# Patient Record
Sex: FEMALE | Race: WHITE | Employment: FULL TIME | URBAN - METROPOLITAN AREA
[De-identification: names, ages, dates, MRNs, and addresses within clinical notes are randomized per-mention and may not be internally consistent; named-entity substitution may affect disease eponyms.]

---

## 2017-03-27 DIAGNOSIS — R53.83 FATIGUE, UNSPECIFIED TYPE: ICD-10-CM

## 2017-03-27 DIAGNOSIS — E55.9 VITAMIN D DEFICIENCY: Primary | ICD-10-CM

## 2017-03-27 RX ORDER — PRAVASTATIN SODIUM 20 MG
20 TABLET ORAL EVERY EVENING
Qty: 90 TABLET | Refills: 1 | Status: SHIPPED | OUTPATIENT
Start: 2017-03-27 | End: 2018-06-11 | Stop reason: SDUPTHER

## 2017-04-06 DIAGNOSIS — R53.83 FATIGUE, UNSPECIFIED TYPE: ICD-10-CM

## 2017-04-06 DIAGNOSIS — E55.9 VITAMIN D DEFICIENCY: ICD-10-CM

## 2017-04-06 LAB
CHOLESTEROL, TOTAL: 165 MG/DL (ref 0–199)
FOLATE: 14.4 NG/ML (ref 7.3–26.1)
HDLC SERPL-MCNC: 46 MG/DL (ref 40–59)
LDL CHOLESTEROL CALCULATED: 97 MG/DL (ref 0–129)
TRIGL SERPL-MCNC: 108 MG/DL (ref 0–200)
VITAMIN B-12: 416 PG/ML (ref 211–946)
VITAMIN D 25-HYDROXY: 40.6 NG/ML (ref 30–100)

## 2017-04-14 LAB — HEPATITIS C ANTIBODY INTERPRETATION: NORMAL

## 2017-05-05 ENCOUNTER — EMPLOYEE WELLNESS (OUTPATIENT)
Dept: OTHER | Age: 57
End: 2017-05-05

## 2017-05-05 LAB
CHOLESTEROL, TOTAL: 166 MG/DL (ref 0–199)
GLUCOSE BLD-MCNC: 112 MG/DL (ref 74–109)
HDLC SERPL-MCNC: 48 MG/DL (ref 40–59)
LDL CHOLESTEROL CALCULATED: 99 MG/DL (ref 0–129)
TRIGL SERPL-MCNC: 94 MG/DL (ref 0–200)

## 2017-05-31 DIAGNOSIS — J98.8 RESPIRATORY TRACT INFECTION: Primary | ICD-10-CM

## 2017-05-31 RX ORDER — AZITHROMYCIN 250 MG/1
TABLET, FILM COATED ORAL
Qty: 1 PACKET | Refills: 0 | Status: SHIPPED | OUTPATIENT
Start: 2017-05-31 | End: 2017-06-10

## 2017-06-13 DIAGNOSIS — E55.9 VITAMIN D DEFICIENCY: ICD-10-CM

## 2017-06-13 RX ORDER — LISINOPRIL 10 MG/1
TABLET ORAL
Qty: 90 TABLET | Refills: 3 | Status: SHIPPED | OUTPATIENT
Start: 2017-06-13 | End: 2018-07-03 | Stop reason: SDUPTHER

## 2017-06-13 RX ORDER — BUPROPION HYDROCHLORIDE 200 MG/1
TABLET, EXTENDED RELEASE ORAL
Qty: 90 TABLET | Refills: 3 | Status: SHIPPED | OUTPATIENT
Start: 2017-06-13 | End: 2018-07-03 | Stop reason: SDUPTHER

## 2017-08-03 ENCOUNTER — OFFICE VISIT (OUTPATIENT)
Dept: SURGERY | Age: 57
End: 2017-08-03

## 2017-08-03 VITALS
WEIGHT: 230 LBS | DIASTOLIC BLOOD PRESSURE: 81 MMHG | HEART RATE: 78 BPM | SYSTOLIC BLOOD PRESSURE: 138 MMHG | HEIGHT: 65 IN | BODY MASS INDEX: 38.32 KG/M2

## 2017-08-03 DIAGNOSIS — E03.9 HYPOTHYROIDISM, UNSPECIFIED TYPE: ICD-10-CM

## 2017-08-03 DIAGNOSIS — E04.2 GOITER, NONTOXIC, MULTINODULAR: ICD-10-CM

## 2017-08-03 LAB
ANION GAP SERPL CALCULATED.3IONS-SCNC: 13 MEQ/L (ref 7–13)
BUN BLDV-MCNC: 11 MG/DL (ref 6–20)
CALCIUM SERPL-MCNC: 8.9 MG/DL (ref 8.6–10.2)
CHLORIDE BLD-SCNC: 104 MEQ/L (ref 98–107)
CO2: 24 MEQ/L (ref 22–29)
CREAT SERPL-MCNC: 0.85 MG/DL (ref 0.5–0.9)
CREATININE URINE: 189.4 MG/DL
GFR AFRICAN AMERICAN: >60
GFR NON-AFRICAN AMERICAN: >60
GLUCOSE BLD-MCNC: 127 MG/DL (ref 74–109)
GLUCOSE BLD-MCNC: 99 MG/DL
HBA1C MFR BLD: 6.9 % (ref 4.8–5.9)
MICROALBUMIN UR-MCNC: <1.2 MG/DL
MICROALBUMIN/CREAT UR-RTO: NORMAL MG/G (ref 0–30)
POTASSIUM SERPL-SCNC: 4.6 MEQ/L (ref 3.5–5.1)
SODIUM BLD-SCNC: 141 MEQ/L (ref 132–144)

## 2017-08-03 PROCEDURE — 82962 GLUCOSE BLOOD TEST: CPT | Performed by: INTERNAL MEDICINE

## 2017-08-03 PROCEDURE — 99213 OFFICE O/P EST LOW 20 MIN: CPT | Performed by: INTERNAL MEDICINE

## 2017-08-03 RX ORDER — LEVOTHYROXINE SODIUM 0.05 MG/1
TABLET ORAL
Qty: 90 TABLET | Refills: 3 | Status: SHIPPED | OUTPATIENT
Start: 2017-08-03 | End: 2018-10-06 | Stop reason: SDUPTHER

## 2017-08-19 ENCOUNTER — HOSPITAL ENCOUNTER (OUTPATIENT)
Dept: ULTRASOUND IMAGING | Age: 57
Discharge: HOME | End: 2017-08-19
Payer: COMMERCIAL

## 2017-08-19 DIAGNOSIS — E03.9 HYPOTHYROIDISM, UNSPECIFIED TYPE: ICD-10-CM

## 2017-08-19 PROCEDURE — 76536 US EXAM OF HEAD AND NECK: CPT

## 2017-08-24 ENCOUNTER — TELEPHONE (OUTPATIENT)
Dept: SURGERY | Age: 57
End: 2017-08-24

## 2017-09-11 DIAGNOSIS — E55.9 VITAMIN D DEFICIENCY: ICD-10-CM

## 2017-11-03 ENCOUNTER — OFFICE VISIT (OUTPATIENT)
Dept: SURGERY | Age: 57
End: 2017-11-03

## 2017-11-03 VITALS
BODY MASS INDEX: 38.15 KG/M2 | HEART RATE: 86 BPM | HEIGHT: 65 IN | SYSTOLIC BLOOD PRESSURE: 132 MMHG | DIASTOLIC BLOOD PRESSURE: 71 MMHG | WEIGHT: 229 LBS

## 2017-11-03 DIAGNOSIS — E03.9 HYPOTHYROIDISM, UNSPECIFIED TYPE: ICD-10-CM

## 2017-11-03 LAB
ANION GAP SERPL CALCULATED.3IONS-SCNC: 15 MEQ/L (ref 7–13)
BUN BLDV-MCNC: 13 MG/DL (ref 6–20)
CALCIUM SERPL-MCNC: 9.2 MG/DL (ref 8.6–10.2)
CHLORIDE BLD-SCNC: 103 MEQ/L (ref 98–107)
CO2: 26 MEQ/L (ref 22–29)
CREAT SERPL-MCNC: 0.83 MG/DL (ref 0.5–0.9)
GFR AFRICAN AMERICAN: >60
GFR NON-AFRICAN AMERICAN: >60
GLUCOSE BLD-MCNC: 111 MG/DL (ref 74–109)
GLUCOSE BLD-MCNC: 87 MG/DL
HBA1C MFR BLD: 6.8 % (ref 4.8–5.9)
POTASSIUM SERPL-SCNC: 4.6 MEQ/L (ref 3.5–5.1)
SODIUM BLD-SCNC: 144 MEQ/L (ref 132–144)
T4 FREE: 1.2 NG/DL (ref 0.93–1.7)
TSH SERPL DL<=0.05 MIU/L-ACNC: 0.87 UIU/ML (ref 0.27–4.2)

## 2017-11-03 PROCEDURE — 82962 GLUCOSE BLOOD TEST: CPT | Performed by: INTERNAL MEDICINE

## 2017-11-03 PROCEDURE — 99213 OFFICE O/P EST LOW 20 MIN: CPT | Performed by: INTERNAL MEDICINE

## 2017-11-03 RX ORDER — DEXTROMETHORPHAN HYDROBROMIDE AND PROMETHAZINE HYDROCHLORIDE 15; 6.25 MG/5ML; MG/5ML
5 SYRUP ORAL 3 TIMES DAILY PRN
Qty: 118 ML | Refills: 1 | Status: SHIPPED | OUTPATIENT
Start: 2017-11-03 | End: 2017-11-10

## 2017-11-03 NOTE — PROGRESS NOTES
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 This is a TR 1 lesion.           Impression       NORMAL SIZE THYROID GLAND       NO EVIDENCE OF DOMINANT NODULE.       0.9 X 0.6 X 0.6 CM NODULE RIGHT LOBE, TR3 LESION.  THE NODULE HAS DECREASED IN SIZE SINCE LAST EXAM.       ACR RECOMMENDATIONS FOR TR3 LESION:   TR3: MILDLY SUSPICIOUS; ? 1.5 CM FOLLOW UP, ? 2.5 CM FNA             FOLLOW UP: 1, 3 AND 5 YEARS       ACR RECOMMENDATIONS FOR  TR 1 LESION:   TR1: BENIGN; NO FNA REQUIRED               Patient Active Problem List   Diagnosis    Vitamin D deficiency    Weight gain    Generalized anxiety disorder    Other specified acquired hypothyroidism    Sleep apnea, obstructive    Type II diabetes mellitus, uncontrolled ([facility]    Hypothyroidism    Essential hypertension    Family history of coronary artery disease    History of tobacco abuse       Current Outpatient Prescriptions:     promethazine-dextromethorphan (PROMETHAZINE-DM) 6.25-15 MG/5ML syrup, Take 5 mLs by mouth 3 times daily as needed for Cough, Disp: 118 mL, Rfl: 1    metFORMIN (GLUCOPHAGE) 500 MG tablet, TAKE ONE TABLET BY MOUTH TWICE A DAY WITH MEALS, Disp: 180 tablet, Rfl: 3    levothyroxine (SYNTHROID) 50 MCG tablet, TAKE ONE TABLET BY MOUTH EVERY DAY, Disp: 90 tablet, Rfl: 03    Liraglutide (VICTOZA) 18 MG/3ML SOPN SC injection, Inject 1.8mg daily please give 9 pens for a 90 day supply, Disp: 9 Pen, Rfl: 3    lisinopril (PRINIVIL;ZESTRIL) 10 MG tablet, TAKE ONE TABLET BY MOUTH ONE TIME A DAY, Disp: 90 tablet, Rfl: 3    buPROPion (WELLBUTRIN SR) 200 MG extended release tablet, TAKE ONE TABLET BY MOUTH ONE TIME A DAY, Disp: 90 tablet, Rfl: 3    pravastatin (PRAVACHOL) 20 MG tablet, Take 1 tablet by mouth every evening, Disp: 90 tablet, Rfl: 1    Insulin Pen Needle (NOVOFINE) 32G X 6 MM MISC, Use qd, Disp: 100 each, Rfl: 3    aspirin 81 MG EC tablet, Take 81 mg by mouth daily, Disp: , Rfl:     Cholecalciferol (VITAMIN D) 2000 UNITS CAPS capsule, Take by mouth daily, Disp: , Rfl:     ibuprofen (ADVIL;MOTRIN) 200 MG tablet, Take 200 mg by mouth every 6 hours as needed for Pain, Disp: , Rfl:     loratadine (CLARITIN) 10 MG tablet, Take 10 mg by mouth daily, Disp: , Rfl:     meclizine (ANTIVERT) 12.5 MG tablet, Take one (1) tablet tid as needed for vertigo, Disp: 60 tablet, Rfl: 0    Review of Systems   Constitutional: Positive for fatigue. All other systems reviewed and are negative. Vitals:    11/03/17 1613   BP: 132/71   Site: Right Arm   Position: Sitting   Cuff Size: Large Adult   Pulse: 86   Weight: 229 lb (103.9 kg)   Height: 5' 5\" (1.651 m)       Objective:   Physical Exam   Constitutional: She appears well-developed and well-nourished. Eyes: Conjunctivae are normal.   Neck: Neck supple. Cardiovascular: Normal rate. Pulmonary/Chest: Effort normal.   Abdominal:   Obese    Musculoskeletal: Normal range of motion. Neurological: She is alert. Skin: Skin is warm. Psychiatric: She has a normal mood and affect. Results for Munir Lew (MRN [id number]) as of 11/3/2017 16:27   Ref. Range 11/3/2017 10:37   Sodium Latest Ref Range: 132 - 144 mEq/L 144   Potassium Latest Ref Range: 3.5 - 5.1 mEq/L 4.6   Chloride Latest Ref Range: 98 - 107 mEq/L 103   CO2 Latest Ref Range: 22 - 29 mEq/L 26   BUN Latest Ref Range: 6 - 20 mg/dL 13   Creatinine Latest Ref Range: 0.50 - 0.90 mg/dL 0.83   Anion Gap Latest Ref Range: 7 - 13 mEq/L 15 (H)   GFR Non- Latest Ref Range: >60  >60.0   GFR African American Latest Ref Range: >60  >60.0   Glucose Latest Ref Range: 74 - 109 mg/dL 111 (H)   Calcium Latest Ref Range: 8.6 - 10.2 mg/dL 9.2   Hemoglobin A1C Latest Ref Range: 4.8 - 5.9 % 6.8 (H)   TSH Latest Ref Range: 0.270 - 4.200 uIU/mL 0.872   T4 Free Latest Ref Range: 0.93 - 1.70 ng/dL 1.20       Assessment:      1.  Uncontrolled type 2 diabetes mellitus without complication, without long-term current use of insulin ([facility]  POCT Glucose   2. Hypothyroidism, unspecified type             Plan:      Orders Placed This Encounter   Procedures    Basic Metabolic Panel     Standing Status:   Future     Standing Expiration Date:   11/3/2018    Hemoglobin A1C     Standing Status:   Future     Standing Expiration Date:   11/3/2018    Microalbumin / Creatinine Urine Ratio     Standing Status:   Future     Standing Expiration Date:   11/3/2018    T4, Free     Standing Status:   Future     Standing Expiration Date:   11/3/2018    TSH without Reflex     Standing Status:   Future     Standing Expiration Date:   11/3/2018    POCT Glucose     The current medical regimen is effective;  continue present plan and medications.

## 2017-11-06 ENCOUNTER — CLINICAL DOCUMENTATION (OUTPATIENT)
Dept: PHARMACY | Facility: CLINIC | Age: 57
End: 2017-11-06

## 2017-11-06 NOTE — LETTER
Laila Jean [address] [facility] [address]           11/06/17     Dear Jose Cuenca! You have completed the requirements to participate in the [facility]) Diabetes Management program for 2018. What you will receive? You will receive beginning Jan. 1 up to $600 in waived copays ($300 if entering the program on July 1) for specific medications and pharmacy-related supplies purchased through the [facility]. A list of these items is available on the [place] [employer] Diabetes Management page (Employee Quick Links > Human Resources > Benefits and Well Being > Diabetes Management) and on [employer] under the Diabetes Management tile. In addition, clinical support will be available if your A1c becomes greater than 8 percent. To reduce your health risks, our care coordinators and diabetes educators will assist you in better controlling your condition. What you will need to do? To maintain your benefit throughout the year and be eligible to receive the benefit next year, you must make sure you complete all the following ongoing requirements:  ? Requirements to be completed throughout year:  ? Take diabetes medication as prescribed as well as cholesterol (Statin) or high blood pressure (ACE/ARB), if needed. o 70% adherence is required of diabetes medications  o Provide documentation if cholesterol and/or high blood pressure medications are not needed. ? Lipid panel (once yearly)  ? Urine albumin (once yearly)  ? Pneumonia Vaccination (as indicated)  ? Requirements to be completed between Jan. 1 and June 30:  ? Visit with your physician (First yearly visit)  ? Meet with a [address] [place] [address] pharmacist in person at a hospital location or by phone (This visit may be conducted prior to the start of the requirements period.)  ? First A1c  ?  Requirements to be completed between July 1 and Dec. 31  ? Visit with your physician (Second yearly visit) ? Second A1c  ? Flu vaccination   ? Requirements if A1c is greater than 8 percent:  ? Engage with a [facility]) diabetes educator at one of our hospital locations or an ambulatory care coordinator by phone, if your A1c is greater than 8 percent. We will be reviewing your [employer] account and sending you reminders for needed actions. Please remember if you dont have a [address], you will need to submit documentation to [email or web address] or by fax to [phone]. As a reminder, if programs requirements are not met, your benefit may be terminated and you will not be eligible to participate in the program next year. Thank you for participating in the program and taking steps to improve your health.

## 2018-01-19 ENCOUNTER — SCHEDULED TELEPHONE ENCOUNTER (OUTPATIENT)
Dept: PHARMACY | Facility: CLINIC | Age: 58
End: 2018-01-19

## 2018-01-19 NOTE — TELEPHONE ENCOUNTER
[facility]) Employee Diabetes Program    Two attempts made to reach patient by telephone for pharmacist appointment. Left voice message for patient to return clinician's phone call to [phone]. Will prepare letter and mail to patient.     Sydnee Sears Tracking Only    PHSO: Yes  Time Spent (min): 5

## 2018-01-22 ENCOUNTER — OFFICE VISIT (OUTPATIENT)
Dept: FAMILY MEDICINE CLINIC | Age: 58
End: 2018-01-22
Payer: COMMERCIAL

## 2018-01-22 VITALS
BODY MASS INDEX: 38.32 KG/M2 | DIASTOLIC BLOOD PRESSURE: 86 MMHG | SYSTOLIC BLOOD PRESSURE: 152 MMHG | TEMPERATURE: 101.1 F | HEIGHT: 65 IN | OXYGEN SATURATION: 98 % | RESPIRATION RATE: 14 BRPM | WEIGHT: 230 LBS | HEART RATE: 76 BPM

## 2018-01-22 DIAGNOSIS — H65.193 ACUTE MIDDLE EAR EFFUSION, BILATERAL: ICD-10-CM

## 2018-01-22 DIAGNOSIS — Z20.828 EXPOSURE TO THE FLU: ICD-10-CM

## 2018-01-22 DIAGNOSIS — J01.40 ACUTE NON-RECURRENT PANSINUSITIS: Primary | ICD-10-CM

## 2018-01-22 DIAGNOSIS — R50.9 FEVER, UNSPECIFIED FEVER CAUSE: ICD-10-CM

## 2018-01-22 LAB
INFLUENZA A ANTIBODY: NORMAL
INFLUENZA B ANTIBODY: NORMAL

## 2018-01-22 PROCEDURE — 87804 INFLUENZA ASSAY W/OPTIC: CPT | Performed by: NURSE PRACTITIONER

## 2018-01-22 PROCEDURE — 99213 OFFICE O/P EST LOW 20 MIN: CPT | Performed by: NURSE PRACTITIONER

## 2018-01-22 RX ORDER — AZITHROMYCIN 250 MG/1
TABLET, FILM COATED ORAL
Qty: 1 PACKET | Refills: 0 | Status: SHIPPED | OUTPATIENT
Start: 2018-01-22 | End: 2018-03-09 | Stop reason: ALTCHOICE

## 2018-01-22 RX ORDER — OSELTAMIVIR PHOSPHATE 75 MG/1
75 CAPSULE ORAL 2 TIMES DAILY
Qty: 10 CAPSULE | Refills: 0 | Status: SHIPPED | OUTPATIENT
Start: 2018-01-22 | End: 2018-01-27

## 2018-01-22 ASSESSMENT — ENCOUNTER SYMPTOMS
ABDOMINAL PAIN: 0
SINUS PAIN: 1
TROUBLE SWALLOWING: 0
VOMITING: 0
SORE THROAT: 0
NAUSEA: 0
ABDOMINAL DISTENTION: 0
DIARRHEA: 0
COUGH: 1
RHINORRHEA: 0
WHEEZING: 0
CHEST TIGHTNESS: 0
SINUS PRESSURE: 1

## 2018-01-22 NOTE — PROGRESS NOTES
CHOLECYSTECTOMY, LAPAROSCOPIC  1995    ENDOMETRIAL ABLATION  2002    metrorrhagia    HYSTERECTOMY  9/7/12    fibroid, cervicitis, ovaries intact    TONSILLECTOMY      TUBAL LIGATION  1984     Social History     Social History    Marital status:      Spouse name: N/A    Number of children: 3    Years of education: N/A     Occupational History    clinical [facility] [employer] [facility] [employer] [place] ,  [address]     Social History Main Topics    Smoking status: Former Smoker     Quit date: 6/12/2005    Smokeless tobacco: Never Used    Alcohol use No    Drug use: No    Sexual activity: Not on file     Other Topics Concern    Not on file     Social History Narrative    Born in [place], one of 4 children (2 boys and 2 girls), both parents in the [employer], moved to [place]    Mother in [place], has memory problems    [employer]     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    Has 2 dogs, Blossom and [address], 2 sons in the area, one daughter in [address]: dogs, sawing, machine embroidery, baking     Family History   Problem Relation Age of Onset    Arrhythmia Mother     Hypertension Mother     COPD Father     Diabetes Paternal Grandmother     Diabetes Paternal Grandfather     Dementia Mother     Alcohol Abuse Father     Schizophrenia Brother      No Known Allergies  Current Outpatient Prescriptions   Medication Sig Dispense Refill    azithromycin (ZITHROMAX) 250 MG tablet Take 2 tabs (500 mg) on Day 1, and take 1 tab (250 mg) on days 2 through 5. 1 packet 0    oseltamivir (TAMIFLU) 75 MG capsule Take 1 capsule by mouth 2 times daily for 5 days 10 capsule 0    metFORMIN (GLUCOPHAGE) 500 MG tablet TAKE ONE TABLET BY MOUTH TWICE A DAY WITH MEALS 180 tablet 3    levothyroxine (SYNTHROID) 50 MCG tablet TAKE ONE TABLET BY MOUTH EVERY DAY 90 tablet 03    Liraglutide (VICTOZA) 18 MG/3ML SOPN SC injection Inject 1.8mg daily please give 9 pens for a 90 day

## 2018-01-23 ENCOUNTER — SCHEDULED TELEPHONE ENCOUNTER (OUTPATIENT)
Dept: PHARMACY | Facility: CLINIC | Age: 58
End: 2018-01-23

## 2018-01-23 RX ORDER — LANCETS 33 GAUGE
EACH MISCELLANEOUS
Qty: 200 EACH | Refills: 3 | Status: SHIPPED | OUTPATIENT
Start: 2018-01-23 | End: 2019-01-15

## 2018-01-23 RX ORDER — BLOOD-GLUCOSE METER
EACH MISCELLANEOUS
Qty: 1 DEVICE | Refills: 0 | Status: SHIPPED | OUTPATIENT
Start: 2018-01-23 | End: 2019-01-15

## 2018-01-23 NOTE — TELEPHONE ENCOUNTER
in date. Patient states she has plenty of Easy Max supplies at home, but she is interested in switching to Agamatrix. Patient uses mail order pharmacy.     Allergies:  No Known Allergies   Vitals/Labs:  BP Readings from Last 3 Encounters:   01/22/18 (!) 152/86   11/03/17 132/71   08/03/17 138/81     Lab Results   Component Value Date    LABMICR <1.20 08/03/2017     Lab Results   Component Value Date    LABA1C 6.8 (H) 11/03/2017    LABA1C 6.9 (H) 08/03/2017    LABA1C 6.1 (H) 04/11/2016     Lab Results   Component Value Date    CHOL 166 05/05/2017     Lab Results   Component Value Date    TRIG 94 05/05/2017     Lab Results   Component Value Date    HDL 48 05/05/2017     Lab Results   Component Value Date    LDLCALC 99 05/05/2017     ALT   Date Value Ref Range Status   11/16/2015 22 0 - 33 U/L Final     The 10-year ASCVD risk score (Thao Marie et al., 2013) is: 6.7%    Values used to calculate the score:      Age: 56 years      Sex: Female      Is Non- : No      Diabetic: Yes      Tobacco smoker: No      Systolic Blood Pressure: 982 mmHg      Is BP treated: Yes      HDL Cholesterol: 48 mg/dL      Total Cholesterol: 166 mg/dL     CrCl: > 100 ml/min based on SCr of 0.83  eGFR: > 60 mL/min/1.73 m^2    Immunizations:  Immunization History   Administered Date(s) Administered    Influenza Vaccine, unspecified formulation 10/04/2016    Influenza Virus Vaccine 10/20/2014, 10/12/2015, 10/25/2017      Smoking Status:  History   Smoking Status    Former Smoker    Quit date: 6/12/2005   Smokeless Tobacco    Never Used      ASSESSMENT:  Initial Program Requirements (to be completed by 7/1/2018):  [] OV with provider for DM (1st)  [] A1c (1st)  [x] On statin or contraindication(s) Pravastatin  [x] On ACEi/ARB or contraindication(s) Lisinopril     Ongoing Program Requirements (to be completed by 12/15/2018):  [] OV with provider for DM (2nd)  [x] ACC/diabetes educator visit (if A1c over 8%) - not required at this time  [] A1c (2nd)  [] Lipid panel  [] Urine protein  [] Pneumococcal vaccination: up to date  [] Influenza vaccination for 5174-1619  [] Medication adherence over 70%    Formulary Medication Review:  Non-formulary or medications with cost-effective alternatives: On EasyMax, but has not tried Agamatrix, which may be considered. Current medications eligible for copay waiver, up to $600, through [facility]) (mail order) Pharmacy:  - Victoza, Lisinopril, Metformin, and Pravastatin  - Generic ([place] pharmacy-stocked) insulin syringes and pen needles  - Agamatrix meter and supplies     Other Considerations:  - Glycemic Goal: <7.0%. Is at blood glucose goal..   - Blood Pressure Goal: BP less than 140/90 mmHg due to history of DM: Is at blood pressure goal   - Lipids: Patient is prescribed low-intensity statin therapy. - Smoking status: quit date 6/12/2005    PLAN:  - Consideration(s) for provider:   · Increase Pravastatin from 20 mg daily to 40 mg daily (low to moderate)  · Agamatrix conversion  - DM program gaps identified:   · Initial requirements: OV with provider for DM (1st) and A1c (1st)   · Ongoing requirements: OV with provider for DM (2nd), A1c (2nd), Lipid panel, Pneumococcal vaccination: up to date, Influenza vaccination for 8835-5801, Medication adherence over 70% and Urine Protein  - Education to patient: Counseling at today's visit: discussed the need for weight loss and Medication Adherence.   Program Overview  - Follow up: PCP for identified gaps or as scheduled below  - Upcoming appointments:   Future Appointments  Date Time Provider Elizabeth Brock   5/14/2018 9:15 AM Ana Mena MD [address] [facility], [address] [facility], PharmD  [address] [facility]   Phone: [phone]    For Pharmacy [address]: Yes  Total # of Interventions Recommended: 3  - Increased Dose #: 0  - New Order #: 1 New Medication Order Reason(s): Cost Conversion  -

## 2018-02-05 DIAGNOSIS — R19.7 DIARRHEA OF PRESUMED INFECTIOUS ORIGIN: Primary | ICD-10-CM

## 2018-02-05 DIAGNOSIS — R19.7 DIARRHEA OF PRESUMED INFECTIOUS ORIGIN: ICD-10-CM

## 2018-02-05 RX ORDER — AMOXICILLIN 500 MG/1
500 CAPSULE ORAL 3 TIMES DAILY
Qty: 21 CAPSULE | Refills: 0 | Status: SHIPPED | OUTPATIENT
Start: 2018-02-05 | End: 2018-02-12

## 2018-02-06 LAB — GI BACTERIAL PATHOGENS BY PCR: NORMAL

## 2018-03-09 ENCOUNTER — OFFICE VISIT (OUTPATIENT)
Dept: INTERNAL MEDICINE CLINIC | Age: 58
End: 2018-03-09
Payer: COMMERCIAL

## 2018-03-09 VITALS
TEMPERATURE: 97.6 F | HEIGHT: 65 IN | BODY MASS INDEX: 37.42 KG/M2 | HEART RATE: 84 BPM | DIASTOLIC BLOOD PRESSURE: 60 MMHG | WEIGHT: 224.6 LBS | OXYGEN SATURATION: 98 % | SYSTOLIC BLOOD PRESSURE: 110 MMHG

## 2018-03-09 DIAGNOSIS — Z12.11 COLON CANCER SCREENING: ICD-10-CM

## 2018-03-09 DIAGNOSIS — Z12.2 ENCOUNTER FOR SCREENING FOR LUNG CANCER: ICD-10-CM

## 2018-03-09 DIAGNOSIS — Z86.69 HISTORY OF SLEEP APNEA: ICD-10-CM

## 2018-03-09 DIAGNOSIS — Z12.39 BREAST CANCER SCREENING: ICD-10-CM

## 2018-03-09 DIAGNOSIS — Z00.00 ENCOUNTER FOR ANNUAL PHYSICAL EXAM: Primary | ICD-10-CM

## 2018-03-09 DIAGNOSIS — Z87.891 PERSONAL HISTORY OF TOBACCO USE: ICD-10-CM

## 2018-03-09 PROCEDURE — 90471 IMMUNIZATION ADMIN: CPT | Performed by: INTERNAL MEDICINE

## 2018-03-09 PROCEDURE — 99396 PREV VISIT EST AGE 40-64: CPT | Performed by: INTERNAL MEDICINE

## 2018-03-09 PROCEDURE — G0296 VISIT TO DETERM LDCT ELIG: HCPCS | Performed by: INTERNAL MEDICINE

## 2018-03-09 PROCEDURE — 90732 PPSV23 VACC 2 YRS+ SUBQ/IM: CPT | Performed by: INTERNAL MEDICINE

## 2018-03-09 ASSESSMENT — ENCOUNTER SYMPTOMS
ABDOMINAL PAIN: 0
SHORTNESS OF BREATH: 0
GASTROINTESTINAL NEGATIVE: 1
RESPIRATORY NEGATIVE: 1
PHOTOPHOBIA: 0
EYE PAIN: 0
COUGH: 0
CHOKING: 0
CHEST TIGHTNESS: 0
BLOOD IN STOOL: 0
TROUBLE SWALLOWING: 0

## 2018-03-09 ASSESSMENT — PATIENT HEALTH QUESTIONNAIRE - PHQ9
SUM OF ALL RESPONSES TO PHQ QUESTIONS 1-9: 0
SUM OF ALL RESPONSES TO PHQ9 QUESTIONS 1 & 2: 0
1. LITTLE INTEREST OR PLEASURE IN DOING THINGS: 0
2. FEELING DOWN, DEPRESSED OR HOPELESS: 0

## 2018-03-09 NOTE — PROGRESS NOTES
History     Social History    Marital status:      Spouse name: N/A    Number of children: 3    Years of education: N/A     Occupational History    clinical [address] ,  [address]     Social History Main Topics    Smoking status: Former Smoker     Packs/day: 1.00     Years: 30.00     Types: Cigarettes     Start date: 3/5/1975     Quit date: 6/12/2005    Smokeless tobacco: Never Used    Alcohol use No      Comment: not at all     Drug use: No    Sexual activity: Not on file     Other Topics Concern    Not on file     Social History Narrative    Born in [place], one of 4 children (2 boys and 2 girls), both parents in the [employer], moved to [place]    Mother in [place], has memory problems    [employer]     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 ALLERGIES    Patient has no known allergies.     Current Outpatient Prescriptions on File Prior to Visit   Medication Sig Dispense Refill    Blood Glucose Monitoring Suppl (AGAMATRIX PRESTO PRO METER) CLARA Use to test blood glucose two times per day 1 Device 0    glucose blood VI test strips (AGAMATRIX PRESTO TEST) strip 1 each by In Vitro route 2 times daily 200 each 3    AGAMATRIX ULTRA-THIN LANCETS MISC Use to test blood glucose two times per day 200 each 3    metFORMIN (GLUCOPHAGE) 500 MG tablet TAKE ONE TABLET BY MOUTH TWICE A DAY WITH MEALS (Patient taking differently: TAKE ONE TABLET BY MOUTH DAILY) 180 tablet 3    levothyroxine (SYNTHROID) 50 MCG tablet TAKE ONE TABLET BY MOUTH EVERY DAY 90 tablet 03    Liraglutide (VICTOZA) 18 MG/3ML SOPN SC injection Inject 1.8mg daily please give 9 pens for a 90 day supply 9 Pen 3    lisinopril (PRINIVIL;ZESTRIL) 10 MG tablet TAKE ONE TABLET BY MOUTH ONE TIME A DAY 90 tablet 3    buPROPion (WELLBUTRIN SR) 200 MG extended release tablet TAKE ONE TABLET BY MOUTH ONE TIME A DAY 90 tablet 3    pravastatin (PRAVACHOL) 20 MG tablet Take 1 tablet by mouth every evening 90 tablet 1    Insulin Pen Needle (NOVOFINE) 32G X 6 MM MISC Use qd 100 each 3    aspirin 81 MG EC tablet Take 81 mg by mouth daily      Cholecalciferol (VITAMIN D) 2000 UNITS CAPS capsule Take by mouth daily      ibuprofen (ADVIL;MOTRIN) 200 MG tablet Take 200 mg by mouth every 6 hours as needed for Pain      loratadine (CLARITIN) 10 MG tablet Take 10 mg by mouth daily as needed (Allergies)        No current facility-administered medications on file prior to visit. Review of Systems   Constitutional: Negative for activity change, fatigue and unexpected weight change. HENT: Negative for congestion, ear pain, nosebleeds and trouble swallowing. Eyes: Negative for photophobia and pain. Respiratory: Negative. Negative for cough, choking, chest tightness and shortness of breath. Cardiovascular: Negative. Negative for chest pain and palpitations. Gastrointestinal: Negative. Negative for abdominal pain and blood in stool. Endocrine: Negative. Negative for cold intolerance, heat intolerance, polydipsia and polyuria. Genitourinary: Negative. Negative for dysuria, frequency, hematuria and pelvic pain. Musculoskeletal: Negative for gait problem, neck pain and neck stiffness. Skin: Negative for rash. Neurological: Negative. Negative for dizziness, tremors, weakness and light-headedness. Psychiatric/Behavioral: Positive for sleep disturbance (sleep interrupted, snoring).  Negative for confusion, today for annual exam, health maintenance and immunizations. Diagnoses and all orders for this visit:    Encounter for annual physical exam  -     Pneumococcal polysaccharide vaccine 23-valent greater than or equal to 3yo subcutaneous/IM  -     Hiv-1,-2 W/Reflex To Hiv-1 Western Blot; Future    Breast cancer screening  -     LUKASZ DIGITAL SCREEN W CAD BILATERAL; Future    Colon cancer screening  -     Via Jenaro Seo MD - [facility]    Encounter for screening for lung cancer  -     CT LUNG SCREENING; Future    Personal history of tobacco use  -     MO VISIT TO DISCUSS LUNG CA SCREEN W LDCT  -     Cancel: CT Lung Screening; Future  -     CT LUNG SCREENING; Future    History of sleep apnea  -     Π[place]σει[place]νος MD Gustavo        Plan:    Reviewed with the patient (/and caregiver if present): current health status, medications, activities and diet. See also orders and comments in the assessment section. Patient advised that daily effort to improve diet (a consistent high fiber, low calorie, low sodium diet) and exercise habits (more aerobic exercise as tolerated), better stress management, will result in improved health and help in better management of the current chronic conditions in the long run.      Orders Placed This Encounter   Procedures    LUKASZ DIGITAL SCREEN W CAD BILATERAL     Standing Status:   Future     Standing Expiration Date:   3/9/2019    CT LUNG SCREENING     Standing Status:   Future     Standing Expiration Date:   3/9/2019    Pneumococcal polysaccharide vaccine 23-valent greater than or equal to 3yo subcutaneous/IM    Hiv-1,-2 W/Reflex To Hiv-1 Western Blot     Standing Status:   Future     Standing Expiration Date:   3/9/2019   [address] [facility]     Referral Priority:   Routine     Referral Type:   Consult for Advice and Opinion     Referral Reason:   Specialty Services Required     Referred to Provider:

## 2018-03-16 ENCOUNTER — TELEPHONE (OUTPATIENT)
Dept: CASE MANAGEMENT | Age: 58
End: 2018-03-16

## 2018-03-20 ENCOUNTER — OFFICE VISIT (OUTPATIENT)
Dept: PULMONOLOGY | Age: 58
End: 2018-03-20
Payer: COMMERCIAL

## 2018-03-20 VITALS
HEART RATE: 74 BPM | WEIGHT: 229 LBS | DIASTOLIC BLOOD PRESSURE: 62 MMHG | SYSTOLIC BLOOD PRESSURE: 138 MMHG | BODY MASS INDEX: 38.15 KG/M2 | OXYGEN SATURATION: 98 % | HEIGHT: 65 IN | TEMPERATURE: 98.5 F

## 2018-03-20 VITALS — WEIGHT: 231 LBS | BODY MASS INDEX: 38.44 KG/M2

## 2018-03-20 DIAGNOSIS — G47.33 OBSTRUCTIVE SLEEP APNEA: Primary | ICD-10-CM

## 2018-03-20 PROCEDURE — 99203 OFFICE O/P NEW LOW 30 MIN: CPT | Performed by: INTERNAL MEDICINE

## 2018-03-20 ASSESSMENT — ENCOUNTER SYMPTOMS
DIARRHEA: 0
ABDOMINAL PAIN: 0
WHEEZING: 0
CHEST TIGHTNESS: 0
VOMITING: 0
RHINORRHEA: 0
COUGH: 0
SINUS PRESSURE: 0
SHORTNESS OF BREATH: 1
NAUSEA: 0
SORE THROAT: 0

## 2018-03-23 ENCOUNTER — APPOINTMENT (OUTPATIENT)
Dept: CARDIAC CATH/INVASIVE PROCEDURES | Age: 58
End: 2018-03-23
Payer: COMMERCIAL

## 2018-03-23 ENCOUNTER — APPOINTMENT (OUTPATIENT)
Dept: GENERAL RADIOLOGY | Age: 58
End: 2018-03-23
Payer: COMMERCIAL

## 2018-03-23 ENCOUNTER — HOSPITAL ENCOUNTER (OUTPATIENT)
Age: 58
Setting detail: OBSERVATION
Discharge: HOME | End: 2018-03-23
Attending: EMERGENCY MEDICINE | Admitting: INTERNAL MEDICINE
Payer: COMMERCIAL

## 2018-03-23 ENCOUNTER — OFFICE VISIT (OUTPATIENT)
Dept: CARDIOLOGY CLINIC | Age: 58
End: 2018-03-23
Payer: COMMERCIAL

## 2018-03-23 VITALS
BODY MASS INDEX: 38.15 KG/M2 | SYSTOLIC BLOOD PRESSURE: 128 MMHG | WEIGHT: 229 LBS | DIASTOLIC BLOOD PRESSURE: 56 MMHG | OXYGEN SATURATION: 99 % | RESPIRATION RATE: 16 BRPM | HEIGHT: 65 IN | TEMPERATURE: 98.3 F | HEART RATE: 68 BPM

## 2018-03-23 VITALS — OXYGEN SATURATION: 98 % | HEART RATE: 80 BPM | DIASTOLIC BLOOD PRESSURE: 70 MMHG | SYSTOLIC BLOOD PRESSURE: 140 MMHG

## 2018-03-23 DIAGNOSIS — G47.33 SLEEP APNEA, OBSTRUCTIVE: ICD-10-CM

## 2018-03-23 DIAGNOSIS — I20.0 UNSTABLE ANGINA (HCC): ICD-10-CM

## 2018-03-23 DIAGNOSIS — I10 ESSENTIAL HYPERTENSION: ICD-10-CM

## 2018-03-23 DIAGNOSIS — I20.0 UNSTABLE ANGINA (HCC): Primary | ICD-10-CM

## 2018-03-23 DIAGNOSIS — Z87.891 HISTORY OF TOBACCO ABUSE: ICD-10-CM

## 2018-03-23 DIAGNOSIS — R94.31 ABNORMAL EKG: ICD-10-CM

## 2018-03-23 DIAGNOSIS — R07.2 PRECORDIAL PAIN: Primary | ICD-10-CM

## 2018-03-23 PROBLEM — R07.9 CHEST PAIN: Status: ACTIVE | Noted: 2018-03-23

## 2018-03-23 LAB
ALBUMIN SERPL-MCNC: 4.2 G/DL (ref 3.9–4.9)
ALP BLD-CCNC: 81 U/L (ref 40–130)
ALT SERPL-CCNC: 29 U/L (ref 0–33)
ANION GAP SERPL CALCULATED.3IONS-SCNC: 13 MEQ/L (ref 7–13)
APTT: 25.1 SEC (ref 21.6–35.4)
AST SERPL-CCNC: 20 U/L (ref 0–35)
BASOPHILS ABSOLUTE: 0.1 K/UL (ref 0–0.2)
BASOPHILS RELATIVE PERCENT: 1.2 %
BILIRUB SERPL-MCNC: 0.2 MG/DL (ref 0–1.2)
BUN BLDV-MCNC: 11 MG/DL (ref 6–20)
CALCIUM SERPL-MCNC: 9.2 MG/DL (ref 8.6–10.2)
CHLORIDE BLD-SCNC: 103 MEQ/L (ref 98–107)
CO2: 27 MEQ/L (ref 22–29)
CREAT SERPL-MCNC: 0.91 MG/DL (ref 0.5–0.9)
EKG ATRIAL RATE: 80 BPM
EKG P AXIS: 31 DEGREES
EKG P-R INTERVAL: 154 MS
EKG Q-T INTERVAL: 356 MS
EKG QRS DURATION: 86 MS
EKG QTC CALCULATION (BAZETT): 410 MS
EKG R AXIS: -2 DEGREES
EKG T AXIS: 94 DEGREES
EKG VENTRICULAR RATE: 80 BPM
EOSINOPHILS ABSOLUTE: 0.3 K/UL (ref 0–0.7)
EOSINOPHILS RELATIVE PERCENT: 3.1 %
GFR AFRICAN AMERICAN: >60
GFR NON-AFRICAN AMERICAN: >60
GLOBULIN: 3.2 G/DL (ref 2.3–3.5)
GLUCOSE BLD-MCNC: 110 MG/DL (ref 74–109)
HCT VFR BLD CALC: 38.1 % (ref 37–47)
HEMOGLOBIN: 12.6 G/DL (ref 12–16)
INR BLD: 0.9
LYMPHOCYTES ABSOLUTE: 2.2 K/UL (ref 1–4.8)
LYMPHOCYTES RELATIVE PERCENT: 23 %
MAGNESIUM: 2 MG/DL (ref 1.7–2.3)
MCH RBC QN AUTO: 28 PG (ref 27–31.3)
MCHC RBC AUTO-ENTMCNC: 33.1 % (ref 33–37)
MCV RBC AUTO: 84.6 FL (ref 82–100)
MONOCYTES ABSOLUTE: 0.6 K/UL (ref 0.2–0.8)
MONOCYTES RELATIVE PERCENT: 5.8 %
NEUTROPHILS ABSOLUTE: 6.5 K/UL (ref 1.4–6.5)
NEUTROPHILS RELATIVE PERCENT: 66.9 %
PDW BLD-RTO: 14.6 % (ref 11.5–14.5)
PLATELET # BLD: 354 K/UL (ref 130–400)
POTASSIUM SERPL-SCNC: 4 MEQ/L (ref 3.5–5.1)
PRO-BNP: 314 PG/ML
PROTHROMBIN TIME: 9.6 SEC (ref 8.1–13.7)
RBC # BLD: 4.51 M/UL (ref 4.2–5.4)
SODIUM BLD-SCNC: 143 MEQ/L (ref 132–144)
TOTAL CK: 122 U/L (ref 0–170)
TOTAL PROTEIN: 7.4 G/DL (ref 6.4–8.1)
TROPONIN: <0.01 NG/ML (ref 0–0.01)
TSH SERPL DL<=0.05 MIU/L-ACNC: 1.38 UIU/ML (ref 0.27–4.2)
WBC # BLD: 9.7 K/UL (ref 4.8–10.8)

## 2018-03-23 PROCEDURE — 6360000002 HC RX W HCPCS

## 2018-03-23 PROCEDURE — 93000 ELECTROCARDIOGRAM COMPLETE: CPT | Performed by: INTERNAL MEDICINE

## 2018-03-23 PROCEDURE — C1725 CATH, TRANSLUMIN NON-LASER: HCPCS

## 2018-03-23 PROCEDURE — 82550 ASSAY OF CK (CPK): CPT

## 2018-03-23 PROCEDURE — 2500000003 HC RX 250 WO HCPCS

## 2018-03-23 PROCEDURE — C1894 INTRO/SHEATH, NON-LASER: HCPCS

## 2018-03-23 PROCEDURE — 80053 COMPREHEN METABOLIC PANEL: CPT

## 2018-03-23 PROCEDURE — 71045 X-RAY EXAM CHEST 1 VIEW: CPT

## 2018-03-23 PROCEDURE — 6370000000 HC RX 637 (ALT 250 FOR IP)

## 2018-03-23 PROCEDURE — G0378 HOSPITAL OBSERVATION PER HR: HCPCS

## 2018-03-23 PROCEDURE — 2720000010 HC SURG SUPPLY STERILE

## 2018-03-23 PROCEDURE — 85730 THROMBOPLASTIN TIME PARTIAL: CPT

## 2018-03-23 PROCEDURE — 85025 COMPLETE CBC W/AUTO DIFF WBC: CPT

## 2018-03-23 PROCEDURE — 83880 ASSAY OF NATRIURETIC PEPTIDE: CPT

## 2018-03-23 PROCEDURE — 84484 ASSAY OF TROPONIN QUANT: CPT

## 2018-03-23 PROCEDURE — 93458 L HRT ARTERY/VENTRICLE ANGIO: CPT | Performed by: INTERNAL MEDICINE

## 2018-03-23 PROCEDURE — 99285 EMERGENCY DEPT VISIT HI MDM: CPT

## 2018-03-23 PROCEDURE — 36415 COLL VENOUS BLD VENIPUNCTURE: CPT

## 2018-03-23 PROCEDURE — 85610 PROTHROMBIN TIME: CPT

## 2018-03-23 PROCEDURE — 84443 ASSAY THYROID STIM HORMONE: CPT

## 2018-03-23 PROCEDURE — C1769 GUIDE WIRE: HCPCS

## 2018-03-23 PROCEDURE — 83735 ASSAY OF MAGNESIUM: CPT

## 2018-03-23 PROCEDURE — 2580000003 HC RX 258

## 2018-03-23 PROCEDURE — 93005 ELECTROCARDIOGRAM TRACING: CPT

## 2018-03-23 PROCEDURE — 6370000000 HC RX 637 (ALT 250 FOR IP): Performed by: EMERGENCY MEDICINE

## 2018-03-23 RX ORDER — ASPIRIN 81 MG/1
324 TABLET, CHEWABLE ORAL ONCE
Status: COMPLETED | OUTPATIENT
Start: 2018-03-23 | End: 2018-03-23

## 2018-03-23 RX ORDER — ONDANSETRON 2 MG/ML
4 INJECTION INTRAMUSCULAR; INTRAVENOUS EVERY 6 HOURS PRN
Status: CANCELLED | OUTPATIENT
Start: 2018-03-23

## 2018-03-23 RX ORDER — MORPHINE SULFATE 4 MG/ML
4 INJECTION, SOLUTION INTRAMUSCULAR; INTRAVENOUS
Status: CANCELLED | OUTPATIENT
Start: 2018-03-23

## 2018-03-23 RX ORDER — SODIUM CHLORIDE 0.9 % (FLUSH) 0.9 %
10 SYRINGE (ML) INJECTION PRN
Status: CANCELLED | OUTPATIENT
Start: 2018-03-23

## 2018-03-23 RX ORDER — ACETAMINOPHEN 325 MG/1
650 TABLET ORAL EVERY 4 HOURS PRN
Status: CANCELLED | OUTPATIENT
Start: 2018-03-23

## 2018-03-23 RX ORDER — ASPIRIN 81 MG/1
81 TABLET, CHEWABLE ORAL DAILY
Status: CANCELLED | OUTPATIENT
Start: 2018-03-24

## 2018-03-23 RX ORDER — HYDRALAZINE HYDROCHLORIDE 20 MG/ML
10 INJECTION INTRAMUSCULAR; INTRAVENOUS EVERY 10 MIN PRN
Status: CANCELLED | OUTPATIENT
Start: 2018-03-23

## 2018-03-23 RX ORDER — CARVEDILOL 3.12 MG/1
3.12 TABLET ORAL 2 TIMES DAILY WITH MEALS
Status: CANCELLED | OUTPATIENT
Start: 2018-03-23

## 2018-03-23 RX ORDER — ATORVASTATIN CALCIUM 40 MG/1
20 TABLET, FILM COATED ORAL NIGHTLY
Status: CANCELLED | OUTPATIENT
Start: 2018-03-23

## 2018-03-23 RX ORDER — MORPHINE SULFATE 2 MG/ML
2 INJECTION, SOLUTION INTRAMUSCULAR; INTRAVENOUS
Status: CANCELLED | OUTPATIENT
Start: 2018-03-23

## 2018-03-23 RX ORDER — NITROGLYCERIN 0.4 MG/1
0.4 TABLET SUBLINGUAL EVERY 5 MIN PRN
Status: CANCELLED | OUTPATIENT
Start: 2018-03-23

## 2018-03-23 RX ORDER — NITROGLYCERIN 0.4 MG/1
0.4 TABLET SUBLINGUAL EVERY 5 MIN PRN
Status: DISCONTINUED | OUTPATIENT
Start: 2018-03-23 | End: 2018-03-23

## 2018-03-23 RX ORDER — LABETALOL HYDROCHLORIDE 5 MG/ML
10 INJECTION, SOLUTION INTRAVENOUS EVERY 30 MIN PRN
Status: CANCELLED | OUTPATIENT
Start: 2018-03-23

## 2018-03-23 RX ORDER — SODIUM CHLORIDE 9 MG/ML
INJECTION, SOLUTION INTRAVENOUS CONTINUOUS
Status: CANCELLED | OUTPATIENT
Start: 2018-03-23 | End: 2018-03-23

## 2018-03-23 RX ORDER — SODIUM CHLORIDE 0.9 % (FLUSH) 0.9 %
10 SYRINGE (ML) INJECTION EVERY 12 HOURS SCHEDULED
Status: CANCELLED | OUTPATIENT
Start: 2018-03-23

## 2018-03-23 RX ORDER — NITROGLYCERIN 0.4 MG/1
0.4 TABLET SUBLINGUAL EVERY 5 MIN PRN
Qty: 25 TABLET | Refills: 3 | Status: SHIPPED | OUTPATIENT
Start: 2018-03-23 | End: 2020-09-21 | Stop reason: SDUPTHER

## 2018-03-23 RX ADMIN — NITROGLYCERIN 0.5 INCH: 20 OINTMENT TOPICAL at 13:25

## 2018-03-23 RX ADMIN — TICAGRELOR 180 MG: 90 TABLET ORAL at 14:05

## 2018-03-23 RX ADMIN — ASPIRIN 81 MG 324 MG: 81 TABLET ORAL at 13:25

## 2018-03-23 ASSESSMENT — ENCOUNTER SYMPTOMS
ABDOMINAL PAIN: 0
VOMITING: 0
COLOR CHANGE: 0
RHINORRHEA: 0
WHEEZING: 0
ABDOMINAL DISTENTION: 0
EYE DISCHARGE: 0
SHORTNESS OF BREATH: 0
PHOTOPHOBIA: 0
FACIAL SWELLING: 0

## 2018-03-23 ASSESSMENT — PAIN DESCRIPTION - FREQUENCY: FREQUENCY: CONTINUOUS

## 2018-03-23 ASSESSMENT — PAIN DESCRIPTION - DESCRIPTORS: DESCRIPTORS: TIGHTNESS

## 2018-03-23 ASSESSMENT — PAIN DESCRIPTION - LOCATION: LOCATION: CHEST

## 2018-03-23 ASSESSMENT — PAIN SCALES - GENERAL: PAINLEVEL_OUTOF10: 1

## 2018-03-23 ASSESSMENT — PAIN DESCRIPTION - PAIN TYPE: TYPE: ACUTE PAIN

## 2018-03-23 NOTE — ED PROVIDER NOTES
available at the time of this note:    XR CHEST PORTABLE    (Results Pending)         ED BEDSIDE ULTRASOUND:   Performed by ED Physician - none    LABS:  Labs Reviewed   CBC WITH AUTO DIFFERENTIAL - Abnormal; Notable for the following:        Result Value    RDW 14.6 (*)     All other components within normal limits   COMPREHENSIVE METABOLIC PANEL - Abnormal; Notable for the following:     Glucose 110 (*)     CREATININE 0.91 (*)     All other components within normal limits   APTT   TROPONIN   PROTIME-INR   TSH WITHOUT REFLEX   CK   MAGNESIUM   BRAIN NATRIURETIC PEPTIDE       All other labs were within normal range or not returned as of this dictation. EMERGENCY DEPARTMENT COURSE and DIFFERENTIAL DIAGNOSIS/MDM:   Vitals:    Vitals:    03/23/18 1259 03/23/18 1405   BP: (!) 185/70 (!) 149/74   Pulse: 90 82   Resp: 18 18   Temp: 98.3 °F (36.8 °C)    TempSrc: Oral    SpO2: 98% 97%   Weight: 229 lb (103.9 kg)    Height: 5' 5\" (1.651 m)        Patient is chest pain-free upon arrival to the emergency Department therefore she is placed on Nitropaste however nitro tabs are held. She is given a full dose aspirin as well as Brilinta per Dr. Gilda Harris request.  She will be admitted to his service for catheterization later today. Her initial troponin is negative and she is currently chest pain free awaiting admission. MDM    CRITICAL CARE TIME   Total Critical Care time was 0 minutes, excluding separately reportable procedures. There was a high probability of clinically significant/life threatening deterioration in the patient's condition which required my urgent intervention. CONSULTS:  None    PROCEDURES:  Unless otherwise noted below, none     Procedures    FINAL IMPRESSION      1. Unstable angina [facility])          DISPOSITION/PLAN   DISPOSITION Decision To Admit 03/23/2018 02:07:41 PM      PATIENT REFERRED TO:  No follow-up provider specified.     DISCHARGE MEDICATIONS:  New Prescriptions    No medications on file (Please note that portions of this note were completed with a voice recognition program.  Efforts were made to edit the dictations but occasionally words are mis-transcribed.)    Kathe Pruitt DO (electronically signed)  Attending Emergency Physician         Kathe Pruitt DO  03/23/18 [id number]

## 2018-03-23 NOTE — ED NOTES
Bed: 04  Expected date: 3/23/18  Expected time: 12:30 PM  Means of arrival:   Comments:  Pt of dr pedro Smith, RN  03/23/18 [id number]

## 2018-03-26 PROCEDURE — 93010 ELECTROCARDIOGRAM REPORT: CPT | Performed by: INTERNAL MEDICINE

## 2018-03-28 ENCOUNTER — HOSPITAL ENCOUNTER (OUTPATIENT)
Dept: SLEEP CENTER | Age: 58
Discharge: HOME | End: 2018-03-30
Payer: COMMERCIAL

## 2018-03-28 PROCEDURE — 95811 POLYSOM 6/>YRS CPAP 4/> PARM: CPT

## 2018-04-04 ENCOUNTER — HOSPITAL ENCOUNTER (OUTPATIENT)
Dept: CT IMAGING | Age: 58
Discharge: HOME | End: 2018-04-06
Payer: COMMERCIAL

## 2018-04-04 DIAGNOSIS — Z87.891 PERSONAL HISTORY OF TOBACCO USE: ICD-10-CM

## 2018-04-04 DIAGNOSIS — Z12.2 ENCOUNTER FOR SCREENING FOR LUNG CANCER: ICD-10-CM

## 2018-04-04 PROCEDURE — G0297 LDCT FOR LUNG CA SCREEN: HCPCS

## 2018-04-25 ENCOUNTER — HOSPITAL ENCOUNTER (OUTPATIENT)
Dept: WOMENS IMAGING | Age: 58
Discharge: HOME | End: 2018-04-27
Payer: COMMERCIAL

## 2018-04-25 DIAGNOSIS — Z12.39 BREAST CANCER SCREENING: ICD-10-CM

## 2018-04-25 PROCEDURE — 77067 SCR MAMMO BI INCL CAD: CPT

## 2018-05-08 ENCOUNTER — EMPLOYEE WELLNESS (OUTPATIENT)
Dept: OTHER | Age: 58
End: 2018-05-08

## 2018-05-08 DIAGNOSIS — E03.9 HYPOTHYROIDISM, UNSPECIFIED TYPE: ICD-10-CM

## 2018-05-08 LAB
ANION GAP SERPL CALCULATED.3IONS-SCNC: 19 MEQ/L (ref 7–13)
BUN BLDV-MCNC: 9 MG/DL (ref 6–20)
CALCIUM SERPL-MCNC: 9.2 MG/DL (ref 8.6–10.2)
CHLORIDE BLD-SCNC: 104 MEQ/L (ref 98–107)
CHOLESTEROL, TOTAL: 129 MG/DL (ref 0–199)
CO2: 22 MEQ/L (ref 22–29)
CREAT SERPL-MCNC: 0.84 MG/DL (ref 0.5–0.9)
CREATININE URINE: 73.2 MG/DL
GFR AFRICAN AMERICAN: >60
GFR NON-AFRICAN AMERICAN: >60
GLUCOSE BLD-MCNC: 110 MG/DL (ref 74–109)
GLUCOSE BLD-MCNC: 130 MG/DL (ref 74–109)
HBA1C MFR BLD: 7 % (ref 4.8–5.9)
HDLC SERPL-MCNC: 41 MG/DL (ref 40–59)
LDL CHOLESTEROL CALCULATED: 63 MG/DL (ref 0–129)
MICROALBUMIN UR-MCNC: <1.2 MG/DL
MICROALBUMIN/CREAT UR-RTO: NORMAL MG/G (ref 0–30)
POTASSIUM SERPL-SCNC: 4.3 MEQ/L (ref 3.5–5.1)
SODIUM BLD-SCNC: 145 MEQ/L (ref 132–144)
T4 FREE: 1.36 NG/DL (ref 0.93–1.7)
TRIGL SERPL-MCNC: 124 MG/DL (ref 0–200)
TSH SERPL DL<=0.05 MIU/L-ACNC: 0.71 UIU/ML (ref 0.27–4.2)

## 2018-05-14 ENCOUNTER — OFFICE VISIT (OUTPATIENT)
Dept: ENDOCRINOLOGY | Age: 58
End: 2018-05-14
Payer: COMMERCIAL

## 2018-05-14 ENCOUNTER — ANESTHESIA EVENT (OUTPATIENT)
Dept: ENDOSCOPY | Age: 58
End: 2018-05-14
Payer: COMMERCIAL

## 2018-05-14 VITALS
BODY MASS INDEX: 38.15 KG/M2 | HEART RATE: 60 BPM | SYSTOLIC BLOOD PRESSURE: 130 MMHG | HEIGHT: 65 IN | WEIGHT: 229 LBS | DIASTOLIC BLOOD PRESSURE: 66 MMHG

## 2018-05-14 VITALS — BODY MASS INDEX: 38.27 KG/M2 | WEIGHT: 230 LBS

## 2018-05-14 DIAGNOSIS — E03.9 HYPOTHYROIDISM, UNSPECIFIED TYPE: ICD-10-CM

## 2018-05-14 LAB — GLUCOSE BLD-MCNC: 120 MG/DL

## 2018-05-14 PROCEDURE — 99213 OFFICE O/P EST LOW 20 MIN: CPT | Performed by: INTERNAL MEDICINE

## 2018-05-14 PROCEDURE — 82962 GLUCOSE BLOOD TEST: CPT | Performed by: INTERNAL MEDICINE

## 2018-05-15 ENCOUNTER — ANESTHESIA (OUTPATIENT)
Dept: ENDOSCOPY | Age: 58
End: 2018-05-15
Payer: COMMERCIAL

## 2018-05-15 ENCOUNTER — HOSPITAL ENCOUNTER (OUTPATIENT)
Age: 58
Setting detail: OUTPATIENT SURGERY
Discharge: HOME | End: 2018-05-15
Attending: INTERNAL MEDICINE | Admitting: INTERNAL MEDICINE
Payer: COMMERCIAL

## 2018-05-15 VITALS
HEART RATE: 74 BPM | WEIGHT: 229 LBS | BODY MASS INDEX: 38.15 KG/M2 | RESPIRATION RATE: 16 BRPM | TEMPERATURE: 98.7 F | DIASTOLIC BLOOD PRESSURE: 70 MMHG | OXYGEN SATURATION: 94 % | SYSTOLIC BLOOD PRESSURE: 130 MMHG | HEIGHT: 65 IN

## 2018-05-15 VITALS
OXYGEN SATURATION: 100 % | RESPIRATION RATE: 12 BRPM | DIASTOLIC BLOOD PRESSURE: 67 MMHG | SYSTOLIC BLOOD PRESSURE: 135 MMHG

## 2018-05-15 PROCEDURE — 7100000010 HC PHASE II RECOVERY - FIRST 15 MIN: Performed by: INTERNAL MEDICINE

## 2018-05-15 PROCEDURE — 3609027000 HC COLONOSCOPY: Performed by: INTERNAL MEDICINE

## 2018-05-15 PROCEDURE — 45380 COLONOSCOPY AND BIOPSY: CPT | Performed by: INTERNAL MEDICINE

## 2018-05-15 PROCEDURE — 45385 COLONOSCOPY W/LESION REMOVAL: CPT | Performed by: INTERNAL MEDICINE

## 2018-05-15 PROCEDURE — 3700000001 HC ADD 15 MINUTES (ANESTHESIA): Performed by: INTERNAL MEDICINE

## 2018-05-15 PROCEDURE — 6360000002 HC RX W HCPCS: Performed by: NURSE ANESTHETIST, CERTIFIED REGISTERED

## 2018-05-15 PROCEDURE — 3700000000 HC ANESTHESIA ATTENDED CARE: Performed by: INTERNAL MEDICINE

## 2018-05-15 PROCEDURE — 2500000003 HC RX 250 WO HCPCS: Performed by: NURSE ANESTHETIST, CERTIFIED REGISTERED

## 2018-05-15 RX ORDER — LIDOCAINE HYDROCHLORIDE 10 MG/ML
1 INJECTION, SOLUTION EPIDURAL; INFILTRATION; INTRACAUDAL; PERINEURAL
Status: DISCONTINUED | OUTPATIENT
Start: 2018-05-15 | End: 2018-05-15 | Stop reason: HOSPADM

## 2018-05-15 RX ORDER — PROPOFOL 10 MG/ML
INJECTION, EMULSION INTRAVENOUS PRN
Status: DISCONTINUED | OUTPATIENT
Start: 2018-05-15 | End: 2018-05-15 | Stop reason: SDUPTHER

## 2018-05-15 RX ORDER — SODIUM CHLORIDE 0.9 % (FLUSH) 0.9 %
10 SYRINGE (ML) INJECTION EVERY 12 HOURS SCHEDULED
Status: DISCONTINUED | OUTPATIENT
Start: 2018-05-15 | End: 2018-05-15 | Stop reason: HOSPADM

## 2018-05-15 RX ORDER — ONDANSETRON 2 MG/ML
4 INJECTION INTRAMUSCULAR; INTRAVENOUS
Status: DISCONTINUED | OUTPATIENT
Start: 2018-05-15 | End: 2018-05-15 | Stop reason: HOSPADM

## 2018-05-15 RX ORDER — SODIUM CHLORIDE 0.9 % (FLUSH) 0.9 %
10 SYRINGE (ML) INJECTION PRN
Status: DISCONTINUED | OUTPATIENT
Start: 2018-05-15 | End: 2018-05-15 | Stop reason: HOSPADM

## 2018-05-15 RX ORDER — SODIUM CHLORIDE 9 MG/ML
INJECTION, SOLUTION INTRAVENOUS CONTINUOUS
Status: DISCONTINUED | OUTPATIENT
Start: 2018-05-15 | End: 2018-05-15 | Stop reason: HOSPADM

## 2018-05-15 RX ORDER — LIDOCAINE HYDROCHLORIDE 20 MG/ML
INJECTION, SOLUTION INFILTRATION; PERINEURAL PRN
Status: DISCONTINUED | OUTPATIENT
Start: 2018-05-15 | End: 2018-05-15 | Stop reason: SDUPTHER

## 2018-05-15 RX ADMIN — LIDOCAINE HYDROCHLORIDE 50 MG: 20 INJECTION, SOLUTION INFILTRATION; PERINEURAL at 08:52

## 2018-05-15 RX ADMIN — PROPOFOL 300 MG: 10 INJECTION, EMULSION INTRAVENOUS at 08:52

## 2018-05-15 RX ADMIN — PROPOFOL 100 MG: 10 INJECTION, EMULSION INTRAVENOUS at 09:10

## 2018-05-15 RX ADMIN — PROPOFOL 100 MG: 10 INJECTION, EMULSION INTRAVENOUS at 09:02

## 2018-05-15 ASSESSMENT — PAIN - FUNCTIONAL ASSESSMENT: PAIN_FUNCTIONAL_ASSESSMENT: 0-10

## 2018-05-31 ENCOUNTER — OFFICE VISIT (OUTPATIENT)
Dept: PULMONOLOGY | Age: 58
End: 2018-05-31
Payer: COMMERCIAL

## 2018-05-31 VITALS
WEIGHT: 233 LBS | SYSTOLIC BLOOD PRESSURE: 118 MMHG | HEART RATE: 67 BPM | HEIGHT: 65 IN | DIASTOLIC BLOOD PRESSURE: 66 MMHG | RESPIRATION RATE: 18 BRPM | OXYGEN SATURATION: 98 % | TEMPERATURE: 97.8 F | BODY MASS INDEX: 38.82 KG/M2

## 2018-05-31 DIAGNOSIS — F17.201 TOBACCO ABUSE, IN REMISSION: ICD-10-CM

## 2018-05-31 DIAGNOSIS — G47.33 SLEEP APNEA, OBSTRUCTIVE: Primary | ICD-10-CM

## 2018-05-31 PROCEDURE — 99214 OFFICE O/P EST MOD 30 MIN: CPT | Performed by: PHYSICIAN ASSISTANT

## 2018-05-31 ASSESSMENT — ENCOUNTER SYMPTOMS
SHORTNESS OF BREATH: 0
RHINORRHEA: 0
ABDOMINAL PAIN: 0
CHEST TIGHTNESS: 0
TROUBLE SWALLOWING: 0
SORE THROAT: 0
SINUS PAIN: 0
STRIDOR: 0
BACK PAIN: 0
VOICE CHANGE: 0
SINUS PRESSURE: 0
WHEEZING: 0
COUGH: 0

## 2018-06-01 ENCOUNTER — OFFICE VISIT (OUTPATIENT)
Dept: CARDIOLOGY CLINIC | Age: 58
End: 2018-06-01
Payer: COMMERCIAL

## 2018-06-01 VITALS
DIASTOLIC BLOOD PRESSURE: 78 MMHG | WEIGHT: 233 LBS | OXYGEN SATURATION: 98 % | RESPIRATION RATE: 16 BRPM | HEIGHT: 65 IN | SYSTOLIC BLOOD PRESSURE: 140 MMHG | BODY MASS INDEX: 38.82 KG/M2 | HEART RATE: 76 BPM

## 2018-06-01 DIAGNOSIS — I10 ESSENTIAL HYPERTENSION: ICD-10-CM

## 2018-06-01 DIAGNOSIS — R07.9 CHEST PAIN, UNSPECIFIED TYPE: Primary | ICD-10-CM

## 2018-06-01 DIAGNOSIS — Z87.891 HISTORY OF TOBACCO ABUSE: ICD-10-CM

## 2018-06-01 PROBLEM — I20.0 UNSTABLE ANGINA (HCC): Status: RESOLVED | Noted: 2018-03-23 | Resolved: 2018-06-01

## 2018-06-01 PROBLEM — R94.31 ABNORMAL EKG: Status: RESOLVED | Noted: 2018-03-23 | Resolved: 2018-06-01

## 2018-06-01 PROCEDURE — 93000 ELECTROCARDIOGRAM COMPLETE: CPT | Performed by: INTERNAL MEDICINE

## 2018-06-01 PROCEDURE — 99213 OFFICE O/P EST LOW 20 MIN: CPT | Performed by: INTERNAL MEDICINE

## 2018-06-01 RX ORDER — PANTOPRAZOLE SODIUM 20 MG/1
20 TABLET, DELAYED RELEASE ORAL DAILY
Qty: 30 TABLET | Refills: 5 | Status: SHIPPED | OUTPATIENT
Start: 2018-06-01 | End: 2019-02-15 | Stop reason: SDUPTHER

## 2018-06-01 RX ORDER — ISOSORBIDE MONONITRATE 30 MG/1
30 TABLET, EXTENDED RELEASE ORAL DAILY
Qty: 30 TABLET | Refills: 3 | Status: SHIPPED | OUTPATIENT
Start: 2018-06-01 | End: 2018-06-29 | Stop reason: ALTCHOICE

## 2018-06-07 ENCOUNTER — HOSPITAL ENCOUNTER (OUTPATIENT)
Dept: PULMONOLOGY | Age: 58
Discharge: HOME | End: 2018-06-07
Payer: COMMERCIAL

## 2018-06-07 DIAGNOSIS — F17.201 TOBACCO ABUSE, IN REMISSION: ICD-10-CM

## 2018-06-07 PROCEDURE — 94729 DIFFUSING CAPACITY: CPT | Performed by: INTERNAL MEDICINE

## 2018-06-07 PROCEDURE — 94060 EVALUATION OF WHEEZING: CPT

## 2018-06-07 PROCEDURE — 94060 EVALUATION OF WHEEZING: CPT | Performed by: INTERNAL MEDICINE

## 2018-06-07 PROCEDURE — 6360000002 HC RX W HCPCS: Performed by: PHYSICIAN ASSISTANT

## 2018-06-07 PROCEDURE — 94726 PLETHYSMOGRAPHY LUNG VOLUMES: CPT | Performed by: INTERNAL MEDICINE

## 2018-06-07 PROCEDURE — 94729 DIFFUSING CAPACITY: CPT

## 2018-06-07 PROCEDURE — 94726 PLETHYSMOGRAPHY LUNG VOLUMES: CPT

## 2018-06-07 RX ORDER — ALBUTEROL SULFATE 2.5 MG/3ML
2.5 SOLUTION RESPIRATORY (INHALATION) ONCE
Status: COMPLETED | OUTPATIENT
Start: 2018-06-07 | End: 2018-06-07

## 2018-06-07 RX ADMIN — ALBUTEROL SULFATE 2.5 MG: 2.5 SOLUTION RESPIRATORY (INHALATION) at 08:20

## 2018-06-11 ENCOUNTER — TELEPHONE (OUTPATIENT)
Dept: CARDIOLOGY CLINIC | Age: 58
End: 2018-06-11

## 2018-06-11 RX ORDER — PRAVASTATIN SODIUM 20 MG
20 TABLET ORAL EVERY EVENING
Qty: 90 TABLET | Refills: 1 | Status: SHIPPED | OUTPATIENT
Start: 2018-06-11 | End: 2019-01-14 | Stop reason: SDUPTHER

## 2018-06-12 ENCOUNTER — PATIENT MESSAGE (OUTPATIENT)
Dept: PULMONOLOGY | Age: 58
End: 2018-06-12

## 2018-06-12 DIAGNOSIS — J45.20 MILD INTERMITTENT REACTIVE AIRWAY DISEASE WITHOUT COMPLICATION: Primary | ICD-10-CM

## 2018-06-13 RX ORDER — ALBUTEROL SULFATE 90 UG/1
2 AEROSOL, METERED RESPIRATORY (INHALATION) EVERY 6 HOURS PRN
Qty: 1 INHALER | Refills: 3 | Status: SHIPPED | OUTPATIENT
Start: 2018-06-13 | End: 2021-01-14

## 2018-06-22 DIAGNOSIS — H81.10 BENIGN PAROXYSMAL VERTIGO, UNSPECIFIED LATERALITY: Primary | ICD-10-CM

## 2018-06-22 DIAGNOSIS — H81.10 VERTIGO, BENIGN PAROXYSMAL, UNSPECIFIED LATERALITY: Primary | ICD-10-CM

## 2018-06-22 RX ORDER — DIAZEPAM 2 MG/1
2 TABLET ORAL EVERY 8 HOURS PRN
Qty: 21 TABLET | Refills: 0 | Status: SHIPPED | OUTPATIENT
Start: 2018-06-22 | End: 2018-06-29

## 2018-06-22 RX ORDER — HYDROCHLOROTHIAZIDE 12.5 MG/1
12.5 CAPSULE, GELATIN COATED ORAL DAILY
Qty: 30 CAPSULE | Refills: 3 | Status: SHIPPED | OUTPATIENT
Start: 2018-06-22 | End: 2018-06-22 | Stop reason: SDUPTHER

## 2018-06-22 RX ORDER — HYDROCHLOROTHIAZIDE 12.5 MG/1
12.5 CAPSULE, GELATIN COATED ORAL DAILY
Qty: 30 CAPSULE | Refills: 3 | Status: SHIPPED | OUTPATIENT
Start: 2018-06-22 | End: 2018-08-24 | Stop reason: SDUPTHER

## 2018-06-22 NOTE — PATIENT INSTRUCTIONS
Patient Education        Benign Paroxysmal Positional Vertigo (BPPV): Care Instructions  Your Care Instructions    Benign paroxysmal positional vertigo, also called BPPV, is an inner ear problem. It causes a spinning or whirling sensation when you move your head. This sensation is called vertigo. The vertigo usually lasts for less than a minute. People often have vertigo spells for a few days or weeks. Then the vertigo goes away. But it may come back again. The vertigo may be mild, or it may be bad enough to cause unsteadiness, nausea, and vomiting. When you move, your inner ear sends messages to the brain. This helps you keep your balance. Vertigo can happen when debris builds up in the inner ear. The buildup can cause the inner ear to send the wrong message to the brain. Your doctor may move you in different positions to help your vertigo get better faster. This is called the Epley maneuver. Your doctor may also prescribe medicines or exercises to help with your symptoms. Follow-up care is a key part of your treatment and safety. Be sure to make and go to all appointments, and call your doctor if you are having problems. It's also a good idea to know your test results and keep a list of the medicines you take. How can you care for yourself at home? · If your doctor suggests that you do Swift-Daroff exercises:  ¨ Sit on the edge of a bed or sofa. Quickly lie down on the side that causes the worst vertigo. Lie on your side with your ear down. ¨ Stay in this position for at least 30 seconds or until the vertigo goes away. ¨ Sit up. If this causes vertigo, wait for it to stop. ¨ Repeat the procedure on the other side. ¨ Repeat this 10 times. Do these exercises 2 times a day until the vertigo is gone. When should you call for help? Call 911 anytime you think you may need emergency care. For example, call if:    · You have symptoms of a stroke.  These may include:  ¨ Sudden numbness, tingling, weakness, or direction your doctor told you to. This should be toward the ear that causes the most vertigo for you. In this picture, the woman is turning toward her left ear. Step 3    1. Tilt yourself backward until you are lying on your back. Your head should still be at a 45-degree turn. Your head should be about midway between looking straight ahead and looking out to your side. Hold for 30 seconds. If you have vertigo, stay in this position until it stops. Step 4    1. Turn your head 90 degrees toward the ear that has the least vertigo. In this picture, the woman is turning to the right because she has vertigo on her left side. The point of your chin should be raised and over your shoulder. Hold for 30 seconds. Step 5    1. Roll onto the side with the least vertigo. You should now be looking at the floor. Hold for 30 seconds. Follow-up care is a key part of your treatment and safety. Be sure to make and go to all appointments, and call your doctor if you are having problems. It's also a good idea to know your test results and keep a list of the medicines you take. Where can you learn more? Go to [email or web address].[email or web address] org and sign in to your [employer] account. Enter Q993 in the iCharts box to learn more about \"Epley Maneuver at Home for Vertigo: Exercises. \"     If you do not have an account, please click on the \"Sign Up Now\" link. Current as of: October 9, 2017  Content Version: 11.6  © 8412-9184 [employer]. Care instructions adapted under license by [facility] ([facility]). If you have questions about a medical condition or this instruction, always ask your healthcare professional. [place] [address] any warranty or liability for your use of this information. Patient Education        Ménière's Disease: Care Instructions  Your Care Instructions    Ménière's (say \"men-YEERS\") disease is a problem of the inner ear that affects hearing and balance.  It causes sudden attacks of vertigo that make you feel like you are spinning. It can also cause a loud ringing in the ears called tinnitus, a temporary loss of hearing, and a feeling of fullness in the ear. Your hearing loss may not get better. The cause of Ménière's disease is not known, but it may be related to a fluid imbalance in the inner ear. The goal of treatment is to make the vertigo less severe until the attack ends. Some people can prevent attacks by eating a diet low in sodium and by doing exercises to improve balance. Medicines may also help. Surgery is an option for some people. Follow-up care is a key part of your treatment and safety. Be sure to make and go to all appointments, and call your doctor if you are having problems. It's also a good idea to know your test results and keep a list of the medicines you take. How can you care for yourself at home? · During an attack of vertigo, lie down and hold your head very still until the feeling passes. This may help you cope with vertigo. · Take your medicines exactly as prescribed. Call your doctor if you think you are having a problem with your medicine. You will get more details on the specific medicines your doctor prescribes. · Avoid caffeine, alcohol, tobacco, and stress, along with any other substances or conditions that trigger an attack. · Eat a diet low in sodium to reduce fluid buildup in the inner ear. · Do exercises to improve your balance. These can help ease vertigo. ¨ Stand with your feet together, arms at your sides, and hold this position for 30 seconds. ¨ For slightly harder exercise, stand with your feet together and arms at your sides while slowly moving your head up and down and side to side. ¨ Keep a chart to track your progress. It can make you aware of any improvements. Include the date, the time you spent exercising, how often your eyes were open or closed, and how you felt during each exercise. ¨ Walk 5 steps and then stop abruptly. take.  How can you care for yourself at home? · Do not lie flat on your back. Prop yourself up slightly. This may reduce the spinning feeling. Keep your eyes open. · Move slowly so that you do not fall. · If your doctor recommends medicine, take it exactly as directed. · Do not drive while you are having vertigo. Certain exercises, called Swift-Daroff exercises, can help decrease vertigo. To do Swift-Daroff exercises:  · Sit on the edge of a bed or sofa and quickly lie down on the side that causes the worst vertigo. Lie on your side with your ear down. · Stay in this position for at least 30 seconds or until the vertigo goes away. · Sit up. If this causes vertigo, wait for it to stop. · Repeat the procedure on the other side. · Repeat this 10 times. Do these exercises 2 times a day until the vertigo is gone. When should you call for help? Call 911 anytime you think you may need emergency care. For example, call if:    · You passed out (lost consciousness).     · You have symptoms of a stroke. These may include:  ¨ Sudden numbness, tingling, weakness, or loss of movement in your face, arm, or leg, especially on only one side of your body. ¨ Sudden vision changes. ¨ Sudden trouble speaking. ¨ Sudden confusion or trouble understanding simple statements. ¨ Sudden problems with walking or balance. ¨ A sudden, severe headache that is different from past headaches.    Call your doctor now or seek immediate medical care if:    · Vertigo occurs with a fever, a headache, or ringing in your ears.     · You have new or increased nausea and vomiting.    Watch closely for changes in your health, and be sure to contact your doctor if:    · Vertigo gets worse or happens more often.     · Vertigo has not gotten better after 2 weeks. Where can you learn more? Go to https://chesdraseb.eSight. org and sign in to your [employer] account.  Enter F759 in the [employer] box to learn more about \"Vertigo: Care Instructions. \"     If you do not have an account, please click on the \"Sign Up Now\" link. Current as of: May 12, 2017  Content Version: 11.6  © 7700-0842 [employer]. Care instructions adapted under license by [facility] ([facility]). If you have questions about a medical condition or this instruction, always ask your healthcare professional. [address] any warranty or liability for your use of this information. Patient Education        Cawthorne Exercises for Vertigo: Care Instructions  Your Care Instructions  Simple exercises can help you regain your balance when you have vertigo. If you have Ménière's disease, benign paroxysmal positional vertigo (BPPV), or another inner ear problem, you may have vertigo off and on. Do these exercises first thing in the morning and before you go to bed. You might get dizzy when you first start them. If this happens, try to do them for at least 5 minutes. Do a group of exercises at a time, starting at the top of the list. It may take several weeks before you can do all the exercises without feeling dizzy. Follow-up care is a key part of your treatment and safety. Be sure to make and go to all appointments, and call your doctor if you are having problems. It's also a good idea to know your test results and keep a list of the medicines you take. How can you care for yourself at home? Exercise 1  While sitting on the side of the bed and holding your head still:  · Look up as far as you can. · Look down as far as you can. · Look from side to side as far as you can. · Stretch your arm straight out in front of you. Focus on your index finger. Continue to focus on your finger while you bring it to your nose. Exercise 2  While sitting on the side of the bed:  · Bring your head as far back as you can. · Bring your head forward to touch your chin to your chest.  · Turn your head from side to side.   · Do these exercises first with your

## 2018-06-29 RX ORDER — RANOLAZINE 500 MG/1
500 TABLET, EXTENDED RELEASE ORAL 2 TIMES DAILY
Qty: 60 TABLET | Refills: 5 | Status: SHIPPED | OUTPATIENT
Start: 2018-06-29 | End: 2018-12-21 | Stop reason: SDUPTHER

## 2018-06-29 RX ORDER — RANOLAZINE 500 MG/1
500 TABLET, EXTENDED RELEASE ORAL 2 TIMES DAILY
COMMUNITY
End: 2018-06-29 | Stop reason: SDUPTHER

## 2018-07-02 DIAGNOSIS — H81.10 BENIGN PAROXYSMAL VERTIGO, UNSPECIFIED LATERALITY: Primary | ICD-10-CM

## 2018-07-03 DIAGNOSIS — E55.9 VITAMIN D DEFICIENCY: ICD-10-CM

## 2018-07-03 RX ORDER — LISINOPRIL 10 MG/1
TABLET ORAL
Qty: 90 TABLET | Refills: 1 | Status: SHIPPED | OUTPATIENT
Start: 2018-07-03 | End: 2019-01-13 | Stop reason: SDUPTHER

## 2018-07-03 RX ORDER — BUPROPION HYDROCHLORIDE 200 MG/1
TABLET, EXTENDED RELEASE ORAL
Qty: 90 TABLET | Refills: 3 | Status: CANCELLED | OUTPATIENT
Start: 2018-07-03

## 2018-07-03 RX ORDER — LISINOPRIL 10 MG/1
TABLET ORAL
Qty: 90 TABLET | Refills: 3 | Status: CANCELLED | OUTPATIENT
Start: 2018-07-03

## 2018-07-03 RX ORDER — BUPROPION HYDROCHLORIDE 200 MG/1
TABLET, EXTENDED RELEASE ORAL
Qty: 90 TABLET | Refills: 1 | Status: SHIPPED | OUTPATIENT
Start: 2018-07-03 | End: 2019-01-13 | Stop reason: SDUPTHER

## 2018-07-03 NOTE — TELEPHONE ENCOUNTER
Rx request   Requested Prescriptions     Pending Prescriptions Disp Refills    buPROPion (WELLBUTRIN SR) 200 MG extended release tablet 90 tablet 3    lisinopril (PRINIVIL;ZESTRIL) 10 MG tablet 90 tablet 3     LOV 3/9/2018  Next Visit Date:  Future Appointments  Date Time Provider Elizabeth Brock   11/27/2018 4:00 PM NAYANA Bautista   12/7/2018 8:15 AM Shivam Soto DO [address] [place] [address]

## 2018-08-24 RX ORDER — HYDROCHLOROTHIAZIDE 12.5 MG/1
12.5 CAPSULE, GELATIN COATED ORAL DAILY
Qty: 30 CAPSULE | Refills: 3 | Status: SHIPPED | OUTPATIENT
Start: 2018-08-24 | End: 2019-01-23 | Stop reason: SDUPTHER

## 2018-10-08 DIAGNOSIS — E55.9 VITAMIN D DEFICIENCY: ICD-10-CM

## 2018-10-08 RX ORDER — LEVOTHYROXINE SODIUM 50 MCG
TABLET ORAL
Qty: 90 TABLET | Refills: 3 | Status: SHIPPED | OUTPATIENT
Start: 2018-10-08 | End: 2019-10-28 | Stop reason: SDUPTHER

## 2018-10-24 DIAGNOSIS — E03.9 HYPOTHYROIDISM, UNSPECIFIED TYPE: ICD-10-CM

## 2018-10-24 DIAGNOSIS — Z00.00 ENCOUNTER FOR ANNUAL PHYSICAL EXAM: ICD-10-CM

## 2018-10-24 LAB
ANION GAP SERPL CALCULATED.3IONS-SCNC: 12 MEQ/L (ref 7–13)
BUN BLDV-MCNC: 11 MG/DL (ref 6–20)
CALCIUM SERPL-MCNC: 9.3 MG/DL (ref 8.6–10.2)
CHLORIDE BLD-SCNC: 101 MEQ/L (ref 98–107)
CO2: 30 MEQ/L (ref 22–29)
CREAT SERPL-MCNC: 0.8 MG/DL (ref 0.5–0.9)
GFR AFRICAN AMERICAN: >60
GFR NON-AFRICAN AMERICAN: >60
GLUCOSE BLD-MCNC: 148 MG/DL (ref 74–109)
HBA1C MFR BLD: 7 % (ref 4.8–5.9)
POTASSIUM SERPL-SCNC: 3.6 MEQ/L (ref 3.5–5.1)
SODIUM BLD-SCNC: 143 MEQ/L (ref 132–144)
T4 FREE: 1.16 NG/DL (ref 0.93–1.7)
TSH SERPL DL<=0.05 MIU/L-ACNC: 0.85 UIU/ML (ref 0.27–4.2)

## 2018-10-25 ENCOUNTER — OFFICE VISIT (OUTPATIENT)
Dept: ENDOCRINOLOGY | Age: 58
End: 2018-10-25
Payer: COMMERCIAL

## 2018-10-25 VITALS
DIASTOLIC BLOOD PRESSURE: 78 MMHG | SYSTOLIC BLOOD PRESSURE: 160 MMHG | WEIGHT: 235 LBS | BODY MASS INDEX: 39.15 KG/M2 | HEIGHT: 65 IN | HEART RATE: 78 BPM

## 2018-10-25 DIAGNOSIS — E11.65 UNCONTROLLED TYPE 2 DIABETES MELLITUS WITH HYPERGLYCEMIA (HCC): Primary | ICD-10-CM

## 2018-10-25 DIAGNOSIS — E03.9 HYPOTHYROIDISM, UNSPECIFIED TYPE: ICD-10-CM

## 2018-10-25 LAB — GLUCOSE BLD-MCNC: 172 MG/DL

## 2018-10-25 PROCEDURE — 82962 GLUCOSE BLOOD TEST: CPT | Performed by: INTERNAL MEDICINE

## 2018-10-25 PROCEDURE — 99213 OFFICE O/P EST LOW 20 MIN: CPT | Performed by: INTERNAL MEDICINE

## 2018-10-26 LAB — HIV-1 AND HIV-2 ANTIBODIES: NEGATIVE

## 2018-11-02 NOTE — PROGRESS NOTES
pain    Chest pain    History of colon polyps    Polyp of colon    Tobacco abuse, in remission     Allergies   Allergen Reactions    Seasonal Itching       Current Outpatient Prescriptions:     Dulaglutide (TRULICITY) 1.42 EE/3.5XP SOPN, Once  A day, Disp: 12 pen, Rfl: 3    Dulaglutide (TRULICITY) 6.40 ME/7.1IN SOPN, Inject 0.75 mg into the skin every 7 days Lot # [id number], exp date 10/2019, Disp: 4 pen, Rfl: 0    SYNTHROID 50 MCG tablet, TAKE ONE TABLET BY MOUTH ONE TIME A DAY, Disp: 90 tablet, Rfl: 3    metFORMIN (GLUCOPHAGE) 500 MG tablet, TAKE ONE TABLET BY MOUTH TWICE A DAY WITH MEALS, Disp: 180 tablet, Rfl: 3    hydrochlorothiazide (MICROZIDE) 12.5 MG capsule, Take 1 capsule by mouth daily, Disp: 30 capsule, Rfl: 3    buPROPion (WELLBUTRIN SR) 200 MG extended release tablet, TAKE ONE TABLET BY MOUTH ONE TIME A DAY, Disp: 90 tablet, Rfl: 1    lisinopril (PRINIVIL;ZESTRIL) 10 MG tablet, TAKE ONE TABLET BY MOUTH ONE TIME A DAY, Disp: 90 tablet, Rfl: 1    ranolazine (RANEXA) 500 MG extended release tablet, Take 1 tablet by mouth 2 times daily, Disp: 60 tablet, Rfl: 5    albuterol sulfate HFA (PROAIR HFA) 108 (90 Base) MCG/ACT inhaler, Inhale 2 puffs into the lungs every 6 hours as needed for Wheezing or Shortness of Breath, Disp: 1 Inhaler, Rfl: 3    pravastatin (PRAVACHOL) 20 MG tablet, Take 1 tablet by mouth every evening, Disp: 90 tablet, Rfl: 1    pantoprazole (PROTONIX) 20 MG tablet, Take 1 tablet by mouth daily, Disp: 30 tablet, Rfl: 5    Respiratory Therapy Supplies CLARA, Please give patient the farshad view mask, Disp: 1 Device, Rfl: 0    nitroGLYCERIN (NITROSTAT) 0.4 MG SL tablet, Place 1 tablet under the tongue every 5 minutes as needed for Chest pain up to max of 3 total doses. If no relief after 1 dose, call 911. (Patient taking differently: Place 0.4 mg under the tongue every 5 minutes as needed for Chest pain up to max of 3 total doses.  If no relief after 1 dose, call 911.), Disp: 25

## 2018-11-26 ENCOUNTER — OFFICE VISIT (OUTPATIENT)
Dept: PULMONOLOGY | Age: 58
End: 2018-11-26
Payer: COMMERCIAL

## 2018-11-26 VITALS
SYSTOLIC BLOOD PRESSURE: 124 MMHG | HEART RATE: 68 BPM | DIASTOLIC BLOOD PRESSURE: 62 MMHG | TEMPERATURE: 98.2 F | BODY MASS INDEX: 38.65 KG/M2 | WEIGHT: 232 LBS | OXYGEN SATURATION: 96 % | HEIGHT: 65 IN

## 2018-11-26 DIAGNOSIS — J45.20 MILD INTERMITTENT REACTIVE AIRWAY DISEASE WITHOUT COMPLICATION: ICD-10-CM

## 2018-11-26 DIAGNOSIS — G47.33 SLEEP APNEA, OBSTRUCTIVE: Primary | ICD-10-CM

## 2018-11-26 DIAGNOSIS — F17.201 TOBACCO ABUSE, IN REMISSION: ICD-10-CM

## 2018-11-26 PROCEDURE — 99214 OFFICE O/P EST MOD 30 MIN: CPT | Performed by: PHYSICIAN ASSISTANT

## 2018-11-27 ASSESSMENT — ENCOUNTER SYMPTOMS
CHEST TIGHTNESS: 0
ABDOMINAL PAIN: 0
SINUS PRESSURE: 0
SINUS PAIN: 0
SORE THROAT: 0
STRIDOR: 0
COUGH: 0
BACK PAIN: 0
SHORTNESS OF BREATH: 0
TROUBLE SWALLOWING: 0
VOICE CHANGE: 0
WHEEZING: 0
RHINORRHEA: 0

## 2018-11-27 NOTE — PROGRESS NOTES
Psychiatric: She has a normal mood and affect. Her behavior is normal.   Nursing note and vitals reviewed. Assessment and Plan      ICD-10-CM    1. Sleep apnea, obstructive G47.33    2. Mild intermittent reactive airway disease without complication Q28.23    3. Tobacco abuse, 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      OVERALL IMPRESSION:  This study shows mild terminal airflow obstruction  with significant reversibility after bronchodilator therapy. Otherwise,  the study was normal, but suggests mild reactive airway disease or asthma.     Review with patient results of PFT's suggest mild reactive airway disease. Discussed with patient to continue her rescue inhaler as needed. If she were to become more symptomatic we could consider low-dose inhaled corticosteroid. Patient doing well at this time so we will hold on any additional therapy. Counseling: CPAP/BiPAP uses, patient advised to use CPAP at least 5-6 hours every night.    Driving: patient denies extreme caution when driving or operating machinery if there is a feeling of drowsiness, especially while driving it is preferable to stop driving and take a brief nap. Sleep hygiene: He avoid supine sleep, sleep on her sides. Avoid  sleep deprivation.  Explained sleep hygiene.  Advice to avoid Alcohol and sedative    Patient will notify us of any changes in her respiratory status that would warrant evaluation sooner otherwise we'll see her for follow-up in 6 months. Reviewed with thepatient: current clinical status, medications, activities and diet. Side effects, adverse effects of the medication prescribed today, as well as treatment plan and result expectations have been discussed with thepatient who expresses understanding and desires to proceed. Close follow up to evaluate treatment results and for coordination of care. I have reviewed the patient's medical history in detail and updated thecomputerized patient record.     Return in about 6 months (around

## 2018-12-21 ENCOUNTER — OFFICE VISIT (OUTPATIENT)
Dept: CARDIOLOGY CLINIC | Age: 58
End: 2018-12-21
Payer: COMMERCIAL

## 2018-12-21 VITALS
HEIGHT: 65 IN | BODY MASS INDEX: 37.79 KG/M2 | OXYGEN SATURATION: 99 % | RESPIRATION RATE: 14 BRPM | DIASTOLIC BLOOD PRESSURE: 74 MMHG | HEART RATE: 87 BPM | WEIGHT: 226.8 LBS | SYSTOLIC BLOOD PRESSURE: 132 MMHG

## 2018-12-21 DIAGNOSIS — F17.201 TOBACCO ABUSE, IN REMISSION: ICD-10-CM

## 2018-12-21 DIAGNOSIS — I10 ESSENTIAL HYPERTENSION: Primary | ICD-10-CM

## 2018-12-21 DIAGNOSIS — R07.2 PRECORDIAL PAIN: ICD-10-CM

## 2018-12-21 PROCEDURE — 93000 ELECTROCARDIOGRAM COMPLETE: CPT | Performed by: INTERNAL MEDICINE

## 2018-12-21 PROCEDURE — 99213 OFFICE O/P EST LOW 20 MIN: CPT | Performed by: INTERNAL MEDICINE

## 2018-12-21 RX ORDER — RANOLAZINE 500 MG/1
500 TABLET, EXTENDED RELEASE ORAL 2 TIMES DAILY
Qty: 180 TABLET | Refills: 3 | Status: SHIPPED | OUTPATIENT
Start: 2018-12-21 | End: 2019-02-01 | Stop reason: SDUPTHER

## 2018-12-21 NOTE — PROGRESS NOTES
Please give patient the farshad view mask 1 Device 0    nitroGLYCERIN (NITROSTAT) 0.4 MG SL tablet Place 1 tablet under the tongue every 5 minutes as needed for Chest pain up to max of 3 total doses. If no relief after 1 dose, call 911. (Patient taking differently: Place 0.4 mg under the tongue every 5 minutes as needed for Chest pain up to max of 3 total doses. If no relief after 1 dose, call 911.) 25 tablet 3    Blood Glucose Monitoring Suppl (AGAMATRIX PRESTO PRO METER) CLARA Use to test blood glucose two times per day 1 Device 0    glucose blood VI test strips (AGAMATRIX PRESTO TEST) strip 1 each by In Vitro route 2 times daily 200 each 3    AGAMATRIX ULTRA-THIN LANCETS MISC Use to test blood glucose two times per day 200 each 3    aspirin 81 MG EC tablet Take 81 mg by mouth daily      Cholecalciferol (VITAMIN D) 2000 UNITS CAPS capsule Take by mouth daily      ibuprofen (ADVIL;MOTRIN) 200 MG tablet Take 200 mg by mouth every 6 hours as needed for Pain      loratadine (CLARITIN) 10 MG tablet Take 10 mg by mouth daily as needed (Allergies)        No current facility-administered medications for this visit. Seasonal    Review of Systems:  General ROS: negative  Psychological ROS: negative  Hematological and Lymphatic ROS: No history of blood clots or bleeding disorder. Respiratory ROS: no cough, shortness of breath, or wheezing  Cardiovascular ROS: positive for - chest pain  Gastrointestinal ROS: no abdominal pain, change in bowel habits, or black or bloody stools  Genito-Urinary ROS: no dysuria, trouble voiding, or hematuria  Musculoskeletal ROS: negative  Neurological ROS: no TIA or stroke symptoms  Dermatological ROS: negative    VITALS:  Blood pressure 132/74, pulse 87, resp. rate 14, height 5' 5\" (1.651 m), weight 226 lb 12.8 oz (102.9 kg), SpO2 99 %. Body mass index is 37.74 kg/m².     Physical Examination:  General appearance - alert, well appearing, and in no distress  Mental status - alert,

## 2019-01-13 DIAGNOSIS — E55.9 VITAMIN D DEFICIENCY: ICD-10-CM

## 2019-01-14 RX ORDER — BUPROPION HYDROCHLORIDE 200 MG/1
TABLET, EXTENDED RELEASE ORAL
Qty: 90 TABLET | Refills: 1 | Status: SHIPPED | OUTPATIENT
Start: 2019-01-14 | End: 2019-07-18 | Stop reason: SDUPTHER

## 2019-01-14 RX ORDER — PRAVASTATIN SODIUM 20 MG
20 TABLET ORAL EVERY EVENING
Qty: 90 TABLET | Refills: 1 | Status: SHIPPED | OUTPATIENT
Start: 2019-01-14 | End: 2019-08-15 | Stop reason: SDUPTHER

## 2019-01-14 RX ORDER — LISINOPRIL 10 MG/1
TABLET ORAL
Qty: 90 TABLET | Refills: 1 | Status: SHIPPED | OUTPATIENT
Start: 2019-01-14 | End: 2019-03-21

## 2019-01-15 ENCOUNTER — SCHEDULED TELEPHONE ENCOUNTER (OUTPATIENT)
Dept: PHARMACY | Facility: CLINIC | Age: 59
End: 2019-01-15

## 2019-01-15 RX ORDER — ASPIRIN 81 MG/1
81 TABLET ORAL DAILY
Qty: 100 TABLET | Refills: 3 | Status: SHIPPED | OUTPATIENT
Start: 2019-01-15 | End: 2019-09-24 | Stop reason: SDUPTHER

## 2019-01-15 RX ORDER — BLOOD-GLUCOSE CONTROL, LOW
EACH MISCELLANEOUS
Qty: 100 EACH | Refills: 3 | Status: SHIPPED | OUTPATIENT
Start: 2019-01-15 | End: 2020-04-28 | Stop reason: SDUPTHER

## 2019-01-15 RX ORDER — BLOOD-GLUCOSE METER
EACH MISCELLANEOUS
Qty: 1 KIT | Refills: 0 | Status: SHIPPED | OUTPATIENT
Start: 2019-01-15 | End: 2019-01-23 | Stop reason: SDUPTHER

## 2019-01-23 RX ORDER — BLOOD-GLUCOSE METER
EACH MISCELLANEOUS
Qty: 1 KIT | Refills: 0 | Status: SHIPPED | OUTPATIENT
Start: 2019-01-23 | End: 2020-04-28 | Stop reason: SDUPTHER

## 2019-01-25 RX ORDER — HYDROCHLOROTHIAZIDE 12.5 MG/1
CAPSULE, GELATIN COATED ORAL
Qty: 30 CAPSULE | Refills: 5 | Status: SHIPPED | OUTPATIENT
Start: 2019-01-25 | End: 2019-03-21

## 2019-02-01 ENCOUNTER — OFFICE VISIT (OUTPATIENT)
Dept: CARDIOLOGY CLINIC | Age: 59
End: 2019-02-01
Payer: COMMERCIAL

## 2019-02-01 ENCOUNTER — TELEPHONE (OUTPATIENT)
Dept: CARDIOLOGY CLINIC | Age: 59
End: 2019-02-01

## 2019-02-01 VITALS
BODY MASS INDEX: 37.61 KG/M2 | DIASTOLIC BLOOD PRESSURE: 70 MMHG | SYSTOLIC BLOOD PRESSURE: 132 MMHG | RESPIRATION RATE: 16 BRPM | OXYGEN SATURATION: 99 % | WEIGHT: 226 LBS | HEART RATE: 85 BPM

## 2019-02-01 DIAGNOSIS — R07.2 PRECORDIAL PAIN: ICD-10-CM

## 2019-02-01 DIAGNOSIS — F17.201 TOBACCO ABUSE, IN REMISSION: ICD-10-CM

## 2019-02-01 DIAGNOSIS — I10 ESSENTIAL HYPERTENSION: ICD-10-CM

## 2019-02-01 DIAGNOSIS — R07.89 OTHER CHEST PAIN: Primary | ICD-10-CM

## 2019-02-01 PROCEDURE — 99213 OFFICE O/P EST LOW 20 MIN: CPT | Performed by: INTERNAL MEDICINE

## 2019-02-01 PROCEDURE — 93000 ELECTROCARDIOGRAM COMPLETE: CPT | Performed by: INTERNAL MEDICINE

## 2019-02-01 RX ORDER — RANOLAZINE 1000 MG/1
1000 TABLET, EXTENDED RELEASE ORAL 2 TIMES DAILY
Qty: 180 TABLET | Refills: 2 | Status: SHIPPED | OUTPATIENT
Start: 2019-02-01 | End: 2019-10-04 | Stop reason: SDUPTHER

## 2019-02-01 RX ORDER — RANOLAZINE 1000 MG/1
1000 TABLET, EXTENDED RELEASE ORAL 2 TIMES DAILY
Qty: 60 TABLET | Refills: 6 | Status: SHIPPED | OUTPATIENT
Start: 2019-02-01 | End: 2019-02-01 | Stop reason: SDUPTHER

## 2019-02-15 RX ORDER — PANTOPRAZOLE SODIUM 20 MG/1
20 TABLET, DELAYED RELEASE ORAL DAILY
Qty: 90 TABLET | Refills: 3 | Status: SHIPPED | OUTPATIENT
Start: 2019-02-15 | End: 2019-03-15 | Stop reason: ALTCHOICE

## 2019-03-07 ENCOUNTER — EMPLOYEE WELLNESS (OUTPATIENT)
Dept: OTHER | Age: 59
End: 2019-03-07

## 2019-03-07 LAB
CHOLESTEROL, TOTAL: 129 MG/DL (ref 0–199)
GLUCOSE BLD-MCNC: 97 MG/DL (ref 70–99)
HBA1C MFR BLD: 5.8 % (ref 4.8–5.9)
HDLC SERPL-MCNC: 46 MG/DL (ref 40–59)
LDL CHOLESTEROL CALCULATED: 51 MG/DL (ref 0–129)
TRIGL SERPL-MCNC: 161 MG/DL (ref 0–150)

## 2019-03-15 ENCOUNTER — OFFICE VISIT (OUTPATIENT)
Dept: GASTROENTEROLOGY | Age: 59
End: 2019-03-15
Payer: COMMERCIAL

## 2019-03-15 VITALS
OXYGEN SATURATION: 96 % | WEIGHT: 224 LBS | BODY MASS INDEX: 37.32 KG/M2 | SYSTOLIC BLOOD PRESSURE: 130 MMHG | DIASTOLIC BLOOD PRESSURE: 62 MMHG | TEMPERATURE: 96.6 F | HEART RATE: 66 BPM | HEIGHT: 65 IN

## 2019-03-15 DIAGNOSIS — R07.9 RIGHT-SIDED CHEST PAIN: Primary | ICD-10-CM

## 2019-03-15 PROCEDURE — 99203 OFFICE O/P NEW LOW 30 MIN: CPT | Performed by: INTERNAL MEDICINE

## 2019-03-15 RX ORDER — OMEPRAZOLE 40 MG/1
40 CAPSULE, DELAYED RELEASE ORAL DAILY
Qty: 60 CAPSULE | Refills: 0 | Status: SHIPPED | OUTPATIENT
Start: 2019-03-15 | End: 2019-04-22 | Stop reason: SDUPTHER

## 2019-03-18 ENCOUNTER — HOSPITAL ENCOUNTER (EMERGENCY)
Age: 59
Discharge: HOME | End: 2019-03-18
Attending: EMERGENCY MEDICINE
Payer: COMMERCIAL

## 2019-03-18 ENCOUNTER — APPOINTMENT (OUTPATIENT)
Dept: CT IMAGING | Age: 59
End: 2019-03-18
Payer: COMMERCIAL

## 2019-03-18 VITALS
WEIGHT: 220 LBS | HEART RATE: 65 BPM | DIASTOLIC BLOOD PRESSURE: 76 MMHG | SYSTOLIC BLOOD PRESSURE: 142 MMHG | OXYGEN SATURATION: 96 % | HEIGHT: 65 IN | TEMPERATURE: 97.1 F | RESPIRATION RATE: 20 BRPM | BODY MASS INDEX: 36.65 KG/M2

## 2019-03-18 DIAGNOSIS — R42 DIZZINESS: Primary | ICD-10-CM

## 2019-03-18 LAB
ALBUMIN SERPL-MCNC: 4 G/DL (ref 3.5–4.6)
ALP BLD-CCNC: 56 U/L (ref 40–130)
ALT SERPL-CCNC: 27 U/L (ref 0–33)
ANION GAP SERPL CALCULATED.3IONS-SCNC: 13 MEQ/L (ref 9–15)
AST SERPL-CCNC: 19 U/L (ref 0–35)
BASOPHILS ABSOLUTE: 0.1 K/UL (ref 0–0.2)
BASOPHILS RELATIVE PERCENT: 1.1 %
BILIRUB SERPL-MCNC: 0.3 MG/DL (ref 0.2–0.7)
BUN BLDV-MCNC: 12 MG/DL (ref 6–20)
CALCIUM SERPL-MCNC: 8.9 MG/DL (ref 8.5–9.9)
CHLORIDE BLD-SCNC: 107 MEQ/L (ref 95–107)
CO2: 24 MEQ/L (ref 20–31)
CREAT SERPL-MCNC: 0.88 MG/DL (ref 0.5–0.9)
EKG ATRIAL RATE: 69 BPM
EKG P AXIS: 28 DEGREES
EKG P-R INTERVAL: 186 MS
EKG Q-T INTERVAL: 460 MS
EKG QRS DURATION: 88 MS
EKG QTC CALCULATION (BAZETT): 492 MS
EKG R AXIS: 23 DEGREES
EKG T AXIS: 113 DEGREES
EKG VENTRICULAR RATE: 69 BPM
EOSINOPHILS ABSOLUTE: 0.3 K/UL (ref 0–0.7)
EOSINOPHILS RELATIVE PERCENT: 3.2 %
GFR AFRICAN AMERICAN: >60
GFR NON-AFRICAN AMERICAN: >60
GLOBULIN: 2.9 G/DL (ref 2.3–3.5)
GLUCOSE BLD-MCNC: 113 MG/DL (ref 70–99)
HCT VFR BLD CALC: 33.1 % (ref 37–47)
HEMOGLOBIN: 11.3 G/DL (ref 12–16)
INR BLD: 1
LYMPHOCYTES ABSOLUTE: 1.7 K/UL (ref 1–4.8)
LYMPHOCYTES RELATIVE PERCENT: 19.2 %
MCH RBC QN AUTO: 29 PG (ref 27–31.3)
MCHC RBC AUTO-ENTMCNC: 34 % (ref 33–37)
MCV RBC AUTO: 85.2 FL (ref 82–100)
MONOCYTES ABSOLUTE: 0.4 K/UL (ref 0.2–0.8)
MONOCYTES RELATIVE PERCENT: 4.8 %
NEUTROPHILS ABSOLUTE: 6.3 K/UL (ref 1.4–6.5)
NEUTROPHILS RELATIVE PERCENT: 71.7 %
PDW BLD-RTO: 14.1 % (ref 11.5–14.5)
PLATELET # BLD: 302 K/UL (ref 130–400)
POTASSIUM SERPL-SCNC: 4.4 MEQ/L (ref 3.4–4.9)
PROTHROMBIN TIME: 9.9 SEC (ref 9–11.5)
RBC # BLD: 3.88 M/UL (ref 4.2–5.4)
SODIUM BLD-SCNC: 144 MEQ/L (ref 135–144)
TOTAL PROTEIN: 6.9 G/DL (ref 6.3–8)
TROPONIN: <0.01 NG/ML (ref 0–0.01)
WBC # BLD: 8.8 K/UL (ref 4.8–10.8)

## 2019-03-18 PROCEDURE — 96374 THER/PROPH/DIAG INJ IV PUSH: CPT

## 2019-03-18 PROCEDURE — 93005 ELECTROCARDIOGRAM TRACING: CPT

## 2019-03-18 PROCEDURE — 84484 ASSAY OF TROPONIN QUANT: CPT

## 2019-03-18 PROCEDURE — 70450 CT HEAD/BRAIN W/O DYE: CPT

## 2019-03-18 PROCEDURE — 6370000000 HC RX 637 (ALT 250 FOR IP): Performed by: EMERGENCY MEDICINE

## 2019-03-18 PROCEDURE — 99284 EMERGENCY DEPT VISIT MOD MDM: CPT

## 2019-03-18 PROCEDURE — 85610 PROTHROMBIN TIME: CPT

## 2019-03-18 PROCEDURE — 80053 COMPREHEN METABOLIC PANEL: CPT

## 2019-03-18 PROCEDURE — 6360000002 HC RX W HCPCS: Performed by: EMERGENCY MEDICINE

## 2019-03-18 PROCEDURE — 36415 COLL VENOUS BLD VENIPUNCTURE: CPT

## 2019-03-18 PROCEDURE — 85025 COMPLETE CBC W/AUTO DIFF WBC: CPT

## 2019-03-18 RX ORDER — ONDANSETRON 2 MG/ML
4 INJECTION INTRAMUSCULAR; INTRAVENOUS ONCE
Status: COMPLETED | OUTPATIENT
Start: 2019-03-18 | End: 2019-03-18

## 2019-03-18 RX ORDER — MECLIZINE HCL 12.5 MG/1
12.5 TABLET ORAL ONCE
Status: COMPLETED | OUTPATIENT
Start: 2019-03-18 | End: 2019-03-18

## 2019-03-18 RX ORDER — MECLIZINE HYDROCHLORIDE 25 MG/1
25 TABLET ORAL 3 TIMES DAILY PRN
Qty: 30 TABLET | Refills: 0 | Status: SHIPPED | OUTPATIENT
Start: 2019-03-18 | End: 2019-03-28

## 2019-03-18 RX ADMIN — MECLIZINE 12.5 MG: 12.5 TABLET ORAL at 09:50

## 2019-03-18 RX ADMIN — ONDANSETRON 4 MG: 2 INJECTION INTRAMUSCULAR; INTRAVENOUS at 09:50

## 2019-03-19 PROCEDURE — 93010 ELECTROCARDIOGRAM REPORT: CPT | Performed by: INTERNAL MEDICINE

## 2019-03-20 VITALS — BODY MASS INDEX: 36.94 KG/M2 | WEIGHT: 222 LBS

## 2019-03-21 DIAGNOSIS — H93.19 TINNITUS AURIUM, UNSPECIFIED LATERALITY: ICD-10-CM

## 2019-03-21 DIAGNOSIS — R42 VERTIGO: Primary | ICD-10-CM

## 2019-03-21 RX ORDER — LISINOPRIL AND HYDROCHLOROTHIAZIDE 12.5; 1 MG/1; MG/1
1 TABLET ORAL DAILY
Qty: 90 TABLET | Refills: 1 | Status: SHIPPED | OUTPATIENT
Start: 2019-03-21 | End: 2019-06-04

## 2019-03-22 DIAGNOSIS — H81.13 BENIGN PAROXYSMAL POSITIONAL VERTIGO DUE TO BILATERAL VESTIBULAR DISORDER: Primary | ICD-10-CM

## 2019-03-29 ENCOUNTER — OFFICE VISIT (OUTPATIENT)
Dept: CARDIOLOGY CLINIC | Age: 59
End: 2019-03-29
Payer: COMMERCIAL

## 2019-03-29 VITALS
OXYGEN SATURATION: 99 % | BODY MASS INDEX: 37.28 KG/M2 | RESPIRATION RATE: 16 BRPM | SYSTOLIC BLOOD PRESSURE: 142 MMHG | DIASTOLIC BLOOD PRESSURE: 70 MMHG | HEART RATE: 70 BPM | WEIGHT: 224 LBS

## 2019-03-29 DIAGNOSIS — Z87.891 HISTORY OF TOBACCO ABUSE: ICD-10-CM

## 2019-03-29 DIAGNOSIS — I10 ESSENTIAL HYPERTENSION: ICD-10-CM

## 2019-03-29 DIAGNOSIS — R07.89 OTHER CHEST PAIN: Primary | ICD-10-CM

## 2019-03-29 PROCEDURE — 99212 OFFICE O/P EST SF 10 MIN: CPT | Performed by: INTERNAL MEDICINE

## 2019-03-29 RX ORDER — ISOSORBIDE DINITRATE 10 MG/1
10 TABLET ORAL 3 TIMES DAILY
Qty: 90 TABLET | Refills: 3 | Status: SHIPPED | OUTPATIENT
Start: 2019-03-29 | End: 2019-11-20 | Stop reason: SINTOL

## 2019-04-02 ENCOUNTER — ANESTHESIA EVENT (OUTPATIENT)
Dept: ENDOSCOPY | Age: 59
End: 2019-04-02
Payer: COMMERCIAL

## 2019-04-02 ENCOUNTER — ANESTHESIA (OUTPATIENT)
Dept: ENDOSCOPY | Age: 59
End: 2019-04-02
Payer: COMMERCIAL

## 2019-04-02 ENCOUNTER — HOSPITAL ENCOUNTER (OUTPATIENT)
Age: 59
Setting detail: OUTPATIENT SURGERY
Discharge: HOME | End: 2019-04-02
Attending: INTERNAL MEDICINE | Admitting: INTERNAL MEDICINE
Payer: COMMERCIAL

## 2019-04-02 VITALS
WEIGHT: 224 LBS | RESPIRATION RATE: 16 BRPM | HEIGHT: 65 IN | DIASTOLIC BLOOD PRESSURE: 66 MMHG | TEMPERATURE: 97.6 F | SYSTOLIC BLOOD PRESSURE: 133 MMHG | BODY MASS INDEX: 37.32 KG/M2 | OXYGEN SATURATION: 97 % | HEART RATE: 66 BPM

## 2019-04-02 VITALS
OXYGEN SATURATION: 97 % | RESPIRATION RATE: 14 BRPM | SYSTOLIC BLOOD PRESSURE: 146 MMHG | DIASTOLIC BLOOD PRESSURE: 78 MMHG

## 2019-04-02 PROBLEM — R07.89 NON-CARDIAC CHEST PAIN: Status: ACTIVE | Noted: 2018-03-23

## 2019-04-02 PROCEDURE — 88342 IMHCHEM/IMCYTCHM 1ST ANTB: CPT

## 2019-04-02 PROCEDURE — 7100000011 HC PHASE II RECOVERY - ADDTL 15 MIN: Performed by: INTERNAL MEDICINE

## 2019-04-02 PROCEDURE — 3609017100 HC EGD: Performed by: INTERNAL MEDICINE

## 2019-04-02 PROCEDURE — 3700000000 HC ANESTHESIA ATTENDED CARE: Performed by: INTERNAL MEDICINE

## 2019-04-02 PROCEDURE — 7100000010 HC PHASE II RECOVERY - FIRST 15 MIN: Performed by: INTERNAL MEDICINE

## 2019-04-02 PROCEDURE — 88305 TISSUE EXAM BY PATHOLOGIST: CPT

## 2019-04-02 PROCEDURE — 3700000001 HC ADD 15 MINUTES (ANESTHESIA): Performed by: INTERNAL MEDICINE

## 2019-04-02 PROCEDURE — 2580000003 HC RX 258: Performed by: INTERNAL MEDICINE

## 2019-04-02 PROCEDURE — 43251 EGD REMOVE LESION SNARE: CPT | Performed by: INTERNAL MEDICINE

## 2019-04-02 PROCEDURE — 6360000002 HC RX W HCPCS: Performed by: NURSE ANESTHETIST, CERTIFIED REGISTERED

## 2019-04-02 PROCEDURE — 88313 SPECIAL STAINS GROUP 2: CPT

## 2019-04-02 RX ORDER — LIDOCAINE HYDROCHLORIDE 10 MG/ML
1 INJECTION, SOLUTION EPIDURAL; INFILTRATION; INTRACAUDAL; PERINEURAL
Status: DISCONTINUED | OUTPATIENT
Start: 2019-04-02 | End: 2019-04-02 | Stop reason: HOSPADM

## 2019-04-02 RX ORDER — ONDANSETRON 2 MG/ML
4 INJECTION INTRAMUSCULAR; INTRAVENOUS
Status: DISCONTINUED | OUTPATIENT
Start: 2019-04-02 | End: 2019-04-02 | Stop reason: HOSPADM

## 2019-04-02 RX ORDER — SODIUM CHLORIDE 0.9 % (FLUSH) 0.9 %
10 SYRINGE (ML) INJECTION PRN
Status: DISCONTINUED | OUTPATIENT
Start: 2019-04-02 | End: 2019-04-02 | Stop reason: HOSPADM

## 2019-04-02 RX ORDER — SODIUM CHLORIDE 0.9 % (FLUSH) 0.9 %
10 SYRINGE (ML) INJECTION EVERY 12 HOURS SCHEDULED
Status: DISCONTINUED | OUTPATIENT
Start: 2019-04-02 | End: 2019-04-02 | Stop reason: HOSPADM

## 2019-04-02 RX ORDER — PROPOFOL 10 MG/ML
INJECTION, EMULSION INTRAVENOUS PRN
Status: DISCONTINUED | OUTPATIENT
Start: 2019-04-02 | End: 2019-04-02 | Stop reason: SDUPTHER

## 2019-04-02 RX ORDER — SODIUM CHLORIDE 9 MG/ML
INJECTION, SOLUTION INTRAVENOUS CONTINUOUS
Status: DISCONTINUED | OUTPATIENT
Start: 2019-04-02 | End: 2019-04-02 | Stop reason: HOSPADM

## 2019-04-02 RX ADMIN — PROPOFOL 20 MG: 10 INJECTION, EMULSION INTRAVENOUS at 09:33

## 2019-04-02 RX ADMIN — PROPOFOL 20 MG: 10 INJECTION, EMULSION INTRAVENOUS at 09:35

## 2019-04-02 RX ADMIN — PROPOFOL 20 MG: 10 INJECTION, EMULSION INTRAVENOUS at 09:29

## 2019-04-02 RX ADMIN — PROPOFOL 100 MG: 10 INJECTION, EMULSION INTRAVENOUS at 09:27

## 2019-04-02 RX ADMIN — SODIUM CHLORIDE: 9 INJECTION, SOLUTION INTRAVENOUS at 08:50

## 2019-04-02 RX ADMIN — PROPOFOL 20 MG: 10 INJECTION, EMULSION INTRAVENOUS at 09:28

## 2019-04-02 RX ADMIN — PROPOFOL 20 MG: 10 INJECTION, EMULSION INTRAVENOUS at 09:31

## 2019-04-02 RX ADMIN — PROPOFOL 20 MG: 10 INJECTION, EMULSION INTRAVENOUS at 09:36

## 2019-04-02 ASSESSMENT — PAIN - FUNCTIONAL ASSESSMENT: PAIN_FUNCTIONAL_ASSESSMENT: 0-10

## 2019-04-02 NOTE — LETTER
[address] [place] [address]  Phone: [phone]    No name on file. April 2, 2019     Dr. Cholo Rodriguez  [address] [facility], [place] [address]    Patient: Johnny Mays   MR Number: [id number]   YOB: 1960   Date of Visit: 3/15/2019     Dear Dr. Braxton Wilson:    I am referring my patient, Johnny Mays, to you for evaluation of non cardiac chest pain. She has had a recent upper endoscopy which showed irregular Z line but otherwise negative exam.  She is currently on twice a day PPI without any resolution of her symptoms. She  has a past medical history of Abnormal EKG, Diverticulosis of colon, DMII (diabetes mellitus, type 2) ([facility] [address] Essential hypertension, Family history of coronary artery disease, Generalized anxiety disorder, H/O: hysterectomy, History of tobacco abuse, Obesity (BMI 30-39.9), Other specified acquired hypothyroidism, Precordial pain, S/P colonoscopy with polypectomy, S/P vaginal hysterectomy, Sleep apnea, obstructive, Unstable angina ([address] and Vertigo. Her  has a past surgical history that includes Endometrial ablation (2002); Cholecystectomy, laparoscopic (1995); Tubal ligation (1984); Tonsillectomy; Hysterectomy (9/7/12); pr colon ca scrn not hi rsk ind (N/A, 5/15/2018); and Diagnostic Cardiac Cath Lab Procedure. She  reports that she quit smoking about 13 years ago. Her smoking use included cigarettes. She started smoking about 44 years ago. She has a 30.00 pack-year smoking history. She has never used smokeless tobacco. She reports that she does not drink alcohol or use drugs.     She has a current medication list which includes the following prescription(s): isosorbide dinitrate, lisinopril-hydrochlorothiazide, omeprazole, metoprolol tartrate, ranolazine, aspirin, bupropion, pravastatin, dulaglutide, synthroid, metformin, vitamin d, ibuprofen, loratadine, prodigy autocode blood glucose, prodigy lancets 28g, blood glucose test strips, albuterol sulfate hfa, respiratory therapy supplies, and nitroglycerin, and the following Facility-Administered Medications: sodium chloride, sodium chloride flush, sodium chloride flush, lidocaine pf, and ondansetron. She is allergic to seasonal.    I appreciate your assistance in her care and look forward to your findings and recommendations.     Sincerely,                           Mert Wood MD

## 2019-04-02 NOTE — ANESTHESIA POSTPROCEDURE EVALUATION
Department of Anesthesiology  Postprocedure Note    Patient: Lynn Reyes  MRN: [id number]  [place]: 1960  Date of evaluation: 4/2/2019  Time:  9:40 AM     Procedure Summary     Date:  04/02/19 Room / Location:  [address] [place]    Anesthesia Start:  1033 Anesthesia Stop:      Procedure:  EGD ESOPHAGOGASTRODUODENOSCOPY (N/A ) Diagnosis:  (RT SIDED CHEST PAIN R07.9 (CPT 38221))    Surgeon:  Mitra Singh MD Responsible Provider:  Awilda Bence, APRN - CRNA    Anesthesia Type:  MAC ASA Status:  3          Anesthesia Type: MAC    Sergo Phase I: Sergo Score: 10    Sergo Phase II:      Last vitals: Reviewed and per EMR flowsheets.        Anesthesia Post Evaluation    Patient location during evaluation: PACU  Patient participation: complete - patient participated  Level of consciousness: awake and alert  Pain score: 0  Airway patency: patent  Nausea & Vomiting: no nausea and no vomiting  Complications: no  Cardiovascular status: blood pressure returned to baseline and hemodynamically stable  Respiratory status: acceptable  Hydration status: euvolemic

## 2019-04-02 NOTE — ANESTHESIA PRE PROCEDURE
Department of Anesthesiology  Preprocedure Note       Name:  Kyle Paul   Age:  66 y.o.  :  1960                                          MRN:  [id number]         Date:  2019      Surgeon: Radha Frye):  Kady Hills MD    Procedure: EGD ESOPHAGOGASTRODUODENOSCOPY (N/A )    Medications prior to admission:   Prior to Admission medications    Medication Sig Start Date End Date Taking?  Authorizing Provider   isosorbide dinitrate (ISORDIL) 10 MG tablet Take 1 tablet by mouth 3 times daily 3/29/19  Yes Shivam Rosasiday, DO   lisinopril-hydrochlorothiazide (PRINZIDE;ZESTORETIC) 10-12.5 MG per tablet Take 1 tablet by mouth daily 3/21/19  Yes Chace Marquez MD   omeprazole (PRILOSEC) 40 MG delayed release capsule Take 1 capsule by mouth daily  Patient taking differently: Take 40 mg by mouth 2 times daily  3/15/19  Yes Kady Hills MD   metoprolol tartrate (LOPRESSOR) 25 MG tablet Take 1 tablet by mouth 2 times daily 19  Yes Shivam Rosasiday, DO   ranolazine (RANEXA) 1000 MG extended release tablet Take 1 tablet by mouth 2 times daily 19  Yes Shivam Rosasiday, DO   aspirin 81 MG EC tablet Take 1 tablet by mouth daily 1/15/19  Yes Dennise lCine MD   buPROPion (WELLBUTRIN SR) 200 MG extended release tablet TAKE 1 TABLET BY MOUTH ONE TIME A DAY 19  Yes Chace Marquez MD   pravastatin (PRAVACHOL) 20 MG tablet Take 1 tablet by mouth every evening 19  Yes Chace Marquez MD   Dulaglutide (TRULICITY) 9.89 LX/0.1ZI SOPN Once  A day 10/25/18  Yes Dennise Cline MD   SYNTHROID 50 MCG tablet TAKE ONE TABLET BY MOUTH ONE TIME A DAY 10/8/18  Yes Dennise Cline MD   metFORMIN (GLUCOPHAGE) 500 MG tablet TAKE ONE TABLET BY MOUTH TWICE A DAY WITH MEALS 10/8/18  Yes Chace Marquez MD   Cholecalciferol (VITAMIN D) 2000 UNITS CAPS capsule Take by mouth daily   Yes Historical Provider, MD   ibuprofen (ADVIL;MOTRIN) 200 MG tablet Take 200 mg by mouth every 6 hours as needed for Pain   Yes Historical Provider, MD   loratadine (CLARITIN) 10 MG tablet Take 10 mg by mouth daily as needed (Allergies)    Yes Historical Provider, MD   Blood Glucose Monitoring Suppl (PRODIGY AUTOCODE BLOOD GLUCOSE) w/Device KIT Use as directed to test up to 1 time daily 1/23/19   MD KAITLIN Ochoa LANCETS 28G MISC Use as directed to test up to 1 time daily 1/15/19   Germain Morse MD   blood glucose test strips (PRODIGY NO CODING BLOOD GLUC) strip 1 each by In Vitro route daily As needed. 1/15/19   Germain Morse MD   albuterol sulfate HFA (PROAIR HFA) 108 (90 Base) MCG/ACT inhaler Inhale 2 puffs into the lungs every 6 hours as needed for Wheezing or Shortness of Breath 6/13/18   Medardo Johnson PA-C   Respiratory Therapy Supplies CLARA Please give patient the farshad view mask 5/31/18   Medardo Johnson PA-C   nitroGLYCERIN (NITROSTAT) 0.4 MG SL tablet Place 1 tablet under the tongue every 5 minutes as needed for Chest pain up to max of 3 total doses. If no relief after 1 dose, call 911. Patient taking differently: Place 0.4 mg under the tongue every 5 minutes as needed for Chest pain up to max of 3 total doses. If no relief after 1 dose, call 911. 3/23/18   Robin Aures Holiday, DO       Current medications:    Current Facility-Administered Medications   Medication Dose Route Frequency Provider Last Rate Last Dose    0.9 % sodium chloride infusion   Intravenous Continuous Renae Bobby  mL/hr at 04/02/19 0850      sodium chloride flush 0.9 % injection 10 mL  10 mL Intravenous 2 times per day Renae Bobby MD        sodium chloride flush 0.9 % injection 10 mL  10 mL Intravenous PRN Renae Bobby MD        lidocaine PF 1 % injection 1 mL  1 mL Intradermal Once PRN Renae Bobby MD           Allergies:     Allergies   Allergen Reactions    Seasonal Itching       Problem List:    Patient Active Problem List   Diagnosis Code    Vitamin D deficiency E55.9    Weight gain R63.5    Generalized anxiety disorder F41.1    Other specified acquired hypothyroidism E03.8    Sleep apnea, obstructive G47.33    Type II diabetes mellitus, uncontrolled (HCC) E11.65    Hypothyroidism E03.9    Essential hypertension I10    Family history of coronary artery disease Z80.55    History of tobacco abuse Z87.891    Precordial pain R07.2    Chest pain R07.9    History of colon polyps Z86.010    Polyp of colon K63.5    Tobacco abuse, 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 [address] 2000    Essential hypertension 1/15/2016    Family history of coronary artery disease 1/15/2016    Generalized anxiety disorder     H/O: hysterectomy     History of tobacco abuse 1/15/2016    Obesity (BMI 30-39. 9)     Other specified acquired hypothyroidism     Precordial pain 2018    (? Prinzmetal?) Dr Alexy Renteria S/P colonoscopy with polypectomy ,     Dr Debbrah Boast, Dr Missy Armijo, tubulovillous adenoma     S/P vaginal hysterectomy     benign    Sleep apnea, obstructive     was on CPAP, not using it at present    Unstable angina ([facility] [address] 3/23/2018    Vertigo 2018       Past Surgical History:        Procedure Laterality Date    CHOLECYSTECTOMY, LAPAROSCOPIC      DIAGNOSTIC CARDIAC CATH LAB PROCEDURE      ENDOMETRIAL ABLATION      metrorrhagia    HYSTERECTOMY  12    fibroid, cervicitis, ovaries intact    MT COLON CA SCRN NOT  [address] IND N/A 5/15/2018    COLONOSCOPY performed by Mya Ortega MD at [address] [place] [address]       Social History:    Social History     Tobacco Use    Smoking status: Former Smoker     Packs/day: 1.00     Years: 30.00     Pack years: 30.00     Types: Cigarettes     Start date: 3/5/1975     Last attempt to quit: 2005     Years since quittin.8    Smokeless tobacco: Never Used    Tobacco comment: 3/15/18 started age 17   Substance Use Topics    Alcohol use:  No Comment: not at all                                 Counseling given: Not Answered  Comment: 3/15/18 started age 17      Vital Signs (Current):   Vitals:    04/02/19 0833   BP: 134/72   Pulse: 72   Resp: 16   Temp: 36.4 °C (97.6 °F)   TempSrc: Temporal   SpO2: 97%   Weight: 224 lb (101.6 kg)   Height: 5' 5\" (1.651 m)                                              BP Readings from Last 3 Encounters:   04/02/19 134/72   03/29/19 (!) 142/70   03/18/19 (!) 142/76       NPO Status: Time of last liquid consumption: 2330                        Time of last solid consumption: 2100                        Date of last liquid consumption: 04/01/19                        Date of last solid food consumption: 04/01/19    BMI:   Wt Readings from Last 3 Encounters:   04/02/19 224 lb (101.6 kg)   03/29/19 224 lb (101.6 kg)   03/18/19 220 lb (99.8 kg)     Body mass index is 37.28 kg/m². CBC:   Lab Results   Component Value Date    WBC 8.8 03/18/2019    RBC 3.88 03/18/2019    HGB 11.3 03/18/2019    HCT 33.1 03/18/2019    MCV 85.2 03/18/2019    RDW 14.1 03/18/2019     03/18/2019       CMP:   Lab Results   Component Value Date     03/18/2019    K 4.4 03/18/2019     03/18/2019    CO2 24 03/18/2019    BUN 12 03/18/2019    CREATININE 0.88 03/18/2019    GFRAA >60.0 03/18/2019    LABGLOM >60.0 03/18/2019    GLUCOSE 113 03/18/2019    PROT 6.9 03/18/2019    CALCIUM 8.9 03/18/2019    BILITOT 0.3 03/18/2019    ALKPHOS 56 03/18/2019    AST 19 03/18/2019    ALT 27 03/18/2019       POC Tests: No results for input(s): POCGLU, POCNA, POCK, POCCL, POCBUN, POCHEMO, POCHCT in the last 72 hours.     Coags:   Lab Results   Component Value Date    PROTIME 9.9 03/18/2019    INR 1.0 03/18/2019    APTT 25.1 03/23/2018       HCG (If Applicable): No results found for: PREGTESTUR, PREGSERUM, HCG, HCGQUANT     ABGs: No results found for: PHART, PO2ART, RSO8GWV, YWD9YJE, BEART, S9LPSSUR     Type & Screen (If Applicable):  No results found for: LABABO, [address]    Anesthesia Evaluation  Patient summary reviewed and Nursing notes reviewed  Airway: Mallampati: II  TM distance: >3 FB   Neck ROM: full  Mouth opening: > = 3 FB Dental: normal exam         Pulmonary:normal exam    (+) sleep apnea:                             Cardiovascular:    (+) hypertension:, angina:,                   Neuro/Psych:   (+) psychiatric history:            GI/Hepatic/Renal: Neg GI/Hepatic/Renal ROS            Endo/Other: Negative Endo/Other ROS                    Abdominal:           Vascular:                                        Anesthesia Plan      MAC     ASA 3             Anesthetic plan and risks discussed with patient. Plan discussed with CRNA.                   Rodger Garay, APRN - CRNA   4/2/2019

## 2019-04-02 NOTE — Clinical Note
Please send this letter to Dr. Nupur Alfonso at [facility], please attached a copy of her endoscopy report and previous clinic visits

## 2019-04-03 ENCOUNTER — HOSPITAL ENCOUNTER (OUTPATIENT)
Dept: PHYSICAL THERAPY | Age: 59
Setting detail: THERAPIES SERIES
Discharge: HOME | End: 2019-04-03
Payer: COMMERCIAL

## 2019-04-03 PROCEDURE — 97162 PT EVAL MOD COMPLEX 30 MIN: CPT

## 2019-04-03 PROCEDURE — 97112 NEUROMUSCULAR REEDUCATION: CPT

## 2019-04-03 ASSESSMENT — PAIN SCALES - GENERAL: PAINLEVEL_OUTOF10: 0

## 2019-04-03 NOTE — PROGRESS NOTES
2015 with rolling over and getting up from bed with spinning sensation. 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           PAIN   Location:      Pain Rating (0-10 pain scale):  Pain Level: 0  Pain Description:     Action:  NA      Objective  Vision  Vision: Within Functional Limits(blurred vision at time of episodes )  Hearing  Hearing: Within functional limits(intermittent tinnitus before, during and after episodes )  Observation/Palpation  Posture: Good     Strength RLE  Strength RLE: WFL  Strength LLE  Strength LLE: WFL  Strength RUE  Strength RUE: WFL  Strength LUE  Strength LUE: WFL                   Sensation  Overall Sensation Status: (denies numbness and tingling, except tip of tongue felt numb last 2 episodes )     Transfers  Sit to Stand: Independent  Stand to sit:  Independent  Ambulation 1  Surface: carpet  Device: No Device  Assistance: Independent  Quality of Gait: Pt ambulates with mildly increased TAMY, good андрей and velocity, good step length, no sig pathway deviations   Distance: Unlimited within clinic              Vestibular Assessment:  Patient experiences spells of vertigo?: Yes  Describe Vertigo: Room Spinning, Loss of Balance  How long do these spells last?: Hours     Describe inducing motions/positions: inconsistent, sometimes with sitting   Patient experiences disequilibrium?: Yes  Disequilibrium is?: Spontaneous, Occurs when sitting, Occurs when walking, Occurs when standing still     Associated hearing/ear symptoms: Yes  Hearing/Ear symptoms: Tinnitus, Pressure/fullness  Which Side?: Both Sides  Any recent Illness or Infections?: No     Any recent accidents or head trauma?: No     Any history of migraines or headaches?: No     Oculomotor Examination  Oculomotor Examination: Yes  Spontaneous Nystagmus: No  Gaze Holding Nystagmus: No  Vergence: Abnormal (diplopia or disconjugate at > 10 cm(15 cm)  Smooth Pursuits: Abnormal/Saccadic intrusions noted, Horizontal pursuit, Vertical pursuit(1/5 symptoms, increased symptoms horizontal with increased saccades, overshoot with vertical)  Saccades: WNL (2 or less)  VOR (slow): WNL  Head Impulse Test/ Head Thrust Test: Negative  DVA: Not Tested       Positional Vertigo:   Intensity (0-5) Nystagmus Duration   Sit -> Rt Sidelying 0     Rt Sidelying -> Sit 0     Sit -> Lt Sidelying 1     Lt Sidelying -> Sit 0       Positional Testing  Right Slemp Hallpike: Vertigo  Describe/Comment: 3/5, return to upright 4/5 \"just off\", reports \"start of spinning\" with lying down     Exercises:   Exercises  Exercise 1: smooth pursuits 30s/2 with VCs for proper speed and range to decrease symptoms  Exercise 2: static stand with feet apart eyes open/ closed, feet together eyes open 30s ea to improve static balance   Exercise 3: Rt CRM to decrease symptoms   Exercise 4: ** 90/180  Exercise 5: ** figure 8s   Exercise 6: ** gait with head turns   Exercise 7: ** obstacles  Exercise 8: ** dual task   Exercise 9: ** B-D exs if needed   Exercise 10: ** thumb push ups  Exercise 20: HEP: smooth pursuits, static stand       POST-PAIN    Pain Rating (0-10 pain scale):  0  Location and Pain Description same as pre-pain unless otherwise indicated.   Action: perform HEP     Assessment  Conditions Requiring Skilled Therapeutic Intervention  Body structures, Functions, Activity limitations: Decreased functional mobility , Decreased balance, Vestibular Impairment  Assessment: Pt presents with 3 week h/o dysequilibrium and \"fuzzy feeling\" affecting function. Pt with symptoms with Rt Hallpike and Lt sidelying. No nystagmus noted. Pt with abnormal smooth pursuits horizontal > vertical.  All other oculomotor exam (-). Pt with decreased static and dynamic balance. Pt does c/o fullness in ears with intermittent tinnitus. Pt to see ENT 5/1/19. Pt would benefit from skilled PT to address deficits and return to previous function without symptoms. Treatment Diagnosis: imbalance, dizziness  Prognosis: Good  Decision Making: Medium Complexity  History: personal factors: stress; contributing PMH: DM, anxiety, obesity, angina, CAD  Exam: vestibular systems involved impacting balance, gait, work, ADLs; Gregorio Brooms: 48/56  Clinical Presentation: complicated   Patient Education: HEP, POC, vestibular anatomy   Barriers to Learning: no   REQUIRES PT FOLLOW UP: Yes  Patient Goal:  Patient goals : reduce/ eliminate vertigo     Patient Education:  HEP, POC, vestibular anatomy   Pt verbalized/demonstrated good understanding:    Yes    G Code:      NA    Plan:  Plan  Times per week: 1-2  Plan weeks: 4  Current Treatment Recommendations: Balance Training, Neuromuscular Re-education, Home Exercise Program, Patient/Caregiver Education & Training, Vestibular Rehab       Evaluation and patient rights have been reviewed and patient agrees with plan of care. Yes    Signature: Electronically signed by Rose Mary Lindsay PT on 4/3/2019 at 8:56 AM    PT Individual Minutes  Time In: 0800  Time Out: 4984  Minutes: 50  Timed Code Treatment Minutes: 15 Minutes(neuro ed)  Procedure Minutes:           Allie Fall Risk Assessment  Risk Factor Scale  Score   History of Falls no 25  0 0   Secondary Diagnosis no 15  0 0   Ambulatory Aid None/ Bedrest/ Wheelchair  30  15  0 0

## 2019-04-03 NOTE — PLAN OF CARE
[address]     Ph: [phone]  Fax: [phone]    [] Certification  [] Recertification [x]  Plan of Care  [] Progress Note [] Discharge      To:  Referring Practitioner: Dr. Louise Reyez        From:  Renae Estrada, PT  Patient: Shivam Agrawal          : 1960  Diagnosis: BPPV due to bilateral vestibular disorder     Date: 4/3/2019  Treatment Diagnosis: imbalance, dizziness     Progress Report Period from:  4/3/2019  to 4/3/2019    Total # of Visits to Date: 1   No Show: 0    Canceled Appointment: 0     OBJECTIVE:   Short Term Goals - Time Frame for Short term goals: 2 wks     Goals Current/Discharge status  Met   Short term goal 1: Independent with HEP to improve balance and decrease symptoms  Need for HEP  [] yes  [] no   Short term goal 2: Report 50% reduction in symptoms   C/o blurred vision, imbalance, nausea, disorientation, denies vertigo [] yes  [] no     Long Term Goals - Time Frame for Long term goals : 4-5 wks   Goals Current/ Discharge status Met   Long term goal 1: (-) bilateral Hallpike and sidelying tests  (+) symptoms with Rt Hallpike and Lt sidelying, no nystagmus noted  [] yes  [] no   Long term goal 2: Le >/= 55/56 to demonstrate improved static balance and decreased risk for falls  Le Balance Score: 48   [] yes  [] no   Long term goal 3: FGA >/= 26/30 to demonstrate good dynamic balance and decreased risk for falls  NT [] yes  [] no       Body structures, Functions, Activity limitations: Decreased functional mobility , Decreased balance, Vestibular Impairment  Assessment: Pt presents with 3 week h/o dysequilibrium and \"fuzzy feeling\" affecting function. Pt with symptoms with Rt Hallpike and Lt sidelying. No nystagmus noted. Pt with abnormal smooth pursuits horizontal > vertical.  All other oculomotor exam (-). Pt with decreased static and dynamic balance.   Pt does c/o fullness in ears with intermittent tinnitus. Pt to see ENT 5/1/19. Pt would benefit from skilled PT to address deficits and return to previous function without symptoms. Prognosis: Good      New Education Provided: HEP, POC, vestibular anatomy   G-Codes    NA    PLAN: [x] Evaluate and Treat  Frequency/Duration:  Plan  Times per week: 1-2  Plan weeks: 4  Current Treatment Recommendations: Balance Training, Neuromuscular Re-education, Home Exercise Program, Patient/Caregiver Education & Training, Vestibular Rehab     Precautions: DM             ? Patient Status:[x] Continue/ Initiate plan of Care    [] Discharge PT. Recommend pt continue with HEP. [] Additional visits requested, Please re-certify for additional visits:          Signature: Electronically signed by Renae Estrada PT on 4/3/19 at 8:56 AM      If you have any questions or concerns, please don't hesitate to call. Thank you for your referral.    I have reviewed this plan of care and certify a need for medically necessary rehabilitation services.     Physician Signature:__________________________________________________________  Date:  Please sign and return

## 2019-04-05 ENCOUNTER — HOSPITAL ENCOUNTER (OUTPATIENT)
Dept: PHYSICAL THERAPY | Age: 59
Setting detail: THERAPIES SERIES
Discharge: HOME | End: 2019-04-05
Payer: COMMERCIAL

## 2019-04-05 DIAGNOSIS — R07.9 RIGHT-SIDED CHEST PAIN: ICD-10-CM

## 2019-04-05 DIAGNOSIS — I20.9 ANGINA, CLASS III (HCC): Primary | ICD-10-CM

## 2019-04-05 PROCEDURE — 97112 NEUROMUSCULAR REEDUCATION: CPT

## 2019-04-05 NOTE — PROGRESS NOTES
[address] [address]  Outpatient Physical Therapy    Treatment Note        Date: 2019  Patient: Adolfo Mckeon  : 1960  ACCT #: [de-identified]  Referring Practitioner: Dr. Abhi Ramires   Diagnosis: BPPV due to bilateral vestibular disorder    Visit Information:  PT Visit Information  Onset Date: (a few weeks ago )  PT Insurance Information: Medical [place]   Total # of Visits Approved: (medical necessity )  Total # of Visits to Date: 2  No Show: 0  Canceled Appointment: 0  Progress Note Counter:     Subjective: Pt reports decreased compliance with HEP due to going to [employer]. Reports increased unsteadiness with all of the increased stimulation while there. Feeling a little better today. HEP Compliance:  [] Good [x] Fair [x] Poor [] Reports not doing due to:    Vital Signs  Patient Currently in Pain: No   Pain Screening  Patient Currently in Pain: No    OBJECTIVE:   Exercises  Exercise 1: smooth pursuits 30sx3 horiz and vert  Exercise 2: Static stand: FA/FT with EO/EC/head turns  Exercise 3: Rt Hallpike (-) for nystagmus and sxs  Exercise 4: 90deg, 180deg turns with spotting fito  Exercise 5: Figure 8 around bolsters  Exercise 6: Amb with horiz and vert head turns  Exercise 8: Dual task: amb with ball on cone, ball toss to self, bean bag toss to self all with category naming, amb with conversation with march with opp UE lift  Exercise 10: Thumb push ups x10  Exercise 11: Foam: head turns, EO/EC  Exercise 20: HEP: thumb push ups    Assessment:   Activity Tolerance  Activity Tolerance: Patient Tolerated treatment well    Body structures, Functions, Activity limitations: Decreased functional mobility , Decreased balance, Vestibular Impairment  Assessment: Initiated vestibular and balance exercises per POC with good tolerance. Pt without dizziness with smooth pursuits but reports mild nausea and blurred vision. Pt able to complete 90deg turns to Lt without difficult.   Reports

## 2019-04-08 ENCOUNTER — NURSE TRIAGE (OUTPATIENT)
Dept: OTHER | Facility: CLINIC | Age: 59
End: 2019-04-08

## 2019-04-08 ENCOUNTER — HOSPITAL ENCOUNTER (OUTPATIENT)
Dept: PHYSICAL THERAPY | Age: 59
Setting detail: THERAPIES SERIES
Discharge: HOME | End: 2019-04-08
Payer: COMMERCIAL

## 2019-04-08 PROCEDURE — 97112 NEUROMUSCULAR REEDUCATION: CPT

## 2019-04-08 NOTE — PROGRESS NOTES
[address] [address]  Outpatient Physical Therapy    Treatment Note        Date: 2019  Patient: Wai Rodriguez  : 1960  ACCT #: [de-identified]  Referring Practitioner: Dr. Ai Castro   Diagnosis: BPPV due to bilateral vestibular disorder    Visit Information:  PT Visit Information  Onset Date: (a few weeks ago )  PT Insurance Information: [facility] [place]   Total # of Visits Approved: (medical necessity )  Total # of Visits to Date: 3  No Show: 0  Canceled Appointment: 0  Progress Note Counter: 3/8    Subjective: Pt reports one mild episode while in grocery store, focusing/targeting did not work well. Felt more off balance and \"fuzzy head\" Taking Antivert just as needed, last taken was prior to PT. HEP Compliance:  [x] Good [] Fair [] Poor [] Reports not doing due to:    Vital Signs  Patient Currently in Pain: No   Pain Screening  Patient Currently in Pain: No    OBJECTIVE:   Exercises  Exercise 1: smooth pursuits 30sx3 horiz and vert in seated and standing (.5/5 - with sense of minimal imbalance)  Exercise 5: Figure 8 around bolsters(increased symptoms with turning to Left)  Exercise 6: Amb with horiz and vert head turns ( min lateral sway with horizontal head turns)  Exercise 7: Stepping over objects without difficulty  Exercise 8: Dual tasking: ball to self F/R  Exercise 9: B-D ex's x3 Vincenzo without symptoms  Exercise 10: Thumb push ups x10 (10cm from nose)  Exercise 11: Foam: EO/EC, semitandem   Exercise 20: HEP: head turns           *Indicates exercise, modality, or manual techniques to be initiated when appropriate    Assessment:   Activity Tolerance  Activity Tolerance: Patient Tolerated treatment well    Body structures, Functions, Activity limitations: Decreased functional mobility , Decreased balance, Vestibular Impairment  Assessment: Continued tx on balance and vestibular exercises. Reports of imbalance with any directional changes to Left. Trialed B-D exercises without symptoms.  Cameron Neal deviation with head turns with gait. Fatigue reported at end of tx. Treatment Diagnosis: imbalance, dizziness  Prognosis: Good       Goals:  Short term goals  Time Frame for Short term goals: 2 wks   Short term goal 1: Independent with HEP to improve balance and decrease symptoms  Short term goal 2: Report 50% reduction in symptoms     Long term goals  Time Frame for Long term goals : 4-5 wks   Long term goal 1: (-) bilateral Hallpike and sidelying tests   Long term goal 2: Le >/= 55/56 to demonstrate improved static balance and decreased risk for falls   Long term goal 3: FGA >/= 26/30 to demonstrate good dynamic balance and decreased risk for falls   Progress toward goals: Balance, symptoms    POST-PAIN       Pain Rating (0-10 pain scale):   0/10   Location and pain description same as pre-treatment unless indicated. Action: [x] NA   [] Perform HEP  [] Meds as prescribed  [] Modalities as prescribed   [] Call Physician     Frequency/Duration:  Plan  Times per week: 1-2  Plan weeks: 4  Current Treatment Recommendations: Balance Training, Neuromuscular Re-education, Home Exercise Program, Patient/Caregiver Education & Training, Vestibular Rehab     Pt to continue current HEP. See objective section for any therapeutic exercise changes, additions or modifications this date.          PT Individual Minutes  Time In: 0802  Time Out: 5951  Minutes: 57  Timed Code Treatment Minutes: 56 Minutes  Procedure Minutes: 0  Neuro: 56    Signature:  Electronically signed by Marion Ibarra PTA on 4/8/19 at 7:51 AM

## 2019-04-08 NOTE — TELEPHONE ENCOUNTER
Reason for Disposition   General information question, no triage required and triager able to answer question    Protocols used: INFORMATION ONLY CALL-ADULT-  Patient called RN access in an attempt to reach [facility] [place] regarding a referral for a test not covered by insurance plan. Call not triaged.

## 2019-04-10 ENCOUNTER — HOSPITAL ENCOUNTER (OUTPATIENT)
Dept: PHYSICAL THERAPY | Age: 59
Setting detail: THERAPIES SERIES
Discharge: HOME | End: 2019-04-10
Payer: COMMERCIAL

## 2019-04-10 PROCEDURE — 97112 NEUROMUSCULAR REEDUCATION: CPT

## 2019-04-10 NOTE — PROGRESS NOTES
[id number] [address]  Outpatient Physical Therapy    Treatment Note        Date: 4/10/2019  Patient: Dollene Kayser  : 1960  ACCT #: [de-identified]  Referring Practitioner: Dr. Alexis Eagle   Diagnosis: BPPV due to bilateral vestibular disorder    Visit Information:  PT Visit Information  Onset Date: (a few weeks ago )  PT Insurance Information: Medical [place]   Total # of Visits Approved: (medical necessity )  Total # of Visits to Date: 4  No Show: 0  Canceled Appointment: 0  Progress Note Counter:     Subjective: \"I dont feel 100%\" reports ears feel full but no significant dizziness epsiodes since last visit. Increased fatigue after last visit. HEP Compliance:  [] Good [x] Fair [] Poor [] Reports not doing due to:    Vital Signs  Patient Currently in Pain: No   Pain Screening  Patient Currently in Pain: No    OBJECTIVE:   Exercises  Exercise 1: smooth pursuits 30sx3 horiz and vert in seated and standing (1-2/5 - with ear fullness with horizontal, increased sway with horizontal vs vertical)  Exercise 2: Tuning board: static stand EO/EC, LOS  Exercise 3: Lt Hallpike (-) for nystagmus, increased pressure in ears/fuzzy reported (no spinning/dizziness)  Exercise 4: 90deg, 180deg turns with spotting fito  Exercise 5: Figure 8 around bolsters(increased symptoms with turning to Left)  Exercise 7: Gait: DGI tasks, tandem walking  Exercise 8: Dual tasking: ball to self F/R  Exercise 10: Thumb push ups x10 (10cm from nose)        *Indicates exercise, modality, or manual techniques to be initiated when appropriate    Assessment:   Activity Tolerance  Activity Tolerance: Patient Tolerated treatment well    Body structures, Functions, Activity limitations: Decreased functional mobility , Decreased balance, Vestibular Impairment  Assessment: Pt with (-) Lt Hallpike however experienced pressure in ears as well as overall sinuses when obtaining position and sitting up.  Pt reporting imbalance with dynamic activities such as figure 8's around bolsters and 180deg turns, improved with spotting. Improved gait quality observed with head turns today. Treatment Diagnosis: imbalance, dizziness  Prognosis: Good       Goals:  Short term goals  Time Frame for Short term goals: 2 wks   Short term goal 1: Independent with HEP to improve balance and decrease symptoms  Short term goal 2: Report 50% reduction in symptoms     Long term goals  Time Frame for Long term goals : 4-5 wks   Long term goal 1: (-) bilateral Hallpike and sidelying tests   Long term goal 2: Le >/= 55/56 to demonstrate improved static balance and decreased risk for falls   Long term goal 3: FGA >/= 26/30 to demonstrate good dynamic balance and decreased risk for falls   Progress toward goals: Balance, symptoms    POST-PAIN       Pain Rating (0-10 pain scale):  0 /10   Location and pain description same as pre-treatment unless indicated. Action: [x] NA   [] Perform HEP  [] Meds as prescribed  [] Modalities as prescribed   [] Call Physician     Frequency/Duration:  Plan  Times per week: 1-2  Plan weeks: 4  Current Treatment Recommendations: Balance Training, Neuromuscular Re-education, Home Exercise Program, Patient/Caregiver Education & Training, Vestibular Rehab     Pt to continue current HEP. See objective section for any therapeutic exercise changes, additions or modifications this date.          PT Individual Minutes  Time In: 1301  Time Out: 1630  Minutes: 58  Timed Code Treatment Minutes: 56 Minutes  Procedure Minutes: 0  Neuro: 56    Signature:  Electronically signed by Amee Odom PTA on 4/10/19 at 10:20 AM

## 2019-04-15 ENCOUNTER — HOSPITAL ENCOUNTER (OUTPATIENT)
Dept: PHYSICAL THERAPY | Age: 59
Setting detail: THERAPIES SERIES
Discharge: HOME | End: 2019-04-15
Payer: COMMERCIAL

## 2019-04-15 NOTE — PROGRESS NOTES
[address]       Physical Therapy  Cancellation/No-show Note  Patient Name:  Johnny Mays  :  1960   Date:  4/15/2019  Referring Practitioner: Dr. Brandon Oakley   Diagnosis: BPPV due to bilateral vestibular disorder    Visit Information:  PT Visit Information  Onset Date: (a few weeks ago )  PT Insurance Information: Medical [place]   Total # of Visits Approved: (medical necessity )  Total # of Visits to Date: 4  No Show: 0  Canceled Appointment: 1  Progress Note Counter:  (cx for 4/15/19)    For today's appointment patient:  [x]  Cancelled  []  Rescheduled appointment  []  No-show   []  Called pt to remind of next appointment     Reason given by patient:  [x]  Patient ill  []  Conflicting appointment  []  No transportation    []  Conflict with work  []  No reason given  []  Other:       Comments:       Signature: Electronically signed by Roseanne Barba PTA on 4/15/19 at 8:56 AM

## 2019-04-17 ENCOUNTER — HOSPITAL ENCOUNTER (OUTPATIENT)
Dept: PHYSICAL THERAPY | Age: 59
Setting detail: THERAPIES SERIES
Discharge: HOME | End: 2019-04-17
Payer: COMMERCIAL

## 2019-04-17 PROCEDURE — 97112 NEUROMUSCULAR REEDUCATION: CPT

## 2019-04-17 ASSESSMENT — ENCOUNTER SYMPTOMS
SHORTNESS OF BREATH: 0
EYE PAIN: 0
ABDOMINAL PAIN: 0
CHEST TIGHTNESS: 0
SORE THROAT: 0
VOMITING: 0
NAUSEA: 0

## 2019-04-17 NOTE — PROGRESS NOTES
[address] [address]  Outpatient Physical Therapy    Treatment Note        Date: 2019  Patient: Jeniffer Gay  : 1960  ACCT #: [de-identified]  Referring Practitioner: Dr. Cherry Rendon   Diagnosis: BPPV due to bilateral vestibular disorder    Visit Information:  PT Visit Information  Onset Date: (a few weeks ago )  PT Insurance Information: Medical [place]   Total # of Visits Approved: (medical necessity )  Total # of Visits to Date: 5  No Show: 0  Canceled Appointment: 1  Progress Note Counter:     Subjective: Pt reports having upper respiratory and sinus issues. Pt reports when she feels an episode coming on, anxiety increases but anxiety does not bring on episodes. Pt denies feeling stressed lately. One episode of imbalance while watching someone turn around to talk to her. HEP Compliance:  [x] Good [] Fair [] Poor [] Reports not doing due to:    Vital Signs  Patient Currently in Pain: No   Pain Screening  Patient Currently in Pain: No    OBJECTIVE:   Exercises  Exercise 1: smooth pursuits 30sx3 horiz and vert in  standing (2/5 \"imbalance\" horizontal. 1/5 vertical and diagnal)  Exercise 4: SLS: 10sec on Lt, 8-10 on Lt with multiple trials, tandem 30sec  Exercise 5: Figure 8s, eyes on cones on ground   Exercise 7: Gait: DGI tasks, tandem walking, march and holds, cross overs  Exercise 10: Thumb push ups x10 (9cm from nose)  Exercise 12: Watching therapist toss bean bag, 360 deg turns  Exercise 13: Pinwheels x2 (mild dizziness)  Exercise 14: VOR x1, x2 seated/stand  Exercise 20: HEP: VOR           *Indicates exercise, modality, or manual techniques to be initiated when appropriate    Assessment: Body structures, Functions, Activity limitations: Decreased functional mobility , Decreased balance, Vestibular Impairment  Assessment: Pt reporting stuffiness in ears as tx progressed. Trialed VORx1 &2 with symptoms of imbalance with VOR x2. Improved balance with head turns and static stands. Treatment Diagnosis: imbalance, dizziness  Prognosis: Good       Goals:  Short term goals  Time Frame for Short term goals: 2 wks   Short term goal 1: Independent with HEP to improve balance and decrease symptoms  Short term goal 2: Report 50% reduction in symptoms     Long term goals  Time Frame for Long term goals : 4-5 wks   Long term goal 1: (-) bilateral Hallpike and sidelying tests   Long term goal 2: Le >/= 55/56 to demonstrate improved static balance and decreased risk for falls   Long term goal 3: FGA >/= 26/30 to demonstrate good dynamic balance and decreased risk for falls   Progress toward goals: Balance, symptoms    POST-PAIN       Pain Rating (0-10 pain scale):   0/10   Location and pain description same as pre-treatment unless indicated. Action: [x] NA   [] Perform HEP  [] Meds as prescribed  [] Modalities as prescribed   [] Call Physician     Frequency/Duration:  Plan  Times per week: 1-2  Plan weeks: 4  Current Treatment Recommendations: Balance Training, Neuromuscular Re-education, Home Exercise Program, Patient/Caregiver Education & Training, Vestibular Rehab     Pt to continue current HEP. See objective section for any therapeutic exercise changes, additions or modifications this date.          PT Individual Minutes  Time In: 2591  Time Out: 1800  Minutes: 63  Timed Code Treatment Minutes: 60 Minutes  Procedure Minutes: 0  Neuro: 60    Signature:  Electronically signed by Sharri Guerrero PTA on 4/17/19 at 9:27 AM

## 2019-04-22 ENCOUNTER — OFFICE VISIT (OUTPATIENT)
Dept: ENDOCRINOLOGY | Age: 59
End: 2019-04-22
Payer: COMMERCIAL

## 2019-04-22 VITALS
WEIGHT: 221 LBS | HEART RATE: 75 BPM | HEIGHT: 65 IN | BODY MASS INDEX: 36.82 KG/M2 | SYSTOLIC BLOOD PRESSURE: 119 MMHG | DIASTOLIC BLOOD PRESSURE: 76 MMHG

## 2019-04-22 DIAGNOSIS — E03.8 OTHER SPECIFIED HYPOTHYROIDISM: Primary | ICD-10-CM

## 2019-04-22 DIAGNOSIS — R07.9 RIGHT-SIDED CHEST PAIN: ICD-10-CM

## 2019-04-22 DIAGNOSIS — E11.65 UNCONTROLLED TYPE 2 DIABETES MELLITUS WITH HYPERGLYCEMIA (HCC): Primary | ICD-10-CM

## 2019-04-22 DIAGNOSIS — E03.8 OTHER SPECIFIED HYPOTHYROIDISM: ICD-10-CM

## 2019-04-22 DIAGNOSIS — E11.65 UNCONTROLLED TYPE 2 DIABETES MELLITUS WITH HYPERGLYCEMIA (HCC): ICD-10-CM

## 2019-04-22 LAB
ANION GAP SERPL CALCULATED.3IONS-SCNC: 16 MEQ/L (ref 9–15)
BUN BLDV-MCNC: 12 MG/DL (ref 6–20)
CALCIUM SERPL-MCNC: 9 MG/DL (ref 8.5–9.9)
CHLORIDE BLD-SCNC: 101 MEQ/L (ref 95–107)
CHP ED QC CHECK: NORMAL
CO2: 27 MEQ/L (ref 20–31)
CREAT SERPL-MCNC: 0.87 MG/DL (ref 0.5–0.9)
CREATININE URINE: 133.9 MG/DL
GFR AFRICAN AMERICAN: >60
GFR NON-AFRICAN AMERICAN: >60
GLUCOSE BLD-MCNC: 116 MG/DL (ref 70–99)
GLUCOSE BLD-MCNC: 130 MG/DL
MICROALBUMIN UR-MCNC: <1.2 MG/DL
MICROALBUMIN/CREAT UR-RTO: NORMAL MG/G (ref 0–30)
POTASSIUM SERPL-SCNC: 4.2 MEQ/L (ref 3.4–4.9)
SODIUM BLD-SCNC: 144 MEQ/L (ref 135–144)
T4 FREE: 1.28 NG/DL (ref 0.84–1.68)
TSH SERPL DL<=0.05 MIU/L-ACNC: 0.53 UIU/ML (ref 0.44–3.86)

## 2019-04-22 PROCEDURE — 99213 OFFICE O/P EST LOW 20 MIN: CPT | Performed by: INTERNAL MEDICINE

## 2019-04-22 PROCEDURE — 82962 GLUCOSE BLOOD TEST: CPT | Performed by: INTERNAL MEDICINE

## 2019-04-22 RX ORDER — OMEPRAZOLE 40 MG/1
CAPSULE, DELAYED RELEASE ORAL
Qty: 60 CAPSULE | Refills: 0 | Status: SHIPPED | OUTPATIENT
Start: 2019-04-22 | End: 2019-06-28 | Stop reason: SDUPTHER

## 2019-04-22 NOTE — PROGRESS NOTES
Subjective:      Patient ID: Jeniffer Gay is a 66 y.o. female. 6 month f/u on diabetes hypothyroidism   Diabetes   She presents for her follow-up diabetic visit. She has type 2 diabetes mellitus. Associated symptoms include chest pain. Symptoms are improving. Current diabetic treatments: metformin plus trulicity  Her overall blood glucose range is 140-180 mg/dl. (Lab Results       Component                Value               Date                       LABA1C                   5.8                 03/07/2019              ) An ACE inhibitor/angiotensin II receptor blocker is being taken. Hypothyroidism on replacement with synthroid 50 mcg daily    reviewed labs      Results for Torrie Houston (MRN [id number]) as of 4/22/2019 16:29   Ref.  Range 4/22/2019 10:26 4/22/2019 10:32   Sodium Latest Ref Range: 135 - 144 mEq/L 144    Potassium Latest Ref Range: 3.4 - 4.9 mEq/L 4.2    Chloride Latest Ref Range: 95 - 107 mEq/L 101    CO2 Latest Ref Range: 20 - 31 mEq/L 27    BUN Latest Ref Range: 6 - 20 mg/dL 12    Creatinine Latest Ref Range: 0.50 - 0.90 mg/dL 0.87    Anion Gap Latest Ref Range: 9 - 15 mEq/L 16 (H)    GFR Non- Latest Ref Range: >60  >60.0    GFR African American Latest Ref Range: >60  >60.0    Glucose Latest Ref Range: 70 - 99 mg/dL 116 (H)    Calcium Latest Ref Range: 8.5 - 9.9 mg/dL 9.0    TSH Latest Ref Range: 0.440 - 3.860 uIU/mL 0.531    T4 Free Latest Ref Range: 0.84 - 1.68 ng/dL 1.28    Creatinine, Ur Latest Ref Range: Not Established mg/dL  133.9   Microalbumin Creatinine Ratio Latest Ref Range: 0.0 - 30.0 mg/G  see below   Microalbumin, Random Urine Latest Ref Range: Not Established mg/dL  <1.20       Patient having chest pains has been seeing cardiologist and getting treated and tested for chest pain is with or without exertion    Patient Active Problem List   Diagnosis    Vitamin D deficiency    Weight gain    Generalized anxiety disorder    Other specified acquired Rfl: 3    metFORMIN (GLUCOPHAGE) 500 MG tablet, TAKE ONE TABLET BY MOUTH TWICE A DAY WITH MEALS, Disp: 180 tablet, Rfl: 3    albuterol sulfate HFA (PROAIR HFA) 108 (90 Base) MCG/ACT inhaler, Inhale 2 puffs into the lungs every 6 hours as needed for Wheezing or Shortness of Breath, Disp: 1 Inhaler, Rfl: 3    Respiratory Therapy Supplies CLARA, Please give patient the farshad view mask, Disp: 1 Device, Rfl: 0    nitroGLYCERIN (NITROSTAT) 0.4 MG SL tablet, Place 1 tablet under the tongue every 5 minutes as needed for Chest pain up to max of 3 total doses. If no relief after 1 dose, call 911. (Patient taking differently: Place 0.4 mg under the tongue every 5 minutes as needed for Chest pain up to max of 3 total doses. If no relief after 1 dose, call 911.), Disp: 25 tablet, Rfl: 3    Cholecalciferol (VITAMIN D) 2000 UNITS CAPS capsule, Take by mouth daily, Disp: , Rfl:     ibuprofen (ADVIL;MOTRIN) 200 MG tablet, Take 200 mg by mouth every 6 hours as needed for Pain, Disp: , Rfl:     loratadine (CLARITIN) 10 MG tablet, Take 10 mg by mouth daily as needed (Allergies) , Disp: , Rfl:       Review of Systems   Cardiovascular: Positive for chest pain. All other systems reviewed and are negative. Vitals:    04/22/19 1558   BP: 119/76   Pulse: 75   Weight: 221 lb (100.2 kg)   Height: 5' 5\" (1.651 m)       Objective:   Physical Exam   Constitutional: She appears well-developed and well-nourished. HENT:   Head: Normocephalic and atraumatic. Neck: Neck supple. Cardiovascular: Normal rate. Pulmonary/Chest: Effort normal.   Abdominal:   Obese    Musculoskeletal: Normal range of motion. Neurological: She is alert. Psychiatric: She has a normal mood and affect. Assessment:       Diagnosis Orders   1.  Uncontrolled type 2 diabetes mellitus with hyperglycemia (HCC)  Basic Metabolic Panel    Hemoglobin A1C    Microalbumin / Creatinine Urine Ratio    POCT Glucose    Hemoglobin A3D    Basic Metabolic Panel Microalbumin / Creatinine Urine Ratio   2.  Other specified hypothyroidism             Plan:      Orders Placed This Encounter   Procedures    Basic Metabolic Panel     Standing Status:   Future     Standing Expiration Date:   4/22/2020    Hemoglobin A1C     Standing Status:   Future     Standing Expiration Date:   4/22/2020    Microalbumin / Creatinine Urine Ratio     Standing Status:   Future     Standing Expiration Date:   4/22/2020    Hemoglobin A1C     Standing Status:   Future     Standing Expiration Date:   4/22/2020    Basic Metabolic Panel     Standing Status:   Future     Standing Expiration Date:   4/22/2020    Microalbumin / Creatinine Urine Ratio     Standing Status:   Future     Standing Expiration Date:   4/22/2020    POCT Glucose      continue trulicity 2.64 mg/week plus  metformin 500 mg bid   Continue synthroid 50 mcg daily    F/u in 6 months         Sarika Lucas MD

## 2019-04-25 ENCOUNTER — HOSPITAL ENCOUNTER (OUTPATIENT)
Dept: PHYSICAL THERAPY | Age: 59
Setting detail: THERAPIES SERIES
Discharge: HOME | End: 2019-04-25
Payer: COMMERCIAL

## 2019-04-25 VITALS — SYSTOLIC BLOOD PRESSURE: 135 MMHG | HEART RATE: 75 BPM | DIASTOLIC BLOOD PRESSURE: 74 MMHG

## 2019-04-25 PROCEDURE — 97112 NEUROMUSCULAR REEDUCATION: CPT

## 2019-04-25 ASSESSMENT — PAIN SCALES - GENERAL: PAINLEVEL_OUTOF10: 2

## 2019-04-25 NOTE — PROGRESS NOTES
[address] [address]  Outpatient Physical Therapy    Treatment Note        Date: 2019  Patient: Delphina Nyhan  : 1960  ACCT #: [de-identified]  Referring Practitioner: Dr. Anthony Mariano   Diagnosis: BPPV due to bilateral vestibular disorder    Visit Information:  PT Visit Information  Onset Date: (a few weeks ago )  PT Insurance Information: Medical [place]   Total # of Visits Approved: (medical necessity )  Total # of Visits to Date: 6  No Show: 1  Canceled Appointment: 1  Progress Note Counter:     Subjective: Pt reports having an episode of dizziness/nausea last night while watching tv, took Feast. Symptoms appeared while driving this morning with headache and nausea. Pt reports 30% improvement in symptoms since beginning PT. Comments: ENT appt 19  HEP Compliance:  [x] Good [] Fair [] Poor [] Reports not doing due to:    Vital Signs  Pulse: 75  BP: 135/74  BP Location: Right upper arm  Patient Currently in Pain: Yes   Pain Screening  Patient Currently in Pain: Yes  Pain Assessment  Pain Level: 2(headache)    OBJECTIVE:   Exercises  Exercise 1: Smooth pursuits 30sec x3 (horizontal 3/5 initially, vertical .5/5)  Exercise 5: Figure 8s around bolster   Exercise 7: DGI tasks  Exercise 14: VOR x1, x2 seated (.5/5 with horiz x2)  Exercise 15: Le/56  Exercise 16: STS with focus on pen - no symptoms  Exercise 17: Smooth pursuits: moving basketball with and without background movement, rollercoaster on computer screen        *Indicates exercise, modality, or manual techniques to be initiated when appropriate    Assessment: Body structures, Functions, Activity limitations: Decreased functional mobility , Decreased balance, Vestibular Impairment  Assessment: Pt with improved balance on Le from 48/56 to 55/56, meeting goal. Initiated smooth pursuits on screen saver with moving background, no reports of symptoms with sitting or standing.  Improved gait speed as well as quality with DGI tasks such as turn and stops and head turns. Pt reporting feeling much better after tx then first entering facility. Treatment Diagnosis: imbalance, dizziness  Prognosis: Good       Goals:  Short term goals  Time Frame for Short term goals: 2 wks   Short term goal 1: Independent with HEP to improve balance and decrease symptoms  Short term goal 2: Report 50% reduction in symptoms     Long term goals  Time Frame for Long term goals : 4-5 wks   Long term goal 1: (-) bilateral Hallpike and sidelying tests   Long term goal 2: Le >/= 55/56 to demonstrate improved static balance and decreased risk for falls   Long term goal 3: FGA >/= 26/30 to demonstrate good dynamic balance and decreased risk for falls   Progress toward goals: DGI, symptoms    POST-PAIN       Pain Rating (0-10 pain scale):  0 /10   Location and pain description same as pre-treatment unless indicated. Action: [x] NA   [] Perform HEP  [] Meds as prescribed  [] Modalities as prescribed   [] Call Physician     Frequency/Duration:  Plan  Times per week: 1-2  Plan weeks: 4  Current Treatment Recommendations: Balance Training, Neuromuscular Re-education, Home Exercise Program, Patient/Caregiver Education & Training, Vestibular Rehab     Pt to continue current HEP. See objective section for any therapeutic exercise changes, additions or modifications this date.          PT Individual Minutes  Time In: 1103  Time Out: 1000  Minutes: 57  Timed Code Treatment Minutes: 57 Minutes  Procedure Minutes:0  Neuro: 57    Signature:  Electronically signed by Telly Coronel PTA on 4/25/19 at 7:53 AM

## 2019-04-29 ENCOUNTER — HOSPITAL ENCOUNTER (OUTPATIENT)
Dept: PHYSICAL THERAPY | Age: 59
Setting detail: THERAPIES SERIES
Discharge: HOME | End: 2019-04-29
Payer: COMMERCIAL

## 2019-04-29 PROCEDURE — 97112 NEUROMUSCULAR REEDUCATION: CPT

## 2019-04-29 NOTE — PROGRESS NOTES
[address] [address]  Outpatient Physical Therapy    Treatment Note        Date: 2019  Patient: Kyle Paul  : 1960  ACCT #: [de-identified]  Referring Practitioner: Dr. Chace Marquez   Diagnosis: BPPV due to bilateral vestibular disorder    Visit Information:  PT Visit Information  Onset Date: (a few weeks ago )  PT Insurance Information: Medical [place]   Total # of Visits Approved: (medical necessity )  Total # of Visits to Date: 7  No Show: 1  Canceled Appointment: 1  Progress Note Counter:     Subjective: Pt reports feeling okay lately after last tx except when she was outside. Feels spotting and targeting have helped when not feeling well. Unsure if meds are making pt not feel well or if its the weather  Comments: ENT appt 19  HEP Compliance:  [x] Good [] Fair [] Poor [] Reports not doing due to:    Vital Signs  Patient Currently in Pain: No   Pain Screening  Patient Currently in Pain: No    OBJECTIVE:   Exercises  Exercise 2: Tuning board: head turns, LOS, smooth pursuits on screen   Exercise 3: Vincenzo Hallpike (-);reports of ear fullness (no dizziness)  Exercise 5: Figure 8s around bolster   Exercise 6: Scanning in hallway for words at various heights  Exercise 7: DGI tasks, stop/gos  Exercise 8: Dual tasking: ball to self F/R, bouncing ball  Exercise 14: VOR x1, x2 seated (V/H)  Exercise 17: Smooth pursuits: moving basketball with and without background movement           *Indicates exercise, modality, or manual techniques to be initiated when appropriate    Assessment:   Activity Tolerance  Activity Tolerance: Patient Tolerated treatment well    Body structures, Functions, Activity limitations: Decreased functional mobility , Decreased balance, Vestibular Impairment  Assessment: Initiated scanning for words on wall at various heights with ambulation. Pt able to complete DGI tasks with improved андрей and balance. Reports of earfullness towards end of tx.  (-) Vincenzo Hallpike without reports

## 2019-05-01 ENCOUNTER — HOSPITAL ENCOUNTER (OUTPATIENT)
Dept: PHYSICAL THERAPY | Age: 59
Setting detail: THERAPIES SERIES
Discharge: HOME | End: 2019-05-01
Payer: COMMERCIAL

## 2019-05-01 PROCEDURE — 97112 NEUROMUSCULAR REEDUCATION: CPT

## 2019-05-01 NOTE — PLAN OF CARE
[address]     Ph: [phone]  Fax: [phone]    [] Certification  [] Recertification [x]  Plan of Care  [] Progress Note [] Discharge      To:  Dr. Bessie Chapman       From:  Keri Holland, PT  Patient: Denisa Gurrola       : 1960  Diagnosis: BPPV due to bilateral vestibular disorder     Date: 2019  Treatment Diagnosis: imbalance, dizziness  Progress Report Period from:  4/3/19 to 2019    Total # of Visits to Date: 8   No Show: 1    Canceled Appointment: 1     OBJECTIVE:   Short Term Goals - Time Frame for Short term goals: 2 wks     Goals Current/Discharge status  Met   Short term goal 1: Independent with HEP to improve balance and decrease symptoms  Independent with HEP  [x] yes  [] no   Short term goal 2: Report 50% reduction in symptoms   Reports 50-60% improvement of symptoms, states improved management of symptoms  [x] yes  [] no     Long Term Goals - Time Frame for Long term goals : 4-5 wks   Goals Current/ Discharge status Met   Long term goal 1: (-) bilateral Hallpike and sidelying tests  (-) Hallpike and sidelying  [x] yes  [] no   Long term goal 2: Le >/= 55/56 to demonstrate improved static balance and decreased risk for falls (goal met 19)    55/56 [x] yes  [] no   Long term goal 3: FGA >/= 26/30 to demonstrate good dynamic balance and decreased risk for falls    New goal:  26/30 [x] yes  [] no   Long term goal 4: Pt to demonstrate good understanding of compensatory strategies during exacerbation of symptoms  Improving management of symptoms, needs progression of compensatory strategies  [] yes  [x] no       Body structures, Functions, Activity limitations: Decreased functional mobility , Decreased balance, Vestibular Impairment  Assessment: Pt presents after appt with ENT. Diagnosed with hydrops. Provided education re: Meniere's.   pt has been instructed on compensatory strategies with improving management of symptoms. Would benefit from continued skilled PT with decreased frequency to address fluctuating symptoms and address management of exacerbating situations. Pt to keep dizzy log until next visit. Prognosis: Good      New Education Provided:  Meniere's education, compensatory strategies     PLAN: [x] Evaluate and Treat  Frequency/Duration:  Plan  Times per week: 1-2  Plan weeks: 4  Current Treatment Recommendations: Balance Training, Neuromuscular Re-education, Home Exercise Program, Patient/Caregiver Education & Training, Vestibular Rehab     Precautions:                  Patient Status:[x] Continue/ Initiate plan of Care    [] Discharge PT. Recommend pt continue with HEP. [] Additional visits requested, Please re-certify for additional visits:          Signature: Electronically signed by Dannielle Lindo PT on 5/1/19 at 4:55 PM      If you have any questions or concerns, please don't hesitate to call. Thank you for your referral.    I have reviewed this plan of care and certify a need for medically necessary rehabilitation services.     Physician Signature:__________________________________________________________  Date:  Please sign and return

## 2019-05-01 NOTE — PROGRESS NOTES
[id number] [address]  Outpatient Physical Therapy    Treatment Note        Date: 2019  Patient: Rubén Gibson  : 1960  ACCT #: [de-identified]  Referring Practitioner: Dr. Han   Diagnosis: BPPV due to bilateral vestibular disorder    Visit Information:  PT Visit Information  Onset Date: (a few weeks ago )  PT Insurance Information: Medical [place]   Total # of Visits Approved: (medical necessity )  Total # of Visits to Date: 8  No Show: 1  Canceled Appointment: 1  Progress Note Counter:     Subjective: Pt reports seeing ENT this morning who discussed pt having symptoms of Meiners. Will have MRI to r/o MS soon. Pt reports 50-60% of reduction in symptoms, symptoms more manageable since beginning PT. Comments: ENT appt 19  HEP Compliance:  [x] Good [] Fair [] Poor [] Reports not doing due to:    Vital Signs  Patient Currently in Pain: No   Pain Screening  Patient Currently in Pain: No    OBJECTIVE:   Exercises  Exercise 2: Tuning board: smooth pursuits, VOR, stepping on/off  Exercise 7: DGI tasks, stop/gos  Exercise 18: FGA: 26            *Indicates exercise, modality, or manual techniques to be initiated when appropriate    Assessment: Body structures, Functions, Activity limitations: Decreased functional mobility , Decreased balance, Vestibular Impairment  Assessment: Pt presents after appt with ENT. Diagnosed with hydrops. Provided education re: Meniere's. pt has been instructed on compensatory strategies with improving management of symptoms. Would benefit from continued skilled PT with decreased frequency to address fluctuating symptoms and address management of exacerbating situations. Pt to keep dizzy log until next visit.     Treatment Diagnosis: imbalance, dizziness  Prognosis: Good       Goals:  Short term goals  Time Frame for Short term goals: 2 wks   Short term goal 1: Independent with HEP to improve balance and decrease symptoms  Short term goal 2: Report 50% reduction in symptoms     Long term goals  Time Frame for Long term goals : 4-5 wks   Long term goal 1: (-) bilateral Hallpike and sidelying tests   Long term goal 2: Le >/= 55/56 to demonstrate improved static balance and decreased risk for falls (goal met 4/25/19)   Long term goal 3: FGA >/= 26/30 to demonstrate good dynamic balance and decreased risk for falls   Long term goal 4: Pt to demonstrate good understanding of compensatory strategies during exacerbation of symptoms   Progress toward goals: Progressed towards all goals. POST-PAIN       Pain Rating (0-10 pain scale):  0 /10   Location and pain description same as pre-treatment unless indicated. Action: [x] NA   [] Perform HEP  [] Meds as prescribed  [] Modalities as prescribed   [] Call Physician     Frequency/Duration:  Plan  Times per week: 1-2  Plan weeks: 4  Current Treatment Recommendations: Balance Training, Neuromuscular Re-education, Home Exercise Program, Patient/Caregiver Education & Training, Vestibular Rehab     Pt to continue current HEP. See objective section for any therapeutic exercise changes, additions or modifications this date. PT Individual Minutes  Time In: 3197  Time Out: 0005  Minutes: 48  Timed Code Treatment Minutes: 48 Minutes(pt requested to leave early d/t conflicting appt)  Procedure Minutes:0  Neuro: 48    Signature:  Electronically signed by Marion Ibarra PTA on 5/1/19 at 4:08 PM  Electronically signed by Jeff Escalante PT on 5/2/2019 at 12:26 PM   The above Physical Therapist has performed functional assessment, education on hydrops with pt during this therapy visit.

## 2019-05-06 ENCOUNTER — TELEPHONE (OUTPATIENT)
Dept: CARDIOLOGY CLINIC | Age: 59
End: 2019-05-06

## 2019-05-06 NOTE — TELEPHONE ENCOUNTER
CARDIAC CTA ORDERED ON 4/5/2019 HAS NOT BEEN PRECERTED YET.     ALSO SHE ADVISED [facility] IS OUT-OF-NETWORK FOR THIS PATIENT. WHAT OTHER TEST WOULD YOU LIKE TO ORDER?

## 2019-05-07 NOTE — TELEPHONE ENCOUNTER
See is she can get it done at [facility]. Should be in network. They do the test there.

## 2019-05-09 NOTE — TELEPHONE ENCOUNTER
PATIENT CALLED TO INQUIRE ON THE PROCESS    WAS INFORMED APPROVAL FORM WAS SUBMITTED TO [employer] AND PENDING APPROVAL

## 2019-05-10 NOTE — TELEPHONE ENCOUNTER
Spoke with pt states she filled out exception form but [employer] told her they were told by our office to cancel testing. Pt upset and does not understand why it was cancelled.  Resent paperwork to Juan Daniel

## 2019-05-13 DIAGNOSIS — H53.8 BLURRED VISION: Primary | ICD-10-CM

## 2019-05-13 LAB
BUN BLDV-MCNC: 17 MG/DL (ref 6–20)
CREAT SERPL-MCNC: 0.88 MG/DL (ref 0.5–0.9)
GFR AFRICAN AMERICAN: >60
GFR NON-AFRICAN AMERICAN: >60

## 2019-05-13 NOTE — TELEPHONE ENCOUNTER
PT CALLED INSURANCE STATES TESTING CAN BE DONE AT [facility] WITH NO PA. ORDER FAXED TO [facility]

## 2019-05-15 ENCOUNTER — HOSPITAL ENCOUNTER (OUTPATIENT)
Dept: PHYSICAL THERAPY | Age: 59
Setting detail: THERAPIES SERIES
Discharge: HOME | End: 2019-05-15
Payer: COMMERCIAL

## 2019-05-15 PROCEDURE — 97112 NEUROMUSCULAR REEDUCATION: CPT

## 2019-05-15 NOTE — PROGRESS NOTES
[address] [address]  Outpatient Physical Therapy    Treatment Note        Date: 5/15/2019  Patient: Freda Hicks  : 1960  ACCT #: [de-identified]  Referring Practitioner: Dr. Louise Reyez   Diagnosis: BPPV due to bilateral vestibular disorder    Visit Information:  PT Visit Information  Onset Date: (a few weeks ago )  PT Insurance Information: Medical [place]   Total # of Visits Approved: (medical necessity )  Total # of Visits to Date: 9  No Show: 1  Canceled Appointment: 1  Progress Note Counter:     Subjective: Pt reports having two large episodes of dizzy spells since last visit. First episode, pt had to leave work, lasted ~3 hours, second episode lasted ~2 hours. MRI scheduled of brain 19 ordered by ENT to r/o MS. HEP Compliance:  [x] Good [] Fair [] Poor [] Reports not doing due to:    Vital Signs  Patient Currently in Pain: No   Pain Screening  Patient Currently in Pain: No    OBJECTIVE:   Exercises  Exercise 1: Smooth pursuits on foam 30sec x3  Exercise 2: Tuning board: stepping on/off, marching  Exercise 5: Stepping over obstacles, onto compliant surface  Exercise 7: DGI tasks, stop/gos  Exercise 14: VOR x1, x2 standing (V/H) no symptoms  Exercise 15: Le: 5/56  Exercise 18: FGA: 29                   *Indicates exercise, modality, or manual techniques to be initiated when appropriate    Assessment: Body structures, Functions, Activity limitations: Decreased functional mobility , Decreased balance, Vestibular Impairment  Assessment: Pt with improved Le score and FGA, meeting goal. Discussed continuing with compensation techniques when dizziness occurs. Pt progressing well towards all goals, will hold until F/U and MRI results with ENT.    Treatment Diagnosis: imbalance, dizziness  Prognosis: Good       Goals:  Short term goals  Time Frame for Short term goals: 2 wks   Short term goal 1: Independent with HEP to improve balance and decrease symptoms  Short term goal 2: Report

## 2019-05-21 ENCOUNTER — HOSPITAL ENCOUNTER (OUTPATIENT)
Dept: MRI IMAGING | Age: 59
Discharge: HOME | End: 2019-05-23
Payer: COMMERCIAL

## 2019-05-21 DIAGNOSIS — H93.13 TINNITUS OF BOTH EARS: ICD-10-CM

## 2019-05-21 PROCEDURE — 70553 MRI BRAIN STEM W/O & W/DYE: CPT

## 2019-05-21 PROCEDURE — A9579 GAD-BASE MR CONTRAST NOS,1ML: HCPCS | Performed by: OTOLARYNGOLOGY

## 2019-05-21 PROCEDURE — 6360000004 HC RX CONTRAST MEDICATION: Performed by: OTOLARYNGOLOGY

## 2019-05-21 RX ADMIN — GADOTERIDOL 15 ML: 279.3 INJECTION, SOLUTION INTRAVENOUS at 09:51

## 2019-05-29 ENCOUNTER — HOSPITAL ENCOUNTER (OUTPATIENT)
Dept: PHYSICAL THERAPY | Age: 59
Setting detail: THERAPIES SERIES
Discharge: HOME | End: 2019-05-29
Payer: COMMERCIAL

## 2019-05-29 ENCOUNTER — TELEPHONE (OUTPATIENT)
Dept: CARDIOLOGY CLINIC | Age: 59
End: 2019-05-29

## 2019-05-29 NOTE — TELEPHONE ENCOUNTER
KOMAL FROM [facility] CALLED IN BECAUSE THEY FAXED OVER INFORMATION THAT THE CTA NEEDED A PA BEFORE IT COULD BE SCHEDULED. SHE WAS ASKING IF THE INSURANCE APPROVED OR DENIED? I DID NOT SEE ANYTHING SCANNED IN TO THE PTS CHART. SHE IS ASKING IF SOMEONE CAN GIVE HER A CALL TO LET HER KNOW THE STATUS. KOMAL FROM [facility] [phone]        PLEASE ADVISE.

## 2019-05-29 NOTE — TELEPHONE ENCOUNTER
PT HAS BEEN APPROVED TO HAVE THE TESTING DONE AT [facility].          [facility] FOR KOMAL

## 2019-05-29 NOTE — PROGRESS NOTES
[address]       Physical Therapy  Cancellation/No-show Note  Patient Name:  Vladislav Ghotra  :  1960   Date:  2019  Referring Practitioner: Dr. Verona Persaud   Diagnosis: BPPV due to bilateral vestibular disorder    Visit Information:  PT Visit Information  Onset Date: (a few weeks ago )  PT Insurance Information: Medical [place]   Total # of Visits Approved: (medical necessity )  Total # of Visits to Date: 9  No Show: 1  Canceled Appointment: 2  Progress Note Counter:  (cx on 19)    For today's appointment patient:  [x]  Cancelled  []  Rescheduled appointment  []  No-show   []  Called pt to remind of next appointment     Reason given by patient:  []  Patient ill  []  Conflicting appointment  []  No transportation    []  Conflict with work  []  No reason given  [x]  Other:  No need for therapy- ready for D/C     Comments:       Signature: Electronically signed by Seth Padron PTA on 19 at 10:01 AM

## 2019-05-30 NOTE — PROGRESS NOTES
[address]     Ph: [phone]  Fax: [phone]    [] Certification  [] Recertification []  Plan of Care  [] Progress Note [x] Discharge      To:  Dr. Holly Galarza        From:  Cabrera Torres, PT  Patient: Angela Che       : 1960  Diagnosis: BPPV due to bilateral vestibular disorder     Date: 2019     Progress Report Period from:  2019 to 2019    Total # of Visits to Date: 9   No Show: 1    Canceled Appointment: 2     OBJECTIVE:   Short Term Goals - Time Frame for Short term goals: 2 wks     Goals Current/Discharge status  Met   Short term goal 1: Independent with HEP to improve balance and decrease symptoms  Indep with good compliance [x] yes  [] no   Short term goal 2: Report 50% reduction in symptoms   Pt reports lessened symptoms when dizziness episodes occur. Continues to have dizziness episodes ~1-2x a week. [] yes  [] no     Long Term Goals - Time Frame for Long term goals : 4-5 wks   Goals Current/ Discharge status Met   Long term goal 1: (-) bilateral Hallpike and sidelying tests  (-) Vincenzo Hallpike and Sidelying tests [x] yes  [] no   Long term goal 2: Le >/= 55/56 to demonstrate improved static balance and decreased risk for falls (goal met 19)  Alayna Dayhoff: 56/56 [x] yes  [] no   Long term goal 3: FGA >/= 26/30 to demonstrate good dynamic balance and decreased risk for falls  FGA: 29/30 [x] yes  [] no   Long term goal 4: Pt to demonstrate good understanding of compensatory strategies during exacerbation of symptoms  Demo's good comprehension of strategies when dizziness occurs. [x] yes  [] no            Assessment: Pt reporting lessened symptoms and episodes of dizziness. Able to use compensatory techniques which have improved symptoms. Pt with improved static and dynamic balance. Pt with improved management of symptoms.  Pt progressed well towards all goals, requests to be D/C'd at this time. Has F/U appt with ENT. PLAN: [x] D/C             Patient Status:[] Continue/ Initiate plan of Care    [x] Discharge PT. Recommend pt continue with HEP. [] Additional visits requested, Please re-certify for additional visits:        Obj info by:  Electronically signed by Dee Dolan PTA on 5/30/2019 at 10:28 AM    Signature: Electronically signed by Ze Baldwin PT on 6/3/2019 at 9:20 AM      If you have any questions or concerns, please don't hesitate to call. Thank you for your referral.    I have reviewed this plan of care and certify a need for medically necessary rehabilitation services.     Physician Signature:__________________________________________________________  Date:  Please sign and return

## 2019-06-04 RX ORDER — HYDROCHLOROTHIAZIDE 25 MG/1
25 TABLET ORAL EVERY MORNING
Qty: 90 TABLET | Refills: 1 | Status: SHIPPED | OUTPATIENT
Start: 2019-06-04 | End: 2019-12-10 | Stop reason: SDUPTHER

## 2019-06-04 RX ORDER — FUROSEMIDE 20 MG/1
20 TABLET ORAL DAILY
Qty: 3 TABLET | Refills: 0 | Status: SHIPPED | OUTPATIENT
Start: 2019-06-04 | End: 2019-07-30

## 2019-06-04 RX ORDER — LISINOPRIL 10 MG/1
10 TABLET ORAL DAILY
Qty: 90 TABLET | Refills: 1 | Status: SHIPPED | OUTPATIENT
Start: 2019-06-04 | End: 2019-12-10 | Stop reason: SDUPTHER

## 2019-06-05 ENCOUNTER — OFFICE VISIT (OUTPATIENT)
Dept: PULMONOLOGY | Age: 59
End: 2019-06-05
Payer: COMMERCIAL

## 2019-06-05 VITALS
SYSTOLIC BLOOD PRESSURE: 118 MMHG | HEIGHT: 65 IN | WEIGHT: 223.6 LBS | BODY MASS INDEX: 37.25 KG/M2 | RESPIRATION RATE: 16 BRPM | TEMPERATURE: 97.6 F | HEART RATE: 66 BPM | DIASTOLIC BLOOD PRESSURE: 68 MMHG | OXYGEN SATURATION: 100 %

## 2019-06-05 DIAGNOSIS — G47.33 SLEEP APNEA, OBSTRUCTIVE: ICD-10-CM

## 2019-06-05 DIAGNOSIS — J45.20 MILD INTERMITTENT REACTIVE AIRWAY DISEASE WITHOUT COMPLICATION: Primary | ICD-10-CM

## 2019-06-05 DIAGNOSIS — F17.201 TOBACCO ABUSE, IN REMISSION: ICD-10-CM

## 2019-06-05 PROCEDURE — 99214 OFFICE O/P EST MOD 30 MIN: CPT | Performed by: INTERNAL MEDICINE

## 2019-06-05 ASSESSMENT — ENCOUNTER SYMPTOMS
DIARRHEA: 0
CHEST TIGHTNESS: 0
SHORTNESS OF BREATH: 0
SINUS PRESSURE: 0
RHINORRHEA: 0
VOMITING: 0
SORE THROAT: 0
ABDOMINAL PAIN: 0
WHEEZING: 0
COUGH: 0
NAUSEA: 0

## 2019-06-05 NOTE — PROGRESS NOTES
Subjective: Audra Kessler is a 66 y.o. female who complains today of:     Chief Complaint   Patient presents with    Follow-up     6 Month F/U for DONNIE       HPI  Patient is here for a follow-up visit regarding obstructive sleep apnea. Since the last visit she reports continued compliance, tolerance, and improvement with use of CPAP at night. Her cough has improved significantly and remained stable, she denies significant dyspnea, she's been having mild chest discomfort which also has improved some but she still under the care of Dr. vasquez for workup of chest pain. Allergies:  Seasonal  Past Medical History:   Diagnosis Date    Abnormal EKG 3/23/2018    Angina pectoris syndrome ([facility] 03/23/2018    Diverticulosis of colon     DMII (diabetes mellitus, type 2) ([facility] [address] 2000    Essential hypertension 1/15/2016    Family history of coronary artery disease 1/15/2016    Gastric polyp 2019    Dr Annika Honeycutt, 5 mm    Generalized anxiety disorder     H/O: hysterectomy 2012    History of tobacco abuse 1/15/2016    Obesity (BMI 30-39. 9)     Other specified acquired hypothyroidism 2000    Precordial pain 03/23/2018    (? Prinzmetal?) Dr Grace Crews S/P colonoscopy with polypectomy 2013, 2018, 2019    Dr Adalid Jarquin, Dr Annika Honeycutt, tubulovillous adenoma, hyperplastic polyp    S/P vaginal hysterectomy 2012    benign    Sleep apnea, obstructive 2007    was on CPAP, not using it at present    Vertigo 2018     Past Surgical History:   Procedure Laterality Date    [facility]      ENDOMETRIAL ABLATION  2002    metrorrhagia    HYSTERECTOMY  9/7/12    fibroid, cervicitis, ovaries intact    NV COLON CA SCRN NOT  [address] [facility] N/A 5/15/2018    COLONOSCOPY performed by Bertha Lopez MD at [address] [place] [address] N/A 4/2/2019    EGD ESOPHAGOGASTRODUODENOSCOPY performed by Bertha [facility] MD at [facility]    Not on file   Social History Narrative    Born in [place], one of 4 children (2 boys and 2 girls), both parents in the [employer], moved to [place]    Mother in [place], has memory problems    [employer]     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 Negative for appetite change, chills, diaphoresis and fever. HENT: Positive for congestion. Negative for nosebleeds, postnasal drip, rhinorrhea, sinus pressure, sneezing and sore throat. Eyes: Negative for visual disturbance. Respiratory: Negative for cough, chest tightness, shortness of breath and wheezing. Cardiovascular: Positive for chest pain. Negative for palpitations and leg swelling. Gastrointestinal: Negative for abdominal pain, diarrhea, nausea and vomiting. Genitourinary: Negative for dysuria and urgency. Musculoskeletal: Negative for arthralgias, joint swelling and myalgias. Skin: Negative for rash. Allergic/Immunologic: Negative for environmental allergies. Neurological: Negative for tremors, weakness, light-headedness and headaches. Psychiatric/Behavioral: Negative for behavioral problems and sleep disturbance. Objective:     Vitals:    06/05/19 0916   BP: 118/68   Site: Right Upper Arm   Position: Sitting   Cuff Size: Large Adult   Pulse: 66   Resp: 16   Temp: 97.6 °F (36.4 °C)   TempSrc: Tympanic   SpO2: 100%   Weight: 223 lb 9.6 oz (101.4 kg)   Height: 5' 5\" (1.651 m)         Physical Exam   Constitutional: She is oriented to person, place, and time. She appears well-developed and well-nourished. HENT:   Head: Normocephalic and atraumatic. Mouth/Throat: Oropharynx is clear and moist.   Eyes: Pupils are equal, round, and reactive to light.  EOM are normal.   Neck: No JVD present. No tracheal deviation present. No thyromegaly present. Cardiovascular: Normal rate and regular rhythm. Exam reveals no gallop. No murmur heard. Pulmonary/Chest: She has no wheezes. She has no rales. She exhibits no tenderness. Abdominal: She exhibits no distension. Musculoskeletal: Normal range of motion. She exhibits no edema. Neurological: She is alert and oriented to person, place, and time. Coordination normal.   Skin: Skin is warm and dry. No rash noted. Psychiatric: She has a normal mood and affect. Assessment:      Diagnosis Orders   1. Mild intermittent reactive airway disease without complication     2. Sleep apnea, obstructive     3. Tobacco abuse, in remission       Patient is doing very well with treatment of obstructive sleep apnea, her reactive airway symptoms have improved as well. We'll continue current treatment and see her for follow-up in one year      Plan:     No orders of the defined types were placed in this encounter. No orders of the defined types were placed in this encounter. Return in about 1 year (around 6/5/2020) for re-evaluation.        Nguyễn Hickey MD

## 2019-06-13 ENCOUNTER — TELEPHONE (OUTPATIENT)
Dept: CARDIOLOGY CLINIC | Age: 59
End: 2019-06-13

## 2019-06-13 DIAGNOSIS — I20.9 ANGINA, CLASS III (HCC): ICD-10-CM

## 2019-06-14 ENCOUNTER — TELEPHONE (OUTPATIENT)
Dept: CARDIOLOGY CLINIC | Age: 59
End: 2019-06-14

## 2019-06-28 DIAGNOSIS — R07.9 RIGHT-SIDED CHEST PAIN: ICD-10-CM

## 2019-06-28 RX ORDER — OMEPRAZOLE 40 MG/1
CAPSULE, DELAYED RELEASE ORAL
Qty: 60 CAPSULE | Refills: 0 | Status: SHIPPED | OUTPATIENT
Start: 2019-06-28 | End: 2019-11-20 | Stop reason: ALTCHOICE

## 2019-07-16 ENCOUNTER — TELEPHONE (OUTPATIENT)
Dept: GASTROENTEROLOGY | Age: 59
End: 2019-07-16

## 2019-07-19 RX ORDER — BUPROPION HYDROCHLORIDE 200 MG/1
TABLET, EXTENDED RELEASE ORAL
Qty: 90 TABLET | Refills: 1 | Status: SHIPPED | OUTPATIENT
Start: 2019-07-19 | End: 2019-08-09

## 2019-07-25 ENCOUNTER — TELEPHONE (OUTPATIENT)
Dept: PHARMACY | Facility: CLINIC | Age: 59
End: 2019-07-25

## 2019-07-25 NOTE — TELEPHONE ENCOUNTER
Tracking Only    PHSO: Yes  Total # of Interventions Recommended: 5  - New Order #: 2 New Medication Order Reason(s): Needs Additional Medication Therapy, Patient Preference  Recommended intervention potential cost savings: 1  Total Interventions Accepted: 3  Time Spent (min): 30    Areli Pierce, PharmD  [address]

## 2019-07-27 ENCOUNTER — APPOINTMENT (OUTPATIENT)
Dept: GENERAL RADIOLOGY | Age: 59
End: 2019-07-27
Payer: COMMERCIAL

## 2019-07-27 ENCOUNTER — HOSPITAL ENCOUNTER (EMERGENCY)
Age: 59
Discharge: HOME | End: 2019-07-28
Payer: COMMERCIAL

## 2019-07-27 VITALS
OXYGEN SATURATION: 96 % | WEIGHT: 222 LBS | RESPIRATION RATE: 18 BRPM | DIASTOLIC BLOOD PRESSURE: 60 MMHG | BODY MASS INDEX: 36.99 KG/M2 | TEMPERATURE: 98.2 F | SYSTOLIC BLOOD PRESSURE: 120 MMHG | HEART RATE: 87 BPM | HEIGHT: 65 IN

## 2019-07-27 DIAGNOSIS — R07.9 CHEST PAIN, UNSPECIFIED TYPE: Primary | ICD-10-CM

## 2019-07-27 LAB
ALBUMIN SERPL-MCNC: 3.9 G/DL (ref 3.5–4.6)
ALP BLD-CCNC: 60 U/L (ref 40–130)
ALT SERPL-CCNC: 33 U/L (ref 0–33)
ANION GAP SERPL CALCULATED.3IONS-SCNC: 18 MEQ/L (ref 9–15)
AST SERPL-CCNC: 31 U/L (ref 0–35)
BACTERIA: NEGATIVE /HPF
BASOPHILS ABSOLUTE: 0.1 K/UL (ref 0–0.2)
BASOPHILS RELATIVE PERCENT: 0.6 %
BILIRUB SERPL-MCNC: <0.2 MG/DL (ref 0.2–0.7)
BILIRUBIN URINE: NEGATIVE
BLOOD, URINE: NEGATIVE
BUN BLDV-MCNC: 19 MG/DL (ref 6–20)
CALCIUM SERPL-MCNC: 9.2 MG/DL (ref 8.5–9.9)
CHLORIDE BLD-SCNC: 96 MEQ/L (ref 95–107)
CLARITY: CLEAR
CO2: 24 MEQ/L (ref 20–31)
COLOR: YELLOW
CREAT SERPL-MCNC: 1.29 MG/DL (ref 0.5–0.9)
EKG ATRIAL RATE: 96 BPM
EKG P AXIS: 38 DEGREES
EKG P-R INTERVAL: 172 MS
EKG Q-T INTERVAL: 338 MS
EKG QRS DURATION: 88 MS
EKG QTC CALCULATION (BAZETT): 427 MS
EKG R AXIS: 12 DEGREES
EKG T AXIS: 88 DEGREES
EKG VENTRICULAR RATE: 96 BPM
EOSINOPHILS ABSOLUTE: 0.4 K/UL (ref 0–0.7)
EOSINOPHILS RELATIVE PERCENT: 3.8 %
EPITHELIAL CELLS, UA: ABNORMAL /HPF (ref 0–5)
GFR AFRICAN AMERICAN: 51.6
GFR NON-AFRICAN AMERICAN: 42.7
GLOBULIN: 3.5 G/DL (ref 2.3–3.5)
GLUCOSE BLD-MCNC: 149 MG/DL (ref 70–99)
GLUCOSE URINE: NEGATIVE MG/DL
HCT VFR BLD CALC: 32.7 % (ref 37–47)
HEMOGLOBIN: 11.4 G/DL (ref 12–16)
HYALINE CASTS: ABNORMAL /HPF (ref 0–5)
KETONES, URINE: NEGATIVE MG/DL
LEUKOCYTE ESTERASE, URINE: ABNORMAL
LYMPHOCYTES ABSOLUTE: 3.1 K/UL (ref 1–4.8)
LYMPHOCYTES RELATIVE PERCENT: 26.7 %
MAGNESIUM: 1.8 MG/DL (ref 1.7–2.4)
MCH RBC QN AUTO: 29.9 PG (ref 27–31.3)
MCHC RBC AUTO-ENTMCNC: 34.7 % (ref 33–37)
MCV RBC AUTO: 86.1 FL (ref 82–100)
MONOCYTES ABSOLUTE: 0.6 K/UL (ref 0.2–0.8)
MONOCYTES RELATIVE PERCENT: 4.9 %
NEUTROPHILS ABSOLUTE: 7.3 K/UL (ref 1.4–6.5)
NEUTROPHILS RELATIVE PERCENT: 64 %
NITRITE, URINE: NEGATIVE
PDW BLD-RTO: 13.6 % (ref 11.5–14.5)
PH UA: 6.5 (ref 5–9)
PLATELET # BLD: 319 K/UL (ref 130–400)
POTASSIUM SERPL-SCNC: 4.5 MEQ/L (ref 3.4–4.9)
PRO-BNP: 541 PG/ML
PROTEIN UA: NEGATIVE MG/DL
RBC # BLD: 3.8 M/UL (ref 4.2–5.4)
RBC UA: ABNORMAL /HPF (ref 0–5)
SODIUM BLD-SCNC: 138 MEQ/L (ref 135–144)
SPECIFIC GRAVITY UA: 1.01 (ref 1–1.03)
TOTAL CK: 138 U/L (ref 0–170)
TOTAL PROTEIN: 7.4 G/DL (ref 6.3–8)
TROPONIN: <0.01 NG/ML (ref 0–0.01)
URINE REFLEX TO CULTURE: YES
UROBILINOGEN, URINE: 1 E.U./DL
WBC # BLD: 11.4 K/UL (ref 4.8–10.8)
WBC UA: ABNORMAL /HPF (ref 0–5)

## 2019-07-27 PROCEDURE — 87086 URINE CULTURE/COLONY COUNT: CPT

## 2019-07-27 PROCEDURE — 85025 COMPLETE CBC W/AUTO DIFF WBC: CPT

## 2019-07-27 PROCEDURE — 99285 EMERGENCY DEPT VISIT HI MDM: CPT

## 2019-07-27 PROCEDURE — 81001 URINALYSIS AUTO W/SCOPE: CPT

## 2019-07-27 PROCEDURE — 84484 ASSAY OF TROPONIN QUANT: CPT

## 2019-07-27 PROCEDURE — 71045 X-RAY EXAM CHEST 1 VIEW: CPT

## 2019-07-27 PROCEDURE — 36415 COLL VENOUS BLD VENIPUNCTURE: CPT

## 2019-07-27 PROCEDURE — 83880 ASSAY OF NATRIURETIC PEPTIDE: CPT

## 2019-07-27 PROCEDURE — 80053 COMPREHEN METABOLIC PANEL: CPT

## 2019-07-27 PROCEDURE — 93005 ELECTROCARDIOGRAM TRACING: CPT | Performed by: EMERGENCY MEDICINE

## 2019-07-27 PROCEDURE — 82550 ASSAY OF CK (CPK): CPT

## 2019-07-27 PROCEDURE — 83735 ASSAY OF MAGNESIUM: CPT

## 2019-07-28 ASSESSMENT — ENCOUNTER SYMPTOMS
ANAL BLEEDING: 0
VOMITING: 0
EYE DISCHARGE: 0
COUGH: 0
ABDOMINAL DISTENTION: 0
ABDOMINAL PAIN: 0
SHORTNESS OF BREATH: 0
VOICE CHANGE: 0
APNEA: 0
BACK PAIN: 1
PHOTOPHOBIA: 0

## 2019-07-28 NOTE — ED PROVIDER NOTES
[address] ED  eMERGENCY dEPARTMENT eNCOUnter      Pt Name: Darylene Curlin  MRN: [id number]  [place] 1960  Date of evaluation: 7/27/2019  Provider: Feliberto Oliver Dr       Chief Complaint   Patient presents with    Chest Pain     intermittently since yesterday. pt took nitro yesterday and had relief. Pt denies any pain at this time. HISTORY OF PRESENT ILLNESS   (Location/Symptom, Timing/Onset,Context/Setting, Quality, Duration, Modifying Factors, Severity)  Note limiting factors. Darylene Curlin is a 66 y.o. female who presents to the emergency department patient presents with right-sided chest pain goes through to her back she notes that it comes and goes sometimes it comes with rest sometimes with movement she has had this episode of chest painfor 3 days she is been evaluated for same in the past she sees Dr. Deepali Mojica for cardiology and Dr. Norma Whelan gastrointestinal.  She has had multiple evaluations. She states her last catheterization was negative. Nitro helps her symptoms movement sometimes worsens her symptoms. Symptoms mild to moderate severity    HPI    NursingNotes were reviewed. REVIEW OF SYSTEMS    (2-9 systems for level 4, 10 or more for level 5)     Review of Systems   Constitutional: Negative for activity change, appetite change and unexpected weight change. HENT: Negative for ear discharge, nosebleeds and voice change. Eyes: Negative for photophobia and discharge. Respiratory: Negative for apnea, cough and shortness of breath. Cardiovascular: Positive for chest pain. Gastrointestinal: Negative for abdominal distention, abdominal pain, anal bleeding and vomiting. Endocrine: Negative for cold intolerance, heat intolerance and polyphagia. Genitourinary: Negative for hematuria. Musculoskeletal: Positive for back pain. Negative for joint swelling. Skin: Negative for pallor.    Allergic/Immunologic: Negative for immunocompromised DAY       ALLERGIES     Seasonal    FAMILY HISTORY       Family History   Problem Relation Age of Onset    Arrhythmia Mother     Hypertension Mother     Dementia Mother     COPD Father         mesothelioma    Alcohol Abuse Father     Diabetes Paternal Grandmother     Diabetes Paternal Grandfather     Colon Cancer Paternal Grandfather     Schizophrenia Brother     Colon Cancer Paternal Uncle           SOCIAL HISTORY       Social History     Socioeconomic History    Marital status:      Spouse name: None    Number of children: 3    Years of education: None    Highest education level: None   Occupational History    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[address] , RN     Employer: [employer]   Social Needs    Financial resource strain: None    Food insecurity:     Worry: None     Inability: None    Transportation needs:     Medical: None     Non-medical: None   Tobacco Use    Smoking status: Former Smoker     Packs/day: 1.00     Years: 30.00     Pack years: 30.00     Types: Cigarettes     Start date: 3/5/1975     Last attempt to quit: 2005     Years since quittin.1    Smokeless tobacco: Never Used    Tobacco comment: 3/15/18 started age 17   Substance and Sexual Activity    Alcohol use: No     Comment: not at all     Drug use: No    Sexual activity: None   Lifestyle    Physical activity:     Days per week: None     Minutes per session: None    Stress: None   Relationships    Social connections:     Talks on phone: None     Gets together: None     Attends Catholic service: None     Active member of club or organization: None     Attends meetings of clubs or organizations: None     Relationship status: None    Intimate partner violence:     Fear of current or ex partner: None     Emotionally abused: None     Physically abused: None     Forced sexual activity: None   Other Topics Concern    None   Social History Narrative    Born in [place], one of 4 children (2 boys and cardiologist.    ekg nsr rate 96 negative st change    RADIOLOGY:   Non-plain filmimages such as CT, Ultrasound and MRI are read by the radiologist. Plain radiographic images are visualized and preliminarily interpreted by the emergency physician with the below findings:    nad      Interpretation per the Radiologist below, if available at the time ofthis note:    XR CHEST PORTABLE    (Results Pending)         ED BEDSIDE ULTRASOUND:   Performed by ED Physician - none    LABS:  Labs Reviewed   COMPREHENSIVE METABOLIC PANEL - Abnormal; Notable for the following components:       Result Value    Anion Gap 18 (*)     Glucose 149 (*)     CREATININE 1.29 (*)     GFR Non- 42.7 (*)     GFR  51.6 (*)     All other components within normal limits   CBC WITH AUTO DIFFERENTIAL - Abnormal; Notable for the following components:    WBC 11.4 (*)     RBC 3.80 (*)     Hemoglobin 11.4 (*)     Hematocrit 32.7 (*)     Neutrophils # 7.3 (*)     All other components within normal limits   URINE RT REFLEX TO CULTURE - Abnormal; Notable for the following components:    Leukocyte Esterase, Urine LARGE (*)     All other components within normal limits   MICROSCOPIC URINALYSIS - Abnormal; Notable for the following components:    WBC, UA 10-20 (*)     RBC, UA 6-10 (*)     All other components within normal limits   URINE CULTURE   TROPONIN   MAGNESIUM   CK   BRAIN NATRIURETIC PEPTIDE       All other labs were within normal range or not returned as of this dictation. EMERGENCY DEPARTMENT COURSE and DIFFERENTIAL DIAGNOSIS/MDM:   Vitals:    Vitals:    07/27/19 2149 07/27/19 2251   BP: (!) 144/62 120/60   Pulse: 97 87   Resp: 18 18   Temp: 98.2 °F (36.8 °C)    TempSrc: Oral    SpO2: 97% 96%   Weight: 222 lb (100.7 kg)    Height: 5' 5\" (1.651 m)             MDM  Number of Diagnoses or Management Options  Diagnosis management comments: margo Esteban. Ok to dc.   He discussed results with patient directly to

## 2019-07-29 ENCOUNTER — NURSE TRIAGE (OUTPATIENT)
Dept: OTHER | Facility: CLINIC | Age: 59
End: 2019-07-29

## 2019-07-29 LAB — URINE CULTURE, ROUTINE: NORMAL

## 2019-07-29 PROCEDURE — 93010 ELECTROCARDIOGRAM REPORT: CPT | Performed by: INTERNAL MEDICINE

## 2019-08-09 ENCOUNTER — OFFICE VISIT (OUTPATIENT)
Dept: INTERNAL MEDICINE CLINIC | Age: 59
End: 2019-08-09
Payer: COMMERCIAL

## 2019-08-09 VITALS
RESPIRATION RATE: 16 BRPM | DIASTOLIC BLOOD PRESSURE: 73 MMHG | HEART RATE: 66 BPM | OXYGEN SATURATION: 98 % | BODY MASS INDEX: 36.61 KG/M2 | SYSTOLIC BLOOD PRESSURE: 112 MMHG | TEMPERATURE: 98.1 F | WEIGHT: 220 LBS

## 2019-08-09 DIAGNOSIS — G43.109 MIGRAINOUS VERTIGO: Primary | ICD-10-CM

## 2019-08-09 DIAGNOSIS — F41.8 OTHER SPECIFIED ANXIETY DISORDERS: ICD-10-CM

## 2019-08-09 PROCEDURE — 99214 OFFICE O/P EST MOD 30 MIN: CPT | Performed by: INTERNAL MEDICINE

## 2019-08-09 RX ORDER — BUPROPION HYDROCHLORIDE 300 MG/1
300 TABLET ORAL EVERY MORNING
Qty: 30 TABLET | Refills: 3 | Status: SHIPPED | OUTPATIENT
Start: 2019-08-09 | End: 2019-12-10 | Stop reason: SDUPTHER

## 2019-08-09 RX ORDER — DIAZEPAM 2 MG/1
2 TABLET ORAL EVERY 8 HOURS PRN
Qty: 30 TABLET | Refills: 0 | Status: SHIPPED | OUTPATIENT
Start: 2019-08-09 | End: 2019-08-19

## 2019-08-09 ASSESSMENT — ENCOUNTER SYMPTOMS
WHEEZING: 0
COUGH: 0
VOMITING: 0
TROUBLE SWALLOWING: 0
VISUAL CHANGE: 0
EYE PAIN: 0
ABDOMINAL PAIN: 0
CHOKING: 0

## 2019-08-09 NOTE — PROGRESS NOTES
U/L Final    Comment: Specimen hemolysis has exceeded the interference as defined  by Roche. Value may be falsely increased. Suggest  recollection if clinically indicated.  Pro-BNP 07/27/2019 541  pg/mL Final    Comment: NT-pro BNP ACUTE Interpretive Guidelines:        Age        Cutoff for Heart Failure     Less than 50 yrs   450 pg/mL     50-75 yrs           900 pg/mL     Greater than 75 yrs  1800 pg/mL    NT-pro BNP NON-ACUTE Interpretive Guidelines:      Age                 Reference Range    Less than 74 yrs   0-125 pg/mL    Greater than 74 yrs   0-450 pg/mL    Other possible causes of an elevated NT-proBNP include:  cardiac ischemia, acute coronary syndrome, COPD, pneumonia,  atrial fibrillation, pulmonary emboli, pulmonary hypertension,  pericarditis    Reference:  Burnie Phoenix., et al. NT-proBNP testing for diagnosis and  short-term prognosis in acute destabilized HF: an international  pooled analysis of 1256 patients.  Heart Journal.  3538;81:502-173        Urine Culture, Routine 07/27/2019 No growth 24 hours   Final    Bacteria, UA 07/27/2019 Negative  /HPF Final    Hyaline Casts, UA 07/27/2019 1-3  0 - 5 /HPF Final    WBC, UA 07/27/2019 10-20* 0 - 5 /HPF Final    RBC, UA 07/27/2019 6-10* 0 - 5 /HPF Final    Epi Cells 07/27/2019 3-5  0 - 5 /HPF Final   Orders Only on 05/13/2019   Component Date Value Ref Range Status    CREATININE 05/13/2019 0.88  0.50 - 0.90 mg/dL Final    GFR Non- 05/13/2019 >60.0  >60 Final    Comment: >60 mL/min/1.73m2 EGFR, calc. for ages 25 and older using the  MDRD formula (not corrected for weight), is valid for stable  renal function.  GFR  05/13/2019 >60.0  >60 Final    Comment: >60 mL/min/1.73m2 EGFR, calc. for ages 25 and older using the  MDRD formula (not corrected for weight), is valid for stable  renal function.  BUN 05/13/2019 17  6 - 20 mg/dL Final       HPI:    Dizziness   This is a recurrent problem.  The Has 2 dogs, Blossom and [employer] 3, 2 sons in the area, one daughter in [address]: dogs, sawing, machine embroidery, baking       Family History   Problem Relation Age of Onset    Arrhythmia Mother     Hypertension Mother     Dementia Mother     COPD Father         mesothelioma    Alcohol Abuse Father     Diabetes Paternal Grandmother     Diabetes Paternal Grandfather     Colon Cancer Paternal Grandfather     Schizophrenia Brother     Colon Cancer Paternal Uncle        Family and socialhistory were reviewed and pertinent changes documented. ALLERGIES    Seasonal    Current Outpatient Medications on File Prior to Visit   Medication Sig Dispense Refill    omeprazole (PRILOSEC) 40 MG delayed release capsule TAKE 1 CAPSULE BY MOUTH ONE TIME A DAY 60 capsule 0    lisinopril (PRINIVIL;ZESTRIL) 10 MG tablet Take 1 tablet by mouth daily 90 tablet 1    hydrochlorothiazide (HYDRODIURIL) 25 MG tablet Take 1 tablet by mouth every morning 90 tablet 1    isosorbide dinitrate (ISORDIL) 10 MG tablet Take 1 tablet by mouth 3 times daily 90 tablet 3    metoprolol tartrate (LOPRESSOR) 25 MG tablet Take 1 tablet by mouth 2 times daily 60 tablet 5    ranolazine (RANEXA) 1000 MG extended release tablet Take 1 tablet by mouth 2 times daily 180 tablet 2    Blood Glucose Monitoring Suppl (PRODIGY AUTOCODE BLOOD GLUCOSE) w/Device KIT Use as directed to test up to 1 time daily 1 kit 0    aspirin 81 MG EC tablet Take 1 tablet by mouth daily 100 tablet 3    PRODIGY LANCETS 28G MISC Use as directed to test up to 1 time daily 100 each 3    blood glucose test strips (PRODIGY NO CODING BLOOD GLUC) strip 1 each by In Vitro route daily As needed.  100 each 3    pravastatin (PRAVACHOL) 20 MG tablet Take 1 tablet by mouth every evening 90 tablet 1    Dulaglutide (TRULICITY) 5.86 HJ/8.2YV SOPN Once  A day (Patient taking differently: once a week Once  A day) 12 pen 3    SYNTHROID 50 MCG tablet vertigo). Negative for tremors, syncope, speech difficulty and headaches. Psychiatric/Behavioral: Negative for decreased concentration and dysphoric mood. The patient is nervous/anxious. Vitals:    08/09/19 0926   BP: 112/73   Site: Left Upper Arm   Position: Sitting   Cuff Size: Large Adult   Pulse: 66   Resp: 16   Temp: 98.1 °F (36.7 °C)   TempSrc: Tympanic   SpO2: 98%   Weight: 220 lb (99.8 kg)       Physical Exam   Constitutional: She is oriented to person, place, and time. No distress. Obese, age appropriate, well groomed     HENT:   Head: Atraumatic. Eyes: Conjunctivae and EOM are normal.   Neck: Normal range of motion. Neck supple. No JVD present. No thyromegaly present. Cardiovascular: Regular rhythm, normal heart sounds and intact distal pulses. Pulmonary/Chest: Effort normal and breath sounds normal.   Abdominal: Soft. She exhibits no distension. Musculoskeletal: Normal range of motion. Neurological: She is alert and oriented to person, place, and time. No cranial nerve deficit. Coordination normal.   Skin: Skin is warm. Psychiatric: Her speech is normal and behavior is normal. Her mood appears anxious. She is not slowed and not withdrawn. Thought content is not delusional. Cognition and memory are normal. She does not express inappropriate judgment. She does not exhibit a depressed mood. Good insight, some denial noted        Assessment:    Judd Avitia was seen today for follow-up and dizziness. Diagnoses and all orders for this visit:    Migrainous vertigo  Comments:  Continue stress management, use diazepam for exacerbations of anxiety, continue diuretic, vestibular exercises  Orders:  -     diazepam (VALIUM) 2 MG tablet; Take 1 tablet by mouth every 8 hours as needed for Anxiety (vertigo) for up to 10 days.     Other specified anxiety disorders  Comments:  Increase Wellbutrin to 300 mg daily, use diazepam 2 mg only as a last resort for anxiety with vertigo  Orders:  -     diazepam

## 2019-08-09 NOTE — PATIENT INSTRUCTIONS
and back. [place] your shoulders down and back, like you're sliding your hands down into your back pants pockets. 4. Repeat the circles at least 2 to 4 times. 5. This exercise is also helpful anytime you want to relax. Lower neck and upper back stretch    1. With your arms about shoulder height, clasp your hands in front of you. 2. Drop your chin toward your chest.  3. Reach straight forward so you are rounding your upper back. Think about pulling your shoulder blades apart. Mariam Coronel feel a stretch across your upper back and shoulders. Hold for at least 6 seconds. 4. Repeat 2 to 4 times. Triceps stretch    1. Reach your arm straight up. 2. Keeping your elbow in place, bend your arm and reach your hand down behind your back. 3. With your other hand, apply gentle pressure to the bent elbow. Mariam Coronel feel a stretch at the back of your upper arm and shoulder. Hold about 6 seconds. 4. Repeat 2 to 4 times with each arm. Shoulder stretch    1. Relax your shoulders. 2. Raise one arm to shoulder height, and reach it across your chest.  3. Pull the arm slightly toward you with your other arm. This will help you get a gentle stretch. Hold for about 6 seconds. 4. Repeat 2 to 4 times. Shoulder blade squeeze    1. Sit or stand up tall with your arms at your sides. 2. Keep your shoulders relaxed and down, not shrugged. 3. Squeeze your shoulder blades together. Hold for 6 seconds, then relax. 4. Repeat 8 to 12 times. Straight-arm shoulder blade squeeze    1. Sit or stand tall. Relax your shoulders. 2. With palms down, hold your elastic tubing or band straight out in front of you. 3. Start with slight tension in the tubing or band, with your hands about shoulder-width apart. 4. Slowly pull straight out to the sides, squeezing your shoulder blades together. Keep your arms straight and at shoulder height. Slowly release. 5. Repeat 8 to 12 times. Rowing    1.  [place] your elastic tubing or band at about your thighs or your chair. 7. Tilt your head toward your shoulder and hold for 15 to 30 seconds. Let the weight of your head stretch your muscles. 8. Repeat 2 to 4 times toward each shoulder. Chin tuck    5. Lie on the floor with a rolled-up towel under your neck. Your head should be touching the floor. 6. Slowly bring your chin toward your chest.  7. Hold for a count of 6, and then relax for up to 10 seconds. 8. Repeat 8 to 12 times. Active cervical rotation    5. Sit in a firm chair, or stand up straight. 6. Keeping your chin level, turn your head to the right, and hold for 15 to 30 seconds. 7. Turn your head to the left and hold for 15 to 30 seconds. 8. Repeat 2 to 4 times to each side. Shoulder blade squeeze    5. While standing, squeeze your shoulder blades together. 6. Do not raise your shoulders up as you are squeezing. 7. Hold for 6 seconds. 8. Repeat 8 to 12 times. Shoulder rolls    5. Sit comfortably with your feet shoulder-width apart. You can also do this exercise standing up. 6. Roll your shoulders up, then back, and then down in a smooth, circular motion. 7. Repeat 2 to 4 times. Follow-up care is a key part of your treatment and safety. Be sure to make and go to all appointments, and call your doctor if you are having problems. It's also a good idea to know your test results and keep a list of the medicines you take. Where can you learn more? Go to https://YPlan.Infoflow. org and sign in to your [employer] account. Enter O593 in the [employer] box to learn more about \"Neck Arthritis: Exercises. \"     If you do not have an account, please click on the \"Sign Up Now\" link. Current as of: September 20, 2018  Content Version: 12.1  © 9350-1972 Healthwise, Incorporated. Care instructions adapted under license by [facility] ([facility]).  If you have questions about a medical condition or this instruction, always ask your healthcare professional. Cass Hebert, and together, and at the same time you will feel a stretch across your chest and the front of your shoulders. 12. Hold for about 6 seconds, and then relax for up to 10 seconds. 13. Repeat 8 to 12 times. Strengthening: Hands on head    8. Move your head backward, forward, and side to side against gentle pressure from your hands, holding each position for about 6 seconds. 9. Repeat 8 to 12 times. Follow-up care is a key part of your treatment and safety. Be sure to make and go to all appointments, and call your doctor if you are having problems. It's also a good idea to know your test results and keep a list of the medicines you take. Where can you learn more? Go to https://Metric Insightspeonkea.Rise. org and sign in to your This[employer] account. Enter P975 in the [employer] box to learn more about \"Neck: Exercises. \"     If you do not have an account, please click on the \"Sign Up Now\" link. Current as of: September 20, 2018  Content Version: 12.1  © 7122-1101 Healthwise, Incorporated. Care instructions adapted under license by [facility] ([facility]). If you have questions about a medical condition or this instruction, always ask your healthcare professional. [address] any warranty or liability for your use of this information.

## 2019-08-16 RX ORDER — PRAVASTATIN SODIUM 20 MG
TABLET ORAL
Qty: 90 TABLET | Refills: 1 | Status: SHIPPED | OUTPATIENT
Start: 2019-08-16 | End: 2019-12-10 | Stop reason: SDUPTHER

## 2019-09-24 RX ORDER — ASPIRIN 81 MG/1
81 TABLET ORAL DAILY
Qty: 100 TABLET | Refills: 3 | Status: SHIPPED | OUTPATIENT
Start: 2019-09-24 | End: 2019-11-20 | Stop reason: ALTCHOICE

## 2019-09-30 DIAGNOSIS — I10 ESSENTIAL HYPERTENSION: Primary | ICD-10-CM

## 2019-10-01 DIAGNOSIS — I10 ESSENTIAL HYPERTENSION: ICD-10-CM

## 2019-10-01 LAB
ANION GAP SERPL CALCULATED.3IONS-SCNC: 18 MEQ/L (ref 9–15)
BUN BLDV-MCNC: 18 MG/DL (ref 6–20)
CALCIUM SERPL-MCNC: 9.2 MG/DL (ref 8.5–9.9)
CHLORIDE BLD-SCNC: 101 MEQ/L (ref 95–107)
CO2: 24 MEQ/L (ref 20–31)
CREAT SERPL-MCNC: 1.13 MG/DL (ref 0.5–0.9)
GFR AFRICAN AMERICAN: >60
GFR NON-AFRICAN AMERICAN: 49.7
GLUCOSE BLD-MCNC: 90 MG/DL (ref 70–99)
POTASSIUM SERPL-SCNC: 4.4 MEQ/L (ref 3.4–4.9)
SODIUM BLD-SCNC: 143 MEQ/L (ref 135–144)

## 2019-10-09 ENCOUNTER — CLINICAL DOCUMENTATION (OUTPATIENT)
Dept: PHARMACY | Facility: CLINIC | Age: 59
End: 2019-10-09

## 2019-10-22 ENCOUNTER — OFFICE VISIT (OUTPATIENT)
Dept: ENDOCRINOLOGY | Age: 59
End: 2019-10-22
Payer: COMMERCIAL

## 2019-10-22 VITALS
SYSTOLIC BLOOD PRESSURE: 115 MMHG | HEART RATE: 67 BPM | WEIGHT: 212 LBS | BODY MASS INDEX: 35.32 KG/M2 | HEIGHT: 65 IN | DIASTOLIC BLOOD PRESSURE: 74 MMHG

## 2019-10-22 DIAGNOSIS — E11.65 UNCONTROLLED TYPE 2 DIABETES MELLITUS WITH HYPERGLYCEMIA (HCC): Primary | ICD-10-CM

## 2019-10-22 DIAGNOSIS — E11.65 UNCONTROLLED TYPE 2 DIABETES MELLITUS WITH HYPERGLYCEMIA (HCC): ICD-10-CM

## 2019-10-22 DIAGNOSIS — E03.9 HYPOTHYROIDISM, UNSPECIFIED TYPE: ICD-10-CM

## 2019-10-22 LAB
CHP ED QC CHECK: NORMAL
CREATININE URINE: 111.9 MG/DL
GLUCOSE BLD-MCNC: 87 MG/DL
HBA1C MFR BLD: 5.5 % (ref 4.8–5.9)
MICROALBUMIN UR-MCNC: <1.2 MG/DL
MICROALBUMIN/CREAT UR-RTO: NORMAL MG/G (ref 0–30)

## 2019-10-22 PROCEDURE — 82962 GLUCOSE BLOOD TEST: CPT | Performed by: INTERNAL MEDICINE

## 2019-10-22 PROCEDURE — 99213 OFFICE O/P EST LOW 20 MIN: CPT | Performed by: INTERNAL MEDICINE

## 2019-10-28 RX ORDER — LEVOTHYROXINE SODIUM 50 MCG
TABLET ORAL
Qty: 90 TABLET | Refills: 3 | Status: SHIPPED | OUTPATIENT
Start: 2019-10-28 | End: 2019-12-10 | Stop reason: SDUPTHER

## 2019-11-20 ENCOUNTER — OFFICE VISIT (OUTPATIENT)
Dept: CARDIOLOGY CLINIC | Age: 59
End: 2019-11-20
Payer: COMMERCIAL

## 2019-11-20 VITALS
OXYGEN SATURATION: 99 % | WEIGHT: 217.8 LBS | BODY MASS INDEX: 36.24 KG/M2 | SYSTOLIC BLOOD PRESSURE: 140 MMHG | DIASTOLIC BLOOD PRESSURE: 70 MMHG | HEART RATE: 75 BPM

## 2019-11-20 DIAGNOSIS — R07.89 OTHER CHEST PAIN: Primary | ICD-10-CM

## 2019-11-20 DIAGNOSIS — Z87.891 HISTORY OF TOBACCO ABUSE: ICD-10-CM

## 2019-11-20 DIAGNOSIS — I10 ESSENTIAL HYPERTENSION: ICD-10-CM

## 2019-11-20 PROCEDURE — 99213 OFFICE O/P EST LOW 20 MIN: CPT | Performed by: INTERNAL MEDICINE

## 2019-12-10 ENCOUNTER — OFFICE VISIT (OUTPATIENT)
Dept: INTERNAL MEDICINE CLINIC | Age: 59
End: 2019-12-10
Payer: COMMERCIAL

## 2019-12-10 VITALS
DIASTOLIC BLOOD PRESSURE: 56 MMHG | WEIGHT: 216 LBS | TEMPERATURE: 96.9 F | HEART RATE: 59 BPM | RESPIRATION RATE: 18 BRPM | OXYGEN SATURATION: 100 % | SYSTOLIC BLOOD PRESSURE: 100 MMHG | BODY MASS INDEX: 35.99 KG/M2 | HEIGHT: 65 IN

## 2019-12-10 DIAGNOSIS — R42 VERTIGO: ICD-10-CM

## 2019-12-10 DIAGNOSIS — I10 ESSENTIAL HYPERTENSION: Primary | ICD-10-CM

## 2019-12-10 DIAGNOSIS — F41.1 GAD (GENERALIZED ANXIETY DISORDER): ICD-10-CM

## 2019-12-10 PROCEDURE — 99214 OFFICE O/P EST MOD 30 MIN: CPT | Performed by: INTERNAL MEDICINE

## 2019-12-10 RX ORDER — HYDROCHLOROTHIAZIDE 25 MG/1
25 TABLET ORAL EVERY MORNING
Qty: 90 TABLET | Refills: 1 | Status: SHIPPED | OUTPATIENT
Start: 2019-12-10 | End: 2020-07-31 | Stop reason: SDUPTHER

## 2019-12-10 RX ORDER — LISINOPRIL 5 MG/1
TABLET ORAL
Qty: 90 TABLET | Refills: 1 | Status: SHIPPED | OUTPATIENT
Start: 2019-12-10 | End: 2020-06-26

## 2019-12-10 RX ORDER — BUPROPION HYDROCHLORIDE 300 MG/1
300 TABLET ORAL EVERY MORNING
Qty: 90 TABLET | Refills: 1 | Status: SHIPPED | OUTPATIENT
Start: 2019-12-10 | End: 2020-06-26

## 2019-12-10 RX ORDER — DIAZEPAM 2 MG/1
TABLET ORAL
COMMUNITY
Start: 2018-06-22 | End: 2020-02-28

## 2019-12-10 RX ORDER — LEVOTHYROXINE SODIUM 0.05 MG/1
TABLET ORAL
Qty: 90 TABLET | Refills: 1 | Status: SHIPPED | OUTPATIENT
Start: 2019-12-10 | End: 2020-08-31

## 2019-12-10 RX ORDER — NITROGLYCERIN 0.4 MG/1
0.4 TABLET SUBLINGUAL EVERY 5 MIN PRN
Qty: 25 TABLET | Refills: 3 | Status: CANCELLED | OUTPATIENT
Start: 2019-12-10

## 2019-12-10 RX ORDER — PRAVASTATIN SODIUM 20 MG
TABLET ORAL
Qty: 90 TABLET | Refills: 1 | Status: SHIPPED | OUTPATIENT
Start: 2019-12-10 | End: 2021-06-10 | Stop reason: SDUPTHER

## 2019-12-10 ASSESSMENT — PATIENT HEALTH QUESTIONNAIRE - PHQ9
2. FEELING DOWN, DEPRESSED OR HOPELESS: 0
SUM OF ALL RESPONSES TO PHQ QUESTIONS 1-9: 0
SUM OF ALL RESPONSES TO PHQ QUESTIONS 1-9: 0
SUM OF ALL RESPONSES TO PHQ9 QUESTIONS 1 & 2: 0
1. LITTLE INTEREST OR PLEASURE IN DOING THINGS: 0

## 2019-12-10 ASSESSMENT — ENCOUNTER SYMPTOMS
PHOTOPHOBIA: 0
CHOKING: 0
EYE PAIN: 0
TROUBLE SWALLOWING: 0
ABDOMINAL PAIN: 0
COUGH: 0
NAUSEA: 0
SHORTNESS OF BREATH: 0
VISUAL CHANGE: 0
COLOR CHANGE: 0

## 2019-12-31 RX ORDER — DEXTROMETHORPHAN HYDROBROMIDE AND PROMETHAZINE HYDROCHLORIDE 15; 6.25 MG/5ML; MG/5ML
5 SYRUP ORAL 3 TIMES DAILY PRN
Qty: 118 ML | Refills: 1 | Status: SHIPPED | OUTPATIENT
Start: 2019-12-31 | End: 2020-01-07

## 2020-02-19 ENCOUNTER — OFFICE VISIT (OUTPATIENT)
Dept: CARDIOLOGY CLINIC | Age: 60
End: 2020-02-19
Payer: COMMERCIAL

## 2020-02-19 VITALS
BODY MASS INDEX: 36.74 KG/M2 | WEIGHT: 220.8 LBS | DIASTOLIC BLOOD PRESSURE: 60 MMHG | SYSTOLIC BLOOD PRESSURE: 130 MMHG | HEART RATE: 72 BPM

## 2020-02-19 PROCEDURE — 93000 ELECTROCARDIOGRAM COMPLETE: CPT | Performed by: INTERNAL MEDICINE

## 2020-02-19 PROCEDURE — 99213 OFFICE O/P EST LOW 20 MIN: CPT | Performed by: INTERNAL MEDICINE

## 2020-02-19 NOTE — PROGRESS NOTES
ENDOSCOPY N/A 2019    EGD ESOPHAGOGASTRODUODENOSCOPY performed by Lyly Brown MD at [facility]       Family History   Problem Relation Age of Onset    Arrhythmia Mother     Hypertension Mother     Dementia Mother     COPD Father         mesothelioma    Alcohol Abuse Father     Diabetes Paternal Grandmother     Diabetes Paternal Grandfather     Colon Cancer Paternal Grandfather     Schizophrenia Brother     Colon Cancer Paternal Uncle        Social History     Socioeconomic History    Marital status:      Spouse name: Not on file    Number of children: 3    Years of education: Not on file    Highest education level: Not on file   Occupational History    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   Social Needs    Financial resource strain: Not on file    Food insecurity:     Worry: Not on file     Inability: Not on file    Transportation needs:     Medical: Not on file     Non-medical: Not on file   Tobacco Use    Smoking status: Former Smoker     Packs/day: 1.00     Years: 30.00     Pack years: 30.00     Types: Cigarettes     Start date: 3/5/1975     Last attempt to quit: 2005     Years since quittin.7    Smokeless tobacco: Never Used    Tobacco comment: 3/15/18 started age 17   Substance and Sexual Activity    Alcohol use: No     Comment: not at all     Drug use: No    Sexual activity: Not on file   Lifestyle    Physical activity:     Days per week: Not on file     Minutes per session: Not on file    Stress: Not on file   Relationships    Social connections:     Talks on phone: Not on file     Gets together: Not on file     Attends Congregational service: Not on file     Active member of club or organization: Not on file     Attends meetings of clubs or organizations: Not on file     Relationship status: Not on file    Intimate partner violence:     Fear of current or ex partner: Not on file     Emotionally abused: Not on file Physically abused: Not on file     Forced sexual activity: Not on file   Other Topics Concern    Not on file   Social History Narrative    Born in [place], one of 4 children (2 boys and 2 girls), both parents in the [employer], moved to [place]    Mother in [place], has memory problems    [employer]     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  buPROPion (WELLBUTRIN XL) 300 MG extended release tablet Take 1 tablet by mouth every morning 90 tablet 1    levothyroxine (SYNTHROID) 50 MCG tablet TAKE 1 TABLET BY MOUTH ONE TIME DAILY 90 tablet 1    metoprolol tartrate (LOPRESSOR) 25 MG tablet Take 1 tablet by mouth 2 times daily 180 tablet 1    pravastatin (PRAVACHOL) 20 MG tablet TAKE ONE TABLET BY MOUTH EVERY EVENING 90 tablet 1    lisinopril (PRINIVIL;ZESTRIL) 5 MG tablet Take 1 tab po daily AM 90 tablet 1    hydrochlorothiazide (HYDRODIURIL) 25 MG tablet Take 1 tablet by mouth every morning 90 tablet 1    metFORMIN (GLUCOPHAGE) 500 MG tablet TAKE ONE TABLET BY MOUTH TWICE A DAY WITH MEALS 180 tablet 1    Dulaglutide (TRULICITY) 4.78 JW/5.4KH SOPN INJECT THE CONTENTS OF ONE SYRINGE UNDER THE SKIN ONCE WEEKLY 6 mL 3    Blood Glucose Monitoring Suppl (PRODIGY AUTOCODE BLOOD GLUCOSE) w/Device KIT Use as directed to test up to 1 time daily 1 kit 0    PRODIGY LANCETS 28G MISC Use as directed to test up to 1 time daily 100 each 3    blood glucose test strips (PRODIGY NO CODING BLOOD GLUC) strip 1 each by In Vitro route daily As needed.  100 each 3    albuterol sulfate HFA (PROAIR HFA) 108 (90 Base) MCG/ACT inhaler Inhale 2 puffs into the lungs every 6 hours as needed for Wheezing or Shortness of Breath 1 Inhaler 3    Respiratory Therapy Supplies CLARA Please give patient the farshad view mask 1 Device 0    nitroGLYCERIN (NITROSTAT) 0.4 MG SL tablet Place 1 tablet under the tongue every 5 minutes as needed for Chest pain up to max of 3 total doses. If no relief after 1 dose, call 911. (Patient taking differently: Place 0.4 mg under the tongue every 5 minutes as needed for Chest pain up to max of 3 total doses. If no relief after 1 dose, call 911.) 25 tablet 3    Cholecalciferol (VITAMIN D) 2000 UNITS CAPS capsule Take by mouth daily      ibuprofen (ADVIL;MOTRIN) 200 MG tablet Take 200 mg by mouth every 6 hours as needed for Pain      loratadine (CLARITIN) 10 MG tablet Take 10 mg by mouth daily as needed (Allergies)        No current facility-administered medications for this visit. Review of Systems:   Review of Systems   Constitutional: Negative for activity change and appetite change. HENT: Negative for congestion. Respiratory: Negative for apnea, choking and chest tightness. Cardiovascular: Negative for chest pain. Gastrointestinal: Negative for abdominal distention and abdominal pain. Endocrine: Negative for cold intolerance and heat intolerance. Genitourinary: Negative for dysuria and enuresis. Musculoskeletal: Negative for arthralgias and back pain. Skin: Negative for color change. Allergic/Immunologic: Negative. Neurological: Negative for dizziness, seizures, syncope and light-headedness. Psychiatric/Behavioral: Negative for agitation, behavioral problems and confusion. Physical Examination:    /60 (Site: Left Upper Arm, Position: Sitting, Cuff Size: Large Adult)   Pulse 72   Wt 220 lb 12.8 oz (100.2 kg)   LMP  (Within Months) Comment: 2012  BMI 36.74 kg/m²    Physical Exam   Constitutional: The patient appears healthy. No distress. HENT: Mouth/Throat: Oropharynx is clear.    Eyes: Pupils are equal, round, and 10/01/2019    K 4.4 10/01/2019     10/01/2019    CO2 24 10/01/2019    BUN 18 10/01/2019    LABALBU 3.9 07/27/2019    CREATININE 1.13 10/01/2019    CALCIUM 9.2 10/01/2019    GFRAA >60.0 10/01/2019    LABGLOM 49.7 10/01/2019    GLUCOSE 87 10/22/2019     Magnesium:    Lab Results   Component Value Date    MG 1.8 07/27/2019     TSH:  Lab Results   Component Value Date    TSH 0.531 04/22/2019       Patient Active Problem List   Diagnosis    Vitamin D deficiency    Weight gain    Generalized anxiety disorder    Other specified acquired hypothyroidism    Sleep apnea, obstructive    Type II diabetes mellitus, uncontrolled ([facility] [address]    Hypothyroidism    Essential hypertension    Family history of coronary artery disease    History of tobacco abuse    Precordial pain    Non-cardiac chest pain    History of colon polyps    Polyp of colon    Tobacco abuse, in remission    Irregular Z line of esophagus    Gastric polyp       Medications:  Current Outpatient Medications   Medication Sig Dispense Refill    diazepam (VALIUM) 2 MG tablet Take by mouth.       buPROPion (WELLBUTRIN XL) 300 MG extended release tablet Take 1 tablet by mouth every morning 90 tablet 1    levothyroxine (SYNTHROID) 50 MCG tablet TAKE 1 TABLET BY MOUTH ONE TIME DAILY 90 tablet 1    metoprolol tartrate (LOPRESSOR) 25 MG tablet Take 1 tablet by mouth 2 times daily 180 tablet 1    pravastatin (PRAVACHOL) 20 MG tablet TAKE ONE TABLET BY MOUTH EVERY EVENING 90 tablet 1    lisinopril (PRINIVIL;ZESTRIL) 5 MG tablet Take 1 tab po daily AM 90 tablet 1    hydrochlorothiazide (HYDRODIURIL) 25 MG tablet Take 1 tablet by mouth every morning 90 tablet 1    metFORMIN (GLUCOPHAGE) 500 MG tablet TAKE ONE TABLET BY MOUTH TWICE A DAY WITH MEALS 180 tablet 1    Dulaglutide (TRULICITY) 7.30 BOND/3.7EB SOPN INJECT THE CONTENTS OF ONE SYRINGE UNDER THE SKIN ONCE WEEKLY 6 mL 3    Blood Glucose Monitoring Suppl (PRODIGY AUTOCODE BLOOD GLUCOSE) w/Device KIT

## 2020-02-28 ENCOUNTER — OFFICE VISIT (OUTPATIENT)
Dept: INTERNAL MEDICINE CLINIC | Age: 60
End: 2020-02-28
Payer: COMMERCIAL

## 2020-02-28 VITALS
SYSTOLIC BLOOD PRESSURE: 113 MMHG | OXYGEN SATURATION: 98 % | HEART RATE: 65 BPM | DIASTOLIC BLOOD PRESSURE: 71 MMHG | RESPIRATION RATE: 12 BRPM | TEMPERATURE: 97.9 F | BODY MASS INDEX: 37.15 KG/M2 | HEIGHT: 65 IN | WEIGHT: 223 LBS

## 2020-02-28 PROCEDURE — 99213 OFFICE O/P EST LOW 20 MIN: CPT | Performed by: INTERNAL MEDICINE

## 2020-02-28 RX ORDER — CELECOXIB 100 MG/1
100 CAPSULE ORAL DAILY
Qty: 30 CAPSULE | Refills: 0 | Status: SHIPPED | OUTPATIENT
Start: 2020-02-28 | End: 2020-06-01 | Stop reason: SDUPTHER

## 2020-02-28 ASSESSMENT — PATIENT HEALTH QUESTIONNAIRE - PHQ9
SUM OF ALL RESPONSES TO PHQ9 QUESTIONS 1 & 2: 0
1. LITTLE INTEREST OR PLEASURE IN DOING THINGS: 0
SUM OF ALL RESPONSES TO PHQ QUESTIONS 1-9: 0
2. FEELING DOWN, DEPRESSED OR HOPELESS: 0
SUM OF ALL RESPONSES TO PHQ QUESTIONS 1-9: 0

## 2020-02-28 ASSESSMENT — ENCOUNTER SYMPTOMS
COLOR CHANGE: 0
ABDOMINAL PAIN: 0
BACK PAIN: 1
SHORTNESS OF BREATH: 0
BOWEL INCONTINENCE: 0

## 2020-02-28 NOTE — PROGRESS NOTES
symptoms. The treatment provided moderate relief. More detail above in the chiefcomplaint(s), interim history and below in the review of systems. Past Medical History:   Diagnosis Date    Abnormal EKG 3/23/2018    Angina pectoris syndrome ([facility] [address] 03/23/2018    Diverticulosis of colon     DMII (diabetes mellitus, type 2) ([facility] [address] 2000    Essential hypertension 1/15/2016    Family history of coronary artery disease 1/15/2016    Gastric polyp 2019    Dr Barry Tran, 5 mm    Generalized anxiety disorder     H/O: hysterectomy 2012    History of tobacco abuse 1/15/2016    Obesity (BMI 30-39. 9)     Other specified acquired hypothyroidism 2000    Precordial pain 03/23/2018    (? Prinzmetal?) Dr Jessica Rushing S/P colonoscopy with polypectomy 2013, 2018, 2019    Dr Alonzo Michaels, Dr Barry Tran, tubulovillous adenoma, hyperplastic polyp    S/P vaginal hysterectomy 2012    benign    Sleep apnea, obstructive 2007    was on CPAP, not using it at present    Vertigo 2018       Past Surgical History:   Procedure Laterality Date    CHOLECYSTECTOMY, [facility] CATH LAB PROCEDURE      ENDOMETRIAL ABLATION  2002    metrorrhagia    HYSTERECTOMY  9/7/12    fibroid, cervicitis, ovaries intact    MA COLON CA SCRN NOT  W [address] [place] [address] N/A 5/15/2018    COLONOSCOPY performed by Chela Smith MD at [address] [place] [address] N/A 4/2/2019    EGD ESOPHAGOGASTRODUODENOSCOPY performed by Chela Smith MD at [address] [facility] Marital status:      Spouse name: Not on file    Number of children: 3    Years of education: Not on file    Highest education level: Not on file   Occupational History    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[employer] , RN     Employer: [employer]   Social Needs    Financial resource strain: Not on file    Food insecurity:     Worry: Not on file     Inability: Not on file    Transportation needs:     Medical: Not on file     Non-medical: Not on file   Tobacco Use    Smoking status: Former Smoker     Packs/day: 1.00     Years: 30.00     Pack years: 30.00     Types: Cigarettes     Start date: 3/5/1975     Last attempt to quit: 2005     Years since quittin.7    Smokeless tobacco: Never Used    Tobacco comment: 3/15/18 started age 17   Substance and Sexual Activity    Alcohol use: No     Comment: not at all     Drug use: No    Sexual activity: Not on file   Lifestyle    Physical activity:     Days per week: Not on file     Minutes per session: Not on file    Stress: Not on file   Relationships    Social connections:     Talks on phone: Not on file     Gets together: Not on file     Attends Holiness service: Not on file     Active member of club or organization: Not on file     Attends meetings of clubs or organizations: Not on file     Relationship status: Not on file    Intimate partner violence:     Fear of current or ex partner: Not on file     Emotionally abused: Not on file     Physically abused: Not on file     Forced sexual activity: Not on file   Other Topics Concern    Not on file   Social History Narrative    Born in [place], one of 4 children (2 boys and 2 girls), both parents in the [place], moved to [place]    Mother in [place], has memory problems    Confucianism Baptism     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    Has 2 dogs, Blossom and [address], 2 sons in the area, one daughter in [address]: dogs, sawing, machine embroidery, baking       Family History   Problem Relation Age of Onset    Arrhythmia Mother     Hypertension Mother     Dementia Mother     COPD Father         mesothelioma    Alcohol Abuse Father     Diabetes Paternal Grandmother     Diabetes Paternal Grandfather     Colon Cancer Paternal Grandfather     Schizophrenia Brother     Colon Cancer Paternal Uncle        Family and socialhistory were reviewed and pertinent changes documented. ALLERGIES    Seasonal    Current Outpatient Medications on File Prior to Visit   Medication Sig Dispense Refill    buPROPion (WELLBUTRIN XL) 300 MG extended release tablet Take 1 tablet by mouth every morning 90 tablet 1    levothyroxine (SYNTHROID) 50 MCG tablet TAKE 1 TABLET BY MOUTH ONE TIME DAILY 90 tablet 1    metoprolol tartrate (LOPRESSOR) 25 MG tablet Take 1 tablet by mouth 2 times daily 180 tablet 1    pravastatin (PRAVACHOL) 20 MG tablet TAKE ONE TABLET BY MOUTH EVERY EVENING 90 tablet 1    lisinopril (PRINIVIL;ZESTRIL) 5 MG tablet Take 1 tab po daily AM 90 tablet 1    hydrochlorothiazide (HYDRODIURIL) 25 MG tablet Take 1 tablet by mouth every morning 90 tablet 1    metFORMIN (GLUCOPHAGE) 500 MG tablet TAKE ONE TABLET BY MOUTH TWICE A DAY WITH MEALS 180 tablet 1    Dulaglutide (TRULICITY) 4.17 LL/0.6JQ SOPN INJECT THE CONTENTS OF ONE SYRINGE UNDER THE SKIN ONCE WEEKLY 6 mL 3    Blood Glucose Monitoring Suppl (PRODIGY AUTOCODE BLOOD GLUCOSE) w/Device KIT Use as directed to test up to 1 time daily 1 kit 0    PRODIGY LANCETS 28G MISC Use as directed to test up to 1 time daily 100 each 3    blood glucose test strips (PRODIGY NO CODING BLOOD GLUC) strip 1 each by In Vitro route daily As needed. 100 each 3    albuterol sulfate HFA (PROAIR HFA) 108 (90 Base) MCG/ACT inhaler Inhale 2 puffs into the lungs every 6 hours as needed for Wheezing or Shortness of Breath 1 Inhaler 3    Respiratory Therapy Supplies CLARA Please give patient the farshad view mask 1 Device 0    nitroGLYCERIN (NITROSTAT) 0.4 MG SL tablet Place 1 tablet under the tongue every 5 minutes as needed for Chest pain up to max of 3 total doses. If no relief after 1 dose, call 911. (Patient taking differently: Place 0.4 mg under the tongue every 5 minutes as needed for Chest pain up to max of 3 total doses.  If no relief after 1 dose, call 911.) 25 tablet 3    Cholecalciferol (VITAMIN D) 2000 UNITS CAPS capsule Take by mouth daily      ibuprofen (ADVIL;MOTRIN) 200 MG tablet Take 200 mg by mouth every 6 hours as needed for Pain      loratadine (CLARITIN) 10 MG tablet Take 10 mg by mouth daily as needed (Allergies)        No current facility-administered medications on file prior to visit. Review of Systems   Constitutional: Negative for chills, diaphoresis, fatigue, fever and unexpected weight change. HENT: Negative for nosebleeds. Respiratory: Negative for shortness of breath. Cardiovascular: Positive for chest pain. Gastrointestinal: Negative for abdominal pain and bowel incontinence. Endocrine: Negative for polydipsia and polyuria. Genitourinary: Negative for bladder incontinence, dysuria, flank pain and pelvic pain. Musculoskeletal: Positive for back pain. Negative for gait problem and joint swelling. Skin: Negative for color change. Allergic/Immunologic: Negative for immunocompromised state. Neurological: Negative for dizziness, tingling, weakness and numbness. Psychiatric/Behavioral: Negative for decreased concentration and dysphoric mood. The patient is not nervous/anxious. Vitals:    02/28/20 1019   BP: 113/71   Site: Left Upper Arm   Position: Sitting   Cuff Size: Medium Adult   Pulse: 65   Resp: 12   Temp: 97.9 °F (36.6 °C)   TempSrc: Oral   SpO2: 98%   Weight: 223 lb (101.2 kg)   Height: 5' 5\" (1.651 m)       Physical Exam  Constitutional:       General: She is not in acute distress. Appearance: She is obese. She is not ill-appearing. HENT:      Head: Atraumatic. Nose: No congestion. Mouth/Throat:      Mouth: Mucous membranes are moist.   Eyes:      Extraocular Movements: Extraocular movements intact. Conjunctiva/sclera: Conjunctivae normal.   Neck:      Musculoskeletal: Neck supple. Cardiovascular:      Rate and Rhythm: Regular rhythm. Pulses: Normal pulses.       Heart sounds: Normal heart sounds. Pulmonary:      Effort: No respiratory distress. Abdominal:      General: There is no distension. Musculoskeletal: Normal range of motion. General: No deformity. Thoracic back: She exhibits normal range of motion, no tenderness and no deformity. Lumbar back: She exhibits normal range of motion and no tenderness. Back:    Skin:     General: Skin is warm and dry. Neurological:      Mental Status: She is oriented to person, place, and time. Mental status is at baseline. Motor: No weakness. Coordination: Coordination normal.      Gait: Gait normal.   Psychiatric:         Attention and Perception: Attention normal.         Mood and Affect: Mood and affect normal.         Speech: Speech normal.         Behavior: Behavior is cooperative. Cognition and Memory: Cognition and memory normal.      Comments: Good insight and motivation         Assessment:    Cas Blankenship was seen today for back pain. Diagnoses and all orders for this visit:    Sacroiliac joint pain             Start exercises per handout, nonsteroidal anti-inflammatory, call if no relief in which case referral to physical therapy and/or pain management for injection      Piriformis syndrome of left side             Same as above, muscle relaxants contraindicated due to history of sleep apnea. Stretching exercises, anti-inflammatory and heating pad, call if no relief      Other orders  -     celecoxib (CELEBREX) 100 MG capsule; Take 1 capsule by mouth daily        Plan:    See all orders and comments in the assessment section. Reviewed with the patient (/and caregiver if present): current health status, medications, activities and diet. Today's diagnosis (in the context ofchronic problems) was discussed with patient (/and caregiver if present), questions answered.      Side effects, adverse effects of the medication prescribed (or refilled) today, treatment plan/ rationale and result expectations have (again) been discussed with the patient who expresses understanding and consents to proceed as outlined above. Additional advise included in the \"after visit summary\". Orders Placed This Encounter   Medications    celecoxib (CELEBREX) 100 MG capsule     Sig: Take 1 capsule by mouth daily     Dispense:  30 capsule     Refill:  0       Close follow up needed to evaluate treatment results and for care coordination. Return in about 4 months (around 6/28/2020) for Dr Miranda July. I have reviewed the patient's medical and surgical, family and social history, health maintenance schedule, and updated the computerized patient record. Please note this report has been partially produced by using speech recognition hardware. It may contain errors related to the system, including grammar, punctuation and spelling as well as words and phrases that may seem inaccurate. For anyquestions or concerns, please feel free to contact me for clarification.         Electronically signed by Rodrigo Frye MD

## 2020-02-28 NOTE — PATIENT INSTRUCTIONS
Patient Education        Sacroiliac Joint Pain: Care Instructions  Your Care Instructions    The sacroiliac joints connect the spine and each side of the pelvis. These joints bear the weight and stress of your torso. This makes them easy to injure. Injury or overuse of these joints may cause low back pain. Stress on these joints can cause joint pain. Sacroiliac joint pain is more common in pregnant women. Certain kinds of arthritis also may cause this type of joint pain. Home treatment may help you feel better. So can avoiding activities that stress your back. Your doctor also may recommend physical therapy. This may include doing exercises and stretches to help with pain. You may also learn to use good posture. Follow-up care is a key part of your treatment and safety. Be sure to make and go to all appointments, and call your doctor if you are having problems. It's also a good idea to know your test results and keep a list of the medicines you take. How can you care for yourself at home? · Ask your doctor about light exercises that may help your back pain. Try to do light activity throughout the day. But make sure to take rests as needed. Find a comfortable position for rest, but don't stay in one position for too long. Avoid activities that cause pain. · To apply heat, put a warm water bottle, a heating pad set on low, or a warm cloth on your back. Do not go to sleep with a heating pad on your skin. · Put ice or a cold pack on your back for 10 to 20 minutes at a time. Put a thin cloth between the ice and your skin. · If the doctor gave you a prescription medicine for pain, take it as prescribed. · If you are not taking a prescription pain medicine, ask your doctor if you can take an over-the-counter pain medicine, such as acetaminophen (Tylenol), ibuprofen (Advil, Motrin), or naproxen (Aleve). Read and follow all instructions on the label. Take pain medicines exactly as directed.   · Do not take two or more pain medicines at the same time unless the doctor told you to. Many pain medicines have acetaminophen, which is Tylenol. Too much acetaminophen (Tylenol) can be harmful. · To prevent future back pain, do exercises to stretch and strengthen your back and stomach. Learn how to use good posture, safe lifting techniques, and proper body mechanics. When should you call for help? Call 911 anytime you think you may need emergency care. For example, call if:    · You are unable to move a leg at all.   [facility] your doctor now or seek immediate medical care if:    · You have new or worse symptoms in your legs or buttocks. Symptoms may include:  ? Numbness or tingling. ? Weakness. ? Pain.     · You lose bladder or bowel control.    Watch closely for changes in your health, and be sure to contact your doctor if:    · You are not getting better as expected. Where can you learn more? Go to https://Sunfire.Beacon Holding. org and sign in to your [employer] account. Enter R829 in the [employer] box to learn more about \"Sacroiliac Joint Pain: Care Instructions. \"     If you do not have an account, please click on the \"Sign Up Now\" link. Current as of: June 26, 2019  Content Version: 12.3  © 1861-5362 Healthwise, Incorporated. Care instructions adapted under license by [facility] ([facility]). If you have questions about a medical condition or this instruction, always ask your healthcare professional. [place] [address] any warranty or liability for your use of this information. Patient Education        Sacroiliac Pain: Exercises  Introduction  Here are some examples of exercises for you to try. The exercises may be suggested for a condition or for rehabilitation. Start each exercise slowly. Ease off the exercises if you start to have pain. You will be told when to start these exercises and which ones will work best for you. How to do the exercises  Knee-to-chest stretch   1.  Do not do the knee-to-chest exercise if it causes or increases back or leg pain. 2. Lie on your back with your knees bent and your feet flat on the floor. You can put a small pillow under your head and neck if it is more comfortable. 3. Grasp your hands under one knee and bring the knee to your chest, keeping the other foot flat on the floor. 4. Keep your lower back pressed to the floor. Hold for at least 15 to 30 seconds. 5. Relax and lower the knee to the starting position. Repeat with the other leg. 6. Repeat 2 to 4 times with each leg. 7. To get more stretch, keep your other leg flat on the floor while pulling your knee to your chest.    Bridging   1. Lie on your back with both knees bent. Your knees should be bent about 90 degrees. 2. Tighten your belly muscles by pulling in your belly button toward your spine. Then push your feet into the floor, squeeze your buttocks, and lift your hips off the floor until your shoulders, hips, and knees are all in a straight line. 3. Hold for about 6 seconds as you continue to breathe normally, and then slowly lower your hips back down to the floor and rest for up to 10 seconds. 4. Repeat 8 to 12 times. Hip extension   1. Get down on your hands and knees on the floor. 2. Keeping your back and neck straight, lift one leg straight out behind you. When you lift your leg, keep your hips level. Don't let your back twist, and don't let your hip drop toward the floor. 3. Hold for 6 seconds. Repeat 8 to 12 times with each leg. 4. If you feel steady and strong when you do this exercise, you can make it more difficult. To do this, when you lift your leg, also lift the opposite arm straight out in front of you. For example, lift the left leg and the right arm at the same time. (This is sometimes called the \"bird dog exercise. \") Hold for 6 seconds, and repeat 8 to 12 times on each side. Clamshell   1. Lie on your side with a pillow under your head.  Keep your feet and knees together and your knees bent. 2. Raise your top knee, but keep your feet together. Do not let your hips roll back. Your legs should open up like a clamshell. 3. Hold for 6 seconds. 4. Slowly lower your knee back down. Rest for 10 seconds. 5. Repeat 8 to 12 times. 6. Switch to your other side and repeat steps 1 through 5. Hamstring wall stretch   1. Lie on your back in a doorway, with one leg through the open door. 2. Slide your affected leg up the wall to straighten your knee. You should feel a gentle stretch down the back of your leg. 1. Do not arch your back. 2. Do not bend either knee. 3. Keep one heel touching the floor and the other heel touching the wall. Do not point your toes. 3. Hold the stretch for at least 1 minute to begin. Then try to lengthen the time you hold the stretch to as long as 6 minutes. 4. Switch legs, and repeat steps 1 through 3.  5. Repeat 2 to 4 times. 6. If you do not have a place to do this exercise in a doorway, there is another way to do it:  7. Lie on your back, and bend one knee. 8. Loop a towel under the ball and toes of that foot, and hold the ends of the towel in your hands. 9. Straighten your knee, and slowly pull back on the towel. You should feel a gentle stretch down the back of your leg. 10. Switch legs, and repeat steps 1 through 3.  11. Repeat 2 to 4 times. Lower abdominal strengthening   1. Lie on your back with your knees bent and your feet flat on the floor. 2. Tighten your belly muscles by pulling your belly button in toward your spine. 3. Lift one foot off the floor and bring your knee toward your chest, so that your knee is straight above your hip and your leg is bent like the letter \"L. \"  4. Lift the other knee up to the same position. 5. Lower one leg at a time to the starting position. 6. Keep alternating legs until you have lifted each leg 8 to 12 times. 7. Be sure to keep your belly muscles tight and your back still as you are moving your legs. Be sure to breathe normally. Piriformis stretch   1. Lie on your back with your legs straight. 2. Lift your affected leg, and bend your knee. With your opposite hand, reach across your body, and then gently pull your knee toward your opposite shoulder. 3. Hold the stretch for 15 to 30 seconds. 4. Switch legs and repeat steps 1 through 3.  5. Repeat 2 to 4 times. Follow-up care is a key part of your treatment and safety. Be sure to make and go to all appointments, and call your doctor if you are having problems. It's also a good idea to know your test results and keep a list of the medicines you take. Where can you learn more? Go to https://SoseipeShowpitch.Glassmap. org and sign in to your [employer] account. Enter E683 in the [employer] box to learn more about \"Sacroiliac Pain: Exercises. \"     If you do not have an account, please click on the \"Sign Up Now\" link. Current as of: June 26, 2019  Content Version: 12.3  © 1750-6062 [employer]. Care instructions adapted under license by [facility] ([facility]). If you have questions about a medical condition or this instruction, always ask your healthcare professional. [place] [address] any warranty or liability for your use of this information. Patient Education        Piriformis Syndrome: Care Instructions  Your Care Instructions    The piriformis muscle is deep under your rear end (buttock). One end of the muscle connects deep inside the pelvic area, and the other end attaches to the top of the thighbone. This muscle can press on the sciatic nerve that runs from your spine down your leg. When this happens, you may have pain, numbness, and tingling in the buttock and down the back of your leg. This is called piriformis syndrome. The pain may get worse when you sit for a long time or climb stairs. Also, you may be more likely to develop piriformis syndrome if you run or walk often.   Your doctor will check for other causes of your pain before treating this syndrome. Treatment may include stretching exercises, massage, and medicine for the pain and swelling. If these do not help, you may get a shot of steroid medicine. Until the pain is gone, you may need to rest the muscle and limit activities like running. Exercises and a change in how you move and sit may be enough to stop the pressure on the nerve. Follow-up care is a key part of your treatment and safety. Be sure to make and go to all appointments, and call your doctor if you are having problems. It's also a good idea to know your test results and keep a list of the medicines you take. How can you care for yourself at home? · If your doctor thinks that strenuous exercise is causing your problem, stop or cut back on activities such as running. You may find swimming to be a good exercise for a while. · Stretch the piriformis muscle. ? Lie on your back. ? Bend one leg at the knee and keep the other leg flat on the ground. ? Raise your bent knee up and then move it across your body. Hold the outside of the knee with the opposite hand. ? Gently pull the knee with your hand toward the opposite shoulder. ? Hold the stretch for at least 15 to 30 seconds. Switch legs. ? Do the stretch several times each day. · Massage the muscle to relieve pressure. ? Sit on the floor. Lean to one side so that the hip on your sore side is off the ground. Put a tennis ball under your buttock on that side. ? As you put weight onto the tennis ball, you may find spots that are especially sore. Move gently so that the tennis ball gently massages each of the sore spots. · Use ice or heat to help reduce pain. Put ice or a cold pack or a heating pad set on low or a warm cloth on the sore area for 10 to 20 minutes at a time. Put a thin cloth between the ice pack or heating pad and your skin.   · Ask your doctor if you can take an over-the-counter pain medicine, such as acetaminophen (Tylenol),

## 2020-04-23 ENCOUNTER — TELEPHONE (OUTPATIENT)
Dept: PHARMACY | Facility: CLINIC | Age: 60
End: 2020-04-23

## 2020-04-23 NOTE — TELEPHONE ENCOUNTER
Pharmacy Pop Care Documentation:   2020 Annual Pharmacy  Visit    Called patient to complete yearly pharmacy appointment to discuss the 2020 Diabetes Management Program.    No answer. Left VM on home/cell TAD: Please call back at [phone] Option #7. Bebestore message also sent.     Guillaume Tomas, [address]   Department, toll free: [phone], option 7

## 2020-04-28 RX ORDER — BLOOD SUGAR DIAGNOSTIC
STRIP MISCELLANEOUS
Qty: 100 EACH | Refills: 3 | Status: SHIPPED | OUTPATIENT
Start: 2020-04-28 | End: 2021-12-02

## 2020-04-28 RX ORDER — LANCETS 33 GAUGE
EACH MISCELLANEOUS
Qty: 100 EACH | Refills: 3 | Status: SHIPPED | OUTPATIENT
Start: 2020-04-28 | End: 2020-12-23 | Stop reason: SDUPTHER

## 2020-04-28 RX ORDER — BLOOD GLUCOSE CNTL HIGH,NORMAL
EACH MISCELLANEOUS
Qty: 1 EACH | Refills: 0 | Status: SHIPPED | OUTPATIENT
Start: 2020-04-28 | End: 2022-10-07

## 2020-04-28 RX ORDER — BLOOD-GLUCOSE METER
KIT MISCELLANEOUS
Qty: 1 KIT | Refills: 0 | Status: SHIPPED | OUTPATIENT
Start: 2020-04-28 | End: 2020-11-24

## 2020-04-28 NOTE — TELEPHONE ENCOUNTER
Maria Isabel Marquez MD - patient would like to change to the covered Bluetooth-enabled glucometer/supplies ([employer] Jazz) - free for her through DM program. Orders pending for your convenience if OK with you. Thank you!   Marsha Vogt, PharmD, [facility] Pharmacist  Dept: [phone] (toll free [phone], option 7)

## 2020-05-01 ENCOUNTER — HOSPITAL ENCOUNTER (EMERGENCY)
Age: 60
Discharge: HOME | End: 2020-05-01
Attending: EMERGENCY MEDICINE
Payer: COMMERCIAL

## 2020-05-01 ENCOUNTER — APPOINTMENT (OUTPATIENT)
Dept: CT IMAGING | Age: 60
End: 2020-05-01
Payer: COMMERCIAL

## 2020-05-01 ENCOUNTER — NURSE TRIAGE (OUTPATIENT)
Dept: OTHER | Facility: CLINIC | Age: 60
End: 2020-05-01

## 2020-05-01 ENCOUNTER — TELEPHONE (OUTPATIENT)
Dept: GASTROENTEROLOGY | Age: 60
End: 2020-05-01

## 2020-05-01 VITALS
BODY MASS INDEX: 38.32 KG/M2 | OXYGEN SATURATION: 96 % | TEMPERATURE: 98 F | HEIGHT: 65 IN | RESPIRATION RATE: 16 BRPM | SYSTOLIC BLOOD PRESSURE: 132 MMHG | WEIGHT: 230 LBS | HEART RATE: 72 BPM | DIASTOLIC BLOOD PRESSURE: 67 MMHG

## 2020-05-01 LAB
ALBUMIN SERPL-MCNC: 4.2 G/DL (ref 3.5–4.6)
ALP BLD-CCNC: 66 U/L (ref 40–130)
ALT SERPL-CCNC: 28 U/L (ref 0–33)
ANION GAP SERPL CALCULATED.3IONS-SCNC: 16 MEQ/L (ref 9–15)
AST SERPL-CCNC: 39 U/L (ref 0–35)
BASOPHILS ABSOLUTE: 0.1 K/UL (ref 0–0.2)
BASOPHILS RELATIVE PERCENT: 0.6 %
BILIRUB SERPL-MCNC: 0.3 MG/DL (ref 0.2–0.7)
BUN BLDV-MCNC: 16 MG/DL (ref 6–20)
CALCIUM SERPL-MCNC: 9.1 MG/DL (ref 8.5–9.9)
CHLORIDE BLD-SCNC: 94 MEQ/L (ref 95–107)
CO2: 24 MEQ/L (ref 20–31)
CREAT SERPL-MCNC: 1.33 MG/DL (ref 0.5–0.9)
EOSINOPHILS ABSOLUTE: 0.2 K/UL (ref 0–0.7)
EOSINOPHILS RELATIVE PERCENT: 1.6 %
GFR AFRICAN AMERICAN: 49.7
GFR NON-AFRICAN AMERICAN: 41.1
GLOBULIN: 4.1 G/DL (ref 2.3–3.5)
GLUCOSE BLD-MCNC: 87 MG/DL (ref 70–99)
HCT VFR BLD CALC: 37.6 % (ref 37–47)
HEMOGLOBIN: 12.4 G/DL (ref 12–16)
INR BLD: 1
LACTIC ACID: 1.7 MMOL/L (ref 0.5–2.2)
LYMPHOCYTES ABSOLUTE: 2.5 K/UL (ref 1–4.8)
LYMPHOCYTES RELATIVE PERCENT: 20.6 %
MCH RBC QN AUTO: 28 PG (ref 27–31.3)
MCHC RBC AUTO-ENTMCNC: 32.9 % (ref 33–37)
MCV RBC AUTO: 85.1 FL (ref 82–100)
MONOCYTES ABSOLUTE: 0.7 K/UL (ref 0.2–0.8)
MONOCYTES RELATIVE PERCENT: 5.9 %
NEUTROPHILS ABSOLUTE: 8.6 K/UL (ref 1.4–6.5)
NEUTROPHILS RELATIVE PERCENT: 71.3 %
PDW BLD-RTO: 13.5 % (ref 11.5–14.5)
PLATELET # BLD: 376 K/UL (ref 130–400)
POC CREATININE WHOLE BLOOD: 1.3
POTASSIUM SERPL-SCNC: 5.1 MEQ/L (ref 3.4–4.9)
PROTHROMBIN TIME: 12.9 SEC (ref 12.3–14.9)
RBC # BLD: 4.42 M/UL (ref 4.2–5.4)
SODIUM BLD-SCNC: 134 MEQ/L (ref 135–144)
TOTAL PROTEIN: 8.3 G/DL (ref 6.3–8)
WBC # BLD: 12 K/UL (ref 4.8–10.8)

## 2020-05-01 PROCEDURE — 85025 COMPLETE CBC W/AUTO DIFF WBC: CPT

## 2020-05-01 PROCEDURE — 80053 COMPREHEN METABOLIC PANEL: CPT

## 2020-05-01 PROCEDURE — 6360000002 HC RX W HCPCS: Performed by: EMERGENCY MEDICINE

## 2020-05-01 PROCEDURE — 74177 CT ABD & PELVIS W/CONTRAST: CPT

## 2020-05-01 PROCEDURE — 6360000004 HC RX CONTRAST MEDICATION: Performed by: EMERGENCY MEDICINE

## 2020-05-01 PROCEDURE — 85610 PROTHROMBIN TIME: CPT

## 2020-05-01 PROCEDURE — 96372 THER/PROPH/DIAG INJ SC/IM: CPT

## 2020-05-01 PROCEDURE — 99284 EMERGENCY DEPT VISIT MOD MDM: CPT

## 2020-05-01 PROCEDURE — 83605 ASSAY OF LACTIC ACID: CPT

## 2020-05-01 PROCEDURE — 36415 COLL VENOUS BLD VENIPUNCTURE: CPT

## 2020-05-01 RX ORDER — METRONIDAZOLE 500 MG/1
500 TABLET ORAL 3 TIMES DAILY
Qty: 21 TABLET | Refills: 0 | Status: SHIPPED | OUTPATIENT
Start: 2020-05-01 | End: 2020-05-08

## 2020-05-01 RX ORDER — DICYCLOMINE HYDROCHLORIDE 10 MG/1
20 CAPSULE ORAL EVERY 6 HOURS PRN
Qty: 20 CAPSULE | Refills: 0 | Status: SHIPPED | OUTPATIENT
Start: 2020-05-01 | End: 2020-05-01 | Stop reason: SDUPTHER

## 2020-05-01 RX ORDER — CIPROFLOXACIN 500 MG/1
500 TABLET, FILM COATED ORAL 2 TIMES DAILY
Qty: 14 TABLET | Refills: 0 | Status: SHIPPED | OUTPATIENT
Start: 2020-05-01 | End: 2020-05-08

## 2020-05-01 RX ORDER — DICYCLOMINE HYDROCHLORIDE 10 MG/ML
20 INJECTION INTRAMUSCULAR ONCE
Status: COMPLETED | OUTPATIENT
Start: 2020-05-01 | End: 2020-05-01

## 2020-05-01 RX ORDER — DICYCLOMINE HYDROCHLORIDE 10 MG/1
20 CAPSULE ORAL EVERY 6 HOURS PRN
Qty: 20 CAPSULE | Refills: 0 | Status: SHIPPED | OUTPATIENT
Start: 2020-05-01 | End: 2020-06-01

## 2020-05-01 RX ORDER — CIPROFLOXACIN 500 MG/1
500 TABLET, FILM COATED ORAL 2 TIMES DAILY
Qty: 14 TABLET | Refills: 0 | Status: SHIPPED | OUTPATIENT
Start: 2020-05-01 | End: 2020-05-01 | Stop reason: SDUPTHER

## 2020-05-01 RX ORDER — METRONIDAZOLE 500 MG/1
500 TABLET ORAL 3 TIMES DAILY
Qty: 21 TABLET | Refills: 0 | Status: SHIPPED | OUTPATIENT
Start: 2020-05-01 | End: 2020-05-01 | Stop reason: SDUPTHER

## 2020-05-01 RX ADMIN — IOPAMIDOL 100 ML: 612 INJECTION, SOLUTION INTRAVENOUS at 17:59

## 2020-05-01 RX ADMIN — DICYCLOMINE HYDROCHLORIDE 20 MG: 20 INJECTION INTRAMUSCULAR at 17:49

## 2020-05-01 ASSESSMENT — PAIN DESCRIPTION - DESCRIPTORS: DESCRIPTORS: SORE

## 2020-05-01 ASSESSMENT — ENCOUNTER SYMPTOMS
CHEST TIGHTNESS: 0
EYE PAIN: 0
NAUSEA: 0
SORE THROAT: 0
VOMITING: 0
BLOOD IN STOOL: 1
SHORTNESS OF BREATH: 0
ABDOMINAL PAIN: 1
ABDOMINAL DISTENTION: 0

## 2020-05-01 ASSESSMENT — PAIN DESCRIPTION - LOCATION: LOCATION: ABDOMEN

## 2020-05-01 ASSESSMENT — PAIN DESCRIPTION - PAIN TYPE: TYPE: ACUTE PAIN

## 2020-05-01 ASSESSMENT — PAIN SCALES - GENERAL: PAINLEVEL_OUTOF10: 2

## 2020-05-01 NOTE — ED PROVIDER NOTES
REFERREDTO:  Zaida Villasenor MD  [address] [place] [address]  [phone] [place] [phone]      If symptoms worsen      DISCHARGEMEDICATIONS:  New Prescriptions    CIPROFLOXACIN (CIPRO) 500 MG TABLET    Take 1 tablet by mouth 2 times daily for 7 days    DICYCLOMINE (BENTYL) 10 MG CAPSULE    Take 2 capsules by mouth every 6 hours as needed (cramps)    METRONIDAZOLE (FLAGYL) 500 MG TABLET    Take 1 tablet by mouth 3 times daily for 7 days     Controlled Substances Monitoring:     RX Monitoring 8/9/2019   Periodic Controlled Substance Monitoring Possible medication side effects, risk of tolerance/dependence & alternative treatments discussed. ;No signs of potential drug abuse or diversion identified. ;Assessed functional status.        (Please note that portions of this note were completed with a voice recognition program.  Efforts were made to edit the dictations but occasionally words are mis-transcribed.)    Albert Mak DO (electronically signed)  Attending Emergency Physician          Albert Mak DO  05/01/20 [id number]

## 2020-05-02 ENCOUNTER — CARE COORDINATION (OUTPATIENT)
Dept: OTHER | Facility: CLINIC | Age: 60
End: 2020-05-02

## 2020-05-02 LAB
GFR AFRICAN AMERICAN: 51
GFR NON-AFRICAN AMERICAN: 42
PERFORMED ON: ABNORMAL
POC CREATININE: 1.3 MG/DL (ref 0.6–1.1)
POC SAMPLE TYPE: ABNORMAL

## 2020-05-02 NOTE — CARE COORDINATION
discharge diagnosis. Advised obtaining a 90-day supply of all daily and as-needed medications. Education provided regarding infection prevention, and signs and symptoms of COVID-19 and when to seek medical attention with patient who verbalized understanding. Discussed exposure protocols and quarantine from [address] you at higher risk for severe illness 2019 and given an opportunity for questions and concerns. The patient agrees to contact the COVID-19 hotline [phone] or PCP office for questions related to their healthcare. CTN/ACM provided contact information for future reference. From CDC: Are you at higher risk for severe illness?  Wash your hands often.  Avoid close contact (6 feet, which is about two arm lengths) with people who are sick.  Put distance between yourself and other people if COVID-19 is spreading in your community.  Clean and disinfect frequently touched surfaces.  Avoid all cruise travel and non-essential air travel.  Call your healthcare professional if you have concerns about COVID-19 and your underlying condition or if you are sick. For more information on steps you can take to protect yourself, see CDC's How to Protect Yourself    Will follow up in 14 days  based on severity of symptoms and risk factors.

## 2020-05-04 ENCOUNTER — TELEPHONE (OUTPATIENT)
Dept: GASTROENTEROLOGY | Age: 60
End: 2020-05-04

## 2020-05-04 ENCOUNTER — VIRTUAL VISIT (OUTPATIENT)
Dept: GASTROENTEROLOGY | Age: 60
End: 2020-05-04
Payer: COMMERCIAL

## 2020-05-04 PROCEDURE — 99442 PR PHYS/QHP TELEPHONE EVALUATION 11-20 MIN: CPT | Performed by: NURSE PRACTITIONER

## 2020-05-15 ENCOUNTER — CARE COORDINATION (OUTPATIENT)
Dept: OTHER | Facility: CLINIC | Age: 60
End: 2020-05-15

## 2020-05-28 ENCOUNTER — TELEPHONE (OUTPATIENT)
Dept: PHARMACY | Facility: CLINIC | Age: 60
End: 2020-05-28

## 2020-05-29 ENCOUNTER — TELEPHONE (OUTPATIENT)
Dept: ADMINISTRATIVE | Age: 60
End: 2020-05-29

## 2020-05-29 ENCOUNTER — NURSE TRIAGE (OUTPATIENT)
Dept: OTHER | Facility: CLINIC | Age: 60
End: 2020-05-29

## 2020-05-29 NOTE — TELEPHONE ENCOUNTER
Terrance England from the [address] called to schedule a New Pt appt for this Pt to re-establish   With [facility] for Hip Pain Issues. Former PCP- Dr Syeda Perry. In checking Jocelyn Tyson's schedule there   Was nothing avail (Under Normal Protocols) to schedule properly. Terrance England requested a message be sent to [facility] to see if she could open or add an appt for either Monday 6/1 or Tuesday 6/2/2020. The Pt needed to be seen within 3 days.   The Pt can be reached [phone]

## 2020-06-01 ENCOUNTER — OFFICE VISIT (OUTPATIENT)
Dept: FAMILY MEDICINE CLINIC | Age: 60
End: 2020-06-01
Payer: COMMERCIAL

## 2020-06-01 VITALS
HEIGHT: 65 IN | WEIGHT: 225 LBS | DIASTOLIC BLOOD PRESSURE: 70 MMHG | TEMPERATURE: 98.2 F | SYSTOLIC BLOOD PRESSURE: 128 MMHG | OXYGEN SATURATION: 99 % | RESPIRATION RATE: 18 BRPM | BODY MASS INDEX: 37.49 KG/M2 | HEART RATE: 72 BPM

## 2020-06-01 DIAGNOSIS — E11.65 UNCONTROLLED TYPE 2 DIABETES MELLITUS WITH HYPERGLYCEMIA (HCC): ICD-10-CM

## 2020-06-01 LAB
ANION GAP SERPL CALCULATED.3IONS-SCNC: 13 MEQ/L (ref 9–15)
BUN BLDV-MCNC: 17 MG/DL (ref 6–20)
CALCIUM SERPL-MCNC: 9.9 MG/DL (ref 8.5–9.9)
CHLORIDE BLD-SCNC: 104 MEQ/L (ref 95–107)
CO2: 28 MEQ/L (ref 20–31)
CREAT SERPL-MCNC: 1.25 MG/DL (ref 0.5–0.9)
GFR AFRICAN AMERICAN: 53.4
GFR NON-AFRICAN AMERICAN: 44.1
GLUCOSE BLD-MCNC: 93 MG/DL (ref 70–99)
HCT VFR BLD CALC: 34.7 % (ref 37–47)
HEMOGLOBIN: 11.6 G/DL (ref 12–16)
MCH RBC QN AUTO: 28.8 PG (ref 27–31.3)
MCHC RBC AUTO-ENTMCNC: 33.3 % (ref 33–37)
MCV RBC AUTO: 86.5 FL (ref 82–100)
PDW BLD-RTO: 13.9 % (ref 11.5–14.5)
PLATELET # BLD: 396 K/UL (ref 130–400)
POTASSIUM SERPL-SCNC: 4.2 MEQ/L (ref 3.4–4.9)
RBC # BLD: 4.01 M/UL (ref 4.2–5.4)
SODIUM BLD-SCNC: 145 MEQ/L (ref 135–144)
WBC # BLD: 9.3 K/UL (ref 4.8–10.8)

## 2020-06-01 PROCEDURE — 99214 OFFICE O/P EST MOD 30 MIN: CPT | Performed by: PHYSICIAN ASSISTANT

## 2020-06-01 PROCEDURE — G0296 VISIT TO DETERM LDCT ELIG: HCPCS | Performed by: PHYSICIAN ASSISTANT

## 2020-06-01 RX ORDER — CELECOXIB 200 MG/1
200 CAPSULE ORAL DAILY
Qty: 90 CAPSULE | Refills: 0 | Status: SHIPPED | OUTPATIENT
Start: 2020-06-01 | End: 2020-09-13

## 2020-06-01 RX ORDER — METHOCARBAMOL 500 MG/1
250 TABLET, FILM COATED ORAL NIGHTLY PRN
Qty: 10 TABLET | Refills: 0 | Status: SHIPPED | OUTPATIENT
Start: 2020-06-01 | End: 2020-07-01

## 2020-06-01 ASSESSMENT — PATIENT HEALTH QUESTIONNAIRE - PHQ9
2. FEELING DOWN, DEPRESSED OR HOPELESS: 0
SUM OF ALL RESPONSES TO PHQ9 QUESTIONS 1 & 2: 0
1. LITTLE INTEREST OR PLEASURE IN DOING THINGS: 0
SUM OF ALL RESPONSES TO PHQ QUESTIONS 1-9: 0
SUM OF ALL RESPONSES TO PHQ QUESTIONS 1-9: 0

## 2020-06-01 NOTE — PATIENT INSTRUCTIONS
\"Hamstring Strain: Rehab Exercises. \"     If you do not have an account, please click on the \"Sign Up Now\" link. Current as of: March 2, 2020               Content Version: 12.5  © 2006-2020 Healthwise, Incorporated. Care instructions adapted under license by [employer] [facility] ([facility]). If you have questions about a medical condition or this instruction, always ask your healthcare professional. [place] [address] any warranty or liability for your use of this information. Back Stretches: Exercises  Introduction  Here are some examples of exercises for stretching your back. Start each exercise slowly. Ease off the exercise if you start to have pain. Your doctor or physical therapist will tell you when you can start these exercises and which ones will work best for you. How to do the exercises  Overhead stretch   1. Stand comfortably with your feet shoulder-width apart. 2. Looking straight ahead, raise both arms over your head and reach toward the ceiling. Do not allow your head to tilt back. 3. Hold for 15 to 30 seconds, then lower your arms to your sides. 4. Repeat 2 to 4 times. Side stretch   1. Stand comfortably with your feet shoulder-width apart. 2. Raise one arm over your head, and then lean to the other side. 3. Slide your hand down your leg as you let the weight of your arm gently stretch your side muscles. Hold for 15 to 30 seconds. 4. Repeat 2 to 4 times on each side. Press-up   1. Lie on your stomach, supporting your body with your forearms. 2. Press your elbows down into the floor to raise your upper back. As you do this, relax your stomach muscles and allow your back to arch without using your back muscles. As your press up, do not let your hips or pelvis come off the floor. 3. Hold for 15 to 30 seconds, then relax. 4. Repeat 2 to 4 times. Relax and rest   11. Lie on your back with a rolled towel under your neck and a pillow under your knees.  Extend your arms

## 2020-06-02 ENCOUNTER — VIRTUAL VISIT (OUTPATIENT)
Dept: GASTROENTEROLOGY | Age: 60
End: 2020-06-02
Payer: COMMERCIAL

## 2020-06-02 PROCEDURE — 99213 OFFICE O/P EST LOW 20 MIN: CPT | Performed by: NURSE PRACTITIONER

## 2020-06-02 RX ORDER — SODIUM, POTASSIUM,MAG SULFATES 17.5-3.13G
SOLUTION, RECONSTITUTED, ORAL ORAL
Qty: 1 BOTTLE | Refills: 0 | Status: SHIPPED | OUTPATIENT
Start: 2020-06-02 | End: 2020-07-16

## 2020-06-02 NOTE — PROGRESS NOTES
Holiday, DO   Cholecalciferol (VITAMIN D) 2000 UNITS CAPS capsule Take by mouth daily  Historical Provider, MD   ibuprofen (ADVIL;MOTRIN) 200 MG tablet Take 200 mg by mouth every 6 hours as needed for Pain  Historical Provider, MD   loratadine (CLARITIN) 10 MG tablet Take 10 mg by mouth daily as needed (Allergies)   Historical Provider, MD       Social History     Tobacco Use    Smoking status: Former Smoker     Packs/day: 1.00     Years: 30.00     Pack years: 30.00     Types: Cigarettes     Start date: 3/5/1975     Last attempt to quit: 2005     Years since quittin.9    Smokeless tobacco: Never Used    Tobacco comment: 3/15/18 started age 17   Substance Use Topics    Alcohol use: No     Comment: not at all     Drug use: No      Allergies   Allergen Reactions    Seasonal Itching   ,   Past Medical History:   Diagnosis Date    Abnormal EKG 3/23/2018    Angina pectoris syndrome ([facility] 2018    Diverticulosis of colon     DMII (diabetes mellitus, type 2) ([facility] [address] [facility]     Dr Placido Leigh hypertension 1/15/2016    Family history of coronary artery disease 1/15/2016    Fatty infiltration of liver     Gastric polyp     Dr Luci Mooney, 5 mm    Generalized anxiety disorder     H/O: hysterectomy     History of tobacco abuse 1/15/2016    Obesity (BMI 30-39. 9)     Other specified acquired hypothyroidism 2000    Precordial pain 2018    (? Prinzmetal?) Dr Agustín Wright S/P colonoscopy with polypectomy , , 2019    Dr Martinez Cousins, Dr Luci Mooney, tubulovillous adenoma, hyperplastic polyp    S/P vaginal hysterectomy     benign    Sleep apnea, obstructive     was on CPAP, not using it at present    Vertigo        PHYSICAL EXAMINATION:  [x] Alert  [x] Oriented to person/place/time    [x] No apparent distress  [] Toxic appearing    [] Face flushed appearing [] Sclera clear  [] Lips are cyanotic      [x] Breathing appears normal  [] Appears tachypneic      [] Rash on visible

## 2020-06-04 ENCOUNTER — ANCILLARY PROCEDURE (OUTPATIENT)
Dept: ENDOSCOPY | Age: 60
End: 2020-06-04
Payer: COMMERCIAL

## 2020-06-04 PROCEDURE — 91200 LIVER ELASTOGRAPHY: CPT

## 2020-06-05 ENCOUNTER — PATIENT MESSAGE (OUTPATIENT)
Dept: FAMILY MEDICINE CLINIC | Age: 60
End: 2020-06-05

## 2020-06-05 ENCOUNTER — CARE COORDINATION (OUTPATIENT)
Dept: OTHER | Facility: CLINIC | Age: 60
End: 2020-06-05

## 2020-06-05 PROBLEM — M54.42 ACUTE LEFT-SIDED LOW BACK PAIN WITH LEFT-SIDED SCIATICA: Status: ACTIVE | Noted: 2020-06-05

## 2020-06-05 PROBLEM — R07.2 PRECORDIAL PAIN: Status: RESOLVED | Noted: 2018-03-23 | Resolved: 2020-06-05

## 2020-06-05 SDOH — SOCIAL STABILITY: SOCIAL NETWORK
IN A TYPICAL WEEK, HOW MANY TIMES DO YOU TALK ON THE PHONE WITH FAMILY, FRIENDS, OR NEIGHBORS?: MORE THAN THREE TIMES A WEEK

## 2020-06-05 SDOH — SOCIAL STABILITY: SOCIAL INSECURITY: WITHIN THE LAST YEAR, HAVE YOU BEEN AFRAID OF YOUR PARTNER OR EX-PARTNER?: NO

## 2020-06-05 SDOH — SOCIAL STABILITY: SOCIAL NETWORK: ARE YOU MARRIED, WIDOWED, DIVORCED, SEPARATED, NEVER MARRIED, OR LIVING WITH A PARTNER?: MARRIED

## 2020-06-05 SDOH — ECONOMIC STABILITY: TRANSPORTATION INSECURITY
IN THE PAST 12 MONTHS, HAS THE LACK OF TRANSPORTATION KEPT YOU FROM MEDICAL APPOINTMENTS OR FROM GETTING MEDICATIONS?: NO

## 2020-06-05 SDOH — HEALTH STABILITY: PHYSICAL HEALTH: ON AVERAGE, HOW MANY DAYS PER WEEK DO YOU ENGAGE IN MODERATE TO STRENUOUS EXERCISE (LIKE A BRISK WALK)?: 0 DAYS

## 2020-06-05 SDOH — SOCIAL STABILITY: SOCIAL INSECURITY: WITHIN THE LAST YEAR, HAVE YOU BEEN HUMILIATED OR EMOTIONALLY ABUSED IN OTHER WAYS BY YOUR PARTNER OR EX-PARTNER?: NO

## 2020-06-05 SDOH — ECONOMIC STABILITY: INCOME INSECURITY: HOW HARD IS IT FOR YOU TO PAY FOR THE VERY BASICS LIKE FOOD, HOUSING, MEDICAL CARE, AND HEATING?: NOT HARD AT ALL

## 2020-06-05 SDOH — ECONOMIC STABILITY: FOOD INSECURITY: WITHIN THE PAST 12 MONTHS, THE FOOD YOU BOUGHT JUST DIDN'T LAST AND YOU DIDN'T HAVE MONEY TO GET MORE.: NEVER TRUE

## 2020-06-05 SDOH — SOCIAL STABILITY: SOCIAL INSECURITY
WITHIN THE LAST YEAR, HAVE TO BEEN RAPED OR FORCED TO HAVE ANY KIND OF SEXUAL ACTIVITY BY YOUR PARTNER OR EX-PARTNER?: NO

## 2020-06-05 SDOH — SOCIAL STABILITY: SOCIAL NETWORK: HOW OFTEN DO YOU GET TOGETHER WITH FRIENDS OR RELATIVES?: MORE THAN THREE TIMES A WEEK

## 2020-06-05 SDOH — SOCIAL STABILITY: SOCIAL NETWORK: HOW OFTEN DO YOU ATTEND CHURCH OR RELIGIOUS SERVICES?: NEVER

## 2020-06-05 SDOH — HEALTH STABILITY: MENTAL HEALTH
STRESS IS WHEN SOMEONE FEELS TENSE, NERVOUS, ANXIOUS, OR CAN'T SLEEP AT NIGHT BECAUSE THEIR MIND IS TROUBLED. HOW STRESSED ARE YOU?: NOT AT ALL

## 2020-06-05 SDOH — SOCIAL STABILITY: SOCIAL INSECURITY
WITHIN THE LAST YEAR, HAVE YOU BEEN KICKED, HIT, SLAPPED, OR OTHERWISE PHYSICALLY HURT BY YOUR PARTNER OR EX-PARTNER?: NO

## 2020-06-05 SDOH — ECONOMIC STABILITY: TRANSPORTATION INSECURITY
IN THE PAST 12 MONTHS, HAS LACK OF TRANSPORTATION KEPT YOU FROM MEETINGS, WORK, OR FROM GETTING THINGS NEEDED FOR DAILY LIVING?: NO

## 2020-06-05 SDOH — ECONOMIC STABILITY: FOOD INSECURITY: WITHIN THE PAST 12 MONTHS, YOU WORRIED THAT YOUR FOOD WOULD RUN OUT BEFORE YOU GOT MONEY TO BUY MORE.: NEVER TRUE

## 2020-06-05 SDOH — SOCIAL STABILITY: SOCIAL NETWORK: HOW OFTEN DO YOU ATTENT MEETINGS OF THE CLUB OR ORGANIZATION YOU BELONG TO?: NEVER

## 2020-06-05 SDOH — HEALTH STABILITY: PHYSICAL HEALTH: ON AVERAGE, HOW MANY MINUTES DO YOU ENGAGE IN EXERCISE AT THIS LEVEL?: 0 MIN

## 2020-06-05 ASSESSMENT — ENCOUNTER SYMPTOMS
SHORTNESS OF BREATH: 0
BLOOD IN STOOL: 0
CONSTIPATION: 0
WHEEZING: 0
CHEST TIGHTNESS: 0
COLOR CHANGE: 0
ABDOMINAL PAIN: 1
ABDOMINAL DISTENTION: 0
NAUSEA: 0
DIARRHEA: 0
BACK PAIN: 1

## 2020-06-05 NOTE — LETTER
Dear Sruthi Bear:    Congratulations for taking a step towards managing your health by participating in the [facility] Management (ACM) program. ACM is a new model of care designed to improve the coordination of your health care with an emphasis on your overall well-being. This confidential program is offered free of charge to [facility] participants and their covered family members. To make the most of your first ACM call, please have the following items ready to discuss:     Make a list of any questions you have about your health including questions about dietary recommendations, lifestyle, and disease management. ? Inform the ACM of any health care providers you have visited in the last month and the reason for your visit. This includes urgent care or ED visits. ? Have a list of all prescribed medications including, over-the counter, herbal and dietary supplements. Inform ACM of any refills needed. ? Inform ACM if any new problems have developed in the last month. ? Confirm the date of your next ACM call. ? Activate a [employer] account, if you haven't already. [employer] can provide a communication between you and your care team; as well as a chance to view your care plan. ? If you want to designate a caregiver have access to your record, please ask your doctor's office for the form. ? Think about your goals to achieve better health. With continued partnership through [facility], we hope to optimize your health, increase your quality of life, and prevent hospitalization. We look forward to continuing to serve you. If you have any health concerns prior to your next Apiary outreach, please contact your primary care doctor. Your ACM contact information is listed below for non-urgent questions. MARSHA Saldivar, RN   Contact Info: [phone]  Email: [email or web address]

## 2020-06-05 NOTE — CARE COORDINATION
Ambulatory Care Coordination Note  6/5/2020  CM Risk Score: 10  Charlson 10 Year Mortality Risk Score: 10%     ACC: Jacqueline Barakat, RN    Summary Note: Spoke with patient who said she is doing fairly well. Said she will start to take the pravachol. We reviewed some of her previous labs from [facility] and she is aware of some of the recent labs that were drawn on Monday. 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 Pt is having some sciatica pain she said and has discussed this with her pcp   She is currently in the diabetic program, She is aware her creatinine is elevate. Pt doesn't routinely check her blood sugar only when she feels \"off\" she said she needs to get better at checking it. Pt said [facility] discussed possibly taking her off the metformin due to the high creatinine, nothing has been decided. Pt is having a colonoscopy 6/30/20 and also has her mammogram scheduled. Pt said working from home has really decreased her exercise and activity. Pt is agreeable to continued calls and participate in the associate care program.     Lynn LARSON, RN- [address] [place] [address]  [phone]              Goals Addressed                 This Visit's Progress     Medication Management        I will take my medication as directed. Barriers: none  Plan for overcoming my barriers: will work with pharmacist and ACM to stay on track with adherence  Confidence: 8/10  Anticipated Goal Completion Date: 6/15/20            Prior to Admission medications    Medication Sig Start Date End Date Taking? Authorizing Provider   Na Sulfate-K Sulfate-Mg Sulf 17.5-3.13-1.6 GM/177ML SOLN As directed 6/2/20   ELISEO Bethea - CNP   celecoxib (CELEBREX) 200 MG capsule Take 1 capsule by mouth daily For low back/hip pain.  6/1/20   Harmony Tyson PA-C   methocarbamol (ROBAXIN) 500 MG tablet Take 0.5 tablets by mouth nightly as needed (back/hip spasm) 6/1/20 7/1/20  Harmony Tyson PA-C   Blood Glucose (VITAMIN D) 2000 UNITS CAPS capsule Take by mouth daily    Historical Provider, MD   ibuprofen (ADVIL;MOTRIN) 200 MG tablet Take 200 mg by mouth every 6 hours as needed for Pain    Historical Provider, MD   loratadine (CLARITIN) 10 MG tablet Take 10 mg by mouth daily as needed (Allergies)     Historical Provider, MD       Future Appointments   Date Time Provider Elizabeth Delmy   6/24/2020  4:20 PM SCHEDULE, [facility] [place] [facility] [place]   6/27/2020  8:30 AM [place] ROOM 1 MLOZ CT [facility] Fac RAD   7/16/2020  2:30 PM ELISEO Graff  Eren Yang   7/23/2020  4:00 PM [place] MAMMOGRAM LEE ROOM ML [facility]OMENS [facility] Fac RAD   12/1/2020  3:30 PM Harmony Tyson [facility] SERVICES

## 2020-06-10 ENCOUNTER — TELEPHONE (OUTPATIENT)
Dept: CASE MANAGEMENT | Age: 60
End: 2020-06-10

## 2020-06-11 RX ORDER — EMPAGLIFLOZIN 10 MG/1
1 TABLET, FILM COATED ORAL DAILY
Qty: 30 TABLET | Refills: 5 | Status: SHIPPED | OUTPATIENT
Start: 2020-06-11 | End: 2020-07-16 | Stop reason: SDUPTHER

## 2020-06-24 ENCOUNTER — NURSE ONLY (OUTPATIENT)
Dept: PRIMARY CARE CLINIC | Age: 60
End: 2020-06-24

## 2020-06-24 ENCOUNTER — HOSPITAL ENCOUNTER (OUTPATIENT)
Age: 60
Setting detail: SPECIMEN
Discharge: HOME | End: 2020-06-24
Payer: COMMERCIAL

## 2020-06-24 ENCOUNTER — CARE COORDINATION (OUTPATIENT)
Dept: OTHER | Facility: CLINIC | Age: 60
End: 2020-06-24

## 2020-06-24 PROCEDURE — U0003 INFECTIOUS AGENT DETECTION BY NUCLEIC ACID (DNA OR RNA); SEVERE ACUTE RESPIRATORY SYNDROME CORONAVIRUS 2 (SARS-COV-2) (CORONAVIRUS DISEASE [COVID-19]), AMPLIFIED PROBE TECHNIQUE, MAKING USE OF HIGH THROUGHPUT TECHNOLOGIES AS DESCRIBED BY CMS-2020-01-R: HCPCS

## 2020-06-26 LAB
SARS-COV-2: NOT DETECTED
SOURCE: NORMAL

## 2020-06-30 ENCOUNTER — HOSPITAL ENCOUNTER (OUTPATIENT)
Age: 60
Setting detail: OUTPATIENT SURGERY
Discharge: HOME | End: 2020-06-30
Attending: INTERNAL MEDICINE | Admitting: INTERNAL MEDICINE
Payer: COMMERCIAL

## 2020-06-30 ENCOUNTER — ANESTHESIA (OUTPATIENT)
Dept: ENDOSCOPY | Age: 60
End: 2020-06-30
Payer: COMMERCIAL

## 2020-06-30 ENCOUNTER — ANESTHESIA EVENT (OUTPATIENT)
Dept: ENDOSCOPY | Age: 60
End: 2020-06-30
Payer: COMMERCIAL

## 2020-06-30 ENCOUNTER — ANCILLARY PROCEDURE (OUTPATIENT)
Dept: ENDOSCOPY | Age: 60
End: 2020-06-30
Attending: INTERNAL MEDICINE
Payer: COMMERCIAL

## 2020-06-30 VITALS
OXYGEN SATURATION: 98 % | SYSTOLIC BLOOD PRESSURE: 130 MMHG | RESPIRATION RATE: 16 BRPM | HEART RATE: 62 BPM | DIASTOLIC BLOOD PRESSURE: 60 MMHG

## 2020-06-30 VITALS
SYSTOLIC BLOOD PRESSURE: 123 MMHG | OXYGEN SATURATION: 96 % | DIASTOLIC BLOOD PRESSURE: 63 MMHG | RESPIRATION RATE: 14 BRPM

## 2020-06-30 LAB
GLUCOSE BLD-MCNC: 112 MG/DL (ref 60–115)
PERFORMED ON: NORMAL

## 2020-06-30 PROCEDURE — 6360000002 HC RX W HCPCS: Performed by: NURSE ANESTHETIST, CERTIFIED REGISTERED

## 2020-06-30 PROCEDURE — 7100000010 HC PHASE II RECOVERY - FIRST 15 MIN: Performed by: INTERNAL MEDICINE

## 2020-06-30 PROCEDURE — 3700000001 HC ADD 15 MINUTES (ANESTHESIA): Performed by: INTERNAL MEDICINE

## 2020-06-30 PROCEDURE — 2500000003 HC RX 250 WO HCPCS: Performed by: NURSE ANESTHETIST, CERTIFIED REGISTERED

## 2020-06-30 PROCEDURE — 3700000000 HC ANESTHESIA ATTENDED CARE: Performed by: INTERNAL MEDICINE

## 2020-06-30 PROCEDURE — 2709999900 HC NON-CHARGEABLE SUPPLY: Performed by: INTERNAL MEDICINE

## 2020-06-30 PROCEDURE — 2580000003 HC RX 258: Performed by: INTERNAL MEDICINE

## 2020-06-30 PROCEDURE — 45380 COLONOSCOPY AND BIOPSY: CPT | Performed by: INTERNAL MEDICINE

## 2020-06-30 PROCEDURE — 88305 TISSUE EXAM BY PATHOLOGIST: CPT

## 2020-06-30 PROCEDURE — 3609027000 HC COLONOSCOPY: Performed by: INTERNAL MEDICINE

## 2020-06-30 RX ORDER — ONDANSETRON 2 MG/ML
4 INJECTION INTRAMUSCULAR; INTRAVENOUS
Status: DISCONTINUED | OUTPATIENT
Start: 2020-06-30 | End: 2020-06-30 | Stop reason: HOSPADM

## 2020-06-30 RX ORDER — SODIUM CHLORIDE 9 MG/ML
INJECTION, SOLUTION INTRAVENOUS
Status: DISCONTINUED
Start: 2020-06-30 | End: 2020-06-30 | Stop reason: HOSPADM

## 2020-06-30 RX ORDER — 0.9 % SODIUM CHLORIDE 0.9 %
500 INTRAVENOUS SOLUTION INTRAVENOUS ONCE
Status: COMPLETED | OUTPATIENT
Start: 2020-06-30 | End: 2020-06-30

## 2020-06-30 RX ORDER — LIDOCAINE HYDROCHLORIDE 20 MG/ML
INJECTION, SOLUTION INFILTRATION; PERINEURAL PRN
Status: DISCONTINUED | OUTPATIENT
Start: 2020-06-30 | End: 2020-06-30 | Stop reason: SDUPTHER

## 2020-06-30 RX ORDER — MAGNESIUM HYDROXIDE 1200 MG/15ML
LIQUID ORAL PRN
Status: DISCONTINUED | OUTPATIENT
Start: 2020-06-30 | End: 2020-06-30 | Stop reason: ALTCHOICE

## 2020-06-30 RX ORDER — PROPOFOL 10 MG/ML
INJECTION, EMULSION INTRAVENOUS PRN
Status: DISCONTINUED | OUTPATIENT
Start: 2020-06-30 | End: 2020-06-30 | Stop reason: SDUPTHER

## 2020-06-30 RX ADMIN — PROPOFOL 50 MG: 10 INJECTION, EMULSION INTRAVENOUS at 08:57

## 2020-06-30 RX ADMIN — PROPOFOL 50 MG: 10 INJECTION, EMULSION INTRAVENOUS at 08:46

## 2020-06-30 RX ADMIN — PROPOFOL 50 MG: 10 INJECTION, EMULSION INTRAVENOUS at 08:59

## 2020-06-30 RX ADMIN — PROPOFOL 20 MG: 10 INJECTION, EMULSION INTRAVENOUS at 08:47

## 2020-06-30 RX ADMIN — LIDOCAINE HYDROCHLORIDE 40 MG: 20 INJECTION, SOLUTION INFILTRATION; PERINEURAL at 08:46

## 2020-06-30 RX ADMIN — SODIUM CHLORIDE 400 ML: 9 INJECTION, SOLUTION INTRAVENOUS at 09:10

## 2020-06-30 RX ADMIN — PROPOFOL 50 MG: 10 INJECTION, EMULSION INTRAVENOUS at 08:53

## 2020-06-30 RX ADMIN — PROPOFOL 50 MG: 10 INJECTION, EMULSION INTRAVENOUS at 08:49

## 2020-06-30 RX ADMIN — PROPOFOL 50 MG: 10 INJECTION, EMULSION INTRAVENOUS at 08:55

## 2020-06-30 RX ADMIN — SODIUM CHLORIDE 500 ML: 9 INJECTION, SOLUTION INTRAVENOUS at 08:39

## 2020-06-30 RX ADMIN — PROPOFOL 50 MG: 10 INJECTION, EMULSION INTRAVENOUS at 08:51

## 2020-06-30 RX ADMIN — PROPOFOL 30 MG: 10 INJECTION, EMULSION INTRAVENOUS at 09:00

## 2020-06-30 RX ADMIN — PROPOFOL 50 MG: 10 INJECTION, EMULSION INTRAVENOUS at 09:06

## 2020-06-30 RX ADMIN — PROPOFOL 50 MG: 10 INJECTION, EMULSION INTRAVENOUS at 09:03

## 2020-06-30 ASSESSMENT — PAIN SCALES - GENERAL
PAINLEVEL_OUTOF10: 0
PAINLEVEL_OUTOF10: 0

## 2020-06-30 ASSESSMENT — PULMONARY FUNCTION TESTS
PIF_VALUE: 0
PIF_VALUE: 2
PIF_VALUE: 0
PIF_VALUE: 1
PIF_VALUE: 0
PIF_VALUE: 2
PIF_VALUE: 0
PIF_VALUE: 1
PIF_VALUE: 0
PIF_VALUE: 0

## 2020-06-30 NOTE — ANESTHESIA PRE PROCEDURE
hydrochlorothiazide (HYDRODIURIL) 25 MG tablet Take 1 tablet by mouth every morning 12/10/19   Adonay Cruz MD   albuterol sulfate HFA (PROAIR HFA) 108 (90 Base) MCG/ACT inhaler Inhale 2 puffs into the lungs every 6 hours as needed for Wheezing or Shortness of Breath 6/13/18   Arnel Beebe PA-C   Respiratory Therapy Supplies CLARA Please give patient the farshad view mask 5/31/18   Arnel Beebe PA-C   nitroGLYCERIN (NITROSTAT) 0.4 MG SL tablet Place 1 tablet under the tongue every 5 minutes as needed for Chest pain up to max of 3 total doses. If no relief after 1 dose, call 911. Patient taking differently: Place 0.4 mg under the tongue every 5 minutes as needed for Chest pain up to max of 3 total doses. If no relief after 1 dose, call 911. 3/23/18   Shivam SIMON Holiday, DO   Cholecalciferol (VITAMIN D) 2000 UNITS CAPS capsule Take by mouth daily    Historical Provider, MD   ibuprofen (ADVIL;MOTRIN) 200 MG tablet Take 200 mg by mouth every 6 hours as needed for Pain    Historical Provider, MD   loratadine (CLARITIN) 10 MG tablet Take 10 mg by mouth daily as needed (Allergies)     Historical Provider, MD       Current medications:    Current Facility-Administered Medications   Medication Dose Route Frequency Provider Last Rate Last Dose    sodium chloride 0.9 % infusion             ondansetron (ZOFRAN) injection 4 mg  4 mg Intravenous Once PRN Joss Ellis MD           Allergies:     Allergies   Allergen Reactions    Seasonal Itching       Problem List:    Patient Active Problem List   Diagnosis Code    Vitamin D deficiency E55.9    Generalized anxiety disorder F41.1    Sleep apnea, obstructive G47.33    Type II diabetes mellitus, uncontrolled ([facility] [address] [facility] E11.65    Hypothyroidism E03.9    Essential hypertension I10    Family history of coronary artery disease Z80.55    Personal history of tobacco use Z87.891    Non-cardiac chest pain R07.89    History of colon polyps Z86.010    Polyp of colon K63.5    summary reviewed and Nursing notes reviewed  Airway: Mallampati: II  TM distance: >3 FB   Neck ROM: full  Mouth opening: > = 3 FB Dental: normal exam         Pulmonary:normal exam    (+) sleep apnea:            Patient did not smoke on day of surgery. ROS comment: Former smoker   Cardiovascular:    (+) hypertension:, angina:, hyperlipidemia      ECG reviewed               Beta Blocker:  Dose within 24 Hrs      ROS comment: Cardiac cath 2018 EF 65%  2020 cardiac note - has angina with normal coronary arteries and does not respond to antiangina meds. Neuro/Psych:   (+) psychiatric history:depression/anxiety             GI/Hepatic/Renal:   (+) liver disease:, bowel prep,          ROS comment: Abnormal CT scan  Diverticulosis  Obese  Fatty liver. Endo/Other:    (+) DiabetesType II DM, , hypothyroidism::., .          Pt had no PAT visit        ROS comment: Vit D Def  vertigo Abdominal:   (+) obese,     Abdomen: soft. Vascular: negative vascular ROS. Anesthesia Plan      MAC     ASA 3       Induction: intravenous. Anesthetic plan and risks discussed with patient. Plan discussed with CRNA.                 ELISEO Quezada - CRNA   6/30/2020

## 2020-06-30 NOTE — H&P
Patient Name: Marco Catherine  : 1960  MRN: [id number]  DATE: 20      ENDOSCOPY  History and Physical    Procedure:    [x] Diagnostic Colonoscopy       [] Screening Colonoscopy  [] EGD      [] ERCP      [] EUS       [] Other    [x] Previous office notes/History and Physical reviewed from the patients chart. Please see EMR for further details of HPI. I have examined the patient's status immediately prior to the procedure and:      Indications/HPI:    []Abdominal Pain   []Cancer- GI/Lung  [x]strong fhx of colon CA  []History of Polyps   []Hamiltons   []Melena  []Abnormal Imaging   []Dysphagia    []Persistent Pneumonia  []Anemia   []Food Impaction  []History of Polyps  []GI Bleed   []Pulmonary nodule/Mass  []Change in bowel habits  []Heartburn/Reflux  []Rectal Bleed (BRBPR)  []Chest Pain - Non Cardiac  []Heme (+) Stool  []Ulcers  []Constipation   []Hemoptysis   []Varices  []Diarrhea   []Hypoxemia  []Nausea/Vomiting   []Screening   []Crohns/Colitis  [x]Other: Abnormal CT scan, concern for colitis at recent presentation to the emergency room    Anesthesia:   [x] MAC [] Moderate Sedation   [] General   [] None     ROS: 12 pt Review of Symptoms was negative unless mentioned above    Medications:   Prior to Admission medications    Medication Sig Start Date End Date Taking? Authorizing Provider   buPROPion (WELLBUTRIN XL) 300 MG extended release tablet TAKE ONE TABLET BY MOUTH EVERY MORNING 20  Yes aHrmony Tyson PA-C   lisinopril (PRINIVIL;ZESTRIL) 5 MG tablet TAKE ONE TABLET BY MOUTH EVERY MORNING 20  Yes Harmony Tyson PA-C   celecoxib (CELEBREX) 200 MG capsule Take 1 capsule by mouth daily For low back/hip pain.  20  Yes MARCI Alcantara-RASHID   methocarbamol (ROBAXIN) 500 MG tablet Take 0.5 tablets by mouth nightly as needed (back/hip spasm) 20 Yes Harmony Tyson PA-C   levothyroxine (SYNTHROID) 50 MCG tablet TAKE 1 TABLET BY MOUTH ONE TIME DAILY 12/10/19  Yes Raissa Pickering MD metoprolol tartrate (LOPRESSOR) 25 MG tablet Take 1 tablet by mouth 2 times daily 12/10/19  Yes Shai Martinez MD   pravastatin (PRAVACHOL) 20 MG tablet TAKE ONE TABLET BY MOUTH EVERY EVENING 12/10/19  Yes Shai Martinez MD   hydrochlorothiazide (HYDRODIURIL) 25 MG tablet Take 1 tablet by mouth every morning 12/10/19  Yes Shai Martinez MD   Cholecalciferol (VITAMIN D) 2000 UNITS CAPS capsule Take by mouth daily   Yes Historical Provider, MD   ibuprofen (ADVIL;MOTRIN) 200 MG tablet Take 200 mg by mouth every 6 hours as needed for Pain   Yes Historical Provider, MD   loratadine (CLARITIN) 10 MG tablet Take 10 mg by mouth daily as needed (Allergies)    Yes Historical Provider, MD   empagliflozin (JARDIANCE) 10 MG tablet Take 1 tablet by mouth daily  Patient taking differently: Take 1 tablet by mouth daily Indications: new medication  6/11/20   Harmony Tyson PA-C   Na Sulfate-K Sulfate-Mg Sulf 17.5-3.13-1.6 GM/177ML SOLN As directed 6/2/20   ELISEO Read - CNP   Blood Glucose Monitoring Suppl (AGAMATRIX JAZZ WIRELESS 2) w/Device KIT Use as directed to monitor blood sugars 4/28/20   Joyce Robles MD   blood glucose test strips (AGAMATRIX JAZZ TEST) strip Use to test blood sugar 1 time daily 4/28/20   Joyce Robles MD   AgaMatrix Ultra-Thin Lancets MISC Use to test blood sugar 1 time daily 4/28/20   Joyce Robles MD   Blood Glucose Calibration (AGAMATRIX CONTROL) SOLN Use as directed for control solution test 4/28/20   Joyce Robles MD   albuterol sulfate HFA (PROAIR HFA) 108 (90 Base) MCG/ACT inhaler Inhale 2 puffs into the lungs every 6 hours as needed for Wheezing or Shortness of Breath  Patient taking differently: Inhale 2 puffs into the lungs every 6 hours as needed for Wheezing or Shortness of Breath Indications: never used  6/13/18   Armand Smallwood PA-C   Respiratory Therapy Supplies CLARA Please give patient the farshad view mask 5/31/18   Armand Smallwood PA-C   nitroGLYCERIN (NITROSTAT) 0.4 MG SL tablet Place 1 tablet under the tongue every 5 minutes as needed for Chest pain up to max of 3 total doses. If no relief after 1 dose, call 911. Patient taking differently: Place 0.4 mg under the tongue every 5 minutes as needed for Chest pain up to max of 3 total doses. If no relief after 1 dose, call 911. 3/23/18   Shivam J Holiday, DO       Allergies: Allergies   Allergen Reactions    Seasonal Itching        History of allergic reaction to anesthesia:  No    Past Medical History:  Past Medical History:   Diagnosis Date    Abnormal EKG 3/23/2018    Angina pectoris syndrome ([facility] [address] 03/23/2018    Diverticulosis of colon     DMII (diabetes mellitus, type 2) ([facility] [address] 2000    Dr Yaneth Saxena hypertension 1/15/2016    Family history of coronary artery disease 1/15/2016    Fatty infiltration of liver 2020    Gastric polyp 2019    Dr Samir Parsons, 5 mm    Generalized anxiety disorder     H/O: hysterectomy 2012    History of tobacco abuse 1/15/2016    Obesity (BMI 30-39. 9)     Other specified acquired hypothyroidism 2000    Precordial pain 03/23/2018    (? Prinzmetal?) Dr Avinash Freeman S/P colonoscopy with polypectomy 2013, 2018, 2019    Dr Sun Dias, Dr Samir Parsons, tubulovillous adenoma, hyperplastic polyp    S/P vaginal hysterectomy 2012    benign    Sleep apnea, obstructive 2007    was on CPAP, not using it at present    Vertigo 2018       Past Surgical History:  Past Surgical History:   Procedure Laterality Date    CHOLECYSTECTOMY, LAPAROSCOPIC  1995    [address]     [address]ED[address]      ENDOMETRIAL ABLATION  2002    metrorrhagia    ENDOSCOPY, COLON, DIAGNOSTIC      HYSTERECTOMY  9/7/12    fibroid, cervicitis, ovaries intact    VT COLON CA SCRN NOT HI RSK IND N/A 5/15/2018    COLONOSCOPY performed by Calli Egan MD at [address] N/A 4/2/2019    EGD ESOPHAGOGASTRODUODENOSCOPY performed by Genoveva Clinton Jayson Arriaga MD at [facility]       Social History:  Social History     Tobacco Use    Smoking status: Former Smoker     Packs/day: 1.00     Years: 30.00     Pack years: 30.00     Types: Cigarettes     Start date: 3/5/1975     Last attempt to quit: 6/12/2005     Years since quitting: 15.0    Smokeless tobacco: Never Used    Tobacco comment: 3/15/18 started age 17   Substance Use Topics    Alcohol use: No     Comment: not at all     Drug use: No       Vital Signs: There were no vitals filed for this visit. Physical Exam:  Cardiac:  [x]WNL []Comments:  Pulmonary:  [x]WNL   []Comments:   Neuro/Mental Status:  [x]WNL  []Comments:  Abdominal:  [x]WNL    []Comments:  Other:   []WNL  []Comments:    Informed Consent:  The risks and benefits of the procedure have been discussed with either the patient or if they cannot consent, their representative. Assessment:  Patient examined and appropriate for planned sedation and procedure. Plan:  Proceed with planned sedation and procedure as above. The patient was counseled at length about risks of dillon COVID-19 in the perioperative and any recovery window from the procedure. The patient was made aware that dillon COVID-19 may worsen their prognosis for recovery from their procedure and lend to a higher morbidity and-all mortality risk. The patient was given the option of postponing the procedure all material risks, benefits, and alternatives were discussed. The patient does wish to proceed with the procedure at this time.     Kerri Marquez MD  8:35 AM

## 2020-06-30 NOTE — ANESTHESIA POSTPROCEDURE EVALUATION
Department of Anesthesiology  Postprocedure Note    Patient: Darling Polanco  MRN: [id number]  YOB: 1960  Date of evaluation: 6/30/2020  Time:  9:15 AM     Procedure Summary     Date:  06/30/20 Room / Location:  [address] [place] [address] Thomas Briscoes    Anesthesia Start:  4563 Anesthesia Stop:  0915    Procedure:  COLONOSCOPY DIAGNOSTIC (N/A ) Diagnosis:  (Abnormal CT scan;  R93.89)    Surgeon:  Nikita Stephens MD Responsible Provider:  ELISEO Francisco CRNA    Anesthesia Type:  MAC ASA Status:  3          Anesthesia Type: MAC    Sergo Phase I: Sergo Score: 10    Sergo Phase II:      Last vitals: Reviewed and per EMR flowsheets.        Anesthesia Post Evaluation    Patient location during evaluation: PACU  Patient participation: complete - patient participated  Level of consciousness: awake and alert  Pain score: 0  Airway patency: patent  Nausea & Vomiting: no nausea and no vomiting  Complications: no  Cardiovascular status: blood pressure returned to baseline and hemodynamically stable  Respiratory status: acceptable  Hydration status: euvolemic

## 2020-07-08 ENCOUNTER — CARE COORDINATION (OUTPATIENT)
Dept: OTHER | Facility: CLINIC | Age: 60
End: 2020-07-08

## 2020-07-08 NOTE — CARE COORDINATION
Ambulatory Care Coordination Note  7/8/2020  CM Risk Score: 10  Charlson 10 Year Mortality Risk Score: 10%     ACC: Valentine Jacobs RN    Summary Note: Spoke with patient who is now on jardiance, has discontinued the metformin. Pt has set new goals for exercise and also for being more compliant with checking blood sugars. Pt denies any hypo or hyperglycemic episodes, she is still working from home. Pt has another follow up for 7/16/2020 with pcp. Pt denies any needs today, will continue to follow        Care Coordination Interventions    Program Enrollment:  Rising Risk  Referral from Primary Care Provider:  No  Suggested Interventions and Community Resources         Goals Addressed                 This Visit's Progress     Patient Stated (pt-stated)        Pt will begin increasing her exercise and improve eating habits in order to lower her A1C  By walking on her lunch break 2-3x per week     Barriers: working from home  Plan for overcoming my barriers: Pt will make a conscious effort to leave her desk for exercise daily   Confidence: 7/10  Anticipated Goal Completion Date: 8/30/20       Patient Stated (pt-stated)        A1C will go back down to previous level of around 5    Barriers: medication side effects - pt will be starting jardiance when PA has gone through, was unable to tolerate Trulicity   Plan for overcoming my barriers: working with pcp and pharmacy to get jardiance approved  Confidence: 10/10  Anticipated Goal Completion Date: 6/30/3030    Jardiance approved         Patient Stated (pt-stated)        Pt will check blood sugars twice daily 2-3 x per week     Barriers: none  Plan for overcoming my barriers: N/A  Confidence: 8/10  Anticipated Goal Completion Date: 7/23/2020            Prior to Admission medications    Medication Sig Start Date End Date Taking?  Authorizing Provider   buPROPion (WELLBUTRIN XL) 300 MG extended release tablet TAKE ONE TABLET BY MOUTH EVERY MORNING 6/26/20   Harmony Tyson NAYANA   lisinopril (PRINIVIL;ZESTRIL) 5 MG tablet TAKE ONE TABLET BY MOUTH EVERY MORNING 6/26/20   Harmony Tyson PA-C   empagliflozin (JARDIANCE) 10 MG tablet Take 1 tablet by mouth daily  Patient taking differently: Take 1 tablet by mouth daily Indications: new medication  6/11/20   Harmony Tyson PA-C   Na Sulfate-K Sulfate-Mg Sulf 17.5-3.13-1.6 GM/177ML SOLN As directed 6/2/20   ELIESO Dao - CNP   celecoxib (CELEBREX) 200 MG capsule Take 1 capsule by mouth daily For low back/hip pain.  6/1/20   Harmony Tyson PA-C   Blood Glucose Monitoring Suppl ([employer] WIRELESS 2) w/Device KIT Use as directed to monitor blood sugars 4/28/20   Rolo Coffey MD   blood glucose test strips (AGAMATRIX JAZZ TEST) strip Use to test blood sugar 1 time daily 4/28/20   Rolo Coffey MD   AgaMatrix Ultra-Thin Lancets MISC Use to test blood sugar 1 time daily 4/28/20   Rolo Coffey MD   Blood Glucose Calibration (AGAMATRIX CONTROL) SOLN Use as directed for control solution test 4/28/20   Rolo Coffey MD   levothyroxine (SYNTHROID) 50 MCG tablet TAKE 1 TABLET BY MOUTH ONE TIME DAILY 12/10/19   Whitney Munoz MD   metoprolol tartrate (LOPRESSOR) 25 MG tablet Take 1 tablet by mouth 2 times daily 12/10/19   Whitney Munoz MD   pravastatin (PRAVACHOL) 20 MG tablet TAKE ONE TABLET BY MOUTH EVERY EVENING 12/10/19   Whitney Munoz MD   hydrochlorothiazide (HYDRODIURIL) 25 MG tablet Take 1 tablet by mouth every morning 12/10/19   Whitney Munoz MD   albuterol sulfate HFA (PROAIR HFA) 108 (90 Base) MCG/ACT inhaler Inhale 2 puffs into the lungs every 6 hours as needed for Wheezing or Shortness of Breath  Patient taking differently: Inhale 2 puffs into the lungs every 6 hours as needed for Wheezing or Shortness of Breath Indications: never used  6/13/18   Franci Browning PA-C   Respiratory Therapy Supplies CLARA Please give patient the farshad view mask 5/31/18   Franci Browning PA-C   nitroGLYCERIN (NITROSTAT) 0.4 MG SL tablet Place 1 tablet under the tongue every 5 minutes as needed for Chest pain up to max of 3 total doses. If no relief after 1 dose, call 911. Patient taking differently: Place 0.4 mg under the tongue every 5 minutes as needed for Chest pain up to max of 3 total doses. If no relief after 1 dose, call 911. 3/23/18   Shivam Michael,    Cholecalciferol (VITAMIN D) 2000 UNITS CAPS capsule Take by mouth daily    Historical Provider, MD   ibuprofen (ADVIL;MOTRIN) 200 MG tablet Take 200 mg by mouth every 6 hours as needed for Pain    Historical Provider, MD   loratadine (CLARITIN) 10 MG tablet Take 10 mg by mouth daily as needed (Allergies)     Historical Provider, MD       Future Appointments   Date Time Provider Elizabeth Brock   7/12/2020 10:00 AM [place] [facility] ROOM 1 [facility] Fac RAD   7/16/2020  8:30 AM NAYANA Chin [address]    7/16/2020  2:30 [facility]   7/23/2020  4:00 PM [facility]   12/1/2020  3:30 PM NAYANA Alcantara  Lisa Mireles      and   Diabetes Assessment    Medic Alert ID:  No  How often do you test your blood sugar?:  Daily (Comment: sometimes)   Do you have barriers with adherence to non-pharmacologic self-management interventions?  (Nutrition/Exercise/Self-Monitoring):  No   Have you ever had to go to the ED for symptoms of low blood sugar?:  No       Do you have hyperglycemia symptoms?:  No   Do you have hypoglycemia symptoms?:  No   Last Blood Sugar Value:  112   Blood Sugar Monitoring Regimen:  Once a Day   Blood Sugar Trends:  No Change

## 2020-07-12 ENCOUNTER — HOSPITAL ENCOUNTER (OUTPATIENT)
Dept: CT IMAGING | Age: 60
Discharge: HOME | End: 2020-07-14
Payer: COMMERCIAL

## 2020-07-12 PROCEDURE — G0297 LDCT FOR LUNG CA SCREEN: HCPCS

## 2020-07-16 ENCOUNTER — VIRTUAL VISIT (OUTPATIENT)
Dept: GASTROENTEROLOGY | Age: 60
End: 2020-07-16
Payer: COMMERCIAL

## 2020-07-16 ENCOUNTER — OFFICE VISIT (OUTPATIENT)
Dept: FAMILY MEDICINE CLINIC | Age: 60
End: 2020-07-16
Payer: COMMERCIAL

## 2020-07-16 VITALS
TEMPERATURE: 98.4 F | HEIGHT: 65 IN | RESPIRATION RATE: 18 BRPM | BODY MASS INDEX: 36.99 KG/M2 | SYSTOLIC BLOOD PRESSURE: 110 MMHG | WEIGHT: 222 LBS | OXYGEN SATURATION: 99 % | DIASTOLIC BLOOD PRESSURE: 60 MMHG | HEART RATE: 60 BPM

## 2020-07-16 DIAGNOSIS — E11.9 TYPE 2 DIABETES MELLITUS WITHOUT COMPLICATION, WITHOUT LONG-TERM CURRENT USE OF INSULIN (HCC): ICD-10-CM

## 2020-07-16 DIAGNOSIS — E03.9 HYPOTHYROIDISM, UNSPECIFIED TYPE: ICD-10-CM

## 2020-07-16 DIAGNOSIS — Z13.220 SCREENING CHOLESTEROL LEVEL: ICD-10-CM

## 2020-07-16 DIAGNOSIS — Z00.00 ANNUAL PHYSICAL EXAM: ICD-10-CM

## 2020-07-16 DIAGNOSIS — I10 ESSENTIAL HYPERTENSION: ICD-10-CM

## 2020-07-16 PROBLEM — M54.42 ACUTE LEFT-SIDED LOW BACK PAIN WITH LEFT-SIDED SCIATICA: Status: RESOLVED | Noted: 2020-06-05 | Resolved: 2020-07-16

## 2020-07-16 PROBLEM — K75.81 NASH (NONALCOHOLIC STEATOHEPATITIS): Status: ACTIVE | Noted: 2020-07-16

## 2020-07-16 LAB
ALBUMIN SERPL-MCNC: 4.3 G/DL (ref 3.5–4.6)
ALP BLD-CCNC: 81 U/L (ref 40–130)
ALT SERPL-CCNC: 29 U/L (ref 0–33)
ANION GAP SERPL CALCULATED.3IONS-SCNC: 10 MEQ/L (ref 9–15)
AST SERPL-CCNC: 23 U/L (ref 0–35)
BILIRUB SERPL-MCNC: 0.3 MG/DL (ref 0.2–0.7)
BUN BLDV-MCNC: 16 MG/DL (ref 6–20)
CALCIUM SERPL-MCNC: 10 MG/DL (ref 8.5–9.9)
CHLORIDE BLD-SCNC: 101 MEQ/L (ref 95–107)
CHOLESTEROL, TOTAL: 160 MG/DL (ref 0–199)
CO2: 28 MEQ/L (ref 20–31)
CREAT SERPL-MCNC: 1.29 MG/DL (ref 0.5–0.9)
GFR AFRICAN AMERICAN: 51.4
GFR NON-AFRICAN AMERICAN: 42.5
GLOBULIN: 3.2 G/DL (ref 2.3–3.5)
GLUCOSE BLD-MCNC: 121 MG/DL (ref 70–99)
HBA1C MFR BLD: 7.3 % (ref 4.8–5.9)
HDLC SERPL-MCNC: 43 MG/DL (ref 40–59)
LDL CHOLESTEROL CALCULATED: 73 MG/DL (ref 0–129)
POTASSIUM SERPL-SCNC: 4.1 MEQ/L (ref 3.4–4.9)
SODIUM BLD-SCNC: 139 MEQ/L (ref 135–144)
T4 FREE: 1.21 NG/DL (ref 0.84–1.68)
TOTAL PROTEIN: 7.5 G/DL (ref 6.3–8)
TRIGL SERPL-MCNC: 219 MG/DL (ref 0–150)
TSH SERPL DL<=0.05 MIU/L-ACNC: 0.91 UIU/ML (ref 0.44–3.86)

## 2020-07-16 PROCEDURE — 99214 OFFICE O/P EST MOD 30 MIN: CPT | Performed by: NURSE PRACTITIONER

## 2020-07-16 PROCEDURE — 99396 PREV VISIT EST AGE 40-64: CPT | Performed by: PHYSICIAN ASSISTANT

## 2020-07-16 RX ORDER — EMPAGLIFLOZIN 10 MG/1
1 TABLET, FILM COATED ORAL DAILY
Qty: 90 TABLET | Refills: 3 | Status: SHIPPED
Start: 2020-07-16 | End: 2021-01-14 | Stop reason: DRUGHIGH

## 2020-07-16 ASSESSMENT — ENCOUNTER SYMPTOMS
CONSTIPATION: 0
SHORTNESS OF BREATH: 0
ABDOMINAL DISTENTION: 0
ABDOMINAL PAIN: 0
TROUBLE SWALLOWING: 0
DIARRHEA: 0
CHEST TIGHTNESS: 0
BACK PAIN: 0
BLOOD IN STOOL: 0
WHEEZING: 0
NAUSEA: 0
COLOR CHANGE: 0

## 2020-07-16 NOTE — PROGRESS NOTES
2020    TELEHEALTH EVALUATION -- Audio/Visual (During FBXTJ-12 public health emergency)    Due to [place] 19 outbreak, patient's office visit was converted to a virtual visit. Patient was contacted and agreed to proceed with a virtual visit via Telephone Visit 15 minutes. The risks and benefits of converting to a virtual visit were discussed in light of the current infectious disease epidemic. Patient also understood that insurance coverage and co-pays are up to their individual insurance plans. HPI:  Alivia Barrera (:  1960) has requested an audio/video evaluation for the following concern(s):  Patient status post colonoscopy, due to abnormal CT scan of the abdomen and a family history of colon cancer with her father. Patient denies diarrhea, constipation, or bleeding. No weight loss or loss of appetite. Patient denies abdominal pain. Patient with ongoing complaints of chest pain with activity. Reports her pain resolves with rest.  Occasional instances of heartburn, takes OTC acid reducer with good response. No dysphagia. No nausea or vomiting. Has not tried FD guard. Reports she seen her PCP today and she recommended she try her antianxiety medication when she has chest pain. Patient denies shortness of breath, palpitations, or syncopal episodes. No fever or chills. Endoscopic investigation:   Colonoscopy 2020- scattered diverticulosis. Polyps-hyperplastic removed from the cecum and rectum. Surveillance due in . Review of Systems   All other systems reviewed and are negative. Prior to Visit Medications    Medication Sig Taking?  Authorizing Provider   Aspirin Buf,CaCarb-MgCarb-MgO, 81 MG TABS Take by mouth Yes Historical Provider, MD   empagliflozin (JARDIANCE) 10 MG tablet Take 1 tablet by mouth daily Yes Harmony Tyson PA-C   buPROPion (WELLBUTRIN XL) 300 MG extended release tablet TAKE ONE TABLET BY MOUTH EVERY MORNING Yes Harmony Tyson PA-C   lisinopril daily Yes Historical Provider, MD   ibuprofen (ADVIL;MOTRIN) 200 MG tablet Take 200 mg by mouth every 6 hours as needed for Pain Yes Historical Provider, MD   loratadine (CLARITIN) 10 MG tablet Take 10 mg by mouth daily as needed (Allergies)  Yes Historical Provider, MD       Social History     Tobacco Use    Smoking status: Former Smoker     Packs/day: 1.00     Years: 30.00     Pack years: 30.00     Types: Cigarettes     Start date: 3/5/1975     Last attempt to quit: 6/12/2005     Years since quitting: 15.1    Smokeless tobacco: Never Used    Tobacco comment: 3/15/18 started age 17   Substance Use Topics    Alcohol use: No     Comment: not at all     Drug use: No      Allergies   Allergen Reactions    Seasonal Itching   ,   Past Medical History:   Diagnosis Date    Abnormal EKG 3/23/2018    Angina pectoris syndrome ([facility] [address] 03/23/2018    Diverticulosis of colon     DMII (diabetes mellitus, type 2) ([facility] [address] 2000    Dr José Antonio Cortes hypertension 1/15/2016    Family history of coronary artery disease 1/15/2016    Fatty infiltration of liver 2020    Gastric polyp 2019    Dr Simón Mccann, 5 mm    Generalized anxiety disorder     H/O: hysterectomy 2012    History of tobacco abuse 1/15/2016    Obesity (BMI 30-39. 9)     Other specified acquired hypothyroidism 2000    Precordial pain 03/23/2018    (? Prinzmetal?) Dr David Fuchs S/P colonoscopy with polypectomy 2013, 2018, 2019    Dr Delrae Osgood, Dr Simón Mccann, tubulovillous adenoma, hyperplastic polyp    S/P vaginal hysterectomy 2012    benign    Sleep apnea, obstructive 2007    was on CPAP, not using it at present    Vertigo 2018   ,   Past Surgical History:   Procedure Laterality Date    CHOLECYSTECTOMY, [address] COLONOSCOPY      COLONOSCOPY N/A 6/30/2020    COLONOSCOPY DIAGNOSTIC performed by Tate Daly MD at [facility] CATH LAB PROCEDURE      ENDOMETRIAL ABLATION  2002    metrorrhagia    ENDOSCOPY, COLON, DIAGNOSTIC  HYSTERECTOMY  9/7/12    fibroid, cervicitis, ovaries intact    CT COLON CA SCRN NOT HI RSK IND N/A 5/15/2018    COLONOSCOPY [facility] by Jimena Kauffman MD at [facility] ENDOSCOPY N/A 4/2/2019    EGD ESOPHAGOGASTRODUODENOSCOPY performed by Jimena Kauffman MD at [facility]       Due to this being a TeleHealth encounter, evaluation of the following organ systems is limited: Vitals/Constitutional/EENT/Resp/CV/GI//MS/Neuro/Skin/Heme-Lymph-Imm. ASSESSMENT/PLAN:  1. Chest pain, unspecified type  Patient with ongoing complaints of intermittent chest pain with activity. Chest pain resolves with rest.  Does see cardiology. Patient strongly recommended to follow-up with cardiology. Patient was encouraged to visit ED if her symptoms persist or worsen.  -Encouraged to try FD guard 30 minutes before meals.  -Will trial PPI if FD guard suboptimal response. 2. NAFLD (nonalcoholic fatty liver disease)  FibroScan results discussed with the patient. With findings of severe steatosis. No fibrosis. Liver function test within normal limits. Discussed with the patient natural history of fatty liver, therapeutic modalities and weight loss program.  Mention that as low as 3.5% of weight loss will be associated with improvement of steatosis. Patient declined nutrition referral.  Continued control of associated medical conditions in the term of diabetes, hypertension and dyslipidemia. Mediterranean diet was strongly recommended. Patient encouraged to avoid anything hepatotoxic. Examples provided. Return in about 6 months (around 1/16/2021). An  electronic signature was used to authenticate this note.     --Gaviota Francois, ELISEO - CNP on 7/16/2020 at 3:01 PM  19}    Pursuant to the emergency declaration under the [facility] waiver authority and the [place] [employer] and [employer] Supplemental Appropriations Act, this Virtual  Visit was conducted, with patient's consent, to reduce the patient's risk of exposure to COVID-19 and provide continuity of care for an established patient. Services were provided through a video synchronous discussion virtually to substitute for in-person clinic visit.

## 2020-07-16 NOTE — PROGRESS NOTES
Subjective  Enma Debbie, 64 y.o. female presents today with:  Chief Complaint   Patient presents with    Diabetes     follow up asking for 90 day supply of med. Be Well to be done today     HPI  Sara Carrington is in the office today for BeWell exam.  Last OV with me: 6/1/2020    Annual exam.  Due for BeWell labs and physical exam.    She is fasting today. She had CT lung ca screen. Negative. Scheduled for mammogram.  UTD with colon ca screen. 2 polyps removed--benign. R sided chest pain. Atypical in nature. She saw Dr. Yasmin Soto for initial symptoms/work up. 2018 Heart cath was completed--normal without signs of blockage. She is continuing to have R sided chest pain on intermittent evenings. Discomfort can be dull-sharp. Initially will start midsternum and radiate to her back. Episodes can last from a couple of minutes-hours. She has taken SL nitro in the past, but the nitro gives patient a headache. Exercise can sometimes make worse. There has been external stressor lately. With covid pandemic, working from home, caring for her mother who lives with patient, etc.  Does not \"feel\" overly stressed, but stress may be a factor. BP is controlled. She is currently on wellbutrin for anxiety/depression. Type 2 diabetes. Started on jardiance, tolerating medication. Due for HgbA1c. She is on 5 mg of lisinopril. She is on statin. Back pain. Resolved with higher dose of celebrex 200 mg. Has had no issues with further pain. BELTRAN. Patient had work-up via GI due to abnormal CT scan of liver. She had fibroscan on 6/4/2020 which showed now cirrhosis. +BELTRAN, needs to make dietary/lifestyle changes. She has follow up today with GI. Review of Systems   Constitutional: Negative for activity change, appetite change, chills, diaphoresis, fatigue, fever and unexpected weight change. HENT: Negative for congestion, nosebleeds, postnasal drip and trouble swallowing.     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[address] , RN     Employer: [employer]   Social Needs    Financial resource strain: Not hard at all    Food insecurity     Worry: Never true     Inability: Never true   [place] needs     Medical: No     Non-medical: No   Tobacco Use    Smoking status: Former Smoker     Packs/day: 1.00     Years: 30.00     Pack years: 30.00     Types: Cigarettes     Start date: 3/5/1975     Last attempt to quit: 6/12/2005     Years since quitting: 15.1    Smokeless tobacco: Never Used    Tobacco comment: 3/15/18 started age 17   Substance and Sexual Activity    Alcohol use: No     Comment: not at all     Drug use: No    Sexual activity: Not on file   Lifestyle    Physical activity     Days per week: 0 days     Minutes per session: 0 min    Stress: Not at all   Relationships    Social connections     Talks on phone: More than three times a week     Gets together: More than three times a week     Attends Confucianist service: Never     Active member of club or organization: Not on file     Attends meetings of clubs or organizations: Never     Relationship status:     Intimate partner violence     Fear of current or ex partner: No     Emotionally abused: No     Physically abused: No     Forced sexual activity: No   Other Topics Concern    Not on file   Social History Narrative    Born in [place], one of 4 children (2 boys and 2 girls), both parents in the [place], moved to [place]    Mother in [place], has memory problems    [employer]ism Confucianism     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    Has 2 dogs, Blossom and Carlo    Children 3, 2 sons in the area, one daughter in [address]: dogs, sawing, machine embroidery, baking     Family History   Problem Relation Age of Onset    Arrhythmia Mother     Hypertension Mother     Dementia Mother     COPD Father         mesothelioma    Alcohol Abuse Father     Diabetes Paternal Grandmother     Diabetes Paternal Grandfather     Colon Cancer Paternal Grandfather     Schizophrenia Brother     Colon Cancer Paternal Uncle      Allergies   Allergen Reactions    Seasonal Itching     Current Outpatient Medications   Medication Sig Dispense Refill    Aspirin Buf,CaCarb-MgCarb-MgO, 81 MG TABS Take by mouth      empagliflozin (JARDIANCE) 10 MG tablet Take 1 tablet by mouth daily 90 tablet 3    buPROPion (WELLBUTRIN XL) 300 MG extended release tablet TAKE ONE TABLET BY MOUTH EVERY MORNING 90 tablet 1    lisinopril (PRINIVIL;ZESTRIL) 5 MG tablet TAKE ONE TABLET BY MOUTH EVERY MORNING 90 tablet 1    celecoxib (CELEBREX) 200 MG capsule Take 1 capsule by mouth daily For low back/hip pain.  90 capsule 0    Blood Glucose Monitoring Suppl ([employer] WIRELESS 2) w/Device KIT Use as directed to monitor blood sugars 1 kit 0    blood glucose test strips (AGAMATRIX JAZZ TEST) strip Use to test blood sugar 1 time daily 100 each 3    AgaMatrix Ultra-Thin Lancets MISC Use to test blood sugar 1 time daily 100 each 3    Blood Glucose Calibration (AGAMATRIX CONTROL) SOLN Use as directed for control solution test 1 each 0    levothyroxine (SYNTHROID) 50 MCG tablet TAKE 1 TABLET BY MOUTH ONE TIME DAILY 90 tablet 1    metoprolol tartrate (LOPRESSOR) 25 MG tablet Take 1 tablet by mouth 2 times daily 180 tablet 1    pravastatin (PRAVACHOL) 20 MG tablet TAKE ONE TABLET BY MOUTH EVERY EVENING 90 tablet 1    hydrochlorothiazide (HYDRODIURIL) 25 MG tablet Take 1 tablet by mouth every morning 90 tablet 1    albuterol sulfate HFA (PROAIR HFA) 108 (90 Base) MCG/ACT inhaler Inhale 2 puffs into the lungs every 6 hours as needed for Wheezing or Shortness of Breath (Patient taking differently: Inhale 2 puffs into the lungs every 6 hours as needed for Wheezing or Shortness of Breath Indications: never used ) 1 Inhaler 3    Respiratory Therapy Supplies CLARA Please give patient the farshad view mask 1 Device 0    nitroGLYCERIN (NITROSTAT) 0.4 MG SL tablet Place 1 tablet under the tongue every 5 minutes as needed for Chest pain up to max of 3 total doses. If no relief after 1 dose, call 911. (Patient taking differently: Place 0.4 mg under the tongue every 5 minutes as needed for Chest pain up to max of 3 total doses. If no relief after 1 dose, call 911.) 25 tablet 3    Cholecalciferol (VITAMIN D) 2000 UNITS CAPS capsule Take by mouth daily      ibuprofen (ADVIL;MOTRIN) 200 MG tablet Take 200 mg by mouth every 6 hours as needed for Pain      loratadine (CLARITIN) 10 MG tablet Take 10 mg by mouth daily as needed (Allergies)        No current facility-administered medications for this visit. PMH, Surgical Hx, Family Hx, and Social Hx reviewed and updated. Health Maintenance reviewed. Objective  Vitals:    07/16/20 0825   BP: 110/60   Site: Left Upper Arm   Position: Sitting   Cuff Size: Medium Adult   Pulse: 60   Resp: 18   Temp: 98.4 °F (36.9 °C)   TempSrc: Temporal   SpO2: 99%   Weight: 222 lb (100.7 kg)   Height: 5' 5\" (1.651 m)     BP Readings from Last 3 Encounters:   07/16/20 110/60   06/30/20 130/60   06/30/20 123/63     Wt Readings from Last 3 Encounters:   07/16/20 222 lb (100.7 kg)   06/01/20 225 lb (102.1 kg)   05/01/20 230 lb (104.3 kg)     Physical Exam  Vitals signs reviewed. Constitutional:       General: She is not in acute distress. Appearance: She is well-developed. She is obese. She is not ill-appearing or diaphoretic.    HENT: Head: Normocephalic and atraumatic. Right Ear: External ear normal.      Left Ear: External ear normal.      Nose: Nose normal.   Eyes:      Conjunctiva/sclera: Conjunctivae normal.   Neck:      Musculoskeletal: Normal range of motion. Cardiovascular:      Rate and Rhythm: Normal rate and regular rhythm. Heart sounds: Normal heart sounds. No murmur. Pulmonary:      Effort: Pulmonary effort is normal. No respiratory distress. Breath sounds: Normal breath sounds. No wheezing or rales. Skin:     General: Skin is warm and dry. Findings: No erythema or rash. Neurological:      General: No focal deficit present. Mental Status: She is alert and oriented to person, place, and time. Psychiatric:         Mood and Affect: Mood normal.         Behavior: Behavior normal.         Thought Content: Thought content normal.         Judgment: Judgment normal.       Assessment & Plan   Naomi Josechjana was seen today for diabetes. Diagnoses and all orders for this visit:    Non-cardiac chest pain  Comments:  Trial 1/2 tablet of xanax at night to see if symptoms resolve. Monitor. Discussed trying 5 mg of amlodipine if not better. Annual physical exam  Comments:  greg today. Orders:  -     Lipid Panel; Future  -     Comprehensive Metabolic Panel; Future    Hypothyroidism, unspecified type  -     TSH Without Reflex; Future  -     T4, Free; Future    Essential hypertension  -     Comprehensive Metabolic Panel; Future    Type 2 diabetes mellitus without complication, without long-term current use of insulin (HCC)  -     empagliflozin (JARDIANCE) 10 MG tablet; Take 1 tablet by mouth daily  -     Hemoglobin A1C; Future  -     Comprehensive Metabolic Panel; Future    Screening cholesterol level  -     Lipid Panel; Future    Generalized anxiety disorder    BELTRAN (nonalcoholic steatohepatitis)  Comments:  has gi follow up today  dietary/lifestyle changes. has follow up with me in December.     Orders Placed This Encounter   Procedures    Lipid Panel     Standing Status:   Future     Standing Expiration Date:   7/16/2021     Order Specific Question:   Is Patient Fasting?/# of Hours     Answer:   8+ hours    Hemoglobin A1C     Standing Status:   Future     Standing Expiration Date:   7/16/2021    TSH Without Reflex     Standing Status:   Future     Standing Expiration Date:   7/16/2021    T4, Free     Standing Status:   Future     Standing Expiration Date:   7/16/2021    Comprehensive Metabolic Panel     Standing Status:   Future     Standing Expiration Date:   7/16/2021     Orders Placed This Encounter   Medications    empagliflozin (JARDIANCE) 10 MG tablet     Sig: Take 1 tablet by mouth daily     Dispense:  90 tablet     Refill:  3     Medications Discontinued During This Encounter   Medication Reason    Na Sulfate-K Sulfate-Mg Sulf 17.5-3.13-1.6 GM/177ML SOLN     empagliflozin (JARDIANCE) 10 MG tablet REORDER     No follow-ups on file. Reviewed with the patient: current clinical status, medications, activities and diet. Side effects, adverse effects of the medication prescribed today, as well as treatment plan/ rationale and result expectations have been discussed with the patient who expresses understanding and desires to proceed. Close follow up to evaluate treatment results and for coordination of care. I have reviewed the patient's medical history in detail and updated the computerized patient record.     Harmony Tyson PA-C

## 2020-07-22 ENCOUNTER — HOSPITAL ENCOUNTER (OUTPATIENT)
Dept: GENERAL RADIOLOGY | Age: 60
Discharge: HOME | End: 2020-07-24
Payer: COMMERCIAL

## 2020-07-22 ENCOUNTER — CARE COORDINATION (OUTPATIENT)
Dept: OTHER | Facility: CLINIC | Age: 60
End: 2020-07-22

## 2020-07-22 ENCOUNTER — OFFICE VISIT (OUTPATIENT)
Dept: FAMILY MEDICINE CLINIC | Age: 60
End: 2020-07-22
Payer: COMMERCIAL

## 2020-07-22 VITALS
SYSTOLIC BLOOD PRESSURE: 136 MMHG | HEIGHT: 65 IN | BODY MASS INDEX: 36.99 KG/M2 | OXYGEN SATURATION: 100 % | HEART RATE: 65 BPM | DIASTOLIC BLOOD PRESSURE: 80 MMHG | RESPIRATION RATE: 16 BRPM | TEMPERATURE: 98.4 F | WEIGHT: 222 LBS

## 2020-07-22 PROCEDURE — 99213 OFFICE O/P EST LOW 20 MIN: CPT | Performed by: NURSE PRACTITIONER

## 2020-07-22 PROCEDURE — 73610 X-RAY EXAM OF ANKLE: CPT

## 2020-07-22 ASSESSMENT — ENCOUNTER SYMPTOMS
SORE THROAT: 0
COLOR CHANGE: 0
WHEEZING: 0
SHORTNESS OF BREATH: 0
RHINORRHEA: 0
ABDOMINAL PAIN: 0
TROUBLE SWALLOWING: 0
CHOKING: 0
NAUSEA: 0
VOMITING: 0
DIARRHEA: 0
CHEST TIGHTNESS: 0
COUGH: 0
BACK PAIN: 0

## 2020-07-22 NOTE — CARE COORDINATION
Ambulatory Care Coordination Note  7/22/2020  CM Risk Score: 10  Charlson 10 Year Mortality Risk Score: 23%     ACC: Geetha Butcher, RN    Summary Note: Called and spoke with patient who was on her way to get her mammogram. Requested I call her tomorrow morning. Will follow         Care Coordination Interventions    Program Enrollment:  Rising Risk  Referral from Primary Care Provider:  No  Suggested Interventions and Community Resources  Diabetes Education: In Process  Zone Management Tools:  Completed         Goals Addressed    None         Prior to Admission medications    Medication Sig Start Date End Date Taking? Authorizing Provider   Aspirin Buf,CaCarb-MgCarb-MgO, 81 MG TABS Take by mouth    Historical Provider, MD   empagliflozin (JARDIANCE) 10 MG tablet Take 1 tablet by mouth daily 7/16/20   Harmony Tyson PA-C   buPROPion (WELLBUTRIN XL) 300 MG extended release tablet TAKE ONE TABLET BY MOUTH EVERY MORNING 6/26/20   Harmony Tyson PA-C   lisinopril (PRINIVIL;ZESTRIL) 5 MG tablet TAKE ONE TABLET BY MOUTH EVERY MORNING 6/26/20   Harmony Tyson PA-C   celecoxib (CELEBREX) 200 MG capsule Take 1 capsule by mouth daily For low back/hip pain.  6/1/20   Harmony Tyson PA-C   Blood Glucose Monitoring Suppl (AGAMATRIX JAZZ WIRELESS 2) w/Device KIT Use as directed to monitor blood sugars 4/28/20   Aishwarya Traore MD   blood glucose test strips (AGAMATRIX JAZZ TEST) strip Use to test blood sugar 1 time daily 4/28/20   Aishwarya Traore MD   AgaMatrix Ultra-Thin Lancets MISC Use to test blood sugar 1 time daily 4/28/20   Aishwarya Traore MD   Blood Glucose Calibration (AGAMATRIX CONTROL) SOLN Use as directed for control solution test 4/28/20   Aishwarya Traore MD   levothyroxine (SYNTHROID) 50 MCG tablet TAKE 1 TABLET BY MOUTH ONE TIME DAILY 12/10/19   Zafar Parks MD   metoprolol tartrate (LOPRESSOR) 25 MG tablet Take 1 tablet by mouth 2 times daily 12/10/19   Zafar Parks MD   pravastatin (PRAVACHOL) 20 MG tablet TAKE ONE TABLET BY MOUTH EVERY EVENING 12/10/19   Yung Whitlock MD   hydrochlorothiazide (HYDRODIURIL) 25 MG tablet Take 1 tablet by mouth every morning 12/10/19   Yung Whitlock MD   albuterol sulfate HFA (PROAIR HFA) 108 (90 Base) MCG/ACT inhaler Inhale 2 puffs into the lungs every 6 hours as needed for Wheezing or Shortness of Breath  Patient taking differently: Inhale 2 puffs into the lungs every 6 hours as needed for Wheezing or Shortness of Breath Indications: never used  6/13/18   Reyna Moran PA-C   Respiratory Therapy Supplies CLARA Please give patient the farshad view mask 5/31/18   Reyna Moran PA-C   nitroGLYCERIN (NITROSTAT) 0.4 MG SL tablet Place 1 tablet under the tongue every 5 minutes as needed for Chest pain up to max of 3 total doses. If no relief after 1 dose, call 911. Patient taking differently: Place 0.4 mg under the tongue every 5 minutes as needed for Chest pain up to max of 3 total doses.  If no relief after 1 dose, call 911. 3/23/18   Shivam Michael,    Cholecalciferol (VITAMIN D) 2000 UNITS CAPS capsule Take by mouth daily    Historical Provider, MD   ibuprofen (ADVIL;MOTRIN) 200 MG tablet Take 200 mg by mouth every 6 hours as needed for Pain    Historical Provider, MD   loratadine (CLARITIN) 10 MG tablet Take 10 mg by mouth daily as needed (Allergies)     Historical Provider, MD       Future Appointments   Date Time Provider Elizabeth Brock   7/23/2020  4:00 PM [address] [place] [address]   12/1/2020  3:30 PM NAYANA Mccann   1/14/2021  8:00 AM Vito Diaz

## 2020-07-22 NOTE — PROGRESS NOTES
[address], 64 y.o. female presents today with:  Chief Complaint   Patient presents with    Pain     Twisted the right ankle today around 3:55 PM.         Ankle Pain    The incident occurred 1 to 3 hours ago. The incident occurred at home. The injury mechanism was an inversion injury. The pain is present in the right ankle. The pain is at a severity of 2/10 (with weight bearing up to 8/10). The pain has been constant since onset. Pertinent negatives include no inability to bear weight, loss of motion, loss of sensation, muscle weakness, numbness or tingling. She reports no foreign bodies present. The symptoms are aggravated by movement, weight bearing and palpation. She has tried ice, rest and elevation for the symptoms. The treatment provided mild relief. Review of Systems   Constitutional: Negative for activity change, appetite change, chills, diaphoresis, fatigue and fever. HENT: Negative for congestion, ear pain, postnasal drip, rhinorrhea, sore throat and trouble swallowing. Eyes: Negative for visual disturbance. Respiratory: Negative for cough, choking, chest tightness, shortness of breath and wheezing. Cardiovascular: Negative for chest pain and palpitations. Gastrointestinal: Negative for abdominal pain, diarrhea, nausea and vomiting. Genitourinary: Negative for difficulty urinating, dysuria, flank pain and urgency. Musculoskeletal: Positive for arthralgias, joint swelling and myalgias. Negative for back pain, neck pain and neck stiffness. Skin: Negative for color change and rash. Neurological: Negative for dizziness, tingling, tremors, seizures, syncope, speech difficulty, weakness, light-headedness, numbness and headaches.        Past Medical History:   Diagnosis Date    Abnormal EKG 3/23/2018    Angina pectoris syndrome ([facility] 03/23/2018    Diverticulosis of colon     [facility]    Dr Steven Rodriguez hypertension 1/15/2016  Family history of coronary artery disease 1/15/2016    Fatty infiltration of liver 2020    Gastric polyp 2019    Dr Joceline Guerrero, 5 mm    Generalized anxiety disorder     H/O: hysterectomy 2012    History of tobacco abuse 1/15/2016    Obesity (BMI 30-39. 9)     Other specified acquired hypothyroidism 2000    Precordial pain 03/23/2018    (? Prinzmetal?) Dr Joyce Precise S/P colonoscopy with polypectomy 2013, 2018, 2019    Dr Katrina Smart, Dr Joceline Guerrero, tubulovillous adenoma, hyperplastic polyp    S/P vaginal hysterectomy 2012    benign    Sleep apnea, obstructive 2007    was on CPAP, not using it at present    Vertigo 2018     Past Surgical History:   Procedure Laterality Date    CHOLECYSTECTOMY, [address] COLONOSCOPY      COLONOSCOPY N/A 6/30/2020    COLONOSCOPY DIAGNOSTIC performed by Jessy Byrnes MD at [facility] CATH LAB PROCEDURE      ENDOMETRIAL ABLATION  2002    metrorrhagia    ENDOSCOPY, COLON, DIAGNOSTIC      HYSTERECTOMY  9/7/12    fibroid, cervicitis, ovaries intact    ND COLON CA SCRN NOT  W [address] [place] [address] N/A 5/15/2018    COLONOSCOPY performed by Jessy Byrnes MD at [address] [facility] ENDOSCOPY N/A 4/2/2019    EGD ESOPHAGOGASTRODUODENOSCOPY performed by Jessy Byrnes MD at [address] Marital status:      Spouse name: Not on file    Number of children: 3    Years of education: Not on file    Highest education level: Not on file   Occupational History    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[address] , RN     Employer: [employer]   Social Needs    Financial resource strain: Not hard at all    Food insecurity     Worry: Never true     Inability: Never true    Transportation needs     Medical: No     Non-medical: No   Tobacco Use    Smoking status: Former Smoker     Packs/day: 1.00     Years: 30.00     Pack years: 30.00     Types: Cigarettes     Start date: 3/5/1975     Last attempt to quit: 6/12/2005     Years since quitting: 15.1    Smokeless tobacco: Never Used    Tobacco comment: 3/15/18 started age 17   Substance and Sexual Activity    Alcohol use: No     Comment: not at all     Drug use: No    Sexual activity: Not on file   Lifestyle    Physical activity     Days per week: 0 days     Minutes per session: 0 min    Stress: Not at all   Relationships    Social connections     Talks on phone: More than three times a week     Gets together: More than three times a week     Attends Judaism service: Never     Active member of club or organization: Not on file     Attends meetings of clubs or organizations: Never     Relationship status:     Intimate partner violence     Fear of current or ex partner: No     Emotionally abused: No     Physically abused: No     Forced sexual activity: No   Other Topics Concern    Not on file   Social History Narrative    Born in [place], one of 4 children (2 boys and 2 girls), both parents in the [place], moved to [place]    Mother in [place], has memory problems    [employer]     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    Has 2 dogs, Blossom and Orissaare    Children 3, 2 sons in the area, one daughter in [address]: dogs, sawing, machine embroidery, baking     Family History   Problem Relation Age of Onset    Arrhythmia Mother     Hypertension Mother     Dementia Mother     COPD Father         mesothelioma    Alcohol Abuse Father     Diabetes Paternal Grandmother     Diabetes Paternal Grandfather     Colon Cancer Paternal Grandfather     Schizophrenia Brother     Colon Cancer Paternal Uncle      Allergies   Allergen Reactions    Seasonal Itching     Current Outpatient Medications   Medication Sig Dispense Refill    Aspirin Buf,CaCarb-MgCarb-MgO, 81 MG TABS Take by mouth      empagliflozin (JARDIANCE) 10 MG tablet Take 1 tablet by mouth daily 90 tablet 3    buPROPion (WELLBUTRIN XL) 300 MG extended release tablet TAKE ONE TABLET BY MOUTH EVERY MORNING 90 tablet 1    lisinopril (PRINIVIL;ZESTRIL) 5 MG tablet TAKE ONE TABLET BY MOUTH EVERY MORNING 90 tablet 1    celecoxib (CELEBREX) 200 MG capsule Take 1 capsule by mouth daily For low back/hip pain. 90 capsule 0    Blood Glucose Monitoring Suppl (Power2Switch WIRELESS 2) w/Device KIT Use as directed to monitor blood sugars 1 kit 0    blood glucose test strips ([employer] JAZZ TEST) strip Use to test blood sugar 1 time daily 100 each 3    AgaMatrix Ultra-Thin Lancets MISC Use to test blood sugar 1 time daily 100 each 3    Blood Glucose Calibration ([employer]ion[employer] CONTROL) SOLN Use as directed for control solution test 1 each 0    levothyroxine (SYNTHROID) 50 MCG tablet TAKE 1 TABLET BY MOUTH ONE TIME DAILY 90 tablet 1    metoprolol tartrate (LOPRESSOR) 25 MG tablet Take 1 tablet by mouth 2 times daily 180 tablet 1    pravastatin (PRAVACHOL) 20 MG tablet TAKE ONE TABLET BY MOUTH EVERY EVENING 90 tablet 1    hydrochlorothiazide (HYDRODIURIL) 25 MG tablet Take 1 tablet by mouth every morning 90 tablet 1    albuterol sulfate HFA (PROAIR HFA) 108 (90 Base) MCG/ACT inhaler Inhale 2 puffs into the lungs every 6 hours as needed for Wheezing or Shortness of Breath (Patient taking differently: Inhale 2 puffs into the lungs every 6 hours as needed for Wheezing or Shortness of Breath Indications: never used ) 1 Inhaler 3    Respiratory Therapy Supplies CLARA Please give patient the farshad view mask 1 Device 0    nitroGLYCERIN (NITROSTAT) 0.4 MG SL tablet Place 1 tablet under the tongue every 5 minutes as needed for Chest pain up to max of 3 total doses. If no relief after 1 dose, call 911. (Patient taking differently: Place 0.4 mg under the tongue every 5 minutes as needed for Chest pain up to max of 3 total doses.  If no relief after 1 dose, call 911.) 25 tablet 3    Cholecalciferol (VITAMIN D) 2000 UNITS CAPS capsule Take by mouth daily      ibuprofen (ADVIL;MOTRIN) 200 MG tablet Take 200 mg by mouth every 6 hours as needed for Pain      loratadine (CLARITIN) 10 MG tablet Take 10 mg by mouth daily as needed (Allergies)        No current facility-administered medications for this visit. PMH, Surgical Hx, Family Hx, and Social Hx reviewed and updated. Health Maintenance reviewed. Objective    Vitals:    07/22/20 1648   BP: 136/80   Site: Left Upper Arm   Position: Sitting   Cuff Size: Medium Adult   Pulse: 65   Resp: 16   Temp: 98.4 °F (36.9 °C)   TempSrc: Tympanic   SpO2: 100%   Weight: 222 lb (100.7 kg)   Height: 5' 5\" (1.651 m)       Physical Exam  Constitutional:       General: She is not in acute distress. Appearance: Normal appearance. She is normal weight. She is not ill-appearing, toxic-appearing or diaphoretic. HENT:      Head: Normocephalic and atraumatic. Right Ear: External ear normal.      Left Ear: External ear normal.      Nose: Nose normal.   Eyes:      General:         Right eye: No discharge. Left eye: No discharge. Conjunctiva/sclera: Conjunctivae normal.      Pupils: Pupils are equal, round, and reactive to light. Neck:      Musculoskeletal: Normal range of motion and neck supple. No neck rigidity or muscular tenderness. Cardiovascular:      Rate and Rhythm: Normal rate and regular rhythm. Pulses: Normal pulses. Pulmonary:      Effort: Pulmonary effort is normal.   Musculoskeletal: Normal range of motion. General: Swelling, tenderness and signs of injury present. No deformity. Right knee: Normal.      Right ankle: She exhibits swelling. She exhibits normal range of motion, no ecchymosis, no deformity and no laceration. Tenderness. Lateral malleolus tenderness found. No medial malleolus and no AITFL tenderness found. Right lower leg: Normal.        Feet:    Skin:     General: Skin is warm and dry.       Capillary Refill: Capillary refill takes less than 2 seconds. Neurological:      General: No focal deficit present. Mental Status: She is alert and oriented to person, place, and time. Mental status is at baseline. Cranial Nerves: No cranial nerve deficit. Sensory: No sensory deficit. Motor: No weakness. Coordination: Coordination normal.         Assessment & Plan    Diagnosis Orders   1. Sprain of right ankle, unspecified ligament, initial encounter  XR ANKLE RIGHT (MIN 3 VIEWS)     Orders Placed This Encounter   Procedures    XR ANKLE RIGHT (MIN 3 VIEWS)     Standing Status:   Future     Standing Expiration Date:   7/22/2021     Order Specific Question:   Reason for exam:     Answer:   inversion injury rolled ankle at 4pm today increased pain and swelling     No orders of the defined types were placed in this encounter. There are no discontinued medications. Return in about 1 week (around 7/29/2020), or if symptoms worsen or fail to improve. Reviewed with the patient: current clinical status,medications, activities and diet. Side effects, adverse effects of themedication prescribed today, as well as treatment plan/ rationale and result expectations have been discussed with the patient who expresses understanding and desires to proceed. Close follow up to evaluate treatment results and for coordination of care. I have reviewed the patient's medical history in detail and updated the computerized patient record.      Kole Interiano, APRN - CNP

## 2020-07-23 ENCOUNTER — HOSPITAL ENCOUNTER (OUTPATIENT)
Dept: WOMENS IMAGING | Age: 60
Discharge: HOME | End: 2020-07-25
Payer: COMMERCIAL

## 2020-07-23 ENCOUNTER — CARE COORDINATION (OUTPATIENT)
Dept: OTHER | Facility: CLINIC | Age: 60
End: 2020-07-23

## 2020-07-23 PROCEDURE — 77063 BREAST TOMOSYNTHESIS BI: CPT

## 2020-07-23 NOTE — CARE COORDINATION
previous level of around 5    Barriers: medication side effects - pt will be starting jardiance when PA has gone through, was unable to tolerate Trulicity   Plan for overcoming my barriers: working with pcp and pharmacy to get jardiance approved  Confidence: 10/10  Anticipated Goal Completion Date: 6/30/3030    Jardiance approved    7/23 - A1C is now up to 7.3            Prior to Admission medications    Medication Sig Start Date End Date Taking? Authorizing Provider   Aspirin Buf,CaCarb-MgCarb-MgO, 81 MG TABS Take by mouth    Historical Provider, MD   empagliflozin (JARDIANCE) 10 MG tablet Take 1 tablet by mouth daily 7/16/20   Harmony Tyson PA-C   buPROPion (WELLBUTRIN XL) 300 MG extended release tablet TAKE ONE TABLET BY MOUTH EVERY MORNING 6/26/20   Harmony Tyson PA-C   lisinopril (PRINIVIL;ZESTRIL) 5 MG tablet TAKE ONE TABLET BY MOUTH EVERY MORNING 6/26/20   Harmony Tyson PA-C   celecoxib (CELEBREX) 200 MG capsule Take 1 capsule by mouth daily For low back/hip pain.  6/1/20   Harmony Tyson PA-C   Blood Glucose Monitoring Suppl (AGAMATRTrippy Bandz JAZZ WIRELESS 2) w/Device KIT Use as directed to monitor blood sugars 4/28/20   Taylor Coleman MD   blood glucose test strips (AGAMATRIX JAZZ TEST) strip Use to test blood sugar 1 time daily 4/28/20   MD Shweta MauricioMatrix Ultra-Thin Lancets MISC Use to test blood sugar 1 time daily 4/28/20   Taylor Coleman MD   Blood Glucose Calibration (AGAMATRIX CONTROL) SOLN Use as directed for control solution test 4/28/20   Taylor Coleman MD   levothyroxine (SYNTHROID) 50 MCG tablet TAKE 1 TABLET BY MOUTH ONE TIME DAILY 12/10/19   Marli Roberto MD   metoprolol tartrate (LOPRESSOR) 25 MG tablet Take 1 tablet by mouth 2 times daily 12/10/19   Marli Roberto MD   pravastatin (PRAVACHOL) 20 MG tablet TAKE ONE TABLET BY MOUTH EVERY EVENING 12/10/19   Marli Roberto MD   hydrochlorothiazide (HYDRODIURIL) 25 MG tablet Take 1 tablet by mouth every morning 12/10/19   Marli Roberto MD   albuterol sulfate HFA (PROAIR HFA) 108 (90 Base) MCG/ACT inhaler Inhale 2 puffs into the lungs every 6 hours as needed for Wheezing or Shortness of Breath  Patient taking differently: Inhale 2 puffs into the lungs every 6 hours as needed for Wheezing or Shortness of Breath Indications: never used  6/13/18   Alex Andrade PA-C   Respiratory Therapy Supplies CLARA Please give patient the farshad view mask 5/31/18   Alex Andrade PA-C   nitroGLYCERIN (NITROSTAT) 0.4 MG SL tablet Place 1 tablet under the tongue every 5 minutes as needed for Chest pain up to max of 3 total doses. If no relief after 1 dose, call 911. Patient taking differently: Place 0.4 mg under the tongue every 5 minutes as needed for Chest pain up to max of 3 total doses.  If no relief after 1 dose, call 911. 3/23/18   Shivam Michael, DO   Cholecalciferol (VITAMIN D) 2000 UNITS CAPS capsule Take by mouth daily    Historical Provider, MD   ibuprofen (ADVIL;MOTRIN) 200 MG tablet Take 200 mg by mouth every 6 hours as needed for Pain    Historical Provider, MD   loratadine (CLARITIN) 10 MG tablet Take 10 mg by mouth daily as needed (Allergies)     Historical Provider, MD       Future Appointments   Date Time Provider Elizabeth Brock   7/23/2020  4:00  [facility]   12/1/2020  3:30 PM NAYANA Hein   1/14/2021  8:00 AM Vito Rodriguez

## 2020-08-14 ENCOUNTER — CARE COORDINATION (OUTPATIENT)
Dept: OTHER | Facility: CLINIC | Age: 60
End: 2020-08-14

## 2020-08-14 NOTE — CARE COORDINATION
Ambulatory Care Coordination Note  8/14/2020  CM Risk Score: 6  Charlson 10 Year Mortality Risk Score: 23%     ACC: Aneta Girard, RN    Summary Note: ACM attempted to reach patient for follow up call regarding care coordination HIPAA compliant message left requesting a return phone call at patients convenience. Will continue to follow. Care Coordination Interventions    Program Enrollment:  Rising Risk  Referral from Primary Care Provider:  No  Suggested Interventions and Community Resources  Diabetes Education: In Process  Zone Management Tools:  Completed         Goals Addressed    None         Prior to Admission medications    Medication Sig Start Date End Date Taking? Authorizing Provider   hydroCHLOROthiazide (HYDRODIURIL) 25 MG tablet Take 1 tablet by mouth every morning 7/31/20   Harmony Tyson PA-C   Aspirin Buf,CaCarb-MgCarb-MgO, 81 MG TABS Take by mouth    Historical Provider, MD   empagliflozin (JARDIANCE) 10 MG tablet Take 1 tablet by mouth daily 7/16/20   Harmony Tyson PA-C   buPROPion (WELLBUTRIN XL) 300 MG extended release tablet TAKE ONE TABLET BY MOUTH EVERY MORNING 6/26/20   Harmony Tyson PA-C   lisinopril (PRINIVIL;ZESTRIL) 5 MG tablet TAKE ONE TABLET BY MOUTH EVERY MORNING 6/26/20   Harmony Tyson PA-C   celecoxib (CELEBREX) 200 MG capsule Take 1 capsule by mouth daily For low back/hip pain.  6/1/20   Harmony Tyson PA-C   Blood Glucose Monitoring Suppl (AGAMATRIX JAZZ WIRELESS 2) w/Device KIT Use as directed to monitor blood sugars 4/28/20   Royal Gomez MD   blood glucose test strips (AGAMATRIX JAZZ TEST) strip Use to test blood sugar 1 time daily 4/28/20   Royal Gomez MD   AgaMatrix Ultra-Thin Lancets MISC Use to test blood sugar 1 time daily 4/28/20   Royal Gomez MD   Blood Glucose Calibration (AGAMATRIX CONTROL) SOLN Use as directed for control solution test 4/28/20   Royal Gomez MD   levothyroxine (SYNTHROID) 50 MCG tablet TAKE 1 TABLET BY MOUTH ONE TIME DAILY 12/10/19   Iván Abiola MD Feng   metoprolol tartrate (LOPRESSOR) 25 MG tablet Take 1 tablet by mouth 2 times daily 12/10/19   Kristel Carlson MD   pravastatin (PRAVACHOL) 20 MG tablet TAKE ONE TABLET BY MOUTH EVERY EVENING 12/10/19   Kristel Carlson MD   albuterol sulfate HFA (PROAIR HFA) 108 (90 Base) MCG/ACT inhaler Inhale 2 puffs into the lungs every 6 hours as needed for Wheezing or Shortness of Breath  Patient taking differently: Inhale 2 puffs into the lungs every 6 hours as needed for Wheezing or Shortness of Breath Indications: never used  6/13/18   Ethan Degroot PA-C   Respiratory Therapy Supplies CLARA Please give patient the farshad view mask 5/31/18   Ethan Degroot PA-C   nitroGLYCERIN (NITROSTAT) 0.4 MG SL tablet Place 1 tablet under the tongue every 5 minutes as needed for Chest pain up to max of 3 total doses. If no relief after 1 dose, call 911. Patient taking differently: Place 0.4 mg under the tongue every 5 minutes as needed for Chest pain up to max of 3 total doses.  If no relief after 1 dose, call 911. 3/23/18   Shivam Michael, DO   Cholecalciferol (VITAMIN D) 2000 UNITS CAPS capsule Take by mouth daily    Historical Provider, MD   ibuprofen (ADVIL;MOTRIN) 200 MG tablet Take 200 mg by mouth every 6 hours as needed for Pain    Historical Provider, MD   loratadine (CLARITIN) 10 MG tablet Take 10 mg by mouth daily as needed (Allergies)     Historical Provider, MD       Future Appointments   Date Time Provider Elizabeth Brock   12/1/2020  3:30 PM NAYANA Zamora [facility]   1/14/2021  8:00 AM Vito Cross

## 2020-08-25 ENCOUNTER — CARE COORDINATION (OUTPATIENT)
Dept: OTHER | Facility: CLINIC | Age: 60
End: 2020-08-25

## 2020-08-25 NOTE — CARE COORDINATION
Ambulatory Care Coordination Note  8/25/2020  CM Risk Score: 6  Charlson 10 Year Mortality Risk Score: 23%     ACC: Valentine Jacobs RN    Summary Note: Pt returned call. She cares for her mom who lives with her and has dementia and is on xarelto for a fib. Her brother and sister live in her moms house in [place] and makes the house payment for them. States her mom is getting worse, sleeps a lot and doesn't really eat much. Is drinking 3 ensures a day and chocolate. They have an aide coming in during the day. Pt has a brother who lives near by but he does not help with their mom. Every Thursday for 3 hours the aide is coming to give her some relief   Blood sugars - she has not checking her blood sugars. Pt said her mom just got home from the hospital and she has been stressed with trying to manage her. She is working on increasing the aide hours to give her and her family some time. Pt said she will check her blood sugar and send them to me in a note in my chart. Care Coordination Interventions    Program Enrollment:  Rising Risk  Referral from Primary Care Provider:  No  Suggested Interventions and Community Resources  Diabetes Education: In Process  Zone Management Tools:  Completed         Goals Addressed                 This Visit's Progress     Medication Management        I will take my medication as directed. Barriers: none  Plan for overcoming my barriers: will work with pharmacist and ACM to stay on track with adherence  Confidence: 8/10  Anticipated Goal Completion Date: 9/15/20       Patient Stated (pt-stated)        Pt will check blood sugars twice daily 2-3 x per week     Barriers: none  Plan for overcoming my barriers: N/A  Confidence: 8/10  Anticipated Goal Completion Date: 9/23/2020            Mana LARSON, RN- [facility]   Associate Care Manager  [phone]    Prior to Admission medications    Medication Sig Start Date End Date Taking?  Authorizing Provider   hydroCHLOROthiazide (HYDRODIURIL) 25 MG tablet Take 1 tablet by mouth every morning 7/31/20   Harmony Tyson PA-C   Aspirin Buf,CaCarb-MgCarb-MgO, 81 MG TABS Take by mouth    Historical Provider, MD   empagliflozin (JARDIANCE) 10 MG tablet Take 1 tablet by mouth daily 7/16/20   Harmony Tyson PA-C   buPROPion (WELLBUTRIN XL) 300 MG extended release tablet TAKE ONE TABLET BY MOUTH EVERY MORNING 6/26/20   Harmony Tyson PA-C   lisinopril (PRINIVIL;ZESTRIL) 5 MG tablet TAKE ONE TABLET BY MOUTH EVERY MORNING 6/26/20   Harmony Tyson PA-C   celecoxib (CELEBREX) 200 MG capsule Take 1 capsule by mouth daily For low back/hip pain.  6/1/20   Harmony Tyson PA-C   Blood Glucose Monitoring Suppl (Chirp[employer] WIRELESS 2) w/Device KIT Use as directed to monitor blood sugars 4/28/20   Aishwarya Traore MD   blood glucose test strips (AGASL8Z | [employer] JAZZ TEST) strip Use to test blood sugar 1 time daily 4/28/20   Aishwarya Traore MD   AgaMatrix Ultra-Thin Lancets MISC Use to test blood sugar 1 time daily 4/28/20   Aishwarya Traore MD   Blood Glucose Calibration (AGAMATRIX CONTROL) SOLN Use as directed for control solution test 4/28/20   Aishwarya Traore MD   levothyroxine (SYNTHROID) 50 MCG tablet TAKE 1 TABLET BY MOUTH ONE TIME DAILY 12/10/19   Zafar Parks MD   metoprolol tartrate (LOPRESSOR) 25 MG tablet Take 1 tablet by mouth 2 times daily 12/10/19   Zafar Parks MD   pravastatin (PRAVACHOL) 20 MG tablet TAKE ONE TABLET BY MOUTH EVERY EVENING 12/10/19   Zafar Parks MD   albuterol sulfate HFA (PROAIR HFA) 108 (90 Base) MCG/ACT inhaler Inhale 2 puffs into the lungs every 6 hours as needed for Wheezing or Shortness of Breath  Patient taking differently: Inhale 2 puffs into the lungs every 6 hours as needed for Wheezing or Shortness of Breath Indications: never used  6/13/18   Bess Antunez PA-C   Respiratory Therapy Supplies CLARA Please give patient the farshad view mask 5/31/18   Bess Antunez PA-C   nitroGLYCERIN (NITROSTAT) 0.4 MG SL tablet Place 1 tablet under the tongue every 5 minutes as needed for Chest pain up to max of 3 total doses. If no relief after 1 dose, call 911. Patient taking differently: Place 0.4 mg under the tongue every 5 minutes as needed for Chest pain up to max of 3 total doses.  If no relief after 1 dose, call 911. 3/23/18   Shivam Michael,    Cholecalciferol (VITAMIN D) 2000 UNITS CAPS capsule Take by mouth daily    Historical Provider, MD   ibuprofen (ADVIL;MOTRIN) 200 MG tablet Take 200 mg by mouth every 6 hours as needed for Pain    Historical Provider, MD   loratadine (CLARITIN) 10 MG tablet Take 10 mg by mouth daily as needed (Allergies)     Historical Provider, MD       Future Appointments   Date Time Provider Elizabeth Brock   12/1/2020  3:30 PM NAYANA Hein [facility]   1/14/2021  8:00 AM Vito Rodriguez

## 2020-09-21 ENCOUNTER — OFFICE VISIT (OUTPATIENT)
Dept: CARDIOLOGY CLINIC | Age: 60
End: 2020-09-21
Payer: COMMERCIAL

## 2020-09-21 VITALS
BODY MASS INDEX: 38.44 KG/M2 | DIASTOLIC BLOOD PRESSURE: 70 MMHG | WEIGHT: 231 LBS | HEART RATE: 61 BPM | SYSTOLIC BLOOD PRESSURE: 120 MMHG

## 2020-09-21 PROCEDURE — 93000 ELECTROCARDIOGRAM COMPLETE: CPT | Performed by: INTERNAL MEDICINE

## 2020-09-21 PROCEDURE — 99213 OFFICE O/P EST LOW 20 MIN: CPT | Performed by: INTERNAL MEDICINE

## 2020-09-21 RX ORDER — NITROGLYCERIN 0.4 MG/1
0.4 TABLET SUBLINGUAL EVERY 5 MIN PRN
Qty: 20 TABLET | Refills: 3 | Status: SHIPPED | OUTPATIENT
Start: 2020-09-21 | End: 2022-06-07

## 2020-09-21 RX ORDER — AMLODIPINE BESYLATE 5 MG/1
5 TABLET ORAL DAILY
Qty: 30 TABLET | Refills: 3 | Status: SHIPPED | OUTPATIENT
Start: 2020-09-21 | End: 2021-03-12

## 2020-09-21 NOTE — PROGRESS NOTES
Chief Complaint   Patient presents with    Chest Pain       1-15-16: Patient presents for initial medical evaluation. Patient is followed on a regular basis by Dr. Letha Melendrez MD. S/p hospitalization for CP. S/p normal treadmill stress echo with normal LVF. States symptoms have resolved. Was under a lot of stress at that time. Pt denies chest pain, dyspnea, dyspnea on exertion, change in exercise capacity, fatigue,  nausea, vomiting, diarrhea, constipation, motor weakness, insomnia, weight loss, syncope, dizziness, lightheadedness, palpitations, PND, orthopnea, or claudication. Losing weight. BP is good.        3-23-18: felt a pressure this am that awakened her from sleep, lasted about 20 min, and radiated to her jaw. No associated SOB, N/V or diaphoresis. Has been under a lot of stress. EKG with new anterolateral changes. Pt denies , dyspnea, dyspnea on exertion, change in exercise capacity, fatigue,  nausea, vomiting, diarrhea, constipation, motor weakness, insomnia, weight loss, syncope, dizziness, lightheadedness, palpitations, PND, orthopnea. No hx of LHC or stress test. No hx of MI, CHF or arrhythmia.     6-1-18: s/p LHC with normal coronaries, normal LVF. Still having mid sternal chest pain, radiates to the back. Does get it with activity now and sometimes occurs at rest. She took a nitro and that helped. Has become more frequent now and any time she took a walk. No excessive caffeine. No smoking. Pt denies  dyspnea, dyspnea on exertion, change in exercise capacity, fatigue,  nausea, vomiting, diarrhea, constipation, motor weakness, insomnia, weight loss, syncope, dizziness, lightheadedness, palpitations, PND, orthopnea, or claudication. BP is mildly high. No smoking. 12-21-18: doing well on Ranexa. Symptoms have much improved since last visit. No GERD type symptoms.  Pt deniesdyspnea, dyspnea on exertion, change in exercise capacity, fatigue,  nausea, vomiting, diarrhea, constipation, motor weakness, insomnia, weight loss, syncope, dizziness, lightheadedness, palpitations, PND, orthopnea, or claudication. No nitro use. BP and hr are good. CAD is stable. No LE discoloration or ulcers. No LE edema. No CHF type symptoms. Lipid profile is normal. No recent hospitalization. No change in meds. BS is controlled. HGA1c is 7. EKg with NSR, no ischemia. No smoking. 2-1-19: experiencing chest pain/discomfort on and off and more frequent lately. States its worsened with activity, walking around at [employer], in the office. Did have an episode yesterday that felt like GERD type symptoms. Radiates to the back and right. Pt denies dyspnea, dyspnea on exertion, change in exercise capacity, fatigue,  nausea, vomiting, diarrhea, constipation, motor weakness, insomnia, weight loss, syncope, dizziness, lightheadedness, palpitations, PND, orthopnea, or claudication. No diaphoresis. No smoking. Drinks a couple of cups of tea a day, even when she went caffeine free still had symptoms. On PPI. Imdur gave her HA.     3-29-19: continues to have episodes of CP/angina despite increasing Ranexa and lorpessor. Mainly symptoms are at night and worse with going up stairs. Was seen by GI, placed on PP 2x/day. Will have an EGD done Wednesday. Pt denies nausea, vomiting, diarrhea, constipation, motor weakness, insomnia, weight loss, syncope, dizziness, lightheadedness, palpitations, PND, orthopnea, or claudication. 9-21-20: continues to have CP with exertion, along with SOB, has to rest to get relief. She was taken off of Ranexa, reduced lisinopril to 5mg daily and helped with her vertigo. S/p EGD which was ok with patient. Was placed on Celebrex for Sciatica and did not really help with her CP. Does not reproduce with palpation, deep breathing or coughing. Pain mainly brought on by walking/exercising/exertion but not all the time. No N/V diaphoresis. Nitro did help before with the pain. No smoking for 15 yrs.      Patient Active Problem List   Diagnosis    Vitamin D deficiency    Generalized anxiety disorder    Sleep apnea, obstructive    Type II diabetes mellitus, uncontrolled ([facility] [address]    Hypothyroidism    Essential hypertension    Family history of coronary artery disease    Personal history of tobacco use    Non-cardiac chest pain    History of colon polyps    Polyp of colon    Tobacco abuse, in remission    Irregular Z line of esophagus    Gastric polyp    BELTRAN (nonalcoholic steatohepatitis)       Past Surgical History:   Procedure Laterality Date    CHOLECYSTECTOMY, LAPAROSCOPIC  1995    COLONOSCOPY      COLONOSCOPY N/A 6/30/2020    COLONOSCOPY DIAGNOSTIC performed by Charmayne Ion, MD at [facility] CATH LAB PROCEDURE      ENDOMETRIAL ABLATION  2002    metrorrhagia    ENDOSCOPY, COLON, DIAGNOSTIC      HYSTERECTOMY  9/7/12    fibroid, cervicitis, ovaries intact    NJ COLON CA SCRN NOT HI RSK IND N/A 5/15/2018    COLONOSCOPY performed by Charmayne Ion, MD at [address] [facility] ENDOSCOPY N/A 4/2/2019    EGD ESOPHAGOGASTRODUODENOSCOPY performed by Charmayne Ion, MD at [facility] Marital status:      Spouse name: Not on file    Number of children: 3    Years of education: Not on file    Highest education level: Not on file   Occupational History    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[address] , RN     Employer: [employer]   Social Needs    Financial resource strain: Not hard at all    Food insecurity     Worry: Never true     Inability: Never true    Transportation needs     Medical: No     Non-medical: No   Tobacco Use    Smoking status: Former Smoker     Packs/day: 1.00     Years: 30.00     Pack years: 30.00     Types: Cigarettes     Start date: 3/5/1975     Last attempt to quit: 6/12/2005     Years since quitting: 15.2    Smokeless tobacco: Never Used    Tobacco comment: 3/15/18 started age 17   Substance and Sexual Activity    Alcohol use: No     Comment: not at all     Drug use: No    Sexual activity: Not on file   Lifestyle    Physical activity     Days per week: 0 days     Minutes per session: 0 min    Stress: Not at all   Relationships    Social connections     Talks on phone: More than three times a week     Gets together: More than three times a week     Attends Caodaism service: Never     Active member of club or organization: Not on file     Attends meetings of clubs or organizations: Never     Relationship status:     Intimate partner violence     Fear of current or ex partner: No     Emotionally abused: No     Physically abused: No     Forced sexual activity: No   Other Topics Concern    Not on file   Social History Narrative    Born in [place], one of 4 children (2 boys and 2 girls), both parents in the [place], moved to [place]    Mother in [place], has memory problems    Buddhist Anab[employer]ism     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    Has 2 dogs, Blossom and [address], 2 sons in the area, one daughter in [address]: dogs, sawing, machine embroidery, baking       Family History   Problem Relation Age of Onset    Arrhythmia Mother     Hypertension Mother     Dementia Mother     COPD Father         mesothelioma    Alcohol Abuse Father     Diabetes Paternal Grandmother     Diabetes Paternal Grandfather     Colon Cancer Paternal Grandfather     Schizophrenia Brother     Colon Cancer Paternal Uncle        Current Outpatient Medications   Medication Sig Dispense Refill    amLODIPine (NORVASC) 5 MG tablet Take 1 tablet by mouth daily 30 tablet 3    celecoxib (CELEBREX) 200 MG capsule TAKE 1 CAPSULE BY MOUTH ONE TIME A DAY FOR LOW BACK/HIP PAIN 60 capsule 0    levothyroxine (SYNTHROID) 50 MCG tablet TAKE 1 TABLET BY MOUTH ONE TIME A DAY 90 tablet 1    hydroCHLOROthiazide (HYDRODIURIL) 25 MG tablet Take 1 tablet by mouth every morning 90 tablet 1    Aspirin Buf,CaCarb-MgCarb-MgO, 81 MG TABS Take by mouth      empagliflozin (JARDIANCE) 10 MG tablet Take 1 tablet by mouth daily 90 tablet 3    buPROPion (WELLBUTRIN XL) 300 MG extended release tablet TAKE ONE TABLET BY MOUTH EVERY MORNING 90 tablet 1    lisinopril (PRINIVIL;ZESTRIL) 5 MG tablet TAKE ONE TABLET BY MOUTH EVERY MORNING 90 tablet 1    Blood Glucose Monitoring Suppl ([employer] WIRELESS 2) w/Device KIT Use as directed to monitor blood sugars 1 kit 0    blood glucose test strips ([employer] JAZZ TEST) strip Use to test blood sugar 1 time daily 100 each 3    AgaMatrix Ultra-Thin Lancets MISC Use to test blood sugar 1 time daily 100 each 3    Blood Glucose Calibration ([employer] CONTROL) SOLN Use as directed for control solution test 1 each 0    metoprolol tartrate (LOPRESSOR) 25 MG tablet Take 1 tablet by mouth 2 times daily 180 tablet 1    pravastatin (PRAVACHOL) 20 MG tablet TAKE ONE TABLET BY MOUTH EVERY EVENING 90 tablet 1    albuterol sulfate HFA (PROAIR HFA) 108 (90 Base) MCG/ACT inhaler Inhale 2 puffs into the lungs every 6 hours as needed for Wheezing or Shortness of Breath (Patient taking differently: Inhale 2 puffs into the lungs every 6 hours as needed for Wheezing or Shortness of Breath Indications: never used ) 1 Inhaler 3    Respiratory Therapy Supplies CLARA Please give patient the farshad view mask 1 Device 0    nitroGLYCERIN (NITROSTAT) 0.4 MG SL tablet Place 1 tablet under the tongue every 5 minutes as needed for Chest pain up to max of 3 total doses. If no relief after 1 dose, call 911. (Patient taking differently: Place 0.4 mg under the tongue every 5 minutes as needed for Chest pain up to max of 3 total doses.  If no relief after 1 dose, call 911.) 25 tablet 3    Cholecalciferol (VITAMIN D) 2000 UNITS CAPS capsule Take by mouth daily      ibuprofen (ADVIL;MOTRIN) 200 MG tablet Take 200 mg by mouth every 6 hours as needed for Pain      loratadine (CLARITIN) 10 MG tablet Take 10 mg by mouth daily as needed (Allergies)        No current facility-administered medications for this visit. Seasonal    Review of Systems:  General ROS: negative  Psychological ROS: negative  Hematological and Lymphatic ROS: No history of blood clots or bleeding disorder. Respiratory ROS: no cough, shortness of breath, or wheezing  Cardiovascular ROS: positive for - chest pain  Gastrointestinal ROS: no abdominal pain, change in bowel habits, or black or bloody stools  Genito-Urinary ROS: no dysuria, trouble voiding, or hematuria  Musculoskeletal ROS: negative  Neurological ROS: no TIA or stroke symptoms  Dermatological ROS: negative    VITALS:  Blood pressure 120/70, pulse 61, weight 231 lb (104.8 kg), not currently breastfeeding. Body mass index is 38.44 kg/m². Physical Examination:  General appearance - alert, well appearing, and in no distress  Mental status - alert, oriented to person, place, and time  Neck - Neck is supple, no JVD or carotid bruits. No thyromegaly or adenopathy. Chest - clear to auscultation, no wheezes, rales or rhonchi, symmetric air entry  Heart - normal rate, regular rhythm, normal S1, S2, no murmurs, rubs, clicks or gallops  Abdomen - soft, nontender, nondistended, no masses or organomegaly  Neurological - alert, oriented, normal speech, no focal findings or movement disorder noted  Extremities - peripheral pulses normal, no pedal edema, no clubbing or cyanosis  Skin - normal coloration and turgor, no rashes, no suspicious skin lesions noted      EKG: normal sinus rhythm, T wave inversion in lateral leads. Orders Placed This Encounter   Procedures    EKG 12 Lead       ASSESSMENT:     Diagnosis Orders   1. Precordial pain  EKG 12 Lead     ? Coronary vasospasm.      Hx of DM  HTN  HLD  Obesity class II  Remote hx of tob abuse      PLAN:     Patient will need to continue to follow up with you for their general medical care     As always, aggressive risk factor modification is strongly recommended. We should adhere to the JNC VIII guidelines for HTN management and the NCEP ATP III guidelines for LDL-C management. Cardiac diet is always recommended with low fat, cholesterol, calories and sodium. Continue medications at current doses. Check EKG. Lopressor 25mg BID    Try CCB given likely vasospastic angina. Stop lisinopril for now. The proper use and anticipated side effects of nitroglycerine has been carefully explained. If a single episode of chest pain is not relieved by one tablet, the patient will try another within 5 minutes; and if this doesn't relieve the pain, the patient is instructed to call 911 for transportation to an emergency department. Patient is to avoid any excessive caffeine, chocolate, or OTC stimulants. Patient was advised and encouraged to check blood pressure at home or at a pharmacy, maintain a logbook, and also call us back if blood pressure are above the target ranges or if it is low. Patient clearly understands and agrees to the instructions. We will need to continue to monitor muscle and liver enzymes, BUN, CR, and electrolytes. Details of medical condition explained and patient was warned about adverse consequences of uncontrolled medical conditions and possible side effects of prescribed medications.

## 2020-09-28 NOTE — PATIENT INSTRUCTIONS
Chief Complaint   Patient presents with     Urinary Retention     Patient here today for Catheter removal after Rezum         Catheter removal documentation on 9/28/2020:    Gene Pagan presents to the clinic for catheter removal.  Reason for removal: After Rezum  Order has been verified. YES  Catheter successfully removed at 9:30 AM without immediate complication.  100 cc's of urine present in the catheter bag.  Urethral meatus is free of secretions and encrustation.  The patient is afebrile.  The patient tolerated the procedure and was instructed to monitor for pain or discomfort, return or call for pain, fever, leakage or decreased urine flow, watch for signs of infection and CALL IF NOT ABLE TO VOID.      Gene Pagan comes into clinic today at the request of Dr Perez Ordering Provider for Catheter removal    This service provided today was under the supervising provider of the day  Dr Vides  who was available if needed.      YASMEEN Johnston     Patient Education        Ankle Sprain: Care Instructions  Your Care Instructions     An ankle sprain can happen when you twist your ankle. The ligaments that support the ankle can get stretched and torn. Often the ankle is swollen and painful. Ankle sprains may take from several weeks to several months to heal. Usually, the more pain and swelling you have, the more severe your ankle sprain is and the longer it will take to heal. You can heal faster and regain strength in your ankle with good home treatment. It is very important to give your ankle time to heal completely, so that you do not easily hurt your ankle again. Follow-up care is a key part of your treatment and safety. Be sure to make and go to all appointments, and call your doctor if you are having problems. It's also a good idea to know your test results and keep a list of the medicines you take. How can you care for yourself at home? · Prop up your foot on pillows as much as possible for the next 3 days. Try to keep your ankle above the level of your heart. This will help reduce the swelling. · Follow your doctor's directions for wearing a splint or elastic bandage. Wrapping the ankle may help reduce or prevent swelling. · Your doctor may give you a splint, a brace, an air stirrup, or another form of ankle support to protect your ankle until it is healed. Wear it as directed while your ankle is healing. Do not remove it unless your doctor tells you to. After your ankle has healed, ask your doctor whether you should wear the brace when you exercise. · Put ice or cold packs on your injured ankle for 10 to 20 minutes at a time. Try to do this every 1 to 2 hours for the next 3 days (when you are awake) or until the swelling goes down. Put a thin cloth between the ice and your skin. · You may need to use crutches until you can walk without pain. If you do use crutches, try to bear some weight on your injured ankle if you can do so without pain. This helps the ankle heal.  · Take pain medicines exactly as directed. ? If the doctor gave you a prescription medicine for pain, take it as prescribed. ? If you are not taking a prescription pain medicine, ask your doctor if you can take an over-the-counter medicine. · If you have been given ankle exercises to do at home, do them exactly as instructed. These can promote healing and help prevent lasting weakness. When should you call for help? Call your doctor now or seek immediate medical care if:  · Your pain is getting worse. · Your swelling is getting worse. · Your splint feels too tight or you are unable to loosen it. Watch closely for changes in your health, and be sure to contact your doctor if:  · You are not getting better after 1 week. Where can you learn more? Go to https://CoinEx.pw.[employer]. org and sign in to your Aipai account. Enter C284 in the Backlift box to learn more about \"Ankle Sprain: Care Instructions. \"     If you do not have an account, please click on the \"Sign Up Now\" link. Current as of: March 2, 2020               Content Version: 12.5  © 7651-3740 "[employer]. Care instructions adapted under license by [facility] ([facility]). If you have questions about a medical condition or this instruction, always ask your healthcare professional. [address] any warranty or liability for your use of this information. Patient Education        Ankle Sprain: Care Instructions  Your Care Instructions     An ankle sprain can happen when you twist your ankle. The ligaments that support the ankle can get stretched and torn. Often the ankle is swollen and painful. Ankle sprains may take from several weeks to several months to heal. Usually, the more pain and swelling you have, the more severe your ankle sprain is and the longer it will take to heal. You can heal faster and regain strength in your ankle with good home treatment.   It is very important to give your ankle time to heal completely, so that you do not easily hurt your ankle again. Follow-up care is a key part of your treatment and safety. Be sure to make and go to all appointments, and call your doctor if you are having problems. It's also a good idea to know your test results and keep a list of the medicines you take. How can you care for yourself at home? · Prop up your foot on pillows as much as possible for the next 3 days. Try to keep your ankle above the level of your heart. This will help reduce the swelling. · Follow your doctor's directions for wearing a splint or elastic bandage. Wrapping the ankle may help reduce or prevent swelling. · Your doctor may give you a splint, a brace, an air stirrup, or another form of ankle support to protect your ankle until it is healed. Wear it as directed while your ankle is healing. Do not remove it unless your doctor tells you to. After your ankle has healed, ask your doctor whether you should wear the brace when you exercise. · Put ice or cold packs on your injured ankle for 10 to 20 minutes at a time. Try to do this every 1 to 2 hours for the next 3 days (when you are awake) or until the swelling goes down. Put a thin cloth between the ice and your skin. · You may need to use crutches until you can walk without pain. If you do use crutches, try to bear some weight on your injured ankle if you can do so without pain. This helps the ankle heal.  · Take pain medicines exactly as directed. ? If the doctor gave you a prescription medicine for pain, take it as prescribed. ? If you are not taking a prescription pain medicine, ask your doctor if you can take an over-the-counter medicine. · If you have been given ankle exercises to do at home, do them exactly as instructed. These can promote healing and help prevent lasting weakness. When should you call for help?    Call your doctor now or seek immediate medical care if:  · Your pain is getting worse. · Your swelling is getting worse. · Your splint feels too tight or you are unable to loosen it. Watch closely for changes in your health, and be sure to contact your doctor if:  · You are not getting better after 1 week. Where can you learn more? Go to https://chpepiceweb.[employer]. org and sign in to your SWYF account. Enter W929 in the [facility] box to learn more about \"Ankle Sprain: Care Instructions. \"     If you do not have an account, please click on the \"Sign Up Now\" link. Current as of: March 2, 2020               Content Version: 12.5  © 2006-2020 [employer]. Care instructions adapted under license by [facility] ([facility]). If you have questions about a medical condition or this instruction, always ask your healthcare professional. [address] any warranty or liability for your use of this information. Patient Education        Learning About RICE (Rest, Ice, Compression, and Elevation)  What is RICE? RICE is a way to care for an injury. RICE helps relieve pain and swelling. It may also help with healing and flexibility. RICE stands for:  · R est and protect the injured or sore area. · I ce or a cold pack used as soon as possible. · C ompression, or wrapping the injured or sore area with an elastic bandage. · E levation (propping up) the injured or sore area. How do you do RICE? You can use RICE for home treatment when you have general aches and pains or after an injury or surgery. Rest  · Do not put weight on the injury for at least 24 to 48 hours. · Use crutches for a badly sprained knee or ankle. · Support a sprained wrist, elbow, or shoulder with a sling. Ice  · Put ice or a cold pack on the injury right away to reduce pain and swelling. Frozen vegetables will also work as an ice pack. Put a thin cloth between the ice or cold pack and your skin.  The cloth protects the injured area from getting too cold.  · Use ice for 10 to 15 minutes at a time for the first 48 to 72 hours. Compression  · Use compression for sprains, strains, and surgeries of the arms and legs. · Wrap the injured area with an elastic bandage or compression sleeve to reduce swelling. · Don't wrap it too tightly. If the area below it feels numb, tingles, or feels cool, loosen the wrap. Elevation  · Use elevation for areas of the body that can be propped up, such as arms and legs. · Prop up the injured area on pillows whenever you use ice. Keep it propped up anytime you sit or lie down. · Try to keep the injured area at or above the level of your heart. This will help reduce swelling and bruising. Where can you learn more? Go to https://JK [employer] Solutionsjonathan.[employer]. org and sign in to your LiquidCompass account. Enter W389 in the EnvironmentIQ box to learn more about \"Learning About RICE (Rest, Ice, Compression, and Elevation). \"     If you do not have an account, please click on the \"Sign Up Now\" link. Current as of: March 2, 2020               Content Version: 12.5  © 2103-8264 [employer] Incorporated. Care instructions adapted under license by [facility] ([facility]). If you have questions about a medical condition or this instruction, always ask your healthcare professional. [address]  any warranty or liability for your use of this information.

## 2020-11-12 ENCOUNTER — PATIENT MESSAGE (OUTPATIENT)
Dept: FAMILY MEDICINE CLINIC | Age: 60
End: 2020-11-12

## 2020-11-13 NOTE — TELEPHONE ENCOUNTER
From: Papa Greene  To: Rojelio Sanders PA-C  Sent: 11/12/2020 10:23 PM EST  Subject: Non-Urgent Medical Question    Hi Harmony,  I've been having symptoms and concern that it's RA. For the past month I've been experiencing stiffness in my hands and feet. Worse first thing in the morning. I've also have had several episodes when my knees become red and very warm (see pictures). I'm not having any pain in my knees. I'm wondering if I should have some blood work done before my next appointment with you.    Thanks, Contentful

## 2020-11-21 DIAGNOSIS — M25.50 ARTHRALGIA OF MULTIPLE JOINTS: ICD-10-CM

## 2020-11-21 LAB
C-REACTIVE PROTEIN: 9.3 MG/L (ref 0–5)
SEDIMENTATION RATE, ERYTHROCYTE: 33 MM (ref 0–30)

## 2020-11-24 ENCOUNTER — OFFICE VISIT (OUTPATIENT)
Dept: FAMILY MEDICINE CLINIC | Age: 60
End: 2020-11-24
Payer: COMMERCIAL

## 2020-11-24 VITALS
BODY MASS INDEX: 38.05 KG/M2 | WEIGHT: 228.4 LBS | SYSTOLIC BLOOD PRESSURE: 128 MMHG | RESPIRATION RATE: 16 BRPM | TEMPERATURE: 98.1 F | HEIGHT: 65 IN | DIASTOLIC BLOOD PRESSURE: 72 MMHG | OXYGEN SATURATION: 100 % | HEART RATE: 69 BPM

## 2020-11-24 LAB
ANA IGG, ELISA: NORMAL
CCP IGG ANTIBODIES: 5 UNITS (ref 0–19)
HBA1C MFR BLD: 8 %
RHEUMATOID FACTOR: <10 IU/ML (ref 0–14)

## 2020-11-24 PROCEDURE — 83036 HEMOGLOBIN GLYCOSYLATED A1C: CPT | Performed by: PHYSICIAN ASSISTANT

## 2020-11-24 PROCEDURE — 99214 OFFICE O/P EST MOD 30 MIN: CPT | Performed by: PHYSICIAN ASSISTANT

## 2020-11-24 PROCEDURE — 3052F HG A1C>EQUAL 8.0%<EQUAL 9.0%: CPT | Performed by: PHYSICIAN ASSISTANT

## 2020-11-24 RX ORDER — GLUCOSAM/CHONDRO/HERB 149/HYAL 750-100 MG
1 TABLET ORAL DAILY
Qty: 90 TABLET | Refills: 3 | Status: SHIPPED | OUTPATIENT
Start: 2020-11-24 | End: 2021-06-29

## 2020-11-24 RX ORDER — ORAL SEMAGLUTIDE 3 MG/1
TABLET ORAL
Qty: 30 TABLET | Refills: 0 | Status: ON HOLD | COMMUNITY
Start: 2020-11-24 | End: 2021-01-13 | Stop reason: SINTOL

## 2020-11-24 NOTE — PROGRESS NOTES
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[place] , RN     Employer: [employer]   Social Needs    Financial resource strain: Not hard at all    Food insecurity     Worry: Never true     Inability: Never true    Transportation needs     Medical: No     Non-medical: No   Tobacco Use    Smoking status: Former Smoker     Packs/day: 1.00     Years: 30.00     Pack years: 30.00     Types: Cigarettes     Start date: 3/5/1975     Last attempt to quit: 6/12/2005     Years since quitting: 15.4    Smokeless tobacco: Never Used    Tobacco comment: 3/15/18 started age 17   Substance and Sexual Activity    Alcohol use: No     Comment: not at all     Drug use: No    Sexual activity: Not on file   Lifestyle    Physical activity     Days per week: 0 days     Minutes per session: 0 min    Stress: Not at all   Relationships    Social connections     Talks on phone: More than three times a week     Gets together: More than three times a week     Attends Christianity service: Never     Active member of club or organization: Not on file     Attends meetings of clubs or organizations: Never     Relationship status:     Intimate partner violence     Fear of current or ex partner: No     Emotionally abused: No     Physically abused: No     Forced sexual activity: No   Other Topics Concern    Not on file   Social History Narrative    Born in [place], one of 4 children (2 boys and 2 girls), both parents in the [place] [place], moved to [place]    Mother in [place], has memory problems    Church Muslim     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    Has 2 dogs, Blossom and [address], 2 sons in the area, one daughter in [address]: dogs, sawing, (PRAVACHOL) 20 MG tablet TAKE ONE TABLET BY MOUTH EVERY EVENING 90 tablet 1    albuterol sulfate HFA (PROAIR HFA) 108 (90 Base) MCG/ACT inhaler Inhale 2 puffs into the lungs every 6 hours as needed for Wheezing or Shortness of Breath (Patient taking differently: Inhale 2 puffs into the lungs every 6 hours as needed for Wheezing or Shortness of Breath Indications: never used ) 1 Inhaler 3    Respiratory Therapy Supplies CLARA Please give patient the farshad view mask 1 Device 0    Cholecalciferol (VITAMIN D) 2000 UNITS CAPS capsule Take by mouth daily      loratadine (CLARITIN) 10 MG tablet Take 10 mg by mouth daily as needed (Allergies)        No current facility-administered medications for this visit. PMH, Surgical Hx, Family Hx, and Social Hx reviewed and updated. Health Maintenance reviewed. Objective  Vitals:    11/24/20 1526   BP: 128/72   Site: Left Upper Arm   Position: Sitting   Cuff Size: Large Adult   Pulse: 69   Resp: 16   Temp: 98.1 °F (36.7 °C)   TempSrc: Temporal   SpO2: 100%   Weight: 228 lb 6.4 oz (103.6 kg)   Height: 5' 5\" (1.651 m)     BP Readings from Last 3 Encounters:   11/24/20 128/72   09/21/20 120/70   07/22/20 136/80     Wt Readings from Last 3 Encounters:   11/24/20 228 lb 6.4 oz (103.6 kg)   09/21/20 231 lb (104.8 kg)   07/22/20 222 lb (100.7 kg)     Physical Exam  Vitals signs reviewed. Constitutional:       Appearance: Normal appearance. She is obese. She is not ill-appearing or toxic-appearing. HENT:      Head: Normocephalic and atraumatic. Right Ear: External ear normal.      Left Ear: External ear normal.      Nose: Nose normal.      Mouth/Throat:      Mouth: Mucous membranes are moist.   Cardiovascular:      Rate and Rhythm: Normal rate and regular rhythm. Pulmonary:      Effort: Pulmonary effort is normal.      Breath sounds: Normal breath sounds. Musculoskeletal:         General: No swelling or tenderness. Right lower leg: No edema.       Left lower leg: No edema. Comments:  strength 4/5, bilaterally. Negative Tinnel's sign. Skin:     General: Skin is warm. Coloration: Skin is not pale. Findings: No erythema. Neurological:      General: No focal deficit present. Mental Status: She is alert and oriented to person, place, and time. Assessment & Plan   Dexter Parks was seen today for knee pain. Diagnoses and all orders for this visit:    Uncontrolled type 2 diabetes mellitus with hyperglycemia (HCC)  -     POCT glycosylated hemoglobin (Hb A1C)  -     Semaglutide (RYBELSUS) 3 MG TABS; Take 1 tablet by mouth daily for diabetes/blood sugar,    Weakness of both hands  -     EMG; Future  -     External Referral to Rheumatology    Joint stiffness  -     External Referral to Rheumatology  -     Misc Natural Products (GLUCOSAMINE CHOND COMPLEX/MSM) TABS; Take 1 tablet by mouth daily    3 month follow up with me.  30 day sample of rybelsus given to patient today. Start glucosamine supplement. Awaiting RA lab work to return. Orders Placed This Encounter   Procedures    External Referral to Rheumatology     Referral Priority:   Routine     Referral Type:   Eval and Treat     Referral Reason:   Specialty Services Required     Referred to Provider:   Norm Cuenca MD     Requested Specialty:   Rheumatology     Number of Visits Requested:   1    POCT glycosylated hemoglobin (Hb A1C)    EMG     Standing Status:   Future     Standing Expiration Date:   11/24/2021     Order Specific Question:   Which body part?      Answer:   bue, rule out CTS     Orders Placed This Encounter   Medications    Misc Natural Products (GLUCOSAMINE CHOND COMPLEX/MSM) TABS     Sig: Take 1 tablet by mouth daily     Dispense:  90 tablet     Refill:  3    Semaglutide (RYBELSUS) 3 MG TABS     Sig: Take 1 tablet by mouth daily for diabetes/blood sugar,     Dispense:  30 tablet     Refill:  0     Medications Discontinued During This Encounter   Medication Reason    Aspirin Buf,CaCarb-MgCarb-MgO, 81 MG TABS LIST CLEANUP    lisinopril (PRINIVIL;ZESTRIL) 5 MG tablet LIST CLEANUP    Blood Glucose Monitoring Suppl ([employer] GroupZZ WIRELESS 2) w/Device KIT LIST CLEANUP    ibuprofen (ADVIL;MOTRIN) 200 MG tablet LIST CLEANUP     Return in about 3 months (around 2/24/2021) for follow up in office. Reviewed with the patient: current clinical status, medications, activities and diet. Side effects, adverse effects of the medication prescribed today, as well as treatment plan/ rationale and result expectations have been discussed with the patient who expresses understanding and desires to proceed. Close follow up to evaluate treatment results and for coordination of care. I have reviewed the patient's medical history in detail and updated the computerized patient record.     Harmony Tyson PA-C

## 2020-11-29 PROBLEM — R29.898 WEAKNESS OF BOTH HANDS: Status: ACTIVE | Noted: 2020-11-29

## 2020-11-29 PROBLEM — M25.60 JOINT STIFFNESS: Status: ACTIVE | Noted: 2020-11-29

## 2020-11-29 ASSESSMENT — ENCOUNTER SYMPTOMS
BLOOD IN STOOL: 0
ABDOMINAL DISTENTION: 0
CHEST TIGHTNESS: 0
NAUSEA: 0
WHEEZING: 0
TROUBLE SWALLOWING: 0
COLOR CHANGE: 0
CONSTIPATION: 0
BACK PAIN: 1
DIARRHEA: 0
SHORTNESS OF BREATH: 0
ABDOMINAL PAIN: 0

## 2020-12-09 NOTE — TELEPHONE ENCOUNTER
Rx request   Requested Prescriptions     Pending Prescriptions Disp Refills    metoprolol tartrate (LOPRESSOR) 25 MG tablet 180 tablet 1     Sig: Take 1 tablet by mouth 2 times daily     LOV 11/24/2020  Next Visit Date:  Future Appointments   Date Time Provider Elizabeth Brock   12/16/2020  4:30 PM Dominique Altman MD [facility]   12/30/2020  8:30 AM ELISEO Hernandez - CNP [facility]   3/26/2021 12:45 PM Shivam Benjamin,  [employer]

## 2020-12-16 ENCOUNTER — HOSPITAL ENCOUNTER (OUTPATIENT)
Dept: NEUROLOGY | Age: 60
Discharge: HOME | End: 2020-12-16
Payer: COMMERCIAL

## 2020-12-16 PROCEDURE — 95886 MUSC TEST DONE W/N TEST COMP: CPT

## 2020-12-16 PROCEDURE — 95910 NRV CNDJ TEST 7-8 STUDIES: CPT

## 2020-12-23 RX ORDER — LANCETS 33 GAUGE
EACH MISCELLANEOUS
Qty: 100 EACH | Refills: 3 | Status: SHIPPED | OUTPATIENT
Start: 2020-12-23 | End: 2022-10-07

## 2020-12-23 NOTE — TELEPHONE ENCOUNTER
Requested Prescriptions     Pending Prescriptions Disp Refills    AgaMatrix Ultra-Thin Lancets MISC 100 each 3     Sig: Use to test blood sugar 1 time daily

## 2020-12-30 ENCOUNTER — VIRTUAL VISIT (OUTPATIENT)
Dept: GASTROENTEROLOGY | Age: 60
End: 2020-12-30
Payer: COMMERCIAL

## 2020-12-30 ENCOUNTER — TELEPHONE (OUTPATIENT)
Dept: PHARMACY | Facility: CLINIC | Age: 60
End: 2020-12-30

## 2020-12-30 PROCEDURE — 99213 OFFICE O/P EST LOW 20 MIN: CPT | Performed by: NURSE PRACTITIONER

## 2020-12-30 RX ORDER — PANTOPRAZOLE SODIUM 20 MG/1
20 TABLET, DELAYED RELEASE ORAL
Qty: 90 TABLET | Refills: 1 | Status: SHIPPED | OUTPATIENT
Start: 2020-12-30 | End: 2021-06-29 | Stop reason: SDUPTHER

## 2020-12-30 NOTE — TELEPHONE ENCOUNTER
Spoke with the patient to schedule a phone call with a pharmacist for the 2021 DM Program. Patient is currently scheduled on   Thursday Jan 14, 2021   Appt at 9:00 AM

## 2020-12-30 NOTE — PROGRESS NOTES
2020    TELEHEALTH EVALUATION -- Audio/Visual (During TGJRI-28 public health emergency)    Due to COVID 19 outbreak, patient's office visit was converted to a virtual visit. Patient was contacted and agreed to proceed with a virtual visit via Telephone Visit 15 minutes. The risks and benefits of converting to a virtual visit were discussed in light of the current infectious disease epidemic. Patient also understood that insurance coverage and co-pays are up to their individual insurance plans. Provider location: home office  Patient Location: home     HPI:  Noreen Hudson (:  1960) has requested an audio/video evaluation for the following concern(s):  Patient with vague complaints of indigestion, especially after certain meals. Describes it as chest pain/indigestion. Sees cardiology. Reports good response with PPI in the past.  No dysphagia, odynophagia, weight loss or loss of appetite. Does report right upper quadrant pressure. No fever or chills. Sedentary lifestyle. No complaints of diarrhea, constipation or bleeding. No palpitations, shortness of breath or syncopal episodes. Endoscopic investigation:     Colonoscopy 2020- scattered diverticulosis. Polyps-hyperplastic removed from the cecum and rectum. Surveillance due in . EGD 2019-normal esophagus. Z-line irregular. Single gastric polyp resected and retrieved. Otherwise a normal exam.  Review of Systems   All other systems reviewed and are negative. Prior to Visit Medications    Medication Sig Taking?  Authorizing Provider   pantoprazole (PROTONIX) 20 MG tablet Take 1 tablet by mouth every morning (before breakfast) Yes Manjinder Juares, APRN - CNP   AgaMatrix Ultra-Thin Lancets MISC Use to test blood sugar 1 time daily  Harmony Tyson PA-C   metoprolol tartrate (LOPRESSOR) 25 MG tablet Take 1 tablet by mouth 2 times daily  [employer], PA-C Misc Natural Products (GLUCOSAMINE CHOND COMPLEX/MSM) TABS Take 1 tablet by mouth daily  Harmony Tyson PA-C   Semaglutide (RYBELSUS) 3 MG TABS Take 1 tablet by mouth daily for diabetes/blood sugar,  Harmony Tyson PA-C   hydroCHLOROthiazide (HYDRODIURIL) 25 MG tablet TAKE 1 TABLET BY MOUTH ONE TIME A DAY IN THE MORNING  Harmony Tyson PA-C   celecoxib (CELEBREX) 200 MG capsule TAKE 1 CAPSULE BY MOUTH ONE TIME A DAY FOR LOW BACK/ HIP PAIN  Harmony Tyson PA-C   amLODIPine (NORVASC) 5 MG tablet Take 1 tablet by mouth daily  Shivam Michael, DO   nitroGLYCERIN (NITROSTAT) 0.4 MG SL tablet Place 1 tablet under the tongue every 5 minutes as needed for Chest pain Indications: never used up to max of 3 total doses. If no relief after 1 dose, call 911.   Shivam SIMON Holgabino, DO   levothyroxine (SYNTHROID) 50 MCG tablet TAKE 1 TABLET BY MOUTH ONE TIME A DAY  Harmony Tyson PA-C   empagliflozin (JARDIANCE) 10 MG tablet Take 1 tablet by mouth daily  Harmony Tyson PA-C   buPROPion (WELLBUTRIN XL) 300 MG extended release tablet TAKE ONE TABLET BY MOUTH EVERY MORNING  Harmony Tyson PA-C   blood glucose test strips (AGAMATRIX JAZZ TEST) strip Use to test blood sugar 1 time daily  Olivia George MD   Blood Glucose Calibration (AGAMATRIX CONTROL) SOLN Use as directed for control solution test  Juan Gomez MD   pravastatin (PRAVACHOL) 20 MG tablet TAKE ONE TABLET BY MOUTH EVERY EVENING  Soledad Toney MD   albuterol sulfate HFA (PROAIR HFA) 108 (90 Base) MCG/ACT inhaler Inhale 2 puffs into the lungs every 6 hours as needed for Wheezing or Shortness of Breath  Patient taking differently: Inhale 2 puffs into the lungs every 6 hours as needed for Wheezing or Shortness of Breath Indications: never used   Dayo Jett PA-C   Respiratory Therapy Supplies CLARA Please give patient the farshad view mask  Dayo Jett PA-C   Cholecalciferol (VITAMIN D) 2000 UNITS CAPS capsule Take by mouth daily  Historical Provider, MD loratadine (CLARITIN) 10 MG tablet Take 10 mg by mouth daily as needed (Allergies)   Historical Provider, MD       Social History     Tobacco Use    Smoking status: Former Smoker     Packs/day: 1.00     Years: 30.00     Pack years: 30.00     Types: Cigarettes     Start date: 3/5/1975     Quit date: 6/12/2005     Years since quitting: 15.5    Smokeless tobacco: Never Used    Tobacco comment: 3/15/18 started age 17   Substance Use Topics    Alcohol use: No     Comment: not at all     Drug use: No        Allergies   Allergen Reactions    Seasonal Itching   ,   Past Medical History:   Diagnosis Date    Abnormal EKG 3/23/2018    Angina pectoris syndrome ([facility] [address] 03/23/2018    Diverticulosis of colon     DMII (diabetes mellitus, type 2) ([facility] [address] 2000    Dr Sky Crenshaw hypertension 1/15/2016    Family history of coronary artery disease 1/15/2016    Fatty infiltration of liver 2020    Gastric polyp 2019    Dr Columba German, 5 mm    Generalized anxiety disorder     H/O: hysterectomy 2012    History of tobacco abuse 1/15/2016    Obesity (BMI 30-39. 9)     Other specified acquired hypothyroidism 2000    Precordial pain 03/23/2018    (? Prinzmetal?) Dr Alesia Mart S/P colonoscopy with polypectomy 2013, 2018, 2019    Dr Michael Valdez, Dr Columba German, tubulovillous adenoma, hyperplastic polyp    S/P vaginal hysterectomy 2012    benign    Sleep apnea, obstructive 2007    was on CPAP, not using it at present    Vertigo 2018   ,   Past Surgical History:   Procedure Laterality Date    CHOLECYSTECTOMY, [address] COLONOSCOPY      COLONOSCOPY N/A 6/30/2020    COLONOSCOPY DIAGNOSTIC performed by Catalino Bills MD at [address] [place] [address] CATH LAB PROCEDURE      ENDOMETRIAL ABLATION  2002    metrorrhagia    ENDOSCOPY, COLON, DIAGNOSTIC      HYSTERECTOMY  9/7/12    fibroid, cervicitis, ovaries intact    [place] COLON CA SCRN NOT  [address] IND N/A 5/15/2018

## 2021-01-06 ENCOUNTER — OFFICE VISIT (OUTPATIENT)
Dept: ORTHOPEDIC SURGERY | Age: 61
End: 2021-01-06
Payer: COMMERCIAL

## 2021-01-06 VITALS
OXYGEN SATURATION: 98 % | BODY MASS INDEX: 37.99 KG/M2 | HEIGHT: 65 IN | WEIGHT: 228 LBS | TEMPERATURE: 96.6 F | HEART RATE: 80 BPM

## 2021-01-06 DIAGNOSIS — G56.03 BILATERAL CARPAL TUNNEL SYNDROME: Primary | ICD-10-CM

## 2021-01-06 DIAGNOSIS — M65.332 TRIGGER FINGER, LEFT MIDDLE FINGER: ICD-10-CM

## 2021-01-06 DIAGNOSIS — M65.331 TRIGGER FINGER, RIGHT MIDDLE FINGER: ICD-10-CM

## 2021-01-06 PROCEDURE — 99205 OFFICE O/P NEW HI 60 MIN: CPT | Performed by: ORTHOPAEDIC SURGERY

## 2021-01-06 NOTE — PROGRESS NOTES
 S/P colonoscopy with polypectomy 2013, 2018, 2019    Dr Mario Fuentes, Dr Krystal Marley, tubulovillous adenoma, hyperplastic polyp    S/P vaginal hysterectomy 2012    benign    Sleep apnea, obstructive 2007    was on CPAP, not using it at present    Vertigo 2018     Past Surgical History:   Procedure Laterality Date    CHOLECYSTECTOMY, [address] N/A 6/30/2020    COLONOSCOPY DIAGNOSTIC performed by Lashanda Michael MD at [facility] CATH LAB PROCEDURE      ENDOMETRIAL ABLATION  2002    metrorrhagia    ENDOSCOPY, COLON, DIAGNOSTIC      HYSTERECTOMY  9/7/12    fibroid, cervicitis, ovaries intact    [facility] IND N/A 5/15/2018    COLONOSCOPY performed by Lashanda Michael MD at 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[address] , RN     Employer: [employer]   Social Needs    Financial resource strain: Not hard at all    Food insecurity     Worry: Never true     Inability: Never true    Transportation needs     Medical: No     Non-medical: No   Tobacco Use    Smoking status: Former Smoker     Packs/day: 1.00     Years: 30.00     Pack years: 30.00     Types: Cigarettes     Start date: 3/5/1975     Quit date: 6/12/2005     Years since quitting: 15.5    Smokeless tobacco: Never Used    Tobacco comment: 3/15/18 started age 17   Substance and Sexual Activity    Alcohol use: No     Comment: not at all     Drug use: No    Sexual activity: Not on file   Lifestyle    Physical activity     Days per week: 0 days Minutes per session: 0 min    Stress: Not at all   Relationships    Social connections     Talks on phone: More than three times a week     Gets together: More than three times a week     Attends Buddhism service: Never     Active member of club or organization: Not on file     Attends meetings of clubs or organizations: Never     Relationship status:     Intimate partner violence     Fear of current or ex partner: No     Emotionally abused: No     Physically abused: No     Forced sexual activity: No   Other Topics Concern    Not on file   Social History Narrative    Born in [place], one of 4 children (2 boys and 2 girls), both parents in the [place], moved to [place]    Mother in [place], has memory problems    Mandaen Jewish     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    Has 2 dogs, Blossom and Adilson Durie    Children 3, 2 sons in the area, one daughter in [address] [place] [address] Avenue: dogs, sawing, machine embroidery, baking     Family History   Problem Relation Age of Onset    Arrhythmia Mother     Hypertension Mother     Dementia Mother     COPD Father         mesothelioma    Alcohol Abuse Father     Diabetes Paternal Grandmother     Diabetes Paternal Grandfather     Colon Cancer Paternal Grandfather     Schizophrenia Brother     Colon Cancer Paternal Uncle      Allergies   Allergen Reactions    Seasonal Itching     Current Outpatient Medications on File Prior to Visit   Medication Sig Dispense Refill    pantoprazole (PROTONIX) 20 MG tablet Take 1 tablet by mouth every morning (before breakfast) 90 tablet 1    AgaMatrix Ultra-Thin Lancets MISC Use to test blood sugar 1 time daily 100 each 3    metoprolol tartrate (LOPRESSOR) 25 MG tablet Take 1 tablet by mouth 2 times daily 180 tablet 1    Misc Natural Products (GLUCOSAMINE CHOND COMPLEX/MSM) TABS Take 1 tablet by mouth daily 90 tablet 3  Semaglutide (RYBELSUS) 3 MG TABS Take 1 tablet by mouth daily for diabetes/blood sugar, 30 tablet 0    hydroCHLOROthiazide (HYDRODIURIL) 25 MG tablet TAKE 1 TABLET BY MOUTH ONE TIME A DAY IN THE MORNING 90 tablet 1    amLODIPine (NORVASC) 5 MG tablet Take 1 tablet by mouth daily 30 tablet 3    nitroGLYCERIN (NITROSTAT) 0.4 MG SL tablet Place 1 tablet under the tongue every 5 minutes as needed for Chest pain Indications: never used up to max of 3 total doses. If no relief after 1 dose, call 911. 20 tablet 3    levothyroxine (SYNTHROID) 50 MCG tablet TAKE 1 TABLET BY MOUTH ONE TIME A DAY 90 tablet 1    empagliflozin (JARDIANCE) 10 MG tablet Take 1 tablet by mouth daily 90 tablet 3    buPROPion (WELLBUTRIN XL) 300 MG extended release tablet TAKE ONE TABLET BY MOUTH EVERY MORNING 90 tablet 1    blood glucose test strips (AGAMATRIX JAZZ TEST) strip Use to test blood sugar 1 time daily 100 each 3    Blood Glucose Calibration (AGAMATRIX CONTROL) SOLN Use as directed for control solution test 1 each 0    pravastatin (PRAVACHOL) 20 MG tablet TAKE ONE TABLET BY MOUTH EVERY EVENING 90 tablet 1    albuterol sulfate HFA (PROAIR HFA) 108 (90 Base) MCG/ACT inhaler Inhale 2 puffs into the lungs every 6 hours as needed for Wheezing or Shortness of Breath (Patient taking differently: Inhale 2 puffs into the lungs every 6 hours as needed for Wheezing or Shortness of Breath Indications: never used ) 1 Inhaler 3    Respiratory Therapy Supplies CLARA Please give patient the farshad view mask 1 Device 0    Cholecalciferol (VITAMIN D) 2000 UNITS CAPS capsule Take by mouth daily      loratadine (CLARITIN) 10 MG tablet Take 10 mg by mouth daily as needed (Allergies)        No current facility-administered medications on file prior to visit. Review of Systems  Patient has no fever chills night sweats. No recent surgery and carpal tunnel. He said no previous carpal tunnel or trigger.     Objective: Pulse 80   Temp 96.6 °F (35.9 °C) (Temporal)   Ht 5' 5\" (1.651 m)   Wt 228 lb (103.4 kg)   SpO2 98%   BMI 37.94 kg/m²     ORTHOEXAM    Patient has a decree sensation meters effusion she has a positive Phalen's test she has triggers to the middle finger on both the left and right side the other fingers not involved including the thumb skins intact sensation is intact in the ulnar suffusion she has no interosseous wasting. Assessment:       Diagnosis Orders   1. Bilateral carpal tunnel syndrome     2. Trigger finger, left middle finger     3. Trigger finger, right middle finger           Plan:   Plan is to go ahead and proceed with a left carpal tunnel release and concomitant left middle finger trigger release. Those surgeries were discussed with her independently the risks and benefits of which immobilization recovery. We also discussed how they relate to her work. We have gone over the anesthetic and surgical options and times. We have also briefly gone on for the nonoperative options as well. Patient wished to proceed operatively and therefore wants to have that arranged. To start with left side and down the line sequentially to the right side. She has no other questions or concerns. The patient was counseled at length about the risks of dillon Covid-19 during their perioperative period and any recovery window from their procedure. The patient was made aware that dillon Covid-19  may worsen their prognosis for recovering from their procedure  and lend to a higher morbidity and/or mortality risk. All material risks, benefits, and reasonable alternatives including postponing the procedure were discussed. The patient does wish to proceed with the procedure at this time. No orders of the defined types were placed in this encounter. No orders of the defined types were placed in this encounter. No follow-ups on file.       Jarrett Day MD Surgery Phone: [phone]   [facility]   Surgery Fax: [phone]    Phone: [phone]   PAT Fax: [phone]    Fax: [phone]    Orthopedics: Surgery Scheduling, PAT & PRE-OP Order Form  Call to advance [place] at [phone] at least 24 hours prior to date of service     [facility]gery [facility] Surgery: [address] [place] [address], [address] [place] [address]  OFFICE   Surgeon's Ayanna Miranda MD Surgery Date:   Time: tf   Patient's Name: Lynn Pederson : 1960    Gender: female  Home Phone:  [phone] Cell Phone: [phone]  Emergency Contact:  Collette Downs   Phone: [phone]  Payor: Amadou Galan /  /  /    ID No.: [id number]      PROVIDER TO COMPLETE:  Diagnosis: Left middle finger trigger, left carpal tunnel syndrome  Procedure/Consent: Left carpal tunnel release, left middle finger trigger release  Case Comments/Implants: N/A   Surgery Scheduled as:  Outpatient  Anesthesia Requested: Ravi Cruz  Referring Family Doctor: NAYANA Rosenberg  [x] [facility] Date/Time:                                                            [x] History & Physical [] Physician will Provide [] Attached [] Dictated [] Other  [x] Follow Anesthesia Pre-Op Orders for X-rays, [facility]     [x] SN & PT to evaluate and treat/educate disease management, medications, home safety & equipment needs for total joint patients  [] Other: ____________________________________________________  Consults: Medical/Cardiac Clearance done by  ____________________  PRE-OP ORDERS:   Allergies: Seasonal Latex Allergies:             Diabetic:           [] IV ________________________  [x] IV Start with J-loop     Preprinted Orders: Attached [] Yes [] No   ANTIBIOTIC PRE-OP: [x] ANCEF 2 gram IVPB if > 120 kg 3 grams IVPB within 1 hour of incision, if ALLERGIC, use VANCOMYCIN 1 gram IV, 2 hours pre-op  [] TXA Protocol [] Other:   [x] NPO [] Betablocker (if needed) _____________________________________   [] Knee high anti-embolic hose [] Thigh high anti-embolic hose   Other: ______________________________________________________    Physician Signature Required:  Date/Time: 1/6/2021

## 2021-01-07 ENCOUNTER — NURSE ONLY (OUTPATIENT)
Dept: PRIMARY CARE CLINIC | Age: 61
End: 2021-01-07

## 2021-01-07 ENCOUNTER — OFFICE VISIT (OUTPATIENT)
Dept: ORTHOPEDIC SURGERY | Age: 61
End: 2021-01-07
Payer: COMMERCIAL

## 2021-01-07 VITALS
HEIGHT: 65 IN | OXYGEN SATURATION: 98 % | WEIGHT: 228 LBS | HEART RATE: 68 BPM | TEMPERATURE: 96.2 F | BODY MASS INDEX: 37.99 KG/M2

## 2021-01-07 DIAGNOSIS — Z01.818 PREOP EXAMINATION: ICD-10-CM

## 2021-01-07 DIAGNOSIS — Z01.818 PREOP EXAMINATION: Primary | ICD-10-CM

## 2021-01-07 DIAGNOSIS — Z01.818 ENCOUNTER FOR PREADMISSION TESTING: Primary | ICD-10-CM

## 2021-01-07 LAB
ALBUMIN SERPL-MCNC: 4.5 G/DL (ref 3.5–4.6)
ALP BLD-CCNC: 95 U/L (ref 40–130)
ALT SERPL-CCNC: 41 U/L (ref 0–33)
ANION GAP SERPL CALCULATED.3IONS-SCNC: 14 MEQ/L (ref 9–15)
AST SERPL-CCNC: 26 U/L (ref 0–35)
BILIRUB SERPL-MCNC: 0.4 MG/DL (ref 0.2–0.7)
BUN BLDV-MCNC: 16 MG/DL (ref 6–20)
CALCIUM SERPL-MCNC: 10 MG/DL (ref 8.5–9.9)
CHLORIDE BLD-SCNC: 101 MEQ/L (ref 95–107)
CO2: 27 MEQ/L (ref 20–31)
CREAT SERPL-MCNC: 1.17 MG/DL (ref 0.5–0.9)
GFR AFRICAN AMERICAN: 57.5
GFR NON-AFRICAN AMERICAN: 47.5
GLOBULIN: 3.5 G/DL (ref 2.3–3.5)
GLUCOSE BLD-MCNC: 97 MG/DL (ref 70–99)
HCT VFR BLD CALC: 41 % (ref 37–47)
HEMOGLOBIN: 13.7 G/DL (ref 12–16)
MCH RBC QN AUTO: 28.5 PG (ref 27–31.3)
MCHC RBC AUTO-ENTMCNC: 33.4 % (ref 33–37)
MCV RBC AUTO: 85.4 FL (ref 82–100)
PDW BLD-RTO: 14.3 % (ref 11.5–14.5)
PLATELET # BLD: 408 K/UL (ref 130–400)
POTASSIUM SERPL-SCNC: 3.8 MEQ/L (ref 3.4–4.9)
RBC # BLD: 4.8 M/UL (ref 4.2–5.4)
SODIUM BLD-SCNC: 142 MEQ/L (ref 135–144)
TOTAL PROTEIN: 8 G/DL (ref 6.3–8)
WBC # BLD: 9.9 K/UL (ref 4.8–10.8)

## 2021-01-07 PROCEDURE — 93010 ELECTROCARDIOGRAM REPORT: CPT | Performed by: PHYSICIAN ASSISTANT

## 2021-01-07 PROCEDURE — 99204 OFFICE O/P NEW MOD 45 MIN: CPT | Performed by: PHYSICIAN ASSISTANT

## 2021-01-07 NOTE — PROGRESS NOTES
[facility]    H&P: Preadmission Testing     Patient: Morris Ricardo  YOB: 1960  MRN: [id number]    Subjective:     Chief Complaint   Patient presents with    Pre-op Exam     pt here for pre op exam for left carpal tunnel release, Surgery 1/13, pt states she still has pain. HPI: Morris Ricardo is a 64 y.o. female w/ pertinent PMHx of recurrent gastritis, previous tobacco abuse, diabetes, atypical chest pain, DONNIE here for preop evaluation for carpal tunnel release scheduled for next Wednesday. Her diabetes is decently controlled however she has complications with that including kidney disease. Her primary care doctor sent stage III. Also with BELTRAN, not taking any Tylenol or anti-inflammatories. She instructed not take care of those probably for the rest of her life. For her heart issue, Followed by Dr. Marin Carson, received a left heart cath with no coronary blockage. Normal LV function. Atypical chest pain constituted as vasospastic angina. She taken beta-blocker and calcium channel blocker. No recent significant chest pain, has some difficulties with exertion but this is nothing new for her. No recent wheezing or shortness of breath. She is no longer a smoker. Not taking any blood thinners. She is on a beta-blocker, she was instructed to continue taking this through surgery  Past Medical History:        Diagnosis Date    Abnormal EKG 3/23/2018    Angina pectoris syndrome ([facility] [address] 03/23/2018    Diverticulosis of colon     DMII (diabetes mellitus, type 2) ([address] 2000    Dr Bhatt [place] hypertension 1/15/2016    Family history of coronary artery disease 1/15/2016    Fatty infiltration of liver 2020    Gastric polyp 2019    Dr Caridad Mejias, 5 mm    Generalized anxiety disorder     H/O: hysterectomy 2012    History of tobacco abuse 1/15/2016    Obesity (BMI 30-39. 9)     Other specified acquired hypothyroidism 2000    Precordial pain 03/23/2018 (? Prinzmetal?) Dr Gabino Kussmaul S/P colonoscopy with polypectomy 2013, 2018, 2019    Dr Madelyn Traylor, Dr So Bailey, tubulovillous adenoma, hyperplastic polyp    S/P vaginal hysterectomy 2012    benign    Sleep apnea, obstructive 2007    was on CPAP, not using it at present    Vertigo 2018     Past Surgical History:    Past Surgical History:   Procedure Laterality Date    CHOLECYSTECTOMY, [address] COLONOSCOPY      COLONOSCOPY N/A 6/30/2020    COLONOSCOPY DIAGNOSTIC performed by Yury Baxter MD at [facility] CATH LAB PROCEDURE      ENDOMETRIAL ABLATION  2002    metrorrhagia    ENDOSCOPY, COLON, DIAGNOSTIC      HYSTERECTOMY  9/7/12    fibroid, cervicitis, ovaries intact    [place] COLON CA SCRN NOT  [address] [facility] N/A 5/15/2018    COLONOSCOPY performed by Yury Baxter MD at [address] [place] [address] N/A 4/2/2019    EGD ESOPHAGOGASTRODUODENOSCOPY performed by Yury Baxter MD at [facility]       Medications Prior to Admission:    Current Outpatient Medications   Medication Sig Dispense Refill    pantoprazole (PROTONIX) 20 MG tablet Take 1 tablet by mouth every morning (before breakfast) 90 tablet 1    AgaMatrix Ultra-Thin Lancets MISC Use to test blood sugar 1 time daily 100 each 3    metoprolol tartrate (LOPRESSOR) 25 MG tablet Take 1 tablet by mouth 2 times daily 180 tablet 1    Misc Natural Products (GLUCOSAMINE CHOND COMPLEX/MSM) TABS Take 1 tablet by mouth daily 90 tablet 3    Semaglutide (RYBELSUS) 3 MG TABS Take 1 tablet by mouth daily for diabetes/blood sugar, 30 tablet 0    hydroCHLOROthiazide (HYDRODIURIL) 25 MG tablet TAKE 1 TABLET BY MOUTH ONE TIME A DAY IN THE MORNING 90 tablet 1    amLODIPine (NORVASC) 5 MG tablet Take 1 tablet by mouth daily 30 tablet 3  nitroGLYCERIN (NITROSTAT) 0.4 MG SL tablet Place 1 tablet under the tongue every 5 minutes as needed for Chest pain Indications: never used up to max of 3 total doses. If no relief after 1 dose, call 911. 20 tablet 3    levothyroxine (SYNTHROID) 50 MCG tablet TAKE 1 TABLET BY MOUTH ONE TIME A DAY 90 tablet 1    empagliflozin (JARDIANCE) 10 MG tablet Take 1 tablet by mouth daily 90 tablet 3    buPROPion (WELLBUTRIN XL) 300 MG extended release tablet TAKE ONE TABLET BY MOUTH EVERY MORNING 90 tablet 1    blood glucose test strips (AGAMATRIX JAZZ TEST) strip Use to test blood sugar 1 time daily 100 each 3    Blood Glucose Calibration (AGAMATRIX CONTROL) SOLN Use as directed for control solution test 1 each 0    pravastatin (PRAVACHOL) 20 MG tablet TAKE ONE TABLET BY MOUTH EVERY EVENING 90 tablet 1    albuterol sulfate HFA (PROAIR HFA) 108 (90 Base) MCG/ACT inhaler Inhale 2 puffs into the lungs every 6 hours as needed for Wheezing or Shortness of Breath (Patient taking differently: Inhale 2 puffs into the lungs every 6 hours as needed for Wheezing or Shortness of Breath Indications: never used ) 1 Inhaler 3    Respiratory Therapy Supplies CLARA Please give patient the farshad view mask 1 Device 0    Cholecalciferol (VITAMIN D) 2000 UNITS CAPS capsule Take by mouth daily      loratadine (CLARITIN) 10 MG tablet Take 10 mg by mouth daily as needed (Allergies)        No current facility-administered medications for this visit.         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[place] , RN     Employer: [employer]   Social Needs    Financial resource strain: Not hard at all    Food insecurity     Worry: Never true     Inability: Never true  Transportation needs     Medical: No     Non-medical: No   Tobacco Use    Smoking status: Former Smoker     Packs/day: 1.00     Years: 30.00     Pack years: 30.00     Types: Cigarettes     Start date: 3/5/1975     Quit date: 6/12/2005     Years since quitting: 15.5    Smokeless tobacco: Never Used    Tobacco comment: 3/15/18 started age 17   Substance and Sexual Activity    Alcohol use: No     Comment: not at all     Drug use: No    Sexual activity: Not on file   Lifestyle    Physical activity     Days per week: 0 days     Minutes per session: 0 min    Stress: Not at all   Relationships    Social connections     Talks on phone: More than three times a week     Gets together: More than three times a week     Attends Worship service: Never     Active member of club or organization: Not on file     Attends meetings of clubs or organizations: Never     Relationship status:     Intimate partner violence     Fear of current or ex partner: No     Emotionally abused: No     Physically abused: No     Forced sexual activity: No   Other Topics Concern    Not on file   Social History Narrative    Born in [place], one of 4 children (2 boys and 2 girls), both parents in the [place], moved to [place]    Mother in [place], has memory problems    Orthodoxy Jehovah's witness     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     Physical Exam Constitutional:       General: She is not in acute distress. Appearance: Normal appearance. She is not ill-appearing. HENT:      Head: Normocephalic. Nose: Nose normal. No congestion or rhinorrhea. Mouth/Throat:      Mouth: Mucous membranes are moist.      Pharynx: Oropharynx is clear. No oropharyngeal exudate or posterior oropharyngeal erythema. Eyes:      Extraocular Movements: Extraocular movements intact. Pupils: Pupils are equal, round, and reactive to light. Cardiovascular:      Rate and Rhythm: Normal rate and regular rhythm. Pulses: Normal pulses. Heart sounds: Normal heart sounds. Pulmonary:      Effort: Pulmonary effort is normal.      Breath sounds: Normal breath sounds. No wheezing, rhonchi or rales. Abdominal:      General: Abdomen is flat. Bowel sounds are normal.      Palpations: Abdomen is soft. Tenderness: There is no abdominal tenderness. Skin:     General: Skin is warm and dry. Capillary Refill: Capillary refill takes less than 2 seconds. Comments: No swelling or erythema over the incision site   Neurological:      General: No focal deficit present. Mental Status: She is alert and oriented to person, place, and time. Radiographs and Laboratory Studies:   EKG:  NSR  Laboratory Studies:   Lab Results   Component Value Date    WBC 9.3 06/01/2020    HGB 11.6 (L) 06/01/2020    HCT 34.7 (L) 06/01/2020    MCV 86.5 06/01/2020     06/01/2020     Lab Results   Component Value Date    SEDRATE 33 (H) 11/21/2020     Lab Results   Component Value Date    CRP 9.3 (H) 11/21/2020       Assessment and Plan:      Diagnosis Orders   1. Preop examination  Comprehensive Metabolic Panel    CBC    [id number] Ambulatory     Instructed to continue taking her beta-blocker. Patient was instructed to quarantine until the day of surgery after getting the COVID test.  Blood work and EKG was ordered and will be reviewed. I'll see them back 2 weeks postoperatively.     Juan Carlos Silveira PA-C  [facility]  [phone]

## 2021-01-09 LAB
SARS-COV-2: NOT DETECTED
SOURCE: NORMAL

## 2021-01-13 ENCOUNTER — ANESTHESIA EVENT (OUTPATIENT)
Dept: OPERATING ROOM | Age: 61
End: 2021-01-13
Payer: COMMERCIAL

## 2021-01-13 ENCOUNTER — ANESTHESIA (OUTPATIENT)
Dept: OPERATING ROOM | Age: 61
End: 2021-01-13
Payer: COMMERCIAL

## 2021-01-13 ENCOUNTER — HOSPITAL ENCOUNTER (OUTPATIENT)
Age: 61
Setting detail: OUTPATIENT SURGERY
Discharge: HOME | End: 2021-01-13
Attending: ORTHOPAEDIC SURGERY | Admitting: ORTHOPAEDIC SURGERY
Payer: COMMERCIAL

## 2021-01-13 VITALS
SYSTOLIC BLOOD PRESSURE: 115 MMHG | HEART RATE: 71 BPM | OXYGEN SATURATION: 95 % | HEIGHT: 65 IN | BODY MASS INDEX: 37.82 KG/M2 | WEIGHT: 227 LBS | DIASTOLIC BLOOD PRESSURE: 55 MMHG | TEMPERATURE: 97 F | RESPIRATION RATE: 18 BRPM

## 2021-01-13 VITALS — SYSTOLIC BLOOD PRESSURE: 142 MMHG | DIASTOLIC BLOOD PRESSURE: 73 MMHG | OXYGEN SATURATION: 99 % | TEMPERATURE: 96.8 F

## 2021-01-13 DIAGNOSIS — G89.18 POST-OPERATIVE PAIN: ICD-10-CM

## 2021-01-13 DIAGNOSIS — G89.18 CHEST WALL PAIN FOLLOWING SURGERY: Primary | ICD-10-CM

## 2021-01-13 DIAGNOSIS — R07.89 CHEST WALL PAIN FOLLOWING SURGERY: Primary | ICD-10-CM

## 2021-01-13 LAB
GLUCOSE BLD-MCNC: 148 MG/DL (ref 60–115)
PERFORMED ON: ABNORMAL

## 2021-01-13 PROCEDURE — 7100000010 HC PHASE II RECOVERY - FIRST 15 MIN: Performed by: ORTHOPAEDIC SURGERY

## 2021-01-13 PROCEDURE — 3700000000 HC ANESTHESIA ATTENDED CARE: Performed by: ORTHOPAEDIC SURGERY

## 2021-01-13 PROCEDURE — 3700000001 HC ADD 15 MINUTES (ANESTHESIA): Performed by: ORTHOPAEDIC SURGERY

## 2021-01-13 PROCEDURE — 3600000013 HC SURGERY LEVEL 3 ADDTL 15MIN: Performed by: ORTHOPAEDIC SURGERY

## 2021-01-13 PROCEDURE — 3600000003 HC SURGERY LEVEL 3 BASE: Performed by: ORTHOPAEDIC SURGERY

## 2021-01-13 PROCEDURE — 6360000002 HC RX W HCPCS: Performed by: ORTHOPAEDIC SURGERY

## 2021-01-13 PROCEDURE — 6360000002 HC RX W HCPCS: Performed by: NURSE ANESTHETIST, CERTIFIED REGISTERED

## 2021-01-13 PROCEDURE — 2500000003 HC RX 250 WO HCPCS: Performed by: ORTHOPAEDIC SURGERY

## 2021-01-13 PROCEDURE — 2500000003 HC RX 250 WO HCPCS: Performed by: NURSE ANESTHETIST, CERTIFIED REGISTERED

## 2021-01-13 PROCEDURE — 26145 TENDON EXCISION PALM/FINGER: CPT | Performed by: ORTHOPAEDIC SURGERY

## 2021-01-13 PROCEDURE — 2709999900 HC NON-CHARGEABLE SUPPLY: Performed by: ORTHOPAEDIC SURGERY

## 2021-01-13 PROCEDURE — 64721 CARPAL TUNNEL SURGERY: CPT | Performed by: ORTHOPAEDIC SURGERY

## 2021-01-13 PROCEDURE — 2580000003 HC RX 258: Performed by: ORTHOPAEDIC SURGERY

## 2021-01-13 RX ORDER — ONDANSETRON 2 MG/ML
INJECTION INTRAMUSCULAR; INTRAVENOUS PRN
Status: DISCONTINUED | OUTPATIENT
Start: 2021-01-13 | End: 2021-01-13 | Stop reason: SDUPTHER

## 2021-01-13 RX ORDER — PROPOFOL 10 MG/ML
INJECTION, EMULSION INTRAVENOUS CONTINUOUS PRN
Status: DISCONTINUED | OUTPATIENT
Start: 2021-01-13 | End: 2021-01-13 | Stop reason: SDUPTHER

## 2021-01-13 RX ORDER — CEFAZOLIN SODIUM 2 G/50ML
2 SOLUTION INTRAVENOUS
Status: COMPLETED | OUTPATIENT
Start: 2021-01-13 | End: 2021-01-13

## 2021-01-13 RX ORDER — DIPHENHYDRAMINE HYDROCHLORIDE 50 MG/ML
12.5 INJECTION INTRAMUSCULAR; INTRAVENOUS
Status: DISCONTINUED | OUTPATIENT
Start: 2021-01-13 | End: 2021-01-13 | Stop reason: HOSPADM

## 2021-01-13 RX ORDER — MEPERIDINE HYDROCHLORIDE 25 MG/ML
12.5 INJECTION INTRAMUSCULAR; INTRAVENOUS; SUBCUTANEOUS EVERY 5 MIN PRN
Status: DISCONTINUED | OUTPATIENT
Start: 2021-01-13 | End: 2021-01-13 | Stop reason: HOSPADM

## 2021-01-13 RX ORDER — SODIUM CHLORIDE, SODIUM LACTATE, POTASSIUM CHLORIDE, CALCIUM CHLORIDE 600; 310; 30; 20 MG/100ML; MG/100ML; MG/100ML; MG/100ML
INJECTION, SOLUTION INTRAVENOUS CONTINUOUS
Status: DISCONTINUED | OUTPATIENT
Start: 2021-01-13 | End: 2021-01-13 | Stop reason: HOSPADM

## 2021-01-13 RX ORDER — SODIUM CHLORIDE 0.9 % (FLUSH) 0.9 %
10 SYRINGE (ML) INJECTION EVERY 12 HOURS SCHEDULED
Status: DISCONTINUED | OUTPATIENT
Start: 2021-01-13 | End: 2021-01-13 | Stop reason: HOSPADM

## 2021-01-13 RX ORDER — HYDROCODONE BITARTRATE AND ACETAMINOPHEN 5; 325 MG/1; MG/1
1 TABLET ORAL PRN
Status: DISCONTINUED | OUTPATIENT
Start: 2021-01-13 | End: 2021-01-13 | Stop reason: HOSPADM

## 2021-01-13 RX ORDER — MAGNESIUM HYDROXIDE 1200 MG/15ML
LIQUID ORAL CONTINUOUS PRN
Status: COMPLETED | OUTPATIENT
Start: 2021-01-13 | End: 2021-01-13

## 2021-01-13 RX ORDER — METOCLOPRAMIDE HYDROCHLORIDE 5 MG/ML
10 INJECTION INTRAMUSCULAR; INTRAVENOUS
Status: DISCONTINUED | OUTPATIENT
Start: 2021-01-13 | End: 2021-01-13 | Stop reason: HOSPADM

## 2021-01-13 RX ORDER — FENTANYL CITRATE 50 UG/ML
50 INJECTION, SOLUTION INTRAMUSCULAR; INTRAVENOUS EVERY 10 MIN PRN
Status: DISCONTINUED | OUTPATIENT
Start: 2021-01-13 | End: 2021-01-13 | Stop reason: HOSPADM

## 2021-01-13 RX ORDER — SODIUM CHLORIDE 0.9 % (FLUSH) 0.9 %
10 SYRINGE (ML) INJECTION PRN
Status: DISCONTINUED | OUTPATIENT
Start: 2021-01-13 | End: 2021-01-13 | Stop reason: HOSPADM

## 2021-01-13 RX ORDER — HYDROCODONE BITARTRATE AND ACETAMINOPHEN 5; 325 MG/1; MG/1
2 TABLET ORAL PRN
Status: DISCONTINUED | OUTPATIENT
Start: 2021-01-13 | End: 2021-01-13 | Stop reason: HOSPADM

## 2021-01-13 RX ORDER — HYDROCODONE BITARTRATE AND ACETAMINOPHEN 5; 325 MG/1; MG/1
1 TABLET ORAL EVERY 4 HOURS PRN
Qty: 15 TABLET | Refills: 0 | Status: SHIPPED | OUTPATIENT
Start: 2021-01-13 | End: 2021-01-16

## 2021-01-13 RX ORDER — ONDANSETRON 2 MG/ML
4 INJECTION INTRAMUSCULAR; INTRAVENOUS
Status: DISCONTINUED | OUTPATIENT
Start: 2021-01-13 | End: 2021-01-13 | Stop reason: HOSPADM

## 2021-01-13 RX ORDER — LIDOCAINE HYDROCHLORIDE 10 MG/ML
1 INJECTION, SOLUTION EPIDURAL; INFILTRATION; INTRACAUDAL; PERINEURAL
Status: DISCONTINUED | OUTPATIENT
Start: 2021-01-13 | End: 2021-01-13 | Stop reason: HOSPADM

## 2021-01-13 RX ORDER — MIDAZOLAM HYDROCHLORIDE 1 MG/ML
INJECTION INTRAMUSCULAR; INTRAVENOUS PRN
Status: DISCONTINUED | OUTPATIENT
Start: 2021-01-13 | End: 2021-01-13 | Stop reason: SDUPTHER

## 2021-01-13 RX ORDER — PROPOFOL 10 MG/ML
INJECTION, EMULSION INTRAVENOUS PRN
Status: DISCONTINUED | OUTPATIENT
Start: 2021-01-13 | End: 2021-01-13 | Stop reason: SDUPTHER

## 2021-01-13 RX ORDER — LIDOCAINE HYDROCHLORIDE 5 MG/ML
INJECTION, SOLUTION INFILTRATION; INTRAVENOUS PRN
Status: DISCONTINUED | OUTPATIENT
Start: 2021-01-13 | End: 2021-01-13 | Stop reason: SDUPTHER

## 2021-01-13 RX ORDER — MORPHINE SULFATE 4 MG/ML
4 INJECTION, SOLUTION INTRAMUSCULAR; INTRAVENOUS
Status: DISCONTINUED | OUTPATIENT
Start: 2021-01-13 | End: 2021-01-13 | Stop reason: HOSPADM

## 2021-01-13 RX ORDER — MORPHINE SULFATE 2 MG/ML
2 INJECTION, SOLUTION INTRAMUSCULAR; INTRAVENOUS
Status: DISCONTINUED | OUTPATIENT
Start: 2021-01-13 | End: 2021-01-13 | Stop reason: HOSPADM

## 2021-01-13 RX ORDER — BUPIVACAINE HYDROCHLORIDE 5 MG/ML
INJECTION, SOLUTION EPIDURAL; INTRACAUDAL PRN
Status: DISCONTINUED | OUTPATIENT
Start: 2021-01-13 | End: 2021-01-13 | Stop reason: ALTCHOICE

## 2021-01-13 RX ORDER — OXYCODONE HYDROCHLORIDE 5 MG/1
5 TABLET ORAL EVERY 4 HOURS PRN
Status: DISCONTINUED | OUTPATIENT
Start: 2021-01-13 | End: 2021-01-13 | Stop reason: HOSPADM

## 2021-01-13 RX ORDER — ACETAMINOPHEN 500 MG
1000 TABLET ORAL EVERY 6 HOURS PRN
Status: ON HOLD | COMMUNITY
End: 2022-04-14 | Stop reason: HOSPADM

## 2021-01-13 RX ORDER — SODIUM CHLORIDE 9 MG/ML
50 INJECTION, SOLUTION INTRAVENOUS CONTINUOUS
Status: DISCONTINUED | OUTPATIENT
Start: 2021-01-13 | End: 2021-01-13 | Stop reason: HOSPADM

## 2021-01-13 RX ADMIN — PROPOFOL 60 MG: 10 INJECTION, EMULSION INTRAVENOUS at 09:01

## 2021-01-13 RX ADMIN — PROPOFOL 40 MG: 10 INJECTION, EMULSION INTRAVENOUS at 09:09

## 2021-01-13 RX ADMIN — LIDOCAINE HYDROCHLORIDE 40 ML: 5 INJECTION, SOLUTION INFILTRATION at 09:06

## 2021-01-13 RX ADMIN — SODIUM CHLORIDE, POTASSIUM CHLORIDE, SODIUM LACTATE AND CALCIUM CHLORIDE: 600; 310; 30; 20 INJECTION, SOLUTION INTRAVENOUS at 08:29

## 2021-01-13 RX ADMIN — PROPOFOL 30 MG: 10 INJECTION, EMULSION INTRAVENOUS at 09:07

## 2021-01-13 RX ADMIN — PROPOFOL 120 MCG/KG/MIN: 10 INJECTION, EMULSION INTRAVENOUS at 09:01

## 2021-01-13 RX ADMIN — MIDAZOLAM HYDROCHLORIDE 2 MG: 2 INJECTION, SOLUTION INTRAMUSCULAR; INTRAVENOUS at 08:55

## 2021-01-13 RX ADMIN — CEFAZOLIN SODIUM 2 G: 2 SOLUTION INTRAVENOUS at 08:59

## 2021-01-13 RX ADMIN — ONDANSETRON 4 MG: 2 INJECTION INTRAMUSCULAR; INTRAVENOUS at 09:17

## 2021-01-13 ASSESSMENT — PULMONARY FUNCTION TESTS
PIF_VALUE: 1
PIF_VALUE: 0

## 2021-01-13 NOTE — ANESTHESIA POSTPROCEDURE EVALUATION
Department of Anesthesiology  Postprocedure Note    Patient: Madelaine Rivers  MRN: [id number]  YOB: 1960  Date of evaluation: 1/13/2021  Time:  9:33 AM     Procedure Summary     Date: 01/13/21 Room / Location: [address]    Anesthesia Start: 0711 Anesthesia Stop: 0281    Procedure: LEFT CARPAL TUNNEL RELEASE, LEFT MIDDLE FINGER TRIGGER RELEASE, TENOSYNOVECTOMY FDPFDS (Left ) Diagnosis: (LEFT MIDDLE FINGER TRIGGER , LEFT CARPAL TUNNEL SYNDROME)    Surgeons: Tonya Paula MD Responsible Provider: Slim Antoine DO    Anesthesia Type: Shanel block ASA Status: 3          Anesthesia Type: Shanel block    Sergo Phase I:      Sergo Phase II: Sergo Score: 8    Last vitals: Reviewed and per EMR flowsheets.        Anesthesia Post Evaluation    Patient location during evaluation: PACU  Patient participation: complete - patient participated  Level of consciousness: awake and alert  Pain score: 0  Airway patency: patent  Nausea & Vomiting: no nausea and no vomiting  Complications: no  Cardiovascular status: blood pressure returned to baseline and hemodynamically stable  Respiratory status: acceptable  Hydration status: euvolemic

## 2021-01-13 NOTE — OP NOTE
incised with a 15 blade scalpel. Dissection is then carried out distally to the sentinel fat and proximally across the wrist with a tenotomy. This fully decompresses the contents of the carpal tunnel including the median nerve. A tenotomy was utilized to ensure no adhesions are present around the median nerve. Once this is confirmed this completes the release and the nerve is noted to be in continuity. Wound is irrigated and closed with several nylon sutures. Marcaine without epinephrine is infiltrated in the area for postoperative pain relief. Attention is now turned to the middle finger. Incision is carried out transversely and over the A1 pulley. Longitudinal dissection is then done. The flaps were meticulously elevated. The A1 pulley is identified and incised. Proximal dissection is carried over the FDP and FDS tendons as well. Now that the areas been released unfortunately it is identified that the flexor digitorum superficialis and flexor digitorum profundus are bundled synovium and scar. Therefore decision is made to proceed with a tenosynovectomy of those tendons. The flexor digitorum superficialis was identified proximally and a tenosynovectomy was performed removing it from its adjacent synovium and scar tissue. This was done all the way to the chiasm into the finger. This was done with the finger both flexed and extended for maximum excursion. Once this was done it was able to be retracted off of the flexor digitorum profundus tendon. Same procedure was then done to the flexor deform sponges from proximal to the palm all the way to the A2 pulley chiasm. With the finger both flexed and extended 1 can appreciate independent excursion of those tendons now without any adjacent synovium or scar tissue. That incision is closed with several nylon sutures. Marcaine without epinephrine infiltrated in the incision as well.       Dressings include Xeroform fluffs web roll and a volar splint. The patient tolerated procedure well without any intraoperative complications.     Electronically signed by Samara Carranza MD on 1/13/2021 at 9:28 AM

## 2021-01-13 NOTE — ANESTHESIA PRE PROCEDURE
nitroGLYCERIN (NITROSTAT) 0.4 MG SL tablet Place 1 tablet under the tongue every 5 minutes as needed for Chest pain Indications: never used up to max of 3 total doses. If no relief after 1 dose, call 911. 9/21/20   Shivam SIMON Holgabino, DO   blood glucose test strips (AGAMATRIX JAZZ TEST) strip Use to test blood sugar 1 time daily 4/28/20   Aileen Marley MD   Blood Glucose Calibration (AGAMATRIX CONTROL) SOLN Use as directed for control solution test 4/28/20   Aileen Marley MD   albuterol sulfate HFA (PROAIR HFA) 108 (90 Base) MCG/ACT inhaler Inhale 2 puffs into the lungs every 6 hours as needed for Wheezing or Shortness of Breath  Patient taking differently: Inhale 2 puffs into the lungs every 6 hours as needed for Wheezing or Shortness of Breath Indications: never used  6/13/18   Lorena Mckeon PA-C   Respiratory Therapy Supplies CLARA Please give patient the farshad view mask 5/31/18   Lorena Mckeon PA-C   loratadine (CLARITIN) 10 MG tablet Take 10 mg by mouth daily as needed (Allergies)     Historical Provider, MD       Current medications:    Current Facility-Administered Medications   Medication Dose Route Frequency Provider Last Rate Last Admin    ceFAZolin (ANCEF) 2 g in dextrose 3 % 50 mL IVPB (duplex)  2 g Intravenous On Call to 29 Deandre Lopez MD        lactated ringers infusion   Intravenous Continuous Janice Zambrano  mL/hr at 01/13/21 0829 New Bag at 01/13/21 0829       Allergies:     Allergies   Allergen Reactions    Seasonal Itching       Problem List:    Patient Active Problem List   Diagnosis Code    Vitamin D deficiency E55.9    Generalized anxiety disorder F41.1    Sleep apnea, obstructive G47.33    Type II diabetes mellitus, uncontrolled ([facility] [address] E11.65    Hypothyroidism E03.9    Essential hypertension I10    Family history of coronary artery disease Z80.55    Personal history of tobacco use Z87.891    Non-cardiac chest pain R07.89    History of colon polyps Z86.010  Polyp of colon K63.5    Tobacco abuse, 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    Irregular Z line of esophagus K22.9    Gastric polyp K31.7    BELTRAN (nonalcoholic steatohepatitis) K75.81    Weakness of both hands R29.898    Joint stiffness M25.60    Bilateral carpal tunnel syndrome G56.03    Trigger finger, right middle finger M65.331       Past Medical History:        Diagnosis Date    Abnormal EKG 3/23/2018    Angina pectoris syndrome ([facility] [address] 03/23/2018    Diverticulosis of colon     DMII (diabetes mellitus, type 2) ([facility] [address] 2000    Dr Eren Segura hypertension 1/15/2016    Family history of coronary artery disease 1/15/2016    Fatty infiltration of liver 2020    Gastric polyp 2019    Dr Anisa Martinez, 5 mm    Generalized anxiety disorder     H/O: hysterectomy 2012    History of tobacco abuse 1/15/2016    Obesity (BMI 30-39. 9)     Other specified acquired hypothyroidism 2000    Precordial pain 03/23/2018    (? Prinzmetal?) Dr Fatou Sanchez S/P colonoscopy with polypectomy 2013, 2018, 2019    Dr Boni Becerra, Dr Anisa Martinez, tubulovillous adenoma, hyperplastic polyp    S/P vaginal hysterectomy 2012    benign    Sleep apnea, obstructive 2007    was on CPAP, not using it at present    Vertigo 2018       Past Surgical History:        Procedure Laterality Date    [address]LECYSTECTOMY, [address] COLONOSCOPY      COLONOSCOPY N/A 6/30/2020    COLONOSCOPY DIAGNOSTIC performed by Lana Watkins MD at [facility] CATH LAB PROCEDURE      ENDOMETRIAL ABLATION  2002    metrorrhagia    ENDOSCOPY, COLON, DIAGNOSTIC      HYSTERECTOMY  9/7/12    fibroid, cervicitis, ovaries intact    [place] COLON CA SCRN NOT  [address] [facility] N/A 5/15/2018    COLONOSCOPY performed by Lana Watkins MD at [facility] N/A 4/2/2019    EGD ESOPHAGOGASTRODUODENOSCOPY performed by Lana Watkins MD at [address] History: Social History     Tobacco Use    Smoking status: Former Smoker     Packs/day: 1.00     Years: 30.00     Pack years: 30.00     Types: Cigarettes     Start date: 3/5/1975     Quit date: 6/12/2005     Years since quitting: 15.6    Smokeless tobacco: Never Used    Tobacco comment: 3/15/18 started age 17   Substance Use Topics    Alcohol use: No     Comment: not at all                                 Counseling given: Not Answered  Comment: 3/15/18 started age 17      Vital Signs (Current):   Vitals:    01/13/21 0800   BP: 133/76   Pulse: 78   Resp: 16   Temp: 97.5 °F (36.4 °C)   TempSrc: Temporal   SpO2: 95%   Weight: 227 lb (103 kg)   Height: 5' 5\" (1.651 m)                                              BP Readings from Last 3 Encounters:   01/13/21 133/76   11/24/20 128/72   09/21/20 120/70       NPO Status: Time of last liquid consumption: 2230                        Time of last solid consumption: 2230                        Date of last liquid consumption: 01/12/21                        Date of last solid food consumption: 01/12/21    BMI:   Wt Readings from Last 3 Encounters:   01/13/21 227 lb (103 kg)   01/07/21 228 lb (103.4 kg)   01/06/21 228 lb (103.4 kg)     Body mass index is 37.77 kg/m². CBC:   Lab Results   Component Value Date    WBC 9.9 01/07/2021    RBC 4.80 01/07/2021    HGB 13.7 01/07/2021    HCT 41.0 01/07/2021    MCV 85.4 01/07/2021    RDW 14.3 01/07/2021     01/07/2021       CMP:   Lab Results   Component Value Date     01/07/2021    K 3.8 01/07/2021     01/07/2021    CO2 27 01/07/2021    BUN 16 01/07/2021    CREATININE 1.17 01/07/2021    GFRAA 57.5 01/07/2021    LABGLOM 47.5 01/07/2021    GLUCOSE 97 01/07/2021    PROT 8.0 01/07/2021    CALCIUM 10.0 01/07/2021    BILITOT 0.4 01/07/2021    ALKPHOS 95 01/07/2021    AST 26 01/07/2021    ALT 41 01/07/2021       POC Tests: No results for input(s): POCGLU, POCNA, POCK, POCCL, POCBUN, POCHEMO, POCHCT in the last 72 hours. Coags:   Lab Results   Component Value Date    PROTIME 12.9 05/01/2020    INR 1.0 05/01/2020    APTT 25.1 03/23/2018       HCG (If Applicable): No results found for: PREGTESTUR, PREGSERUM, HCG, HCGQUANT     ABGs: No results found for: PHART, PO2ART, ORQ6ZYK, MAU1DFT, BEART, W6LTDOWL     Type & Screen (If Applicable):  No results found for: LABABO, LABRH    Drug/Infectious Status (If Applicable):  No results found for: HIV, HEPCAB    COVID-19 Screening (If Applicable):   Lab Results   Component Value Date    COVID19 Not Detected 01/07/2021         Anesthesia Evaluation  Patient summary reviewed and Nursing notes reviewed no history of anesthetic complications:   Airway: Mallampati: II  TM distance: >3 FB   Neck ROM: full  Mouth opening: > = 3 FB Dental: normal exam         Pulmonary:normal exam  breath sounds clear to auscultation  (+) sleep apnea:                             Cardiovascular:  Exercise tolerance: good (>4 METS),   (+) hypertension:, angina:, hyperlipidemia      ECG reviewed  Rhythm: regular  Rate: normal           Beta Blocker:  Dose within 24 Hrs      ROS comment: Sinus  Rhythm   -Anteroseptal infarct -age undetermined.    -Nonspecific ST depression  -Nondiagnostic. Neuro/Psych:   (+) neuromuscular disease:, psychiatric history:            GI/Hepatic/Renal:   (+) GERD:, liver disease:, morbid obesity         ROS comment: BMI 38. Endo/Other:    (+) DiabetesType II DM, , hypothyroidism::., .          Pt had PAT visit. Abdominal:           Vascular: negative vascular ROS. Anesthesia Plan      Ambrose block     ASA 3       Induction: intravenous. MIPS: Prophylactic antiemetics administered. Anesthetic plan and risks discussed with patient. Plan discussed with CRNA.     Attending anesthesiologist reviewed and agrees with Alin Coles DO   1/13/2021

## 2021-01-14 ENCOUNTER — SCHEDULED TELEPHONE ENCOUNTER (OUTPATIENT)
Dept: PHARMACY | Facility: CLINIC | Age: 61
End: 2021-01-14

## 2021-01-14 DIAGNOSIS — E11.9 TYPE 2 DIABETES MELLITUS WITHOUT COMPLICATION, WITHOUT LONG-TERM CURRENT USE OF INSULIN (HCC): ICD-10-CM

## 2021-01-14 RX ORDER — BLOOD-GLUCOSE METER
1 KIT MISCELLANEOUS ONCE
Qty: 1 KIT | Refills: 0 | Status: SHIPPED | OUTPATIENT
Start: 2021-01-14 | End: 2022-10-07

## 2021-01-14 RX ORDER — EMPAGLIFLOZIN 25 MG/1
1 TABLET, FILM COATED ORAL DAILY
Qty: 90 TABLET | Refills: 1 | Status: SHIPPED | OUTPATIENT
Start: 2021-01-14 | End: 2021-06-29 | Stop reason: SDUPTHER

## 2021-01-14 NOTE — TELEPHONE ENCOUNTER
Thank you for the quick response!     Anuel Barrera, PharmD, [facility]  Direct: [phone]  Department, toll free [phone], option 7

## 2021-01-14 NOTE — TELEPHONE ENCOUNTER
[address] [place] [address] Employee Diabetes Program - Be Well With Diabetes  =================================================================  Edy Sky is a 64 y.o. female enrolled in the [address] [place] [address] Employee Diabetes Program. Patient provided Gerardojose manuelchaparrita Harris with verbal consent to remain in the program for this year. Medications:  Medication Sig    acetaminophen (TYLENOL) 500 MG tablet Take 1,000 mg by mouth every 6 hours as needed for Pain    HYDROcodone-acetaminophen (NORCO) 5-325 MG per tablet Take 1 tablet by mouth every 4 hours as needed for Pain for up to 3 days. Intended supply: 3 days. Take lowest dose possible to manage pain    pantoprazole (PROTONIX) 20 MG tablet Take 1 tablet by mouth every morning (before breakfast)    metoprolol tartrate (LOPRESSOR) 25 MG tablet Take 1 tablet by mouth 2 times daily    hydroCHLOROthiazide (HYDRODIURIL) 25 MG tablet  · Covered by DM program TAKE 1 TABLET BY MOUTH ONE TIME A DAY IN THE MORNING    amLODIPine (NORVASC) 5 MG tablet  · Covered by DM program (newly added in 2021) Take 1 tablet by mouth daily    nitroGLYCERIN (NITROSTAT) 0.4 MG SL tablet Place 1 tablet under the tongue every 5 minutes as needed for Chest pain Indications: never used up to max of 3 total doses. If no relief after 1 dose, call 911.     levothyroxine (SYNTHROID) 50 MCG tablet  · Covered by DM program (newly added in 2021) TAKE 1 TABLET BY MOUTH ONE TIME A DAY    empagliflozin (JARDIANCE) 10 MG tablet  · Covered by DM program Take 1 tablet by mouth daily    buPROPion (WELLBUTRIN XL) 300 MG extended release tablet TAKE ONE TABLET BY MOUTH EVERY MORNING    pravastatin (PRAVACHOL) 20 MG tablet  · Covered by DM program TAKE ONE TABLET BY MOUTH EVERY EVENING    Cholecalciferol (VITAMIN D) 2000 UNITS CAPS capsule Take 1 capsule by mouth daily     loratadine (CLARITIN) 10 MG tablet Take 10 mg by mouth daily as needed (Allergies)     Misc Natural Products (GLUCOSAMINE CHOND COMPLEX/MSM) TABS Take 1 tablet by mouth daily       Current Pharmacy: [facility]  Current testing supplies/frequency: [employer] - patient lost her glucometer and requests a new one. Will pend orders in separate encounter. Allergies: Allergies   Allergen Reactions    Seasonal Itching      Vitals/Labs:  BP Readings from Last 3 Encounters:   01/13/21 (!) 115/55   01/13/21 (!) 142/73   11/24/20 128/72     Lab Results   Component Value Date    LABMICR <1.20 10/22/2019     Lab Results   Component Value Date    LABA1C 8.0 11/24/2020    LABA1C 7.3 (H) 07/16/2020    LABA1C 5.5 10/22/2019     Lab Results   Component Value Date    CHOL 160 07/16/2020    TRIG 219 (H) 07/16/2020    HDL 43 07/16/2020    LDLCALC 73 07/16/2020     ALT   Date Value Ref Range Status   01/07/2021 41 (H) 0 - 33 U/L Final     AST   Date Value Ref Range Status   01/07/2021 26 0 - 35 U/L Final     The 10-year ASCVD risk score (Bladimir Huff, et al., 2013) is: 5.5%    Values used to calculate the score:      Age: 64 years      Sex: Female      Is Non- : No      Diabetic: Yes      Tobacco smoker: No      Systolic Blood Pressure: 817 mmHg      Is BP treated: Yes      HDL Cholesterol: 43 mg/dL      Total Cholesterol: 160 mg/dL     Lab Results   Component Value Date    CREATININE 1.17 (H) 01/07/2021     CrCl cannot be calculated (Unknown ideal weight. ).   eGFR: 47 mL/min/1.73 m^2    Immunizations:  Immunization History   Administered Date(s) Administered    Influenza Vaccine, unspecified formulation 10/04/2016    Influenza Virus Vaccine 10/20/2014, 10/12/2015, 10/25/2017, 10/09/2018, 10/16/2018, 10/31/2019    Influenza Whole 10/20/2014, 10/12/2015    Influenza, Quadv, IM, PF (6 mo and older Fluzone, Flulaval, Fluarix, and 3 yrs and older Afluria) 10/04/2016    Pneumococcal Polysaccharide ([id number]) 03/09/2018      Social History:  Social History     Tobacco Use    Smoking status: Former Smoker Packs/day: 1.00     Years: 30.00     Pack years: 30.00     Types: Cigarettes     Start date: 3/5/1975     Quit date: 6/12/2005     Years since quitting: 15.6    Smokeless tobacco: Never Used    Tobacco comment: 3/15/18 started age 17   Substance Use Topics    Alcohol use: No     Comment: not at all      ASSESSMENT:  Initial Program Requirements (Y indicates has completed for the year, N indicates needs to be completed by 7/1/2021): No - Office Visit with provider for DM (1st)  No - A1c (1st)     Ongoing Program Requirements (Y indicates has completed for the year, N indicates needs to be completed by 12/31/2021): No - Office Visit with provider for DM (2nd)  No - ACC/diabetes educator visit (if A1c over 8%)  No - A1c (2nd)  No - Lipid panel  No - Urine microalbumin  Yes - Pneumococcal vaccination: Up-to-date, not needed again until age 74  No - Influenza vaccination for Fall 2021  No - Medication adherence over 70%  Yes - On statin - pravastatin  Yes - On ACEi/ARB or contraindication(s) cardiologist switched from lisinopril to amlodipine for vasospastic angina     Formulary Medication Review:  Non-formulary or medications with cost-effective alternatives: none. Current medications eligible for copay waiver, up to $600, through [employer]:  - Amlodipine (newly added in 2021), Jardiance, HCTZ, levothyroxine (newly added in 2021) and pravastatin. - Agamatrix meter and supplies     Diabetes Care:   - Glycemic Goal: <7.0%. Is no longer at blood glucose goal which is likely due to stopping Trulicity d/t colitis. Patient switched to Jardiance 10 mg daily. Patient was provided samples of Rybelsus (GLP1) and was unable to tolerate (similar side effects w/ Trulicity). Discussed increasing Jardiance dose to 25 mg daily - patient agreeable.  Will send recommendation to PCP in separate encounter.  - Duplicate MOA: n/a   - Reduce Pill [place]: n/a - prescribed Jardiance only  - (2nd), ACC/diabetes educator visit (if A1c over 8%), A1c (2nd), Lipid panel, Urine microalbumin, Influenza vaccination for [id number] and Medication adherence over 70%   - Education to patient: Discussed general issues about diabetes pathophysiology and management.  and Reminder A1c and lipid panel can be completed for free at [facility] screenings   - Follow up: PCP for identified gaps or as scheduled below  - Upcoming appointments:   Future Appointments   Date Time Provider Elizabeth Aclantari   1/27/2021  1:00 PM Kadeem Barrera PA-C [address]   2/26/2021  9:00 AM Kym Alas MD [address]   3/26/2021 12:45 PM Shivam Abdi, [facility]       Nino Santamaria, PharmD, [facility]  Direct: [phone]  Department, toll free [phone], option 7

## 2021-01-14 NOTE — TELEPHONE ENCOUNTER
PCP accepted pended prescriptions in separate encounter. Will send [employer] message to the patient and will sign off at this time. Raven Wyatt, PharmD, [facility]  Direct: [phone]  Department, toll free [phone], option 7           For Pharmacy Admin Tracking Only    PHSO: Yes  Total # of Interventions Recommended: 4  - Increased Dose #: 1  - New Order #: 1 New Medication Order Reason(s): Adherence  - Refills Provided #: 1  - Updated Order #: 1 Updated Order Reason(s):  Other  Recommended intervention potential cost savings: 1  Total Interventions Accepted: 4  Time Spent (min): 60

## 2021-01-14 NOTE — TELEPHONE ENCOUNTER
Dr. Mark Mehta PA-C,    Your patient is currently enrolled in the Be Well with Diabetes program. After your patient's recent visit with a [facility]  Clinical Pharmacist, the below were identified as opportunities to assist with their diabetes management (if patient is not eligible for below recommendations, please reply with reason/contraindication):     · Patient lost her glucometer and requests a new one. I have pended an order for your convenience. · Recommend increasing Jardiance dose from 10 mg daily to 25 mg daily to improve DM control. Patient agrees with dose increase and reports no side effects with Jardiance therapy. I have pended an order for your convenience. See pharmacist note from today for complete details. To remain active in the program, the patient is to meet the requirements and documentation must be provided by pre-established deadlines (see below).        Program Requirements  Initial Program Requirements (to be completed by 07/01/2020):  · Office visit with provider for DM (1st)  · A1c (1st)    Ongoing Program Requirements (to be completed by 12/31/2020):  · Office visit with provider for DM (2nd)  · A1c (2nd)  · Diabetes Education if A1c >8%  · Lipid panel  · Urine microalbumin   · Influenza vaccination for Fall 2020    Thank you,    John Paul Smith, PharmD, [facility]  Direct: [phone]  Department, toll free [phone], option 7

## 2021-01-22 RX ORDER — BUPROPION HYDROCHLORIDE 300 MG/1
TABLET ORAL
Qty: 90 TABLET | Refills: 1 | Status: SHIPPED
Start: 2021-01-22 | End: 2021-02-04 | Stop reason: SDUPTHER

## 2021-01-22 NOTE — TELEPHONE ENCOUNTER
Rx request   Requested Prescriptions     Pending Prescriptions Disp Refills    buPROPion (WELLBUTRIN XL) 300 MG extended release tablet 90 tablet 1     LOV 11/24/2020  Next Visit Date:  Future Appointments   Date Time Provider Elizabeth Brock   1/27/2021  1:00 PM Alissa Bailey PA-C [address] [facility]   2/26/2021  9:00 AM William Pedersen MD [address]   3/26/2021 12:45 PM Shivam Ornelas [facility],  [facility]

## 2021-01-27 ENCOUNTER — OFFICE VISIT (OUTPATIENT)
Dept: ORTHOPEDIC SURGERY | Age: 61
End: 2021-01-27
Payer: COMMERCIAL

## 2021-01-27 VITALS
WEIGHT: 228 LBS | OXYGEN SATURATION: 98 % | HEART RATE: 64 BPM | BODY MASS INDEX: 34.56 KG/M2 | TEMPERATURE: 97.5 F | HEIGHT: 68 IN

## 2021-01-27 DIAGNOSIS — M65.331 TRIGGER FINGER, RIGHT MIDDLE FINGER: Primary | ICD-10-CM

## 2021-01-27 DIAGNOSIS — G56.03 BILATERAL CARPAL TUNNEL SYNDROME: ICD-10-CM

## 2021-01-27 PROCEDURE — 99215 OFFICE O/P EST HI 40 MIN: CPT | Performed by: PHYSICIAN ASSISTANT

## 2021-01-27 PROCEDURE — 99024 POSTOP FOLLOW-UP VISIT: CPT | Performed by: PHYSICIAN ASSISTANT

## 2021-01-27 PROCEDURE — L3908 WHO COCK-UP NONMOLDE PRE OTS: HCPCS | Performed by: PHYSICIAN ASSISTANT

## 2021-01-27 NOTE — PATIENT INSTRUCTIONS
-please ensure that you have made arrangements to get your Covid test done at our designated. Upon arrival, call [phone] and they will come out to your car to do the test.   -stop taking asprin and motrin until after your surgery. Continue taking your beta-blocker .  -no eating / drinking the after midnight the night before surgery.

## 2021-01-29 ENCOUNTER — NURSE ONLY (OUTPATIENT)
Dept: PRIMARY CARE CLINIC | Age: 61
End: 2021-01-29

## 2021-01-29 DIAGNOSIS — G56.03 BILATERAL CARPAL TUNNEL SYNDROME: ICD-10-CM

## 2021-01-30 LAB
SARS-COV-2: NOT DETECTED
SOURCE: NORMAL

## 2021-02-02 ENCOUNTER — ANESTHESIA EVENT (OUTPATIENT)
Dept: OPERATING ROOM | Age: 61
End: 2021-02-02
Payer: COMMERCIAL

## 2021-02-03 ENCOUNTER — ANESTHESIA (OUTPATIENT)
Dept: OPERATING ROOM | Age: 61
End: 2021-02-03
Payer: COMMERCIAL

## 2021-02-03 ENCOUNTER — HOSPITAL ENCOUNTER (OUTPATIENT)
Age: 61
Setting detail: OUTPATIENT SURGERY
Discharge: HOME | End: 2021-02-03
Attending: ORTHOPAEDIC SURGERY | Admitting: ORTHOPAEDIC SURGERY
Payer: COMMERCIAL

## 2021-02-03 VITALS — DIASTOLIC BLOOD PRESSURE: 59 MMHG | OXYGEN SATURATION: 95 % | SYSTOLIC BLOOD PRESSURE: 128 MMHG

## 2021-02-03 VITALS
TEMPERATURE: 97 F | HEIGHT: 65 IN | SYSTOLIC BLOOD PRESSURE: 128 MMHG | HEART RATE: 66 BPM | BODY MASS INDEX: 37.82 KG/M2 | OXYGEN SATURATION: 98 % | WEIGHT: 227 LBS | RESPIRATION RATE: 16 BRPM | DIASTOLIC BLOOD PRESSURE: 63 MMHG

## 2021-02-03 DIAGNOSIS — G89.18 POST-OPERATIVE PAIN: Primary | ICD-10-CM

## 2021-02-03 LAB
GLUCOSE BLD-MCNC: 142 MG/DL (ref 60–115)
PERFORMED ON: ABNORMAL

## 2021-02-03 PROCEDURE — 2580000003 HC RX 258: Performed by: ORTHOPAEDIC SURGERY

## 2021-02-03 PROCEDURE — 2500000003 HC RX 250 WO HCPCS: Performed by: NURSE ANESTHETIST, CERTIFIED REGISTERED

## 2021-02-03 PROCEDURE — 3700000000 HC ANESTHESIA ATTENDED CARE: Performed by: ORTHOPAEDIC SURGERY

## 2021-02-03 PROCEDURE — 7100000010 HC PHASE II RECOVERY - FIRST 15 MIN: Performed by: ORTHOPAEDIC SURGERY

## 2021-02-03 PROCEDURE — 7100000011 HC PHASE II RECOVERY - ADDTL 15 MIN: Performed by: ORTHOPAEDIC SURGERY

## 2021-02-03 PROCEDURE — 6360000002 HC RX W HCPCS: Performed by: NURSE ANESTHETIST, CERTIFIED REGISTERED

## 2021-02-03 PROCEDURE — 2580000003 HC RX 258: Performed by: NURSE ANESTHETIST, CERTIFIED REGISTERED

## 2021-02-03 PROCEDURE — 3600000003 HC SURGERY LEVEL 3 BASE: Performed by: ORTHOPAEDIC SURGERY

## 2021-02-03 PROCEDURE — 2580000003 HC RX 258: Performed by: ANESTHESIOLOGY

## 2021-02-03 PROCEDURE — 3600000013 HC SURGERY LEVEL 3 ADDTL 15MIN: Performed by: ORTHOPAEDIC SURGERY

## 2021-02-03 PROCEDURE — 2709999900 HC NON-CHARGEABLE SUPPLY: Performed by: ORTHOPAEDIC SURGERY

## 2021-02-03 PROCEDURE — 3700000001 HC ADD 15 MINUTES (ANESTHESIA): Performed by: ORTHOPAEDIC SURGERY

## 2021-02-03 PROCEDURE — 64721 CARPAL TUNNEL SURGERY: CPT | Performed by: ORTHOPAEDIC SURGERY

## 2021-02-03 PROCEDURE — 26145 TENDON EXCISION PALM/FINGER: CPT | Performed by: ORTHOPAEDIC SURGERY

## 2021-02-03 PROCEDURE — 6360000002 HC RX W HCPCS: Performed by: ORTHOPAEDIC SURGERY

## 2021-02-03 PROCEDURE — 6370000000 HC RX 637 (ALT 250 FOR IP): Performed by: ANESTHESIOLOGY

## 2021-02-03 PROCEDURE — 2500000003 HC RX 250 WO HCPCS: Performed by: ORTHOPAEDIC SURGERY

## 2021-02-03 RX ORDER — SODIUM CHLORIDE, SODIUM LACTATE, POTASSIUM CHLORIDE, CALCIUM CHLORIDE 600; 310; 30; 20 MG/100ML; MG/100ML; MG/100ML; MG/100ML
INJECTION, SOLUTION INTRAVENOUS CONTINUOUS PRN
Status: DISCONTINUED | OUTPATIENT
Start: 2021-02-03 | End: 2021-02-03 | Stop reason: SDUPTHER

## 2021-02-03 RX ORDER — MEPERIDINE HYDROCHLORIDE 25 MG/ML
12.5 INJECTION INTRAMUSCULAR; INTRAVENOUS; SUBCUTANEOUS EVERY 5 MIN PRN
Status: DISCONTINUED | OUTPATIENT
Start: 2021-02-03 | End: 2021-02-03 | Stop reason: HOSPADM

## 2021-02-03 RX ORDER — MORPHINE SULFATE 4 MG/ML
4 INJECTION, SOLUTION INTRAMUSCULAR; INTRAVENOUS
Status: DISCONTINUED | OUTPATIENT
Start: 2021-02-03 | End: 2021-02-03 | Stop reason: HOSPADM

## 2021-02-03 RX ORDER — MORPHINE SULFATE 2 MG/ML
2 INJECTION, SOLUTION INTRAMUSCULAR; INTRAVENOUS
Status: DISCONTINUED | OUTPATIENT
Start: 2021-02-03 | End: 2021-02-03 | Stop reason: HOSPADM

## 2021-02-03 RX ORDER — SODIUM CHLORIDE, SODIUM LACTATE, POTASSIUM CHLORIDE, CALCIUM CHLORIDE 600; 310; 30; 20 MG/100ML; MG/100ML; MG/100ML; MG/100ML
INJECTION, SOLUTION INTRAVENOUS CONTINUOUS
Status: DISCONTINUED | OUTPATIENT
Start: 2021-02-03 | End: 2021-02-03 | Stop reason: SDUPTHER

## 2021-02-03 RX ORDER — ONDANSETRON 2 MG/ML
4 INJECTION INTRAMUSCULAR; INTRAVENOUS
Status: DISCONTINUED | OUTPATIENT
Start: 2021-02-03 | End: 2021-02-03 | Stop reason: HOSPADM

## 2021-02-03 RX ORDER — SODIUM CHLORIDE 0.9 % (FLUSH) 0.9 %
10 SYRINGE (ML) INJECTION EVERY 12 HOURS SCHEDULED
Status: DISCONTINUED | OUTPATIENT
Start: 2021-02-03 | End: 2021-02-03 | Stop reason: HOSPADM

## 2021-02-03 RX ORDER — SODIUM CHLORIDE 0.9 % (FLUSH) 0.9 %
10 SYRINGE (ML) INJECTION PRN
Status: DISCONTINUED | OUTPATIENT
Start: 2021-02-03 | End: 2021-02-03 | Stop reason: HOSPADM

## 2021-02-03 RX ORDER — SODIUM CHLORIDE 9 MG/ML
50 INJECTION, SOLUTION INTRAVENOUS CONTINUOUS
Status: DISCONTINUED | OUTPATIENT
Start: 2021-02-03 | End: 2021-02-03 | Stop reason: HOSPADM

## 2021-02-03 RX ORDER — LIDOCAINE HYDROCHLORIDE 5 MG/ML
INJECTION, SOLUTION INFILTRATION; INTRAVENOUS PRN
Status: DISCONTINUED | OUTPATIENT
Start: 2021-02-03 | End: 2021-02-03 | Stop reason: SDUPTHER

## 2021-02-03 RX ORDER — HYDROCODONE BITARTRATE AND ACETAMINOPHEN 5; 325 MG/1; MG/1
1 TABLET ORAL PRN
Status: COMPLETED | OUTPATIENT
Start: 2021-02-03 | End: 2021-02-03

## 2021-02-03 RX ORDER — HYDROCODONE BITARTRATE AND ACETAMINOPHEN 5; 325 MG/1; MG/1
2 TABLET ORAL PRN
Status: COMPLETED | OUTPATIENT
Start: 2021-02-03 | End: 2021-02-03

## 2021-02-03 RX ORDER — SODIUM CHLORIDE, SODIUM LACTATE, POTASSIUM CHLORIDE, CALCIUM CHLORIDE 600; 310; 30; 20 MG/100ML; MG/100ML; MG/100ML; MG/100ML
INJECTION, SOLUTION INTRAVENOUS CONTINUOUS
Status: DISCONTINUED | OUTPATIENT
Start: 2021-02-03 | End: 2021-02-03 | Stop reason: HOSPADM

## 2021-02-03 RX ORDER — CEFAZOLIN SODIUM 2 G/50ML
2000 SOLUTION INTRAVENOUS
Status: COMPLETED | OUTPATIENT
Start: 2021-02-03 | End: 2021-02-03

## 2021-02-03 RX ORDER — OXYCODONE HYDROCHLORIDE 5 MG/1
5 TABLET ORAL EVERY 4 HOURS PRN
Status: DISCONTINUED | OUTPATIENT
Start: 2021-02-03 | End: 2021-02-03 | Stop reason: HOSPADM

## 2021-02-03 RX ORDER — FENTANYL CITRATE 50 UG/ML
50 INJECTION, SOLUTION INTRAMUSCULAR; INTRAVENOUS EVERY 10 MIN PRN
Status: DISCONTINUED | OUTPATIENT
Start: 2021-02-03 | End: 2021-02-03 | Stop reason: HOSPADM

## 2021-02-03 RX ORDER — MAGNESIUM HYDROXIDE 1200 MG/15ML
LIQUID ORAL CONTINUOUS PRN
Status: COMPLETED | OUTPATIENT
Start: 2021-02-03 | End: 2021-02-03

## 2021-02-03 RX ORDER — HYDROCODONE BITARTRATE AND ACETAMINOPHEN 5; 325 MG/1; MG/1
1 TABLET ORAL EVERY 4 HOURS PRN
Qty: 15 TABLET | Refills: 0 | Status: SHIPPED | OUTPATIENT
Start: 2021-02-03 | End: 2021-02-06

## 2021-02-03 RX ORDER — LIDOCAINE HYDROCHLORIDE 10 MG/ML
1 INJECTION, SOLUTION EPIDURAL; INFILTRATION; INTRACAUDAL; PERINEURAL
Status: DISCONTINUED | OUTPATIENT
Start: 2021-02-03 | End: 2021-02-03 | Stop reason: HOSPADM

## 2021-02-03 RX ORDER — METOCLOPRAMIDE HYDROCHLORIDE 5 MG/ML
10 INJECTION INTRAMUSCULAR; INTRAVENOUS
Status: DISCONTINUED | OUTPATIENT
Start: 2021-02-03 | End: 2021-02-03 | Stop reason: HOSPADM

## 2021-02-03 RX ORDER — MIDAZOLAM HYDROCHLORIDE 2 MG/2ML
INJECTION, SOLUTION INTRAMUSCULAR; INTRAVENOUS PRN
Status: DISCONTINUED | OUTPATIENT
Start: 2021-02-03 | End: 2021-02-03 | Stop reason: SDUPTHER

## 2021-02-03 RX ORDER — DIPHENHYDRAMINE HYDROCHLORIDE 50 MG/ML
12.5 INJECTION INTRAMUSCULAR; INTRAVENOUS
Status: DISCONTINUED | OUTPATIENT
Start: 2021-02-03 | End: 2021-02-03 | Stop reason: HOSPADM

## 2021-02-03 RX ORDER — PROPOFOL 10 MG/ML
INJECTION, EMULSION INTRAVENOUS CONTINUOUS PRN
Status: DISCONTINUED | OUTPATIENT
Start: 2021-02-03 | End: 2021-02-03 | Stop reason: SDUPTHER

## 2021-02-03 RX ORDER — BUPIVACAINE HYDROCHLORIDE 5 MG/ML
INJECTION, SOLUTION EPIDURAL; INTRACAUDAL PRN
Status: DISCONTINUED | OUTPATIENT
Start: 2021-02-03 | End: 2021-02-03 | Stop reason: ALTCHOICE

## 2021-02-03 RX ADMIN — CEFAZOLIN SODIUM 2 G: 2 SOLUTION INTRAVENOUS at 08:02

## 2021-02-03 RX ADMIN — SODIUM CHLORIDE, POTASSIUM CHLORIDE, SODIUM LACTATE AND CALCIUM CHLORIDE: 600; 310; 30; 20 INJECTION, SOLUTION INTRAVENOUS at 07:16

## 2021-02-03 RX ADMIN — PROPOFOL 100 MCG/KG/MIN: 10 INJECTION, EMULSION INTRAVENOUS at 08:02

## 2021-02-03 RX ADMIN — HYDROCODONE BITARTRATE AND ACETAMINOPHEN 1 TABLET: 5; 325 TABLET ORAL at 08:56

## 2021-02-03 RX ADMIN — MIDAZOLAM HYDROCHLORIDE 2 MG: 1 INJECTION, SOLUTION INTRAMUSCULAR; INTRAVENOUS at 07:49

## 2021-02-03 RX ADMIN — LIDOCAINE HYDROCHLORIDE 50 ML: 5 INJECTION, SOLUTION INFILTRATION at 08:00

## 2021-02-03 RX ADMIN — SODIUM CHLORIDE, POTASSIUM CHLORIDE, SODIUM LACTATE AND CALCIUM CHLORIDE: 600; 310; 30; 20 INJECTION, SOLUTION INTRAVENOUS at 07:49

## 2021-02-03 ASSESSMENT — PULMONARY FUNCTION TESTS
PIF_VALUE: 1
PIF_VALUE: 0
PIF_VALUE: 1
PIF_VALUE: 0
PIF_VALUE: 1
PIF_VALUE: 0
PIF_VALUE: 1
PIF_VALUE: 0
PIF_VALUE: 1
PIF_VALUE: 0
PIF_VALUE: 0
PIF_VALUE: 1
PIF_VALUE: 0
PIF_VALUE: 1
PIF_VALUE: 0
PIF_VALUE: 0

## 2021-02-03 ASSESSMENT — PAIN SCALES - GENERAL: PAINLEVEL_OUTOF10: 5

## 2021-02-03 NOTE — OP NOTE
Operative Note      Patient: Rand Florian  YOB: 1960  MRN: [id number]    Date of Procedure: 2/3/2021    Pre-Op Diagnosis: RIGHT CARPAL TUNNEL SYNDROME , RIGHT MIDDLE FINGER TRIGGER    Post-Op Diagnosis: Right carpal tunnel syndrome, right middle finger trigger, right FDP and FDS extensive tenosynovitis palm to middle finger       Procedure:    Right carpal tunnel release, right middle finger trigger release, right flexor digitorum profundus tenosynovectomy palm to middle finger, right flexor digitorum superficialis tenosynovectomy palm to middle finger    Surgeon(s):  Oscar Gallo MD    Assistant:   Physician Assistant: Brad Corley PA-C    Anesthesia: Tornado Block    Estimated Blood Loss (mL): Minimal    Complications: None    Specimens:   * No specimens in log *    Implants:  * No implants in log *      Drains: * No LDAs found *    Findings: Trigger finger left middle in addition to extensive tenosynovitis FDP and FDS, compressed nerve carpal tunnel    Detailed Description of Procedure:     Brief clinical note:    Patient presents for a right carpal tunnel release today. The risks and benefits the procedure were discussed with the patient in detail. These include but not limited to injury soft tissue nerves and vessels, incisional problems, pillar pain, residual deficits, and recurrence. Medical and anesthetic risks were also discussed. Patient is also developed worsening left middle finger trigger. She like to see if she can have that addressed taking advantage of the same anesthetic. Therefore I discussed the options with her in that regard including the nonoperative and operative option. The operative option includes risks and benefits of mobilization recovery and expectations. Understanding that an understanding that Intra-Op decision was made the patient was to add that to the procedure and its added to such consent. That area was marked.  Patient is consented and the left carpal tunnel is marked preoperatively as well. Operative note:    Patient is taken the op room after anesthesia was ministered in form of a [place] block the area is prepped and draped in usual manner. A final timeout is done with the operative team.  An incision is carried out with a 15 blade scalpel in line with the ring finger in the palm. Meticulous dissection is then carried out with a tenotomy incising the palmar fascia and a Heiss retractor is applied. The transverse carpal ligament is identified under direct visualization and incised with a 15 blade scalpel. Dissection is then carried out distally to the sentinel fat and proximally across the wrist with a tenotomy. This fully decompresses the contents of the carpal tunnel including the median nerve. A tenotomy was utilized to ensure no adhesions are present around the median nerve. Once this is confirmed this completes the release and the nerve is noted to be in continuity. Wound is irrigated and closed with several nylon sutures. Attention is now turned to the right middle finger. A transverse incision is carried out in the distal aspect of the palm. Slightly oblique in nature. Longitudinal dissection is done. The A1 pulley is identified. The A1 pulley is then incised from proximal to distal. The A2 pulley is preserved distally. Proximal dissection is carried out as well through the fascia. This reveals the underlying FDP and FDS tendons which unfortunately are bundled and synovium. Therefore a thorough tenosynovectomy is required. The first is first done of the FDS tendon. This is done from proximal distal. The finger was flexed and extended for maximum excursion and therefore tenosynovectomy is done from proximal all the way distal to the chiasm. Once that was performed the tendon was retracted and the same thing was done to the flexor digitorum profundus tendon. Once again from proximal to distal to the chiasm.  Once that was performed and now they are free of any synovium start adjacent scar tissue they now freely flow without triggering and dependently. That incision was closed with several nylon sutures. Both incisions were infiltrated with Marcaine without epinephrine. This is followed by Xeroform fluffs web roll and a volar splint. The patient tolerated the procedure well without intraoperative complication. The patient did receive a preoperative antibiotic. Marcaine without epinephrine is infiltrated in the area for postoperative pain relief. Dressings include Xeroform fluffs web roll and a volar splint. The patient tolerated procedure well without any intraoperative complications.     Electronically signed by Katharina Kovacs MD on 2/3/2021 at 8:37 AM

## 2021-02-03 NOTE — FLOWSHEET NOTE
Verified consent and laterality with surgeon, procedure to be on right hand. Scotty charge nurse notified.

## 2021-02-03 NOTE — ANESTHESIA PRE PROCEDURE
Department of Anesthesiology  Preprocedure Note       Name:  Slade Cochran   Age:  64 y.o.  :  1960                                          MRN:  [id number]         Date:  2/3/2021      Surgeon: Zac Do):  Brittany Louie MD    Procedure: Procedure(s):  RIGHT CARPAL TUNNEL RELEASE, TENOSYNOVECTOMY, PAT WITH JOSEPH IN OFFICE    Medications prior to admission:   Prior to Admission medications    Medication Sig Start Date End Date Taking?  Authorizing Provider   hydroCHLOROthiazide (HYDRODIURIL) 25 MG tablet TAKE 1 TABLET BY MOUTH ONE TIME A DAY IN THE MORNING 21  Yes Harmony Tyson PA-C   buPROPion (WELLBUTRIN XL) 300 MG extended release tablet TAKE ONE TABLET BY MOUTH EVERY MORNING 21  Yes Harmony Tyson PA-C   empagliflozin (JARDIANCE) 25 MG tablet Take 1 tablet by mouth daily 21  Yes Harmony Tsyon PA-C   acetaminophen (TYLENOL) 500 MG tablet Take 1,000 mg by mouth every 6 hours as needed for Pain   Yes Historical Provider, MD   pantoprazole (PROTONIX) 20 MG tablet Take 1 tablet by mouth every morning (before breakfast) 20  Yes Keon Garcia, APRN - CNP   AgaMatrix Ultra-Thin Lancets MISC Use to test blood sugar 1 time daily 20  Yes Harmony Tyson PA-C   metoprolol tartrate (LOPRESSOR) 25 MG tablet Take 1 tablet by mouth 2 times daily 20  Yes Harmony Tyson PA-C   amLODIPine (NORVASC) 5 MG tablet Take 1 tablet by mouth daily 20  Yes Shivam Michael DO   levothyroxine (SYNTHROID) 50 MCG tablet TAKE 1 TABLET BY MOUTH ONE TIME A DAY 20  Yes Harmony Tyson PA-C   blood glucose test strips (AGAMATRIX JAZZ TEST) strip Use to test blood sugar 1 time daily 20  Yes Frank Rodríguez MD   Blood Glucose Calibration (AGAMATRIX CONTROL) SOLN Use as directed for control solution test 20  Yes Frank Rodríguez MD   pravastatin (PRAVACHOL) 20 MG tablet TAKE ONE TABLET BY MOUTH EVERY EVENING 12/10/19  Yes Gómez Ramirez MD   Respiratory Therapy Supplies CLARA Please give patient the farshad view mask 5/31/18  Yes Les Franco PA-C   Cholecalciferol (VITAMIN D) 2000 UNITS CAPS capsule Take 1 capsule by mouth daily    Yes Historical Provider, MD   loratadine (CLARITIN) 10 MG tablet Take 10 mg by mouth daily as needed (Allergies)    Yes Historical Provider, MD   Blood Glucose Monitoring Suppl (GigaBryte 2) w/Device KIT 1 each by Does not apply route once for 1 dose 1/14/21 1/14/21  Harmony Tyson PA-C   Misc Natural Products (GLUCOSAMINE CHOND COMPLEX/MSM) TABS Take 1 tablet by mouth daily 11/24/20   Harmony Tyson PA-C   nitroGLYCERIN (NITROSTAT) 0.4 MG SL tablet Place 1 tablet under the tongue every 5 minutes as needed for Chest pain Indications: never used up to max of 3 total doses.  If no relief after 1 dose, call 911. 9/21/20   Mary Michael, DO       Current medications:    Current Facility-Administered Medications   Medication Dose Route Frequency Provider Last Rate Last Admin    ceFAZolin (ANCEF) 2000 mg in dextrose 3 % 50 mL IVPB (duplex)  2,000 mg Intravenous On Call to 29 Deandre Lopez MD        lactated ringers infusion   Intravenous Continuous Alex Long  mL/hr at 02/03/21 0659 New Bag at 02/03/21 0659    meperidine (DEMEROL) injection 12.5 mg  12.5 mg Intravenous Q5 Min PRN Alex Long MD        fentaNYL (SUBLIMAZE) injection 50 mcg  50 mcg Intravenous Q10 Min PRN Alex Long MD        HYDROmorphone (DILAUDID) injection 0.5 mg  0.5 mg Intravenous Q10 Min PRN Alex Long MD        HYDROcodone-acetaminophen [facility]) 5-325 MG per tablet 1 tablet  1 tablet Oral PRN Alex Long MD        Or    HYDROcodone-acetaminophen [facility]) 5-325 MG per tablet 2 tablet  2 tablet Oral PRN Alex Long MD        ondansetron [facility]) injection 4 mg  4 mg Intravenous Once PRN Alex Long MD        metoclopramide [facility]) injection 10 mg  10 mg Intravenous Once PRN Danielle Tinajero Shena Ashton MD        diphenhydrAMINE (BENADRYL) injection 12.5 mg  12.5 mg Intravenous Once PRN José Miguel Gonzalez MD        lactated ringers infusion   Intravenous Continuous José Miguel Gonzalez MD        sodium chloride flush 0.9 % injection 10 mL  10 mL Intravenous 2 times per day José Miguel Gonzalez MD        sodium chloride flush 0.9 % injection 10 mL  10 mL Intravenous PRN José Miguel Gonzalez MD        lidocaine PF 1 % injection 1 mL  1 mL Intradermal Once PRN José Miguel Gonzalez MD           Allergies: Allergies   Allergen Reactions    Seasonal Itching       Problem List:    Patient Active Problem List   Diagnosis Code    Vitamin D deficiency E55.9    Generalized anxiety disorder F41.1    Sleep apnea, obstructive G47.33    Type II diabetes mellitus, uncontrolled ([facility] [address] E11.65    Hypothyroidism E03.9    Essential hypertension I10    Family history of coronary artery disease Z80.55    Personal history of tobacco use Z87.891    Non-cardiac chest pain R07.89    History of colon polyps Z86.010    Polyp of colon K63.5    Tobacco abuse, 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    Irregular Z line of esophagus K22.9    Gastric polyp K31.7    BELTRAN (nonalcoholic steatohepatitis) K75.81    Weakness of both hands R29.898    Joint stiffness M25.60    Bilateral carpal tunnel syndrome G56.03    Trigger finger, right middle finger M65.331       Past Medical History:        Diagnosis Date    Abnormal EKG 3/23/2018    Angina pectoris syndrome ([facility] [address] 03/23/2018    Diverticulosis of colon     DMII (diabetes mellitus, type 2) ([facility] [address] 2000    Dr Mary Solorzano hypertension 1/15/2016    Family history of coronary artery disease 1/15/2016    Fatty infiltration of liver 2020    Gastric polyp 2019    Dr Zhao Dye, 5 mm    Generalized anxiety disorder     H/O: hysterectomy 2012    History of tobacco abuse 1/15/2016    Obesity (BMI 30-39. 9)     Other specified acquired hypothyroidism 2000    Precordial pain 03/23/2018    (? Prinzmetal?) Dr Dannielle Lopez S/P colonoscopy with polypectomy 2013, 2018, 2019    Dr Sarkis Penaloza, Dr Kika Dumont, tubulovillous adenoma, hyperplastic polyp    S/P vaginal hysterectomy 2012    benign    Sleep apnea, obstructive 2007    was on CPAP, not using it at present    Vertigo 2018       Past Surgical History:        Procedure Laterality Date    CARPAL TUNNEL RELEASE Left 1/13/2021    LEFT CARPAL TUNNEL RELEASE, LEFT MIDDLE FINGER TRIGGER RELEASE, TENOSYNOVECTOMY FDPFDS performed by Lourdes Garcia MD at [address] [place] [address], [address] [facility] COLONOSCOPY N/A 6/30/2020    COLONOSCOPY DIAGNOSTIC performed by Phil Gregory MD at [address] [facility] CATH LAB PROCEDURE      ENDOMETRIAL ABLATION  2002    metrorrhagia    ENDOSCOPY, COLON, DIAGNOSTIC      HYSTERECTOMY  9/7/12    fibroid, cervicitis, ovaries intact    [place] COLON CA SCRN NOT  [address] [place] [address] IND N/A 5/15/2018    COLONOSCOPY performed by Phil Gregory MD at [address] [facility] ENDOSCOPY N/A 4/2/2019    EGD ESOPHAGOGASTRODUODENOSCOPY performed by Phil Gregory MD at [address] [place] [address] History:    Social History     Tobacco Use    Smoking status: Former Smoker     Packs/day: 1.00     Years: 30.00     Pack years: 30.00     Types: Cigarettes     Start date: 3/5/1975     Quit date: 6/12/2005     Years since quitting: 15.6    Smokeless tobacco: Never Used    Tobacco comment: 3/15/18 started age 17   Substance Use Topics    Alcohol use: No     Comment: not at all                                 Counseling given: Not Answered  Comment: 3/15/18 started age 17      Vital Signs (Current):   Vitals:    02/03/21 0600 02/03/21 0618   BP: 127/64    Pulse: 69    Resp: 18    Temp: 98.4 °F (36.9 °C)    TempSrc: Temporal    SpO2: 97%    Weight:  227 lb (103 kg)   Height:  5' 5\" (1.651 m)                                              BP Readings from Last 3 Encounters:   02/03/21 127/64   01/13/21 (!) 115/55   01/13/21 (!) 142/73       NPO Status: Time of last liquid consumption: 2200                        Time of last solid consumption: 2200                        Date of last liquid consumption: 02/02/21                        Date of last solid food consumption: 02/02/21    BMI:   Wt Readings from Last 3 Encounters:   02/03/21 227 lb (103 kg)   01/27/21 228 lb (103.4 kg)   01/13/21 227 lb (103 kg)     Body mass index is 37.77 kg/m².     CBC:   Lab Results   Component Value Date    WBC 9.9 01/07/2021    RBC 4.80 01/07/2021    HGB 13.7 01/07/2021    HCT 41.0 01/07/2021    MCV 85.4 01/07/2021    RDW 14.3 01/07/2021     01/07/2021       CMP:   Lab Results   Component Value Date     01/07/2021    K 3.8 01/07/2021     01/07/2021    CO2 27 01/07/2021    BUN 16 01/07/2021    CREATININE 1.17 01/07/2021    GFRAA 57.5 01/07/2021    LABGLOM 47.5 01/07/2021    GLUCOSE 97 01/07/2021    PROT 8.0 01/07/2021    CALCIUM 10.0 01/07/2021    BILITOT 0.4 01/07/2021    ALKPHOS 95 01/07/2021    AST 26 01/07/2021    ALT 41 01/07/2021       POC Tests:   Recent Labs     02/03/21  8171   POCGLU 142*       Coags:   Lab Results   Component Value Date    PROTIME 12.9 05/01/2020    INR 1.0 05/01/2020    APTT 25.1 03/23/2018       HCG (If Applicable): No results found for: PREGTESTUR, PREGSERUM, HCG, HCGQUANT     ABGs: No results found for: PHART, PO2ART, UVX4NHR, HZV0MMP, BEART, M2VNFRRL     Type & Screen (If Applicable):  No results found for: LABABO, LABRH    Drug/Infectious Status (If Applicable):  No results found for: HIV, HEPCAB    COVID-19 Screening (If Applicable):   Lab Results   Component Value Date    COVID19 Not Detected 01/29/2021         Anesthesia Evaluation  Patient summary reviewed and Nursing notes reviewed no history of anesthetic complications:   Airway: Mallampati: II  TM distance: >3 FB   Neck ROM: full  Mouth opening: > = 3 FB Dental: normal exam         Pulmonary:normal exam    (+) sleep apnea:                             Cardiovascular:    (+) hypertension:, angina:,       ECG reviewed               Beta Blocker:  Dose within 24 Hrs         Neuro/Psych:   (+) neuromuscular disease:, psychiatric history:            GI/Hepatic/Renal:   (+) liver disease:, morbid obesity          Endo/Other:    (+) Diabetes, hypothyroidism::., .          Pt had PAT visit. Abdominal:   (+) obese,         Vascular: negative vascular ROS. Anesthesia Plan      Shanel block     ASA 4       Induction: intravenous. MIPS: Prophylactic antiemetics administered. Anesthetic plan and risks discussed with patient. Plan discussed with CRNA.     Attending anesthesiologist reviewed and agrees with Pre Eval content              Whitney Piña MD   2/3/2021

## 2021-02-03 NOTE — PROGRESS NOTES
CLINICAL PHARMACY NOTE: MEDS TO [address] Select Patient?: Yes  Total # of Prescriptions Filled: 1   The following medications were delivered to the patient:  · norco 5/325mg tab  Total # of Interventions Completed: 0   Time Spent (min): 15    Additional Documentation:

## 2021-02-03 NOTE — ANESTHESIA PROCEDURE NOTES
Peripheral Block    Patient location during procedure: pre-op  Staffing  Anesthesiologist: Bren Monsalve MD  Peripheral Block  Patient position: supine  Block type: Shanel block  Laterality: right  Injection technique: single-shot  Guidance: other  Local infiltration: lidocaine  Infiltration strength: 0.5 %  Dose: 50 mL  Local infiltration: lidocaine  Assessment  Slow fractionated injection: yes  Reason for block: primary anesthetic

## 2021-02-17 ENCOUNTER — OFFICE VISIT (OUTPATIENT)
Dept: ORTHOPEDIC SURGERY | Age: 61
End: 2021-02-17

## 2021-02-17 DIAGNOSIS — R53.1 WEAKNESS: ICD-10-CM

## 2021-02-17 DIAGNOSIS — M65.332 TRIGGER FINGER, LEFT MIDDLE FINGER: ICD-10-CM

## 2021-02-17 DIAGNOSIS — G56.03 BILATERAL CARPAL TUNNEL SYNDROME: Primary | ICD-10-CM

## 2021-02-17 PROCEDURE — 99024 POSTOP FOLLOW-UP VISIT: CPT | Performed by: PHYSICIAN ASSISTANT

## 2021-02-17 NOTE — PROGRESS NOTES
[facility]      Subjective:      Chief Complaint   Patient presents with    Post-Op Check     post op RIGHT CARPAL TUNNEL RELEASE, TRIGGER FINGER RELEASE RIGHT MIDDLE FINGER, FDP, FDS, TENOSYNOVECTOMY 02/03       HPI: Lynn Pederson is a 64 y.o. female who is here 2 weeks postop CTR. Noo issues ssince surgery. incisions are great.  sutures were removed. Past Medical History:   Diagnosis Date    Abnormal EKG 3/23/2018    Angina pectoris syndrome ([facility] [address] 03/23/2018    Diverticulosis of colon     DMII (diabetes mellitus, type 2) ([facility] [address] 2000    Dr Lopez Dense hypertension 1/15/2016    Family history of coronary artery disease 1/15/2016    Fatty infiltration of liver 2020    Gastric polyp 2019    Dr Tata Bullard, 5 mm    Generalized anxiety disorder     H/O: hysterectomy 2012    History of tobacco abuse 1/15/2016    Obesity (BMI 30-39. 9)     Other specified acquired hypothyroidism 2000    Precordial pain 03/23/2018    (? Prinzmetal?) Dr Escalante Noah S/P colonoscopy with polypectomy 2013, 2018, 2019    Dr Gosia Holbrook, Dr Tata Bullard, tubulovillous adenoma, hyperplastic polyp    S/P vaginal hysterectomy 2012    benign    Sleep apnea, obstructive 2007    was on CPAP, not using it at present    Vertigo 2018      Past Surgical History:   Procedure Laterality Date    CARPAL TUNNEL RELEASE Left 1/13/2021    LEFT CARPAL TUNNEL RELEASE, LEFT MIDDLE FINGER TRIGGER RELEASE, TENOSYNOVECTOMY FDPFDS performed by Mame Esteves MD at [address] Right 2/3/2021    RIGHT CARPAL TUNNEL RELEASE, [address] [facility], FDP, FDS, TENOSYNOVECTOMY performed by Mame Esteves MD at [address] [facility], [facility]      COLONOSCOPY N/A 6/30/2020    COLONOSCOPY DIAGNOSTIC performed by Sirisha Tucker MD at [address] [facility] CATH LAB PROCEDURE      ENDOMETRIAL ABLATION  2002    metrorrhagia    ENDOSCOPY, COLON, DIAGNOSTIC      HYSTERECTOMY  9/7/12    fibroid, cervicitis, ovaries intact    [place] COLON CA SCRN NOT [facility] IND N/A 5/15/2018    COLONOSCOPY performed by Baldev Roberts MD at [address] 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[address] , RN     Employer: [employer]   Social Needs    Financial resource strain: Not hard at all    Food insecurity     Worry: Never true     Inability: Never true    Transportation needs     Medical: No     Non-medical: No   Tobacco Use    Smoking status: Former Smoker     Packs/day: 1.00     Years: 30.00     Pack years: 30.00     Types: Cigarettes     Start date: 3/5/1975     Quit date: 6/12/2005     Years since quitting: 15.6    Smokeless tobacco: Never Used    Tobacco comment: 3/15/18 started age 17   Substance and Sexual Activity    Alcohol use: No     Comment: not at all     Drug use: No    Sexual activity: Not on file   Lifestyle    Physical activity     Days per week: 0 days     Minutes per session: 0 min    Stress: Not at all   Relationships    Social connections     Talks on phone: More than three times a week     Gets together: More than three times a week     Attends Rastafari service: Never     Active member of club or organization: Not on file     Attends meetings of clubs or organizations: Never     Relationship status:     Intimate partner violence     Fear of current or ex partner: No     Emotionally abused: No     Physically abused: No     Forced sexual activity: No   Other Topics Concern    Not on file   Social History doses. If no relief after 1 dose, call 911. 20 tablet 3    levothyroxine (SYNTHROID) 50 MCG tablet TAKE 1 TABLET BY MOUTH ONE TIME A DAY 90 tablet 1    blood glucose test strips (AGAMATRIX JAZZ TEST) strip Use to test blood sugar 1 time daily 100 each 3    Blood Glucose Calibration (AGAMATRIX CONTROL) SOLN Use as directed for control solution test 1 each 0    pravastatin (PRAVACHOL) 20 MG tablet TAKE ONE TABLET BY MOUTH EVERY EVENING 90 tablet 1    Respiratory Therapy Supplies CLARA Please give patient the farshad view mask 1 Device 0    Cholecalciferol (VITAMIN D) 2000 UNITS CAPS capsule Take 1 capsule by mouth daily       loratadine (CLARITIN) 10 MG tablet Take 10 mg by mouth daily as needed (Allergies)       Blood Glucose Monitoring Suppl (AGAMATRIX JAZZ WIRELESS 2) w/Device KIT 1 each by Does not apply route once for 1 dose 1 kit 0     No current facility-administered medications on file prior to visit. Objective: There were no vitals taken for this visit. General: WDWN, well appearing, in no distress   Skin: without breakdown, rash, normal in color    Well-healing incisions at the palmar aspect of the hand, no erythema or discharge or signs of infection      Radiographs and Laboratory Studies:       Laboratory Studies:   Lab Results   Component Value Date    WBC 9.9 01/07/2021    HGB 13.7 01/07/2021    HCT 41.0 01/07/2021    MCV 85.4 01/07/2021     (H) 01/07/2021     Lab Results   Component Value Date    SEDRATE 33 (H) 11/21/2020     Lab Results   Component Value Date    CRP 9.3 (H) 11/21/2020       Assessment and Plan:      Diagnosis Orders   1. Bilateral carpal tunnel syndrome     2. Trigger finger, left middle finger         Sutures were removed. Incision looks great, no signs of infection. Instructed to continue with range of motion exercises until any residual stiffness is resolved. No bathing for another week, however they can shower.   If they are having any issues or signs of infection, they were instructed to call our office immediately and make a follow-up appointment. She is having some residual weakness and would need to get through occupational therapy for this is of the left hand greater than the right hand. She had carpal tunnel surgery there about 3 to 4 weeks ago. She is also still complaining of some numbness at the fourth and fifth digits, no ulnar nerve discrepancies on EMG. We will monitor this and assess at her next appointment in 4 weeks. She can now possibly for 2 more weeks unless she is having significant pain we can extend that for another month if needed. She will let us know  The above plan was discussed in thorough detail with Dr. Tish Sierra and the patient. No orders of the defined types were placed in this encounter. No orders of the defined types were placed in this encounter. No follow-ups on file.     Koko Oh PA-C  [place] [id number] [facility]  [phone]

## 2021-02-22 ENCOUNTER — HOSPITAL ENCOUNTER (OUTPATIENT)
Dept: OCCUPATIONAL THERAPY | Age: 61
Setting detail: THERAPIES SERIES
Discharge: HOME | End: 2021-02-22
Payer: COMMERCIAL

## 2021-02-22 PROCEDURE — 97166 OT EVAL MOD COMPLEX 45 MIN: CPT

## 2021-02-22 NOTE — PROGRESS NOTES
[x] [address]:       [address] [place] [address].  Wilberto Lovell  Ph: [phone]   Fax: [phone] [] [address] [address] [facility] [address]:  [address] [address] [facility], [address]   Ph: [phone]  Fax: [phone]       OCCUPATIONAL THERAPY EVALUATION     Evaluation Date:  2/22/2021       OT Individual Minutes  Time In: 0800  Time Out: 0900  Minutes: 60    OT Eval mod complexity 60 minutes for 1 unit, CPT 48163     Patient Name:Geovanna Garrido   Gender: female   YOB: 1960         MRN: [id number]     Physician: Referring Practitioner: Neha Dagn PA-C  Diagnosis: Diagnosis: R53.1 Weakness(Bilateral carpal tunnel release and trigger release (3rd Digit))   Treating diagnosis: R29.898 Other symptoms and signs involving the musculoskeletal systems (decreased strength, coordination, ROM in B wrist/digits)                 Referral Date:  2/17/2021          Onset Date:  October 2020 (Patient reports in October is when she started to notice the pain in both hands and progressively got worse, with left having more pain than the right). On 1/13/2021, patient had left carpal tunnel release, left middle finger trigger release, and left tenosynovectomy FDP and FDS. On 2/3/2021, patient had right carpal tunnel release, right middle finger trigger release, and right tenosynovectomy FDP and FDS.   PMH:  Patient Active Problem List   Diagnosis    Vitamin D deficiency    Generalized anxiety disorder    Sleep apnea, obstructive    Type II diabetes mellitus, uncontrolled ([address]    Hypothyroidism    Essential hypertension    Family history of coronary artery disease    Personal history of tobacco use    Non-cardiac chest pain    History of colon polyps    Polyp of colon    Tobacco abuse, in remission    Irregular Z line of esophagus    Gastric polyp    BELTRAN (nonalcoholic steatohepatitis)    Weakness of both hands    Joint stiffness    Bilateral carpal tunnel syndrome    Acquired trigger finger of right middle finger    Right carpal tunnel syndrome     Past Medical History:   Diagnosis Date    Abnormal EKG 3/23/2018    Angina pectoris syndrome ([facility] [address] [address] 03/23/2018    Diverticulosis of colon     DMII (diabetes mellitus, type 2) ([facility] [address] 2000    Dr Rocío Welch hypertension 1/15/2016    Family history of coronary artery disease 1/15/2016    Fatty infiltration of liver 2020    Gastric polyp 2019    Dr Kika Dumont, 5 mm    Generalized anxiety disorder     H/O: hysterectomy 2012    History of tobacco abuse 1/15/2016    Obesity (BMI 30-39. 9)     Other specified acquired hypothyroidism 2000    Precordial pain 03/23/2018    (? Prinzmetal?) Dr Dannielle Lopez S/P colonoscopy with polypectomy 2013, 2018, 2019    Dr Sarkis Penaloza, Dr Kika Dumont, tubulovillous adenoma, hyperplastic polyp    S/P vaginal hysterectomy 2012    benign    Sleep apnea, obstructive 2007    was on CPAP, not using it at present    Vertigo 2018     Past Surgical History:   Procedure Laterality Date    CARPAL TUNNEL RELEASE Left 1/13/2021    LEFT CARPAL TUNNEL RELEASE, LEFT MIDDLE FINGER TRIGGER RELEASE, TENOSYNOVECTOMY FDPFDS performed by Lourdes Garcia MD at [facility] Right 2/3/2021    RIGHT CARPAL [address], [address] [facility], FDP, FDS, TENOSYNOVECTOMY performed by Lourdes Garcia MD at [address], [address] [facility]      COLONOSCOPY N/A 6/30/2020    COLONOSCOPY DIAGNOSTIC performed by Phil Gregory MD at [facility] CATH LAB PROCEDURE      ENDOMETRIAL ABLATION  2002    metrorrhagia    ENDOSCOPY, COLON, DIAGNOSTIC      HYSTERECTOMY  9/7/12    fibroid, cervicitis, ovaries intact    MD COLON CA SCRN NOT  [address] IND N/A 5/15/2018    COLONOSCOPY performed by Phil Gregory MD at [address] [place] [address] N/A 4/2/2019    EGD ESOPHAGOGASTRODUODENOSCOPY performed by José Miguel Martinez MD at [facility]     Allergies   Allergen Reactions    Seasonal Itching       Diagnostic imaging: Physician interpretation of imaging tests reviewed. Medications:    Current Outpatient Medications:     buPROPion (WELLBUTRIN XL) 300 MG extended release tablet, TAKE ONE TABLET BY MOUTH EVERY MORNING, Disp: 90 tablet, Rfl: 1    hydroCHLOROthiazide (HYDRODIURIL) 25 MG tablet, TAKE 1 TABLET BY MOUTH ONE TIME A DAY IN THE MORNING, Disp: 90 tablet, Rfl: 1    Blood Glucose Monitoring Suppl (Tunespeak WIRELESS 2) w/Device KIT, 1 each by Does not apply route once for 1 dose, Disp: 1 kit, Rfl: 0    empagliflozin (JARDIANCE) 25 MG tablet, Take 1 tablet by mouth daily, Disp: 90 tablet, Rfl: 1    acetaminophen (TYLENOL) 500 MG tablet, Take 1,000 mg by mouth every 6 hours as needed for Pain, Disp: , Rfl:     pantoprazole (PROTONIX) 20 MG tablet, Take 1 tablet by mouth every morning (before breakfast), Disp: 90 tablet, Rfl: 1    AgaMatrix Ultra-Thin Lancets MISC, Use to test blood sugar 1 time daily, Disp: 100 each, Rfl: 3    metoprolol tartrate (LOPRESSOR) 25 MG tablet, Take 1 tablet by mouth 2 times daily, Disp: 180 tablet, Rfl: 1    Misc Natural Products (GLUCOSAMINE CHOND COMPLEX/MSM) TABS, Take 1 tablet by mouth daily, Disp: 90 tablet, Rfl: 3    amLODIPine (NORVASC) 5 MG tablet, Take 1 tablet by mouth daily, Disp: 30 tablet, Rfl: 3    nitroGLYCERIN (NITROSTAT) 0.4 MG SL tablet, Place 1 tablet under the tongue every 5 minutes as needed for Chest pain Indications: never used up to max of 3 total doses.  If no relief after 1 dose, call 911., Disp: 20 tablet, Rfl: 3    levothyroxine (SYNTHROID) 50 MCG tablet, TAKE 1 TABLET BY MOUTH ONE TIME A DAY, Disp: 90 tablet, Rfl: 1    blood glucose test strips (Driver H[employer] TEST) strip, Use to test blood sugar 1 time daily, Disp: 100 each, Rfl: 3    Blood Glucose Calibration ([place] CONTROL) SOLN, Use as directed for control solution test, Disp: 1 each, Rfl: 0    pravastatin (PRAVACHOL) 20 MG tablet, TAKE ONE TABLET BY MOUTH EVERY EVENING, Disp: 90 tablet, Rfl: 1    Respiratory Therapy Supplies CLARA, Please give patient the farshad view mask, Disp: 1 Device, Rfl: 0    Cholecalciferol (VITAMIN D) 2000 UNITS CAPS capsule, Take 1 capsule by mouth daily , Disp: , Rfl:     loratadine (CLARITIN) 10 MG tablet, Take 10 mg by mouth daily as needed (Allergies) , Disp: , Rfl:     Visits Allowed/Insurance/Certification Information:   OT Visit Information  OT Insurance Information: Medical [place]   Total # of Visits Approved: (BMN)  Total # of Visits to Date: 1  Progress Note Due Date: 03/21/21  Progress Note Counter: Eval    Restrictions/Precautions  Patient reports no restrictions per physician. English primary language: Yes    Transfer of pt care required: no     SUBJECTIVE FINDINGS     Support contact:        Pt lives: spouse and mother (with dementia)  Home:  two level with 14 stairs going up and handrail(s) left  going up   Entrance: 3 stairs into the house,   Bathroom: tub/shower combo , grab bars in shower  and tub transfer bench   DME: Transport chair, rollator, and hospital bed for mother. Pain:                   Pain Location  Description Initial Rating  Current Rating  Improved by Worsened by   Stiffness in hands \"stiff\" 2/10 3/10 Rest, extra strength tylenol Increased use, after use   Incision sites Sore, \"quick sharp\" 2/10 0/10 Resolves on own, \"stop what I'm doing\" Increased use   Bilateral Wrist Aching, nagging 4-5/10 0/10 Rest Increase use,  night time   Max pain at home during activities: 7/10 (most of the time around 5/10 at worst)  Lowest pain at home during activities/rest: 0/10    Action for pain:   Patient reports pain is at acceptable level for treatment. Prior Level of Functioning:    Patient performing at independent level for ADL and IADL activities. Patient noticed her  was getting a little weaker. It was getting harder to open jars, doors, etc.     Work Status:  Pt employed full time as nurse care coordinator. Work requirements are typing, phone calls, educating patients. Is this a work related injury: no   Is this a [facility] claim: no      Driving:yes      Hobbies, Leisure, social activities: sewing, crafting, embroidery, cooking, baking     Previous OT treatments for this condition: no.     History of Present Illness or Pain/ Chief complaint:  Patient reports that in October 2020 is when she started to notice the pain in both hands and progressively got worse, with left having more pain than the right. Patient reports it has been going on for the past 7 or so years. Patient reports she was diagnosed with bilateral carpal tunnel syndrome in Late 2020 following EMG study. On 1/13/2021, patient had left carpal tunnel release, left middle finger trigger release, and left tenosynovectomy FDP and FDS. On 2/3/2021, patient had right carpal tunnel release, right middle finger trigger release, and right tenosynovectomy FDP and FDS. Current Functional Limitations Per Patient Report:   Orientation: Oriented x 3  Communication: No concerns   Hearing: No concerns   Perception: No concerns    Vision:  WFL              Feeding: Patient reports mostly independent, but does get tired after cutting a lot of food. ,   Grooming: Patient reports difficulty with styling hair and that it causes pain. Patient reports she has dropped her curling brush a few times. Bathing: Patient can complete independent but reports discomfort after extended use. UE dressing: Patient reports independence with dressing, but does have increased discomfort. LE dressing: Patient reports independence with dressing, but does have increased discomfort.   Toileting: No concerns   Toilet transfer: No concerns   Tub/Shower transfer: No concerns     Cooking: Patient completes, but increased pain, tires NT 96 NT 36 NT   Comments:       Right   Left Norms    A P  A P    Thumb         MP Flexion WFL NT  WFL NT 0-70   IP Flexion WFL NT  WFL NT 0-90   Radial Abduction WFL NT  WFL NT 0-50   Palmar Adduction WFL NT  WFL NT 0-40   Comments:    Opposition  Right Hand: WFL (to 5th digit)  Left Hand: WFL (to 5th digit) Patient reports pain of 3/10 with reaching thumb to 5th digit. Distance Thumb Tip from Head of 5th MC: measurements cm or inches   Right Hand: WFL   Left Hand: WFL  Patient reports feeling \"pull\" in both wrist while reaching thumb over to head of 5th metacarpal.     Edema  Circumferential measurements cm or inches    Right Left   Distal Crease 20.2 19.8   Wrist (across styloid) 17.4 17.5   Metacarpal Phalangeal 20 19.6        & Pinch Strength  Average of 3 tries Right Norm Left Norm    (lb) 16.67 Female age 49-57: 46 lbs  15 Female age 49-57: 55 lbs    Malave Pinch (lb) 5.33 Female age 49-57: 11.0 lbs  3.33 Female age 49-57: 10.0 lbs    Lateral Pinch (lb) 8 Female age 49-57: 12.0 lbs  9 Female age 49-57: 11.0 lbs    Comments:    Coordination & Dexterity   Right Norm Left Norm   Nine Hole Peg Test  (seconds) 21.14 Female age 49-57: 14.7 s  24.22 Female age 49-57: 23.2 s    Box & Blocks  (# of blocks) NT Female age 49-57: 65.7 NT Female age 49-57: 76.11   Comments: Box and blocks not tested due to time constraints. Skin Integrity  Patient with well healing incisions on left palmar surface and wrist. Patient with dry skin around incision areas on right UE, but appears to be well healing. Cognition:   No concerns at time of evaluation. Sensation:     Patient reports right hand has a little numbness which occurs mostly in morning, but it has improved since surgery. Patient reports numbness in left thumb, index, and ring finger is gone (within the last 2 weeks.  Patient reports decreased numbness/tingling in left 4th and 5th digits, but it still has stiffness and \"some of the pins and needles feeling\". Tone:   normal tone      Joint Mobility  Patient with decreased ROM in bilateral wrist and digits. Palpation/Tenderness  Patient reports no pain/tenderness during palpation in wrist or digits. Education/Barriers to learning:     Barriers:none   Education on this date: OT role and POC     ASSESSMENT    Pt is a 64 y.o. female who presents to [facility] outpatient s/p  left carpal tunnel release, left middle finger trigger release, and left tenosynovectomy FDP and FDS (1/13/21) and s/p right carpal tunnel release, right middle finger trigger release, and right tenosynovectomy FDP and FDS (2/3/21). Patient currently presents with the below deficits. Patient would benefit from occupational therapy services to address these deficits to improve independence and performance in ADL and IADL tasks. Problems:  [x] Decreased UE strength   [x] Decreased UE ROM   [x] Decreased  strength   [x] Decreased fine motor skills   [x] Increased pain    [x] Decreased ADL status   [] Decreased joint mobility   [x] Decreased coordination   [x] Decreased sensation    [] Other:      Complexity:         [] Low Complexity:   ¨ History: Brief history including review of medical records relating to the problem  ¨ Exam: 1-3 performance Deficits  ¨ Assistance/Modification: No assistance or modifications required to perform tasks. No comorbities affecting occupational performance  [x] Medium Complexity:   ¨ History: Expanded review of medical records and additional review of physical, cognitive, or psychosocial history related to current functional performance  ¨ Exam: 3-5 performance deficits  ¨ Assistance/Modification: Min/mod assistance or modifications required to perform tasks. May have comorbidities that affect occupational performance.   [] High Complexity:   ¨ History: Extensive review of medical records and additional review of physical, cognitive, or psychosocial history related to current functional performance. ¨ Exam: 5 or more performance deficits  ¨ Assistance/Modification: Significant assistance or modifications required to perform tasks. Have comorbidities that affect occupational performance. The Upper Extremity Functional Index  The Upper Extremity Functional Scale  Any of your usual work, housework, or school activities: Moderate Difficulty  Your usual hobbies, recreational, or sporting activities: A Little Bit of Difficulty  Lifting a bag of groceries to waist level: A Little Bit of Difficulty  Lifting a bag of groceries above your head: Moderate Difficulty  Grooming your hair: A Little Bit of Difficulty  Pushing up on your hands (eg from bathtub/chair): Quite a Bit of Difficulty  Preparing food (eg peeling, cutting): Moderate Difficulty  Driving: No Difficulty  Vacuuming, sweeping, or raking: A Little Bit of Difficulty  Dressing: A Little Bit of Difficulty  Doing up buttons: A Little Bit of Difficulty  Using tools or appliances: A Little Bit of Difficulty  Opening doors: Moderate Difficulty  Cleaning: A Little Bit of Difficulty  Tying or lacing shoes: A Little Bit of Difficulty  Sleeping: A Little Bit of Difficulty  Laundering clothes (eg washing, ironing, folding) :  A Little Bit of Difficulty  Opening a jar: Extreme Difficulty or Unable to Perform Activity  Throwing a ball: No Difficulty  Carrying a small suitcase with your affected limb: A Little Bit of Difficulty  UEFS Score: 66.25  UEFS Disability Index: 1-19%  UEFS CMS Modifier: CI      Rehabilitation Potential:    [] Excellent [x] Good  [] Fair  [] Poor      PLAN OF CARE     To see patient for 2 x/week for 4-6 weeks or 8-12 visits for the following treatment interventions:    [x] Evaluate & Treat [] Neuromuscular Re-education:     [x] Re-evaluation [] Tissue (stress) Loading Program    [x] Pain Management  [x] PROM/Stretching/AAROM/AROM    [x] Edema Management  [x] Splinting    [] Wound Care/Scar Management  [x] Desensitization    [x] ADL Training  [x] Strengthening/Graded Therapeutic Activity    [] Tendon Repair Program  [x] Coordination/Dexterity Training    [x] Instruction/Application of energy [x] Manual Techniques        conservation, work simplification [x] Instruction in HEP        joint protection, body mechanics [] Aquatic Therapy    [x] Modalities [x]  Ultrasound           [x] Infrared [] Hot/Cold Pack:          [x] Paraffin   [] Other:   [x] Electrical Stimulation [x] Fluidotherapy     [x] CURRY able to work with pt   [x] D/C plan: Will assess pt after established visits to determine need for continued therapy. GOALS:     LTG 1 : Patient will report pain 2 or less during functional activities. LTG 2 : Patient will be independent with all recommended HEP's, adaptive strategies, and adaptive techniques. LTG 3 : Patient will report decreased frequency of numbness/tingling in bilateral digits. Ani Nguyễn LTG 4 : Patient will increase bilateral ROM of bilateral wrist/digits from current to [facility] to increase performance with I/ADL's. LTG 5 : Patient will increase BUE strength from current by 1/2 muscle grade to increase performance with I/ADL's. LTG 6 : Patient will increase B  strength from current by 15-20 lbs to increase performance with I/ADL's. LTG 7 : Patient will increase B pinch strength from current by 3-5 lbs to increase performance with I/ADL's.              Signature: Electronically signed by Karen Coats OT on 2/22/2021 at 4:57 PM    Falls Risk Assessment     Age: 0-59 = 0          60-69= 1            > 70= 2 History of Falls:   0  Falls  last 6 mo = 0    1  fall  Last  6 mo = 1   1-3 falls last 6 mo = 2 Medical History:   Parkinsons, CVA, HTN, vertigo, >4 meds, use of assistive device (1pt.for each)  Mental Status:  A & O x 3 = 0  Disoriented to person, place, or time = 2     [x]  INITIAL ASSESSMENT:                                                      Date: 2/22/2021

## 2021-02-26 ENCOUNTER — HOSPITAL ENCOUNTER (OUTPATIENT)
Dept: OCCUPATIONAL THERAPY | Age: 61
Setting detail: THERAPIES SERIES
Discharge: HOME | End: 2021-02-26
Payer: COMMERCIAL

## 2021-02-26 PROCEDURE — 97530 THERAPEUTIC ACTIVITIES: CPT

## 2021-02-26 PROCEDURE — 97140 MANUAL THERAPY 1/> REGIONS: CPT

## 2021-02-26 ASSESSMENT — PAIN DESCRIPTION - PAIN TYPE: TYPE: ACUTE PAIN

## 2021-02-26 NOTE — PROGRESS NOTES
Occupational Therapy  Daily Treatment Note  Date: 2021  Patient Name: Ramon Brown  :  1960  MRN: [id number]       Subjective   General  Referring Practitioner: Marty Gamez PA-C  Diagnosis: R53.1 Weakness(B carpal tunnel release & 3rd digit trigger finger release)  OT Visit Information  OT Insurance Information: Medical [place]   Total # of Visits Approved: (BMN)  Total # of Visits to Date: 2  Progress Note Due Date: 21  Progress Note Counter:   Pain Assessment  Pain Assessment: 0-10  Pain Level: 2  Pain Type: Acute pain  Pain Location: Hand;Wrist  Pain Orientation: Right;Left  Vital Signs  Patient Currently in Pain: Yes   Patient reports bilateral wrists/hands are feeling \"achey\" off and on throughout the day. Treatment Activities:    Patient provided with scar massage to palm of left hand and wrist with good overall tolerance. Scar massage to right palm/wrist held this day secondary to dry, cracked skin to avoid irritation. Patient was educated on proper technique to perform at home. Patient was provided with direct myofascial release techniques to decrease pain and restrictions in bilateral hands/wrist as follows: transverse plane release to left palm, left wrist, and right wrist. Patient tolerates well. Patient was educated on multiple therapeutic activities to increase ROM to bilateral wrist/hands. Patient educated on AROM to wrist, tendon gliding exercises, and median nerve glides. Patient requires minimal cues for proper technique with exercises. Patient was fit for bilateral size small compression gloves this date- issued for home use. Patient was also issued a surgical brush for increased tactile input for decreased numbness/tingling with good understanding reported. Patient ends session with left hand in fluidotherapy x10 minutes while performing AROM in the heat. Assessment    Patient tolerated treatment session well.  Patient reports a decrease in overall

## 2021-03-01 ENCOUNTER — HOSPITAL ENCOUNTER (OUTPATIENT)
Dept: OCCUPATIONAL THERAPY | Age: 61
Setting detail: THERAPIES SERIES
Discharge: HOME | End: 2021-03-01
Payer: COMMERCIAL

## 2021-03-01 PROCEDURE — 97530 THERAPEUTIC ACTIVITIES: CPT

## 2021-03-01 PROCEDURE — 97140 MANUAL THERAPY 1/> REGIONS: CPT

## 2021-03-01 NOTE — PROGRESS NOTES
Occupational Therapy  Daily Note     Name: Boni Harding  : 1960  MRN: [id number]  Diagnosis: R53.1 Weakness    Visit Information:   OT Insurance Information: Medical [place]   Total # of Visits Approved: (BMN)  Total # of Visits to Date: 3  Progress Note Due Date: 21  Progress Note Counter:     Date: 3/1/2021  Time:   OT Manual therapy 15 minutes for 1 unit(s), CPT 21252  OT Therapeutic activities 50 minutes for 3 unit(s), CPT 53251       OT Individual Minutes  Time In: 861  Time Out: 5925  Minutes: 65    Referring Practitioner: Neha Dang PA-C              Subjective: Patient reports that she has been wearing the edema gloves, especially at night. Patient reports that she was kneading dough over the weekend and her hands were fatigued and sore afterwards. Pain rating:   Pre-treatment pain:    2/10    Action for pain:   Patient reports pain is at acceptable level for treatment. Pain after treatment:      \"0.5/10\", patient states it's more of a \"sore\" feeling than pain. Focus of treatment was on the following:   decreasing pain, education/training, scar management, bilateral ROM, /pinch strengthening    Objective:    Treatment Activity:   Patient provided with scar massage to palmar surface of right and left hand and wrist with good overall tolerance. Patients dry and cracked skin on right palmar surface had healed up and was able to provided scar massage to right hand/wrist at this time. Patient reported a small amount of tenderness on palmar surface of left hand, but tolerable. Patient was educated on additional therapeutic activities to increase ROM and strength to bilateral wrists/hands. Patient educated on finger blocking at MCP, PIP, and DIP joints, and foam block exercises for strengthening. Patient completed 10 reps for each hand for , key pinch, three-finger pinch, pad-to-pad pinch, thumb flex, and tip .  Patient demonstrated good overall

## 2021-03-02 ENCOUNTER — TELEPHONE (OUTPATIENT)
Dept: ORTHOPEDIC SURGERY | Age: 61
End: 2021-03-02

## 2021-03-02 LAB — DIABETIC RETINOPATHY: NEGATIVE

## 2021-03-02 NOTE — TELEPHONE ENCOUNTER
Patient stopped in the office to let you know she needs one more week of PT, per PT request, also patient still has numbness to her left hand. I will schedule a follow up appointment for patient.

## 2021-03-05 ENCOUNTER — HOSPITAL ENCOUNTER (OUTPATIENT)
Dept: OCCUPATIONAL THERAPY | Age: 61
Setting detail: THERAPIES SERIES
Discharge: HOME | End: 2021-03-05
Payer: COMMERCIAL

## 2021-03-05 PROCEDURE — 97530 THERAPEUTIC ACTIVITIES: CPT

## 2021-03-05 PROCEDURE — 97112 NEUROMUSCULAR REEDUCATION: CPT

## 2021-03-08 ENCOUNTER — HOSPITAL ENCOUNTER (OUTPATIENT)
Dept: OCCUPATIONAL THERAPY | Age: 61
Setting detail: THERAPIES SERIES
Discharge: HOME | End: 2021-03-08
Payer: COMMERCIAL

## 2021-03-08 PROCEDURE — 97140 MANUAL THERAPY 1/> REGIONS: CPT

## 2021-03-08 PROCEDURE — 97530 THERAPEUTIC ACTIVITIES: CPT

## 2021-03-08 NOTE — PROGRESS NOTES
Occupational Therapy  Daily Note     Name: David Mann  : 1960  MRN: [id number]  Diagnosis: R53.1 Weakness    Visit Information:   OT Insurance Information: Medical [place]   Total # of Visits Approved: (BMN)  Total # of Visits to Date: 5  Progress Note Due Date: 21  Progress Note Counter:     Date: 3/8/2021  Time:   OT Manual therapy 17 minutes for 1 unit(s), CPT 00176  OT Therapeutic activities 40 minutes for 3 unit(s), CPT 69387       OT Individual Minutes  Time In: 6503  Time Out: 1600  Minutes: 62    Referring Practitioner: Milan Santos PA-C              Subjective: Patient states has noticed improvements but her  still feels weak. Pain rating:   Pre-treatment pain:    2/10    Action for pain:   Patient reports pain is at acceptable level for treatment. Pain after treatment:      Pt denies pain         Focus of treatment was on the following:   decreasing pain, education/training, scar management, bilateral ROM    Objective:    Treatment Activity:   Patient provided with scar massage to palmar surface of right and left hand and wrist with good overall tolerance. Patient reported one incidence of  tenderness on palmar surface of right hand near incision from trigger finger release. Patient reported this went away quickly.       Patient was educated on additional therapeutic activities to increase ROM and strength to bilateral wrists/hands. Patient educated on table top exercises to complete with palms on table, completing abduction/adduction with digits and lifting each individual finger one at a time. Patient completed 10 reps for each exercise with each hand. Patient demonstrated good overall understanding with min verbal cues for proper form. Patient reported feeling pull/stretch in fingers during some of the exercises with left hand more than right, but no sharp or shooting pain. Patient with good overall tolerance.    Patient also education on ROM exercises for Electronically signed by Alejandrina Mendoza OT on 3/8/2021 at 3:58 PM

## 2021-03-12 ENCOUNTER — HOSPITAL ENCOUNTER (OUTPATIENT)
Dept: OCCUPATIONAL THERAPY | Age: 61
Setting detail: THERAPIES SERIES
Discharge: HOME | End: 2021-03-12
Payer: COMMERCIAL

## 2021-03-12 PROCEDURE — 97140 MANUAL THERAPY 1/> REGIONS: CPT

## 2021-03-12 PROCEDURE — 97530 THERAPEUTIC ACTIVITIES: CPT

## 2021-03-12 PROCEDURE — 97022 WHIRLPOOL THERAPY: CPT

## 2021-03-12 NOTE — PROGRESS NOTES
Occupational Therapy  Daily Treatment Note  Date: 3/12/2021  Patient Name: Mauricio Babin  :  1960  MRN: [id number]       Subjective Pt see and reports that her hands had post treatment pain and stiffness  OT Visit Information  Progress Note Counter: -  Pre Treatment Pain Screening  Pain at present: 3  Intervention List: Patient able to continue with treatment   Treatment Activities:  Pt's daughter is a massage therapist who is certified reflexology. Pt was encouraged to schedule regular visits long term to continue to promote wellness. Pt was placed in paraffin as a education process in its use so that she can use a paraffin bath at home and Pt was given locations  to purchase one for home use as it was successful in reducing pain and increasing AROM with less pain .  Pt also received B palmar transverse plane MFR , Bilateral fingerpulls to all digits, deep releases to B palms and forearms following paraffin removal.                          Assessment Pt agreed that paraffin was beneficial along with MFR and AROM     Post Treatment Pain Screening  Pain at present: 1         Plan Continue  [address]                     Goals As per [address]       Therapy Time   Individual Concurrent Group Co-treatment   Time In  1300         Time Out  1400         Minutes  60               Electronically signed by COLLETTE Whitman/L on 3/12/2021 at 4:57 PM  Jose Houston OTR/L
Declines

## 2021-03-15 ENCOUNTER — HOSPITAL ENCOUNTER (OUTPATIENT)
Dept: OCCUPATIONAL THERAPY | Age: 61
Setting detail: THERAPIES SERIES
Discharge: HOME | End: 2021-03-15
Payer: COMMERCIAL

## 2021-03-15 ENCOUNTER — APPOINTMENT (OUTPATIENT)
Dept: OCCUPATIONAL THERAPY | Age: 61
End: 2021-03-15
Payer: COMMERCIAL

## 2021-03-15 PROCEDURE — 97140 MANUAL THERAPY 1/> REGIONS: CPT

## 2021-03-15 PROCEDURE — 97530 THERAPEUTIC ACTIVITIES: CPT

## 2021-03-15 NOTE — PROGRESS NOTES
Occupational Therapy  Daily Note     Name: Glenys Rivero  : 1960  MRN: [id number]  Diagnosis: R53.1 Weakness    Visit Information:   OT Insurance Information: Medical [place]   Total # of Visits Approved: (BMN)  Total # of Visits to Date: 7  Progress Note Due Date: 21  Progress Note Counter:     Date: 3/15/2021  Time:   OT Manual therapy 20 minutes for 1 unit(s), CPT 11965  OT Therapeutic activities 40 minutes for 3 unit(s), CPT 25226       OT Individual Minutes  Time In: 7703  Time Out: 1759  Minutes: 60    Referring Practitioner: Alice Duong PA-C              Subjective: Patient reports she is completing her HEP. Patient reports that her hands are sore by the end of the day, after completing activities all day and doing the exercises. Pain rating:   Pre-treatment pain:    2/10, bilateral hands with left worst than right    Action for pain:   Patient reports pain is at acceptable level for treatment. Pain after treatment:      Pt denies pain         Focus of treatment was on the following:   decreasing pain, education/training, scar management, bilateral ROM     Objective:    Treatment Activity:   Patient provided with gentle massage over scars on palmar surfaces of left and right hands with the mini-massager. Patient reported this helped to reduce the discomfort in bilateral hands, especially left hand. Patient provided with scar massage to palmar surface of right and left hand and wrist in circular and cross friction movements. Patient with good overall tolerance for both techniques. Education/HEP: hand strengthening: Patient issued pink theraputty. Patient completed 10 reps (to B hands) of thumb flexion, power grasp, palmar pinch, tip pinch, digit abduction, pad on pad rotation. Patient reported increased discomfort in left hand with certain exercises including thumb flexion and power grasp. Patient educated to not complete these exercises if sharp or shooting pain.  Patient educated to work up to increased repetitions. Written hand out provided. Educated to use theraputty on hard surfaces only and avoid clothing, hair, pets, or other items/areas that theraputty could become stuck in. Modality:   Patient ends session with bilateral hands in fluidotherapy x20 minutes while performing AROM in the heat. Patient with good tolerance. Discussed previous HEP: yes, Pt verbally confirmed compliant with HEP's    Comments:     Assessment:   Pt tolerated treatment well. Patient reported decreased pain following treatment. Patient reports overall improvement in bilateral hands, but continues with increased discomfort and fatigue in the evenings. Plan:   Continue POC    Goals:     Long term goals  Time Frame for Long term goals : 2x/week for 4-6 weeks, 8-12 visits  Long term goal 1: Patient will report pain 2 or less during functional activities. Long term goal 2: Patient will be independent with all recommended HEP's, adaptive strategies, and adaptive techniques. Long term goal 3: Patient will report decreased frequency of numbness/tingling in bilateral digits. .    Long term goal 4: Patient will increase bilateral ROM of bilateral wrist/digits from current to [facility] to increase performance with I/ADL's. Long term goal 5: Patient will increase BUE strength from current by 1/2 muscle grade to increase performance with I/ADL's. Long term goals 6: Patient will increase B  strength from current by 15-20 lbs to increase performance with I/ADL's. Long term goal 7: Patient will increase B pinch strength from current by 3-5 lbs to increase performance with I/ADL's.      Signature: Electronically signed by Quiana Espinoza OT on 3/15/2021 at 4:20 PM

## 2021-03-16 RX ORDER — AMLODIPINE BESYLATE 5 MG/1
TABLET ORAL
Qty: 90 TABLET | Refills: 3 | Status: SHIPPED | OUTPATIENT
Start: 2021-03-16 | End: 2021-06-29 | Stop reason: SDUPTHER

## 2021-03-19 ENCOUNTER — HOSPITAL ENCOUNTER (OUTPATIENT)
Dept: OCCUPATIONAL THERAPY | Age: 61
Setting detail: THERAPIES SERIES
Discharge: HOME | End: 2021-03-19
Payer: COMMERCIAL

## 2021-03-19 PROCEDURE — 97140 MANUAL THERAPY 1/> REGIONS: CPT

## 2021-03-19 PROCEDURE — 97530 THERAPEUTIC ACTIVITIES: CPT

## 2021-03-19 RX ORDER — LEVOTHYROXINE SODIUM 0.05 MG/1
TABLET ORAL
Qty: 90 TABLET | Refills: 1 | Status: SHIPPED | OUTPATIENT
Start: 2021-03-19 | End: 2021-06-29 | Stop reason: SDUPTHER

## 2021-03-19 NOTE — PROGRESS NOTES
Occupational Therapy  Daily Note     Name: Madelaine Rivers  : 1960  MRN: [id number]  Diagnosis: R53.1 Weakness    Visit Information:   OT Insurance Information: Medical [place]   Progress Note Counter:     Date: 3/19/2021  Time:   OT Manual therapy 10 minutes for 1 unit(s), CPT 53704  OT Therapeutic activities 50 minutes for 3 unit(s), CPT 98343       OT Individual Minutes  Time In: 6780    Referring Practitioner: Salbador Sullivan PA-C              Subjective:  Elin reported that she had no pain in her hands after the last session and it lasted for a longer period. Patient also reported that she had difficulty with mixing wet ingredients into the dry ingredients when she was making scones. 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 Patient reported that the numbness and tingling has improved with continued numbness in the ulnar digits of the left hand primarily in the evening after using her hands all day and first thing in the morning. Patient is scheduled to return to work on 3/29/21. Pain rating:   Pre-treatment pain:    3/10 in B hands especially on the ulnar border     Action for pain:   Patient reports pain is at acceptable level for treatment. Pain after treatment:      0/10         Focus of treatment was on the following:   ADL, bilateral coordination, decreasing pain, education/training and leisure     Objective:    Treatment Activity: Patient used metal salad tongs with her right hand primarily but some with her left to  small metal rings and place them in the container. Patient benefited from education to not lift her shoulders while using her hands in task but had difficulty following through with that. Elin was able to use the moderate resitance graded clothespin ( red) to  pop beads and drop them into the top of a cone. Left hand fatigued quicker then the right.  Patient felt that the task was beneficial as she has trouble clipping the nails on her right hand because of difficulty manipulating the nail clippers with her left hand. Offered to assist with finding larger clippers but patient stated that she is able to do it with multiple trials and rest breaks. Modality:     Patient was screened for contraindications prior to use of modality. Fluidotherapy at 115° F for 20 minutes while performing AROM of the hands to provide deep heat and for sensory input to scars    MFR/Manual:   Pt treated seated on chair and was provided with soft tissue massage on all recent surgical sites. Patient's scar from the trigger finger release on the right hand was noted to be thicker then other scars. Cross friction and circular motions were used for scar massage. Activity graded: yes Comments: Patient switched hands when the hand she was using fatigued. Education/HEP: energy conservation/ work simplification Patient was issued red foam to place on utensils or other objects to make them larger and require less  when she is using her hand in a repetitive and sustained task at home. Patient reported that she felt that would help her. Discussed previous HEP: yes, Pt with fair. follow through with the theraputty and exercises because she feels that she has been increasing functional tasks. Comments: Patient has not obtained a paraffin unit because she is not sure which one to get. Patient reported that heat does help and she often uses her heated steering wheel for the heat. Patient was encouraged to obtain the paraffin unit before she returns to work. Assessment:   Pt making good  progress towards goals. Plan:   Continue POC    Goals: Worked on LTGs 1,2,4,5,6,7    LTG 1 : Patient will report pain 2 or less during functional activities. LTG 2 : Patient will be independent with all recommended HEP's, adaptive strategies, and adaptive techniques.     LTG 3 : Patient will report decreased frequency of numbness/tingling in bilateral digits. Karla Mosquera LTG 4 : Patient will increase bilateral ROM of bilateral wrist/digits from current to [facility] to increase performance with I/ADL's. LTG 5 : Patient will increase BUE strength from current by 1/2 muscle grade to increase performance with I/ADL's. LTG 6 : Patient will increase B  strength from current by 15-20 lbs to increase performance with I/ADL's. LTG 7 : Patient will increase B pinch strength from current by 3-5 lbs to increase performance with I/ADL's.        [address], OTR/L   3/19/2021  11:51 AM

## 2021-03-22 ENCOUNTER — HOSPITAL ENCOUNTER (OUTPATIENT)
Dept: OCCUPATIONAL THERAPY | Age: 61
Setting detail: THERAPIES SERIES
Discharge: HOME | End: 2021-03-22
Payer: COMMERCIAL

## 2021-03-22 PROCEDURE — 97530 THERAPEUTIC ACTIVITIES: CPT

## 2021-03-22 NOTE — PROGRESS NOTES
Occupational Therapy  Daily Note     Name: Sue Crook  : 1960  MRN: [id number]  Diagnosis: R53.1 Weakness    Visit Information:   OT Insurance Information: Medical [place]   Total # of Visits Approved: (BMN)  Total # of Visits to Date: 8  Progress Note Due Date: 21  Progress Note Counter:     Date: 3/22/2021  Time:   OT Therapeutic activities 60 minutes for 4 unit(s), CPT 10803       OT Individual Minutes  Time In: 1500    Referring Practitioner: Delmi Alexander PA-C              Subjective: Patient reported that she feels overall there have been a lot of improvements, but continues to have her hands be tired after repetitive functional activities. Pain rating:   Pre-treatment pain:    3/10    Action for pain:   No action necessary. Pain after treatment:      Pt denies pain         Focus of treatment was on the following:   assess for progress toward goals, decreasing pain and increase bilateral  wrist/digit active ROM     Objective:    Treatment Activity:   Patient assessed for progress towards goals and for updated POC/progress note.      Upper Extremity Strength and Range of Motion                  Right   Left     MMT AROM  Eval AROM  3/22/21 Norm  AROM Eval AROM  3/22/21 MMT   Wrist                 Flexion 3+/5 55 65 0-70 43 63 3/5   Extension  3+/5 53 58 0-60 55 60 3/5   Ulnar deviation NT/5 27 30 0-30 25 30 NT/5   Radial Deviation  NT/5 17 20 0-20 16 21 NT/5   Comments:         Hand Range of Motion                        Right   Left   Normal  MP PIP DIP   MP PIP DIP     0-90 0-100 0-70   0-90 0-100 0-70     AROM  Eval AROM  3/22/21 AROM  Eval AROM 3/22/21 AROM  Eval AROM  3/22/21   AROM  Eval AROM  3/22/21 AROM  Eval AROM  3/22/21 AROM  Eval AROM  3/22/21   Index                             Extension [facility] WFL   Flexion 78 84 100 102 60 62   26 72 90 92 46 62   [facility] [place] [place] [place] WFL   Flexion 80 88 88 96 70 70   70 78 80 90 50 60   Ring                             Extension [facility] [place] [place] [place] WFL   Flexion  86 88 98 98 50 50   70 78 82 96 40 50   Little                              [facility] WFL   WFL WFL [facility] WFL   Flexion  90 90 90 84 50 60   76 82 96 96 36 50   Comments:    Opposition  Right Hand: WFL (to 5th digit)  Left Hand: WFL (to 5th digit) Patient reports pain of 3/10 with reaching thumb to 5th digit on evaluation. Patient reported \"pull\" but no pain.           Edema  Circumferential measurements cm or inches     Right  Eval Right  3/22/21 Left Eval Left  3/22/21   Distal Crease 20.2 19.8 19.8 19.5   Wrist (across styloid) 17.4 16.5 17.5 17.3   Metacarpal Phalangeal 20 20 19.6 19.5          & Pinch Strength  Average of 3 tries Right  Eval Right  3/22/21 Norm Left  Eval Left  3/22/21 Norm    (lb) 16.67 27.67 Female age 49-57: 46 lbs  15 21 Female age 49-57: 55 lbs    [place] Pinch (lb) 5.33 8 Female age 49-57: 11.0 lbs  3.33 6 Female age 49-57: 10.0 lbs    Lateral Pinch (lb) 10 8 Female age 49-57: 12.0 lbs  7 6 Female age 49-57: 11.0 lbs    Comments:     Coordination & Dexterity    Right  Eval Right 3/22/21 Norm Left  Eval Left  3/22/21 Norm   Nine Hole Peg Test  (seconds) 21.14 19.65 Female age 49-57: 14.7 s  24.22 20.65 Female age 49-57: 23.2 s    Comments: Box and blocks not tested due to time constraints. The Upper Extremity Functional Index  The Upper Extremity Functional Scale  Any of your usual work, housework, or school activities: A Little Bit of Difficulty  Your usual hobbies, recreational, or sporting activities: A Little Bit of Difficulty  Lifting a bag of groceries to waist level: No Difficulty  Lifting a bag of groceries above your head: A Little Bit of Difficulty  Grooming your hair: No Difficulty  Pushing up on your hands (eg from bathtub/chair):  A Little Bit of Difficulty  Preparing food (eg peeling, cutting): A Little Bit of Difficulty  Driving: No Difficulty  Vacuuming, sweeping, or raking: A Little Bit of Difficulty  Dressing: No Difficulty  Doing up buttons: No Difficulty  Using tools or appliances: A Little Bit of Difficulty  Opening doors: A Little Bit of Difficulty  Cleaning: A Little Bit of Difficulty  Tying or lacing shoes: No Difficulty  Sleeping: No Difficulty  Laundering clothes (eg washing, ironing, folding) : A Little Bit of Difficulty  Opening a jar: A Little Bit of Difficulty  Throwing a ball: No Difficulty  Carrying a small suitcase with your affected limb: No Difficulty  UEFS Score: 86.25    Modality:     Patient was screened for contraindications prior to use of modality. Fluidotherapy at 115° F for 20 minutes while performing AROM of the hands to provide deep heat and for sensory input to scars        Discussed previous HEP: yes, Pt verbally confirmed compliant with HEP's    Comments:     Assessment:   Pt tolerated treatment well. Patient made progress towards all goals. Patient reports overall functional improvements, but continues to get fatigued by the end of the day. Patient would benefit from continued occupational therapy services to increased bilateral hand strength and decreased fatigue following functional activity. Plan:   Update POC, 1-2x/week for 2-4 weeks. Goals:     Long term goals  Time Frame for Long term goals : 2x/week for 4-6 weeks, 8-12 visits  Long term goal 1: Patient will report pain 2 or less during functional activities. Long term goal 2: Patient will be independent with all recommended HEP's, adaptive strategies, and adaptive techniques. Long term goal 3: Patient will report decreased frequency of numbness/tingling in bilateral digits. .    Long term goal 4: Patient will increase bilateral ROM of bilateral wrist/digits from [facility] to [facility] to increase performance with I/ADL's.    Long term goal 5: Patient will increase BUE strength from current by 1/2 muscle grade to increase performance with I/ADL's. Long term goals 6: Patient will increase B  strength from current by 15-20 lbs to increase performance with I/ADL's. Long term goal 7: Patient will increase B pinch strength from current by 3-5 lbs to increase performance with I/ADL's.      Signature: Electronically signed by Issa Lozano OT on 3/22/2021 at 4:57 PM

## 2021-03-23 ENCOUNTER — OFFICE VISIT (OUTPATIENT)
Dept: ORTHOPEDIC SURGERY | Age: 61
End: 2021-03-23
Payer: COMMERCIAL

## 2021-03-23 VITALS — HEART RATE: 70 BPM | TEMPERATURE: 97 F | OXYGEN SATURATION: 97 %

## 2021-03-23 DIAGNOSIS — R20.2 NUMBNESS AND TINGLING IN LEFT HAND: Primary | ICD-10-CM

## 2021-03-23 DIAGNOSIS — R20.0 NUMBNESS AND TINGLING IN LEFT HAND: Primary | ICD-10-CM

## 2021-03-23 PROCEDURE — 99213 OFFICE O/P EST LOW 20 MIN: CPT | Performed by: PHYSICIAN ASSISTANT

## 2021-03-23 NOTE — PROGRESS NOTES
COLONOSCOPY N/A 6/30/2020    COLONOSCOPY DIAGNOSTIC performed by Prabhu Grider MD at [address] [facility] CATH LAB PROCEDURE      ENDOMETRIAL ABLATION  2002    metrorrhagia    ENDOSCOPY, COLON, DIAGNOSTIC      HYSTERECTOMY  9/7/12    fibroid, cervicitis, ovaries intact    MS COLON CA SCRN NOT  [address] IND N/A 5/15/2018    COLONOSCOPY performed by Prabhu Grider MD at [address] [facility] ENDOSCOPY N/A 4/2/2019    EGD ESOPHAGOGASTRODUODENOSCOPY performed by Prabhu Grider MD at [address] [place] Marital status:      Spouse name: Not on file    Number of children: 3    Years of education: Not on file    Highest education level: Not on file   Occupational History    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   Social Needs    Financial resource strain: Not hard at all    Food insecurity     Worry: Never true     Inability: Never true    Transportation needs     Medical: No     Non-medical: No   Tobacco Use    Smoking status: Former Smoker     Packs/day: 1.00     Years: 30.00     Pack years: 30.00     Types: Cigarettes     Start date: 3/5/1975     Quit date: 6/12/2005     Years since quitting: 15.7    Smokeless tobacco: Never Used    Tobacco comment: 3/15/18 started age 17   Substance and Sexual Activity    Alcohol use: No     Comment: not at all     Drug use: No    Sexual activity: Not on file   Lifestyle    Physical activity     Days per week: 0 days     Minutes per session: 0 min    Stress: Not at all   Relationships    Social connections     Talks on phone: More than three times a week     Gets together: More than three times a week     Attends Latter-day service: Never     Active member of club or organization: Not on file     Attends meetings of clubs or organizations: Never     Relationship status:     Intimate partner violence     Fear of current or ex partner: No     Emotionally abused: No     Physically abused: No     Forced sexual activity: No   Other Topics Concern    Not on file   Social History Narrative    Born in [place], one of 4 children (2 boys and 2 girls), both parents in the [employer], moved to [place]    Mother in [place], has memory problems    Hindu Islam     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    Has 2 dogs, Blossom and [address], 2 sons in the area, one daughter in [address]: dogs, sawing, machine embroidery, baking     Family History   Problem Relation Age of Onset    Arrhythmia Mother     Hypertension Mother     Dementia Mother     COPD Father         mesothelioma    Alcohol Abuse Father     Diabetes Paternal Grandmother     Diabetes Paternal Grandfather     Colon Cancer Paternal Grandfather     Schizophrenia Brother     Colon Cancer Paternal Uncle      Allergies   Allergen Reactions    Seasonal Itching     Current Outpatient Medications on File Prior to Visit   Medication Sig Dispense Refill    levothyroxine (SYNTHROID) 50 MCG tablet TAKE 1 TABLET BY MOUTH ONE TIME A DAY 90 tablet 1    amLODIPine (NORVASC) 5 MG tablet TAKE 1 TABLET BY MOUTH ONE TIME A DAY 90 tablet 3    buPROPion (WELLBUTRIN XL) 300 MG extended release tablet TAKE ONE TABLET BY MOUTH EVERY MORNING 90 tablet 1    hydroCHLOROthiazide (HYDRODIURIL) 25 MG tablet TAKE 1 TABLET BY MOUTH ONE TIME A DAY IN THE MORNING 90 tablet 1    empagliflozin (JARDIANCE) 25 MG tablet Take 1 tablet by mouth daily 90 tablet 1    acetaminophen (TYLENOL) 500 MG tablet Take 1,000 mg by mouth every 6 hours as needed for Pain      pantoprazole (PROTONIX) 20 MG tablet Take 1 tablet by mouth every morning (before breakfast) 90 tablet 1    AgaMatrix Ultra-Thin Lancets MISC Use to test blood sugar 1 time daily 100 each 3    metoprolol tartrate (LOPRESSOR) 25 MG tablet Take 1 tablet by mouth 2 times daily 180 tablet 1    Misc Natural Products (GLUCOSAMINE CHOND COMPLEX/MSM) TABS Take 1 tablet by mouth daily 90 tablet 3    nitroGLYCERIN (NITROSTAT) 0.4 MG SL tablet Place 1 tablet under the tongue every 5 minutes as needed for Chest pain Indications: never used up to max of 3 total doses. If no relief after 1 dose, call 911. 20 tablet 3    blood glucose test strips (AGAMATRFlywheel Software JAZZ TEST) strip Use to test blood sugar 1 time daily 100 each 3    Blood Glucose Calibration (AGABuzzniIX CONTROL) SOLN Use as directed for control solution test 1 each 0    pravastatin (PRAVACHOL) 20 MG tablet TAKE ONE TABLET BY MOUTH EVERY EVENING 90 tablet 1    Respiratory Therapy Supplies CLARA Please give patient the farshad view mask 1 Device 0    Cholecalciferol (VITAMIN D) 2000 UNITS CAPS capsule Take 1 capsule by mouth daily       loratadine (CLARITIN) 10 MG tablet Take 10 mg by mouth daily as needed (Allergies)       Blood Glucose Monitoring Suppl (CarePaymentZZ WIRELESS 2) w/Device KIT 1 each by Does not apply route once for 1 dose 1 kit 0     No current facility-administered medications on file prior to visit. Objective:   Pulse 70   Temp 97 °F (36.1 °C) (Temporal)   SpO2 97%       Radiographs and Laboratory Studies:   Previous diagnostic imaging studies were reviewed. EMG showed normal ulnar conduction    Laboratory Studies:   Lab Results   Component Value Date    WBC 9.9 01/07/2021    HGB 13.7 01/07/2021    HCT 41.0 01/07/2021    MCV 85.4 01/07/2021     (H) 01/07/2021     Lab Results   Component Value Date    SEDRATE 33 (H) 11/21/2020     Lab Results   Component Value Date    CRP 9.3 (H) 11/21/2020       Assessment and Plan:      Diagnosis Orders   1. Numbness and tingling in left hand  Ambulatory referral to [facility]chols continues to have numbness and tingling in the fourth and fifth digits of the left hand. Her EMG showed no ulnar nerve latencies.   She has no neck pain or other radiculopathy-like symptoms with a negative Spurling's test.  She is never injured her neck before. Therapy made her special brace, she was instructed to wear this more frequently and continue to work with them for the next 4 weeks. If she is not getting any improvement at her next appointment we will either consider sending her to Dr. Lidia Patel or an MRI of the neck. The above plan was discussed in thorough detail with Dr. Yesenia Mccloud and the patient. No orders of the defined types were placed in this encounter. No orders of the defined types were placed in this encounter. No follow-ups on file.     Michael Lyman PA-C  [facility]  [phone]

## 2021-03-23 NOTE — PROGRESS NOTES
OCCUPATIONAL THERAPY PROGRESS NOTE  []  [address]          [address]        Ph: [phone]         Fax: [phone]          []  [facility] [address]         Ph: [phone]         Fax: [phone]        [] Certification     [] Recertification     [x] Plan of Care    [x] Progress Note        Date: 3/23/2021    To:Referring Practitioner: Salbador Sullivan PA-C          From: Moira Nichols OT  Patient: Madelaine Rivers       : 1960  MRN: [id number]  Diagnosis:Diagnosis: R53.1 Weakness   Date of eval: 2021    Visit Information:   OT Insurance Information: [employer] [place]   Total # of Visits Approved: (BMN)  Total # of Visits to Date: 8  Progress Note Due Date: 21  Progress Note Counter:     Last POC date: Eval 2021   Reporting period: 2021 through 3/23/2021                            Assessment:    Goals Current/Discharge status  Met   Long term goal 1: Patient will report pain 2 or less during functional activities. [] Met  [x] Partially Met  [] Not Met   Long term goal 2: Patient will be independent with all recommended HEP's, adaptive strategies, and adaptive techniques. Ongoing. Patient reports compliance with HEP. [] Met  [x] Partially Met  [] Not Met   Long term goal 3: Patient will report decreased frequency of numbness/tingling in bilateral digits. .   Patient reports overall decreased numbness and tingling in bilateral digits, but it does continue a little in left 4th and 5th digits. [] Met  [x] Partially Met  [] Not Met   Long term goal 4: Patient will increase bilateral ROM of bilateral wrist/digits from current to [facility] to increase performance with I/ADL's. Patient [facility] for all ROM for right and left wrist/digits. See chart below for all measurements.   [x] Met  [] Partially Met  [] Not Met   Long term goal 5: Patient will increase BUE strength from current by 1/2 muscle grade to increase performance with I/ADL's.   [] Met  [x] Partially Met  [] Not Met   Long term goals 6: Patient will increase B  strength from current by 15-20 lbs to increase performance with I/ADL's. Right Eval: 16.67 lbs  Right Current: 27.67 lbs  Left Eval: 12 lbs  Left Current: 23 lbs [] Met  [x] Partially Met  [] Not Met   Long term goal 7: Patient will increase B pinch strength from current by 3-5 lbs to increase performance with I/ADL's. Right Palmar Pinch Eval: 5.33 lbs  Right Palmar Pinch current: 8lbs  Left Palmar Pinch Eval: 3.33 lbs  Left Palmar Pinch current: 8lbs  Right Lateral Pinch Eval: 10 lbs  Right Lateral Pinch Current: 10 lbs  Left Lateral Pinch Eval: 7 lbs  Left Lateral Pinch Current: 8 lbs [] Met  [x] Partially Met  [] Not Met       Functional assessment used: Upper Extremity Functional Index  Score on eval:   UEFS Score: 66.25  UEFS Disability Index: 1-19%  UEFS CMS Modifier: CI  Score currently:   UEFS Score: 86.25    TREATMENT PLAN:    [x] Evaluate & Treat  [x] Re-evaluation  [x] Pain Management  [x] Edema Management  [] Wound Care/Scar Management  [x] ADL Training  [] Tendon Repair Program  [] Aquatic Therapy  [] Instruction/Application of energy conservation, work simplification, joint protection, body mechanics   [x] Pt able to work with [place] [employer]  [x] D/C plan: Will assess pt after established visits to determine need for continued therapy.   [] Neuromuscular Re-education  [] Tissue (stress) Loading Program  [] PROM/Stretching/AAROM/AROM  [] Splinting  [] Desensitization  [] Strengthening/Graded Therapeutic Activity  [] Coordination/Dexterity Training  [] Manual Techniques  [] Instruction in HEP  [x] Modalities: [x] Ultrasound [x] Infrared                 [x] Hot/cold pack [x] Paraffin                  [x] Electrical stimulation                  [x] Other: BTE                                Frequency/Duration:  1-2x per week for 2-4 weeks or 4-8 visits. Rehab Potential: [] Excellent [x] Good     [] Fair [] Poor      Patient Status: [x] Continue/Initate plan of Care   []  Discharge   []  Additional visits requested, please re-certify for additional visits        Electronically signed by:  Idalia Meade OT 3/23/2021 7:57 AM    If you have any questions or concerns, please don't hesitate to call. Thank you for your referral.      I have reviewed this plan of care and certify a need for medically necessary rehabilitation services.     Physician Signature:__________________________________________________________    Date: 3/23/2021  Please sign and return

## 2021-03-26 ENCOUNTER — HOSPITAL ENCOUNTER (OUTPATIENT)
Dept: OCCUPATIONAL THERAPY | Age: 61
Setting detail: THERAPIES SERIES
Discharge: HOME | End: 2021-03-26
Payer: COMMERCIAL

## 2021-03-26 PROCEDURE — 97140 MANUAL THERAPY 1/> REGIONS: CPT

## 2021-03-26 PROCEDURE — 97530 THERAPEUTIC ACTIVITIES: CPT

## 2021-03-29 ENCOUNTER — HOSPITAL ENCOUNTER (OUTPATIENT)
Dept: OCCUPATIONAL THERAPY | Age: 61
Setting detail: THERAPIES SERIES
Discharge: HOME | End: 2021-03-29
Payer: COMMERCIAL

## 2021-03-29 PROCEDURE — 97530 THERAPEUTIC ACTIVITIES: CPT

## 2021-03-29 PROCEDURE — 97140 MANUAL THERAPY 1/> REGIONS: CPT

## 2021-04-07 ENCOUNTER — HOSPITAL ENCOUNTER (OUTPATIENT)
Dept: OCCUPATIONAL THERAPY | Age: 61
Setting detail: THERAPIES SERIES
Discharge: HOME | End: 2021-04-07
Payer: COMMERCIAL

## 2021-04-07 PROCEDURE — 97530 THERAPEUTIC ACTIVITIES: CPT

## 2021-04-07 NOTE — PROGRESS NOTES
Occupational Therapy  Daily Note     Name: Vishal Harper  : 1960  MRN: [id number]  Diagnosis: R53.1 Weakness    Visit Information:   OT Insurance Information: Medical [place]   Total # of Visits Approved: (BMN)  Total # of Visits to Date: 12  Progress Note Counter:     Date: 2021  OT Therapeutic activities 60 minutes for 4 unit(s), CPT 08924     OT Individual Minutes  Time In: 1600  Time Out: 1700  Minutes: 60    Referring Practitioner: Soledad Stubbs PA-C      Subjective:  \"I can feel clicking in the two joints right here (L 4 and 5 digit at MCP). I think it may be arthritis. \"        Pain rating:   Pre-treatment pain:    Stiffness on ulnar side of L hand    Action for pain:   No action necessary. Pain after treatment:      Pt denies pain when hand at rest. Reported sometimes feels pain in L wrist and 4th and 5th digits (when \"clicking\" around MCP)        Focus of treatment was on the following:   assess for progress toward goals     Objective:    Treatment Activity:     Pt stated has not worn glove or brace for past couple of nights. Pt stated using home paraffin wax at 145°. Educated on theraputic range 125-135° F to prevent burns. Informed higher setting to sanitize wax. Pt verbalized understanding.      Upper Extremity Strength and Range of Motion on eval                  Right   Left     MMT eval A P Norm  A P MMT   Shoulder                 Flexion 4/5 WFL NT 0-180 WFL NT 4/5   Abduction 4/5 WFL NT 0-180 WFL NT 4/5   Internal Rotation 4/5 WFL NT 0-80 WFL NT 4/5   External Rotation 4/5 WFL NT 0-60 WFL NT 4/5   Elbow                 Flexion 4/5 WFL NT 0-150 WFL NT 4/5   Extension 4/5 WFL -0 WFL NT 4/5   Pronation NT/5 WFL NT 0-80 WFL NT NT/5   Supination NT/5 WFL NT 0-80 WFL NT NT/5   Wrist                 Flexion 3+/5 55 NT 0-70 43 NT 3/5   Extension  3+/5 53 NT 0-60 55 NT 3/5   Ulnar deviation NT/5 27 NT 0-30 25 NT NT/5   Radial Deviation  NT/5 17 NT 0-20 16 NT NT/5   Comments:    Upper Extremity Strength and Range of Motion on 4/7/20                  Right   Left     MMT eval A P Norm  A P MMT   Shoulder                 Flexion 4+/5 WFL NT 0-180 WFL NT 4+/5   Abduction 4+/5 WFL NT 0-180 WFL NT 4+/5   Internal Rotation 4+/5 WFL NT 0-80 WFL NT 4+/5   External Rotation 4+/5 WFL NT 0-60 WFL NT 4+/5   Elbow                 Flexion 4+/5 WFL NT 0-150 WFL NT 4+/5   Extension 4+/5 WFL -0 WFL NT 4+/5   Pronation NT/5 WFL NT 0-80 WFL NT NT/5   Supination NT/5 WFL NT 0-80 WFL NT NT/5   Wrist                 Flexion 4/5 65 NT 0-70 60 NT 4/5   Extension  4/5 70 NT 0-60 60 NT 4/5   Ulnar deviation NT/5 40 NT 0-30 35 NT NT/5   Radial Deviation  NT/5 20 NT 0-20 20 NT NT/5   Comments:     Hand Range of Motion                        Right   Left   Normal  MP PIP DIP   MP PIP DIP     0-90 0-100 0-70   0-90 0-100 0-70     A P A P A P   A P A P A P   Index                             Extension WFL NT WFL NT WFL NT   WFL NT WFL NT WFL NT   Flexion 70 NT 90 NT 40 NT   77 NT 85 NT 42 NT   Middle                             Extension WFL NT WFL NT WFL NT   WFL NT WFL NT WFL NT   Flexion 80 NT 90 NT 55 NT   65 NT 90 NT 47 NT   Ring                             Extension WFL NT WFL NT WFL NT   WFL NT WFL NT WFL NT   Flexion  80 NT 90 NT 45 NT   75 NT 86 NT 30 NT   Little                              Extension WFL NT WFL NT WFL NT   WFL NT WFL NT WFL NT   Flexion  90 NT 90 NT 45 NT   85 NT 82 NT 43 NT   Comments:      Edema  Circumferential measurements cm or inches     Right eval Right 4/7 Left eval Left 4/7   Distal Crease 20.2 20.2 cm 19.8 19.0 cm   Wrist (across styloid) 17.4 16.3 cm 17.5 16.5 cm   Metacarpal Phalangeal 20 20.0 cm 19.6 19.0  cm          & Pinch Strength  Average of 3 tries Right eval 4/7 Norm Left eval 4/7 Norm    (lb) 16.67 40.3 Female age 49-57: 46 lbs  15 27.4 Female age 49-57: 46 lbs    Malave Pinch (lb) 5.33 8.33 Female age 49-57: 11.0 lbs  3.33 8.5 Female age 49-57: 10.0 lbs Lateral Pinch (lb) 10 11.5 Female age 49-57: 12.0 lbs  7 11.6 Female age 49-57: 11.0 lbs    Comments:     Discussed previous HEP: yes, Pt verbally confirmed compliant with HEP's    Comments: Pt reported numbness and tingling  \"gone except L 4th and 5th digit numb    Assessment:   Pt met goals. Discussed d/c from OT. Pt agreed. Plan:   D/C from OP OT    Goals:     Long term goals  Time Frame for Long term goals : 2x/week for 4-6 weeks, 8-12 visits  Long term goal 1: Patient will report pain 2 or less during functional activities. Long term goal 2: Patient will be independent with all recommended HEP's, adaptive strategies, and adaptive techniques. Long term goal 3: Patient will report decreased frequency of numbness/tingling in bilateral digits. .    Long term goal 4: Patient will increase bilateral ROM of bilateral wrist/digits from current to [facility] to increase performance with I/ADL's. Long term goal 5: Patient will increase BUE strength from current by 1/2 muscle grade to increase performance with I/ADL's. Long term goals 6: Patient will increase B  strength from current by 15-20 lbs to increase performance with I/ADL's. Long term goal 7: Patient will increase B pinch strength from current by 3-5 lbs to increase performance with I/ADL's.      COLLETTE Torres/L   4/7/2021  5:12 PM

## 2021-04-07 NOTE — PROGRESS NOTES
increase performance with I/ADL's. See chart below  [x] Met  [] Partially Met  [] Not Met   Long term goal 7: Patient will increase B pinch strength from current by 3-5 lbs to increase performance with I/ADL's.   See chart below  [x] Met  [] Partially Met  [] Not Met     Upper Extremity Strength and Range of Motion on eval                          Right   Left     MMT eval A P Norm  A P MMT   Shoulder                 Flexion 4/5 WFL NT 0-180 WFL NT 4/5   Abduction 4/5 WFL NT 0-180 WFL NT 4/5   Internal Rotation 4/5 WFL NT 0-80 WFL NT 4/5   External Rotation 4/5 WFL NT 0-60 WFL NT 4/5   Elbow                 Flexion 4/5 WFL NT 0-150 WFL NT 4/5   Extension 4/5 WFL -0 WFL NT 4/5   Pronation NT/5 WFL NT 0-80 WFL NT NT/5   Supination NT/5 WFL NT 0-80 WFL NT NT/5   Wrist                 Flexion 3+/5 55 NT 0-70 43 NT 3/5   Extension  3+/5 53 NT 0-60 55 NT 3/5   Ulnar deviation NT/5 27 NT 0-30 25 NT NT/5   Radial Deviation  NT/5 17 NT 0-20 16 NT NT/5     Upper Extremity Strength and Range of Motion on 4/7/20                          Right   Left     MMT eval A P Norm  A P MMT   Shoulder                 Flexion 4+/5 WFL NT 0-180 WFL NT 4+/5   Abduction 4+/5 WFL NT 0-180 WFL NT 4+/5   Internal Rotation 4+/5 WFL NT 0-80 WFL NT 4+/5   External Rotation 4+/5 WFL NT 0-60 WFL NT 4+/5   Elbow                 Flexion 4+/5 WFL NT 0-150 WFL NT 4+/5   Extension 4+/5 WFL -0 WFL NT 4+/5   Pronation NT/5 WFL NT 0-80 WFL NT NT/5   Supination NT/5 WFL NT 0-80 WFL NT NT/5   Wrist                 Flexion 4/5 65 NT 0-70 60 NT 4/5   Extension  4/5 70 NT 0-60 60 NT 4/5   Ulnar deviation NT/5 40 NT 0-30 35 NT NT/5   Radial Deviation  NT/5 20 NT 0-20 20 NT NT/5   Comments:     Hand Range of Motion on eval                        Right   Left   Normal  MP PIP DIP   MP PIP DIP     0-90 0-100 0-70   0-90 0-100 0-70     A P A P A P   A P A P A P   Index                             Extension WFL NT WFL NT WFL NT   WFL NT WFL NT WFL NT Flexion 78  NT 60 NT   26 NT 90 NT 46 NT   Middle                             Extension WFL NT WFL NT WFL NT   WFL NT WFL NT WFL NT   Flexion 80 NT 88 NT 70 NT   70 NT 80 NT 50 NT   Ring                             Extension WFL NT WFL NT WFL NT   WFL NT WFL NT WFL NT   Flexion  86 NT 98 NT 50 NT   70 NT 82 NT 40 NT   Little                              Extension WFL NT WFL NT WFL NT   WFL NT WFL NT WFL NT   Flexion  90 NT 90 NT 50 NT   76 NT 96 NT 36 NT   Comments:      Hand Range of Motion on 4/7/2021                                     Right   Left   Normal  MP PIP DIP   MP PIP DIP     0-90 0-100 0-70   0-90 0-100 0-70     A P A P A P   A P A P A P   Index                             Extension WFL NT WFL NT WFL NT   WFL NT WFL NT WFL NT   Flexion 70 NT 90 NT 40 NT   77 NT 85 NT 42 NT   Middle                             Extension WFL NT WFL NT WFL NT   WFL NT WFL NT WFL NT   Flexion 80 NT 90 NT 55 NT   65 NT 90 NT 47 NT   Ring                             Extension WFL NT WFL NT WFL NT   WFL NT WFL NT WFL NT   Flexion  80 NT 90 NT 45 NT   75 NT 86 NT 30 NT   Little                              Extension WFL NT WFL NT WFL NT   WFL NT WFL NT WFL NT   Flexion  90 NT 90 NT 45 NT   85 NT 82 NT 43 NT   Comments:      Edema  Circumferential measurements cm or inches     Right eval Right 4/7 Left eval Left 4/7   Distal Crease 20.2 20.2 cm 19.8 19.0 cm   Wrist (across styloid) 17.4 16.3 cm 17.5 16.5 cm   Metacarpal Phalangeal 20 20.0 cm 19.6 19.0  cm          & Pinch Strength  Average of 3 tries Right eval 4/7 Norm Left eval 4/7 Norm    (lb) 16.67 40.3 Female age 55-59: 51 lbs  12 33.3 Female age 55-59: 46 lbs    Amlave Pinch (lb) 5.33 8.33 Female age 55-59: 11.0 lbs  3.33 8.5 Female age 55-59: 10.0 lbs    Lateral Pinch (lb) 10 11.5 Female age 55-59: 12.0 lbs  7 11.6 Female age 55-59: 11.0 lbs    Comments:      Functional assessment used: Upper Extremity Functional Index  Score on eval:   UEFS Score: 66.25  UEFS Disability Index: 1-19%  UEFS CMS Modifier: CI    Score at d/c: not assessed at d/c    Plan: D/C from OT    Thank you for referral of this patient. Electronically signed by:   OCTAVIO Guerrier 4/7/2021 5:19 PM

## 2021-04-16 ENCOUNTER — APPOINTMENT (OUTPATIENT)
Dept: OCCUPATIONAL THERAPY | Age: 61
End: 2021-04-16
Payer: COMMERCIAL

## 2021-04-21 ENCOUNTER — APPOINTMENT (OUTPATIENT)
Dept: OCCUPATIONAL THERAPY | Age: 61
End: 2021-04-21
Payer: COMMERCIAL

## 2021-05-12 ENCOUNTER — OFFICE VISIT (OUTPATIENT)
Dept: ORTHOPEDIC SURGERY | Age: 61
End: 2021-05-12
Payer: COMMERCIAL

## 2021-05-12 VITALS — TEMPERATURE: 95.5 F | HEART RATE: 78 BPM | OXYGEN SATURATION: 99 %

## 2021-05-12 DIAGNOSIS — M65.331 ACQUIRED TRIGGER FINGER OF RIGHT MIDDLE FINGER: Primary | ICD-10-CM

## 2021-05-12 DIAGNOSIS — G56.03 BILATERAL CARPAL TUNNEL SYNDROME: ICD-10-CM

## 2021-05-12 DIAGNOSIS — M65.352 TRIGGER LITTLE FINGER OF LEFT HAND: ICD-10-CM

## 2021-05-12 PROCEDURE — 99212 OFFICE O/P EST SF 10 MIN: CPT | Performed by: ORTHOPAEDIC SURGERY

## 2021-05-12 NOTE — PROGRESS NOTES
Subjective:      Patient ID: Ashley Hardy is a 64 y.o. female who presents today for:  Chief Complaint   Patient presents with    Follow-up     pt states she is still having trouble with her left hand and it is ahrd for her to grasp things, pt states she still has mild numbness and her pinky is bothering her        HPI    Patient states she is much improved over last couple months the residual numbness that she had after surgery is resolved as well. Her only issue there for finger occasionally gets catching and but not today or not most recently. She is here as she had questions about her left fifth finger in addition to just overall follow-up in regards to carpal tunnels. Patient's left fifth finger or the initial carpal tunnel no longer is within the global.    Patient also had the left side done earlier in January. Patient is status post right carpal tunnel release and right middle finger trigger release back on 2/3/2021. For recollection purposes for EMG nerve conduction study was done on 12/16/2020 and was consistent with bilateral moderate severe carpal tunnel. Patient on the Concentric needle examination did have mild denervation changes in the thenar musculature. Past Medical History:   Diagnosis Date    Abnormal EKG 3/23/2018    Angina pectoris syndrome ([address] 03/23/2018    Diverticulosis of colon     DMII (diabetes mellitus, type 2) ([facility] [address] 2000    Dr Artem Boston hypertension 1/15/2016    Family history of coronary artery disease 1/15/2016    Fatty infiltration of liver 2020    Gastric polyp 2019    Dr Elier Mcelroy, 5 mm    Generalized anxiety disorder     H/O: hysterectomy 2012    History of tobacco abuse 1/15/2016    Obesity (BMI 30-39. 9)     Other specified acquired hypothyroidism 2000    Precordial pain 03/23/2018    (? Prinzmetal?) Dr Rivera Corral S/P colonoscopy with polypectomy 2013, 2018, 2019    Dr Ramon Christensen, Dr Elier Mcelroy, tubulovillous adenoma, hyperplastic polyp    S/P vaginal hysterectomy 2012    benign    Sleep apnea, obstructive 2007    was on CPAP, not using it at present    Vertigo 2018     Past Surgical History:   Procedure Laterality Date    CARPAL TUNNEL RELEASE Left 1/13/2021    LEFT CARPAL TUNNEL RELEASE, LEFT MIDDLE FINGER TRIGGER RELEASE, TENOSYNOVECTOMY FDPFDS performed by Hortensia Rendon MD at [address] Right 2/3/2021    RIGHT CARPAL TUNNEL RELEASE, TRIGGER FINGER RELEASE RIGHT MIDDLE FINGER, FDP, FDS, TENOSYNOVECTOMY performed by Hortensia Rendon MD at [address], [facility] COLONOSCOPY N/A 6/30/2020    COLONOSCOPY DIAGNOSTIC performed by Sanjuanita Houston MD at [address] [facility] CATH LAB PROCEDURE      ENDOMETRIAL ABLATION  2002    metrorrhagia    ENDOSCOPY, COLON, DIAGNOSTIC      HYSTERECTOMY  9/7/12    fibroid, cervicitis, ovaries intact    ME COLON CA SCRN NOT  [address]  IND N/A 5/15/2018    COLONOSCOPY performed by Sanjuanita Houston MD at [address] [facility] ENDOSCOPY N/A 4/2/2019    EGD ESOPHAGOGASTRODUODENOSCOPY performed by Sanjuanita Houston MD at [address] [place] Marital status:      Spouse name: Not on file    Number of children: 3    Years of education: Not on file    Highest education level: Not on file   Occupational History    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[address] , RN     Employer: [employer]   Social Needs    Financial resource strain: Not hard at all    Food insecurity     Worry: Never true     Inability: Never true    Transportation needs     Medical: No     Non-medical: No   Tobacco Use    Smoking status: Former Smoker     Packs/day: 1.00     Years: 30.00     Pack years: 30.00     Types: Cigarettes     Start date: 3/5/1975     Quit date: 6/12/2005     Years since quitting: 15.9    Smokeless tobacco: Never Used   [facility] TABLET BY MOUTH EVERY MORNING 90 tablet 1    hydroCHLOROthiazide (HYDRODIURIL) 25 MG tablet TAKE 1 TABLET BY MOUTH ONE TIME A DAY IN THE MORNING 90 tablet 1    empagliflozin (JARDIANCE) 25 MG tablet Take 1 tablet by mouth daily 90 tablet 1    acetaminophen (TYLENOL) 500 MG tablet Take 1,000 mg by mouth every 6 hours as needed for Pain      pantoprazole (PROTONIX) 20 MG tablet Take 1 tablet by mouth every morning (before breakfast) 90 tablet 1    AgaMatrix Ultra-Thin Lancets MISC Use to test blood sugar 1 time daily 100 each 3    metoprolol tartrate (LOPRESSOR) 25 MG tablet Take 1 tablet by mouth 2 times daily 180 tablet 1    Misc Natural Products (GLUCOSAMINE CHOND COMPLEX/MSM) TABS Take 1 tablet by mouth daily 90 tablet 3    nitroGLYCERIN (NITROSTAT) 0.4 MG SL tablet Place 1 tablet under the tongue every 5 minutes as needed for Chest pain Indications: never used up to max of 3 total doses. If no relief after 1 dose, call 911. 20 tablet 3    blood glucose test strips (AGAMATRIX JAZZ TEST) strip Use to test blood sugar 1 time daily 100 each 3    Blood Glucose Calibration (AGAMATRIX CONTROL) SOLN Use as directed for control solution test 1 each 0    pravastatin (PRAVACHOL) 20 MG tablet TAKE ONE TABLET BY MOUTH EVERY EVENING 90 tablet 1    Respiratory Therapy Supplies CLARA Please give patient the farshad view mask 1 Device 0    Cholecalciferol (VITAMIN D) 2000 UNITS CAPS capsule Take 1 capsule by mouth daily       loratadine (CLARITIN) 10 MG tablet Take 10 mg by mouth daily as needed (Allergies)       Blood Glucose Monitoring Suppl (AGAMATRIX JAZZ WIRELESS 2) w/Device KIT 1 each by Does not apply route once for 1 dose 1 kit 0     No current facility-administered medications on file prior to visit.            Review of Systems  No fever chills night sweats    Objective:   Pulse 78   Temp 95.5 °F (35.3 °C) (Temporal)   SpO2 99%     ORTHOEXAM    Has well-healed incisions both over both middle fingers in both carpal tunnels which were done. On examination the left the patient has a little early trigger in the fifth digit but no swelling at this time does not catch actively or passively. Patient is ulnar sensation is intact her median nerve sensation is intact and significantly improved over presurgical examination. Assessment:       Diagnosis Orders   1. Acquired trigger finger of right middle finger     2. Bilateral carpal tunnel syndrome     3. Trigger little finger of left hand           Plan:   She is doing wonderfully in regards to both carpal tunnels and trigger releases. The patient has an early trigger on the fifth and we discussed that today. Of discussed options in that regard at this time I recommend just splinting at night and being aware of it. If it gets worse she will come back in for potential injection. She is comfortable with the plan she has no further questions. Her examination does not dictate the need for an injection today. No orders of the defined types were placed in this encounter. No orders of the defined types were placed in this encounter. Return if symptoms worsen or fail to improve.       Robyn Salazar MD

## 2021-06-09 ENCOUNTER — TELEPHONE (OUTPATIENT)
Dept: PHARMACY | Facility: CLINIC | Age: 61
End: 2021-06-09

## 2021-06-29 ENCOUNTER — OFFICE VISIT (OUTPATIENT)
Dept: FAMILY MEDICINE CLINIC | Age: 61
End: 2021-06-29
Payer: COMMERCIAL

## 2021-06-29 VITALS
TEMPERATURE: 96.5 F | DIASTOLIC BLOOD PRESSURE: 78 MMHG | WEIGHT: 224 LBS | RESPIRATION RATE: 16 BRPM | SYSTOLIC BLOOD PRESSURE: 120 MMHG | HEIGHT: 65 IN | BODY MASS INDEX: 37.32 KG/M2 | HEART RATE: 65 BPM | OXYGEN SATURATION: 96 %

## 2021-06-29 DIAGNOSIS — E03.9 HYPOTHYROIDISM, UNSPECIFIED TYPE: ICD-10-CM

## 2021-06-29 DIAGNOSIS — E11.65 UNCONTROLLED TYPE 2 DIABETES MELLITUS WITH HYPERGLYCEMIA (HCC): ICD-10-CM

## 2021-06-29 DIAGNOSIS — E87.6 HYPOKALEMIA: Primary | ICD-10-CM

## 2021-06-29 DIAGNOSIS — I10 ESSENTIAL HYPERTENSION: ICD-10-CM

## 2021-06-29 DIAGNOSIS — R30.0 DYSURIA: ICD-10-CM

## 2021-06-29 DIAGNOSIS — K22.9 IRREGULAR Z LINE OF ESOPHAGUS: ICD-10-CM

## 2021-06-29 DIAGNOSIS — E11.65 UNCONTROLLED TYPE 2 DIABETES MELLITUS WITH HYPERGLYCEMIA (HCC): Primary | ICD-10-CM

## 2021-06-29 DIAGNOSIS — F41.1 GENERALIZED ANXIETY DISORDER: ICD-10-CM

## 2021-06-29 DIAGNOSIS — Z87.891 PERSONAL HISTORY OF NICOTINE DEPENDENCE: ICD-10-CM

## 2021-06-29 LAB
ALBUMIN SERPL-MCNC: 4.3 G/DL (ref 3.5–4.6)
ALP BLD-CCNC: 103 U/L (ref 40–130)
ALT SERPL-CCNC: 45 U/L (ref 0–33)
ANION GAP SERPL CALCULATED.3IONS-SCNC: 15 MEQ/L (ref 9–15)
AST SERPL-CCNC: 39 U/L (ref 0–35)
BASOPHILS ABSOLUTE: 0.1 K/UL (ref 0–0.2)
BASOPHILS RELATIVE PERCENT: 0.9 %
BILIRUB SERPL-MCNC: 0.5 MG/DL (ref 0.2–0.7)
BILIRUBIN, POC: NORMAL
BLOOD URINE, POC: NORMAL
BUN BLDV-MCNC: 14 MG/DL (ref 6–20)
CALCIUM SERPL-MCNC: 9.8 MG/DL (ref 8.5–9.9)
CHLORIDE BLD-SCNC: 95 MEQ/L (ref 95–107)
CHOLESTEROL, TOTAL: 160 MG/DL (ref 0–199)
CLARITY, POC: NORMAL
CO2: 30 MEQ/L (ref 20–31)
COLOR, POC: YELLOW
CREAT SERPL-MCNC: 1.12 MG/DL (ref 0.5–0.9)
CREATININE URINE: 42 MG/DL
EOSINOPHILS ABSOLUTE: 0.2 K/UL (ref 0–0.7)
EOSINOPHILS RELATIVE PERCENT: 2.4 %
GFR AFRICAN AMERICAN: >60
GFR NON-AFRICAN AMERICAN: 49.9
GLOBULIN: 3.6 G/DL (ref 2.3–3.5)
GLUCOSE BLD-MCNC: 118 MG/DL (ref 70–99)
GLUCOSE URINE, POC: 60
HBA1C MFR BLD: 9.5 % (ref 4.8–5.9)
HCT VFR BLD CALC: 39.8 % (ref 37–47)
HDLC SERPL-MCNC: 44 MG/DL (ref 40–59)
HEMOGLOBIN: 13.3 G/DL (ref 12–16)
KETONES, POC: NORMAL
LDL CHOLESTEROL CALCULATED: 88 MG/DL (ref 0–129)
LEUKOCYTE EST, POC: NORMAL
LYMPHOCYTES ABSOLUTE: 2.5 K/UL (ref 1–4.8)
LYMPHOCYTES RELATIVE PERCENT: 27 %
MCH RBC QN AUTO: 27.3 PG (ref 27–31.3)
MCHC RBC AUTO-ENTMCNC: 33.4 % (ref 33–37)
MCV RBC AUTO: 81.8 FL (ref 82–100)
MICROALBUMIN UR-MCNC: <1.2 MG/DL
MICROALBUMIN/CREAT UR-RTO: NORMAL MG/G (ref 0–30)
MONOCYTES ABSOLUTE: 0.5 K/UL (ref 0.2–0.8)
MONOCYTES RELATIVE PERCENT: 5.3 %
NEUTROPHILS ABSOLUTE: 6 K/UL (ref 1.4–6.5)
NEUTROPHILS RELATIVE PERCENT: 64.4 %
NITRITE, POC: NORMAL
PDW BLD-RTO: 14.4 % (ref 11.5–14.5)
PH, POC: 6
PLATELET # BLD: 421 K/UL (ref 130–400)
POTASSIUM SERPL-SCNC: 2.6 MEQ/L (ref 3.4–4.9)
PROTEIN, POC: NORMAL
RBC # BLD: 4.87 M/UL (ref 4.2–5.4)
SODIUM BLD-SCNC: 140 MEQ/L (ref 135–144)
SPECIFIC GRAVITY, POC: 1.01
TOTAL PROTEIN: 7.9 G/DL (ref 6.3–8)
TRIGL SERPL-MCNC: 142 MG/DL (ref 0–150)
UROBILINOGEN, POC: 3.5
WBC # BLD: 9.3 K/UL (ref 4.8–10.8)

## 2021-06-29 PROCEDURE — 99214 OFFICE O/P EST MOD 30 MIN: CPT | Performed by: PHYSICIAN ASSISTANT

## 2021-06-29 PROCEDURE — 81003 URINALYSIS AUTO W/O SCOPE: CPT | Performed by: PHYSICIAN ASSISTANT

## 2021-06-29 RX ORDER — LEVOTHYROXINE SODIUM 0.05 MG/1
TABLET ORAL
Qty: 90 TABLET | Refills: 4 | Status: SHIPPED | OUTPATIENT
Start: 2021-06-29 | End: 2021-10-04

## 2021-06-29 RX ORDER — EMPAGLIFLOZIN 25 MG/1
1 TABLET, FILM COATED ORAL DAILY
Qty: 90 TABLET | Refills: 4 | Status: ON HOLD | OUTPATIENT
Start: 2021-06-29 | End: 2022-04-14 | Stop reason: HOSPADM

## 2021-06-29 RX ORDER — PANTOPRAZOLE SODIUM 20 MG/1
20 TABLET, DELAYED RELEASE ORAL
Qty: 90 TABLET | Refills: 4 | Status: SHIPPED | OUTPATIENT
Start: 2021-06-29 | End: 2022-07-19 | Stop reason: SDUPTHER

## 2021-06-29 RX ORDER — HYDROCHLOROTHIAZIDE 25 MG/1
TABLET ORAL
Qty: 90 TABLET | Refills: 4 | Status: ON HOLD | OUTPATIENT
Start: 2021-06-29 | End: 2022-04-14 | Stop reason: HOSPADM

## 2021-06-29 RX ORDER — AMLODIPINE BESYLATE 5 MG/1
TABLET ORAL
Qty: 90 TABLET | Refills: 4 | Status: ON HOLD | OUTPATIENT
Start: 2021-06-29 | End: 2022-04-14 | Stop reason: HOSPADM

## 2021-06-29 RX ORDER — BUPROPION HYDROCHLORIDE 300 MG/1
TABLET ORAL
Qty: 90 TABLET | Refills: 4 | Status: SHIPPED | OUTPATIENT
Start: 2021-06-29 | End: 2022-07-19 | Stop reason: SDUPTHER

## 2021-06-29 SDOH — ECONOMIC STABILITY: FOOD INSECURITY: WITHIN THE PAST 12 MONTHS, YOU WORRIED THAT YOUR FOOD WOULD RUN OUT BEFORE YOU GOT MONEY TO BUY MORE.: NEVER TRUE

## 2021-06-29 SDOH — ECONOMIC STABILITY: FOOD INSECURITY: WITHIN THE PAST 12 MONTHS, THE FOOD YOU BOUGHT JUST DIDN'T LAST AND YOU DIDN'T HAVE MONEY TO GET MORE.: NEVER TRUE

## 2021-06-29 ASSESSMENT — ENCOUNTER SYMPTOMS
ABDOMINAL PAIN: 0
SHORTNESS OF BREATH: 0
CONSTIPATION: 0
ABDOMINAL DISTENTION: 0
CHEST TIGHTNESS: 0
COLOR CHANGE: 0
DIARRHEA: 0
BLOOD IN STOOL: 0
WHEEZING: 0
TROUBLE SWALLOWING: 0
BACK PAIN: 1
NAUSEA: 0

## 2021-06-29 ASSESSMENT — PATIENT HEALTH QUESTIONNAIRE - PHQ9
SUM OF ALL RESPONSES TO PHQ QUESTIONS 1-9: 0
2. FEELING DOWN, DEPRESSED OR HOPELESS: 0
1. LITTLE INTEREST OR PLEASURE IN DOING THINGS: 0
SUM OF ALL RESPONSES TO PHQ QUESTIONS 1-9: 0
SUM OF ALL RESPONSES TO PHQ QUESTIONS 1-9: 0
SUM OF ALL RESPONSES TO PHQ9 QUESTIONS 1 & 2: 0

## 2021-06-29 ASSESSMENT — SOCIAL DETERMINANTS OF HEALTH (SDOH): HOW HARD IS IT FOR YOU TO PAY FOR THE VERY BASICS LIKE FOOD, HOUSING, MEDICAL CARE, AND HEATING?: NOT HARD AT ALL

## 2021-06-29 NOTE — PROGRESS NOTES
[address], 64 y.o. female presents today with:  Chief Complaint   Patient presents with    Diabetes    Blood Work     pt would like to have bw done for Be well      HPI  Erma Deshpande is in the office today for routine follow up visit. Last OV with me: 11/24/2020  Due for routine labs. Overall, feeling well. Due for CT lung screen. Hypothyroidism. On synthroid replacement. Due for labs. Bladder fullness/pressure. Had some very mild bladder fullness/spasm for a couple of days. Symptoms improved today. She is on jardiance therapy. Denies hematuria. No vaginal discharge or pruritis. Knee erythema/swelling. C/o intermittent erythema or knees and mild swelling. Denies injury/trauma. History of arthritis of back/joints. Has noticed swelling and erythema of her knee joints at nice. At this time, no regular exercise. She sits throughout the day for work--she is working virtually. Has been avoiding NSAIDs due to some elevated renal function labs. Plans on starting glucosamine.     Hand numbness/tingling. Patient had bilateral carpal tunnel release with [facility]. Also had trigger finger release. Did well post-op. Completed OT. Mild decreased  strength, but overall, improved.      Type 2 diabetes. Due for HgbA1c monitoring. At this time, patient is taking 25 mg jardiance daily. Does not routinely check blood sugars. Chest tightness/pain. Was having some recurrent episodes of chest tightness. Cardiac work-up in the past, negative. Was started on protonix. No further symptoms. Review of Systems   Constitutional: Negative for activity change, appetite change, chills, diaphoresis, fatigue, fever and unexpected weight change. HENT: Negative for congestion, nosebleeds, postnasal drip and trouble swallowing. Respiratory: Negative for chest tightness, shortness of breath and wheezing.     Cardiovascular: Negative for chest pain, palpitations and leg swelling. Gastrointestinal: Negative for abdominal distention, abdominal pain, blood in stool, constipation, diarrhea and nausea. Musculoskeletal: Positive for arthralgias and back pain. Negative for gait problem, joint swelling and myalgias. Skin: Negative for color change and rash. Neurological: Positive for weakness (very mild, improved overall). Negative for dizziness, light-headedness, numbness and headaches. Psychiatric/Behavioral: Negative for dysphoric mood and sleep disturbance. The patient is not nervous/anxious. Past Medical History:   Diagnosis Date    Abnormal EKG 3/23/2018    Angina pectoris syndrome ([facility] [address] 03/23/2018    Diverticulosis of colon     DMII (diabetes mellitus, type 2) ([facility] [address] 2000    Dr Chuck Guadalupe hypertension 1/15/2016    Family history of coronary artery disease 1/15/2016    Fatty infiltration of liver 2020    Gastric polyp 2019    Dr Mari Chapman, 5 mm    Generalized anxiety disorder     H/O: hysterectomy 2012    History of tobacco abuse 1/15/2016    Obesity (BMI 30-39. 9)     Other specified acquired hypothyroidism 2000    Precordial pain 03/23/2018    (? Prinzmetal?) Dr Trini Díaz S/P colonoscopy with polypectomy 2013, 2018, 2019    Dr Annemarie Guerrero, Dr Mari Chapman, tubulovillous adenoma, hyperplastic polyp    S/P vaginal hysterectomy 2012    benign    Sleep apnea, obstructive 2007    was on CPAP, not using it at present    Vertigo 2018     Past Surgical History:   Procedure Laterality Date    CARPAL TUNNEL RELEASE Left 1/13/2021    LEFT CARPAL TUNNEL RELEASE, LEFT MIDDLE FINGER TRIGGER RELEASE, TENOSYNOVECTOMY FDPFDS performed by Diana Cherry MD at [facility] Right 2/3/2021    RIGHT CARPAL TUNNEL RELEASE, TRIGGER FINGER RELEASE RIGHT MIDDLE FINGER, FDP, FDS, TENOSYNOVECTOMY performed by Diana Cherry MD at [address] COLONOSCOPY N/A 6/30/2020    COLONOSCOPY DIAGNOSTIC Running Out of Food in the Last Year: Never true    Ran Out of Food in the Last Year: Never true   Transportation Needs:     Lack of Transportation (Medical):      Lack of Transportation (Non-Medical):    Physical Activity:     Days of Exercise per Week:     Minutes of Exercise per Session:    Stress:     Feeling of Stress :    Social Connections:     Frequency of Communication with Friends and Family:     Frequency of Social Gatherings with Friends and Family:     Attends Shinto Services:     Active Member of Clubs or Organizations:     Attends Club or Organization Meetings:     Marital Status:    Intimate Partner Violence:     Fear of Current or Ex-Partner:     Emotionally Abused:     Physically Abused:     Sexually Abused:      Family History   Problem Relation Age of Onset    Arrhythmia Mother     Hypertension Mother     Dementia Mother     COPD Father         mesothelioma    Alcohol Abuse Father     Diabetes Paternal Grandmother     Diabetes Paternal Grandfather     Colon Cancer Paternal Grandfather     Schizophrenia Brother     Colon Cancer Paternal Uncle      Allergies   Allergen Reactions    Seasonal Itching     Current Outpatient Medications   Medication Sig Dispense Refill    levothyroxine (SYNTHROID) 50 MCG tablet TAKE 1 TABLET BY MOUTH ONE TIME A DAY 90 tablet 4    amLODIPine (NORVASC) 5 MG tablet TAKE 1 TABLET BY MOUTH ONE TIME A DAY 90 tablet 4    buPROPion (WELLBUTRIN XL) 300 MG extended release tablet TAKE ONE TABLET BY MOUTH EVERY MORNING 90 tablet 4    hydroCHLOROthiazide (HYDRODIURIL) 25 MG tablet TAKE 1 TABLET BY MOUTH ONE TIME A DAY IN THE MORNING 90 tablet 4    empagliflozin (JARDIANCE) 25 MG tablet Take 1 tablet by mouth daily 90 tablet 4    pantoprazole (PROTONIX) 20 MG tablet Take 1 tablet by mouth every morning (before breakfast) 90 tablet 4    pravastatin (PRAVACHOL) 20 MG tablet TAKE ONE TABLET BY MOUTH EVERY EVENING 90 tablet 1    Blood Glucose Monitoring Suppl (AGAMATRParentsWareZZ WIRELESS 2) w/Device KIT 1 each by Does not apply route once for 1 dose 1 kit 0    acetaminophen (TYLENOL) 500 MG tablet Take 1,000 mg by mouth every 6 hours as needed for Pain      AgaMatrix Ultra-Thin Lancets MISC Use to test blood sugar 1 time daily 100 each 3    metoprolol tartrate (LOPRESSOR) 25 MG tablet Take 1 tablet by mouth 2 times daily 180 tablet 1    nitroGLYCERIN (NITROSTAT) 0.4 MG SL tablet Place 1 tablet under the tongue every 5 minutes as needed for Chest pain Indications: never used up to max of 3 total doses. If no relief after 1 dose, call 911. 20 tablet 3    blood glucose test strips (AGAMATRIX JAZZ TEST) strip Use to test blood sugar 1 time daily 100 each 3    Blood Glucose Calibration (AGAMATRIX CONTROL) SOLN Use as directed for control solution test 1 each 0    Respiratory Therapy Supplies CLARA Please give patient the farshad view mask 1 Device 0    Cholecalciferol (VITAMIN D) 2000 UNITS CAPS capsule Take 1 capsule by mouth daily       loratadine (CLARITIN) 10 MG tablet Take 10 mg by mouth daily as needed (Allergies)        No current facility-administered medications for this visit. PMH, Surgical Hx, Family Hx, and Social Hx reviewed and updated. Health Maintenance reviewed. Objective  Vitals:    06/29/21 1348 06/29/21 1423   BP: (!) 142/74 120/78   Site: Right Upper Arm Left Upper Arm   Position: Sitting Sitting   Cuff Size: Large Adult Medium Adult   Pulse: 65    Resp: 16    Temp: 96.5 °F (35.8 °C)    TempSrc: Temporal    SpO2: 96%    Weight: 224 lb (101.6 kg)    Height: 5' 5\" (1.651 m)      BP Readings from Last 3 Encounters:   06/29/21 120/78   02/03/21 (!) 128/59   02/03/21 128/63     Wt Readings from Last 3 Encounters:   06/29/21 224 lb (101.6 kg)   02/03/21 227 lb (103 kg)   01/27/21 228 lb (103.4 kg)     Physical Exam  Vitals reviewed. Constitutional:       Appearance: Normal appearance. She is obese.  She is not ill-appearing or toxic-appearing. HENT:      Head: Normocephalic and atraumatic. Right Ear: Tympanic membrane, ear canal and external ear normal.      Left Ear: Tympanic membrane, ear canal and external ear normal.      Nose: Nose normal.      Mouth/Throat:      Mouth: Mucous membranes are moist.   Eyes:      Conjunctiva/sclera: Conjunctivae normal.   Cardiovascular:      Rate and Rhythm: Normal rate and regular rhythm. Pulmonary:      Effort: Pulmonary effort is normal.      Breath sounds: Normal breath sounds. Musculoskeletal:         General: No swelling or tenderness. Right lower leg: No edema. Left lower leg: No edema. Comments:  strength 4/5, bilaterally. Negative Tinnel's sign. Skin:     General: Skin is warm. Coloration: Skin is not pale. Findings: No erythema. Neurological:      General: No focal deficit present. Mental Status: She is alert and oriented to person, place, and time. Assessment & Plan   Nola Ly was seen today for diabetes and blood work. Diagnoses and all orders for this visit:    Generalized anxiety disorder  -     buPROPion (WELLBUTRIN XL) 300 MG extended release tablet; TAKE ONE TABLET BY MOUTH EVERY MORNING    Uncontrolled type 2 diabetes mellitus with hyperglycemia (HCC)  -     empagliflozin (JARDIANCE) 25 MG tablet; Take 1 tablet by mouth daily  -      DIABETES FOOT EXAM  -     Microalbumin / Creatinine Urine Ratio; Future  -     CBC Auto Differential; Future  -     Comprehensive Metabolic Panel; Future  -     Hemoglobin A1C; Future  -     Lipid Panel;  Future    Hypothyroidism, unspecified type  -     levothyroxine (SYNTHROID) 50 MCG tablet; TAKE 1 TABLET BY MOUTH ONE TIME A DAY    Essential hypertension  -     amLODIPine (NORVASC) 5 MG tablet; TAKE 1 TABLET BY MOUTH ONE TIME A DAY  -     hydroCHLOROthiazide (HYDRODIURIL) 25 MG tablet; TAKE 1 TABLET BY MOUTH ONE TIME A DAY IN THE MORNING  -     CBC Auto Differential; Future  -     Comprehensive Metabolic Panel; Future    Irregular Z line of esophagus  -     pantoprazole (PROTONIX) 20 MG tablet; Take 1 tablet by mouth every morning (before breakfast)    Personal history of nicotine dependence  -     CT lung screen [Initial/Annual]; Future    Dysuria  -     POCT Urinalysis No Micro (Auto)    Routine labs. Continue current medication. Overall, doing well.  6 month follow up. Orders Placed This Encounter   Procedures    CT lung screen [Initial/Annual]     Age: 64 y.o. Smoking History:   Social History    Tobacco Use      Smoking status: Former Smoker        Packs/day: 1.00        Years: 30.00        Pack years: 30        Types: Cigarettes        Start date: 3/5/1975        Quit date: 2005        Years since quittin.0      Smokeless tobacco: Never Used      Tobacco comment: 3/15/18 started age 17    Vaping Use      Vaping Use: Never used    Alcohol use: No      Comment: not at all     Drug use: No    Pack years: 30  Last CT lung screen: 2020     Standing Status:   Future     Standing Expiration Date:   2022     Order Specific Question:   Is there documentation of shared decision making? Answer:   Yes     Order Specific Question:   Does the patient show any signs or symptoms of lung cancer? Answer:   No     Order Specific Question:   Is this the first (baseline) CT or an annual exam?     Answer: Annual [2]     Order Specific Question:   Is this a low dose CT or a routine CT? Answer:   Low Dose CT [1]     Order Specific Question:   Smoking Status? Answer: Former Smoker [4]     Order Specific Question:   Date quit smoking? (must be within 15 years)     Answer:   2005     Order Specific Question:   Smoking packs per day? Answer:   1     Order Specific Question:   Years smoking?      Answer:   30    Microalbumin / Creatinine Urine Ratio     Standing Status:   Future     Standing Expiration Date:   2022    CBC Auto Differential     Standing Status: Future     Standing Expiration Date:   6/29/2022    Comprehensive Metabolic Panel     Standing Status:   Future     Standing Expiration Date:   6/29/2022    Hemoglobin A1C     Standing Status:   Future     Standing Expiration Date:   6/29/2022    Lipid Panel     Standing Status:   Future     Standing Expiration Date:   6/29/2022     Order Specific Question:   Is Patient Fasting?/# of Hours     Answer:   8+ hours    POCT Urinalysis No Micro (Auto)    HM DIABETES FOOT EXAM     Orders Placed This Encounter   Medications    levothyroxine (SYNTHROID) 50 MCG tablet     Sig: TAKE 1 TABLET BY MOUTH ONE TIME A DAY     Dispense:  90 tablet     Refill:  4    amLODIPine (NORVASC) 5 MG tablet     Sig: TAKE 1 TABLET BY MOUTH ONE TIME A DAY     Dispense:  90 tablet     Refill:  4    buPROPion (WELLBUTRIN XL) 300 MG extended release tablet     Sig: TAKE ONE TABLET BY MOUTH EVERY MORNING     Dispense:  90 tablet     Refill:  4    hydroCHLOROthiazide (HYDRODIURIL) 25 MG tablet     Sig: TAKE 1 TABLET BY MOUTH ONE TIME A DAY IN THE MORNING     Dispense:  90 tablet     Refill:  4    empagliflozin (JARDIANCE) 25 MG tablet     Sig: Take 1 tablet by mouth daily     Dispense:  90 tablet     Refill:  4    pantoprazole (PROTONIX) 20 MG tablet     Sig: Take 1 tablet by mouth every morning (before breakfast)     Dispense:  90 tablet     Refill:  4     Medications Discontinued During This Encounter   Medication Reason    metFORMIN (GLUCOPHAGE) 500 MG tablet LIST CLEANUP    Misc Natural Products (GLUCOSAMINE CHOND COMPLEX/MSM) TABS LIST CLEANUP    pantoprazole (PROTONIX) 20 MG tablet REORDER    empagliflozin (JARDIANCE) 25 MG tablet REORDER    buPROPion (WELLBUTRIN XL) 300 MG extended release tablet REORDER    hydroCHLOROthiazide (HYDRODIURIL) 25 MG tablet REORDER    amLODIPine (NORVASC) 5 MG tablet REORDER    levothyroxine (SYNTHROID) 50 MCG tablet REORDER     No follow-ups on file.       Reviewed with the patient: current

## 2021-06-30 ENCOUNTER — TELEPHONE (OUTPATIENT)
Dept: FAMILY MEDICINE CLINIC | Age: 61
End: 2021-06-30

## 2021-07-01 ENCOUNTER — TELEPHONE (OUTPATIENT)
Dept: FAMILY MEDICINE CLINIC | Age: 61
End: 2021-07-01

## 2021-07-01 DIAGNOSIS — E87.6 HYPOKALEMIA: Primary | ICD-10-CM

## 2021-07-01 RX ORDER — POTASSIUM CHLORIDE 20 MEQ/1
20 TABLET, EXTENDED RELEASE ORAL 2 TIMES DAILY
Qty: 180 TABLET | Refills: 1 | Status: SHIPPED | OUTPATIENT
Start: 2021-07-01 | End: 2021-07-01 | Stop reason: SDUPTHER

## 2021-07-01 RX ORDER — POTASSIUM CHLORIDE 20 MEQ/1
20 TABLET, EXTENDED RELEASE ORAL 2 TIMES DAILY
Qty: 180 TABLET | Refills: 1 | Status: SHIPPED | OUTPATIENT
Start: 2021-07-01 | End: 2022-02-21

## 2021-07-01 NOTE — TELEPHONE ENCOUNTER
Patient called left message on voicemail that medication was sent to her requested Pharmacy     AMM,MA

## 2021-07-01 NOTE — TELEPHONE ENCOUNTER
Patient called stated that [facility] would not have her potassium chloride ready until tomorrow, Pt is asking if you can resent it to [facility] so she can start thi today     Please advise and thank you

## 2021-07-02 DIAGNOSIS — E87.6 HYPOKALEMIA: ICD-10-CM

## 2021-07-02 LAB — POTASSIUM SERPL-SCNC: 3.7 MEQ/L (ref 3.4–4.9)

## 2021-07-06 ENCOUNTER — PATIENT MESSAGE (OUTPATIENT)
Dept: FAMILY MEDICINE CLINIC | Age: 61
End: 2021-07-06

## 2021-07-06 DIAGNOSIS — E87.6 HYPOKALEMIA: Primary | ICD-10-CM

## 2021-07-06 NOTE — TELEPHONE ENCOUNTER
From: Jennifer Gray  To: Andrade Tyson PA-C  Sent: 7/6/2021 1:32 PM EDT  Subject: Test Results Question    Ant Antunez,  Do I need to have my potassium's repeated to check level with amount of potassium I'm on? I can have it redrawn on Friday if needed.  Thanks, Anat Cortes

## 2021-07-09 DIAGNOSIS — E87.6 HYPOKALEMIA: ICD-10-CM

## 2021-07-09 LAB — POTASSIUM SERPL-SCNC: 4.5 MEQ/L (ref 3.4–4.9)

## 2021-08-02 ENCOUNTER — TELEPHONE (OUTPATIENT)
Dept: CASE MANAGEMENT | Age: 61
End: 2021-08-02

## 2021-08-02 NOTE — TELEPHONE ENCOUNTER
According to CMS guidelines the patient does not meet the requirements for a CT lung screening. Waiting for insurance authorization to see if patient will be covered for a CT lung screening.

## 2021-08-06 ENCOUNTER — HOSPITAL ENCOUNTER (OUTPATIENT)
Dept: CT IMAGING | Age: 61
Discharge: HOME | End: 2021-08-08
Payer: COMMERCIAL

## 2021-08-06 DIAGNOSIS — Z87.891 PERSONAL HISTORY OF NICOTINE DEPENDENCE: ICD-10-CM

## 2021-08-06 PROCEDURE — 71271 CT THORAX LUNG CANCER SCR C-: CPT

## 2021-09-16 DIAGNOSIS — E11.65 UNCONTROLLED TYPE 2 DIABETES MELLITUS WITH HYPERGLYCEMIA (HCC): Primary | ICD-10-CM

## 2021-09-16 DIAGNOSIS — G47.33 SLEEP APNEA, OBSTRUCTIVE: ICD-10-CM

## 2021-09-16 DIAGNOSIS — E55.9 VITAMIN D DEFICIENCY: ICD-10-CM

## 2021-09-16 DIAGNOSIS — I10 ESSENTIAL HYPERTENSION: ICD-10-CM

## 2021-09-24 ENCOUNTER — TELEPHONE (OUTPATIENT)
Dept: BARIATRICS/WEIGHT MGMT | Age: 61
End: 2021-09-24

## 2021-09-24 NOTE — TELEPHONE ENCOUNTER
Online Info Session Completed:  on 9/23/21 okay to schedule with Dr. Edi Kilpatrick   with [facility]    Patient informed the following: This is NOT a guarantee of payment  When stating that you have a Benefit or Coverage for Bariatric Surgery - that means that you may qualify for the surgery  Bariatric Surgery is considered an elective procedure, patient is responsible to know their benefits . Any information we obtain when calling your insurance  is not  a guarantee of  coverage  and/or  benefit. Appointment Note :   New Patient , [address],   3   month visits,  PG Fee $200,  Mailed Packet or advised to arrive  early      Remind Patient of $200 Program fee with $ 100 required at Second visit with office on initial dietician visit. Remind Patient they must be nicotine free. They will be tested at the beginning of the program and prior to surgery. Advise Patient Responsible for out of pocket, copay at medical visits, Deductible and coinsurance applied to medical visits and procedure. You will be responsible for any of the following:  · Copays $55.00  · Deductibles $1,000  · Co insurances: 90/10  · OOP: $1,000    The items mentioned above are indicated or required by your insurance plan. Your deductible and coinsurance are applied to medical visits and procedures. Verified with patient if he or she has had any previous bariatric surgery? ( If yes ,advise patient of transfer of care process and program fee)     Scheduled 10/21/21. Joshua Cisneros

## 2021-09-24 NOTE — TELEPHONE ENCOUNTER
Online Info Session Completed:  on 9/22/21 okay to schedule with Dr. Jasmeet Meade   with [facility]    Patient informed the following: This is NOT a guarantee of payment  When stating that you have a Benefit or Coverage for Bariatric Surgery - that means that you may qualify for the surgery  Bariatric Surgery is considered an elective procedure, patient is responsible to know their benefits . Any information we obtain when calling your insurance  is not  a guarantee of  coverage  and/or  benefit. Appointment Note :   New Patient , [place],   6   month visits,  PG Fee $200,  Mailed Packet or advised to arrive  early      Remind Patient of $200 Program fee with $ 100 required at Second visit with office on initial dietician visit. Remind Patient they must be nicotine free. They will be tested at the beginning of the program and prior to surgery. Advise Patient Responsible for out of pocket, copay at medical visits, Deductible and coinsurance applied to medical visits and procedure. You will be responsible for any of the following:  · Copays:0  · Deductibles : $2,000  · Co insurances 80/20  · OOP: $7150    The items mentioned above are indicated or required by your insurance plan. Your deductible and coinsurance are applied to medical visits and procedures. Verified with patient if he or she has had any previous bariatric surgery? ( If yes ,advise patient of transfer of care process and program fee)       Scheduled 10/21/21. Edinson Alvarez

## 2021-10-02 DIAGNOSIS — E03.9 HYPOTHYROIDISM, UNSPECIFIED TYPE: ICD-10-CM

## 2021-10-03 NOTE — TELEPHONE ENCOUNTER
Rx request   Requested Prescriptions     Pending Prescriptions Disp Refills    levothyroxine (SYNTHROID) 50 MCG tablet [Pharmacy Med Name: LEVOTHYROXINE SODIUM 50MCG TABS] 90 tablet 1     Sig: TAKE 1 TABLET BY MOUTH ONE TIME A DAY     LOV 6/29/2021  Next Visit Date:  Future Appointments   Date Time Provider Elizabeth Brock   10/21/2021 12:15 PM Kingsley Mccauley DO bariatric [facility]   10/21/2021  1:45 PM Kingsley Mccauley DO bariatric [facility]   2/7/2022  9:15 AM NAYANA Alba [facility]

## 2021-10-04 RX ORDER — LEVOTHYROXINE SODIUM 0.05 MG/1
TABLET ORAL
Qty: 90 TABLET | Refills: 1 | Status: ON HOLD | OUTPATIENT
Start: 2021-10-04 | End: 2022-04-13 | Stop reason: SDUPTHER

## 2021-10-21 ENCOUNTER — OFFICE VISIT (OUTPATIENT)
Dept: BARIATRICS/WEIGHT MGMT | Age: 61
End: 2021-10-21
Payer: COMMERCIAL

## 2021-10-21 ENCOUNTER — NURSE ONLY (OUTPATIENT)
Dept: BARIATRICS/WEIGHT MGMT | Age: 61
End: 2021-10-21

## 2021-10-21 VITALS
BODY MASS INDEX: 35.99 KG/M2 | DIASTOLIC BLOOD PRESSURE: 60 MMHG | WEIGHT: 216 LBS | HEART RATE: 80 BPM | HEIGHT: 65 IN | SYSTOLIC BLOOD PRESSURE: 135 MMHG

## 2021-10-21 DIAGNOSIS — E11.69 DIABETES MELLITUS TYPE 2 IN OBESE (HCC): ICD-10-CM

## 2021-10-21 DIAGNOSIS — G47.33 SLEEP APNEA, OBSTRUCTIVE: ICD-10-CM

## 2021-10-21 DIAGNOSIS — E66.01 MORBID OBESITY DUE TO EXCESS CALORIES (HCC): ICD-10-CM

## 2021-10-21 DIAGNOSIS — E66.9 DIABETES MELLITUS TYPE 2 IN OBESE (HCC): ICD-10-CM

## 2021-10-21 DIAGNOSIS — K44.9 HIATAL HERNIA WITH GERD: Primary | ICD-10-CM

## 2021-10-21 DIAGNOSIS — K21.9 HIATAL HERNIA WITH GERD: Primary | ICD-10-CM

## 2021-10-21 DIAGNOSIS — I10 ESSENTIAL HYPERTENSION: ICD-10-CM

## 2021-10-21 PROCEDURE — 99204 OFFICE O/P NEW MOD 45 MIN: CPT | Performed by: SURGERY

## 2021-10-21 NOTE — LETTER
Visit Date: 10/21/2021    Patient: Abiodun Bess  YOB: 1960    Dear Dr. Oneida Singh PA-C,      I had the pleasure of seeing Main Linda in the office today for a consult for weight loss surgery. Her current Weight: 216 lb (98 kg), which gives her a Body mass index is 36.5 kg/m². .  We had a long discussion regarding surgical options for weight loss and improvement in co-morbidities. She is considering a bariatric  procedure. We will start the preoperative workup, which includes bloodwork, psychological evaluation, support group attendance, and preoperative medical clearance from you. We will also initiate pre-certification for surgery, which requires a letter of medical necessity from you and often office notes documenting a weight history and co-morbidities. Main Linda will require 3 months of medically supervised visits as specified by the patient's insurance. Thank you for allowing me to participate in the care of your patient. If you have any questions or concerns, please do not hesitate to call.       Sincerely,     Milagro Hussein DO  Director of Bariatric and Minimally Invasive Surgery  [facility]KSHI MOREAU [facility], [address] [facility], [address] [place] [address] Lack: [phone]  F: [phone]

## 2021-10-21 NOTE — PROGRESS NOTES
Medical Nutrition Therapy  Initial Nutrition Assessment for Metabolic/ Bariatric Surgery  Required insurance visit prior to surgery:  3  Shared with patient the importance of documenting exercise and staying at or below start weight during visits. Pt reports: Jared Rivers is a 64 y.o. female with a date of birth of 1960. Weight History: Wt Readings from Last 3 Encounters:   10/21/21 216 lb (98 kg)   06/29/21 224 lb (101.6 kg)   02/03/21 227 lb (103 kg)        How does your weight affect your daily activities? What would be different in your life if you felt healthier and fit? Why is that important to you now? Do you drink alcohol? . NO    Do you use tobacco in the form of cigarettes, cigars, chew or any vapor appliance? No    Weight History      Marcy Rodriguez's highest adult weight was 230 lbs at age . Patient was at her highest weight for  . Patient's triggers/known causes to her highest weight are . Katja Collier lowest adult weight was 119 lbs at age . Patient was at her lowest weight for  . The lowest weight was achieved through  . Physical Activity  Do you participate in a structured exercise program, step counting or regular physical activity? no      Instructions and exercise logs were provided to patient today see goal sheet and plan. Previous weight loss attempts  Patient has participated in the following weight loss programs:   Calorie restriction, lowfat diet, lowcarbdiet      Nutrition History  Have you ever been diagnosed with an eating disorder? No  Have you ever had problems tolerating a multivitamin or mineral supplement?no  Have you ever been diagnosed with a vitamin or mineral deficiency? no     Patient dines out to a sit down restaurant 4 times per week. Patient dines out to a fast food restaurant 2 times per week. Patient does have grazing. Patient  have night eating. Patient does have a history of emotional eating.   Patient does have a history of  eating out of boredom. Drinks throughout the day: iced tea    24 hour recall/food frequency: has been scanned into chart unless completed below. Surgery  Patient's greatest concern about having surgery is: Mimi Luo How will you know when you've been successful? Assessment:  Nutritional Needs:  Men: 1500kcal daily minimum     Women 1200kcal daily minimum. 60-80gm of protein daily  PES Statement:  Obesity related to a complex combination of decreased energy needs, disordered eating patterns, physical inactivity, and increased psychological/life stress as evidenced by BMI greater than 30 and inability to maintain a significant amount of weight loss through conventional weight loss interventions. Goals    All goals were planned with and agreed on by the patient. I want to improve my health because   appt # NA G What is your next step? C 1 2 3 4 5 6 7 8 9     0  1 I will read the education binder provided to me and the                  x 2 I will make my pschological evaluation appoinment. 0  3 I will bring this goal card to every appointment. 4 I will eliminate all tobacco/nicotine. 5 I will limit alcoholic beverages to 1-6YS per week. 6 I will limit dining out to 3 times per week or less. 7 I will eliminate sugary beverages. 8 I will eliminate carbonated beverages. 9 I will eliminate drinking with a straw. 10 I will limit caffeinated beverages to 16oz daily. 11 I will limit log my exercise daily. 12 I will determine my calcium and mvi plan. 13 I will have 1-2 servings of lean protein present at each meal and minimeal.               0  14 I will eat every 3-5 hours. 15 I will drink 64oz of fluid daily. 16 I will eat slowly during meals and snacks. 17 I will limit fluids 4oz before after and during meals. 18 I will eat protein first at all meals followed by vegetables,  Fruit and lastly whole grains. 23 My first weight neutral approach is:                 20 My second weight neutral approach is:                 21  My Thirds weight neutral approach is:                 22                  23                  24                  25                  Goals reviewed with patient as below  Do you understand your goals? Do you have the information you need to achieve your goals? Do you have any questions  right now? Plan    Exercise for Health 15 easy exercises to do at home with 6 activity logs were provided to the patient with verbal and written instructions on how to carry this out. Goal number 14 was provided to the patient on this visit please see above. Will follow up each month and provide support as patient begins to add physical activity to life style. Monitor and review goals adjust as needed. Follow up monthly supervised diet and exercise.        Korey Moctezuma RD, LD

## 2021-10-22 ENCOUNTER — OFFICE VISIT (OUTPATIENT)
Dept: FAMILY MEDICINE CLINIC | Age: 61
End: 2021-10-22
Payer: COMMERCIAL

## 2021-10-22 VITALS
HEIGHT: 65 IN | RESPIRATION RATE: 16 BRPM | SYSTOLIC BLOOD PRESSURE: 138 MMHG | WEIGHT: 216 LBS | OXYGEN SATURATION: 100 % | BODY MASS INDEX: 35.99 KG/M2 | DIASTOLIC BLOOD PRESSURE: 80 MMHG | HEART RATE: 68 BPM

## 2021-10-22 DIAGNOSIS — Z02.89 ENCOUNTER FOR COMPLETION OF FORM WITH PATIENT: Primary | ICD-10-CM

## 2021-10-22 DIAGNOSIS — E11.65 UNCONTROLLED TYPE 2 DIABETES MELLITUS WITH HYPERGLYCEMIA (HCC): ICD-10-CM

## 2021-10-22 PROCEDURE — 99213 OFFICE O/P EST LOW 20 MIN: CPT | Performed by: INTERNAL MEDICINE

## 2021-10-22 ASSESSMENT — ENCOUNTER SYMPTOMS
SINUS PRESSURE: 0
ABDOMINAL PAIN: 0
CHEST TIGHTNESS: 0
COLOR CHANGE: 0
ABDOMINAL DISTENTION: 0
SINUS PAIN: 0
EYE REDNESS: 0
COUGH: 0
VOMITING: 0
SORE THROAT: 0
SHORTNESS OF BREATH: 0
WHEEZING: 0
EYE DISCHARGE: 0
EYE ITCHING: 0
DIARRHEA: 0

## 2021-10-22 NOTE — LETTER
Eren Carreno  [address] [place] [address], [address] [facility], [address] [place] [address]. Re: Ceci Page    : 1960    To Whom it May Concern: The above named patient has been seen by our Office for about 5 years. She has a history of Class II Obesity and suffers from associated comorbidities including DONNIE, Type 2 Diabetes, bilateral knee Osteoarthritis and Fatty Liver Disease. Her current weight is 216 lbs and BMI is 36.5. The patient has undergone multiple weight loss attempts using customized diet plans, exercise programs and pharmacologic therapy without maintained success. I feel this patient would benefit from weight loss surgery in view of her associated medical conditions. I appreciate your consideration for approval.   Please feel free to contact me for any further information.         Sincerely,         Stacia Arizmendi MD.

## 2021-10-22 NOTE — PROGRESS NOTES
Subjective:      Patient ID: Anders Cordon is a 64 y.o. female who presents today for:  Chief Complaint   Patient presents with    Follow-up     needs to discuss getting forms filled out/a letter        HPI   Patient being seen today for follow up for Diabetes and for Medical Necessity Form completion for Weight Management Surgery (with the [facility]). Patient is currently on Jardiance 25mg daily and reports compliance. Last HbA1c was 9.5 (6/29/21). She is trying to adopt healthier lifestyle options and is planning for weight management surgery. Patient has a history of Class II Obesity. Unsuccessful weight loss maintenance with customized diets, exercise programs and pharmacologic therapy (including 2 trials of Adipex). Existing comorbidities include OA of bilateral knees, Type 2 Diabetes, DONNIE and Fatty Liver Disease. Past Medical History:   Diagnosis Date    Abnormal EKG 3/23/2018    Angina pectoris syndrome ([facility] [address] 03/23/2018    Diverticulosis of colon     DMII (diabetes mellitus, type 2) ([facility] [address] 2000    Dr Jordana Zepeda hypertension 1/15/2016    Family history of coronary artery disease 1/15/2016    Fatty infiltration of liver 2020    Gastric polyp 2019    Dr Avis Woods, 5 mm    Generalized anxiety disorder     H/O: hysterectomy 2012    History of tobacco abuse 1/15/2016    Obesity (BMI 30-39. 9)     Other specified acquired hypothyroidism 2000    Precordial pain 03/23/2018    (? Prinzmetal?) Dr Cathy Shaver S/P colonoscopy with polypectomy 2013, 2018, 2019    Dr Soledad Jules, Dr Avis Woods, tubulovillous adenoma, hyperplastic polyp    S/P vaginal hysterectomy 2012    benign    Sleep apnea, obstructive 2007    was on CPAP, not using it at present    Vertigo 2018     Past Surgical History:   Procedure Laterality Date    CARPAL TUNNEL RELEASE Left 1/13/2021    LEFT CARPAL TUNNEL RELEASE, LEFT MIDDLE FINGER TRIGGER RELEASE, TENOSYNOVECTOMY FDPFDS performed by Óscar Reich, MD at [address] Right 2/3/2021    RIGHT CARPAL TUNNEL RELEASE, TRIGGER FINGER RELEASE RIGHT MIDDLE FINGER, FDP, FDS, TENOSYNOVECTOMY performed by Kimmy Freed MD at [address], [address] [facility] COLONOSCOPY      COLONOSCOPY N/A 6/30/2020    COLONOSCOPY DIAGNOSTIC performed by Faisal Forrest MD at [facility] CATH LAB PROCEDURE      ENDOMETRIAL ABLATION  2002    metrorrhagia    ENDOSCOPY, COLON, DIAGNOSTIC      HYSTERECTOMY  9/7/12    fibroid, cervicitis, ovaries intact    [facility] COLON CA SCRN NOT  [facility] N/A 5/15/2018    COLONOSCOPY performed by Faisal Forrest MD at [facility] ENDOSCOPY N/A 4/2/2019    EGD ESOPHAGOGASTRODUODENOSCOPY performed by [facility]aisal Forrest MD at [facility]     Family History   Problem Relation Age of Onset    Arrhythmia Mother     Hypertension Mother    Shantal Safer Dementia Mother     COPD Father         mesothelioma    Alcohol Abuse Father     Diabetes Paternal Grandmother     Diabetes Paternal Grandfather     Colon Cancer Paternal Grandfather     Schizophrenia Brother     Colon Cancer Paternal Uncle      Allergies   Allergen Reactions    Seasonal Itching     Current Outpatient Medications on File Prior to Visit   Medication Sig Dispense Refill    Boswellia-Glucosamine-Vit D (OSTEO BI-FLEX ONE PER DAY PO)       levothyroxine (SYNTHROID) 50 MCG tablet TAKE 1 TABLET BY MOUTH ONE TIME A DAY 90 tablet 1    potassium chloride (KLOR-CON M) 20 MEQ extended release tablet Take 1 tablet by mouth 2 times daily 180 tablet 1    amLODIPine (NORVASC) 5 MG tablet TAKE 1 TABLET BY MOUTH ONE TIME A DAY 90 tablet 4    buPROPion (WELLBUTRIN XL) 300 MG extended release tablet TAKE ONE TABLET BY MOUTH EVERY MORNING 90 tablet 4    hydroCHLOROthiazide (HYDRODIURIL) 25 MG tablet TAKE 1 TABLET BY MOUTH ONE TIME A DAY IN THE MORNING 90 tablet 4  empagliflozin (JARDIANCE) 25 MG tablet Take 1 tablet by mouth daily 90 tablet 4    pantoprazole (PROTONIX) 20 MG tablet Take 1 tablet by mouth every morning (before breakfast) 90 tablet 4    pravastatin (PRAVACHOL) 20 MG tablet TAKE ONE TABLET BY MOUTH EVERY EVENING 90 tablet 1    Blood Glucose Monitoring Suppl ([employer] WIRELESS 2) w/Device KIT 1 each by Does not apply route once for 1 dose 1 kit 0    acetaminophen (TYLENOL) 500 MG tablet Take 1,000 mg by mouth every 6 hours as needed for Pain      AgaMatrix Ultra-Thin Lancets MISC Use to test blood sugar 1 time daily 100 each 3    metoprolol tartrate (LOPRESSOR) 25 MG tablet Take 1 tablet by mouth 2 times daily (Patient taking differently: Take 25 mg by mouth daily ) 180 tablet 1    nitroGLYCERIN (NITROSTAT) 0.4 MG SL tablet Place 1 tablet under the tongue every 5 minutes as needed for Chest pain Indications: never used up to max of 3 total doses. If no relief after 1 dose, call 911. 20 tablet 3    blood glucose test strips (AGAMATRIX JAZZ TEST) strip Use to test blood sugar 1 time daily 100 each 3    Blood Glucose Calibration (AGAMATRIX CONTROL) SOLN Use as directed for control solution test 1 each 0    Respiratory Therapy Supplies CLARA Please give patient the farshad view mask 1 Device 0    Cholecalciferol (VITAMIN D) 2000 UNITS CAPS capsule Take 1 capsule by mouth daily       loratadine (CLARITIN) 10 MG tablet Take 10 mg by mouth daily as needed (Allergies)        No current facility-administered medications on file prior to visit. Review of Systems   Constitutional: Negative for activity change, appetite change, chills, fatigue, fever and unexpected weight change. HENT: Negative for ear discharge, ear pain, sinus pressure, sinus pain and sore throat. Eyes: Negative for discharge, redness, itching and visual disturbance. Respiratory: Negative for cough, chest tightness, shortness of breath and wheezing.     Cardiovascular: Negative for chest pain, palpitations and leg swelling. Gastrointestinal: Negative for abdominal distention, abdominal pain, diarrhea and vomiting. Endocrine: Negative for cold intolerance, heat intolerance, polydipsia and polyuria. Genitourinary: Negative for dysuria, flank pain and frequency. Skin: Negative for color change and rash. Neurological: Negative for dizziness, seizures, syncope, light-headedness and headaches. Objective:   /80 (Site: Right Upper Arm, Position: Sitting, Cuff Size: Large Adult)   Pulse 68   Resp 16   Ht 5' 4.5\" (1.638 m)   Wt 216 lb (98 kg)   SpO2 100%   BMI 36.50 kg/m²     Physical Exam  Constitutional:       General: She is not in acute distress. Appearance: Normal appearance. She is normal weight. She is not diaphoretic. HENT:      Head: Normocephalic and atraumatic. Mouth/Throat:      Mouth: Mucous membranes are moist.      Pharynx: Oropharynx is clear. Eyes:      General:         Right eye: No discharge. Left eye: No discharge. Conjunctiva/sclera: Conjunctivae normal.      Pupils: Pupils are equal, round, and reactive to light. Cardiovascular:      Rate and Rhythm: Normal rate and regular rhythm. Pulses: Normal pulses. Heart sounds: Normal heart sounds. No murmur heard. Pulmonary:      Effort: Pulmonary effort is normal. No respiratory distress. Breath sounds: Normal breath sounds. No wheezing. Abdominal:      General: Bowel sounds are normal. There is no distension. Palpations: Abdomen is soft. Tenderness: There is no abdominal tenderness. Musculoskeletal:         General: No swelling or tenderness. Normal range of motion. Cervical back: Normal range of motion and neck supple. Right lower leg: No edema. Left lower leg: No edema. Skin:     General: Skin is warm and dry. Neurological:      General: No focal deficit present.       Mental Status: She is alert and oriented to treatment rationale and possible medication adverse effects; verbalizes understanding and willing to proceed with care.     Indira Ott MD

## 2021-10-22 NOTE — LETTER
10-22-21  Maxim Caldera MD  [facility], [facility]  [address] [place] [address], [address] [facility], [address] [place] [address] [phone]  Fax [phone]        To whom it may concern:       Rene Rivas was seen in my office on 10/22/21 as requested on her forms for [id number] [facility] here is a list of her weight readings over the last two years. 6/29/21 - 224lbs  11/24/20 - 228lbs  9/21/20 - 231lbs  6/1/20 - 225lbs  2/28/20 - 223lbs  12/10/19 - 216lbs  10/22/19 - 212lbs    If you need any further information please feel free to contact our office.       Sincerely,      Maxim Caldera MD.

## 2021-10-24 NOTE — PROGRESS NOTES
[facility]  [facility] INVASIVE BARIATRIC SURG  [address] [place] [address] [place]  [address] [place] [id number]  Dept: [phone]    SURGICAL WEIGHT MANAGEMENT PROGRAM  PROGRESS NOTE INITIAL EVALUATION     Patient: Dimple Gomez        Service Date: 10/21/2021      HPI:     Chief Complaint   Patient presents with    Bariatric, Initial Visit    Weight Loss       The patient is a pleasant 64y.o. year old female  with morbid obesity, who stands Height: 5' 4.5\" (163.8 cm) tall with a weight of Weight: 216 lb (98 kg) , resulting in a BMI of Body mass index is 36.5 kg/m². . The patient suffers from multiple co-morbidities as a result of morbid obesity, including: Type 2 Diabetes Mellitus, Hypertension, Hyperlipidemia, Obstructive Sleep Apnea, Hypothyroidism and GERD. She has suffered from obesity for many years. She has had EGD in the past    The patient denies  a history of myocardial infarction, deep vein thrombosis, pulmonary embolism, renal failure, hepatic failure and stroke. The patient has failed multiple attempts at non-surgical weight loss, and is now seeking surgical intervention to promote permanent and consistent weight loss. She  has chosen Rebecca-en-Y Gastric Bypass. She is well educated regarding it, as she has recently viewed our weight loss surgery informational seminar . Medical History:  Past Medical History:   Diagnosis Date    Abnormal EKG 3/23/2018    Angina pectoris syndrome ([address] 03/23/2018    Diverticulosis of colon     DMII (diabetes mellitus, type 2) ([address] 2000    Dr Margarita Arellano hypertension 1/15/2016    Family history of coronary artery disease 1/15/2016    Fatty infiltration of liver 2020    Gastric polyp 2019    Dr Peter Fraire, 5 mm    Generalized anxiety disorder     H/O: hysterectomy 2012    History of tobacco abuse 1/15/2016    Obesity (BMI 30-39. 9)     Other specified acquired hypothyroidism 2000    Precordial pain 03/23/2018    (? Prinzmetal?) Dr Henry Maxwell S/P colonoscopy with polypectomy 2013, 2018, 2019    Dr Kavita Singh, Dr Can Rojas, tubulovillous adenoma, hyperplastic polyp    S/P vaginal hysterectomy 2012    benign    Sleep apnea, obstructive 2007    was on CPAP, not using it at present    Vertigo 2018       Surgical History:  Past Surgical History:   Procedure Laterality Date    CARPAL TUNNEL RELEASE Left 1/13/2021    LEFT CARPAL TUNNEL RELEASE, LEFT MIDDLE FINGER TRIGGER RELEASE, TENOSYNOVECTOMY FDPFDS performed by Kimmy Freed MD at [address] [facility] Right 2/3/2021    RIGHT CARPAL TUNNEL RELEASE, TRIGGER FINGER RELEASE RIGHT MIDDLE FINGER, FDP, FDS, TENOSYNOVECTOMY performed by Kimmy Freed MD at [address], [address] [facility] N/A 6/30/2020    COLONOSCOPY DIAGNOSTIC performed by Faisal Forrest MD at [address] [place] [address] CATH LAB PROCEDURE      ENDOMETRIAL ABLATION  2002    metrorrhagia    ENDOSCOPY, COLON, DIAGNOSTIC      HYSTERECTOMY  9/7/12    fibroid, cervicitis, ovaries intact    VT COLON CA SCRN NOT  [address] [place] [address] IND N/A 5/15/2018    COLONOSCOPY performed by Faisal Forrest MD at [address] ENDOSCOPY N/A 4/2/2019    EGD ESOPHAGOGASTRODUODENOSCOPY performed by Faisal Forrest MD at [facility]       Family History:      Problem Relation Age of Onset    Arrhythmia Mother     Hypertension Mother     Dementia Mother     COPD Father         mesothelioma    Alcohol Abuse Father     Diabetes Paternal Grandmother     Diabetes Paternal Grandfather     Colon Cancer Paternal Grandfather     Schizophrenia Brother     Colon Cancer Paternal Uncle        Social History:   Social History     Tobacco Use    Smoking status: Former Smoker     Packs/day: 1.00     Years: 30.00     Pack years: 30.00     Types: Cigarettes     Start date: 3/5/1975     Quit date: 6/12/2005     Years since quitting: 16.3    Smokeless tobacco: Never Used    Tobacco comment: 3/15/18 started age 17   Vaping Use    Vaping Use: Never used   Substance Use Topics    Alcohol use: No     Comment: not at all     Drug use: No       Current Med List:  Current Outpatient Medications   Medication Sig Dispense Refill    levothyroxine (SYNTHROID) 50 MCG tablet TAKE 1 TABLET BY MOUTH ONE TIME A DAY 90 tablet 1    potassium chloride (KLOR-CON M) 20 MEQ extended release tablet Take 1 tablet by mouth 2 times daily 180 tablet 1    amLODIPine (NORVASC) 5 MG tablet TAKE 1 TABLET BY MOUTH ONE TIME A DAY 90 tablet 4    buPROPion (WELLBUTRIN XL) 300 MG extended release tablet TAKE ONE TABLET BY MOUTH EVERY MORNING 90 tablet 4    hydroCHLOROthiazide (HYDRODIURIL) 25 MG tablet TAKE 1 TABLET BY MOUTH ONE TIME A DAY IN THE MORNING 90 tablet 4    empagliflozin (JARDIANCE) 25 MG tablet Take 1 tablet by mouth daily 90 tablet 4    pantoprazole (PROTONIX) 20 MG tablet Take 1 tablet by mouth every morning (before breakfast) 90 tablet 4    pravastatin (PRAVACHOL) 20 MG tablet TAKE ONE TABLET BY MOUTH EVERY EVENING 90 tablet 1    Blood Glucose Monitoring Suppl ([employer] WIRELESS 2) w/Device KIT 1 each by Does not apply route once for 1 dose 1 kit 0    acetaminophen (TYLENOL) 500 MG tablet Take 1,000 mg by mouth every 6 hours as needed for Pain      AgaMatrix Ultra-Thin Lancets MISC Use to test blood sugar 1 time daily 100 each 3    metoprolol tartrate (LOPRESSOR) 25 MG tablet Take 1 tablet by mouth 2 times daily (Patient taking differently: Take 25 mg by mouth daily ) 180 tablet 1    nitroGLYCERIN (NITROSTAT) 0.4 MG SL tablet Place 1 tablet under the tongue every 5 minutes as needed for Chest pain Indications: never used up to max of 3 total doses.  If no relief after 1 dose, call 911. 20 tablet 3    blood glucose test strips ([employer] TEST) strip Use to test blood sugar 1 time daily 100 each 3    Blood Glucose Calibration ([employer] CONTROL) SOLN Use as directed for control solution test 1 each 0    Respiratory Therapy Supplies CLARA Please give patient the farshad view mask 1 Device 0    Cholecalciferol (VITAMIN D) 2000 UNITS CAPS capsule Take 1 capsule by mouth daily       loratadine (CLARITIN) 10 MG tablet Take 10 mg by mouth daily as needed (Allergies)       Boswellia-Glucosamine-Vit D (OSTEO BI-FLEX ONE PER DAY PO)        No current facility-administered medications for this visit. Allergies   Allergen Reactions    Seasonal Itching         SOCIAL:      This patient is alone for the evaluation today. [] HIV Risk Factors (i.e.) intravenous drug abuser; at risk sexual behavior; received blood products    [] TB Risk Factors (i.e.) Medically underserved, institutional care, foreign born, endemic area; exposure to active case    [] Hepatitis B&C Risk Factors (i.e.) Received blood transfusion prior to 1992; recreational drug use; high risk sexual behaviors; tattoos or body piercings; contact with blood or needle sticks in the workplace    Comprehension    Ability to grasp concepts and respond to questions:   [x] High   [] Medium   [] Low    Motivation    [x] Asks Questions; eager to learn   [] Needs education   [] Extreme anxiety    [] uncooperative   [] Denies need for education    English Speaking Ability    [x] Speaks English well   [x] Reads English well   [x] Understands spoken english    [x] Understands written English   [] No need for interpretive support      [] Might benefit from interpretive support   []  required for all services     REVIEW OF SYSTEMS: (Negative unless marked otherwise)     See review of Systems scanned into media    PRESENT ILLNESS:     Weight Parameters  Weight 216 lb (98 kg)   Height 5' 4.5\" (1.638 m)   BMI Body mass index is 36.5 kg/m².    IBW     EBW               IMMUNIZATION STATUS  Immunization History   Administered Date(s) Administered    COVID-19, Moderna, PF, 100mcg/0.5mL 01/06/2021, 02/03/2021    Influenza Vaccine, unspecified formulation 10/04/2016    Influenza Virus Vaccine 10/20/2014, 10/12/2015, 10/25/2017, 10/09/2018, 10/16/2018, 10/31/2019    Influenza Whole 10/20/2014, 10/12/2015    Influenza, Quadv, IM, PF (6 mo and older Fluzone, Flulaval, Fluarix, and 3 yrs and older Afluria) 10/04/2016    Pneumococcal Polysaccharide ([id number]) 03/09/2018       FALLS ASSESSMENT    [] LOW RISK FOR FALLS    [] MODERATE RISK FOR FALLS    [] Difficulty walking/selfcare    [] Falls in the past 2 months    [] Suspicion of Clinician    [] Other:      SMOKING CESSATION     [] Not needed     [] Instructed to stop smoking    [] Pamphlet community resources given     VTE SCREEN    [] Family hx DVT/PE  /   [] Personal hx of DVT/PE    [x] Denies any family or personal hx of DVT/PE    Physician Review    [x] Past medical, family, & social history reviewed and discussed with patient. Review of surgery and post-surgical changes (by surgeon for surgical patients only)    [x] Lifelong diet expectations reviewed with patient    [x] Need for lifelong vitamin supplementation reviewed with patient    PHYSICAL EXAMINATION:      /60 (Site: Right Upper Arm, Position: Sitting, Cuff Size: Large Adult)   Pulse 80   Ht 5' 4.5\" (1.638 m)   Wt 216 lb (98 kg)   BMI 36.50 kg/m²     Constitutional:  Vital signs are normal. The patient appears well-developed   HEENT:      Head: Normocephalic. Atraumatic     Eyes: pupils are equal and reactive. No scleral icterus is present. Neck: No mass and no thyromegaly present. Cardiovascular: Normal rate, regular rhythm, S1 normal and S2 normal.  Bilateral pulses present. Pulmonary/Chest: Effort normal and breath sounds normal. No retractions. Abdominal: Soft. Normal appearance. There is no organomegaly. No tenderness. There is no rigidity, no rebound, no guarding and no Soares's sign. Musculoskeletal:      Right lower leg: Normal. No tenderness and no edema. Left lower leg: Normal. No tenderness and no edema. Lymphadenopathy:     No cervical adenopathy, No Exrtemity Adenopathy. Neurological: The patient is alert and oriented. Moving all four extremities equally, sensation grossly intact bilateral.  Skin: Skin is warm, dry and intact. Psychiatric: The patient has a normal mood and affect. Speech is normal and behavior is normal. Judgment and thought content normal. Cognition and memory are normal.     RECOMMENDATIONS:     We spent a great deal of time discussing the risks and benefits of Rebecca-en-Y Gastric Bypass, including but not limited to injury to intra-abdominal organs, breakdown of the gastric staple line, the need for re-operative therapy,  prolonged hospitalization,  mechanical ventilation,  and death. We discussed the possibility of bleeding, the need for blood transfusions, blood clots, hospital-acquired and intra-abdominal infection, anastomotic stricture, and worsening GERD. And we discussed the need for post-operative visit compliance, behavior modifications and diet changes, protein and vitamin supplementation, as well as routine scheduled and dedicated exercise. I instructed the patient to utilize the exercise log that will be given to them at their fist dietician appointment. We discussed the potential weight loss benefit of approximately 60-70% of her excess body weight at 12-18 months post-op, as well as the possibility of insufficient weight loss or weight gain after 2 years post-operative time. Discussed the risk of substance abuse and or nicotine abuse today with patient. They expressed understanding of the risks of abuse of such drugs. PLAN:       Diagnosis Orders   1. Hiatal hernia with GERD  [place] Upper Gi W Air Contrast   2. Essential hypertension     3. Diabetes mellitus type 2 in obese ([facility]     4. Sleep apnea, obstructive     5.  Morbid obesity due to excess calories [facility])            Initial Testing     Primary Procedure: Rebecca-en-Y Gastric Bypass     Labwork: Initial Pre-surgical Lab Tests (CMP, TSH, Fasting Lipid Profile, Mg, Zinc, Vit B1 (whole blood), Vit B12, 25-OH Vit D, Fe,  Ferritin,  Folate) and Negative serum nicotine prior to submission for pre-auth to rule out nutritional deficiencies with morbid obesity and consideration for bariatric surgery    Endoscopic Studies: None  Need prior records with hiatal hernia with GERD    Psychological Assessment: Psychological Evaluation and Clearance    Nutrition Assessment: Bariatric Nutrition Assessment and Clearance    Pulmonary Evaluation: CPAP Settings, will obtain given sleep apnea and need for major surgery    Other  Consultations: medical clearance with BMI 36 and Hypertension    Physician Supervised Diet and Exercise required by the patients insurance company: 3 months.        Surgical Diet requirement:  2 weeks      Final Testing  Screening Chest Xray  and EKG within 6 months of date of surgery    Labwork:  Final Lab Tests  within 3 months of date of surgery (CBC, PT/PTT, BMP)     Electronically signed by Veronica Woodall DO on 10/24/2021 at 12:59 PM

## 2021-10-27 ENCOUNTER — HOSPITAL ENCOUNTER (OUTPATIENT)
Dept: GENERAL RADIOLOGY | Age: 61
Discharge: HOME | End: 2021-10-29
Payer: COMMERCIAL

## 2021-10-27 DIAGNOSIS — K44.9 HIATAL HERNIA: ICD-10-CM

## 2021-10-27 DIAGNOSIS — K21.9 HIATAL HERNIA WITH GERD: ICD-10-CM

## 2021-10-27 DIAGNOSIS — K21.00 GASTROESOPHAGEAL REFLUX DISEASE WITH ESOPHAGITIS, UNSPECIFIED WHETHER HEMORRHAGE: ICD-10-CM

## 2021-10-27 DIAGNOSIS — K44.9 HIATAL HERNIA WITH GERD: ICD-10-CM

## 2021-10-27 PROCEDURE — 74246 X-RAY XM UPR GI TRC 2CNTRST: CPT

## 2021-10-27 PROCEDURE — 2500000003 HC RX 250 WO HCPCS: Performed by: SURGERY

## 2021-10-27 RX ADMIN — BARIUM SULFATE 176 G: 960 POWDER, FOR SUSPENSION ORAL at 10:45

## 2021-10-27 RX ADMIN — BARIUM SULFATE 304 G: 980 POWDER, FOR SUSPENSION ORAL at 10:46

## 2021-11-01 DIAGNOSIS — K21.9 GASTROESOPHAGEAL REFLUX DISEASE WITHOUT ESOPHAGITIS: Primary | ICD-10-CM

## 2021-11-04 ENCOUNTER — NURSE ONLY (OUTPATIENT)
Dept: BARIATRICS/WEIGHT MGMT | Age: 61
End: 2021-11-04

## 2021-11-19 ENCOUNTER — OFFICE VISIT (OUTPATIENT)
Dept: CARDIOLOGY CLINIC | Age: 61
End: 2021-11-19
Payer: COMMERCIAL

## 2021-11-19 VITALS
SYSTOLIC BLOOD PRESSURE: 134 MMHG | HEIGHT: 65 IN | DIASTOLIC BLOOD PRESSURE: 80 MMHG | HEART RATE: 60 BPM | WEIGHT: 216.6 LBS | BODY MASS INDEX: 36.09 KG/M2

## 2021-11-19 DIAGNOSIS — R07.9 EXERTIONAL CHEST PAIN: ICD-10-CM

## 2021-11-19 DIAGNOSIS — F17.201 TOBACCO ABUSE, IN REMISSION: ICD-10-CM

## 2021-11-19 DIAGNOSIS — Z01.818 PRE-OPERATIVE CLEARANCE: Primary | ICD-10-CM

## 2021-11-19 DIAGNOSIS — E11.65 UNCONTROLLED TYPE 2 DIABETES MELLITUS WITH HYPERGLYCEMIA (HCC): ICD-10-CM

## 2021-11-19 DIAGNOSIS — I10 ESSENTIAL HYPERTENSION: ICD-10-CM

## 2021-11-19 LAB
ALBUMIN SERPL-MCNC: 4.3 G/DL (ref 3.5–4.6)
ALP BLD-CCNC: 86 U/L (ref 40–130)
ALT SERPL-CCNC: 43 U/L (ref 0–33)
AST SERPL-CCNC: 32 U/L (ref 0–35)
BILIRUB SERPL-MCNC: 0.3 MG/DL (ref 0.2–0.7)
BILIRUBIN DIRECT: <0.2 MG/DL (ref 0–0.4)
BILIRUBIN, INDIRECT: ABNORMAL MG/DL (ref 0–0.6)
TOTAL PROTEIN: 8 G/DL (ref 6.3–8)

## 2021-11-19 PROCEDURE — 99213 OFFICE O/P EST LOW 20 MIN: CPT | Performed by: INTERNAL MEDICINE

## 2021-11-19 PROCEDURE — 93000 ELECTROCARDIOGRAM COMPLETE: CPT | Performed by: INTERNAL MEDICINE

## 2021-11-19 RX ORDER — RANOLAZINE 500 MG/1
500 TABLET, EXTENDED RELEASE ORAL 2 TIMES DAILY
Qty: 180 TABLET | Refills: 1 | Status: SHIPPED | OUTPATIENT
Start: 2021-11-19 | End: 2022-06-07 | Stop reason: ALTCHOICE

## 2021-11-19 NOTE — PROGRESS NOTES
Chief Complaint   Patient presents with    Cardiac Clearance     BARIATRIC SURGERY        1-15-16: Patient presents for initial medical evaluation. Patient is followed on a regular basis by Dr. Albert Haro MD. S/p hospitalization for CP. S/p normal treadmill stress echo with normal LVF. States symptoms have resolved. Was under a lot of stress at that time. Pt denies chest pain, dyspnea, dyspnea on exertion, change in exercise capacity, fatigue,  nausea, vomiting, diarrhea, constipation, motor weakness, insomnia, weight loss, syncope, dizziness, lightheadedness, palpitations, PND, orthopnea, or claudication. Losing weight. BP is good.        3-23-18: felt a pressure this am that awakened her from sleep, lasted about 20 min, and radiated to her jaw. No associated SOB, N/V or diaphoresis. Has been under a lot of stress. EKG with new anterolateral changes. Pt denies , dyspnea, dyspnea on exertion, change in exercise capacity, fatigue,  nausea, vomiting, diarrhea, constipation, motor weakness, insomnia, weight loss, syncope, dizziness, lightheadedness, palpitations, PND, orthopnea. No hx of LHC or stress test. No hx of MI, CHF or arrhythmia.     6-1-18: s/p LHC with normal coronaries, normal LVF. Still having mid sternal chest pain, radiates to the back. Does get it with activity now and sometimes occurs at rest. She took a nitro and that helped. Has become more frequent now and any time she took a walk. No excessive caffeine. No smoking. Pt denies  dyspnea, dyspnea on exertion, change in exercise capacity, fatigue,  nausea, vomiting, diarrhea, constipation, motor weakness, insomnia, weight loss, syncope, dizziness, lightheadedness, palpitations, PND, orthopnea, or claudication. BP is mildly high. No smoking. 12-21-18: doing well on Ranexa. Symptoms have much improved since last visit. No GERD type symptoms.  Pt deniesdyspnea, dyspnea on exertion, change in exercise capacity, fatigue,  nausea, vomiting, diarrhea, constipation, motor weakness, insomnia, weight loss, syncope, dizziness, lightheadedness, palpitations, PND, orthopnea, or claudication. No nitro use. BP and hr are good. CAD is stable. No LE discoloration or ulcers. No LE edema. No CHF type symptoms. Lipid profile is normal. No recent hospitalization. No change in meds. BS is controlled. HGA1c is 7. EKg with NSR, no ischemia. No smoking. 2-1-19: experiencing chest pain/discomfort on and off and more frequent lately. States its worsened with activity, walking around at [employer], in the office. Did have an episode yesterday that felt like GERD type symptoms. Radiates to the back and right. Pt denies dyspnea, dyspnea on exertion, change in exercise capacity, fatigue,  nausea, vomiting, diarrhea, constipation, motor weakness, insomnia, weight loss, syncope, dizziness, lightheadedness, palpitations, PND, orthopnea, or claudication. No diaphoresis. No smoking. Drinks a couple of cups of tea a day, even when she went caffeine free still had symptoms. On PPI. Imdur gave her HA.     3-29-19: continues to have episodes of CP/angina despite increasing Ranexa and lorpessor. Mainly symptoms are at night and worse with going up stairs. Was seen by GI, placed on PP 2x/day. Will have an EGD done Wednesday. Pt denies nausea, vomiting, diarrhea, constipation, motor weakness, insomnia, weight loss, syncope, dizziness, lightheadedness, palpitations, PND, orthopnea, or claudication. 9-21-20: continues to have CP with exertion, along with SOB, has to rest to get relief. She was taken off of Ranexa, reduced lisinopril to 5mg daily and helped with her vertigo. S/p EGD which was ok with patient. Was placed on Celebrex for Sciatica and did not really help with her CP. Does not reproduce with palpation, deep breathing or coughing. Pain mainly brought on by walking/exercising/exertion but not all the time. No N/V diaphoresis. Nitro did help before with the pain.    No smoking for 15 yrs. 11-19-21: doing well. Hx of vasospastic angina. Considering bariatric surgery. Started walking and now having some chest and back discomfort. Sometimes its minor and at times lasting >15 min. Hx of normal LHC in 2018. On Norvasc and Lopressor. Did not tolerate Ranexxa in the past. IMDUR gave her a HA in the past.   EKG with nSR, t wave inversion. Chronic.      Patient Active Problem List   Diagnosis    Vitamin D deficiency    Generalized anxiety disorder    Sleep apnea, obstructive    Type II diabetes mellitus, uncontrolled ([address]    Hypothyroidism    Essential hypertension    Family history of coronary artery disease    Personal history of tobacco use    Non-cardiac chest pain    History of colon polyps    Polyp of colon    Tobacco abuse, in remission    Irregular Z line of esophagus    Gastric polyp    BELTRAN (nonalcoholic steatohepatitis)    Weakness of both hands    Joint stiffness    Bilateral carpal tunnel syndrome    Acquired trigger finger of right middle finger    Right carpal tunnel syndrome    Trigger little finger of left hand    Dysuria       Past Surgical History:   Procedure Laterality Date    CARPAL TUNNEL RELEASE Left 1/13/2021    LEFT CARPAL TUNNEL RELEASE, LEFT MIDDLE FINGER TRIGGER RELEASE, TENOSYNOVECTOMY FDPFDS performed by Kathryn Dolan MD at [facility] Right 2/3/2021    RIGHT CARPAL TUNNEL RELEASE, TRIGGER FINGER RELEASE RIGHT MIDDLE FINGER, FDP, FDS, TENOSYNOVECTOMY performed by Kathryn Dolan MD at [facility], [facility]      COLONOSCOPY N/A 6/30/2020    COLONOSCOPY DIAGNOSTIC performed by Guzman Harp MD at [address] [facility] CATH LAB PROCEDURE      ENDOMETRIAL ABLATION  2002    metrorrhagia    ENDOSCOPY, COLON, DIAGNOSTIC      HYSTERECTOMY  9/7/12    fibroid, cervicitis, ovaries intact    OR COLON CA SCRN NOT HI RSK IND N/A 5/15/2018 COLONOSCOPY performed by Alicia Perez MD at [facility] ENDOSCOPY N/A 2019    EGD ESOPHAGOGASTRODUODENOSCOPY performed by Alicia Perez MD at [facility] Marital status:      Spouse name: Not on file    Number of children: 3    Years of education: Not on file    Highest education level: Not on file   Occupational History    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[address] , RN     Employer: [employer]   Tobacco Use    Smoking status: Former Smoker     Packs/day: 1.00     Years: 30.00     Pack years: 30.00     Types: Cigarettes     Start date: 3/5/1975     Quit date: 2005     Years since quittin.4    Smokeless tobacco: Never Used    Tobacco comment: 3/15/18 started age 17   Vaping Use    Vaping Use: Never used   Substance and Sexual Activity    Alcohol use: No     Comment: not at all     Drug use: No    Sexual activity: Not on file   Other Topics Concern    Not on file   Social History Narrative    Born in [place], one of 4 children (2 boys and 2 girls), both parents in the [place], moved to [place]    Mother in [place], has memory problems    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[employer]     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    Has 2 dogs, Blossom and Miguel Stammer    Children 3, 2 sons in the area, one daughter in [address]: dogs, sawing, machine embroidery, baking     Social Determinants of Health     Financial Resource Strain: Low Risk     Difficulty of Paying Living Expenses: Not hard at all   Food Insecurity: No Food Insecurity    Worried About [facility] in the Last Year: Never true    [facility] in the Last Year: Never true   Transportation Needs:     Lack of Transportation (Medical): Not on file    Lack of Transportation (Non-Medical):  Not on file   Physical Activity:     Days of Exercise per Week: Not on file    Minutes of Exercise per Session: Not on file   Stress:     Feeling of Stress : Not on file   Social Connections:     Frequency of Communication with Friends and Family: Not on file    Frequency of Social Gatherings with Friends and Family: Not on file    Attends Worship Services: Not on file    Active Member of Clubs or Organizations: Not on file    Attends Club or Organization Meetings: Not on file    Marital Status: Not on file   Intimate Partner Violence:     Fear of Current or Ex-Partner: Not on file    Emotionally Abused: Not on file    Physically Abused: Not on file    Sexually Abused: Not on file   Housing Stability:     Unable to Pay for Housing in the Last Year: Not on file    Number of Places Lived in the Last Year: Not on file    Unstable Housing in the Last Year: Not on file       Family History   Problem Relation Age of Onset    Arrhythmia Mother     Hypertension Mother     Dementia Mother     COPD Father         mesothelioma    Alcohol Abuse Father     Diabetes Paternal Grandmother     Diabetes Paternal Grandfather     Colon Cancer Paternal Grandfather     Schizophrenia Brother     Colon Cancer Paternal Uncle        Current Outpatient Medications   Medication Sig Dispense Refill    metoprolol tartrate (LOPRESSOR) 25 MG tablet Take 25 mg by mouth daily      Boswellia-Glucosamine-Vit D (OSTEO BI-FLEX ONE PER DAY PO)       levothyroxine (SYNTHROID) 50 MCG tablet TAKE 1 TABLET BY MOUTH ONE TIME A DAY 90 tablet 1    potassium chloride (KLOR-CON M) 20 MEQ extended release tablet Take 1 tablet by mouth 2 times daily 180 tablet 1    amLODIPine (NORVASC) 5 MG tablet TAKE 1 TABLET BY MOUTH ONE TIME A DAY 90 tablet 4    buPROPion (WELLBUTRIN XL) 300 MG extended release tablet TAKE ONE TABLET BY MOUTH EVERY MORNING 90 tablet 4    hydroCHLOROthiazide (HYDRODIURIL) 25 MG tablet TAKE 1 TABLET BY MOUTH ONE TIME A DAY IN THE MORNING 90 tablet 4    empagliflozin (JARDIANCE) 25 MG tablet Take 1 tablet by mouth daily 90 tablet 4    pantoprazole (PROTONIX) 20 MG tablet Take 1 tablet by mouth every morning (before breakfast) 90 tablet 4    pravastatin (PRAVACHOL) 20 MG tablet TAKE ONE TABLET BY MOUTH EVERY EVENING 90 tablet 1    acetaminophen (TYLENOL) 500 MG tablet Take 1,000 mg by mouth every 6 hours as needed for Pain      nitroGLYCERIN (NITROSTAT) 0.4 MG SL tablet Place 1 tablet under the tongue every 5 minutes as needed for Chest pain Indications: never used up to max of 3 total doses. If no relief after 1 dose, call 911. 20 tablet 3    Blood Glucose Calibration (AGAMATRIX CONTROL) SOLN Use as directed for control solution test 1 each 0    Cholecalciferol (VITAMIN D) 2000 UNITS CAPS capsule Take 1 capsule by mouth daily       loratadine (CLARITIN) 10 MG tablet Take 10 mg by mouth daily as needed (Allergies)       Blood Glucose Monitoring Suppl (Nimbula WIRELESS 2) w/Device KIT 1 each by Does not apply route once for 1 dose 1 kit 0    AgaMatrix Ultra-Thin Lancets MISC Use to test blood sugar 1 time daily 100 each 3    blood glucose test strips (AGAMAFuture Medical TechnologiesIX JAZZ TEST) strip Use to test blood sugar 1 time daily 100 each 3    Respiratory Therapy Supplies CLARA Please give patient the farshad view mask 1 Device 0     No current facility-administered medications for this visit. Seasonal    Review of Systems:  General ROS: negative  Psychological ROS: negative  Hematological and Lymphatic ROS: No history of blood clots or bleeding disorder.    Respiratory ROS: no cough, shortness of breath, or wheezing  Cardiovascular ROS: positive for - chest pain  Gastrointestinal ROS: no abdominal pain, change in bowel habits, or black or bloody stools  Genito-Urinary ROS: no dysuria, trouble voiding, or hematuria  Musculoskeletal ROS: negative  Neurological ROS: no TIA or stroke symptoms  Dermatological ROS: negative    VITALS:  Blood pressure 134/80, pulse 60, height 5' 5\" (1.651 m), weight 216 lb 9.6 oz (98.2 kg), not currently breastfeeding. Body mass index is 36.04 kg/m². Physical Examination:  General appearance - alert, well appearing, and in no distress  Mental status - alert, oriented to person, place, and time  Neck - Neck is supple, no JVD or carotid bruits. No thyromegaly or adenopathy. Chest - clear to auscultation, no wheezes, rales or rhonchi, symmetric air entry  Heart - normal rate, regular rhythm, normal S1, S2, no murmurs, rubs, clicks or gallops  Abdomen - soft, nontender, nondistended, no masses or organomegaly  Neurological - alert, oriented, normal speech, no focal findings or movement disorder noted  Extremities - peripheral pulses normal, no pedal edema, no clubbing or cyanosis  Skin - normal coloration and turgor, no rashes, no suspicious skin lesions noted      EKG: normal sinus rhythm, T wave inversion in lateral leads. Orders Placed This Encounter   Procedures    EKG 12 Lead       ASSESSMENT:     Diagnosis Orders   1. Pre-operative clearance  EKG 12 Lead   2. Essential hypertension     3. Tobacco abuse, in remission       ? Coronary vasospasm. Hx of DM  HTN  HLD  Obesity class II  Remote hx of tob abuse      PLAN:     Patient will need to continue to follow up with you for their general medical care     As always, aggressive risk factor modification is strongly recommended. We should adhere to the JNC VIII guidelines for HTN management and the NCEP ATP III guidelines for LDL-C management. Cardiac diet is always recommended with low fat, cholesterol, calories and sodium. Continue medications at current doses. Ok for bariatric surgery. Patient is an intermediate  risk patient for the proposed procedure/surgery. No active cardiac conditions such as ischemia, CHF or arrhythmias. Recomment peripoperative BBlocker, GI/DVT prophylaxis. Check EKG. Lopressor 25mg BID, norvasc 5 mg for angina. SL nitro.      The proper use and anticipated side effects of nitroglycerine has been carefully explained. If a single episode of chest pain is not relieved by one tablet, the patient will try another within 5 minutes; and if this doesn't relieve the pain, the patient is instructed to call 911 for transportation to an emergency department. Patient is to avoid any excessive caffeine, chocolate, or OTC stimulants. Patient was advised and encouraged to check blood pressure at home or at a pharmacy, maintain a logbook, and also call us back if blood pressure are above the target ranges or if it is low. Patient clearly understands and agrees to the instructions. We will need to continue to monitor muscle and liver enzymes, BUN, CR, and electrolytes. Details of medical condition explained and patient was warned about adverse consequences of uncontrolled medical conditions and possible side effects of prescribed medications.

## 2021-11-24 LAB — HBA1C MFR BLD: 8.5 % (ref 4.8–5.9)

## 2021-12-02 ENCOUNTER — TELEPHONE (OUTPATIENT)
Dept: PHARMACY | Facility: CLINIC | Age: 61
End: 2021-12-02

## 2021-12-02 RX ORDER — BLOOD SUGAR DIAGNOSTIC
STRIP MISCELLANEOUS
Qty: 100 EACH | Refills: 3 | Status: SHIPPED | OUTPATIENT
Start: 2021-12-02 | End: 2022-10-07

## 2021-12-02 NOTE — TELEPHONE ENCOUNTER
Patient calls to verify any outstanding W DM requirements, after reviewing our database, I advised pt she is missing a second A1c and a second provider visit. Patient states she recently had both, A1c done on 11/19/21 and an office visit on 10/22/21. I was able to confirm both of these are in her Epic chart, and advised her I would forward information to DM team member to update our database.

## 2021-12-07 DIAGNOSIS — K21.9 GASTROESOPHAGEAL REFLUX DISEASE WITHOUT ESOPHAGITIS: ICD-10-CM

## 2021-12-11 LAB — H PYLORI BREATH TEST: NEGATIVE

## 2021-12-15 ENCOUNTER — OFFICE VISIT (OUTPATIENT)
Dept: BARIATRICS/WEIGHT MGMT | Age: 61
End: 2021-12-15
Payer: COMMERCIAL

## 2021-12-15 ENCOUNTER — HOSPITAL ENCOUNTER (OUTPATIENT)
Age: 61
Setting detail: SPECIMEN
Discharge: HOME | End: 2021-12-15

## 2021-12-15 VITALS
WEIGHT: 209 LBS | SYSTOLIC BLOOD PRESSURE: 130 MMHG | DIASTOLIC BLOOD PRESSURE: 72 MMHG | BODY MASS INDEX: 34.82 KG/M2 | HEIGHT: 65 IN | HEART RATE: 66 BPM

## 2021-12-15 DIAGNOSIS — I20.9 ANGINA PECTORIS SYNDROME (HCC): ICD-10-CM

## 2021-12-15 DIAGNOSIS — E66.9 OBESITY (BMI 30-39.9): ICD-10-CM

## 2021-12-15 DIAGNOSIS — F41.1 GENERALIZED ANXIETY DISORDER: ICD-10-CM

## 2021-12-15 DIAGNOSIS — E03.9 HYPOTHYROIDISM, UNSPECIFIED TYPE: ICD-10-CM

## 2021-12-15 DIAGNOSIS — F17.201 TOBACCO ABUSE, IN REMISSION: ICD-10-CM

## 2021-12-15 DIAGNOSIS — G47.33 SLEEP APNEA, OBSTRUCTIVE: ICD-10-CM

## 2021-12-15 DIAGNOSIS — E11.69 DIABETES MELLITUS TYPE 2 IN OBESE (HCC): Primary | ICD-10-CM

## 2021-12-15 DIAGNOSIS — E11.69 DIABETES MELLITUS TYPE 2 IN OBESE (HCC): ICD-10-CM

## 2021-12-15 DIAGNOSIS — E66.9 DIABETES MELLITUS TYPE 2 IN OBESE (HCC): Primary | ICD-10-CM

## 2021-12-15 DIAGNOSIS — K76.0 FATTY INFILTRATION OF LIVER: ICD-10-CM

## 2021-12-15 DIAGNOSIS — I10 ESSENTIAL HYPERTENSION: ICD-10-CM

## 2021-12-15 DIAGNOSIS — E66.9 DIABETES MELLITUS TYPE 2 IN OBESE (HCC): ICD-10-CM

## 2021-12-15 LAB
ABSOLUTE EOS #: 0.25 K/UL (ref 0–0.44)
ABSOLUTE IMMATURE GRANULOCYTE: <0.03 K/UL (ref 0–0.3)
ABSOLUTE LYMPH #: 1.97 K/UL (ref 1.1–3.7)
ABSOLUTE MONO #: 0.4 K/UL (ref 0.1–1.2)
ALBUMIN SERPL-MCNC: 4.3 G/DL (ref 3.5–5.2)
ALBUMIN/GLOBULIN RATIO: 1.3 (ref 1–2.5)
ALP BLD-CCNC: 82 U/L (ref 35–104)
ALT SERPL-CCNC: 33 U/L (ref 5–33)
ANION GAP SERPL CALCULATED.3IONS-SCNC: 14 MMOL/L (ref 9–17)
AST SERPL-CCNC: 30 U/L
BASOPHILS # BLD: 1 % (ref 0–2)
BASOPHILS ABSOLUTE: 0.07 K/UL (ref 0–0.2)
BILIRUB SERPL-MCNC: 0.37 MG/DL (ref 0.3–1.2)
BUN BLDV-MCNC: 18 MG/DL (ref 6–20)
BUN/CREAT BLD: ABNORMAL (ref 9–20)
CALCIUM SERPL-MCNC: 9.5 MG/DL (ref 8.6–10.4)
CHLORIDE BLD-SCNC: 99 MMOL/L (ref 98–107)
CHOLESTEROL/HDL RATIO: 3.5
CHOLESTEROL: 149 MG/DL
CO2: 23 MMOL/L (ref 20–31)
CREAT SERPL-MCNC: 1.23 MG/DL (ref 0.5–0.9)
DIFFERENTIAL TYPE: ABNORMAL
EOSINOPHILS RELATIVE PERCENT: 3 % (ref 1–4)
ESTIMATED AVERAGE GLUCOSE: 180 MG/DL
FOLATE: 11.1 NG/ML
GFR AFRICAN AMERICAN: 54 ML/MIN
GFR NON-AFRICAN AMERICAN: 45 ML/MIN
GFR SERPL CREATININE-BSD FRML MDRD: ABNORMAL ML/MIN/{1.73_M2}
GFR SERPL CREATININE-BSD FRML MDRD: ABNORMAL ML/MIN/{1.73_M2}
GLUCOSE BLD-MCNC: 134 MG/DL (ref 70–99)
HBA1C MFR BLD: 7.9 % (ref 4–6)
HCT VFR BLD CALC: 40.1 % (ref 36.3–47.1)
HDLC SERPL-MCNC: 43 MG/DL
HEMOGLOBIN: 12.9 G/DL (ref 11.9–15.1)
IMMATURE GRANULOCYTES: 0 %
IRON SATURATION: 18 % (ref 20–55)
IRON: 55 UG/DL (ref 37–145)
LDL CHOLESTEROL: 75 MG/DL (ref 0–130)
LYMPHOCYTES # BLD: 24 % (ref 24–43)
MAGNESIUM: 2.2 MG/DL (ref 1.6–2.6)
MCH RBC QN AUTO: 27 PG (ref 25.2–33.5)
MCHC RBC AUTO-ENTMCNC: 32.2 G/DL (ref 28.4–34.8)
MCV RBC AUTO: 83.9 FL (ref 82.6–102.9)
MONOCYTES # BLD: 5 % (ref 3–12)
NRBC AUTOMATED: 0 PER 100 WBC
PDW BLD-RTO: 13.8 % (ref 11.8–14.4)
PLATELET # BLD: 361 K/UL (ref 138–453)
PLATELET ESTIMATE: ABNORMAL
PMV BLD AUTO: 9.4 FL (ref 8.1–13.5)
POTASSIUM SERPL-SCNC: 3.3 MMOL/L (ref 3.7–5.3)
PTH INTACT: 72.02 PG/ML (ref 15–65)
RBC # BLD: 4.78 M/UL (ref 3.95–5.11)
RBC # BLD: ABNORMAL 10*6/UL
SEG NEUTROPHILS: 67 % (ref 36–65)
SEGMENTED NEUTROPHILS ABSOLUTE COUNT: 5.35 K/UL (ref 1.5–8.1)
SODIUM BLD-SCNC: 136 MMOL/L (ref 135–144)
TOTAL IRON BINDING CAPACITY: 311 UG/DL (ref 250–450)
TOTAL PROTEIN: 7.6 G/DL (ref 6.4–8.3)
TRIGL SERPL-MCNC: 156 MG/DL
TSH SERPL DL<=0.05 MIU/L-ACNC: 0.59 MIU/L (ref 0.3–5)
UNSATURATED IRON BINDING CAPACITY: 256 UG/DL (ref 112–347)
VITAMIN B-12: 480 PG/ML (ref 232–1245)
VITAMIN D 25-HYDROXY: 50.9 NG/ML (ref 30–100)
VLDLC SERPL CALC-MCNC: ABNORMAL MG/DL (ref 1–30)
WBC # BLD: 8.1 K/UL (ref 3.5–11.3)
WBC # BLD: ABNORMAL 10*3/UL

## 2021-12-15 PROCEDURE — 3052F HG A1C>EQUAL 8.0%<EQUAL 9.0%: CPT | Performed by: NURSE PRACTITIONER

## 2021-12-15 PROCEDURE — 99213 OFFICE O/P EST LOW 20 MIN: CPT | Performed by: NURSE PRACTITIONER

## 2021-12-15 NOTE — PROGRESS NOTES
Medical Nutrition Therapy   Metabolic and Bariatric Surgery         Supervised diet and exercise preparation  Visit 1 out of 3  Pt reports:      Changes in eating patterns to promote health are noted below on the goals number 19-22    Vitals: Wt Readings from Last 3 Encounters:   12/15/21 209 lb (94.8 kg)   11/19/21 216 lb 9.6 oz (98.2 kg)   10/22/21 216 lb (98 kg)         Nutrition Assessment:   PES: Knowledge deficit related to healthy behaviors that support weight management post weight loss surgery as evidenced by Body mass index is 35.32 kg/m². Nutrition Assessment of Goal Attainment:  TREATMENT GOALS:    1. Pt  Completed 3 out of 3 goals. 2.TREATMENT GOALS FOR UPCOMING WEEK: continue all previous goals and add: # 6, 9    All goals were planned with and agreed on by the patient. appt # NA G What is your next step? C 1 2 3 4 5 6 7 8 9     0  1 I will read the education binder provided to me and the   x 100              x 2 I will make my pschological evaluation appoinment. 1  3 I will bring this goal card to every appointment. 100              x 4 I will eliminate all tobacco/nicotine. x 5 I will limit alcoholic beverages to 0-8IQ per week. 1  6 I will limit dining out to 3 times per week or less. 100              x 7 I will eliminate sugary beverages. x 8 I will eliminate carbonated beverages. 1  9 I will eliminate drinking with a straw. 10 I will limit caffeinated beverages to 16oz daily. 1  11 I will limit log my exercise daily. 100               12 I will determine my calcium and mvi plan. 13 I will have 1-2 servings of lean protein present at each meal and minimeal.                x 14 I will eat every 3-5 hours. 15 I will drink 64oz of fluid daily. 16 I will eat slowly during meals and snacks.                  17 I will limit fluids 4oz before after and during meals. 18 I will eat protein first at all meals followed by vegetables,  Fruit and lastly whole grains. 23 My first weight neutral approach is:                 20 My second weight neutral approach is:                 21  My Thirds weight neutral approach is:                 22                    Questions asked for goal understanding:                                                  Do you understand your goals? Do you have the information you need to achieve your goals? Do you have any questions  right now? [x]  Consistent goal achievement in the program thus far and further success with goals is expected. []  Unable to consistently make progress in goal achievement. At this time patient is not moving forward  in developing the skills needed for success after surgery. Plan:    Continue to follow monthly and review goals.          [x]  Nutrition visits complete    []

## 2021-12-15 NOTE — PROGRESS NOTES
2021    LEFT CARPAL TUNNEL RELEASE, LEFT MIDDLE FINGER TRIGGER RELEASE, TENOSYNOVECTOMY FDPFDS performed by Arsen Montgomery MD at [address] Right 2/3/2021    RIGHT CARPAL TUNNEL RELEASE, TRIGGER FINGER RELEASE RIGHT MIDDLE FINGER, FDP, FDS, TENOSYNOVECTOMY performed by Arsen Montgomery MD at [address], [facility] COLONOSCOPY N/A 2020    COLONOSCOPY DIAGNOSTIC performed by Artem Escobar MD at [address] [facility] CATH LAB PROCEDURE      ENDOMETRIAL ABLATION      metrorrhagia    ENDOSCOPY, COLON, DIAGNOSTIC      HYSTERECTOMY  12    fibroid, cervicitis, ovaries intact    RI COLON CA SCRN NOT  [address] IND N/A 5/15/2018    COLONOSCOPY performed by Artem Escobar MD at [facility]    UPPER GASTROINTESTINAL ENDOSCOPY N/A 2019    EGD ESOPHAGOGASTRODUODENOSCOPY performed by Artem Escobar MD at [facility]       Family History:  Family History   Problem Relation Age of Onset    Arrhythmia Mother     Hypertension Mother     Dementia Mother     COPD Father         mesothelioma    Alcohol Abuse Father     Diabetes Paternal Grandmother     Diabetes Paternal Grandfather     Colon Cancer Paternal Grandfather     Schizophrenia Brother     Colon Cancer Paternal Uncle        Social History:  Social History     Socioeconomic History    Marital status:      Spouse name: Not on file    Number of children: 3    Years of education: Not on file    Highest education level: Not on file   Occupational History    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[employer] , RN     Employer: [employer]   Tobacco Use    Smoking status: Former Smoker     Packs/day: 1.00     Years: 30.00     Pack years: 30.00     Types: Cigarettes     Start date: 3/5/1975     Quit date: 2005     Years since quittin.5    Smokeless tobacco: Never Used    Tobacco comment: 3/15/18 started age 17   Vaping Use    Vaping Use: Never used   Substance and Sexual Activity    Alcohol use: No     Comment: not at all     Drug use: No    Sexual activity: Not on file   Other Topics Concern    Not on file   Social History Narrative    Born in [place], one of 4 children (2 boys and 2 girls), both parents in the [employer], moved to [place]    Mother in [place], has memory problems    Mu-ism Muslim     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    Has 2 dogs, Blossom and Orissaare    Children 3, 2 sons in the area, one daughter in [address]: dogs, sawing, machine embroidery, baking     Social Determinants of Health     Financial Resource Strain: Low Risk     Difficulty of Paying Living Expenses: Not hard at all   Food Insecurity: No Food Insecurity    Worried About [address] [facility] in the Last Year: Never true    [address] in the Last Year: Never true   Transportation Needs:     Lack of Transportation (Medical): Not on file    Lack of Transportation (Non-Medical):  Not on file   Physical Activity:     Days of Exercise per Week: Not on file    Minutes of Exercise per Session: Not on file   Stress:     Feeling of Stress : Not on file   Social Connections:     Frequency of Communication with Friends and Family: Not on file    Frequency of Social Gatherings with Friends and Family: Not on file    Attends Catholic Services: Not on file    Active Member of [address] [place] [address] or Organizations: Not on file    Attends Club or Organization Meetings: Not on file    Marital Status: Not on file   Intimate Partner Violence:     Fear of Current or Ex-Partner: Not on file    Emotionally Abused: Not on file    Physically Abused: Not on file    Sexually Abused: Not on file   Housing Stability:     Unable to Pay for Housing in the Last Year: Not on file    Number of [place] in the Last Year: Not on file    Unstable Housing in the Last Year: Not on file Current Medications:  Current Outpatient Medications   Medication Sig Dispense Refill    blood glucose test strips (AGAMATRIX JAZZ TEST) strip USE TO TEST BLOOD SUGAR ONE TIME DAILY 100 each 3    metoprolol tartrate (LOPRESSOR) 25 MG tablet Take 25 mg by mouth daily      ranolazine (RANEXA) 500 MG extended release tablet Take 1 tablet by mouth 2 times daily 180 tablet 1    Boswellia-Glucosamine-Vit D (OSTEO BI-FLEX ONE PER DAY PO)       levothyroxine (SYNTHROID) 50 MCG tablet TAKE 1 TABLET BY MOUTH ONE TIME A DAY 90 tablet 1    potassium chloride (KLOR-CON M) 20 MEQ extended release tablet Take 1 tablet by mouth 2 times daily 180 tablet 1    amLODIPine (NORVASC) 5 MG tablet TAKE 1 TABLET BY MOUTH ONE TIME A DAY 90 tablet 4    buPROPion (WELLBUTRIN XL) 300 MG extended release tablet TAKE ONE TABLET BY MOUTH EVERY MORNING 90 tablet 4    hydroCHLOROthiazide (HYDRODIURIL) 25 MG tablet TAKE 1 TABLET BY MOUTH ONE TIME A DAY IN THE MORNING 90 tablet 4    empagliflozin (JARDIANCE) 25 MG tablet Take 1 tablet by mouth daily 90 tablet 4    pantoprazole (PROTONIX) 20 MG tablet Take 1 tablet by mouth every morning (before breakfast) 90 tablet 4    pravastatin (PRAVACHOL) 20 MG tablet TAKE ONE TABLET BY MOUTH EVERY EVENING 90 tablet 1    acetaminophen (TYLENOL) 500 MG tablet Take 1,000 mg by mouth every 6 hours as needed for Pain      AgaMatrix Ultra-Thin Lancets MISC Use to test blood sugar 1 time daily 100 each 3    nitroGLYCERIN (NITROSTAT) 0.4 MG SL tablet Place 1 tablet under the tongue every 5 minutes as needed for Chest pain Indications: never used up to max of 3 total doses.  If no relief after 1 dose, call [phone]. [phone] tablet 3    Blood Glucose Calibration (AGAMATRIX CONTROL) SOLN Use as directed for control solution test 1 each 0    Respiratory Therapy Supplies CLARA Please give patient the farshad view mask 1 Device 0    Cholecalciferol (VITAMIN D) 2000 UNITS CAPS capsule Take 1 capsule by mouth daily  loratadine (CLARITIN) 10 MG tablet Take 10 mg by mouth daily as needed (Allergies)       Blood Glucose Monitoring Suppl ([place] WIRELESS 2) w/Device KIT 1 each by Does not apply route once for 1 dose 1 kit 0     No current facility-administered medications for this visit. Vital Signs:  /72 (Site: Right Upper Arm, Position: Sitting, Cuff Size: Large Adult)   Pulse 66   Ht 5' 4.5\" (1.638 m)   Wt 209 lb (94.8 kg)   BMI 35.32 kg/m²     BMI/Height/Weight:  Body mass index is 35.32 kg/m². Review of Systems - A review of systems was performed. All was negative unless otherwise documented in HPI. Constitutional: Negative for fever, chills and diaphoresis. HENT: Negative for hearing loss and trouble swallowing. Eyes: Negative for photophobia and visual disturbance. Respiratory: Negative for cough, shortness of breath and wheezing. Cardiovascular: Negative for chest pain and palpitations. Gastrointestinal: Negative for nausea, vomiting, abdominal pain, diarrhea, constipation, blood in stool and abdominal distention. Endocrine: Negative for polydipsia, polyphagia and polyuria. Genitourinary: Negative for dysuria, frequency, hematuria and difficulty urinating. Musculoskeletal: Negative for myalgias, joint swelling. Skin: Negative for pallor and rash. Neurological: Negative for dizziness, tremors, light-headedness and headaches. Psychiatric/Behavioral: Negative for sleep disturbance and dysphoric mood. Objective:      Physical Exam   Vital signs reviewed. General: Well-developed and well-nourished. No acute distress. Skin: Warm, dry and intact. HEENT: Normocephalic. EOMs intact. Conjunctivae normal. Neck supple. Cardiovascular: Normal rate, regular rhythm. Pulmonary/Chest: Normal effort. Lungs clear to auscultation. No rales, rhonchi or wheezing. Abdominal: Positive bowel sounds. Soft, nontender. Nondistended. Musculoskeletal: Movement x4.  No edema. Neurological: Gait normal. Alert and oriented to person, place, and time. Psychiatric: Normal mood and affect. Speech and behavior normal. Judgment and thought content normal. Cognition and memory intact. Assessment:       Diagnosis Orders   1. Diabetes mellitus type 2 in obese (HCC)  CBC Auto Differential    Comprehensive Metabolic Panel    Hemoglobin A1C    Iron and TIBC    Lipid Panel    Magnesium    PTH, Intact    TSH without Reflex    Vitamin A    Vitamin B1    Vitamin B12 & Folate    Vitamin D 25 Hydroxy    Zinc   2. Sleep apnea, obstructive  CBC Auto Differential    Comprehensive Metabolic Panel    Hemoglobin A1C    Iron and TIBC    Lipid Panel    Magnesium    PTH, Intact    TSH without Reflex    Vitamin A    Vitamin B1    Vitamin B12 & Folate    Vitamin D 25 Hydroxy    Zinc   3. Generalized anxiety disorder  CBC Auto Differential    Comprehensive Metabolic Panel    Hemoglobin A1C    Iron and TIBC    Lipid Panel    Magnesium    PTH, Intact    TSH without Reflex    Vitamin A    Vitamin B1    Vitamin B12 & Folate    Vitamin D 25 Hydroxy    Zinc   4. Essential hypertension  CBC Auto Differential    Comprehensive Metabolic Panel    Hemoglobin A1C    Iron and TIBC    Lipid Panel    Magnesium    PTH, Intact    TSH without Reflex    Vitamin A    Vitamin B1    Vitamin B12 & Folate    Vitamin D 25 Hydroxy    Zinc   5. Fatty infiltration of liver  CBC Auto Differential    Comprehensive Metabolic Panel    Hemoglobin A1C    Iron and TIBC    Lipid Panel    Magnesium    PTH, Intact    TSH without Reflex    Vitamin A    Vitamin B1    Vitamin B12 & Folate    Vitamin D 25 Hydroxy    Zinc   6. Hypothyroidism, unspecified type  CBC Auto Differential    Comprehensive Metabolic Panel    Hemoglobin A1C    Iron and TIBC    Lipid Panel    Magnesium    PTH, Intact    TSH without Reflex    Vitamin A    Vitamin B1    Vitamin B12 & Folate    Vitamin D 25 Hydroxy    Zinc   7.  Angina pectoris syndrome (HCC)  CBC Auto Differential    Comprehensive Metabolic Panel    Hemoglobin A1C    Iron and TIBC    Lipid Panel    Magnesium    PTH, Intact    TSH without Reflex    Vitamin A    Vitamin B1    Vitamin B12 & Folate    Vitamin D 25 Hydroxy    Zinc   8. Tobacco abuse, in remission  CBC Auto Differential    Comprehensive Metabolic Panel    Hemoglobin A1C    Iron and TIBC    Lipid Panel    Magnesium    PTH, Intact    TSH without Reflex    Vitamin A    Vitamin B1    Vitamin B12 & Folate    Vitamin D 25 Hydroxy    Zinc   9. Obesity (BMI 30-39. 9)  CBC Auto Differential    Comprehensive Metabolic Panel    Hemoglobin A1C    Iron and TIBC    Lipid Panel    Magnesium    PTH, Intact    TSH without Reflex    Vitamin A    Vitamin B1    Vitamin B12 & Folate    Vitamin D 25 Hydroxy    Zinc       Plan:    Dietitian visit today. Patient was encouraged to journal all food intake. Keep calorie level at approximately 8760-8511. Protein intake is to be a minimum of 60-80 grams per day. Water drinking was encouraged with a goal of 64oz-128oz daily. Beverages to be calorie free except for milk. Every other beverage should be water. Avoid soda. Continue to increase level of physical activity. Encouraged use of exercise log. Follow-up  Return in about 1 month (around 1/15/2022). Orders this encounter:  Orders Placed This Encounter   Procedures    CBC Auto Differential     Standing Status:   Future     Number of Occurrences:   1     Standing Expiration Date:   12/15/2022    Comprehensive Metabolic Panel     Standing Status:   Future     Number of Occurrences:   1     Standing Expiration Date:   12/15/2022    Hemoglobin A1C     Standing Status:   Future     Number of Occurrences:   1     Standing Expiration Date:   12/15/2022    Iron and TIBC     Standing Status:   Future     Number of Occurrences:   1     Standing Expiration Date:   12/15/2022     Order Specific Question:   Is Patient Fasting?      Answer:   yes     Order Specific Question:   No of Hours? Answer:   12    Lipid Panel     Standing Status:   Future     Number of Occurrences:   1     Standing Expiration Date:   12/15/2022     Order Specific Question:   Is Patient Fasting?/# of Hours     Answer:   15    Magnesium     Standing Status:   Future     Number of Occurrences:   1     Standing Expiration Date:   12/15/2022    PTH, Intact     Standing Status:   Future     Number of Occurrences:   1     Standing Expiration Date:   12/15/2022    TSH without Reflex     Standing Status:   Future     Number of Occurrences:   1     Standing Expiration Date:   12/15/2022    Vitamin A     Standing Status:   Future     Number of Occurrences:   1     Standing Expiration Date:   12/15/2022    Vitamin B1     Standing Status:   Future     Number of Occurrences:   1     Standing Expiration Date:   12/15/2022    Vitamin B12 & Folate     Standing Status:   Future     Number of Occurrences:   1     Standing Expiration Date:   12/15/2022    Vitamin D 25 Hydroxy     Standing Status:   Future     Number of Occurrences:   1     Standing Expiration Date:   12/15/2022    Zinc     Standing Status:   Future     Number of Occurrences:   1     Standing Expiration Date:   12/15/2022       Prescriptions this encounter:  No orders of the defined types were placed in this encounter.       Electronically signed by:  Cindy Gates CNP

## 2021-12-18 LAB
RETINYL PALMITATE: <0.02 MG/L (ref 0–0.1)
VITAMIN A LEVEL: 0.61 MG/L (ref 0.3–1.2)
VITAMIN A, INTERP: NORMAL

## 2021-12-19 LAB
VITAMIN B1 WHOLE BLOOD: 135 NMOL/L (ref 70–180)
ZINC: 79.6 UG/DL (ref 60–120)

## 2022-01-04 ENCOUNTER — TELEPHONE (OUTPATIENT)
Dept: PHARMACY | Facility: CLINIC | Age: 62
End: 2022-01-04

## 2022-01-04 NOTE — TELEPHONE ENCOUNTER
For [place] in place:  No   Recommendation Provided To: Patient/Caregiver: 1 via Telephone   Intervention Detail: Scheduled Appointment   Gap Closed?: Yes    Intervention Accepted By: Patient/Caregiver: 1   Time Spent (min): 10

## 2022-01-04 NOTE — TELEPHONE ENCOUNTER
2022 Annual Pharmacist Visit    Called patient to schedule 2022 yearly pharmacist appointment to discuss medications for Diabetes Management Program.    No answer. Left VM.      Please call back at [phone], option #3         Bethene Sicard, [address]   Phone: [phone], option #3

## 2022-01-10 ENCOUNTER — SCHEDULED TELEPHONE ENCOUNTER (OUTPATIENT)
Dept: PHARMACY | Facility: CLINIC | Age: 62
End: 2022-01-10

## 2022-01-10 NOTE — TELEPHONE ENCOUNTER
Blood Glucose Calibration (AGAMATRIX CONTROL) SOLN Use as directed for control solution test 1 each 0    Cholecalciferol (VITAMIN D) 2000 UNITS CAPS capsule Take 1 capsule by mouth daily       loratadine (CLARITIN) 10 MG tablet Take 10 mg by mouth daily as needed (Allergies)       Blood Glucose Monitoring Suppl (AGAMATRIX JAZZ WIRELESS 2) w/Device KIT 1 each by Does not apply route once for 1 dose 1 kit 0     No current facility-administered medications for this visit. Current Pharmacy: [facility]  Current testing supplies/frequency: Agamatrix [place] Salvia couple times per week to once a day    Allergies: Allergen Reactions    Seasonal Itching      Vitals/Labs:  BP Readings from Last 3 Encounters:   12/15/21 130/72   11/19/21 134/80   10/22/21 138/80      Ref.  Range 6/29/2021 16:49   Creatinine, Ur Latest Ref Range: Not Established mg/dL 42.0   Microalbumin Creatinine Ratio Latest Ref Range: 0.0 - 30.0 mg/G see below   Microalbumin, Random Urine Latest Ref Range: Not Established mg/dL <1.20     Hemoglobin A1c   Component Value Date    LABA1C 7.9 (H) 12/15/2021    LABA1C 8.5 (H) 11/19/2021    LABA1C 9.5 (H) 06/29/2021     Lipid Panel   Component Value Date    CHOL 149 12/15/2021    TRIG 156 (H) 12/15/2021    HDL 43 12/15/2021    LDLCHOLESTEROL 75 12/15/2021     ALT   Date Value Ref Range Status   12/15/2021 33 5 - 33 U/L Final     AST   Date Value Ref Range Status   12/15/2021 30 <32 U/L Final     The 10-year ASCVD risk score (Vernon Callaway, et al., 2013) is: 7.3%    Values used to calculate the score:      Age: 63 years      Sex: Female      Is Non- : No      Diabetic: Yes      Tobacco smoker: No      Systolic Blood Pressure: 713 mmHg      Is BP treated: Yes      HDL Cholesterol: 43 mg/dL      Total Cholesterol: 149 mg/dL     Renal Function   Component Value Date    LABGLOM 45 12/15/2021    CREATININE 1.23 (H) 12/15/2021   CrCL ~55 mL/min (calculated using C-G equation, Ht 1.638m, Adj.BW 71.4kg)    Immunizations:  Administered Date(s) Administered    COVID-19, Moderna, Primary or Immunocompromised, PF, 100mcg/0.5mL 2021, 2021, 2021    Influenza Vaccine, unspecified formulation 10/04/2016    Influenza Virus Vaccine 10/20/2014, 10/12/2015, 10/25/2017, 10/09/2018, 10/16/2018, 10/31/2019, 10/22/2021, 10/22/2021    Influenza Whole 10/20/2014, 10/12/2015    Influenza, Quadv, IM, PF (6 mo and older Fluzone, Flulaval, Fluarix, and 3 yrs and older Afluria) 10/04/2016    Pneumococcal Polysaccharide ([id number]) 2018      Social History:  Tobacco Use    Smoking status: Former Smoker     Packs/day: 1.00     Years: 30.00     Pack years: 30.00     Types: Cigarettes     Start date: 3/5/1975     Quit date: 2005     Years since quittin.5    Smokeless tobacco: Never Used    Tobacco comment: 3/15/18 started age 17   Substance Use Topics    Alcohol use: No     Comment: not at all      ASSESSMENT:  Initial Program Requirements (Y indicates has completed for the year, N indicates needs to be completed by 2022): No - Provider Visit for DM (1st)  No - A1c (1st)     Ongoing Program Requirements (Y indicates has completed for the year, N indicates needs to be completed by 2022):   No - Provider Visit for DM (2nd)  No - ACC/diabetes educator visit (if A1c over 8%) - not currently needed  No - A1c (2nd)  No - Lipid panel  No - Urine microalbumin  Yes - Pneumococcal vaccination: Up-to-date, not needed again until age 74  No - Influenza vaccination for 2022  No - Medication adherence over 70%  Yes - On statin or contraindication(s) - pravastatin  Yes - On ACEi/ARB or contraindication(s) - cardio previously switched lisinopril to amlodipine     Current medications eligible for copay waiver, up to $600, through [employer]:  - Jardiance (empagliflozin), pravastatin, hydrochlorothiazide, levothyroxine  - Agamatrix Sujata     Diabetes Care:   - Glycemic Goal: <7.0%, is not at blood glucose goal    - Home blood sugar records:recent reading 130, highest 225; if high she can attribute to something she ate  - Any episodes of hypoglycemia? no   - Medication compliance: compliant most of the time   - Diet: pt planning to get bariatric surgery  - Lipids: Patient is prescribed moderate-intensity statin therapy.   - Other: Patient would like writer to request refill for metoprolol from cardio; monitor bupropion dose as it is recommended to consider limiting dose to 150mg max    PLAN:  - DM program gaps identified:   · Initial requirements: Provider Visit for DM (1st) and A1c (1st)   · Ongoing requirements: Provider visit for DM (2nd), A1c (2nd), Lipid panel, Urine microalbumin, Influenza vaccination for [id number] and Medication adherence over 70%   - Education to patient: Addressed diet and exercise   - Follow up: PCP for identified gaps or as scheduled below  - Upcoming appointments:   Date Time Provider Department Center   1/14/2022  8:00 AM Rodolfo Griffith [facility]   1/14/2022 11:30 AM ELISEO Garcia - CNP Weight Mgmt [facility]   2/7/2022  9:15 AM [facility]AYANA [facility] EM[facility]GENCY [facility]   5/13/2022  9:30 AM Shivam Lewis, DO [address] [place] [address], PharmD, [facility]reys, [facility]  Department, toll free: [phone], option 1    =======================================================    For Pharmacy Admin Tracking Only   Recommendation Provided To: Provider: 1 via Note to Provider   Intervention Detail: Refill(s) Provided   Gap Closed?: Yes    Intervention Accepted By: Provider: 0   Time Spent (min): 60

## 2022-01-12 ENCOUNTER — TELEMEDICINE (OUTPATIENT)
Dept: FAMILY MEDICINE CLINIC | Age: 62
End: 2022-01-12
Payer: COMMERCIAL

## 2022-01-12 DIAGNOSIS — B37.31 VAGINAL CANDIDIASIS: Primary | ICD-10-CM

## 2022-01-12 PROCEDURE — 99422 OL DIG E/M SVC 11-20 MIN: CPT | Performed by: INTERNAL MEDICINE

## 2022-01-12 RX ORDER — FLUCONAZOLE 150 MG/1
150 TABLET ORAL
Qty: 2 TABLET | Refills: 0 | Status: SHIPPED | OUTPATIENT
Start: 2022-01-12 | End: 2022-01-18

## 2022-01-12 ASSESSMENT — ENCOUNTER SYMPTOMS
VOMITING: 0
SHORTNESS OF BREATH: 0
CHEST TIGHTNESS: 0
NAUSEA: 0
WHEEZING: 0
COUGH: 0

## 2022-01-12 NOTE — PROGRESS NOTES
Subjective:      Patient ID: Gaston Rogers is a 63 y.o. female who presents today for:  Chief Complaint   Patient presents with    Vaginal Itching     vaginal burning x 1 week        HPI   Patient being seen today for vaginal complaints. Reports vaginal burning and itching with onset over a week. Symptoms associated with vulval redness and irritation. No vaginal discharge has been noted though slight bleeding has been observed on wiping. No fever or chills or urinary symptoms reported. Of note, patient is status post hysterectomy    Past Medical History:   Diagnosis Date    Abnormal EKG 3/23/2018    Angina pectoris syndrome ([facility] [address] 03/23/2018    Diverticulosis of colon     DMII (diabetes mellitus, type 2) ([facility] [address] 2000    Dr Earle Camargo hypertension 1/15/2016    Family history of coronary artery disease 1/15/2016    Fatty infiltration of liver 2020    Gastric polyp 2019    Dr Daniel Mueller, 5 mm    Generalized anxiety disorder     H/O: hysterectomy 2012    History of tobacco abuse 1/15/2016    Obesity (BMI 30-39. 9)     Other specified acquired hypothyroidism 2000    Precordial pain 03/23/2018    (? Prinzmetal?) Dr Jennifer Lopez S/P colonoscopy with polypectomy 2013, 2018, 2019    Dr Reba Donohue, Dr Daniel Mueller, tubulovillous adenoma, hyperplastic polyp    S/P vaginal hysterectomy 2012    benign    Sleep apnea, obstructive 2007    was on CPAP, not using it at present    Vertigo 2018     Past Surgical History:   Procedure Laterality Date    CARPAL TUNNEL RELEASE Left 1/13/2021    LEFT CARPAL TUNNEL RELEASE, LEFT MIDDLE FINGER TRIGGER RELEASE, TENOSYNOVECTOMY FDPFDS performed by Laura Lopez MD at [facility] Right 2/3/2021    RIGHT CARPAL TUNNEL RELEASE, TRIGGER FINGER RELEASE RIGHT MIDDLE FINGER, FDP, FDS, TENOSYNOVECTOMY performed by Laura Lopez MD at  [address] COLONOSCOPY N/A 6/30/2020    COLONOSCOPY DIAGNOSTIC performed by Abilio Vizcaino MD at [address] [facility] CATH LAB PROCEDURE      ENDOMETRIAL ABLATION  2002    metrorrhagia    ENDOSCOPY, COLON, DIAGNOSTIC      HYSTERECTOMY  9/7/12    fibroid, cervicitis, ovaries intact    ID COLON CA [address]CRN NOT  [address]  IND N/A 5/15/2018    COLONOSCOPY performed by Abilio Vizcaino MD at [address] [facility] ENDOSCOPY N/A 4/2/2019    EGD ESOPHAGOGASTRODUODENOSCOPY performed by Abilio Vizcaino MD at [facility]     Family History   Problem Relation Age of Onset    Arrhythmia Mother     Hypertension Mother     Dementia Mother     COPD Father         mesothelioma    Alcohol Abuse Father     Diabetes Paternal Grandmother     Diabetes Paternal Grandfather     Colon Cancer Paternal Grandfather     Schizophrenia Brother     Colon Cancer Paternal Uncle      Allergies   Allergen Reactions    Seasonal Itching     Current Outpatient Medications on File Prior to Visit   Medication Sig Dispense Refill    pravastatin (PRAVACHOL) 20 MG tablet TAKE ONE TABLET BY MOUTH EVERY EVENING 90 tablet 1    blood glucose test strips (AGAMATRIX JAZZ TEST) strip USE TO TEST BLOOD SUGAR ONE TIME DAILY 100 each 3    metoprolol tartrate (LOPRESSOR) 25 MG tablet Take 25 mg by mouth daily      ranolazine (RANEXA) 500 MG extended release tablet Take 1 tablet by mouth 2 times daily 180 tablet 1    Boswell[place]-Glucosamine-Vit D (OSTEO BI-FLEX ONE PER DAY PO) Take 1 tablet by mouth daily       levothyroxine (SYNTHROID) 50 MCG tablet TAKE 1 TABLET BY MOUTH ONE TIME A DAY 90 tablet 1    potassium chloride (KLOR-CON M) 20 MEQ extended release tablet Take 1 tablet by mouth 2 times daily 180 tablet 1    amLODIPine (NORVASC) 5 MG tablet TAKE 1 TABLET BY MOUTH ONE TIME A DAY 90 tablet 4    buPROPion (WELLBUTRIN XL) 300 MG extended release tablet TAKE ONE TABLET BY MOUTH EVERY MORNING 90 tablet 4    hydroCHLOROthiazide (HYDRODIURIL) 25 MG tablet TAKE 1 TABLET BY MOUTH ONE TIME A DAY IN THE MORNING 90 tablet 4    empagliflozin (JARDIANCE) 25 MG tablet Take 1 tablet by mouth daily 90 tablet 4    pantoprazole (PROTONIX) 20 MG tablet Take 1 tablet by mouth every morning (before breakfast) 90 tablet 4    acetaminophen (TYLENOL) 500 MG tablet Take 1,000 mg by mouth every 6 hours as needed for Pain      AgaMatrix Ultra-Thin Lancets MISC Use to test blood sugar 1 time daily 100 each 3    nitroGLYCERIN (NITROSTAT) 0.4 MG SL tablet Place 1 tablet under the tongue every 5 minutes as needed for Chest pain Indications: never used up to max of 3 total doses. If no relief after 1 dose, call 911. 20 tablet 3    Blood Glucose Calibration ([employer] CONTROL) SOLN Use as directed for control solution test 1 each 0    Cholecalciferol (VITAMIN D) 2000 UNITS CAPS capsule Take 1 capsule by mouth daily       loratadine (CLARITIN) 10 MG tablet Take 10 mg by mouth daily as needed (Allergies)       Blood Glucose Monitoring Suppl (Reflex 2) w/Device KIT 1 each by Does not apply route once for 1 dose 1 kit 0     No current facility-administered medications on file prior to visit. Review of Systems   Constitutional: Negative for activity change, chills, diaphoresis, fatigue and fever. Respiratory: Negative for cough, chest tightness, shortness of breath and wheezing. Cardiovascular: Negative for chest pain, palpitations and leg swelling. Gastrointestinal: Negative for nausea and vomiting. Genitourinary: Negative for dysuria, frequency, hematuria and vaginal discharge. Neurological: Negative for dizziness, syncope, light-headedness and headaches. Objective: There were no vitals taken for this visit. Physical Exam  Constitutional:       General: She is not in acute distress. Appearance: She is not toxic-appearing. Neurological:      Mental Status: She is alert and oriented to person, place, and time. face (virtual) with the patient discussing the diagnosis and importance of compliance with the treatment plan as well as documenting on the day of the visit. Idalmis Ibarra was evaluated through a synchronous (real-time) audio-video encounter. The patient (or guardian if applicable) is aware that this is a billable service. Verbal consent to proceed has been obtained within the past 12 months. The visit was conducted pursuant to the emergency declaration under the [address] [place] [address] and the [employer] and [employer] General Act. Patient identification was verified, and a caregiver was present when appropriate. The patient was located in a state where the provider was credentialed to provide care.     Jo Casillas MD

## 2022-01-14 ENCOUNTER — OFFICE VISIT (OUTPATIENT)
Dept: BARIATRICS/WEIGHT MGMT | Age: 62
End: 2022-01-14
Payer: COMMERCIAL

## 2022-01-14 ENCOUNTER — OFFICE VISIT (OUTPATIENT)
Dept: BEHAVIORAL/MENTAL HEALTH CLINIC | Age: 62
End: 2022-01-14
Payer: COMMERCIAL

## 2022-01-14 VITALS
HEIGHT: 65 IN | SYSTOLIC BLOOD PRESSURE: 141 MMHG | WEIGHT: 207 LBS | DIASTOLIC BLOOD PRESSURE: 81 MMHG | HEART RATE: 81 BPM | BODY MASS INDEX: 34.49 KG/M2

## 2022-01-14 DIAGNOSIS — E11.69 DIABETES MELLITUS TYPE 2 IN OBESE (HCC): Primary | ICD-10-CM

## 2022-01-14 DIAGNOSIS — E66.9 OBESITY (BMI 30-39.9): Primary | ICD-10-CM

## 2022-01-14 DIAGNOSIS — K75.81 NASH (NONALCOHOLIC STEATOHEPATITIS): ICD-10-CM

## 2022-01-14 DIAGNOSIS — E66.9 DIABETES MELLITUS TYPE 2 IN OBESE (HCC): Primary | ICD-10-CM

## 2022-01-14 DIAGNOSIS — E66.9 OBESITY (BMI 30-39.9): ICD-10-CM

## 2022-01-14 DIAGNOSIS — F41.1 GENERALIZED ANXIETY DISORDER: ICD-10-CM

## 2022-01-14 DIAGNOSIS — G47.33 SLEEP APNEA, OBSTRUCTIVE: ICD-10-CM

## 2022-01-14 DIAGNOSIS — I10 ESSENTIAL HYPERTENSION: ICD-10-CM

## 2022-01-14 PROCEDURE — 96130 PSYCL TST EVAL PHYS/QHP 1ST: CPT | Performed by: PSYCHOLOGIST

## 2022-01-14 PROCEDURE — 96131 PSYCL TST EVAL PHYS/QHP EA: CPT | Performed by: PSYCHOLOGIST

## 2022-01-14 PROCEDURE — 90791 PSYCH DIAGNOSTIC EVALUATION: CPT | Performed by: PSYCHOLOGIST

## 2022-01-14 PROCEDURE — 96136 PSYCL/NRPSYC TST PHY/QHP 1ST: CPT | Performed by: PSYCHOLOGIST

## 2022-01-14 PROCEDURE — 96137 PSYCL/NRPSYC TST PHY/QHP EA: CPT | Performed by: PSYCHOLOGIST

## 2022-01-14 PROCEDURE — 99213 OFFICE O/P EST LOW 20 MIN: CPT | Performed by: NURSE PRACTITIONER

## 2022-01-14 ASSESSMENT — PATIENT HEALTH QUESTIONNAIRE - PHQ9
SUM OF ALL RESPONSES TO PHQ QUESTIONS 1-9: 1
SUM OF ALL RESPONSES TO PHQ9 QUESTIONS 1 & 2: 0
9. THOUGHTS THAT YOU WOULD BE BETTER OFF DEAD, OR OF HURTING YOURSELF: 0
4. FEELING TIRED OR HAVING LITTLE ENERGY: 0
SUM OF ALL RESPONSES TO PHQ QUESTIONS 1-9: 1
SUM OF ALL RESPONSES TO PHQ QUESTIONS 1-9: 1
5. POOR APPETITE OR OVEREATING: 1
2. FEELING DOWN, DEPRESSED OR HOPELESS: 0
1. LITTLE INTEREST OR PLEASURE IN DOING THINGS: 0
SUM OF ALL RESPONSES TO PHQ QUESTIONS 1-9: 1
8. MOVING OR SPEAKING SO SLOWLY THAT OTHER PEOPLE COULD HAVE NOTICED. OR THE OPPOSITE, BEING SO FIGETY OR RESTLESS THAT YOU HAVE BEEN MOVING AROUND A LOT MORE THAN USUAL: 0
7. TROUBLE CONCENTRATING ON THINGS, SUCH AS READING THE NEWSPAPER OR WATCHING TELEVISION: 0
3. TROUBLE FALLING OR STAYING ASLEEP: 0
6. FEELING BAD ABOUT YOURSELF - OR THAT YOU ARE A FAILURE OR HAVE LET YOURSELF OR YOUR FAMILY DOWN: 0

## 2022-01-14 ASSESSMENT — ANXIETY QUESTIONNAIRES
2. NOT BEING ABLE TO STOP OR CONTROL WORRYING: 0
7. FEELING AFRAID AS IF SOMETHING AWFUL MIGHT HAPPEN: 0
GAD7 TOTAL SCORE: 3
1. FEELING NERVOUS, ANXIOUS, OR ON EDGE: 1
4. TROUBLE RELAXING: 0
6. BECOMING EASILY ANNOYED OR IRRITABLE: 1
3. WORRYING TOO MUCH ABOUT DIFFERENT THINGS: 1
5. BEING SO RESTLESS THAT IT IS HARD TO SIT STILL: 0

## 2022-01-14 ASSESSMENT — LIFESTYLE VARIABLES: HOW OFTEN DO YOU HAVE A DRINK CONTAINING ALCOHOL: 0

## 2022-01-14 NOTE — PROGRESS NOTES
Medical Nutrition Therapy   Metabolic and Bariatric Surgery         Supervised diet and exercise preparation  Visit 2 out of 3  Pt reports:      Changes in eating patterns to promote health are noted below on the goals number 19-22    Vitals: Wt Readings from Last 3 Encounters:   01/14/22 207 lb (93.9 kg)   12/15/21 209 lb (94.8 kg)   11/19/21 216 lb 9.6 oz (98.2 kg)         Nutrition Assessment:   PES: Knowledge deficit related to healthy behaviors that support weight management post weight loss surgery as evidenced by Body mass index is 34.98 kg/m². Nutrition Assessment of Goal Attainment:  TREATMENT GOALS:    1. Pt  Completed 4 out of 4 goals. 2.TREATMENT GOALS FOR UPCOMING WEEK: continue all previous goals and add: # 09,62,49,15    All goals were planned with and agreed on by the patient. appt # NA G What is your next step? C 1 2 3 4 5 6 7 8 9     0  1 I will read the education binder provided to me and the   x 100              x 2 I will make my pschological evaluation appoinment. 2  3 I will bring this goal card to every appointment. 100 100             x 4 I will eliminate all tobacco/nicotine. x 5 I will limit alcoholic beverages to 3-7CE per week. 1  6 I will limit dining out to 3 times per week or less. 100 75             x 7 I will eliminate sugary beverages. x 8 I will eliminate carbonated beverages. 1  9 I will eliminate drinking with a straw. x  100              10 I will limit caffeinated beverages to 16oz daily. 2  11 I will limit log my exercise daily. 100 100            2  12 I will determine my calcium and mvi plan. 2  13 I will have 1-2 servings of lean protein present at each meal and minimeal.                x 14 I will eat every 3-5 hours. 15 I will drink 64oz of fluid daily. 2  16 I will eat slowly during meals and snacks.                  17 I will limit fluids 4oz before after and during meals. 2  18 I will eat protein first at all meals followed by vegetables,  Fruit and lastly whole grains. 23 My first weight neutral approach is:                 20 My second weight neutral approach is:                 21  My Thirds weight neutral approach is:                 22                    Questions asked for goal understanding:                                                  Do you understand your goals? Do you have the information you need to achieve your goals? Do you have any questions  right now? [x]  Consistent goal achievement in the program thus far and further success with goals is expected. []  Unable to consistently make progress in goal achievement. At this time patient is not moving forward  in developing the skills needed for success after surgery. Plan:    Continue to follow monthly and review goals.          [x]  Nutrition visits complete    []

## 2022-01-14 NOTE — PROGRESS NOTES
Medical Weight Management Progress Note    Subjective      Patient being seen for medically supervised weight loss for the chronic conditions of DM Type 2, HTN, DONNIE, Anxiety, Fatty Liver, Hypothyroidism, Angina, Hx Tobacco Use. She is working on the behavior changes discussed at the initial appointment. Patient continues on diet plan. Physical activity includes walking. Weight today is 209 lbs. Diabetes regimen includes jardiance. Last A1C 7.9%. Quit smoking 6 years ago. Psych eval done today. EGD done on 4/2/19 revealed gastric polyp. No H Pylori testing found on report. H Pylori breath test performed on 12/7/21 was negative. Cardiac clearance received. Uses CPAP. No current issues. Working toward bariatric surgery:    [] Sleeve Gastrectomy                                                           [x] Rebecca-en-Y Gastric Bypass    Allergies: Allergies   Allergen Reactions    Seasonal Itching       Past Medical History:     Past Medical History:   Diagnosis Date    Abnormal EKG 3/23/2018    Angina pectoris syndrome ([address] 03/23/2018    Diverticulosis of colon     DMII (diabetes mellitus, type 2) ([facility] [address] 2000    Dr Artem Boston hypertension 1/15/2016    Family history of coronary artery disease 1/15/2016    Fatty infiltration of liver 2020    Gastric polyp 2019    Dr Elier Mcelroy, 5 mm    Generalized anxiety disorder     H/O: hysterectomy 2012    History of tobacco abuse 1/15/2016    Obesity (BMI 30-39. 9)     Other specified acquired hypothyroidism 2000    Precordial pain 03/23/2018    (? Prinzmetal?) Dr Rivera Corral S/P colonoscopy with polypectomy 2013, 2018, 2019    Dr Ramon Christensen, Dr Elier Mcelroy, tubulovillous adenoma, hyperplastic polyp    S/P vaginal hysterectomy 2012    benign    Sleep apnea, obstructive 2007    was on CPAP, not using it at present   [place] Vertigo 2018   .     Past Surgical History:  Past Surgical History:   Procedure Laterality Date    CARPAL TUNNEL RELEASE Left 1/13/2021 LEFT CARPAL TUNNEL RELEASE, LEFT MIDDLE FINGER TRIGGER RELEASE, TENOSYNOVECTOMY FDPFDS performed by Scott Anderson MD at [address] Right 2/3/2021    RIGHT CARPAL TUNNEL RELEASE, TRIGGER FINGER RELEASE RIGHT MIDDLE FINGER, FDP, FDS, TENOSYNOVECTOMY performed by Scott Anderson MD at [address], [facility] COLONOSCOPY N/A 2020    COLONOSCOPY DIAGNOSTIC performed by Joseph Montoya MD at [facility] CATH LAB PROCEDURE      ENDOMETRIAL ABLATION      metrorrhagia    ENDOSCOPY, COLON, DIAGNOSTIC      HYSTERECTOMY  12    fibroid, cervicitis, ovaries intact    GA COLON CA SCRN NOT  [address] IND N/A 5/15/2018    COLONOSCOPY performed by Joseph Montoya MD at [facility]    UPPER GASTROINTESTINAL ENDOSCOPY N/A 2019    EGD ESOPHAGOGASTRODUODENOSCOPY performed by Joseph Montoya MD at [facility]       Family History:  Family History   Problem Relation Age of Onset    Arrhythmia Mother     Hypertension Mother     Dementia Mother     COPD Father         mesothelioma    Alcohol Abuse Father     Diabetes Paternal Grandmother     Diabetes Paternal Grandfather     Colon Cancer Paternal Grandfather     Schizophrenia Brother     Colon Cancer Paternal Uncle        Social History:  Social History     Socioeconomic History    Marital status:      Spouse name: Not on file    Number of children: 3    Years of education: Not on file    Highest education level: Not on file   Occupational History    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[address] , RN     Employer: [employer]   Tobacco Use    Smoking status: Former Smoker     Packs/day: 1.00     Years: 30.00     Pack years: 30.00     Types: Cigarettes     Start date: 3/5/1975     Quit date: 2005     Years since quittin.6    Smokeless tobacco: Never Used    Tobacco comment: 3/15/18 started age 17   Vaping Use    Vaping Use: Never used   Substance and Sexual Activity    Alcohol use: No     Comment: not at all     Drug use: No    Sexual activity: Not on file   Other Topics Concern    Not on file   Social History Narrative    Born in [place], one of 4 children (2 boys and 2 girls), both parents in the [employer], moved to [place]    Mother in [place], has memory problems    Religious Judaism     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    Has 2 dogs, Blossom and Maximiano Hammed    Children 3, 2 sons in the area, one daughter in [address]: dogs, sawing, machine embroidery, baking     Social Determinants of Health     Financial Resource Strain: Low Risk     Difficulty of Paying Living Expenses: Not hard at all   Food Insecurity: No Food Insecurity    Worried About [facility] in the Last Year: Never true    [address] in the Last Year: Never true   Transportation Needs:     Lack of Transportation (Medical): Not on file    Lack of Transportation (Non-Medical):  Not on file   Physical Activity:     Days of Exercise per Week: Not on file    Minutes of Exercise per Session: Not on file   Stress:     Feeling of Stress : Not on file   Social Connections:     Frequency of Communication with Friends and Family: Not on file    Frequency of Social Gatherings with Friends and Family: Not on file    Attends Taoist Services: Not on file    Active Member of [address] [place] [address] or Organizations: Not on file    Attends Club or Organization Meetings: Not on file    Marital Status: Not on file   Intimate Partner Violence:     Fear of Current or Ex-Partner: Not on file    Emotionally Abused: Not on file    Physically Abused: Not on file    Sexually Abused: Not on file   Housing Stability:     Unable to Pay for Housing in the Last Year: Not on file    Number of [place] in the Last Year: Not on file    Unstable Housing in the Last Year: Not on file Current Medications:  Current Outpatient Medications   Medication Sig Dispense Refill    fluconazole (DIFLUCAN) 150 MG tablet Take 1 tablet by mouth every 72 hours for 6 days 2 tablet 0    pravastatin (PRAVACHOL) 20 MG tablet TAKE ONE TABLET BY MOUTH EVERY EVENING 90 tablet 1    blood glucose test strips (AGAMATRIX JAZZ TEST) strip USE TO TEST BLOOD SUGAR ONE TIME DAILY 100 each 3    metoprolol tartrate (LOPRESSOR) 25 MG tablet Take 25 mg by mouth daily      ranolazine (RANEXA) 500 MG extended release tablet Take 1 tablet by mouth 2 times daily 180 tablet 1    Boswellia-Glucosamine-Vit D (OSTEO BI-FLEX ONE PER DAY PO) Take 1 tablet by mouth daily       levothyroxine (SYNTHROID) 50 MCG tablet TAKE 1 TABLET BY MOUTH ONE TIME A DAY 90 tablet 1    potassium chloride (KLOR-CON M) 20 MEQ extended release tablet Take 1 tablet by mouth 2 times daily 180 tablet 1    amLODIPine (NORVASC) 5 MG tablet TAKE 1 TABLET BY MOUTH ONE TIME A DAY 90 tablet 4    buPROPion (WELLBUTRIN XL) 300 MG extended release tablet TAKE ONE TABLET BY MOUTH EVERY MORNING 90 tablet 4    hydroCHLOROthiazide (HYDRODIURIL) 25 MG tablet TAKE 1 TABLET BY MOUTH ONE TIME A DAY IN THE MORNING 90 tablet 4    empagliflozin (JARDIANCE) 25 MG tablet Take 1 tablet by mouth daily 90 tablet 4    pantoprazole (PROTONIX) 20 MG tablet Take 1 tablet by mouth every morning (before breakfast) 90 tablet 4    acetaminophen (TYLENOL) 500 MG tablet Take 1,000 mg by mouth every 6 hours as needed for Pain      AgaMatrix Ultra-Thin Lancets MISC Use to test blood sugar 1 time daily 100 each 3    nitroGLYCERIN (NITROSTAT) 0.4 MG SL tablet Place 1 tablet under the tongue every 5 minutes as needed for Chest pain Indications: never used up to max of 3 total doses.  If no relief after 1 dose, call 911. 20 tablet 3    Blood Glucose Calibration (AGAMATRIX CONTROL) SOLN Use as directed for control solution test 1 each 0    Cholecalciferol (VITAMIN D) 2000 UNITS CAPS capsule Take 1 capsule by mouth daily       loratadine (CLARITIN) 10 MG tablet Take 10 mg by mouth daily as needed (Allergies)       Blood Glucose Monitoring Suppl ([place] 2) w/Device KIT 1 each by Does not apply route once for 1 dose 1 kit 0     No current facility-administered medications for this visit. Vital Signs:  BP (!) 141/81 (Site: Right Upper Arm, Position: Sitting, Cuff Size: Large Adult)   Pulse 81   Ht 5' 4.5\" (1.638 m)   Wt 207 lb (93.9 kg)   BMI 34.98 kg/m²     BMI/Height/Weight:  Body mass index is 34.98 kg/m². Review of Systems - A review of systems was performed. All was negative unless otherwise documented in HPI. Constitutional: Negative for fever, chills and diaphoresis. HENT: Negative for hearing loss and trouble swallowing. Eyes: Negative for photophobia and visual disturbance. Respiratory: Negative for cough, shortness of breath and wheezing. Cardiovascular: Negative for chest pain and palpitations. Gastrointestinal: Negative for nausea, vomiting, abdominal pain, diarrhea, constipation, blood in stool and abdominal distention. Endocrine: Negative for polydipsia, polyphagia and polyuria. Genitourinary: Negative for dysuria, frequency, hematuria and difficulty urinating. Musculoskeletal: Negative for myalgias, joint swelling. Skin: Negative for pallor and rash. Neurological: Negative for dizziness, tremors, light-headedness and headaches. Psychiatric/Behavioral: Negative for sleep disturbance and dysphoric mood. Objective:      Physical Exam   Vital signs reviewed. General: Well-developed and well-nourished. No acute distress. Skin: Warm, dry and intact. HEENT: Normocephalic. EOMs intact. Conjunctivae normal. Neck supple. Cardiovascular: Normal rate, regular rhythm. Pulmonary/Chest: Normal effort. Lungs clear to auscultation. No rales, rhonchi or wheezing. Abdominal: Positive bowel sounds. Soft, nontender. Nondistended. Musculoskeletal: Movement x4. No edema. Neurological: Gait normal. Alert and oriented to person, place, and time. Psychiatric: Normal mood and affect. Speech and behavior normal. Judgment and thought content normal. Cognition and memory intact. Assessment:       Diagnosis Orders   1. Diabetes mellitus type 2 in obese ([address]     2. Essential hypertension     3. Sleep apnea, obstructive     4. BELTRAN (nonalcoholic steatohepatitis)     5. Obesity (BMI 30-39. 9)         Plan:    Dietitian visit today. Patient was encouraged to journal all food intake. Keep calorie level at approximately 7431-0050. Protein intake is to be a minimum of 60-80 grams per day. Water drinking was encouraged with a goal of 64oz-128oz daily. Beverages to be calorie free except for milk. Every other beverage should be water. Avoid soda. Continue to increase level of physical activity. Encouraged use of exercise log. Follow-up  Return in about 1 month (around 2/14/2022). Orders this encounter:  No orders of the defined types were placed in this encounter. Prescriptions this encounter:  No orders of the defined types were placed in this encounter.       Electronically signed by:  Ana Lutz CNP

## 2022-01-14 NOTE — PROGRESS NOTES
Bariatric Pre-Surgical Psychology Initial Evaluation  Lluvia Saab M.A. Psychology Doctoral Trainee    Supervised by Brittaney Bennett, Ph.D.  Licensed Psychologist ()      Visit Date: 1/14/2022   Time of appointment:  8:00- 11:00  Time spent with patient: 180  Time spent writing report: 120  Interactive feedback provided on: 1/18/22    Breakdown of charges:   62810- first hour assessment  03158 - half hour assessment   01621 - three additional units of assessment of a half hour  55117 - first hour report writing   08145 - One additional unit of report writing and interpretive feedback of one hour    Pt provided informed consent for the behavioral health evaluation. Discussed with patient the limits of confidentiality (i.e. abuse reporting, suicide intervention, etc.) and purpose of evaluation. Pt indicated understanding. Identifying Data and Reason for Referral:  Mable Rivera is a 63 y.o. female, who was seen for a pre-surgical psychological evaluation. As part of this evaluation, a clinical interview was conducted. 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 Her highest reported weight has been 230 lbs, which was her weight at age 63. Her lowest reported weight has been 109 lbs. , which was her/his weight at age 27. Mabel Rivera described several dieting and weight loss attempts, including the Atkins diet, counting calories, the Zone diet, and using a phone krista to track meals. .      Overview of Current Eating and Exercise Patterns:   Mabel Rivera reported that she usually eats 3-4 meals per day and 1-2 snacks.    There was No evidence of binging. There was Some evidence of significant emotional eating. Fredis Velez indicated that her eating difficulties are related to preferring sweets and carbohydrates and not getting enough protein. She has identified that fast food and food that is convenient are some of the biggest barriers to changing her eating behaviors. Wilma Trent reported that she engages in some exercise, which includes walking and taking the stairs. She used to go to the gym regularly, but chronic knee pain and a recent ankle injury prevent her from engaging in a higher level of exercise. Knowledge of Bariatric Surgery  Fredis Velez was able to adequately describe the Gastric Bypass (Rebecca-en-Y) procedure as well as the modifications She will need to make after the surgery with respect to the amount, frequency and types of food. She was able to evidence realistic goals and also able to recognize that Her efforts will help create a good outcome. She was able to identify that there are individual differences in the response and refeeding process. She appeared to have Good knowledge of the risks and benefits of bariatric surgery and was able to identify risks of surgery which include infection, poor outcomes, and relapse. Motivation for Weight Loss  Fredis Velez reported that her/his motivation for Gastric Bypass (Rebecca-en-Y) bariatric surgery is to improve her overall health. She specifically identifies her knees, ankles, liver, and diabetes as chronic health issues that can be improved through successful bariatric surgery. She also identifies her , children, and grandchildren as important motivators and reasons to improve her health. Social History:         Living Arrangement: Fredis Velez currently lives with her , 66-year-old son, and mother in a single family home. They also have two Yorkies. Work/Education: Fredis Velez did not complete high school but completed her GED. She went on to complete a bachelor's degree in nursing.  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 Social Support: She reported that She has significant social support on which to rely during recovery from surgery. Childhood/Family: Lucia Mcgovern described a difficult family history. Her parents  when she was 17 after a turbulent marriage. She had her children very young and has been  to her  for over 27 years. Relevant Psychiatric History:        Lucia Mcgovern has struggled with worry and perfectionism since she was young. Currently these symptoms are well-controlled through therapy and medication. She can remember periods of low mood which coincide with periods of high stress, but it does not appear any of these periods meet criteria for a depressive episode. Current Psychological Symptoms:  Lucia Mcgovern currently occassionally experiences feeling nervous, anxious, or on edge, worrying too much about different things, and becoming easily annoyed and irritable. She identifies irritability as the current worst symptom. However, she feels these symptoms are generally well controlled at this time. Relevant Medical History:  Lucia Mcgovern has had several medical procedures. She had a tonsillectomy at age 13, a tubo ligation in 1984, her gall bladder removed in 1994, two uterine ablations in 2002, and a partial hysterectomy in 2007. Substance Use:  TOBACCO:  She reports that she quit smoking about 16 years ago. Her smoking use included cigarettes. She started smoking about 46 years ago. She has a 30.00 pack-year smoking history. She has never used smokeless tobacco.  ALCOHOL:  She reports no history of alcohol use. OTHER SUBSTANCES: She reports no history of drug use.      Family History:  Her family history includes Alcohol Abuse in her father; Arrhythmia in her mother; COPD in her father; Colon Cancer in her paternal grandfather and paternal uncle; Dementia in her mother; Diabetes in her paternal grandfather and paternal grandmother; Hypertension in her mother; Schizophrenia in her brother. Mental Status:  Mood was within normal limits with congruent affect. Suicidal ideation was denied. Homicidal ideation was denied. Hygiene was good . Dress was neat. Behavior was Within Normal Limits with No observation or self-report of difficulties ambulating. Attitude was Cooperative and Friendly. Eye-contact was good. Speech: rate - WNL, rhythm -  WNL, volume - WNL  Verbalizations were goal directed and coherent. Thought processes were intact and goal-oriented without evidence of delusions, hallucinations, obsessions, or anitha. Associations were characterized by intact cognitive processes. Pt was oriented to person, place, time, and general circumstances;  recent:  good and remote:  good. Insight and judgment were estimated to be good, AEB, a good  understanding of cyclical maladaptive patterns, and the ability to use insight to inform behavior change. Psychological Testing Results: The MMPI-3 is a measure of personality and psychiatric symptoms. It was examined for validity, and the profile indicates a valid and interpretable pattern of responding. There were no indications of over- or under-reporting. This testing is considered a valid assessment of his/her personality and psychological symptoms. The prole suggests Lucia Mcgovern is emotionally healthy and well-adjusted with an optimistic outlook on life. She reports below-average impulsive behaviors and may be overly trusting, but this may also result from having many positive experiences, healthy relationships, and a strong family connection. Lucia Mcgovern was also administered the PHQ-9 and KIRILL-7 to measure depression and anxiety. She obtained a score of 1 on the PHQ-9, indicating she does nnot experience symptoms of depression and a score of 3 on the KIIRLL-7, indicating a low level of anxiety symptoms.   The AUDIT-C was also completed to assess her potential for an alcohol use disorder. She obtained a total score of 0 on this measure, which reflects her abstinence from alcohol. Amaris Dial was also administered the Eating Disorder Examination Questionnaire (KATINA-Q). Her scores indicate a strong desire to lose weight in a healthy way. Clinical Impression Summary:  Amaris Dial is a 63 y.o. female referred for a pre-surgical psychological evaluation to identify psychosocial issues that may complicate outcomes and provide recommendations for how to improve these potential problems. Overall, the patient presented as cooperative and motivated to participate in the evaluation process. Based upon clinical interview, behavioral observations, medical records review, and testing data, Amaris Dial 's psychological suitability for bariatric surgery was predicted to be excellent. She is strongly motivated to lose weight. She also has good family support to help her access additional treatment if needed. She is currently psychologically healthy. She has shown the ability to modify her diet in the past few months, as well as maintain an exercise program and make good nutritional choices, which speaks well to her ability for behavior change. She has also demonstrated the ability to make very difficult behavior change AEB quitting smoking. She is cognitively capable of understanding and complying with the dietary restrictions involved following her surgery. Diagnosis:  Amaris Dial was seen today for weight loss and anxiety. Diagnoses and all orders for this visit:    Obesity (BMI 30-39. 9)    Generalized anxiety disorder        Recommendations:    From a psychological perspective, Amaris Dial presents as a good candidate for bariatric surgery at this time. Outcomes could be improved if the following recommendations are adhered to:    1. Following surgery, she may benefit from supportive therapy to help her cope with the dietary restrictions required of her/him.   A group support model is recommended, if available. If Tristen Barboza is more comfortable in a solo setting, she is welcome to see [employer] as well. 2.  She would likely benefit from a nutrition consult as well to help guide her toward the best food choices. Tristen Barboza has had success in the past with external motivators such as worksheets and apps. If possible, incorporating these techniques into her current weight and exercise goals may provide enhanced outcomes. 3. Tristen Barboza has found effective methods of managing her symptoms of anxiety. We recommend continuing with her regimen with an eye toward increased stress management required post-surgery.     Electronically signed by Weston Teresa on 1/14/22 at 10:12 AM EST

## 2022-02-03 NOTE — TELEPHONE ENCOUNTER
Patient requesting medication refill.  Please approve or deny this request.    Rx requested:  Requested Prescriptions     Pending Prescriptions Disp Refills    metoprolol tartrate (LOPRESSOR) 25 MG tablet 60 tablet 5     Sig: Take 1 tablet by mouth daily         Last Office Visit:   6/29/2021      Next Visit Date:  Future Appointments   Date Time Provider Elizabeth Brock   2/7/2022  9:15 AM [employer] [facility]   2/8/2022 11:00 AM ELISEO Pineda CNP Weight Mgmt [facility]   5/13/2022  9:30 AM Shivam Parks [facility]

## 2022-02-07 ENCOUNTER — OFFICE VISIT (OUTPATIENT)
Dept: FAMILY MEDICINE CLINIC | Age: 62
End: 2022-02-07
Payer: COMMERCIAL

## 2022-02-07 VITALS
OXYGEN SATURATION: 98 % | WEIGHT: 207 LBS | SYSTOLIC BLOOD PRESSURE: 130 MMHG | RESPIRATION RATE: 16 BRPM | BODY MASS INDEX: 34.49 KG/M2 | TEMPERATURE: 97.4 F | HEART RATE: 72 BPM | HEIGHT: 65 IN | DIASTOLIC BLOOD PRESSURE: 68 MMHG

## 2022-02-07 DIAGNOSIS — I10 ESSENTIAL HYPERTENSION: ICD-10-CM

## 2022-02-07 DIAGNOSIS — E87.6 HYPOKALEMIA: ICD-10-CM

## 2022-02-07 DIAGNOSIS — E66.9 OBESITY (BMI 30-39.9): ICD-10-CM

## 2022-02-07 DIAGNOSIS — E11.65 UNCONTROLLED TYPE 2 DIABETES MELLITUS WITH HYPERGLYCEMIA (HCC): ICD-10-CM

## 2022-02-07 DIAGNOSIS — Z12.31 ENCOUNTER FOR SCREENING MAMMOGRAM FOR MALIGNANT NEOPLASM OF BREAST: ICD-10-CM

## 2022-02-07 DIAGNOSIS — E03.9 HYPOTHYROIDISM, UNSPECIFIED TYPE: ICD-10-CM

## 2022-02-07 DIAGNOSIS — K76.0 FATTY INFILTRATION OF LIVER: ICD-10-CM

## 2022-02-07 DIAGNOSIS — I20.9 ANGINA PECTORIS SYNDROME (HCC): ICD-10-CM

## 2022-02-07 DIAGNOSIS — K75.81 NASH (NONALCOHOLIC STEATOHEPATITIS): ICD-10-CM

## 2022-02-07 DIAGNOSIS — R07.89 NON-CARDIAC CHEST PAIN: ICD-10-CM

## 2022-02-07 DIAGNOSIS — E11.65 UNCONTROLLED TYPE 2 DIABETES MELLITUS WITH HYPERGLYCEMIA (HCC): Primary | ICD-10-CM

## 2022-02-07 PROBLEM — E11.69 DIABETES MELLITUS TYPE 2 IN OBESE (HCC): Status: RESOLVED | Noted: 2018-03-23 | Resolved: 2022-02-07

## 2022-02-07 PROBLEM — G56.03 BILATERAL CARPAL TUNNEL SYNDROME: Status: RESOLVED | Noted: 2021-01-06 | Resolved: 2022-02-07

## 2022-02-07 PROBLEM — M65.352 TRIGGER LITTLE FINGER OF LEFT HAND: Status: RESOLVED | Noted: 2021-05-12 | Resolved: 2022-02-07

## 2022-02-07 PROBLEM — R30.0 DYSURIA: Status: RESOLVED | Noted: 2021-06-29 | Resolved: 2022-02-07

## 2022-02-07 PROBLEM — R29.898 WEAKNESS OF BOTH HANDS: Status: RESOLVED | Noted: 2020-11-29 | Resolved: 2022-02-07

## 2022-02-07 PROBLEM — M65.331 ACQUIRED TRIGGER FINGER OF RIGHT MIDDLE FINGER: Status: RESOLVED | Noted: 2021-01-06 | Resolved: 2022-02-07

## 2022-02-07 LAB
ANION GAP SERPL CALCULATED.3IONS-SCNC: 14 MEQ/L (ref 9–15)
BUN BLDV-MCNC: 16 MG/DL (ref 6–20)
CALCIUM SERPL-MCNC: 9.5 MG/DL (ref 8.5–9.9)
CHLORIDE BLD-SCNC: 104 MEQ/L (ref 95–107)
CO2: 26 MEQ/L (ref 20–31)
CREAT SERPL-MCNC: 1.08 MG/DL (ref 0.5–0.9)
GFR AFRICAN AMERICAN: >60
GFR NON-AFRICAN AMERICAN: 51.9
GLUCOSE BLD-MCNC: 128 MG/DL (ref 70–99)
POTASSIUM SERPL-SCNC: 3.6 MEQ/L (ref 3.4–4.9)
SODIUM BLD-SCNC: 144 MEQ/L (ref 135–144)

## 2022-02-07 PROCEDURE — 99214 OFFICE O/P EST MOD 30 MIN: CPT | Performed by: PHYSICIAN ASSISTANT

## 2022-02-07 ASSESSMENT — ENCOUNTER SYMPTOMS
ABDOMINAL PAIN: 0
NAUSEA: 0
WHEEZING: 0
ABDOMINAL DISTENTION: 0
SHORTNESS OF BREATH: 0
DIARRHEA: 0
COLOR CHANGE: 0
BACK PAIN: 1
BLOOD IN STOOL: 0
CHEST TIGHTNESS: 0
TROUBLE SWALLOWING: 0
CONSTIPATION: 0

## 2022-02-07 NOTE — PROGRESS NOTES
Subjective  [place] Floor, 63 y.o. female presents today with:  Chief Complaint   Patient presents with    Diabetes     follow up      HPI  In the office today for follow up. Last OV with me: 6/29/21. Obesity/BELTRAN. Pursing bariatric surgery through team in [facility]. Has visit tomorrow. Plans on pursing the braydon-en-y bypass. Would like to do it for overall health. Trying to walk for exercise. Hypokalemia. On bid replacement therapy. Needs lab rechecked. Type 2 diabetes. Currently taking jardiance 25 mg daily. Did have an episode of vaginal yeast infection--this did resolve. Using vaginal wipes to help with cleaning. Last HgbA1c: 7.9%--improved. Atypical CP/HTN. Compliant with her medications. Followed by cardiology. Has been out of metoprolol--has noticed increase in frequency of MENA/pain. CP is R sided with infrequent radiation to RUE. Hypothyroidism. On synthroid replacement. Due for labs. Review of Systems   Constitutional: Negative for activity change, appetite change, chills, diaphoresis, fatigue, fever and unexpected weight change. HENT: Negative for congestion, nosebleeds, postnasal drip and trouble swallowing. Respiratory: Negative for chest tightness, shortness of breath and wheezing. Cardiovascular: Positive for chest pain. Negative for palpitations and leg swelling. Gastrointestinal: Negative for abdominal distention, abdominal pain, blood in stool, constipation, diarrhea and nausea. Genitourinary: Negative for difficulty urinating, dysuria, urgency, vaginal discharge and vaginal pain. Musculoskeletal: Positive for arthralgias and back pain. Negative for gait problem, joint swelling and myalgias. Skin: Negative for color change and rash. Neurological: Negative for dizziness, weakness, light-headedness, numbness and headaches. Psychiatric/Behavioral: Negative for dysphoric mood and sleep disturbance.  The patient is not nervous/anxious. Past Medical History:   Diagnosis Date    Abnormal EKG 3/23/2018    Angina pectoris syndrome ([facility] [address] 03/23/2018    Diverticulosis of colon     DMII (diabetes mellitus, type 2) ([facility] [address] 2000    Dr Denver Quivers hypertension 1/15/2016    Family history of coronary artery disease 1/15/2016    Fatty infiltration of liver 2020    Gastric polyp 2019    Dr Barney Rodriguez, 5 mm    Generalized anxiety disorder     H/O: hysterectomy 2012    History of tobacco abuse 1/15/2016    Obesity (BMI 30-39. 9)     Other specified acquired hypothyroidism 2000    Precordial pain 03/23/2018    (? Prinzmetal?) Dr Tiffanie Hui S/P colonoscopy with polypectomy 2013, 2018, 2019    Dr Gilford Fetter, Dr Barney Rodriguez, tubulovillous adenoma, hyperplastic polyp    S/P vaginal hysterectomy 2012    benign    Sleep apnea, obstructive 2007    was on CPAP, not using it at present    Vertigo 2018     Past Surgical History:   Procedure Laterality Date    CARPAL TUNNEL RELEASE Left 1/13/2021    LEFT CARPAL TUNNEL RELEASE, LEFT MIDDLE FINGER TRIGGER RELEASE, TENOSYNOVECTOMY FDPFDS performed by Cheryl Sandoval MD at [address] Right 2/3/2021    RIGHT CARPAL [address], [address] [facility], FDP, FDS, TENOSYNOVECTOMY performed by Cheryl Sandoval MD at [address], [facility]      COLONOSCOPY N/A 6/30/2020    COLONOSCOPY DIAGNOSTIC performed by Frank Riggs MD at [facility] CATH LAB PROCEDURE      ENDOMETRIAL ABLATION  2002    metrorrhagia    ENDOSCOPY, COLON, DIAGNOSTIC      HYSTERECTOMY  9/7/12    fibroid, cervicitis, ovaries intact    TN COLON CA SCRN NOT  [address] IND N/A 5/15/2018    COLONOSCOPY performed by Frank Riggs MD at [facility] N/A 4/2/2019    EGD ESOPHAGOGASTRODUODENOSCOPY performed by Frank Riggs MD at [facility] [address] [place] [address] Rd History     Socioeconomic History    Marital status:      Spouse name: Not on file    Number of children: 3    Years of education: Not on file    Highest education level: Not on file   Occupational History    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[address] , RN     Employer: [employer]   Tobacco Use    Smoking status: Former Smoker     Packs/day: 1.00     Years: 30.00     Pack years: 30.00     Types: Cigarettes     Start date: 3/5/1975     Quit date: 2005     Years since quittin.6    Smokeless tobacco: Never Used    Tobacco comment: 3/15/18 started age 17   Vaping Use    Vaping Use: Never used   Substance and Sexual Activity    Alcohol use: No     Comment: not at all     Drug use: No    Sexual activity: Not on file   Other Topics Concern    Not on file   Social History Narrative    Born in [place], one of 4 children (2 boys and 2 girls), both parents in the [employer], moved to [place]    Mother in [place], has memory problems    Roman [employer] Evangelical     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    Has 2 dogs, Blossom and Orissaare    Children 3, 2 sons in the area, one daughter in [address] [place] [address] Avenue: dogs, sawing, machine embroidery, baking     Social Determinants of Health     Financial Resource Strain: Low Risk     Difficulty of Paying Living Expenses: Not hard at all   Food Insecurity: No Food Insecurity    Worried About [facility] in the Last Year: Never true    [address] [facility] [address] in the Last Year: Never true   Transportation Needs:     Lack of Transportation (Medical): Not on file    Lack of Transportation (Non-Medical):  Not on file   Physical Activity:     Days of Exercise per Week: Not on file    Minutes of Exercise per Session: Not on file   Stress:     Feeling of Stress : Not on file   Social Connections:     Frequency of Communication with Friends and Family: Not on file    Frequency of Social Gatherings with Friends and Family: Not on file    Attends Islam Services: Not on file    Active Member of Clubs or Organizations: Not on file    Attends Club or Organization Meetings: Not on file    Marital Status: Not on file   Intimate Partner Violence:     Fear of Current or Ex-Partner: Not on file    Emotionally Abused: Not on file    Physically Abused: Not on file    Sexually Abused: Not on file   Housing Stability:     Unable to Pay for Housing in the Last Year: Not on file    Number of [place] in the Last Year: Not on file    Unstable Housing in the Last Year: Not on file     Family History   Problem Relation Age of Onset    Arrhythmia Mother     Hypertension Mother     Dementia Mother     COPD Father         mesothelioma    Alcohol Abuse Father     Diabetes Paternal Grandmother     Diabetes Paternal Grandfather     Colon Cancer Paternal Grandfather     Schizophrenia Brother     Colon Cancer Paternal Uncle      Allergies   Allergen Reactions    Seasonal Itching     Current Outpatient Medications   Medication Sig Dispense Refill    metoprolol tartrate (LOPRESSOR) 25 MG tablet Take 1 tablet by mouth daily 180 tablet 4    pravastatin (PRAVACHOL) 20 MG tablet TAKE ONE TABLET BY MOUTH EVERY EVENING 90 tablet 1    blood glucose test strips (AGAMATRIX JAZZ TEST) strip USE TO TEST BLOOD SUGAR ONE TIME DAILY 100 each 3    ranolazine (RANEXA) 500 MG extended release tablet Take 1 tablet by mouth 2 times daily 180 tablet 1    Boswellia-Glucosamine-Vit D (OSTEO BI-FLEX ONE PER DAY PO) Take 1 tablet by mouth daily       levothyroxine (SYNTHROID) 50 MCG tablet TAKE 1 TABLET BY MOUTH ONE TIME A DAY 90 tablet 1    potassium chloride (KLOR-CON M) 20 MEQ extended release tablet Take 1 tablet by mouth 2 times daily 180 tablet 1    amLODIPine (NORVASC) 5 MG tablet TAKE 1 TABLET BY MOUTH ONE TIME A DAY 90 tablet 4    buPROPion (WELLBUTRIN XL) 300 MG extended release tablet TAKE ONE TABLET BY MOUTH EVERY MORNING 90 tablet 4    hydroCHLOROthiazide (HYDRODIURIL) 25 MG tablet TAKE 1 TABLET BY MOUTH ONE TIME A DAY IN THE MORNING 90 tablet 4    empagliflozin (JARDIANCE) 25 MG tablet Take 1 tablet by mouth daily 90 tablet 4    pantoprazole (PROTONIX) 20 MG tablet Take 1 tablet by mouth every morning (before breakfast) 90 tablet 4    Blood Glucose Monitoring Suppl ([employer] 2) w/Device KIT 1 each by Does not apply route once for 1 dose 1 kit 0    acetaminophen (TYLENOL) 500 MG tablet Take 1,000 mg by mouth every 6 hours as needed for Pain      AgaMatrix Ultra-Thin Lancets MISC Use to test blood sugar 1 time daily 100 each 3    nitroGLYCERIN (NITROSTAT) 0.4 MG SL tablet Place 1 tablet under the tongue every 5 minutes as needed for Chest pain Indications: never used up to max of 3 total doses. If no relief after 1 dose, call 911. 20 tablet 3    Blood Glucose Calibration (AGAMATRIX CONTROL) SOLN Use as directed for control solution test 1 each 0    Cholecalciferol (VITAMIN D) 2000 UNITS CAPS capsule Take 1 capsule by mouth daily       loratadine (CLARITIN) 10 MG tablet Take 10 mg by mouth daily as needed (Allergies)        No current facility-administered medications for this visit. PMH, Surgical Hx, Family Hx, and Social Hx reviewed and updated. Health Maintenance reviewed. Objective  Vitals:    02/07/22 0917 02/07/22 0958   BP: (!) 140/78 130/68   Site: Left Upper Arm Left Upper Arm   Position: Sitting Sitting   Cuff Size: Medium Adult Medium Adult   Pulse: 72    Resp: 16    Temp: 97.4 °F (36.3 °C)    TempSrc: Temporal    SpO2: 98%    Weight: 207 lb (93.9 kg)    Height: 5' 4.5\" (1.638 m)      BP Readings from Last 3 Encounters:   02/07/22 130/68   01/14/22 (!) 141/81   12/15/21 130/72     Wt Readings from Last 3 Encounters:   02/07/22 207 lb (93.9 kg)   01/14/22 207 lb (93.9 kg)   12/15/21 209 lb (94.8 kg)     Physical Exam  Vitals reviewed. Constitutional:       Appearance: Normal appearance.  She is obese. She is not ill-appearing or toxic-appearing. HENT:      Head: Normocephalic and atraumatic. Right Ear: Tympanic membrane, ear canal and external ear normal.      Left Ear: Tympanic membrane, ear canal and external ear normal.      Nose: Nose normal.      Mouth/Throat:      Mouth: Mucous membranes are moist.   Eyes:      Conjunctiva/sclera: Conjunctivae normal.   Cardiovascular:      Rate and Rhythm: Normal rate and regular rhythm. Pulmonary:      Effort: Pulmonary effort is normal.      Breath sounds: Normal breath sounds. Musculoskeletal:         General: No swelling or tenderness. Right lower leg: No edema. Left lower leg: No edema. Skin:     General: Skin is warm. Coloration: Skin is not pale. Findings: No erythema. Neurological:      General: No focal deficit present. Mental Status: She is alert and oriented to person, place, and time. Psychiatric:         Mood and Affect: Mood normal.         Behavior: Behavior normal.         Thought Content: Thought content normal.         Judgment: Judgment normal.       Assessment & Plan   Boogie Yee was seen today for diabetes. Diagnoses and all orders for this visit:    Uncontrolled type 2 diabetes mellitus with hyperglycemia ([address]  -     Basic Metabolic Panel; Future    Encounter for screening mammogram for malignant neoplasm of breast  -     LUKASZ DIGITAL SCREEN W OR WO CAD BILATERAL; Future    Hypokalemia  -     Basic Metabolic Panel; Future    Essential hypertension  -     Basic Metabolic Panel; Future    Angina pectoris syndrome (HCC)    Fatty infiltration of liver    BELTRAN (nonalcoholic steatohepatitis)    Hypothyroidism, unspecified type    Non-cardiac chest pain    Obesity (BMI 30-39. 9)    Continue current medications. She is pursuing bariatric surgery. Repeat BMP today. 4 month follow up with me.     Orders Placed This Encounter   Procedures    LUKASZ DIGITAL SCREEN W OR WO CAD BILATERAL     Standing Status:   Future Standing Expiration Date:   2/7/2023     Order Specific Question:   Reason for exam:     Answer:   screening    Basic Metabolic Panel     Standing Status:   Future     Number of Occurrences:   1     Standing Expiration Date:   2/7/2023     No orders of the defined types were placed in this encounter. There are no discontinued medications. Return in about 4 months (around 6/7/2022) for follow up in office. Reviewed with the patient: current clinical status, medications, activities and diet. Side effects, adverse effects of the medication prescribed today, as well as treatment plan/ rationale and result expectations have been discussed with the patient who expresses understanding and desires to proceed. Close follow up to evaluate treatment results and for coordination of care. I have reviewed the patient's medical history in detail and updated the computerized patient record.     Harmony Tyson PA-C

## 2022-02-08 ENCOUNTER — OFFICE VISIT (OUTPATIENT)
Dept: BARIATRICS/WEIGHT MGMT | Age: 62
End: 2022-02-08
Payer: COMMERCIAL

## 2022-02-08 VITALS
BODY MASS INDEX: 34.16 KG/M2 | DIASTOLIC BLOOD PRESSURE: 70 MMHG | SYSTOLIC BLOOD PRESSURE: 118 MMHG | HEART RATE: 60 BPM | HEIGHT: 65 IN | WEIGHT: 205 LBS

## 2022-02-08 DIAGNOSIS — Z87.891 PERSONAL HISTORY OF TOBACCO USE: ICD-10-CM

## 2022-02-08 DIAGNOSIS — I10 ESSENTIAL HYPERTENSION: ICD-10-CM

## 2022-02-08 DIAGNOSIS — E03.9 HYPOTHYROIDISM, UNSPECIFIED TYPE: ICD-10-CM

## 2022-02-08 DIAGNOSIS — G47.33 SLEEP APNEA, OBSTRUCTIVE: ICD-10-CM

## 2022-02-08 DIAGNOSIS — K75.81 NASH (NONALCOHOLIC STEATOHEPATITIS): ICD-10-CM

## 2022-02-08 DIAGNOSIS — E11.69 DIABETES MELLITUS TYPE 2 IN OBESE (HCC): Primary | ICD-10-CM

## 2022-02-08 DIAGNOSIS — E66.9 DIABETES MELLITUS TYPE 2 IN OBESE (HCC): Primary | ICD-10-CM

## 2022-02-08 DIAGNOSIS — E66.9 OBESITY (BMI 30-39.9): ICD-10-CM

## 2022-02-08 PROBLEM — E87.6 HYPOKALEMIA: Status: RESOLVED | Noted: 2022-02-07 | Resolved: 2022-02-08

## 2022-02-08 PROCEDURE — 99213 OFFICE O/P EST LOW 20 MIN: CPT | Performed by: NURSE PRACTITIONER

## 2022-02-08 NOTE — PROGRESS NOTES
Medical Weight Management Progress Note    Subjective      Patient being seen for medically supervised weight loss for the chronic conditions of DM Type 2, HTN, DONNIE, Anxiety, Fatty Liver, Hypothyroidism, Angina, Hx Tobacco Use. She is working on the behavior changes discussed at the initial appointment. Patient continues on diet plan. Physical activity includes walking. Weight loss of 2 lbs since last visit. Diabetes regimen includes jardiance. Last A1C 7.9%. Quit smoking 6 years ago. Psych eval completed and clearance received. EGD done on 4/2/19 revealed gastric polyp. No H Pylori testing found on report. H Pylori breath test performed on 12/7/21 was negative. Cardiac clearance received. Uses CPAP. No current issues. Working toward bariatric surgery:    [] Sleeve Gastrectomy                                                           [x] Rebecca-en-Y Gastric Bypass    Allergies: Allergies   Allergen Reactions    Seasonal Itching       Past Medical History:     Past Medical History:   Diagnosis Date    Abnormal EKG 3/23/2018    Angina pectoris syndrome ([facility] 03/23/2018    Diverticulosis of colon     DMII (diabetes mellitus, type 2) ([facility] [address] 2000    Dr Mariia Valente hypertension 1/15/2016    Family history of coronary artery disease 1/15/2016    Fatty infiltration of liver 2020    Gastric polyp 2019    Dr Saw Arcos, 5 mm    Generalized anxiety disorder     H/O: hysterectomy 2012    History of tobacco abuse 1/15/2016    Obesity (BMI 30-39. 9)     Other specified acquired hypothyroidism 2000    Precordial pain 03/23/2018    (? Prinzmetal?) Dr Sheridan Beaulieu S/P colonoscopy with polypectomy 2013, 2018, 2019    Dr Faustino Aguilar, Dr Saw Arcos, tubulovillous adenoma, hyperplastic polyp    S/P vaginal hysterectomy 2012    benign    Sleep apnea, obstructive 2007    was on CPAP, not using it at present   Pattie Manual Vertigo 2018   .     Past Surgical History:  Past Surgical History:   Procedure Laterality Date    CARPAL TUNNEL RELEASE Left 2021    LEFT CARPAL TUNNEL RELEASE, LEFT MIDDLE FINGER TRIGGER RELEASE, TENOSYNOVECTOMY FDPFDS performed by Oscar Gallo MD at [address] Right 2/3/2021    RIGHT CARPAL TUNNEL RELEASE, TRIGGER FINGER RELEASE RIGHT MIDDLE FINGER, FDP, FDS, TENOSYNOVECTOMY performed by [address], MD at [address], [address] [facility] COLONOSCOPY N/A 2020    COLONOSCOPY DIAGNOSTIC performed by Aldair Pillai MD at [address] [facility] CATH LAB PROCEDURE      ENDOMETRIAL ABLATION      metrorrhagia    ENDOSCOPY, COLON, DIAGNOSTIC      HYSTERECTOMY  12    fibroid, cervicitis, ovaries intact    NV COLON CA SCRN NOT  [address] IND N/A 5/15/2018    COLONOSCOPY performed by Aldair Pillai MD at [address] [facility]    UPPER GASTROINTESTINAL ENDOSCOPY N/A 2019    EGD ESOPHAGOGASTRODUODENOSCOPY performed by Aldair Pillai MD at [facility]       Family History:  Family History   Problem Relation Age of Onset    Arrhythmia Mother     Hypertension Mother     Dementia Mother     COPD Father         mesothelioma    Alcohol Abuse Father     Diabetes Paternal Grandmother     Diabetes Paternal Grandfather     Colon Cancer Paternal Grandfather     Schizophrenia Brother     Colon Cancer Paternal Uncle        Social History:  Social History     Socioeconomic History    Marital status:      Spouse name: Not on file    Number of children: 3    Years of education: Not on file    Highest education level: Not on file   Occupational History    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[address] , RN     Employer: [employer]   Tobacco Use    Smoking status: Former Smoker     Packs/day: 1.00     Years: 30.00     Pack years: 30.00     Types: Cigarettes     Start date: 3/5/1975     Quit date: 2005     Years since quittin.6    Smokeless tobacco: Never Used    Tobacco comment: 3/15/18 started age 17   Vaping Use    Vaping Use: Never used   Substance and Sexual Activity    Alcohol use: No     Comment: not at all     Drug use: No    Sexual activity: Not on file   Other Topics Concern    Not on file   Social History Narrative    Born in [place], one of 4 children (2 boys and 2 girls), both parents in the [employer], moved to [place]    Mother in [place], has memory problems    [employer]     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: dogs, sawing, machine embroidery, baking     Social Determinants of Health     Financial Resource Strain: Low Risk     Difficulty of Paying Living Expenses: Not hard at all   Food Insecurity: No Food Insecurity    Worried About [address] in the Last Year: Never true    [address] in the Last Year: Never true   Transportation Needs:     Lack of Transportation (Medical): Not on file    Lack of Transportation (Non-Medical):  Not on file   Physical Activity:     Days of Exercise per Week: Not on file    Minutes of Exercise per Session: Not on file   Stress:     Feeling of Stress : Not on file   Social Connections:     Frequency of Communication with Friends and Family: Not on file    Frequency of Social Gatherings with Friends and Family: Not on file    Attends Church Services: Not on file    Active Member of Clubs or Organizations: Not on file    Attends Club or Organization Meetings: Not on file    Marital Status: Not on file   Intimate Partner Violence:     Fear of Current or Ex-Partner: Not on file    Emotionally Abused: Not on file    Physically Abused: Not on file    Sexually Abused: Not on file   Housing Stability:     Unable to Pay for Housing in the Last Year: Not on file    Number of [place] in the Last Year: Not on file    Unstable Housing in the Last Year: Not on file       Current Medications:  Current Outpatient Medications   Medication Sig Dispense Refill    metoprolol tartrate (LOPRESSOR) 25 MG tablet Take 1 tablet by mouth daily 180 tablet 4    pravastatin (PRAVACHOL) 20 MG tablet TAKE ONE TABLET BY MOUTH EVERY EVENING 90 tablet 1    blood glucose test strips (AGAMATRIX JAZZ TEST) strip USE TO TEST BLOOD SUGAR ONE TIME DAILY 100 each 3    ranolazine (RANEXA) 500 MG extended release tablet Take 1 tablet by mouth 2 times daily 180 tablet 1    Boswellia-Glucosamine-Vit D (OSTEO BI-FLEX ONE PER DAY PO) Take 1 tablet by mouth daily       levothyroxine (SYNTHROID) 50 MCG tablet TAKE 1 TABLET BY MOUTH ONE TIME A DAY 90 tablet 1    potassium chloride (KLOR-CON M) 20 MEQ extended release tablet Take 1 tablet by mouth 2 times daily 180 tablet 1    amLODIPine (NORVASC) 5 MG tablet TAKE 1 TABLET BY MOUTH ONE TIME A DAY 90 tablet 4    buPROPion (WELLBUTRIN XL) 300 MG extended release tablet TAKE ONE TABLET BY MOUTH EVERY MORNING 90 tablet 4    hydroCHLOROthiazide (HYDRODIURIL) 25 MG tablet TAKE 1 TABLET BY MOUTH ONE TIME A DAY IN THE MORNING 90 tablet 4    empagliflozin (JARDIANCE) 25 MG tablet Take 1 tablet by mouth daily 90 tablet 4    pantoprazole (PROTONIX) 20 MG tablet Take 1 tablet by mouth every morning (before breakfast) 90 tablet 4    acetaminophen (TYLENOL) 500 MG tablet Take 1,000 mg by mouth every 6 hours as needed for Pain      AgaMatrix Ultra-Thin Lancets MISC Use to test blood sugar 1 time daily 100 each 3    nitroGLYCERIN (NITROSTAT) 0.4 MG SL tablet Place 1 tablet under the tongue every 5 minutes as needed for Chest pain Indications: never used up to max of 3 total doses.  If no relief after 1 dose, call [phone]. 14 tablet 3    Blood Glucose Calibration (AGAMATRIX CONTROL) SOLN Use as directed for control solution test 1 each 0    Cholecalciferol (VITAMIN D) 2000 UNITS CAPS capsule Take 1 capsule by mouth daily       loratadine normal. Alert and oriented to person, place, and time. Psychiatric: Normal mood and affect. Speech and behavior normal. Judgment and thought content normal. Cognition and memory intact. Assessment:       Diagnosis Orders   1. Diabetes mellitus type 2 in obese (HCC)  Nicotine, Blood   2. BELTRAN (nonalcoholic steatohepatitis)  Nicotine, Blood   3. Personal history of tobacco use  Nicotine, Blood   4. Hypothyroidism, unspecified type  Nicotine, Blood   5. Sleep apnea, obstructive  Nicotine, Blood   6. Essential hypertension  Nicotine, Blood   7. Obesity (BMI 30-39. 9)  Nicotine, Blood       Plan:    Dietitian visit today. Patient was encouraged to journal all food intake. Keep calorie level at approximately 6378-1005. Protein intake is to be a minimum of 60-80 grams per day. Water drinking was encouraged with a goal of 64oz-128oz daily. Beverages to be calorie free except for milk. Every other beverage should be water. Avoid soda. Continue to increase level of physical activity. Encouraged use of exercise log. Nicotine level ordered per request of surgeon. Follow-up  No follow-ups on file. Orders this encounter:  Orders Placed This Encounter   Procedures    Nicotine, Blood     Standing Status:   Future     Standing Expiration Date:   2/8/2023       Prescriptions this encounter:  No orders of the defined types were placed in this encounter.       Electronically signed by:  Priscilla Isbell CNP

## 2022-02-08 NOTE — PROGRESS NOTES
Medical Nutrition Therapy   Metabolic and Bariatric Surgery         Supervised diet and exercise preparation  Visit 3 out of 3  Pt reports:      Changes in eating patterns to promote health are noted below on the goals number 19-22    Vitals: Wt Readings from Last 3 Encounters:   02/08/22 205 lb (93 kg)   02/07/22 207 lb (93.9 kg)   01/14/22 207 lb (93.9 kg)         Nutrition Assessment:   PES: Knowledge deficit related to healthy behaviors that support weight management post weight loss surgery as evidenced by Body mass index is 34.64 kg/m². Nutrition Assessment of Goal Attainment:  TREATMENT GOALS:    1. Pt  Completed 6 out of 6 goals. 2.TREATMENT GOALS FOR UPCOMING WEEK: continue all previous goals and add: # 0    All goals were planned with and agreed on by the patient. appt # NA G What is your next step? C 1 2 3 4 5 6 7 8 9     0  1 I will read the education binder provided to me and the   x 100              x 2 I will make my pschological evaluation appoinment. 2  3 I will bring this goal card to every appointment. 100 100 100            x 4 I will eliminate all tobacco/nicotine. x 5 I will limit alcoholic beverages to 8-9IR per week. 1  6 I will limit dining out to 3 times per week or less. 100 75             x 7 I will eliminate sugary beverages. x 8 I will eliminate carbonated beverages. 1  9 I will eliminate drinking with a straw. x  100              10 I will limit caffeinated beverages to 16oz daily. 2  11 I will limit log my exercise daily. 100 100 100           2  12 I will determine my calcium and mvi plan. x   100           2  13 I will have 1-2 servings of lean protein present at each meal and minimeal. x   100            x 14 I will eat every 3-5 hours. 15 I will drink 64oz of fluid daily. 2  16 I will eat slowly during meals and snacks.  x   100             17 I will limit fluids 4oz before after and during meals. x              2  18 I will eat protein first at all meals followed by vegetables,  Fruit and lastly whole grains. x   100             19 My first weight neutral approach is:                 20 My second weight neutral approach is:                 21  My Thirds weight neutral approach is:                 22                      Questions asked for goal understanding:                                                  Do you understand your goals? Do you have the information you need to achieve your goals? Do you have any questions  right now? [x]  Consistent goal achievement in the program thus far and further success with goals is expected. []  Unable to consistently make progress in goal achievement. At this time patient is not moving forward  in developing the skills needed for success after surgery. Plan:    Continue to follow monthly and review goals.          []  Nutrition visits complete    [x]

## 2022-02-10 ENCOUNTER — TELEPHONE (OUTPATIENT)
Dept: FAMILY MEDICINE CLINIC | Age: 62
End: 2022-02-10

## 2022-02-10 ENCOUNTER — NURSE ONLY (OUTPATIENT)
Dept: FAMILY MEDICINE CLINIC | Age: 62
End: 2022-02-10

## 2022-02-10 VITALS — WEIGHT: 211 LBS | BODY MASS INDEX: 35.66 KG/M2

## 2022-02-10 DIAGNOSIS — G47.33 SLEEP APNEA, OBSTRUCTIVE: ICD-10-CM

## 2022-02-10 DIAGNOSIS — I10 ESSENTIAL HYPERTENSION: ICD-10-CM

## 2022-02-10 DIAGNOSIS — E11.69 DIABETES MELLITUS TYPE 2 IN OBESE (HCC): ICD-10-CM

## 2022-02-10 DIAGNOSIS — E66.9 OBESITY (BMI 30-39.9): ICD-10-CM

## 2022-02-10 DIAGNOSIS — E03.9 HYPOTHYROIDISM, UNSPECIFIED TYPE: ICD-10-CM

## 2022-02-10 DIAGNOSIS — E66.9 DIABETES MELLITUS TYPE 2 IN OBESE (HCC): ICD-10-CM

## 2022-02-10 DIAGNOSIS — Z87.891 PERSONAL HISTORY OF TOBACCO USE: ICD-10-CM

## 2022-02-10 DIAGNOSIS — K75.81 NASH (NONALCOHOLIC STEATOHEPATITIS): ICD-10-CM

## 2022-02-10 NOTE — TELEPHONE ENCOUNTER
Pt was in the office for a weight check needed it per one of her [facility] doctors  Pts weight today was 211 LBS

## 2022-02-10 NOTE — PROGRESS NOTES
Pt in office for weight check,  Needed to have it checked for one of her [facility] doctors   Weight was at 211lb

## 2022-02-14 ENCOUNTER — TELEPHONE (OUTPATIENT)
Dept: BARIATRICS/WEIGHT MGMT | Age: 62
End: 2022-02-14

## 2022-02-14 NOTE — TELEPHONE ENCOUNTER
----- Message from ELISEO Gallego CNP sent at 2/8/2022 12:56 PM EST -----  Medically ready to pursue insurance approval process for bariatric surgery.

## 2022-02-15 LAB
3-OH-COTININE: <2 NG/ML
COTININE: <2 NG/ML
NICOTINE: <2 NG/ML

## 2022-02-15 NOTE — TELEPHONE ENCOUNTER
Patient's record has been submitted to the insurance company on 2/15/2022 for authorization to schedule surgery. Instructions to patient: if patient calls for status on authorization to schedule surgery please instruct patient that it could take up to 30 days for an authorization. Once authorization is received the insurance specialists will contact the patient to schedule their procedure.       Program fee paid in full yes

## 2022-02-21 ENCOUNTER — HOSPITAL ENCOUNTER (OUTPATIENT)
Dept: WOMENS IMAGING | Age: 62
Discharge: HOME | End: 2022-02-23
Payer: COMMERCIAL

## 2022-02-21 VITALS — BODY MASS INDEX: 36.02 KG/M2 | WEIGHT: 211 LBS | HEIGHT: 64 IN

## 2022-02-21 DIAGNOSIS — Z12.31 ENCOUNTER FOR SCREENING MAMMOGRAM FOR MALIGNANT NEOPLASM OF BREAST: ICD-10-CM

## 2022-02-21 PROCEDURE — 77063 BREAST TOMOSYNTHESIS BI: CPT

## 2022-02-21 RX ORDER — POTASSIUM CHLORIDE 20 MEQ/1
TABLET, EXTENDED RELEASE ORAL
Qty: 180 TABLET | Refills: 0 | Status: SHIPPED | OUTPATIENT
Start: 2022-02-21 | End: 2022-04-21 | Stop reason: ALTCHOICE

## 2022-03-01 ENCOUNTER — TELEPHONE (OUTPATIENT)
Dept: BARIATRICS/WEIGHT MGMT | Age: 62
End: 2022-03-01

## 2022-03-01 NOTE — TELEPHONE ENCOUNTER
Pt calling because the date on her approval does not match the date of her surgery; also had additional question for surgery scheduler that she would not share at this time

## 2022-03-25 RX ORDER — SODIUM CHLORIDE, SODIUM LACTATE, POTASSIUM CHLORIDE, CALCIUM CHLORIDE 600; 310; 30; 20 MG/100ML; MG/100ML; MG/100ML; MG/100ML
1000 INJECTION, SOLUTION INTRAVENOUS CONTINUOUS
Status: CANCELLED | OUTPATIENT
Start: 2022-03-25

## 2022-03-28 ENCOUNTER — HOSPITAL ENCOUNTER (OUTPATIENT)
Dept: GENERAL RADIOLOGY | Age: 62
Discharge: HOME | End: 2022-03-30
Payer: COMMERCIAL

## 2022-03-28 ENCOUNTER — HOSPITAL ENCOUNTER (OUTPATIENT)
Dept: PREADMISSION TESTING | Age: 62
Discharge: HOME | End: 2022-04-01
Payer: COMMERCIAL

## 2022-03-28 ENCOUNTER — TELEPHONE (OUTPATIENT)
Dept: BARIATRICS/WEIGHT MGMT | Age: 62
End: 2022-03-28

## 2022-03-28 ENCOUNTER — NURSE ONLY (OUTPATIENT)
Dept: BARIATRICS/WEIGHT MGMT | Age: 62
End: 2022-03-28

## 2022-03-28 VITALS
RESPIRATION RATE: 18 BRPM | WEIGHT: 206 LBS | TEMPERATURE: 96.6 F | OXYGEN SATURATION: 100 % | SYSTOLIC BLOOD PRESSURE: 125 MMHG | DIASTOLIC BLOOD PRESSURE: 72 MMHG | HEIGHT: 65 IN | HEART RATE: 70 BPM | BODY MASS INDEX: 34.32 KG/M2

## 2022-03-28 LAB
ANION GAP SERPL CALCULATED.3IONS-SCNC: 11 MMOL/L (ref 9–17)
BUN BLDV-MCNC: 17 MG/DL (ref 6–20)
CALCIUM SERPL-MCNC: 9.9 MG/DL (ref 8.6–10.4)
CHLORIDE BLD-SCNC: 102 MMOL/L (ref 98–107)
CO2: 26 MMOL/L (ref 20–31)
CREAT SERPL-MCNC: 1.07 MG/DL (ref 0.5–0.9)
GFR AFRICAN AMERICAN: >60 ML/MIN
GFR NON-AFRICAN AMERICAN: 52 ML/MIN
GFR SERPL CREATININE-BSD FRML MDRD: ABNORMAL ML/MIN/{1.73_M2}
GLUCOSE BLD-MCNC: 143 MG/DL (ref 70–99)
HCT VFR BLD CALC: 42.7 % (ref 36.3–47.1)
HEMOGLOBIN: 13.9 G/DL (ref 11.9–15.1)
INR BLD: 1
MCH RBC QN AUTO: 27.4 PG (ref 25.2–33.5)
MCHC RBC AUTO-ENTMCNC: 32.6 G/DL (ref 28.4–34.8)
MCV RBC AUTO: 84.1 FL (ref 82.6–102.9)
NRBC AUTOMATED: 0 PER 100 WBC
PARTIAL THROMBOPLASTIN TIME: 26.4 SEC (ref 20.5–30.5)
PDW BLD-RTO: 13.3 % (ref 11.8–14.4)
PLATELET # BLD: 358 K/UL (ref 138–453)
PMV BLD AUTO: 8.6 FL (ref 8.1–13.5)
POTASSIUM SERPL-SCNC: 3.7 MMOL/L (ref 3.7–5.3)
PROTHROMBIN TIME: 10.4 SEC (ref 9.1–12.3)
RBC # BLD: 5.08 M/UL (ref 3.95–5.11)
SODIUM BLD-SCNC: 139 MMOL/L (ref 135–144)
WBC # BLD: 9.9 K/UL (ref 3.5–11.3)

## 2022-03-28 PROCEDURE — 36415 COLL VENOUS BLD VENIPUNCTURE: CPT

## 2022-03-28 PROCEDURE — 93005 ELECTROCARDIOGRAM TRACING: CPT | Performed by: SURGERY

## 2022-03-28 PROCEDURE — G0480 DRUG TEST DEF 1-7 CLASSES: HCPCS

## 2022-03-28 PROCEDURE — 85610 PROTHROMBIN TIME: CPT

## 2022-03-28 PROCEDURE — 71046 X-RAY EXAM CHEST 2 VIEWS: CPT

## 2022-03-28 PROCEDURE — 85730 THROMBOPLASTIN TIME PARTIAL: CPT

## 2022-03-28 PROCEDURE — 80048 BASIC METABOLIC PNL TOTAL CA: CPT

## 2022-03-28 PROCEDURE — 85027 COMPLETE CBC AUTOMATED: CPT

## 2022-03-28 RX ORDER — HEPARIN SODIUM 5000 [USP'U]/ML
5000 INJECTION, SOLUTION INTRAVENOUS; SUBCUTANEOUS ONCE
Status: CANCELLED | OUTPATIENT
Start: 2022-03-28

## 2022-03-28 NOTE — PROGRESS NOTES
Anesthesia Focused Assessment    Has patient ever tested positive for COVID? No    STOP-BANG Sleep Apnea Questionnaire    SNORE loudly (heard through closed doors)? Yes  TIRED, fatigued, sleepy during daytime? Yes  OBSERVED stopping breathing during sleep? Yes  High blood PRESSURE being treated? Yes    BMI over 35? No  AGE over 48? Yes  NECK circumference over 16\"? No  GENDER (male)? No             Total 5  High risk 5-8  Intermediate risk 3-4  Low risk 0-2    Obstructive Sleep Apnea: yes  If YES, machine used: cpap-instructed to bring day of surgery     Type 1 DM:   no  T2DM:  yes    Coronary Artery Disease: Follows with cardiology Dr. Fay Romeo, history of cath (negative). Follows with cardiology for \"vasospastic angina. \"  Hypertension:  yes    Active smoker:  1 PPD for 30 years, quit 2005. Drinks Alcohol:  no    Dentition: upper and lower full dentures    Defib / AICD / Pacemaker: no      Renal Failure/dialysis:  no    Patient was evaluated in PAT & anesthesia guidelines were applied. NPO guidelines, medication instructions and scheduled arrival time were reviewed with patient. Hx of anesthesia complications:  No, but IV morphine caused dizziness. Family hx of anesthesia complications:  no                                                                                                                     Anesthesia contacted:   no  Medical or cardiac clearance ordered: cardiac clearance and notes are in paper chart. Medical clearance in chart.     Ben Denton PA-C  3/28/22  11:58 AM

## 2022-03-28 NOTE — TELEPHONE ENCOUNTER
Patient came to pre-op class today and told Alberto Graf she has lost weight. She is at 202# and 5'4\" - BMI is now at 34.7. Dr. Eduin King please advise? Patient is scheduled for surgery on 4/12/22.

## 2022-03-28 NOTE — H&P
History and Physical    Pt Name: Gabriel Oconnor  MRN: [id number]  YOB: 1960  Date of evaluation: 3/28/2022    SUBJECTIVE:   History of Chief Complaint:    Patient presents for PAT appointment. She complains of obesity, difficulty with ability to lose weight and maintain that weight loss. She has several comorbidities that would be helped with weight loss. She has tried multiple programs in the past without lasting success. She has been following with bariatrics and has been scheduled for gastric bypass Rebecca En Y. Past Medical History    has a past medical history of Abnormal EKG, Angina pectoris syndrome ([address] Diverticulosis of colon, DMII (diabetes mellitus, type 2) ([address] Essential hypertension, Family history of coronary artery disease, Fatty infiltration of liver, Gastric polyp, Generalized anxiety disorder, H/O: hysterectomy, History of tobacco abuse, Obesity (BMI 30-39.9), Other specified acquired hypothyroidism, Precordial pain, S/P colonoscopy with polypectomy, S/P vaginal hysterectomy, Seasonal allergies, Sleep apnea, obstructive, Under care of team, Under care of team, Vasospastic angina ([address] Vertigo, Wears dentures, and Wears glasses. Past Surgical History   has a past surgical history that includes Endometrial ablation (2002); Cholecystectomy, laparoscopic (1995); Tubal ligation (1984); Tonsillectomy; Hysterectomy (09/07/2012); pr colon ca scrn not hi rsk ind (N/A, 05/15/2018); Diagnostic Cardiac Cath Lab Procedure; Upper gastrointestinal endoscopy (N/A, 04/02/2019); Colonoscopy; Endoscopy, colon, diagnostic; Colonoscopy (N/A, 06/30/2020); Carpal tunnel release (Left, 01/13/2021); Carpal tunnel release (Right, 02/03/2021); and Cardiac catheterization (2018). Medications  Prior to Admission medications    Medication Sig Start Date End Date Taking?  Authorizing Provider   potassium chloride (KLOR-CON M) 20 MEQ extended release tablet TAKE ONE TABLET TWO TIMES A DAY 2/21/22   Jennifer Rodriguez NAYANA Tyson   metoprolol tartrate (LOPRESSOR) 25 MG tablet Take 1 tablet by mouth daily 2/3/22   Harmony Tyson PA-C   pravastatin (PRAVACHOL) 20 MG tablet TAKE ONE TABLET BY MOUTH EVERY EVENING 12/29/21   Harmony Tyson PA-C   blood glucose test strips ([employer] TEST) strip USE TO TEST BLOOD SUGAR ONE TIME DAILY 12/2/21   MARCI Degroot   ranolazine (RANEXA) 500 MG extended release tablet Take 1 tablet by mouth 2 times daily 11/19/21   Shivam J Holiday, DO   levothyroxine (SYNTHROID) 50 MCG tablet TAKE 1 TABLET BY MOUTH ONE TIME A DAY 10/4/21   Hamilton Gamboa MD   amLODIPine (NORVASC) 5 MG tablet TAKE 1 TABLET BY MOUTH ONE TIME A DAY 6/29/21   Harmony Tyson PA-C   buPROPion (WELLBUTRIN XL) 300 MG extended release tablet TAKE ONE TABLET BY MOUTH EVERY MORNING 6/29/21   Harmony Tyson PA-C   hydroCHLOROthiazide (HYDRODIURIL) 25 MG tablet TAKE 1 TABLET BY MOUTH ONE TIME A DAY IN THE MORNING 6/29/21   Harmony Tyson PA-C   empagliflozin (JARDIANCE) 25 MG tablet Take 1 tablet by mouth daily 6/29/21   Harmony Tyson PA-C   pantoprazole (PROTONIX) 20 MG tablet Take 1 tablet by mouth every morning (before breakfast) 6/29/21   Harmony Tyson PA-C   Blood Glucose Monitoring Suppl (IVDeskZZ WIRELESS 2) w/Device KIT 1 each by Does not apply route once for 1 dose 1/14/21 2/7/22  Harmony Tyson PA-C   acetaminophen (TYLENOL) 500 MG tablet Take 1,000 mg by mouth every 6 hours as needed for Pain    Historical Provider, MD Hurtado Ultra-Thin Lancets MISC Use to test blood sugar 1 time daily 12/23/20   Harmony Tyson PA-C   nitroGLYCERIN (NITROSTAT) 0.4 MG SL tablet Place 1 tablet under the tongue every 5 minutes as needed for Chest pain Indications: never used up to max of 3 total doses.  If no relief after 1 dose, call 911. 9/21/20   Shivam J Holiday, DO   Blood Glucose Calibration (AGAMATRIX CONTROL) SOLN Use as directed for control solution test 4/28/20   Tanya Aguilar MD   Cholecalciferol (VITAMIN D) 2000 UNITS CAPS capsule Take 1 capsule by mouth daily     Historical Provider, MD   loratadine (CLARITIN) 10 MG tablet Take 10 mg by mouth daily as needed (Allergies)     Historical Provider, MD     Allergies  is allergic to seasonal and morphine. Family History  family history includes Alcohol Abuse in her father; Arrhythmia in her mother; Breast Cancer in her paternal grandmother; COPD in her father; Colon Cancer in her paternal grandfather and paternal uncle; Dementia in her mother; Diabetes in her paternal grandfather and paternal grandmother; Hypertension in her mother; Schizophrenia in her brother. Social History   reports that she quit smoking about 16 years ago. Her smoking use included cigarettes. She started smoking about 47 years ago. She has a 30.00 pack-year smoking history. She has never used smokeless tobacco.   reports no history of alcohol use. reports no history of drug use. Marital Status   Occupation nurse, clinical care coordinator [employer]     Review of Systems:  CONSTITUTIONAL:   negative for fevers, chills, fatigue and malaise    EYES:   negative for double vision, blurred vision and photophobia    HEENT:   negative for tinnitus, epistaxis and sore throat     RESPIRATORY:   negative for cough, shortness of breath, wheezing     CARDIOVASCULAR:   negative for chest pain, palpitations, syncope, edema     GASTROINTESTINAL:   negative for nausea, vomiting    GENITOURINARY:   negative for incontinence     MUSCULOSKELETAL:   negative for neck or back pain     NEUROLOGICAL:   Negative for weakness and tingling  negative for headaches and dizziness     PSYCHIATRIC:   negative for anxiety        OBJECTIVE:   VITALS:  height is 5' 4.75\" (1.645 m) and weight is 206 lb (93.4 kg). Her temporal temperature is 96.6 °F (35.9 °C). Her blood pressure is 125/72 and her pulse is 70. Her respiration is 18 and oxygen saturation is 100%. CONSTITUTIONAL:alert & cooperative, no acute distress.   Very pleasant and talkative. SKIN:  Warm and dry, no rashes on exposed areas of skin. HEAD:  Normocephalic, atraumatic   EYES: EOMs intact. Wearing glasses. EARS:  Hearing grossly WNL. NOSE:  Nares patent. No rhinorrhea   MOUTH/THROAT:  benign  NECK:supple, no lymphadenopathy  LUNGS: Clear to auscultation bilaterally, no wheezes. CARDIOVASCULAR: Heart sounds are normal.  Regular rate and rhythm without murmur. ABDOMEN: soft, non tender, non distended. EXTREMITIES: no edema bilateral lower extremities. Testing:   EK22  Labs pending: drawn 3/28/2022   Chest XRay:  22    IMPRESSIONS:   1. obesity  2.  has a past medical history of Abnormal EKG (2018), Angina pectoris syndrome ([address] (2018), Diverticulosis of colon, DMII (diabetes mellitus, type 2) ([address] (), Essential hypertension (01/15/2016), Family history of coronary artery disease (01/15/2016), Fatty infiltration of liver (), Gastric polyp (), Generalized anxiety disorder, H/O: hysterectomy (), History of tobacco abuse (01/15/2016), Obesity (BMI 30-39.9), Other specified acquired hypothyroidism (), Precordial pain (2018), S/P colonoscopy with polypectomy (, , ), S/P vaginal hysterectomy (), Seasonal allergies, Sleep apnea, obstructive (), Under care of team (2022), Under care of team (2022), Vasospastic angina ([address] Vertigo (), Wears dentures, and Wears glasses. PLANS:   1.  Gastric bypass Rebecac Pineda PA-C  Electronically signed 3/28/2022 at 12:07 PM

## 2022-03-28 NOTE — H&P (VIEW-ONLY)
History and Physical    Pt Name: Ajit Pillai  MRN: [id number]  YOB: 1960  Date of evaluation: 3/28/2022    SUBJECTIVE:   History of Chief Complaint:    Patient presents for PAT appointment. She complains of obesity, difficulty with ability to lose weight and maintain that weight loss. She has several comorbidities that would be helped with weight loss. She has tried multiple programs in the past without lasting success. She has been following with bariatrics and has been scheduled for gastric bypass Rebecca En Y. Past Medical History    has a past medical history of Abnormal EKG, Angina pectoris syndrome ([address] Diverticulosis of colon, DMII (diabetes mellitus, type 2) ([address] Essential hypertension, Family history of coronary artery disease, Fatty infiltration of liver, Gastric polyp, Generalized anxiety disorder, H/O: hysterectomy, History of tobacco abuse, Obesity (BMI 30-39.9), Other specified acquired hypothyroidism, Precordial pain, S/P colonoscopy with polypectomy, S/P vaginal hysterectomy, Seasonal allergies, Sleep apnea, obstructive, Under care of team, Under care of team, Vasospastic angina ([address] Vertigo, Wears dentures, and Wears glasses. Past Surgical History   has a past surgical history that includes Endometrial ablation (2002); Cholecystectomy, laparoscopic (1995); Tubal ligation (1984); Tonsillectomy; Hysterectomy (09/07/2012); pr colon ca scrn not hi rsk ind (N/A, 05/15/2018); Diagnostic Cardiac Cath Lab Procedure; Upper gastrointestinal endoscopy (N/A, 04/02/2019); Colonoscopy; Endoscopy, colon, diagnostic; Colonoscopy (N/A, 06/30/2020); Carpal tunnel release (Left, 01/13/2021); Carpal tunnel release (Right, 02/03/2021); and Cardiac catheterization (2018). Medications  Prior to Admission medications    Medication Sig Start Date End Date Taking?  Authorizing Provider   potassium chloride (KLOR-CON M) 20 MEQ extended release tablet TAKE ONE TABLET TWO TIMES A DAY 2/21/22   Kelly Bowden NAYANA Tyson   metoprolol tartrate (LOPRESSOR) 25 MG tablet Take 1 tablet by mouth daily 2/3/22   Harmony Tyson PA-C   pravastatin (PRAVACHOL) 20 MG tablet TAKE ONE TABLET BY MOUTH EVERY EVENING 12/29/21   Harmony Tyson PA-C   blood glucose test strips (AGAMATRIX JAZZ TEST) strip USE TO TEST BLOOD SUGAR ONE TIME DAILY 12/2/21   MARCI Feldman   ranolazine (RANEXA) 500 MG extended release tablet Take 1 tablet by mouth 2 times daily 11/19/21   Shivam J Holiday, DO   levothyroxine (SYNTHROID) 50 MCG tablet TAKE 1 TABLET BY MOUTH ONE TIME A DAY 10/4/21   Meg Hernandez MD   amLODIPine (NORVASC) 5 MG tablet TAKE 1 TABLET BY MOUTH ONE TIME A DAY 6/29/21   Harmony Tyson PA-C   buPROPion (WELLBUTRIN XL) 300 MG extended release tablet TAKE ONE TABLET BY MOUTH EVERY MORNING 6/29/21   Harmony Tyson PA-C   hydroCHLOROthiazide (HYDRODIURIL) 25 MG tablet TAKE 1 TABLET BY MOUTH ONE TIME A DAY IN THE MORNING 6/29/21   Harmony Tyson PA-C   empagliflozin (JARDIANCE) 25 MG tablet Take 1 tablet by mouth daily 6/29/21   Harmony Tyson PA-C   pantoprazole (PROTONIX) 20 MG tablet Take 1 tablet by mouth every morning (before breakfast) 6/29/21   Harmony Tyson PA-C   Blood Glucose Monitoring Suppl (AGAFamo.usLEWIS JAZZ WIRELESS 2) w/Device KIT 1 each by Does not apply route once for 1 dose 1/14/21 2/7/22  Harmony Tyson PA-C   acetaminophen (TYLENOL) 500 MG tablet Take 1,000 mg by mouth every 6 hours as needed for Pain    Historical Provider, MD Hurtado Ultra-Thin Lancets MISC Use to test blood sugar 1 time daily 12/23/20   Harmony Tyson PA-C   nitroGLYCERIN (NITROSTAT) 0.4 MG SL tablet Place 1 tablet under the tongue every 5 minutes as needed for Chest pain Indications: never used up to max of 3 total doses.  If no relief after 1 dose, call 911. 9/21/20   Shivam J Holiday, DO   Blood Glucose Calibration (AGAMATRIX CONTROL) SOLN Use as directed for control solution test 4/28/20   Wilfredo Abdalla MD   Cholecalciferol (VITAMIN D) 2000 UNITS CAPS capsule Take 1 capsule by mouth daily     Historical Provider, MD   loratadine (CLARITIN) 10 MG tablet Take 10 mg by mouth daily as needed (Allergies)     Historical Provider, MD     Allergies  is allergic to seasonal and morphine. Family History  family history includes Alcohol Abuse in her father; Arrhythmia in her mother; Breast Cancer in her paternal grandmother; COPD in her father; Colon Cancer in her paternal grandfather and paternal uncle; Dementia in her mother; Diabetes in her paternal grandfather and paternal grandmother; Hypertension in her mother; Schizophrenia in her brother. Social History   reports that she quit smoking about 16 years ago. Her smoking use included cigarettes. She started smoking about 47 years ago. She has a 30.00 pack-year smoking history. She has never used smokeless tobacco.   reports no history of alcohol use. reports no history of drug use. Marital Status   Occupation nurse, clinical care coordinator [employer]     Review of Systems:  CONSTITUTIONAL:   negative for fevers, chills, fatigue and malaise    EYES:   negative for double vision, blurred vision and photophobia    HEENT:   negative for tinnitus, epistaxis and sore throat     RESPIRATORY:   negative for cough, shortness of breath, wheezing     CARDIOVASCULAR:   negative for chest pain, palpitations, syncope, edema     GASTROINTESTINAL:   negative for nausea, vomiting    GENITOURINARY:   negative for incontinence     MUSCULOSKELETAL:   negative for neck or back pain     NEUROLOGICAL:   Negative for weakness and tingling  negative for headaches and dizziness     PSYCHIATRIC:   negative for anxiety        OBJECTIVE:   VITALS:  height is 5' 4.75\" (1.645 m) and weight is 206 lb (93.4 kg). Her temporal temperature is 96.6 °F (35.9 °C). Her blood pressure is 125/72 and her pulse is 70. Her respiration is 18 and oxygen saturation is 100%. CONSTITUTIONAL:alert & cooperative, no acute distress.   Very pleasant and talkative. SKIN:  Warm and dry, no rashes on exposed areas of skin. HEAD:  Normocephalic, atraumatic   EYES: EOMs intact. Wearing glasses. EARS:  Hearing grossly WNL. NOSE:  Nares patent. No rhinorrhea   MOUTH/THROAT:  benign  NECK:supple, no lymphadenopathy  LUNGS: Clear to auscultation bilaterally, no wheezes. CARDIOVASCULAR: Heart sounds are normal.  Regular rate and rhythm without murmur. ABDOMEN: soft, non tender, non distended. EXTREMITIES: no edema bilateral lower extremities. Testing:   EK22  Labs pending: drawn 3/28/2022   Chest XRay:  22    IMPRESSIONS:   1. obesity  2.  has a past medical history of Abnormal EKG (2018), Angina pectoris syndrome ([address] (2018), Diverticulosis of colon, DMII (diabetes mellitus, type 2) ([address] (), Essential hypertension (01/15/2016), Family history of coronary artery disease (01/15/2016), Fatty infiltration of liver (), Gastric polyp (), Generalized anxiety disorder, H/O: hysterectomy (), History of tobacco abuse (01/15/2016), Obesity (BMI 30-39.9), Other specified acquired hypothyroidism (), Precordial pain (2018), S/P colonoscopy with polypectomy (, , ), S/P vaginal hysterectomy (), Seasonal allergies, Sleep apnea, obstructive (), Under care of team (2022), Under care of team (2022), Vasospastic angina ([address] Vertigo (), Wears dentures, and Wears glasses. PLANS:   1.  Gastric bypass Rebecca En Lorrie Lagos PA-C  Electronically signed 3/28/2022 at 12:07 PM

## 2022-03-30 LAB
3-OH-COTININE: <2 NG/ML
COTININE: <2 NG/ML
EKG ATRIAL RATE: 64 BPM
EKG P AXIS: 22 DEGREES
EKG P-R INTERVAL: 174 MS
EKG Q-T INTERVAL: 398 MS
EKG QRS DURATION: 106 MS
EKG QTC CALCULATION (BAZETT): 410 MS
EKG R AXIS: -5 DEGREES
EKG T AXIS: 84 DEGREES
EKG VENTRICULAR RATE: 64 BPM
NICOTINE: <2 NG/ML

## 2022-03-30 PROCEDURE — 93010 ELECTROCARDIOGRAM REPORT: CPT | Performed by: INTERNAL MEDICINE

## 2022-03-31 ENCOUNTER — OFFICE VISIT (OUTPATIENT)
Dept: BARIATRICS/WEIGHT MGMT | Age: 62
End: 2022-03-31
Payer: COMMERCIAL

## 2022-03-31 VITALS
WEIGHT: 204 LBS | DIASTOLIC BLOOD PRESSURE: 75 MMHG | SYSTOLIC BLOOD PRESSURE: 133 MMHG | HEART RATE: 75 BPM | BODY MASS INDEX: 33.99 KG/M2 | HEIGHT: 65 IN

## 2022-03-31 DIAGNOSIS — K21.00 GASTROESOPHAGEAL REFLUX DISEASE WITH ESOPHAGITIS WITHOUT HEMORRHAGE: Primary | ICD-10-CM

## 2022-03-31 DIAGNOSIS — K75.81 NASH (NONALCOHOLIC STEATOHEPATITIS): ICD-10-CM

## 2022-03-31 DIAGNOSIS — E66.9 OBESITY (BMI 30-39.9): ICD-10-CM

## 2022-03-31 PROCEDURE — 99213 OFFICE O/P EST LOW 20 MIN: CPT | Performed by: SURGERY

## 2022-04-07 NOTE — PROGRESS NOTES
kg)   BMI 34.21 kg/m²   General This patient is awake, alert, and oriented, and is in no apparent distress. Cardiac Regular rate and rhythm without evidence of murmur. Respiratory Clear to auscultation bilaterally. Abdomen Obese, soft, non-tender, non-distended without masses/ No  evidence of abdominal hernia / Incisions consistent with previous surgeries. Head and Neck Obese, normocephalic and atraumatic/soft and supple, no  lymphadenopathy or obvious bruits. Extremeties No cyanosis, clubbing or edema/ No calf tenderness/No  restrictions of movement, is ambulatory without assistance. Neurological Intact x 4 extremities, no focal deficits noted   Skin No rashes or lesions noted   Rectal Deferred     RECOMMENDATIONS:       Diagnosis Orders   1. Gastroesophageal reflux disease with esophagitis without hemorrhage     2. BELTRAN (nonalcoholic steatohepatitis)     3. Obesity (BMI 30-39. 9)            We spent a great deal of time discussing the risks and benefits of Laparoscopic Rebecca-en-Y Gastric Bypass, including but not limited to injury to intra-abdominal organs, breakdown of the gastric staple line, the need for re-operative therapy,  prolonged hospitalization,  mechanical ventilation,  and death. We discussed the possibility of bleeding, the need for blood transfusions, blood clots, hospital-acquired and intra-abdominal infection, anastomotic stricture, and worsening GERD. And we discussed the need for post-operative visit compliance, behavior modifications and diet changes, protein and vitamin supplementation, as well as routine scheduled and dedicated exercise. We discussed the potential weight loss benefit of approximately 60-70% of her excess body weight at 12-18 months post-op, as well as the possibility of insufficient weight loss or weight gain after 2 years post-operative time.      The following was discussed with the patient:    DVT Prophylaxis    GERD  Improvement of GERD with bypass  PPI    Morbid Obesity, BMI 35  Option of medical and surgical weight loss given  Need for long term diet and exercise to promote sustained weight loss  She would like to move forward with surgery    Electronically signed by Meche Hansen DO on 4/6/2022 at 8:59 PM    Please note that this chart was generated using voice recognition Dragon dictation software. Although every effort was made to ensure the accuracy of this automated transcription, some errors in transcription may have occurred.

## 2022-04-08 DIAGNOSIS — E03.9 HYPOTHYROIDISM, UNSPECIFIED TYPE: ICD-10-CM

## 2022-04-12 ENCOUNTER — ANESTHESIA (OUTPATIENT)
Dept: OPERATING ROOM | Age: 62
DRG: 621 | End: 2022-04-12
Payer: COMMERCIAL

## 2022-04-12 ENCOUNTER — HOSPITAL ENCOUNTER (INPATIENT)
Age: 62
LOS: 2 days | Discharge: HOME | DRG: 621 | End: 2022-04-14
Attending: SURGERY | Admitting: SURGERY
Payer: COMMERCIAL

## 2022-04-12 ENCOUNTER — ANESTHESIA EVENT (OUTPATIENT)
Dept: OPERATING ROOM | Age: 62
DRG: 621 | End: 2022-04-12
Payer: COMMERCIAL

## 2022-04-12 VITALS — TEMPERATURE: 96 F | OXYGEN SATURATION: 95 % | DIASTOLIC BLOOD PRESSURE: 81 MMHG | SYSTOLIC BLOOD PRESSURE: 144 MMHG

## 2022-04-12 DIAGNOSIS — Z98.84 STATUS POST GASTRIC BYPASS FOR OBESITY: ICD-10-CM

## 2022-04-12 DIAGNOSIS — G89.18 POST-OP PAIN: Primary | ICD-10-CM

## 2022-04-12 LAB
ANION GAP SERPL CALCULATED.3IONS-SCNC: 12 MMOL/L (ref 9–17)
BUN BLDV-MCNC: 21 MG/DL (ref 6–20)
CALCIUM SERPL-MCNC: 9 MG/DL (ref 8.6–10.4)
CHLORIDE BLD-SCNC: 104 MMOL/L (ref 98–107)
CO2: 23 MMOL/L (ref 20–31)
CREAT SERPL-MCNC: 1.28 MG/DL (ref 0.5–0.9)
GFR AFRICAN AMERICAN: 52 ML/MIN
GFR NON-AFRICAN AMERICAN: 43 ML/MIN
GFR SERPL CREATININE-BSD FRML MDRD: ABNORMAL ML/MIN/{1.73_M2}
GLUCOSE BLD-MCNC: 137 MG/DL (ref 74–100)
GLUCOSE BLD-MCNC: 165 MG/DL (ref 65–105)
GLUCOSE BLD-MCNC: 183 MG/DL (ref 70–99)
HCT VFR BLD CALC: 44 % (ref 36.3–47.1)
HEMOGLOBIN: 12.9 G/DL (ref 11.9–15.1)
MCH RBC QN AUTO: 27.2 PG (ref 25.2–33.5)
MCHC RBC AUTO-ENTMCNC: 29.3 G/DL (ref 28.4–34.8)
MCV RBC AUTO: 92.8 FL (ref 82.6–102.9)
NRBC AUTOMATED: 0 PER 100 WBC
PDW BLD-RTO: 13.6 % (ref 11.8–14.4)
PLATELET # BLD: 223 K/UL (ref 138–453)
PMV BLD AUTO: 9.6 FL (ref 8.1–13.5)
POC POTASSIUM: 4.2 MMOL/L (ref 3.5–4.5)
POTASSIUM SERPL-SCNC: 5.3 MMOL/L (ref 3.7–5.3)
RBC # BLD: 4.74 M/UL (ref 3.95–5.11)
SODIUM BLD-SCNC: 139 MMOL/L (ref 135–144)
WBC # BLD: 18 K/UL (ref 3.5–11.3)

## 2022-04-12 PROCEDURE — 2580000003 HC RX 258: Performed by: SURGERY

## 2022-04-12 PROCEDURE — 6370000000 HC RX 637 (ALT 250 FOR IP): Performed by: SURGERY

## 2022-04-12 PROCEDURE — 80048 BASIC METABOLIC PNL TOTAL CA: CPT

## 2022-04-12 PROCEDURE — 82947 ASSAY GLUCOSE BLOOD QUANT: CPT

## 2022-04-12 PROCEDURE — 3600000009 HC SURGERY ROBOT BASE: Performed by: SURGERY

## 2022-04-12 PROCEDURE — A4216 STERILE WATER/SALINE, 10 ML: HCPCS | Performed by: NURSE ANESTHETIST, CERTIFIED REGISTERED

## 2022-04-12 PROCEDURE — 3700000000 HC ANESTHESIA ATTENDED CARE: Performed by: SURGERY

## 2022-04-12 PROCEDURE — 2720000010 HC SURG SUPPLY STERILE: Performed by: SURGERY

## 2022-04-12 PROCEDURE — 2580000003 HC RX 258: Performed by: ANESTHESIOLOGY

## 2022-04-12 PROCEDURE — 84132 ASSAY OF SERUM POTASSIUM: CPT

## 2022-04-12 PROCEDURE — 0D164ZA BYPASS STOMACH TO JEJUNUM, PERCUTANEOUS ENDOSCOPIC APPROACH: ICD-10-PCS | Performed by: SURGERY

## 2022-04-12 PROCEDURE — 0DJ08ZZ INSPECTION OF UPPER INTESTINAL TRACT, VIA NATURAL OR ARTIFICIAL OPENING ENDOSCOPIC: ICD-10-PCS | Performed by: SURGERY

## 2022-04-12 PROCEDURE — 43644 LAP GASTRIC BYPASS/ROUX-EN-Y: CPT | Performed by: SURGERY

## 2022-04-12 PROCEDURE — 6360000002 HC RX W HCPCS: Performed by: SURGERY

## 2022-04-12 PROCEDURE — 2500000003 HC RX 250 WO HCPCS: Performed by: SURGERY

## 2022-04-12 PROCEDURE — 1200000000 HC SEMI PRIVATE

## 2022-04-12 PROCEDURE — 8E0W4CZ ROBOTIC ASSISTED PROCEDURE OF TRUNK REGION, PERCUTANEOUS ENDOSCOPIC APPROACH: ICD-10-PCS | Performed by: SURGERY

## 2022-04-12 PROCEDURE — 6360000002 HC RX W HCPCS: Performed by: ANESTHESIOLOGY

## 2022-04-12 PROCEDURE — 43281 LAP PARAESOPHAG HERN REPAIR: CPT | Performed by: SURGERY

## 2022-04-12 PROCEDURE — 3600000019 HC SURGERY ROBOT ADDTL 15MIN: Performed by: SURGERY

## 2022-04-12 PROCEDURE — 7100000000 HC PACU RECOVERY - FIRST 15 MIN: Performed by: SURGERY

## 2022-04-12 PROCEDURE — 2709999900 HC NON-CHARGEABLE SUPPLY: Performed by: SURGERY

## 2022-04-12 PROCEDURE — 0BQT4ZZ REPAIR DIAPHRAGM, PERCUTANEOUS ENDOSCOPIC APPROACH: ICD-10-PCS | Performed by: SURGERY

## 2022-04-12 PROCEDURE — 85027 COMPLETE CBC AUTOMATED: CPT

## 2022-04-12 PROCEDURE — 6360000002 HC RX W HCPCS: Performed by: NURSE ANESTHETIST, CERTIFIED REGISTERED

## 2022-04-12 PROCEDURE — 2500000003 HC RX 250 WO HCPCS: Performed by: NURSE ANESTHETIST, CERTIFIED REGISTERED

## 2022-04-12 PROCEDURE — 3700000001 HC ADD 15 MINUTES (ANESTHESIA): Performed by: SURGERY

## 2022-04-12 PROCEDURE — S2900 ROBOTIC SURGICAL SYSTEM: HCPCS | Performed by: SURGERY

## 2022-04-12 PROCEDURE — 2580000003 HC RX 258: Performed by: NURSE ANESTHETIST, CERTIFIED REGISTERED

## 2022-04-12 PROCEDURE — 7100000001 HC PACU RECOVERY - ADDTL 15 MIN: Performed by: SURGERY

## 2022-04-12 RX ORDER — LIDOCAINE HYDROCHLORIDE 10 MG/ML
INJECTION, SOLUTION EPIDURAL; INFILTRATION; INTRACAUDAL; PERINEURAL PRN
Status: DISCONTINUED | OUTPATIENT
Start: 2022-04-12 | End: 2022-04-12 | Stop reason: SDUPTHER

## 2022-04-12 RX ORDER — OXYCODONE HCL 5 MG/5 ML
5 SOLUTION, ORAL ORAL EVERY 4 HOURS PRN
Status: DISCONTINUED | OUTPATIENT
Start: 2022-04-12 | End: 2022-04-14 | Stop reason: HOSPADM

## 2022-04-12 RX ORDER — PROPOFOL 10 MG/ML
INJECTION, EMULSION INTRAVENOUS PRN
Status: DISCONTINUED | OUTPATIENT
Start: 2022-04-12 | End: 2022-04-12 | Stop reason: SDUPTHER

## 2022-04-12 RX ORDER — SODIUM CHLORIDE 0.9 % (FLUSH) 0.9 %
5-40 SYRINGE (ML) INJECTION PRN
Status: DISCONTINUED | OUTPATIENT
Start: 2022-04-12 | End: 2022-04-14 | Stop reason: HOSPADM

## 2022-04-12 RX ORDER — DIPHENHYDRAMINE HYDROCHLORIDE 50 MG/ML
INJECTION INTRAMUSCULAR; INTRAVENOUS PRN
Status: DISCONTINUED | OUTPATIENT
Start: 2022-04-12 | End: 2022-04-12 | Stop reason: SDUPTHER

## 2022-04-12 RX ORDER — MIDAZOLAM HYDROCHLORIDE 2 MG/2ML
2 INJECTION, SOLUTION INTRAMUSCULAR; INTRAVENOUS ONCE
Status: COMPLETED | OUTPATIENT
Start: 2022-04-12 | End: 2022-04-12

## 2022-04-12 RX ORDER — FENTANYL CITRATE 50 UG/ML
INJECTION, SOLUTION INTRAMUSCULAR; INTRAVENOUS PRN
Status: DISCONTINUED | OUTPATIENT
Start: 2022-04-12 | End: 2022-04-12 | Stop reason: SDUPTHER

## 2022-04-12 RX ORDER — DROPERIDOL 2.5 MG/ML
0.62 INJECTION, SOLUTION INTRAMUSCULAR; INTRAVENOUS
Status: COMPLETED | OUTPATIENT
Start: 2022-04-12 | End: 2022-04-12

## 2022-04-12 RX ORDER — SCOLOPAMINE TRANSDERMAL SYSTEM 1 MG/1
1 PATCH, EXTENDED RELEASE TRANSDERMAL
Status: DISCONTINUED | OUTPATIENT
Start: 2022-04-12 | End: 2022-04-14 | Stop reason: HOSPADM

## 2022-04-12 RX ORDER — NEOSTIGMINE METHYLSULFATE 5 MG/5 ML
SYRINGE (ML) INTRAVENOUS PRN
Status: DISCONTINUED | OUTPATIENT
Start: 2022-04-12 | End: 2022-04-12 | Stop reason: SDUPTHER

## 2022-04-12 RX ORDER — IPRATROPIUM BROMIDE AND ALBUTEROL SULFATE 2.5; .5 MG/3ML; MG/3ML
1 SOLUTION RESPIRATORY (INHALATION)
Status: DISCONTINUED | OUTPATIENT
Start: 2022-04-12 | End: 2022-04-14 | Stop reason: HOSPADM

## 2022-04-12 RX ORDER — HYDRALAZINE HYDROCHLORIDE 20 MG/ML
10 INJECTION INTRAMUSCULAR; INTRAVENOUS
Status: DISCONTINUED | OUTPATIENT
Start: 2022-04-12 | End: 2022-04-12 | Stop reason: HOSPADM

## 2022-04-12 RX ORDER — SODIUM CHLORIDE 9 MG/ML
25 INJECTION, SOLUTION INTRAVENOUS PRN
Status: DISCONTINUED | OUTPATIENT
Start: 2022-04-12 | End: 2022-04-12 | Stop reason: HOSPADM

## 2022-04-12 RX ORDER — SODIUM CHLORIDE, SODIUM LACTATE, POTASSIUM CHLORIDE, CALCIUM CHLORIDE 600; 310; 30; 20 MG/100ML; MG/100ML; MG/100ML; MG/100ML
INJECTION, SOLUTION INTRAVENOUS CONTINUOUS
Status: DISCONTINUED | OUTPATIENT
Start: 2022-04-12 | End: 2022-04-13

## 2022-04-12 RX ORDER — PROMETHAZINE HYDROCHLORIDE 25 MG/1
25 TABLET ORAL EVERY 6 HOURS PRN
Qty: 28 TABLET | Refills: 0 | OUTPATIENT
Start: 2022-04-12 | End: 2022-04-19

## 2022-04-12 RX ORDER — METOCLOPRAMIDE HYDROCHLORIDE 5 MG/ML
10 INJECTION INTRAMUSCULAR; INTRAVENOUS
Status: DISCONTINUED | OUTPATIENT
Start: 2022-04-12 | End: 2022-04-12 | Stop reason: HOSPADM

## 2022-04-12 RX ORDER — ONDANSETRON 2 MG/ML
INJECTION INTRAMUSCULAR; INTRAVENOUS PRN
Status: DISCONTINUED | OUTPATIENT
Start: 2022-04-12 | End: 2022-04-12 | Stop reason: SDUPTHER

## 2022-04-12 RX ORDER — HEPARIN SODIUM 5000 [USP'U]/ML
5000 INJECTION, SOLUTION INTRAVENOUS; SUBCUTANEOUS ONCE
Status: COMPLETED | OUTPATIENT
Start: 2022-04-12 | End: 2022-04-12

## 2022-04-12 RX ORDER — HEPARIN SODIUM 5000 [USP'U]/ML
5000 INJECTION, SOLUTION INTRAVENOUS; SUBCUTANEOUS EVERY 8 HOURS SCHEDULED
Status: DISCONTINUED | OUTPATIENT
Start: 2022-04-12 | End: 2022-04-14 | Stop reason: HOSPADM

## 2022-04-12 RX ORDER — FAMOTIDINE 20 MG/1
20 TABLET, FILM COATED ORAL 2 TIMES DAILY
Status: DISCONTINUED | OUTPATIENT
Start: 2022-04-12 | End: 2022-04-14 | Stop reason: HOSPADM

## 2022-04-12 RX ORDER — DEXAMETHASONE SODIUM PHOSPHATE 10 MG/ML
INJECTION INTRAMUSCULAR; INTRAVENOUS PRN
Status: DISCONTINUED | OUTPATIENT
Start: 2022-04-12 | End: 2022-04-12 | Stop reason: SDUPTHER

## 2022-04-12 RX ORDER — DIPHENHYDRAMINE HYDROCHLORIDE 50 MG/ML
12.5 INJECTION INTRAMUSCULAR; INTRAVENOUS
Status: DISCONTINUED | OUTPATIENT
Start: 2022-04-12 | End: 2022-04-12 | Stop reason: HOSPADM

## 2022-04-12 RX ORDER — PROMETHAZINE HYDROCHLORIDE 12.5 MG/1
25 TABLET ORAL EVERY 6 HOURS PRN
Status: DISCONTINUED | OUTPATIENT
Start: 2022-04-12 | End: 2022-04-14 | Stop reason: HOSPADM

## 2022-04-12 RX ORDER — SODIUM CHLORIDE, SODIUM LACTATE, POTASSIUM CHLORIDE, CALCIUM CHLORIDE 600; 310; 30; 20 MG/100ML; MG/100ML; MG/100ML; MG/100ML
1000 INJECTION, SOLUTION INTRAVENOUS CONTINUOUS
Status: DISCONTINUED | OUTPATIENT
Start: 2022-04-12 | End: 2022-04-13

## 2022-04-12 RX ORDER — SODIUM CHLORIDE 0.9 % (FLUSH) 0.9 %
5-40 SYRINGE (ML) INJECTION PRN
Status: DISCONTINUED | OUTPATIENT
Start: 2022-04-12 | End: 2022-04-12 | Stop reason: HOSPADM

## 2022-04-12 RX ORDER — ONDANSETRON 2 MG/ML
4 INJECTION INTRAMUSCULAR; INTRAVENOUS EVERY 6 HOURS PRN
Status: DISCONTINUED | OUTPATIENT
Start: 2022-04-12 | End: 2022-04-14 | Stop reason: HOSPADM

## 2022-04-12 RX ORDER — SODIUM CHLORIDE 0.9 % (FLUSH) 0.9 %
5-40 SYRINGE (ML) INJECTION EVERY 12 HOURS SCHEDULED
Status: DISCONTINUED | OUTPATIENT
Start: 2022-04-12 | End: 2022-04-12 | Stop reason: HOSPADM

## 2022-04-12 RX ORDER — GLYCOPYRROLATE 1 MG/5 ML
SYRINGE (ML) INTRAVENOUS PRN
Status: DISCONTINUED | OUTPATIENT
Start: 2022-04-12 | End: 2022-04-12 | Stop reason: SDUPTHER

## 2022-04-12 RX ORDER — SODIUM CHLORIDE 9 MG/ML
25 INJECTION, SOLUTION INTRAVENOUS PRN
Status: DISCONTINUED | OUTPATIENT
Start: 2022-04-12 | End: 2022-04-14 | Stop reason: HOSPADM

## 2022-04-12 RX ORDER — CEFAZOLIN SODIUM 1 G/50ML
1000 INJECTION, SOLUTION INTRAVENOUS EVERY 8 HOURS
Status: DISPENSED | OUTPATIENT
Start: 2022-04-12 | End: 2022-04-13

## 2022-04-12 RX ORDER — SODIUM CHLORIDE 0.9 % (FLUSH) 0.9 %
5-40 SYRINGE (ML) INJECTION EVERY 12 HOURS SCHEDULED
Status: DISCONTINUED | OUTPATIENT
Start: 2022-04-12 | End: 2022-04-14 | Stop reason: HOSPADM

## 2022-04-12 RX ORDER — SODIUM CHLORIDE 9 MG/ML
INJECTION INTRAVENOUS PRN
Status: DISCONTINUED | OUTPATIENT
Start: 2022-04-12 | End: 2022-04-12 | Stop reason: SDUPTHER

## 2022-04-12 RX ORDER — ROCURONIUM BROMIDE 10 MG/ML
INJECTION, SOLUTION INTRAVENOUS PRN
Status: DISCONTINUED | OUTPATIENT
Start: 2022-04-12 | End: 2022-04-12 | Stop reason: SDUPTHER

## 2022-04-12 RX ORDER — EPHEDRINE SULFATE/0.9% NACL/PF 50 MG/5 ML
SYRINGE (ML) INTRAVENOUS PRN
Status: DISCONTINUED | OUTPATIENT
Start: 2022-04-12 | End: 2022-04-12 | Stop reason: SDUPTHER

## 2022-04-12 RX ORDER — MAGNESIUM HYDROXIDE 1200 MG/15ML
LIQUID ORAL CONTINUOUS PRN
Status: DISCONTINUED | OUTPATIENT
Start: 2022-04-12 | End: 2022-04-12 | Stop reason: ALTCHOICE

## 2022-04-12 RX ORDER — MEPERIDINE HYDROCHLORIDE 50 MG/ML
12.5 INJECTION INTRAMUSCULAR; INTRAVENOUS; SUBCUTANEOUS EVERY 5 MIN PRN
Status: DISCONTINUED | OUTPATIENT
Start: 2022-04-12 | End: 2022-04-12 | Stop reason: HOSPADM

## 2022-04-12 RX ADMIN — PHENYLEPHRINE HYDROCHLORIDE 100 MCG: 10 INJECTION INTRAVENOUS at 09:24

## 2022-04-12 RX ADMIN — HEPARIN SODIUM 5000 UNITS: 5000 INJECTION INTRAVENOUS; SUBCUTANEOUS at 21:43

## 2022-04-12 RX ADMIN — FENTANYL CITRATE 100 MCG: 50 INJECTION, SOLUTION INTRAMUSCULAR; INTRAVENOUS at 08:51

## 2022-04-12 RX ADMIN — Medication 0.8 MG: at 12:16

## 2022-04-12 RX ADMIN — PHENYLEPHRINE HYDROCHLORIDE 100 MCG: 10 INJECTION INTRAVENOUS at 09:58

## 2022-04-12 RX ADMIN — DEXAMETHASONE SODIUM PHOSPHATE 8 MG: 10 INJECTION INTRAMUSCULAR; INTRAVENOUS at 08:56

## 2022-04-12 RX ADMIN — Medication 5 MG: at 12:16

## 2022-04-12 RX ADMIN — WATER 2000 MG: 1 INJECTION INTRAMUSCULAR; INTRAVENOUS; SUBCUTANEOUS at 08:58

## 2022-04-12 RX ADMIN — SODIUM CHLORIDE 10 ML: 9 INJECTION, SOLUTION INTRAMUSCULAR; INTRAVENOUS; SUBCUTANEOUS at 09:05

## 2022-04-12 RX ADMIN — SODIUM CHLORIDE, POTASSIUM CHLORIDE, SODIUM LACTATE AND CALCIUM CHLORIDE 1000 ML: 600; 310; 30; 20 INJECTION, SOLUTION INTRAVENOUS at 07:14

## 2022-04-12 RX ADMIN — METRONIDAZOLE 500 MG: 500 INJECTION, SOLUTION INTRAVENOUS at 18:47

## 2022-04-12 RX ADMIN — ROCURONIUM BROMIDE 50 MG: 10 INJECTION INTRAVENOUS at 08:51

## 2022-04-12 RX ADMIN — ROCURONIUM BROMIDE 10 MG: 10 INJECTION INTRAVENOUS at 11:24

## 2022-04-12 RX ADMIN — HYDROMORPHONE HYDROCHLORIDE 1 MG: 1 INJECTION, SOLUTION INTRAMUSCULAR; INTRAVENOUS; SUBCUTANEOUS at 15:46

## 2022-04-12 RX ADMIN — SODIUM CHLORIDE, POTASSIUM CHLORIDE, SODIUM LACTATE AND CALCIUM CHLORIDE 1000 ML: 600; 310; 30; 20 INJECTION, SOLUTION INTRAVENOUS at 14:26

## 2022-04-12 RX ADMIN — ROCURONIUM BROMIDE 10 MG: 10 INJECTION INTRAVENOUS at 12:02

## 2022-04-12 RX ADMIN — CEFAZOLIN SODIUM 1000 MG: 1 INJECTION, SOLUTION INTRAVENOUS at 22:31

## 2022-04-12 RX ADMIN — Medication 0.2 MG: at 09:20

## 2022-04-12 RX ADMIN — DIPHENHYDRAMINE HYDROCHLORIDE 12.5 MG: 50 INJECTION, SOLUTION INTRAMUSCULAR; INTRAVENOUS at 08:56

## 2022-04-12 RX ADMIN — Medication 10 MG: at 09:28

## 2022-04-12 RX ADMIN — ROCURONIUM BROMIDE 10 MG: 10 INJECTION INTRAVENOUS at 10:53

## 2022-04-12 RX ADMIN — MIDAZOLAM HYDROCHLORIDE 2 MG: 1 INJECTION, SOLUTION INTRAMUSCULAR; INTRAVENOUS at 08:12

## 2022-04-12 RX ADMIN — ROCURONIUM BROMIDE 10 MG: 10 INJECTION INTRAVENOUS at 09:24

## 2022-04-12 RX ADMIN — PHENYLEPHRINE HYDROCHLORIDE 100 MCG: 10 INJECTION INTRAVENOUS at 10:02

## 2022-04-12 RX ADMIN — PHENYLEPHRINE HYDROCHLORIDE 50 MCG/MIN: 10 INJECTION INTRAVENOUS at 10:24

## 2022-04-12 RX ADMIN — FENTANYL CITRATE 50 MCG: 50 INJECTION, SOLUTION INTRAMUSCULAR; INTRAVENOUS at 12:52

## 2022-04-12 RX ADMIN — LIDOCAINE HYDROCHLORIDE 50 MG: 10 INJECTION, SOLUTION EPIDURAL; INFILTRATION; INTRACAUDAL; PERINEURAL at 08:51

## 2022-04-12 RX ADMIN — PHENYLEPHRINE HYDROCHLORIDE 100 MCG: 10 INJECTION INTRAVENOUS at 09:27

## 2022-04-12 RX ADMIN — HYDROMORPHONE HYDROCHLORIDE 0.5 MG: 1 INJECTION, SOLUTION INTRAMUSCULAR; INTRAVENOUS; SUBCUTANEOUS at 13:45

## 2022-04-12 RX ADMIN — PHENYLEPHRINE HYDROCHLORIDE 100 MCG: 10 INJECTION INTRAVENOUS at 09:18

## 2022-04-12 RX ADMIN — ONDANSETRON 4 MG: 2 INJECTION INTRAMUSCULAR; INTRAVENOUS at 12:52

## 2022-04-12 RX ADMIN — FENTANYL CITRATE 50 MCG: 50 INJECTION, SOLUTION INTRAMUSCULAR; INTRAVENOUS at 12:15

## 2022-04-12 RX ADMIN — PROPOFOL 200 MG: 10 INJECTION, EMULSION INTRAVENOUS at 08:51

## 2022-04-12 RX ADMIN — ROCURONIUM BROMIDE 10 MG: 10 INJECTION INTRAVENOUS at 10:27

## 2022-04-12 RX ADMIN — PHENYLEPHRINE HYDROCHLORIDE 100 MCG: 10 INJECTION INTRAVENOUS at 09:23

## 2022-04-12 RX ADMIN — HEPARIN SODIUM 5000 UNITS: 5000 INJECTION INTRAVENOUS; SUBCUTANEOUS at 07:24

## 2022-04-12 RX ADMIN — METRONIDAZOLE 500 MG: 500 INJECTION, SOLUTION INTRAVENOUS at 09:02

## 2022-04-12 RX ADMIN — FENTANYL CITRATE 50 MCG: 50 INJECTION, SOLUTION INTRAMUSCULAR; INTRAVENOUS at 10:53

## 2022-04-12 RX ADMIN — PHENYLEPHRINE HYDROCHLORIDE 100 MCG: 10 INJECTION INTRAVENOUS at 09:05

## 2022-04-12 RX ADMIN — WATER 2000 MG: 1 INJECTION INTRAMUSCULAR; INTRAVENOUS; SUBCUTANEOUS at 12:50

## 2022-04-12 RX ADMIN — SODIUM CHLORIDE, PRESERVATIVE FREE 20 MG: 5 INJECTION INTRAVENOUS at 21:42

## 2022-04-12 RX ADMIN — FENTANYL CITRATE 50 MCG: 50 INJECTION, SOLUTION INTRAMUSCULAR; INTRAVENOUS at 13:24

## 2022-04-12 RX ADMIN — DROPERIDOL 0.62 MG: 2.5 INJECTION, SOLUTION INTRAMUSCULAR; INTRAVENOUS at 17:22

## 2022-04-12 RX ADMIN — SODIUM CHLORIDE, POTASSIUM CHLORIDE, SODIUM LACTATE AND CALCIUM CHLORIDE: 600; 310; 30; 20 INJECTION, SOLUTION INTRAVENOUS at 10:34

## 2022-04-12 RX ADMIN — Medication 10 MG: at 09:25

## 2022-04-12 RX ADMIN — HYDROMORPHONE HYDROCHLORIDE 0.5 MG: 1 INJECTION, SOLUTION INTRAMUSCULAR; INTRAVENOUS; SUBCUTANEOUS at 13:55

## 2022-04-12 RX ADMIN — ROCURONIUM BROMIDE 10 MG: 10 INJECTION INTRAVENOUS at 09:54

## 2022-04-12 ASSESSMENT — PULMONARY FUNCTION TESTS
PIF_VALUE: 5
PIF_VALUE: 34
PIF_VALUE: 31
PIF_VALUE: 29
PIF_VALUE: 18
PIF_VALUE: 16
PIF_VALUE: 31
PIF_VALUE: 32
PIF_VALUE: 35
PIF_VALUE: 27
PIF_VALUE: 33
PIF_VALUE: 36
PIF_VALUE: 23
PIF_VALUE: 32
PIF_VALUE: 23
PIF_VALUE: 35
PIF_VALUE: 35
PIF_VALUE: 19
PIF_VALUE: 33
PIF_VALUE: 34
PIF_VALUE: 35
PIF_VALUE: 34
PIF_VALUE: 28
PIF_VALUE: 31
PIF_VALUE: 34
PIF_VALUE: 32
PIF_VALUE: 31
PIF_VALUE: 32
PIF_VALUE: 33
PIF_VALUE: 22
PIF_VALUE: 18
PIF_VALUE: 33
PIF_VALUE: 30
PIF_VALUE: 33
PIF_VALUE: 20
PIF_VALUE: 31
PIF_VALUE: 2
PIF_VALUE: 19
PIF_VALUE: 27
PIF_VALUE: 29
PIF_VALUE: 34
PIF_VALUE: 31
PIF_VALUE: 32
PIF_VALUE: 32
PIF_VALUE: 31
PIF_VALUE: 24
PIF_VALUE: 29
PIF_VALUE: 34
PIF_VALUE: 31
PIF_VALUE: 28
PIF_VALUE: 31
PIF_VALUE: 34
PIF_VALUE: 24
PIF_VALUE: 34
PIF_VALUE: 29
PIF_VALUE: 34
PIF_VALUE: 32
PIF_VALUE: 30
PIF_VALUE: 28
PIF_VALUE: 29
PIF_VALUE: 31
PIF_VALUE: 33
PIF_VALUE: 32
PIF_VALUE: 32
PIF_VALUE: 31
PIF_VALUE: 34
PIF_VALUE: 34
PIF_VALUE: 31
PIF_VALUE: 24
PIF_VALUE: 29
PIF_VALUE: 31
PIF_VALUE: 31
PIF_VALUE: 28
PIF_VALUE: 30
PIF_VALUE: 0
PIF_VALUE: 20
PIF_VALUE: 31
PIF_VALUE: 33
PIF_VALUE: 7
PIF_VALUE: 26
PIF_VALUE: 35
PIF_VALUE: 25
PIF_VALUE: 3
PIF_VALUE: 31
PIF_VALUE: 28
PIF_VALUE: 31
PIF_VALUE: 28
PIF_VALUE: 29
PIF_VALUE: 27
PIF_VALUE: 30
PIF_VALUE: 32
PIF_VALUE: 32
PIF_VALUE: 33
PIF_VALUE: 33
PIF_VALUE: 34
PIF_VALUE: 29
PIF_VALUE: 29
PIF_VALUE: 36
PIF_VALUE: 28
PIF_VALUE: 17
PIF_VALUE: 28
PIF_VALUE: 31
PIF_VALUE: 2
PIF_VALUE: 34
PIF_VALUE: 31
PIF_VALUE: 17
PIF_VALUE: 1
PIF_VALUE: 22
PIF_VALUE: 29
PIF_VALUE: 35
PIF_VALUE: 30
PIF_VALUE: 31
PIF_VALUE: 20
PIF_VALUE: 34
PIF_VALUE: 23
PIF_VALUE: 2
PIF_VALUE: 35
PIF_VALUE: 29
PIF_VALUE: 33
PIF_VALUE: 34
PIF_VALUE: 27
PIF_VALUE: 31
PIF_VALUE: 29
PIF_VALUE: 19
PIF_VALUE: 31
PIF_VALUE: 29
PIF_VALUE: 35
PIF_VALUE: 29
PIF_VALUE: 29
PIF_VALUE: 35
PIF_VALUE: 29
PIF_VALUE: 35
PIF_VALUE: 30
PIF_VALUE: 29
PIF_VALUE: 30
PIF_VALUE: 30
PIF_VALUE: 17
PIF_VALUE: 34
PIF_VALUE: 29
PIF_VALUE: 30
PIF_VALUE: 30
PIF_VALUE: 19
PIF_VALUE: 24
PIF_VALUE: 31
PIF_VALUE: 18
PIF_VALUE: 32
PIF_VALUE: 19
PIF_VALUE: 30
PIF_VALUE: 32
PIF_VALUE: 29
PIF_VALUE: 33
PIF_VALUE: 30
PIF_VALUE: 0
PIF_VALUE: 29
PIF_VALUE: 35
PIF_VALUE: 29
PIF_VALUE: 36
PIF_VALUE: 34
PIF_VALUE: 31
PIF_VALUE: 36
PIF_VALUE: 32
PIF_VALUE: 25
PIF_VALUE: 34
PIF_VALUE: 33
PIF_VALUE: 20
PIF_VALUE: 7
PIF_VALUE: 34
PIF_VALUE: 1
PIF_VALUE: 35
PIF_VALUE: 35
PIF_VALUE: 27
PIF_VALUE: 30
PIF_VALUE: 20
PIF_VALUE: 24
PIF_VALUE: 3
PIF_VALUE: 34
PIF_VALUE: 35
PIF_VALUE: 29
PIF_VALUE: 35
PIF_VALUE: 29
PIF_VALUE: 29
PIF_VALUE: 35
PIF_VALUE: 29
PIF_VALUE: 33
PIF_VALUE: 34
PIF_VALUE: 36
PIF_VALUE: 20
PIF_VALUE: 31
PIF_VALUE: 35
PIF_VALUE: 18
PIF_VALUE: 31
PIF_VALUE: 32
PIF_VALUE: 31
PIF_VALUE: 32
PIF_VALUE: 35
PIF_VALUE: 34
PIF_VALUE: 30
PIF_VALUE: 2
PIF_VALUE: 36
PIF_VALUE: 30
PIF_VALUE: 29
PIF_VALUE: 29
PIF_VALUE: 33
PIF_VALUE: 29
PIF_VALUE: 30
PIF_VALUE: 32
PIF_VALUE: 30
PIF_VALUE: 32
PIF_VALUE: 32
PIF_VALUE: 30
PIF_VALUE: 30
PIF_VALUE: 34
PIF_VALUE: 22
PIF_VALUE: 20
PIF_VALUE: 26
PIF_VALUE: 27
PIF_VALUE: 30
PIF_VALUE: 29
PIF_VALUE: 19
PIF_VALUE: 34
PIF_VALUE: 18
PIF_VALUE: 33
PIF_VALUE: 19
PIF_VALUE: 29
PIF_VALUE: 31
PIF_VALUE: 28
PIF_VALUE: 31
PIF_VALUE: 34
PIF_VALUE: 30
PIF_VALUE: 1
PIF_VALUE: 34
PIF_VALUE: 19
PIF_VALUE: 31
PIF_VALUE: 30
PIF_VALUE: 29
PIF_VALUE: 18
PIF_VALUE: 34
PIF_VALUE: 29
PIF_VALUE: 22
PIF_VALUE: 32
PIF_VALUE: 18
PIF_VALUE: 29
PIF_VALUE: 0
PIF_VALUE: 34
PIF_VALUE: 29
PIF_VALUE: 32
PIF_VALUE: 34
PIF_VALUE: 17
PIF_VALUE: 33
PIF_VALUE: 35
PIF_VALUE: 29
PIF_VALUE: 33
PIF_VALUE: 31
PIF_VALUE: 34
PIF_VALUE: 34
PIF_VALUE: 31
PIF_VALUE: 35
PIF_VALUE: 32
PIF_VALUE: 34
PIF_VALUE: 34
PIF_VALUE: 31
PIF_VALUE: 19
PIF_VALUE: 29
PIF_VALUE: 19
PIF_VALUE: 29
PIF_VALUE: 33
PIF_VALUE: 32
PIF_VALUE: 35
PIF_VALUE: 25
PIF_VALUE: 36
PIF_VALUE: 26
PIF_VALUE: 19
PIF_VALUE: 31
PIF_VALUE: 36
PIF_VALUE: 35
PIF_VALUE: 32
PIF_VALUE: 33
PIF_VALUE: 33
PIF_VALUE: 31
PIF_VALUE: 26

## 2022-04-12 ASSESSMENT — PAIN DESCRIPTION - ONSET: ONSET: ON-GOING

## 2022-04-12 ASSESSMENT — PAIN DESCRIPTION - PROGRESSION: CLINICAL_PROGRESSION: NOT CHANGED

## 2022-04-12 ASSESSMENT — PAIN DESCRIPTION - LOCATION
LOCATION: ABDOMEN

## 2022-04-12 ASSESSMENT — PAIN SCALES - GENERAL
PAINLEVEL_OUTOF10: 7
PAINLEVEL_OUTOF10: 4
PAINLEVEL_OUTOF10: 1
PAINLEVEL_OUTOF10: 7
PAINLEVEL_OUTOF10: 4

## 2022-04-12 ASSESSMENT — PAIN DESCRIPTION - DESCRIPTORS: DESCRIPTORS: ACHING;BURNING

## 2022-04-12 ASSESSMENT — PAIN DESCRIPTION - PAIN TYPE
TYPE: SURGICAL PAIN

## 2022-04-12 ASSESSMENT — PAIN - FUNCTIONAL ASSESSMENT: PAIN_FUNCTIONAL_ASSESSMENT: 0-10

## 2022-04-12 NOTE — INTERVAL H&P NOTE
Pt Name: Nemo Calix  MRN: [id number]  YOB: 1960  Date of evaluation: 4/12/2022    I have reviewed the patient's history and physical examination completed in pre-admission testing.     Changes to history or on examination, if any, are as follows:  none    Nan Land PA-C  4/12/22  6:57 AM

## 2022-04-12 NOTE — ANESTHESIA PRE PROCEDURE
Department of Anesthesiology  Preprocedure Note       Name:  Jennifer Mendoza   Age:  63 y.o.  :  1960                                          MRN:  [id number]         Date:  2022      Surgeon: Antoni Morse):  Ethel Kim DO    Procedure: Procedure(s):  XI ROBOTIC LAPAROSCOPIC SHEILA-EN-Y GASTRIC BYPASS, LIVER BIOPSY, EGD, POSSIBLE OPEN - GI SCHEDULED    Medications prior to admission:   Prior to Admission medications    Medication Sig Start Date End Date Taking?  Authorizing Provider   potassium chloride (KLOR-CON M) 20 MEQ extended release tablet TAKE ONE TABLET TWO TIMES A DAY 22   Harmony Tyson PA-C   metoprolol tartrate (LOPRESSOR) 25 MG tablet Take 1 tablet by mouth daily 2/3/22   Harmony Tyson PA-C   pravastatin (PRAVACHOL) 20 MG tablet TAKE ONE TABLET BY MOUTH EVERY EVENING 21   Harmony Tyson PA-C   blood glucose test strips (Kailos GeneticsZZ TEST) strip USE TO TEST BLOOD SUGAR ONE TIME DAILY 21   MARCI Jordan   ranolazine (RANEXA) 500 MG extended release tablet Take 1 tablet by mouth 2 times daily 21   Shivam Michael DO   levothyroxine (SYNTHROID) 50 MCG tablet TAKE 1 TABLET BY MOUTH ONE TIME A DAY 10/4/21   Mario Causey MD   amLODIPine (NORVASC) 5 MG tablet TAKE 1 TABLET BY MOUTH ONE TIME A DAY 21   Harmony Tyson PA-C   buPROPion (WELLBUTRIN XL) 300 MG extended release tablet TAKE ONE TABLET BY MOUTH EVERY MORNING 21   Harmony Tyson PA-C   hydroCHLOROthiazide (HYDRODIURIL) 25 MG tablet TAKE 1 TABLET BY MOUTH ONE TIME A DAY IN THE MORNING 21   Harmony Tyson PA-C   empagliflozin (JARDIANCE) 25 MG tablet Take 1 tablet by mouth daily 21   Harmony Tyson PA-C   pantoprazole (PROTONIX) 20 MG tablet Take 1 tablet by mouth every morning (before breakfast) 21   Harmony Tyson PA-C   Blood Glucose Monitoring Suppl (AGACoLucid PharmaceuticalsIX JAZZ WIRELESS 2) w/Device KIT 1 each by Does not apply route once for 1 dose 21  Harmony Tyson PA-C   acetaminophen (TYLENOL) 500 MG tablet Take 1,000 mg by mouth every 6 hours as needed for Pain    Historical Provider, MD Hurtado Ultra-Thin Lancets MISC Use to test blood sugar 1 time daily 12/23/20   Harmony Tyson PA-C   nitroGLYCERIN (NITROSTAT) 0.4 MG SL tablet Place 1 tablet under the tongue every 5 minutes as needed for Chest pain Indications: never used up to max of 3 total doses. If no relief after 1 dose, call 911. Patient not taking: Reported on 4/12/2022 9/21/20   Shivam Michael DO   Blood Glucose Calibration (AGAMATRIX CONTROL) SOLN Use as directed for control solution test 4/28/20   Shoshana Sin MD   Cholecalciferol (VITAMIN D) 2000 UNITS CAPS capsule Take 1 capsule by mouth daily     Historical Provider, MD   loratadine (CLARITIN) 10 MG tablet Take 10 mg by mouth daily as needed (Allergies)     Historical Provider, MD       Current medications:    Current Facility-Administered Medications   Medication Dose Route Frequency Provider Last Rate Last Admin    ceFAZolin (ANCEF) 2,000 mg in sterile water 20 mL IV syringe  2,000 mg IntraVENous Once [place] Robertson, DO        lactated ringers infusion 1,000 mL  1,000 mL IntraVENous Continuous Sarah Morillo MD 50 mL/hr at 04/12/22 0714 1,000 mL at 04/12/22 0714    metronidazole (FLAGYL) 500 mg in NaCl 100 mL IVPB premix  500 mg IntraVENous Once [place] Robertson, DO           Allergies:     Allergies   Allergen Reactions    Seasonal Itching    Morphine Dizziness or Vertigo     IV morphine caused dizziness       Problem List:    Patient Active Problem List   Diagnosis Code    Vitamin D deficiency E55.9    Generalized anxiety disorder F41.1    Sleep apnea, obstructive G47.33    Type II diabetes mellitus, uncontrolled ([facility] E11.65    Hypothyroidism E03.9    Essential hypertension I10    Family history of coronary artery disease Z80.55    Personal history of tobacco use Z87.891    Non-cardiac chest pain R07.89    History of colon polyps Z86.010    Polyp of colon K63.5    Tobacco abuse, 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    Irregular Z line of esophagus K22.9    Gastric polyp K31.7    BELTRAN (nonalcoholic steatohepatitis) K75.81    Joint stiffness M25.60    Fatty infiltration of liver K76.0    Angina pectoris syndrome (HCC) I20.9    Obesity (BMI 30-39. 9) E66.9       Past Medical History:        Diagnosis Date    Abnormal EKG 03/23/2018    Angina pectoris syndrome ([facility] 03/23/2018    Diverticulosis of colon     DMII (diabetes mellitus, type 2) ([facility] 2000    Dr Corine Arambula hypertension 01/15/2016    Family history of coronary artery disease 01/15/2016    Fatty infiltration of liver 2020    Gastric polyp 2019    Dr Zo Lay, 5 mm    Generalized anxiety disorder     H/O: hysterectomy 2012    History of tobacco abuse 01/15/2016    Obesity (BMI 30-39. 9)     Other specified acquired hypothyroidism 2000    Precordial pain 03/23/2018    (? Prinzmetal?) Dr Karlee Echeverria S/P colonoscopy with polypectomy 2013, 2018, 2019    Dr David Rendon, Dr Zo Lay, tubulovillous adenoma, hyperplastic polyp    S/P vaginal hysterectomy 2012    benign    Seasonal allergies     Sleep apnea, obstructive 2007    was on CPAP, not using it at present    Under care of team 03/28/2022    pcp-Harmony Blanca-last visit nov 2021    Under care of team 03/28/2022    cardiac-[facility]-last visit dec 2021    Vasospastic angina [facility])     Dr. Kamlesh Alford (cardiology)    Vertigo 2018    Wears dentures     upper and lower full dentures    Wears glasses        Past Surgical History:        Procedure Laterality Date    CARDIAC CATHETERIZATION  2018    no stents    CARPAL TUNNEL RELEASE Left 01/13/2021    LEFT CARPAL TUNNEL RELEASE, LEFT MIDDLE FINGER TRIGGER RELEASE, TENOSYNOVECTOMY FDPFDS performed by Jennifer Fernandez MD at [address] [place] [address] Right 02/03/2021    RIGHT CARPAL TUNNEL RELEASE, TRIGGER FINGER RELEASE RIGHT MIDDLE FINGER, FDP, FDS, TENOSYNOVECTOMY performed by Daisy Melendez Miguel Keen MD at [address] [place], [address] [facility] COLONOSCOPY N/A 2020    COLONOSCOPY DIAGNOSTIC performed by Bin Quan MD at [facility] CATH LAB PROCEDURE      ENDOMETRIAL ABLATION  2002    metrorrhagia    ENDOSCOPY, COLON, DIAGNOSTIC      HYSTERECTOMY  2012    fibroid, cervicitis, ovaries intact    [place] COLON CA SCRN NOT  [address]  IND N/A 05/15/2018    COLONOSCOPY performed by Bin Quan MD at [address] ENDOSCOPY N/A 2019    EGD ESOPHAGOGASTRODUODENOSCOPY performed by Bin Quan MD at [facility]       Social History:    Social History     Tobacco Use    Smoking status: Former Smoker     Packs/day: 1.00     Years: 30.00     Pack years: 30.00     Types: Cigarettes     Start date: 3/5/1975     Quit date: 2005     Years since quittin.8    Smokeless tobacco: Never Used    Tobacco comment: 3/15/18 started age 17   Substance Use Topics    Alcohol use: No     Comment: not at all                                 Counseling given: Not Answered  Comment: 3/15/18 started age 17      Vital Signs (Current):   Vitals:    22 0712   BP: 139/65   Pulse: 65   Resp: 18   Temp: 97.3 °F (36.3 °C)   TempSrc: Temporal   SpO2: 97%   Weight: 212 lb 0.3 oz (96.2 kg)   Height: 5' 4\" (1.626 m)                                              BP Readings from Last 3 Encounters:   22 139/65   22 125/72   22 133/75       NPO Status: Time of last liquid consumption: 615                        Time of last solid consumption: 1800                        Date of last liquid consumption: 22                        Date of last solid food consumption: 04/10/22    BMI:   Wt Readings from Last 3 Encounters:   22 212 lb 0.3 oz (96.2 kg)   22 206 lb (93.4 kg)   22 204 lb (92.5 kg)     Body mass index is 36.39 kg/m².    CBC:   Lab Results   Component Value Date    WBC 9.9 03/28/2022    RBC 5.08 03/28/2022    HGB 13.9 03/28/2022    HCT 42.7 03/28/2022    MCV 84.1 03/28/2022    RDW 13.3 03/28/2022     03/28/2022       CMP:   Lab Results   Component Value Date     03/28/2022    K 3.7 03/28/2022     03/28/2022    CO2 26 03/28/2022    BUN 17 03/28/2022    CREATININE 1.07 03/28/2022    GFRAA >60 03/28/2022    LABGLOM 52 03/28/2022    GLUCOSE 143 03/28/2022    PROT 7.6 12/15/2021    CALCIUM 9.9 03/28/2022    BILITOT 0.37 12/15/2021    ALKPHOS 82 12/15/2021    AST 30 12/15/2021    ALT 33 12/15/2021       POC Tests:   Recent Labs     04/12/22  2886   POCGLU 137*   POCK 4.2       Coags:   Lab Results   Component Value Date    PROTIME 10.4 03/28/2022    INR 1.0 03/28/2022    APTT 26.4 03/28/2022    APTT 25.1 03/23/2018       HCG (If Applicable): No results found for: PREGTESTUR, PREGSERUM, HCG, HCGQUANT     ABGs: No results found for: PHART, PO2ART, XQH1RPG, WWB8MHK, BEART, X1VFWGUQ     Type & Screen (If Applicable):  No results found for: LABABO, LABRH    Drug/Infectious Status (If Applicable):  No results found for: HIV, HEPCAB    COVID-19 Screening (If Applicable):   Lab Results   Component Value Date    COVID19 Not Detected 01/29/2021           Anesthesia Evaluation  Patient summary reviewed and Nursing notes reviewed no history of anesthetic complications:   Airway: Mallampati: I  TM distance: >3 FB   Neck ROM: limited  Mouth opening: > = 3 FB Dental:    (+) edentulous      Pulmonary:normal exam    (+) sleep apnea:                            ROS comment: 30 pack year smoker quit 2005   Cardiovascular:    (+) hypertension:, angina:, CAD:,                   Neuro/Psych:   (+) depression/anxiety             GI/Hepatic/Renal:   (+) liver disease (BETLRAN):, renal disease: CRI, morbid obesity          Endo/Other:    (+) DiabetesType II DM, , .                 Abdominal:             Vascular:           Other Findings: Anesthesia Plan      general     ASA 3       Induction: intravenous. MIPS: Postoperative opioids intended and Prophylactic antiemetics administered. Anesthetic plan and risks discussed with patient. Plan discussed with CRNA.                   Clinton Rodriguez MD   4/12/2022

## 2022-04-12 NOTE — ANESTHESIA POSTPROCEDURE EVALUATION
POST- ANESTHESIA EVALUATION       Pt Name: Gabriel Oconnor  MRN: [id number]  [place]: 1960  Date of evaluation: 4/12/2022  Time:  2:17 PM      /65   Pulse 68   Temp 97.5 °F (36.4 °C) (Temporal)   Resp 12   Ht 5' 4\" (1.626 m)   Wt 212 lb 0.3 oz (96.2 kg)   SpO2 94%   BMI 36.39 kg/m²      Consciousness Level  Awake  Cardiopulmonary Status  Stable  Pain Adequately Treated YES  Nausea / Vomiting  NO  Adequate Hydration  YES  Anesthesia Related Complications NONE      Electronically signed by Vijay Bianchi MD on 4/12/2022 at 2:17 PM       Department of Anesthesiology  Postprocedure Note    Patient: Gabriel Oconnor  MRN: [id number]  [place]: 1960  Date of evaluation: 4/12/2022  Time:  2:16 PM     Procedure Summary     Date: 04/12/22 Room / Location: [address]    Anesthesia Start: 2385 Anesthesia Stop: 3876    Procedure: XI ROBOTIC LAPAROSCOPIC SHEILA-EN-Y GASTRIC BYPASS, EGD, HIATAL HERNIA REPAIR (N/A Abdomen) Diagnosis: (OBESITY, DIABETES, HYPERTENSION)    Surgeons: Meche Hansen DO Responsible Provider: Vijay Bianchi MD    Anesthesia Type: general ASA Status: 3          Anesthesia Type: general    Sergo Phase I: Sergo Score: 8    Sergo Phase II:      Last vitals: Reviewed and per EMR flowsheets.        Anesthesia Post Evaluation

## 2022-04-12 NOTE — BRIEF OP NOTE
Brief Postoperative Note      Patient: Ivy Johns  YOB: 1960  MRN: [id number]    Date of Procedure: 4/12/2022    Pre-Op Diagnosis: OBESITY, DIABETES, HYPERTENSION    Post-Op Diagnosis: Same       Procedure(s):  XI ROBOTIC LAPAROSCOPIC SHEILA-EN-Y GASTRIC BYPASS, EGD, HIATAL HERNIA REPAIR    Surgeon(s):  Carly Finney DO    Assistant:  First Assistant: Tania Hartmann    Anesthesia: General    Estimated Blood Loss (mL): Minimal    Complications: None    Specimens:   * No specimens in log *    Implants:  * No implants in log *      Drains:   Closed/Suction Drain Right RUQ;Abdomen Bulb 19 Frisian (Active)       Findings:     Electronically signed by Carly Finney DO on 4/12/2022 at 1:01 PM

## 2022-04-13 ENCOUNTER — APPOINTMENT (OUTPATIENT)
Dept: GENERAL RADIOLOGY | Age: 62
DRG: 621 | End: 2022-04-13
Attending: SURGERY
Payer: COMMERCIAL

## 2022-04-13 DIAGNOSIS — E03.9 HYPOTHYROIDISM, UNSPECIFIED TYPE: ICD-10-CM

## 2022-04-13 LAB
ANION GAP SERPL CALCULATED.3IONS-SCNC: 13 MMOL/L (ref 9–17)
BUN BLDV-MCNC: 23 MG/DL (ref 6–20)
CALCIUM SERPL-MCNC: 8.7 MG/DL (ref 8.6–10.4)
CHLORIDE BLD-SCNC: 104 MMOL/L (ref 98–107)
CO2: 23 MMOL/L (ref 20–31)
CREAT SERPL-MCNC: 1.16 MG/DL (ref 0.5–0.9)
GFR AFRICAN AMERICAN: 58 ML/MIN
GFR NON-AFRICAN AMERICAN: 48 ML/MIN
GFR SERPL CREATININE-BSD FRML MDRD: ABNORMAL ML/MIN/{1.73_M2}
GLUCOSE BLD-MCNC: 120 MG/DL (ref 70–99)
HCT VFR BLD CALC: 34.1 % (ref 36.3–47.1)
HEMOGLOBIN: 11 G/DL (ref 11.9–15.1)
MCH RBC QN AUTO: 27.5 PG (ref 25.2–33.5)
MCHC RBC AUTO-ENTMCNC: 32.3 G/DL (ref 28.4–34.8)
MCV RBC AUTO: 85.3 FL (ref 82.6–102.9)
NRBC AUTOMATED: 0 PER 100 WBC
PDW BLD-RTO: 13.6 % (ref 11.8–14.4)
PLATELET # BLD: 284 K/UL (ref 138–453)
PMV BLD AUTO: 9 FL (ref 8.1–13.5)
POTASSIUM SERPL-SCNC: 4.2 MMOL/L (ref 3.7–5.3)
RBC # BLD: 4 M/UL (ref 3.95–5.11)
SODIUM BLD-SCNC: 140 MMOL/L (ref 135–144)
WBC # BLD: 11.5 K/UL (ref 3.5–11.3)

## 2022-04-13 PROCEDURE — 80048 BASIC METABOLIC PNL TOTAL CA: CPT

## 2022-04-13 PROCEDURE — 94761 N-INVAS EAR/PLS OXIMETRY MLT: CPT

## 2022-04-13 PROCEDURE — 6360000002 HC RX W HCPCS: Performed by: SURGERY

## 2022-04-13 PROCEDURE — 74220 X-RAY XM ESOPHAGUS 1CNTRST: CPT

## 2022-04-13 PROCEDURE — 85027 COMPLETE CBC AUTOMATED: CPT

## 2022-04-13 PROCEDURE — 6370000000 HC RX 637 (ALT 250 FOR IP): Performed by: SURGERY

## 2022-04-13 PROCEDURE — 36415 COLL VENOUS BLD VENIPUNCTURE: CPT

## 2022-04-13 PROCEDURE — 94640 AIRWAY INHALATION TREATMENT: CPT

## 2022-04-13 PROCEDURE — 1200000000 HC SEMI PRIVATE

## 2022-04-13 PROCEDURE — 2580000003 HC RX 258: Performed by: SURGERY

## 2022-04-13 PROCEDURE — 2500000003 HC RX 250 WO HCPCS: Performed by: SURGERY

## 2022-04-13 PROCEDURE — 6360000004 HC RX CONTRAST MEDICATION: Performed by: SURGERY

## 2022-04-13 RX ORDER — PROMETHAZINE HYDROCHLORIDE 25 MG/1
25 TABLET ORAL EVERY 6 HOURS PRN
Qty: 20 TABLET | Refills: 0 | OUTPATIENT
Start: 2022-04-13 | End: 2022-04-20

## 2022-04-13 RX ORDER — CYCLOBENZAPRINE HCL 10 MG
10 TABLET ORAL 3 TIMES DAILY PRN
Qty: 21 TABLET | Refills: 0 | OUTPATIENT
Start: 2022-04-13 | End: 2022-04-23

## 2022-04-13 RX ORDER — OXYCODONE HYDROCHLORIDE AND ACETAMINOPHEN 5; 325 MG/1; MG/1
1 TABLET ORAL EVERY 6 HOURS PRN
Qty: 28 TABLET | Refills: 0 | OUTPATIENT
Start: 2022-04-13 | End: 2022-04-20

## 2022-04-13 RX ADMIN — ONDANSETRON 4 MG: 2 INJECTION INTRAMUSCULAR; INTRAVENOUS at 18:42

## 2022-04-13 RX ADMIN — WATER 1000 MG: 100 INJECTION, SOLUTION INTRAVENOUS at 06:43

## 2022-04-13 RX ADMIN — IPRATROPIUM BROMIDE AND ALBUTEROL SULFATE 1 AMPULE: .5; 3 SOLUTION RESPIRATORY (INHALATION) at 14:01

## 2022-04-13 RX ADMIN — HYDROMORPHONE HYDROCHLORIDE 1 MG: 1 INJECTION, SOLUTION INTRAMUSCULAR; INTRAVENOUS; SUBCUTANEOUS at 00:49

## 2022-04-13 RX ADMIN — FAMOTIDINE 20 MG: 20 TABLET, FILM COATED ORAL at 21:24

## 2022-04-13 RX ADMIN — HEPARIN SODIUM 5000 UNITS: 5000 INJECTION INTRAVENOUS; SUBCUTANEOUS at 13:32

## 2022-04-13 RX ADMIN — IPRATROPIUM BROMIDE AND ALBUTEROL SULFATE 1 AMPULE: .5; 3 SOLUTION RESPIRATORY (INHALATION) at 07:15

## 2022-04-13 RX ADMIN — IPRATROPIUM BROMIDE AND ALBUTEROL SULFATE 1 AMPULE: .5; 3 SOLUTION RESPIRATORY (INHALATION) at 21:17

## 2022-04-13 RX ADMIN — SODIUM CHLORIDE, POTASSIUM CHLORIDE, SODIUM LACTATE AND CALCIUM CHLORIDE: 600; 310; 30; 20 INJECTION, SOLUTION INTRAVENOUS at 00:52

## 2022-04-13 RX ADMIN — OXYCODONE HYDROCHLORIDE 5 MG: 5 SOLUTION ORAL at 22:58

## 2022-04-13 RX ADMIN — OXYCODONE HYDROCHLORIDE 5 MG: 5 SOLUTION ORAL at 14:10

## 2022-04-13 RX ADMIN — METRONIDAZOLE 500 MG: 500 INJECTION, SOLUTION INTRAVENOUS at 04:54

## 2022-04-13 RX ADMIN — OXYCODONE HYDROCHLORIDE 5 MG: 5 SOLUTION ORAL at 18:38

## 2022-04-13 RX ADMIN — OXYCODONE HYDROCHLORIDE 5 MG: 5 SOLUTION ORAL at 09:51

## 2022-04-13 RX ADMIN — IOHEXOL 30 ML: 240 INJECTION, SOLUTION INTRATHECAL; INTRAVASCULAR; INTRAVENOUS; ORAL at 08:29

## 2022-04-13 RX ADMIN — HEPARIN SODIUM 5000 UNITS: 5000 INJECTION INTRAVENOUS; SUBCUTANEOUS at 21:24

## 2022-04-13 RX ADMIN — SODIUM CHLORIDE, PRESERVATIVE FREE 10 ML: 5 INJECTION INTRAVENOUS at 21:25

## 2022-04-13 RX ADMIN — FAMOTIDINE 20 MG: 20 TABLET, FILM COATED ORAL at 08:53

## 2022-04-13 RX ADMIN — OXYCODONE HYDROCHLORIDE 5 MG: 5 SOLUTION ORAL at 04:57

## 2022-04-13 RX ADMIN — HYDROMORPHONE HYDROCHLORIDE 1 MG: 1 INJECTION, SOLUTION INTRAMUSCULAR; INTRAVENOUS; SUBCUTANEOUS at 10:48

## 2022-04-13 RX ADMIN — HEPARIN SODIUM 5000 UNITS: 5000 INJECTION INTRAVENOUS; SUBCUTANEOUS at 06:43

## 2022-04-13 ASSESSMENT — PAIN SCALES - GENERAL
PAINLEVEL_OUTOF10: 7
PAINLEVEL_OUTOF10: 8
PAINLEVEL_OUTOF10: 5
PAINLEVEL_OUTOF10: 7
PAINLEVEL_OUTOF10: 6
PAINLEVEL_OUTOF10: 6
PAINLEVEL_OUTOF10: 5
PAINLEVEL_OUTOF10: 8
PAINLEVEL_OUTOF10: 8
PAINLEVEL_OUTOF10: 7
PAINLEVEL_OUTOF10: 6
PAINLEVEL_OUTOF10: 6
PAINLEVEL_OUTOF10: 5

## 2022-04-13 NOTE — OP NOTE
chosen laparoscopic Rebecca-en-Y gastric bypass and wishes to proceed with the procedure. Description of procedure: The patient was taken to the operating room and placed in supine position. After successful induction of general endotracheal anesthesia by the anesthesia department a Mcneal catheter and orogastric tube was placed to decompress the bladder and stomach respectively. The patient was placed in split leg lithotomy position and care was taken to pad the exposed extremities. Then was prepped and draped in normal sterile fashion. A 12 mm Optiview trocar was placed in the left sub-costal position in the midclavicular line and access to the abdominal cavity was obtained under visualization. Pneumoperitoneum was then established without apparent complications. Underlying structures surveyed, no evidence of injury. After evaluation of the abdominal contents from this trocar position 5 other ports were placed under direct visualization. One at the umbilicus, one 2-3 cm above the umbilicus, one at the subxiphoid area, one in the right subcostal margin in the midclavicular line, and the last one further lateral to the original Optiview port. A solution of 0.5% Marcaine was used to infiltrate the subcutaneous tissues prior to trocar placement. The robot docked. There was apparent hiatal hernia. At this time due to a hiatal hernia decision was made to perform hiatal hernia repair. We mobilized the pars flaccida until the right nancy was noted and we could begin our dissection. Using accommodation of blunt and sharp dissection as well as electrocautery a window was created along the right nancy along the esophagus. The avascular plane was entered and this facilitated our 360 degree circumferential dissection exposing and dissecting the phrenoesophageal ligament. Careful dissection circumferentially around the esophagus to expose the left nancy. The right and left vagus nerves were identified.   The hernia sac dissected from the mediastinum and pleura. The sac reduced from the esophagus and mediastinum. Upon completion of full dissection around the esophagus we then used O Ethibond suture to reapproximate the anterior and posterior nancy. A 52 Bulgarian bougie was then passed by anesthesia under direct visualization until we could see this within the lumen of the stomach and this was across the crura in satisfactory position. Using the Ethibond we then closed the the hiatal hernia defect. A blunt probe could easily be passed between the crural defect closure and the esophagus while the bougie was in place. Satisfactory repair noted. After decompression of the stomach with the orogastric tube, the orogastric tube and any esophageal probes were removed from the patient. The stomach was grasped using a forceps and retracted caudally to expose the phrenoesophageal ligament. This was taken down with hook cautery. A blunt palpation probe was used to expose the esophagus and left bundle of the right nancy. At this point we released our retraction on the stomach and measured for 5-7 cm from the angle of His along the lesser curvature. We made a window along the lesser curvature through the pars flaccida with the Vessel Sealer and began the dissection. This allowed entry into the lesser sac. Once the lesser sac was entered a 60 mm x 2.5 mm HECTOR stapler was used to come across the neurovascular bundle. A 60 mm x 3.5 mm stapler then passed across the stomach and fired. 60 mm × 3.5 mm staple loads were used to progress cephalad toward the angle of His. A 30 mL gastric pouch was created. After this was done the Orvil anvil for the EEA stapler was placed transorally. This anvil was attached to an 25 [place] orogastric tube and was placed by anesthesia without complications. The orogastric tube was seen in the gastric pouch.   The scissors was used to make a gastrotomy in the gastric pouch and the orogastric tube was pulled out of the gastric pouch. At this point the orogastric tube was detached from the anvil and removed, leaving the anvil in the gastric pouch. Having successfully completed our small gastric pouch and anvil placement, we turned our attention to dividing the greater omentum. This was accomplished with the Vessel Sealer. After this we identified the ligament of Treitz and the inferior mesenteric vein, and went 50 cm distal.  A 3-0 silk suture and clips were then placed to shawn the proximal bowel and then we divided the bowel using a 60 mm × 2.5 mm HECTOR stapler. The mesentery was also transected using a Vessel Sealer. After this was done we brought the efferent limb all the way up to the gastric pouch in between our previously created defect in the greater omentum. The staple line and the jejunum was opened using cautery and after enlarging the lateral trocar site the 25 mm EEA stapler was introduced into the abdomen and into the open jejunum. Using the EEA stapler we performed our gastrojejunostomy. The stapler was removed and 2 complete donuts were identified. The open ended jejunum was closed using a 60 mm x 2.5 mm HECTOR stapler and bowel continuity reestablished. This amputated piece of jejunum was placed in an Endopouch and removed from the abdomen. We then measured distal on the small bowel and oriented our bowel 120 cm to create the jejunojejunostomy. The mesentery exteremely short  and hard to manipulate throughout the case. An enterotomy was created in each limb with the electrocautery  and using the triple stapling technique created a side-to-side jejunojejunostomy. 60 mm x 2.5 mm staple loads were utilized with 45 mm of the staple load. The opening was evaluated for hemostasis and care was taken to make sure the posterior wall of the anastomosis was free. This was directly visualized under laparoscopic visualization.   The remaining free defect was closed with one or more firings of a 60 mm x 2.5 mm linear stapler. Numerous sutures of 3-0 Silk sutures were used to close the defect of the mesentery at the jejunojejunostomy. The piece of small bowel from the triple staple technique was then withdrawn from the abdomen. The staple line was noted to be intact and to have captured serosa the entire length of the anastomosis. The anastomosis appeared patent. Hemostasis obtained. Having restored bowel continuity we placed multiple interrupted sutures to reinforce the gastrojejunal anastomosis. Interrupted 3-0 Vicryl sutures were placed laterally, posterior laterally and anteriorly so that we could circumferentially reinforce the anastomosis. The bowel was pink and viable, and there was no tension on the anastomosis. Having completed our reinforced sutures we then proceeded with checking the anastomosis for leakage. After completing the gastrojejunostomy, upper GI endoscopy was performed in order to evaluate the integrity of the anastomosis. The Olympus gastroscope was introduced through the mouth, through the esophagus and into the small gastric pouch. The anastomosis was noted to be patent widely. The lateral gastric staple line and gastrojejunal anastomosis were hemostatic. The scope passed through the anastomosis into the jejunum into both limbs without obstruction. The anastomosis was submerged under irrigation placed in the abdomen. There was no evidence of air extravasation on the intraoperative operative leak test performed. The air was evacuated and the scope removed from the patient. A 19 perforated [place] drain was left posterior to the anastomosis and brought out through the right upper quadrant incision. This was secured with a 2-0 nylon suture. No specimens. The abdomen was then copiously irrigated and checked for hemostasis. The effluent was evacuated from the abdomen and then we then turned our attention to closure of the trocar sites.   At this point prior to evacuation of the pneumoperitoneum we closed all our 12 mm ports with laparoscopic suture fascial closure device using 0 Vicryl. The pneumoperitoneum was then evacuated. The wounds were irrigated and found to be hemostatic. The skin was closed using 4-0 Vicryl in a running subcuticular fashion. Steri-Strips were applied to the wounds the patient was awakened from anesthesia extubated and taken to recovery in stable condition. The patient and her family updated to all operative findings.       Electronically signed by Angela Nunes DO on 4/13/2022 at 10:14 AM

## 2022-04-13 NOTE — PROGRESS NOTES
General Surgery Progress Note            PATIENT NAME: Tracey Arenas     TODAY'S DATE: 4/13/2022, 5:59 AM    SUBJECTIVE:    Pt seen and examined. Afebrile. Vitals stable. No overnight events. Pain is well controlled. Last BM on Mick 4/10. Pt not sure about passing gas. Voiding appropriately. Ambulated down the cabrera.       ROS:  Gen: No fever or chills  Eyes: No blurry vision or conjunctivitis   ENT: No sinus congestion or sore throat  CV: No chest pain or dyspnea on exertion  Pulm: No cough or shortness of breath  GI: Post operative abdominal pain, nausea or vomiting  Renal: No dysuria or hematuria  Endo: No excessive sweating or cold intolerance  Heme/Onc: No excessive bruising or swollen lymph nodes  Neuro: No difficulty with speech or acute extremity weakness  Skin: No rashes or ulcers    OBJECTIVE:   VITALS:  BP (!) 131/56   Pulse 72   Temp 98 °F (36.7 °C) (Oral)   Resp 13   Ht 5' 4\" (1.626 m)   Wt 212 lb 0.3 oz (96.2 kg)   SpO2 94%   BMI 36.39 kg/m²      INTAKE/OUTPUT:      Intake/Output Summary (Last 24 hours) at 4/13/2022 0559  Last data filed at 4/13/2022 0530  Gross per 24 hour   Intake 2205 ml   Output 1100 ml   Net 1105 ml       PHYSICAL EXAM  GEN: Alert, awake, oriented, NAD  HEENT: normocephalic, no scleral icterus, moist mucous membranes  CV: Regular rate   LUNG: no respiratory distress, no audible wheezing  ABDOMEN: Post operative abdominal pain, non-distended,   EXTREMITIES: No edema, cyanosis or clubbing    Data:  CBC with Differential:    Lab Results   Component Value Date    WBC 11.5 04/13/2022    RBC 4.00 04/13/2022    HGB 11.0 04/13/2022    HCT 34.1 04/13/2022     04/13/2022    MCV 85.3 04/13/2022    MCH 27.5 04/13/2022    MCHC 32.3 04/13/2022    RDW 13.6 04/13/2022    LYMPHOPCT 24 12/15/2021    LYMPHOPCT 27.0 06/29/2021    MONOPCT 5 12/15/2021    MONOPCT 5.3 06/29/2021    BASOPCT 1 12/15/2021    BASOPCT 0.9 06/29/2021    MONOSABS 0.40 12/15/2021    MONOSABS 0.5 06/29/2021 LYMPHSABS 1.97 12/15/2021    LYMPHSABS 2.5 06/29/2021    EOSABS 0.25 12/15/2021    EOSABS 0.2 06/29/2021    BASOSABS 0.07 12/15/2021    DIFFTYPE NOT REPORTED 12/15/2021     BMP:    Lab Results   Component Value Date     04/13/2022    K 4.2 04/13/2022     04/13/2022    CO2 23 04/13/2022    BUN 23 04/13/2022    LABALBU 4.3 12/15/2021    CREATININE 1.16 04/13/2022    CALCIUM 8.7 04/13/2022    GFRAA 58 04/13/2022    LABGLOM 48 04/13/2022    GLUCOSE 120 04/13/2022     Radiology Review:    No results found. ASSESSMENT   63year old F status post day 1 gastric bypass Rebecca En Y    Plan  1. Esophagram today  2. Bariatric CLD  3. Continue pain management and nausea control  4. Okay to ambulate     --------------------------------------------  Threasa Neither, MS3  4/13/2022 at 5:59 AM     I have seen and examined the patient. I agree with the above medical student's assessment and plan I have made my addendum.     [place],   General Surgery, PGY-1

## 2022-04-13 NOTE — DISCHARGE SUMMARY
Surgery Discharge Summary     Patient Identification  Edie Bacon is a 63 y.o. female. :  1960  Admit Date:  2022    Discharge date:   2022                                  Disposition: home    Discharge Diagnoses:   Patient Active Problem List   Diagnosis    Vitamin D deficiency    Generalized anxiety disorder    Sleep apnea, obstructive    Type II diabetes mellitus, uncontrolled ([address]    Hypothyroidism    Essential hypertension    Family history of coronary artery disease    Personal history of tobacco use    Non-cardiac chest pain    History of colon polyps    Polyp of colon    Tobacco abuse, in remission    Irregular Z line of esophagus    Gastric polyp    BELTRAN (nonalcoholic steatohepatitis)    Joint stiffness    Fatty infiltration of liver    Angina pectoris syndrome (HCC)    Obesity (BMI 30-39. 9)    Status post gastric bypass for obesity       Condition on discharge: good    Consults: none    Surgery: laparoscopic braydon en y    Patient Instructions: Activity: no heavy lifting, pushing, pulling for 6 weeks, no driving for 2 weeks or while on analgesics  Diet: As tolerated  Follow-up with Dr. Maty Park in 2 weeks. See pre-printed instructions in chart and given to patient upon discharge.     Discharge Medications:        Medication List      CONTINUE taking these medications    AgaMatrix Control Soln  Use as directed for control solution test     AgaMatrix Jazz Wireless 2 w/Device Kit  1 each by Does not apply route once for 1 dose     AgaMatrix Ultra-Thin Lancets Misc  Use to test blood sugar 1 time daily        ASK your doctor about these medications    acetaminophen 500 MG tablet  Commonly known as: TYLENOL     AgaMatrix Jazz Test strip  Generic drug: blood glucose test strips  USE TO TEST BLOOD SUGAR ONE TIME DAILY     amLODIPine 5 MG tablet  Commonly known as: NORVASC  TAKE 1 TABLET BY MOUTH ONE TIME A DAY     buPROPion 300 MG extended release tablet  Commonly known as: WELLBUTRIN XL  TAKE ONE TABLET BY MOUTH EVERY MORNING     hydroCHLOROthiazide 25 MG tablet  Commonly known as: HYDRODIURIL  TAKE 1 TABLET BY MOUTH ONE TIME A DAY IN THE MORNING     Jardiance 25 MG tablet  Generic drug: empagliflozin  Take 1 tablet by mouth daily     levothyroxine 50 MCG tablet  Commonly known as: SYNTHROID  TAKE 1 TABLET BY MOUTH ONE TIME A DAY     loratadine 10 MG tablet  Commonly known as: CLARITIN     metoprolol tartrate 25 MG tablet  Commonly known as: LOPRESSOR  Take 1 tablet by mouth daily     nitroGLYCERIN 0.4 MG SL tablet  Commonly known as: Nitrostat  Place 1 tablet under the tongue every 5 minutes as needed for Chest pain Indications: never used up to max of 3 total doses. If no relief after 1 dose, call 911. pantoprazole 20 MG tablet  Commonly known as: PROTONIX  Take 1 tablet by mouth every morning (before breakfast)     potassium chloride 20 MEQ extended release tablet  Commonly known as: KLOR-CON M  TAKE ONE TABLET TWO TIMES A DAY     pravastatin 20 MG tablet  Commonly known as: PRAVACHOL  TAKE ONE TABLET BY MOUTH EVERY EVENING     ranolazine 500 MG extended release tablet  Commonly known as: Ranexa  Take 1 tablet by mouth 2 times daily     vitamin D 50 MCG (2000 UT) Caps capsule             HPI and Hospital Course:   63 y.o. female presented on 4/12/2022 for laparoscopic braydon en y. Esophagram 4/13 was negative for leak. Hospital course was unremarkable. On day of discharge pt was tolerating regular diet, pain controlled with oral medications and ambulating without difficulty.       Electronically signed by Muna Mcgowan DO on 4/13/2022 at 4:59 PM

## 2022-04-13 NOTE — TELEPHONE ENCOUNTER
Rx request   Requested Prescriptions     Pending Prescriptions Disp Refills    levothyroxine (SYNTHROID) 50 MCG tablet 90 tablet 1     LOV 2/7/2022  Next Visit Date:  Future Appointments   Date Time Provider Elizabeth Brock   4/21/2022  1:15 PM Jim Adams DO bariatric [id number]   5/13/2022  9:30 AM DO Chio Pozo [facility] EMERGENCY [facility] AT [place]   5/13/2022  1:30 PM Jim Adams DO bariatric [facility]   6/7/2022  3:15 PM NAYANA Ko [facility]

## 2022-04-13 NOTE — PROGRESS NOTES
Discussed bariatric discharge instructions with patient , allowed time for questions, WARREN care discussed, had patient demonstrate IS, lovenox teaching done, and patient voices understanding

## 2022-04-14 VITALS
BODY MASS INDEX: 36.2 KG/M2 | HEART RATE: 86 BPM | RESPIRATION RATE: 18 BRPM | SYSTOLIC BLOOD PRESSURE: 134 MMHG | OXYGEN SATURATION: 93 % | HEIGHT: 64 IN | WEIGHT: 212.02 LBS | TEMPERATURE: 98.9 F | DIASTOLIC BLOOD PRESSURE: 56 MMHG

## 2022-04-14 PROCEDURE — 6370000000 HC RX 637 (ALT 250 FOR IP): Performed by: SURGERY

## 2022-04-14 PROCEDURE — 94761 N-INVAS EAR/PLS OXIMETRY MLT: CPT

## 2022-04-14 PROCEDURE — 2580000003 HC RX 258: Performed by: SURGERY

## 2022-04-14 PROCEDURE — 94640 AIRWAY INHALATION TREATMENT: CPT

## 2022-04-14 PROCEDURE — 6360000002 HC RX W HCPCS: Performed by: SURGERY

## 2022-04-14 RX ORDER — PROMETHAZINE HYDROCHLORIDE 25 MG/1
25 TABLET ORAL EVERY 6 HOURS PRN
Qty: 56 TABLET | Refills: 0 | Status: SHIPPED | OUTPATIENT
Start: 2022-04-14 | End: 2022-04-28

## 2022-04-14 RX ORDER — OXYCODONE HCL 5 MG/5 ML
5 SOLUTION, ORAL ORAL EVERY 6 HOURS PRN
Qty: 20 ML | Refills: 0 | Status: SHIPPED | OUTPATIENT
Start: 2022-04-14 | End: 2022-04-19

## 2022-04-14 RX ORDER — LEVOTHYROXINE SODIUM 0.05 MG/1
TABLET ORAL
Qty: 90 TABLET | Refills: 1 | Status: SHIPPED | OUTPATIENT
Start: 2022-04-14

## 2022-04-14 RX ORDER — SUCRALFATE ORAL 1 G/10ML
1 SUSPENSION ORAL 4 TIMES DAILY
Qty: 560 ML | Refills: 0 | Status: SHIPPED | OUTPATIENT
Start: 2022-04-14 | End: 2022-06-07

## 2022-04-14 RX ORDER — CYCLOBENZAPRINE HCL 10 MG
10 TABLET ORAL 3 TIMES DAILY PRN
Qty: 21 TABLET | Refills: 0 | Status: SHIPPED | OUTPATIENT
Start: 2022-04-14 | End: 2022-04-24

## 2022-04-14 RX ORDER — LEVOTHYROXINE SODIUM 0.05 MG/1
TABLET ORAL
Qty: 90 TABLET | Refills: 1 | OUTPATIENT
Start: 2022-04-14

## 2022-04-14 RX ADMIN — IPRATROPIUM BROMIDE AND ALBUTEROL SULFATE 1 AMPULE: .5; 3 SOLUTION RESPIRATORY (INHALATION) at 07:35

## 2022-04-14 RX ADMIN — HEPARIN SODIUM 5000 UNITS: 5000 INJECTION INTRAVENOUS; SUBCUTANEOUS at 13:23

## 2022-04-14 RX ADMIN — OXYCODONE HYDROCHLORIDE 5 MG: 5 SOLUTION ORAL at 13:23

## 2022-04-14 RX ADMIN — SODIUM CHLORIDE, PRESERVATIVE FREE 10 ML: 5 INJECTION INTRAVENOUS at 09:33

## 2022-04-14 RX ADMIN — OXYCODONE HYDROCHLORIDE 5 MG: 5 SOLUTION ORAL at 03:01

## 2022-04-14 RX ADMIN — IPRATROPIUM BROMIDE AND ALBUTEROL SULFATE 1 AMPULE: .5; 3 SOLUTION RESPIRATORY (INHALATION) at 14:24

## 2022-04-14 RX ADMIN — HEPARIN SODIUM 5000 UNITS: 5000 INJECTION INTRAVENOUS; SUBCUTANEOUS at 05:46

## 2022-04-14 RX ADMIN — FAMOTIDINE 20 MG: 20 TABLET, FILM COATED ORAL at 09:30

## 2022-04-14 RX ADMIN — OXYCODONE HYDROCHLORIDE 5 MG: 5 SOLUTION ORAL at 07:00

## 2022-04-14 ASSESSMENT — PAIN SCALES - GENERAL
PAINLEVEL_OUTOF10: 7
PAINLEVEL_OUTOF10: 5
PAINLEVEL_OUTOF10: 5
PAINLEVEL_OUTOF10: 7
PAINLEVEL_OUTOF10: 7

## 2022-04-14 NOTE — CARE COORDINATION
Case Management Initial Discharge Plan  Jo Chappell,             Met with:patient to discuss discharge plans. Information verified: address, contacts, phone number, , insurance Yes  Insurance Provider: Caroline Lopez    Emergency Contact/Next of Kin name & number: Tracy Pal (spouse) [phone]  Who are involved in patient's support system? spouse    PCP: Deanna Jones PA-C  Date of last visit: 2021      Discharge Planning    Living Arrangements:        Home has 2 stories   stairs to climb to get into front door, stairs to climb to reach second floor  Location of bedroom/bathroom in home up    Patient able to perform ADL's:Independent    Current Services (outpatient & in home) none  DME equipment: none  DME provider: none    Is patient receiving oral anticoagulation therapy? No    If indicated:   Physician managing anticoagulation treatment:   Where does patient obtain lab work for ATC treatment? Does patient have any issues/concerns obtaining medications? No  If yes, what are patient's concerns? Is there a preferred Pharmacy after hours or on weekends? No    If yes, which pharmacy? Potential Assistance Needed:  N/A    Patient agreeable to home care: No  [place] of choice provided:  no    Prior SNF/Rehab Placement and Facility: no  Agreeable to SNF/Rehab: No  [place] of choice provided: no     Evaluation: no    Expected Discharge date:  22    Patient expects to be discharged to:        If home: is the family and/or caregiver wiling & able to provide support at home?   Who will be providing this support?     Follow Up Appointment: Best Day/ Time: Monday PM    Transportation provider:   Transportation arrangements needed for discharge: No    Readmission Risk              Risk of Unplanned Readmission:  13             Does patient have a readmission risk score greater than 14?: No  If yes, follow-up appointment must be made within 7 days of discharge.      Goals of Care:       Educated patient on transitional options, provided freedom of choice and are agreeable with plan      Discharge Plan: home with           Electronically signed by Niranjan Sanchez RN on 4/14/22 at 12:48 PM EDT

## 2022-04-14 NOTE — PROGRESS NOTES
CLINICAL PHARMACY NOTE: MEDS TO BEDS    Total # of Prescriptions Filled: 6   The following medications were delivered to the patient:  · Promethazine 25 mg tab  · Cyclobenzaprine 10 mg tab  · Sucralfate 1 gm/ 10 ml susp  · Levothyroxine 50 mcg tab  · Oxycodone 5 mg / 5 ml solu.   · Enoxaparin 40 mg / 0.4 ml syr    Additional Documentation:Medications delivered to the patient in room 236 @ 2:57 pm. [place] payment

## 2022-04-14 NOTE — PROGRESS NOTES
Bariatric Surgery Progress Note            PATIENT NAME: Laura Zamora     TODAY'S DATE: 4/14/2022, 6:01 AM    SUBJECTIVE:    Pt seen and examined. Afebrile. Vitals stable. No overnight events. Pain is well controlled. Not passing gas. Last BM on 4/10. Voiding approprietly. Ambulated down the cabrera. WARREN intact, 130 cc serosanguineous output overnight. ROS:  Gen: No fever or chills  Eyes: No blurry vision or conjunctivitis   ENT: No sinus congestion or sore throat  CV: No chest pain or dyspnea on exertion  Pulm: No cough or shortness of breath  GI: post operative abdominal pain, No nausea or vomiting  Renal: No dysuria or hematuria  Endo: No excessive sweating or cold intolerance  Heme/Onc: No excessive bruising or swollen lymph nodes  Neuro: No difficulty with speech or acute extremity weakness  Skin: No rashes or ulcers    OBJECTIVE:   VITALS:  BP (!) 129/54   Pulse 83   Temp 99.2 °F (37.3 °C) (Oral)   Resp 17   Ht 5' 4\" (1.626 m)   Wt 212 lb 0.3 oz (96.2 kg)   SpO2 98%   BMI 36.39 kg/m²      INTAKE/OUTPUT:      Intake/Output Summary (Last 24 hours) at 4/14/2022 0601  Last data filed at 4/14/2022 4865  Gross per 24 hour   Intake --   Output 1030 ml   Net -1030 ml       PHYSICAL EXAM  GEN: Alert, awake, oriented, NAD  HEENT: normocephalic, no scleral icterus, moist mucous membranes  CV: Regular rate   LUNG: no respiratory distress, no audible wheezing  ABDOMEN: soft, appropriately tender to palpation, no rebound, guarding, or rigidity. WARREN intact with serosanguineous output. Surgical incisions are clean, dry, with skin glue intact. No surrounding erythema, bleeding, or drainage.   EXTREMITIES: No edema, cyanosis or clubbing    Data:  CBC with Differential:    Lab Results   Component Value Date    WBC 11.5 04/13/2022    RBC 4.00 04/13/2022    HGB 11.0 04/13/2022    HCT 34.1 04/13/2022     04/13/2022    MCV 85.3 04/13/2022    MCH 27.5 04/13/2022    MCHC 32.3 04/13/2022    RDW 13.6 04/13/2022 LYMPHOPCT 24 12/15/2021    LYMPHOPCT 27.0 06/29/2021    MONOPCT 5 12/15/2021    MONOPCT 5.3 06/29/2021    BASOPCT 1 12/15/2021    BASOPCT 0.9 06/29/2021    MONOSABS 0.40 12/15/2021    MONOSABS 0.5 06/29/2021    LYMPHSABS 1.97 12/15/2021    LYMPHSABS 2.5 06/29/2021    EOSABS 0.25 12/15/2021    EOSABS 0.2 06/29/2021    BASOSABS 0.07 12/15/2021    DIFFTYPE NOT REPORTED 12/15/2021     BMP:    Lab Results   Component Value Date     04/13/2022    K 4.2 04/13/2022     04/13/2022    CO2 23 04/13/2022    BUN 23 04/13/2022    LABALBU 4.3 12/15/2021    CREATININE 1.16 04/13/2022    CALCIUM 8.7 04/13/2022    GFRAA 58 04/13/2022    LABGLOM 48 04/13/2022    GLUCOSE 120 04/13/2022       Radiology Review:      [place] ESOPHAGRAM  4/13/2022  No fluoroscopic evidence for extraluminal contrast or postoperative leak. ASSESSMENT   63 F POD #2 s/p robotic RNY      Plan  1. Bariatric CLD  2. Continue pain management and nausea control  3. Encourage frequent ambulation  4. Encourage IS use  5. Dispo planning    -----------------------------------------------  Mimidacia Zafar, MS3  4/14/2022 at 6:01 AM     General Surgery Resident Statement/Note:  I have discussed the case, including pertinent history and exam findings with the above medical student have personally seen the patient. Pt seen and examined at bedside. I performed both history and physical exam.     Note was edited and changes made by me. Assessment and plan reviewed and changes made by me. I agree with the assessment and plan as stated above.     Electronically signed by Baltazar Wu DO on 4/14/2022 at 7:08 AM

## 2022-04-15 ENCOUNTER — CARE COORDINATION (OUTPATIENT)
Dept: OTHER | Facility: CLINIC | Age: 62
End: 2022-04-15

## 2022-04-15 ENCOUNTER — TELEPHONE (OUTPATIENT)
Dept: BARIATRICS/WEIGHT MGMT | Age: 62
End: 2022-04-15

## 2022-04-15 ENCOUNTER — HOSPITAL ENCOUNTER (OUTPATIENT)
Dept: INFUSION THERAPY | Age: 62
Setting detail: INFUSION SERIES
Discharge: HOME | End: 2022-04-15
Payer: COMMERCIAL

## 2022-04-15 VITALS
HEART RATE: 71 BPM | DIASTOLIC BLOOD PRESSURE: 59 MMHG | RESPIRATION RATE: 16 BRPM | SYSTOLIC BLOOD PRESSURE: 140 MMHG | TEMPERATURE: 98.3 F

## 2022-04-15 DIAGNOSIS — E86.0 DEHYDRATION: ICD-10-CM

## 2022-04-15 DIAGNOSIS — K95.89 COMPLICATIONS OF BARIATRIC PROCEDURES: ICD-10-CM

## 2022-04-15 DIAGNOSIS — Z98.84 STATUS POST GASTRIC BYPASS FOR OBESITY: ICD-10-CM

## 2022-04-15 DIAGNOSIS — K95.89 COMPLICATIONS OF BARIATRIC PROCEDURES: Primary | ICD-10-CM

## 2022-04-15 PROCEDURE — 2580000003 HC RX 258: Performed by: SURGERY

## 2022-04-15 PROCEDURE — 96361 HYDRATE IV INFUSION ADD-ON: CPT

## 2022-04-15 PROCEDURE — 96360 HYDRATION IV INFUSION INIT: CPT

## 2022-04-15 RX ORDER — 0.9 % SODIUM CHLORIDE 0.9 %
1000 INTRAVENOUS SOLUTION INTRAVENOUS ONCE
Status: CANCELLED | OUTPATIENT
Start: 2022-04-15 | End: 2022-04-15

## 2022-04-15 RX ORDER — 0.9 % SODIUM CHLORIDE 0.9 %
1000 INTRAVENOUS SOLUTION INTRAVENOUS ONCE
Status: CANCELLED
Start: 2022-04-15 | End: 2022-04-15

## 2022-04-15 RX ORDER — 0.9 % SODIUM CHLORIDE 0.9 %
1000 INTRAVENOUS SOLUTION INTRAVENOUS ONCE
Status: COMPLETED | OUTPATIENT
Start: 2022-04-15 | End: 2022-04-15

## 2022-04-15 RX ADMIN — SODIUM CHLORIDE 1000 ML: 9 INJECTION, SOLUTION INTRAVENOUS at 10:53

## 2022-04-15 RX ADMIN — SODIUM CHLORIDE 1000 ML: 9 INJECTION, SOLUTION INTRAVENOUS at 11:55

## 2022-04-15 NOTE — CARE COORDINATION
[facility] Transitions Initial Follow Up Call    Call within 2 business days of discharge: Yes    Patient: Ramos Reed Patient : 1960   MRN: [id number]  Reason for Admission: gastric bypass   Discharge Date: 22 RARS: Readmission Risk Score: 8.4 ( )      Last Discharge [facility]       Complaint Diagnosis Description Type Department Provider    22  Post-op pain . .. Admission (Discharged) [address] DO        Transitions of Care Initial Call    Was this an external facility discharge? No Discharge Facility: [facility] to be reviewed by the provider   Additional needs identified to be addressed with provider: no  none             Method of communication with provider : none      Advance Care Planning:   Does patient have an Advance Directive: not on file. Was this a readmission? No  Patient stated reason for admission: gastric bypass surgery  Patients top risk factors for readmission: medical condition-recent gastric surgery, need for IV hydration today, diabetic    Care Transition Nurse (CTN) contacted the patient by telephone to perform post hospital discharge assessment. Verified name and  with patient as identifiers. Provided introduction to self, and explanation of the CTN role. CTN reviewed discharge instructions, medical action plan and red flags with patient who verbalized understanding. Patient given an opportunity to ask questions and does not have any further questions or concerns at this time. Were discharge instructions available to patient? Yes. Reviewed appropriate site of care based on symptoms and resources available to patient including: Specialist  MyChart Messaging. The patient agrees to contact the PCP office for questions related to their healthcare. Medication reconciliation was performed with patient, who verbalizes understanding of administration of home medications.  Advised obtaining a 90-day supply of all daily and as-needed medications. ACM provided contact information. Plan for follow-up call in 5-7 days based on severity of symptoms and risk factors. Plan for next call: intake tolerance, pain level, review hydration status. outcome from follow up visits next week, blood pressures blood sugars  Pt said she is urinating well, went 4 x today, no nausea, is tolerating fluids ok. She is following the d/c instructions to the dexter. She is to drink 64 ounces of fluid a day and try to drink a protein shake, the instructions gradually change as each day passes. She has not had a BM has spoken the the nurse and pt is getting MOM to start taking, she is up walking around the house she said, she is wearing her binder. She is taking Lovenox, no questions in regard to this. She checks her blood pressure at home, but since she just got home from the IV hydration she has not checked it, said it was good at the infusion center. She is changing the dressing ( drain sponge) as needed, no foul odor or drainage. She has not rechecked her blood sugar either yet but normally she checks it she said. Follow up is Monday with pcp and Thursday with surgeon.      Non-face-to-face services provided:  Obtained and reviewed discharge summary and/or continuity of care documents    Care Transitions 24 Hour Call    Do you have a copy of your discharge instructions?: Yes  Do you have all of your prescriptions and are they filled?: Yes  Have you been contacted by a [address]?: No  Have you scheduled your follow up appointment?: Yes  How are you going to get to your appointment?: Car - family or friend to transport (Comment: monday 4/18 with pcp and 4/21 with bariatric surgeon)  Were you discharged with any Home Care or Post Acute Services: No  Do you have support at home?: Partner/Spouse/SO  Do you feel like you have everything you need to keep you well at home?: Yes  Care Transitions Interventions         Follow Up  Future Appointments   Date Time Provider Elizabeth Brock   4/18/2022  2:30 PM Yara Mccabe MD [facility], [facility].   4/21/2022  1:15 PM Mame Comment, DO bariatric sher [facility]   5/13/2022  9:30 AM DO Chio Matthews [facility]   5/13/2022  1:30 PM Mame Comment, DO bariatric sher Lillian Galan   6/7/2022  3:15 PM NAYANA Tang  [place]  [facility]

## 2022-04-15 NOTE — FLOWSHEET NOTE
Patient to the floor ambulatory for her IV hydration. Vital signs taken. Denies any discomfort. Call light within reach.

## 2022-04-15 NOTE — TELEPHONE ENCOUNTER
Post-op outreach call made to pt. Pt reports frequent ambulation around home. Pt wearing abd binder. No complaints of SOB or tachycardia. Laparoscopic incisional sites without problems. Drain serosanguinous    Pt has not had a BM since surgery. not Passing flatus. Agrees to use Milk of Magnesia or Miriaax and monitor for BM. Pt reports urine output of at least 4 times in a 24 hour period. Intake is described as water 8 oz, protein starting today, getting iv fluids today    Rates pain as 3  on a 0-10 scale. Pt using pain medication as directed. Allowed pt the opportunity to ask questions. Reminded patient to reference the patient education materials as needed. Reminded pt that they may phone the office or the \"after hours telephone number\"  that is listed in the patient education binder as needed. Pt verbalized understanding. Pt without concerns at this time      Post op office appt date and time reviewed with pt.     Patient  is IDDM, and is going to check sugars    Has Lovenox is going well    Checking blood pressure

## 2022-04-18 ENCOUNTER — OFFICE VISIT (OUTPATIENT)
Dept: FAMILY MEDICINE CLINIC | Age: 62
End: 2022-04-18
Payer: COMMERCIAL

## 2022-04-18 VITALS
SYSTOLIC BLOOD PRESSURE: 122 MMHG | BODY MASS INDEX: 34.83 KG/M2 | HEART RATE: 75 BPM | OXYGEN SATURATION: 99 % | DIASTOLIC BLOOD PRESSURE: 78 MMHG | TEMPERATURE: 98.2 F | WEIGHT: 204 LBS | HEIGHT: 64 IN

## 2022-04-18 DIAGNOSIS — Z98.84 STATUS POST BARIATRIC SURGERY: ICD-10-CM

## 2022-04-18 DIAGNOSIS — Z09 HOSPITAL DISCHARGE FOLLOW-UP: Primary | ICD-10-CM

## 2022-04-18 DIAGNOSIS — N18.31 STAGE 3A CHRONIC KIDNEY DISEASE (HCC): ICD-10-CM

## 2022-04-18 DIAGNOSIS — E87.6 CHRONIC HYPOKALEMIA: ICD-10-CM

## 2022-04-18 PROCEDURE — 99495 TRANSJ CARE MGMT MOD F2F 14D: CPT | Performed by: INTERNAL MEDICINE

## 2022-04-18 PROCEDURE — 1111F DSCHRG MED/CURRENT MED MERGE: CPT | Performed by: INTERNAL MEDICINE

## 2022-04-20 DIAGNOSIS — E87.6 CHRONIC HYPOKALEMIA: ICD-10-CM

## 2022-04-20 DIAGNOSIS — N18.31 STAGE 3A CHRONIC KIDNEY DISEASE (HCC): ICD-10-CM

## 2022-04-20 LAB
ANION GAP SERPL CALCULATED.3IONS-SCNC: 16 MEQ/L (ref 9–15)
BUN BLDV-MCNC: 18 MG/DL (ref 6–20)
CALCIUM SERPL-MCNC: 9 MG/DL (ref 8.5–9.9)
CHLORIDE BLD-SCNC: 100 MEQ/L (ref 95–107)
CO2: 19 MEQ/L (ref 20–31)
CREAT SERPL-MCNC: 0.89 MG/DL (ref 0.5–0.9)
GFR AFRICAN AMERICAN: >60
GFR NON-AFRICAN AMERICAN: >60
GLUCOSE BLD-MCNC: 71 MG/DL (ref 70–99)
POTASSIUM SERPL-SCNC: 4 MEQ/L (ref 3.4–4.9)
SODIUM BLD-SCNC: 135 MEQ/L (ref 135–144)

## 2022-04-21 ENCOUNTER — TELEPHONE (OUTPATIENT)
Dept: BARIATRICS/WEIGHT MGMT | Age: 62
End: 2022-04-21

## 2022-04-21 ENCOUNTER — OFFICE VISIT (OUTPATIENT)
Dept: BARIATRICS/WEIGHT MGMT | Age: 62
End: 2022-04-21

## 2022-04-21 VITALS
DIASTOLIC BLOOD PRESSURE: 79 MMHG | BODY MASS INDEX: 33.46 KG/M2 | HEART RATE: 108 BPM | SYSTOLIC BLOOD PRESSURE: 122 MMHG | WEIGHT: 196 LBS | TEMPERATURE: 98.4 F | HEIGHT: 64 IN

## 2022-04-21 DIAGNOSIS — Z98.84 STATUS POST GASTRIC BYPASS FOR OBESITY: Primary | ICD-10-CM

## 2022-04-21 PROCEDURE — 99024 POSTOP FOLLOW-UP VISIT: CPT | Performed by: SURGERY

## 2022-04-21 NOTE — PROGRESS NOTES
Medical Nutrition Therapy  Metabolic and Bariatric surgery  1 week Post operative follow up        Vitals:   Vitals:    04/21/22 1322   BP: 122/79   Site: Right Upper Arm   Position: Sitting   Cuff Size: Large Adult   Pulse: 108   Temp: 98.4 °F (36.9 °C)   Weight: 196 lb (88.9 kg)   Height: 5' 4\" (1.626 m)      Body mass index is 33.64 kg/m². Labs reviewed:              Nutrition Assessment:   PES: Inadequate food and beverage intake r/t WLS as evidenced by loss of excess body weight lost 16 lbs over 9 days.       Goals   60-80gm of protein  48-64oz of fluid       [x] met     []  Not met        Plan:  Pt questions answered re diet advancement;  F/u 5 weeks post-op      Catherine Kellogg MS, RD, LD

## 2022-04-21 NOTE — TELEPHONE ENCOUNTER
[place] paperwork was received. Blank copy of forms has been scanned. Patient notified it will take up to 3-5 business days for completion.  Will fax once completed and mail original to patient

## 2022-04-22 ENCOUNTER — CARE COORDINATION (OUTPATIENT)
Dept: OTHER | Facility: CLINIC | Age: 62
End: 2022-04-22

## 2022-04-22 NOTE — CARE COORDINATION
[facility] Transitions Follow Up Call    2022    Patient: [facility]  Patient : 1960   MRN: [id number]  Reason for Admission: gastric bypass  Discharge Date: 22 RARS: Readmission Risk Score: 8.4 ( )         Care Transitions Follow Up Call    Needs to be reviewed by the provider   Additional needs identified to be addressed with provider: No  none             Method of communication with provider : none      Care Transition Nurse (CTN) contacted the patient by telephone to follow up after admission on 2022. Verified name and  with patient as identifiers. Addressed changes since last contact: patient states she is working on fluid intake. she is working on drinking her protein shakes. patient followed up with surgeon yesterday and upgraded her diet. she can now have sugar free pudding. she can also drink 3 protein shakes per day. she is trying to drink these for meals. patient states her blood pressure is good at 134/71, pulse is 73. blood sugar was 104 today but has been ranging between 70-86. checking bs twice per day. patient had drain pulled yesterday. sites looks good. 6 incisions look good. patient states her biggest complaint is fatigue. states she gets wore out easily. patient follows a few groups on Facebook for people who have had gastric bypass and she says it is a common complaint and gets better around 5-6 weeks post op. patient started multivitamins and tums 1000 mg twice per day for calcium. states she does not take these at the same time. patient has 5.5 more days of lovenox. no issues. patient has not had to take flexeril for a couple of days. has not had to take phenergan since being home from hospital. patient states she is not concerned of dehydration but is mindful that she has to get in the required amount of fluids. Discussed follow-up appointments. If no appointment was previously scheduled, appointment scheduling offered: patient has f/u appts.    Is follow up appointment scheduled within 7 days of discharge? n/a. Advance Care Planning:   Does patient have an Advance Directive: not discussed. CTN reviewed discharge instructions, medical action plan and red flags with patient and discussed any barriers to care and/or understanding of plan of care after discharge. Discussed appropriate site of care based on symptoms and resources available to patient including: PCP  Specialist  Benefits related nurse triage line  Urgent care clinics  Doree Gottron 24/7  When to call [address] The patient agrees to contact the PCP office for questions related to their healthcare. Patients top risk factors for readmission: medical condition-s/p gastric bypass  Interventions to address risk factors: Obtained and reviewed discharge summary and/or continuity of care documents      Non-[facility] follow up appointment(s):     CTN provided contact information for future needs. Plan for follow-up call in 5-7 days based on severity of symptoms and risk factors. Plan for next call: intake tolerance, pain level, review hydration status, blood pressures, blood sugars            Care Transitions Subsequent and Final Call    Schedule Follow Up Appointment with PCP: Completed  Subsequent and Final Calls  Do you have any ongoing symptoms?: No  Have your medications changed?: No  Do you have any questions related to your medications?: No  Do you currently have any active services?: No  Do you have any needs or concerns that I can assist you with?: No  Identified Barriers: None  Care Transitions Interventions  Other Interventions:            Follow Up  Future Appointments   Date Time Provider Elizabeth Brock   5/13/2022  9:30 AM DO Chio Gallo [facility]   5/13/2022  1:30 PM Parul Magallanes DO bariatric su [facility]   6/7/2022  3:15 PM NAYANA Schuster [address], [facility]

## 2022-04-27 NOTE — PROGRESS NOTES
[address] [facility] [address] INVASIVE BARIATRIC SURG  [address]  [address]  [address] [facility] [address]  Dept: [phone]    4/21/2022    CC: Post Bariatric Surgery    History:  63year old female 1 week post bariatric surgery. Overall doing well. Tolerating diet. No nausea or vomiting. Pain is controlled. Had a BM. No new complaints. No chest pain, shortness of breath, fevers/chills.     Review of Systems:  General  Negative for: [] Weight Change   [x] Fatigue   [x] Fevers & Chills    [] Appetite change [] Other:    Positive for: [x] Weight Change   [] Fatigue   [] Fevers & Chills    [] Appetite change [] Other:   Cardiac  Negative for: [x] Chest Pain   [x] Difficulty Breathing   [] Leg Cramps [x] Edema of Lower Extremeties    [] Left   [] Right      Positive for: [] Chest Pain   [] Difficulty Breathing   [] Leg Cramps [] Edema of Lower Extremeties    [] Left   [] Right   Pulmonary  Negative for: [x] Shortness of Breath [] Wheeze [x] Cough  [] Calf Pain     Positive for: [] Shortness of Breath [] Wheeze [] Cough  [] Calf Pain   Gastro-Intestinal Negative for: [] Heartburn   [] Reflux   [] Dysphagia   [] Melena   [] BRBPR  [x] Vomiting   [x] Abdominal Pain   [] Diarrhea     [] Constipation  [] Other:     Positive for: [] Heartburn   [] Reflux   [] Dysphagia   [] Melena   [] BRBPR  [] Vomiting   [] Abdominal Pain   [] Diarrhea     [] Constipation  [] Other:    Muskuloskeletal Negative for: [] Joint pain   [] Back pain   [] Knee Pain   [x] Muscle weakness [x] Hernia   [] Edema [] Other:     Positive for: [] Joint pain   [] Back pain   [] Knee Pain   [] Muscle weakness [] Hernia   [] Edema [] Other:    Neurologic Negative for: [x] Syncope   [x] Insomnia   [] Being treated for depression  [] Other:     Positive for: [] Syncope   [] Insomnia   [] Being treated for depression  [] Other:    Skin Negative for: [] Wound:   [] Open   [] Draining   [] Incisional     [x] Rash   [x] Hair Loss  [] Other:     Positive for: [] Wound:   [] Open   [] Draining    [] Incisional  [] Rash   [] Hair Loss  [] Other:        Physical Exam:  /79 (Site: Right Upper Arm, Position: Sitting, Cuff Size: Large Adult)   Pulse 108   Temp 98.4 °F (36.9 °C)   Ht 5' 4\" (1.626 m)   Wt 196 lb (88.9 kg)   BMI 33.64 kg/m²     Constitutional:  Vital signs are normal. The patient appears well-developed and well-nourished. HEENT:   Head: Normocephalic. Atraumatic  Eyes: pupils are equal and reactive. No scleral icterus is present. Neck: No mass and no thyromegaly present. Cardiovascular: Normal rate, regular rhythm, S1 normal and S2 normal.  Radial pulses present   Pulmonary/Chest: Effort normal and breath sounds normal. No retractions  Abdominal: Soft. Normal appearance. There is no organomegaly. No tenderness. There is no rigidity, no rebound, no guarding and no Soares's sign. Musculoskeletal:        Right lower leg: Normal. No tenderness and no edema. Left lower leg: Normal. No tenderness and no edema. Lymphadenopathy:     No cervical adenopathy, No Exrtemity Adenopathy. Neurological: The patient is alert and oriented. Moving all 4 extremities, sensation grossly intact bilateral  Skin: Skin is warm, dry and intact. Incisions CDI  Psychiatric: The patient has a normal mood and affect.  Speech is normal and behavior is normal. Judgment and thought content normal. Cognition and memory are normal.     Assessment:  1 week post bypass  Progressing well    Plan:  Fulls  Start vitamins  Lovenox  Ambulation stressed  Overall doing well  Packing changed, no signs of infection, wound left to heal by secondary on left side  Pathology reviewed with patient

## 2022-04-29 ENCOUNTER — CARE COORDINATION (OUTPATIENT)
Dept: OTHER | Facility: CLINIC | Age: 62
End: 2022-04-29

## 2022-04-29 ASSESSMENT — ENCOUNTER SYMPTOMS
VOMITING: 0
NAUSEA: 0
CHEST TIGHTNESS: 0
WHEEZING: 0
SHORTNESS OF BREATH: 0
COUGH: 0

## 2022-04-29 NOTE — CARE COORDINATION
[facility] Transitions Follow Up Call    2022    Patient: Odessa Castellanos  Patient : 1960   MRN: [id number]  Reason for Admission: Gastric Bypass Surgery   Discharge Date: 22 RARS: Readmission Risk Score: 8.4 ( )       Care Transitions Follow Up Call    Needs to be reviewed by the provider   Additional needs identified to be addressed with provider: Yes and patient is notifying   none             Method of communication with provider : Pt is calling the surgeon to notify of a pulling type feeling below her belly button       Care Transition Nurse (CTN) contacted the patient by telephone to follow up after admission on 22. Verified name and  with patient as identifiers. Addressed changes since last contact: none  Discussed follow-up appointments. If no appointment was previously scheduled, appointment scheduling offered: n/a. Is follow up appointment scheduled within 7 days of discharge? Yes. CTN reviewed discharge instructions, medical action plan and red flags with patient and discussed any barriers to care and/or understanding of plan of care after discharge. Discussed appropriate site of care based on symptoms and resources available to patient including: Specialist. The patient agrees to contact the PCP office for questions related to their healthcare. She is having a pulling type sensation/ pain below her belly button, this is new she said, I gave her the number to Dr Nita Hopkins office and she is going to call to let them know. Patients top risk factors for readmission: medical condition-recent bariatric surgery  Interventions to address risk factors: verify patient has ongoing communication with her surgeon, follows the diet plan set forth the the surgeon, monitors for constipation,     It is taking her 1-2 hours to drink her protein shake in the morning. She drinks her second shake at night. She is taking her Tums 1000 mg 2 tabs at night.  She is taking her MVI daily. She eats a greek yogurt in late in afternoon. She is taking MOM prn for constipation but her bowels have been moving ok so far. She had her last does of Lovenox last night. She is now on full liquids. Blood sugar ranges from . CTN provided contact information for future needs. Plan for follow-up call in 10-14 days based on severity of symptoms and risk factors. Plan for next call: review symptoms, diet intake and activity tolerance          Care Transitions Subsequent and Final Call    Subsequent and Final Calls  Have your medications changed?: No  Do you currently have any active services?: No  Do you have any needs or concerns that I can assist you with?: No  Identified Barriers: None  Care Transitions Interventions  Other Interventions:            Follow Up  Future Appointments   Date Time Provider Elizabeth Brock   5/11/2022  4:00 PM Elin Gotti DO bariatric sher [facility]   5/13/2022  9:30 AM Shivam Rosales DO [facility]   6/7/2022  3:15 PM NAYANA Marquez [facility]

## 2022-04-29 NOTE — PROGRESS NOTES
Post-Discharge Transitional Care Follow Up      Edie Bacon   YOB: 1960    Date of Office Visit:  4/18/2022  Date of Hospital Admission: 4/12/22  Date of Hospital Discharge: 4/14/22  Readmission Risk Score (high >=14%. Medium >=10%):Readmission Risk Score: 8.4 ( )      Care management risk score Rising risk (score 2-5) and Complex Care (Scores >=6): 5     Non face to face  following discharge, date last encounter closed (first attempt may have been earlier): 4/15/2022  1:50 PM     Call initiated 2 business days of discharge: Yes     Hospital discharge follow-up  Status post bariatric surgery        -     Stable post-op state        -     Graded diet per Bariatric recommendations; tolerating well. -     No heavy lifting, pushing or pulling for 6 weeks. -     UT DISCHARGE MEDS RECONCILED W/ CURRENT OUTPATIENT MED LIST. Continue all medications. -     Scheduled f/u with Bariatric Surgery in 2 weeks. Chronic hypokalemia        -     Likely in setting of chronic HCTZ use; now discontinued        -     Patient reports difficulty tolerating oral KCl tabs. D/c for now in view of above. -     Basic Metabolic Panel; Future. Stage 3a chronic kidney disease (HCC)        -     At baseline; monitor  -     Basic Metabolic Panel; Future      Medical Decision Making: moderate complexity  Return in 3 months (on 7/18/2022). On this date 4/18/2022 I have spent 30 minutes reviewing previous notes, test results and face to face with the patient discussing the diagnosis and importance of compliance with the treatment plan as well as documenting on the day of the visit. Subjective:   HPI    Patient is presenting today for a hospital follow up visit. Status post Laparoscopic Rebecca-en-Y Surgery on 4/12/2022. Procedure was tolerated well without complications. Post-op Esophagram on 4/13/22 was negative for an anastomotic leak.  Hospital stay was uneventful and patient was discharged home in a stable state on 4/14/22. Inpatient course: Discharge summary reviewed- see chart. Interval history/Current status:    Patient reports stable post-discharge course. Only complaint is of persisting fatigue. Tolerating graded diet per bariatric recommendations. Pain is well controlled. No nausea or vomiting endorsed. Patient is scheduled for follow up with her Bariatric Surgeon (Dr. Ken Lancaster) in 2 weeks. No pushing, pulling or heavy lifting has been recommended for 6 weeks. Patient Active Problem List   Diagnosis    Vitamin D deficiency    Generalized anxiety disorder    Sleep apnea, obstructive    Type II diabetes mellitus, uncontrolled ([address]    Hypothyroidism    Essential hypertension    Family history of coronary artery disease    Personal history of tobacco use    Non-cardiac chest pain    History of colon polyps    Polyp of colon    Tobacco abuse, in remission    Irregular Z line of esophagus    Gastric polyp    BELTRAN (nonalcoholic steatohepatitis)    Joint stiffness    Fatty infiltration of liver    Angina pectoris syndrome (HCC)    Obesity (BMI 30-39. 9)    Status post gastric bypass for obesity    Complications of bariatric procedures    Dehydration    Morbid obesity due to excess calories ([address]    Hiatal hernia with GERD       Medications listed as ordered at the time of discharge from hospital     Medication List          Accurate as of April 18, 2022 11:59 PM. If you have any questions, ask your nurse or doctor.             CONTINUE taking these medications    AgaMatrix Control Soln  Use as directed for control solution test     AgaMatrix Jazz Test strip  Generic drug: blood glucose test strips  USE TO TEST BLOOD SUGAR ONE TIME DAILY     AgaMatrix Jazz Wireless 2 w/Device Kit  1 each by Does not apply route once for 1 dose     AgaMatrix Ultra-Thin Lancets Misc  Use to test blood sugar 1 time daily     buPROPion 300 MG extended release tablet  Commonly known as: [employer] XL  TAKE ONE TABLET BY MOUTH EVERY MORNING     cyclobenzaprine 10 MG tablet  Commonly known as: FLEXERIL  Take 1 tablet by mouth 3 times daily as needed for Muscle spasms     enoxaparin 40 MG/0.4ML injection  Commonly known as: Lovenox  Inject 0.4 mLs into the skin 2 times daily for 14 days     levothyroxine 50 MCG tablet  Commonly known as: SYNTHROID  TAKE 1 TABLET BY MOUTH ONE TIME A DAY     metoprolol tartrate 25 MG tablet  Commonly known as: LOPRESSOR  Take 1 tablet by mouth daily     nitroGLYCERIN 0.4 MG SL tablet  Commonly known as: Nitrostat  Place 1 tablet under the tongue every 5 minutes as needed for Chest pain Indications: never used up to max of 3 total doses. If no relief after 1 dose, call 911. oxyCODONE 5 MG/5ML solution  Commonly known as: ROXICODONE  Take 5 mLs by mouth every 6 hours as needed for Pain for up to 5 days.      pantoprazole 20 MG tablet  Commonly known as: PROTONIX  Take 1 tablet by mouth every morning (before breakfast)     potassium chloride 20 MEQ extended release tablet  Commonly known as: KLOR-CON M  TAKE ONE TABLET TWO TIMES A DAY     pravastatin 20 MG tablet  Commonly known as: PRAVACHOL  TAKE ONE TABLET BY MOUTH EVERY EVENING     promethazine 25 MG tablet  Commonly known as: PHENERGAN  Take 1 tablet by mouth every 6 hours as needed for Nausea     ranolazine 500 MG extended release tablet  Commonly known as: Ranexa  Take 1 tablet by mouth 2 times daily     sucralfate 1 GM/10ML suspension  Commonly known as: Carafate  Take 10 mLs by mouth 4 times daily for 14 days             Medications marked \"taking\" at this time  Outpatient Medications Marked as Taking for the 22 encounter (Office Visit) with Taylor Mcrae MD   Medication Sig Dispense Refill    levothyroxine (SYNTHROID) 50 MCG tablet TAKE 1 TABLET BY MOUTH ONE TIME A DAY 90 tablet 1    [] promethazine (PHENERGAN) 25 MG tablet Take 1 tablet by mouth every 6 hours as needed for Nausea (Patient not taking: Reported on 2022) 56 tablet 0    [] oxyCODONE (ROXICODONE) 5 MG/5ML solution Take 5 mLs by mouth every 6 hours as needed for Pain for up to 5 days. 20 mL 0    [] cyclobenzaprine (FLEXERIL) 10 MG tablet Take 1 tablet by mouth 3 times daily as needed for Muscle spasms (Patient not taking: Reported on 2022) 21 tablet 0    [] enoxaparin (LOVENOX) 40 MG/0.4ML injection Inject 0.4 mLs into the skin 2 times daily for 14 days 11.2 mL 0    sucralfate (CARAFATE) 1 GM/10ML suspension Take 10 mLs by mouth 4 times daily for 14 days 560 mL 0    [DISCONTINUED] potassium chloride (KLOR-CON M) 20 MEQ extended release tablet TAKE ONE TABLET TWO TIMES A  tablet 0    [DISCONTINUED] metoprolol tartrate (LOPRESSOR) 25 MG tablet Take 1 tablet by mouth daily 180 tablet 4    pravastatin (PRAVACHOL) 20 MG tablet TAKE ONE TABLET BY MOUTH EVERY EVENING 90 tablet 1    blood glucose test strips (AGAMATRIX JAZZ TEST) strip USE TO TEST BLOOD SUGAR ONE TIME DAILY 100 each 3    ranolazine (RANEXA) 500 MG extended release tablet Take 1 tablet by mouth 2 times daily 180 tablet 1    buPROPion (WELLBUTRIN XL) 300 MG extended release tablet TAKE ONE TABLET BY MOUTH EVERY MORNING 90 tablet 4    pantoprazole (PROTONIX) 20 MG tablet Take 1 tablet by mouth every morning (before breakfast) 90 tablet 4    AgaMatrix Ultra-Thin Lancets MISC Use to test blood sugar 1 time daily 100 each 3    nitroGLYCERIN (NITROSTAT) 0.4 MG SL tablet Place 1 tablet under the tongue every 5 minutes as needed for Chest pain Indications: never used up to max of 3 total doses. If no relief after 1 dose, call 911.  (Patient not taking: Reported on 2022) 20 tablet 3    Blood Glucose Calibration (AGAMATRIX CONTROL) SOLN Use as directed for control solution test 1 each 0        Medications patient taking as of now reconciled against medications ordered at time of hospital discharge: Yes    Review of Systems   Constitutional: Positive for fatigue. Negative for activity change, chills, diaphoresis and fever. Respiratory: Negative for cough, chest tightness, shortness of breath and wheezing. Cardiovascular: Negative for chest pain, palpitations and leg swelling. Gastrointestinal: Negative for nausea and vomiting. Neurological: Negative for dizziness, syncope, light-headedness and headaches. Objective:    /78 (Site: Right Upper Arm, Position: Sitting, Cuff Size: Medium Adult)   Pulse 75   Temp 98.2 °F (36.8 °C) (Temporal)   Ht 5' 4\" (1.626 m)   Wt 204 lb (92.5 kg)   SpO2 99%   BMI 35.02 kg/m²   Physical Exam  Constitutional:       General: She is not in acute distress. Appearance: Normal appearance. She is normal weight. She is not diaphoretic. HENT:      Head: Normocephalic and atraumatic. Cardiovascular:      Rate and Rhythm: Normal rate and regular rhythm. Pulses: Normal pulses. Heart sounds: Normal heart sounds. No murmur heard. No friction rub. Pulmonary:      Effort: Pulmonary effort is normal. No respiratory distress. Breath sounds: Normal breath sounds. No wheezing or rhonchi. Chest:      Chest wall: No tenderness. Abdominal:      General: Abdomen is flat. Bowel sounds are normal. There is no distension. Palpations: Abdomen is soft. Tenderness: There is no abdominal tenderness. Comments: Abdominal binder in place. Laparoscopic scars x 6, healing well. No surrounding erythema or active drainage noted. Musculoskeletal:         General: No tenderness. Normal range of motion. Right lower leg: No edema. Left lower leg: No edema. Neurological:      Mental Status: She is alert. An electronic signature was used to authenticate this note.   --Dane Fink MD

## 2022-04-29 NOTE — CARE COORDINATION
[facility] Transitions Follow Up Call    2022    Patient: Jennifer Mendoza  Patient : 1960   MRN: [id number]  Reason for Admission: gastric bypass  Discharge Date: 22 RARS: Readmission Risk Score: 8.4 ( )         Pt was at the bank handling some issues for her mother, said she will call me back. Care Transitions Subsequent and Final Call    Subsequent and Final Calls  Care Transitions Interventions  Other Interventions:            Follow Up  Future Appointments   Date Time Provider Elizabeth Brock   2022  4:00 PM Ethel Kim DO bariatric  [facility]   2022  9:30 AM Shivam Telles [facility]   2022  3:15 PM NAYANA Segundo [facility]

## 2022-05-05 ENCOUNTER — CARE COORDINATION (OUTPATIENT)
Dept: OTHER | Facility: CLINIC | Age: 62
End: 2022-05-05

## 2022-05-06 ENCOUNTER — CARE COORDINATION (OUTPATIENT)
Dept: OTHER | Facility: CLINIC | Age: 62
End: 2022-05-06

## 2022-05-06 NOTE — CARE COORDINATION
[facility] Transitions Follow Up Call    2022    Patient: Yomaira Carrizales  Patient : 1960   MRN: [id number]  Reason for Admission: Post Gastric Bypass   Discharge Date: 22 RARS: Readmission Risk Score: 8.4 ( )     Care Transitions Follow Up Call    Needs to be reviewed by the provider   Additional needs identified to be addressed with provider: No  none             Method of communication with provider : none  Pt weight today is #186.0  Yesterday was was 185.8, she has advanced to a pureed diet now. Ate a few bites of cream of wheat this morning, she gets full quickly she said, but is getting the 2 protein shakes in every day, she still takes over an hour to drink the shake   Blood sugar this am was 100 fasting, yesterday was 113 and recheck was 106. Average  Over the last 14 days is 97 pre meal and 105 post meal.  Blood pressure: 124/71  HR - 72  Blood pressure is ranging , highest was 104 systolic and that was only once or twice. Last A1C was in December 7.9  Before her surgery and weight loss. Pt said overall she is doing well. Care Transition Nurse (CTN) contacted the patient by telephone to follow up after admission on 22. Verified name and  with patient as identifiers. Addressed changes since last contact: advanced to pureed diet now  Discussed follow-up appointments. If no appointment was previously scheduled, appointment scheduling offered: n/a. Is follow up appointment scheduled within 7 days of discharge? Yes. CTN reviewed discharge instructions, medical action plan and red flags with patient and discussed any barriers to care and/or understanding of plan of care after discharge. Discussed appropriate site of care based on symptoms and resources available to patient including: Specialist. The patient agrees to contact the PCP office for questions related to their healthcare.      Patients top risk factors for readmission: medical condition-recent gastric bypass surgery  Interventions to address risk factors: Obtained and reviewed discharge summary and/or continuity of care documents        CTN provided contact information for future needs. Plan for follow-up call in 10-14 days based on severity of symptoms and risk factors. Plan for next call: diet review, bowel movements, protein intake, weight, blood sugars              Care Transitions Subsequent and Final Call    Subsequent and Final Calls  Do you have any ongoing symptoms?: No  Have your medications changed?: No  Do you have any questions related to your medications?: No  Do you currently have any active services?: No  Do you have any needs or concerns that I can assist you with?: No  Identified Barriers: None  Care Transitions Interventions  Other Interventions:            Follow Up  Future Appointments   Date Time Provider Elizabeth Brock   5/11/2022  4:00 PM Hanna Jacobson DO bariatric [facility]   5/13/2022  9:30 AM Shivam Schrader DO [address] [place] [address]   6/7/2022  3:15 PM [facility], [address] [place] [address]

## 2022-05-11 ENCOUNTER — OFFICE VISIT (OUTPATIENT)
Dept: BARIATRICS/WEIGHT MGMT | Age: 62
End: 2022-05-11

## 2022-05-11 VITALS
DIASTOLIC BLOOD PRESSURE: 78 MMHG | WEIGHT: 186 LBS | HEART RATE: 81 BPM | RESPIRATION RATE: 20 BRPM | SYSTOLIC BLOOD PRESSURE: 135 MMHG | HEIGHT: 64 IN | BODY MASS INDEX: 31.76 KG/M2

## 2022-05-11 DIAGNOSIS — Z98.84 STATUS POST GASTRIC BYPASS FOR OBESITY: Primary | ICD-10-CM

## 2022-05-11 PROCEDURE — 99024 POSTOP FOLLOW-UP VISIT: CPT | Performed by: SURGERY

## 2022-05-11 NOTE — PROGRESS NOTES
Medical Nutrition Therapy  Metabolic and Bariatric surgery  1 month after surgery follow up note           Vitals:   Vitals:    05/11/22 1554   BP: 135/78   Site: Right Upper Arm   Position: Sitting   Cuff Size: Medium Adult   Pulse: 81   Resp: 20   Weight: 186 lb (84.4 kg)   Height: 5' 4\" (1.626 m)      Body mass index is 31.93 kg/m². Labs reviewed:     Multivitamin/mineral intake: Bariatric Choice, daily  Calcium intake: Tums, daily   Other:            Nutrition Assessment:   PES: Inadequate food and beverage intake r/t WLS as evidenced by loss of excess body weight lost 26 lbs over 1 mo.       Goals   60-80gm of protein  48-64oz of fluid     [x] met     []  Not met        Plan:  Questions answered re diet advancement and tolerance  F/u 3 months after surgery         Todd Reed, MS, RD, LD

## 2022-05-13 ENCOUNTER — OFFICE VISIT (OUTPATIENT)
Dept: CARDIOLOGY CLINIC | Age: 62
End: 2022-05-13
Payer: COMMERCIAL

## 2022-05-13 VITALS
BODY MASS INDEX: 31.76 KG/M2 | HEART RATE: 86 BPM | OXYGEN SATURATION: 98 % | DIASTOLIC BLOOD PRESSURE: 70 MMHG | SYSTOLIC BLOOD PRESSURE: 150 MMHG | WEIGHT: 185 LBS

## 2022-05-13 DIAGNOSIS — R07.89 NON-CARDIAC CHEST PAIN: ICD-10-CM

## 2022-05-13 DIAGNOSIS — I10 ESSENTIAL HYPERTENSION: Primary | ICD-10-CM

## 2022-05-13 DIAGNOSIS — I20.9 ANGINA PECTORIS SYNDROME (HCC): ICD-10-CM

## 2022-05-13 DIAGNOSIS — E66.9 OBESITY (BMI 30-39.9): ICD-10-CM

## 2022-05-13 PROCEDURE — 99214 OFFICE O/P EST MOD 30 MIN: CPT | Performed by: INTERNAL MEDICINE

## 2022-05-13 NOTE — PROGRESS NOTES
Chief Complaint   Patient presents with    Hypertension    6 Month Follow-Up       1-15-16: Patient presents for initial medical evaluation. Patient is followed on a regular basis by Dr. Prem Fernandes MD. S/p hospitalization for CP. S/p normal treadmill stress echo with normal LVF. States symptoms have resolved. Was under a lot of stress at that time. Pt denies chest pain, dyspnea, dyspnea on exertion, change in exercise capacity, fatigue,  nausea, vomiting, diarrhea, constipation, motor weakness, insomnia, weight loss, syncope, dizziness, lightheadedness, palpitations, PND, orthopnea, or claudication. Losing weight. BP is good.        3-23-18: felt a pressure this am that awakened her from sleep, lasted about 20 min, and radiated to her jaw. No associated SOB, N/V or diaphoresis. Has been under a lot of stress. EKG with new anterolateral changes. Pt denies , dyspnea, dyspnea on exertion, change in exercise capacity, fatigue,  nausea, vomiting, diarrhea, constipation, motor weakness, insomnia, weight loss, syncope, dizziness, lightheadedness, palpitations, PND, orthopnea. No hx of LHC or stress test. No hx of MI, CHF or arrhythmia.     6-1-18: s/p LHC with normal coronaries, normal LVF. Still having mid sternal chest pain, radiates to the back. Does get it with activity now and sometimes occurs at rest. She took a nitro and that helped. Has become more frequent now and any time she took a walk. No excessive caffeine. No smoking. Pt denies  dyspnea, dyspnea on exertion, change in exercise capacity, fatigue,  nausea, vomiting, diarrhea, constipation, motor weakness, insomnia, weight loss, syncope, dizziness, lightheadedness, palpitations, PND, orthopnea, or claudication. BP is mildly high. No smoking. 12-21-18: doing well on Ranexa. Symptoms have much improved since last visit. No GERD type symptoms.  Pt deniesdyspnea, dyspnea on exertion, change in exercise capacity, fatigue,  nausea, vomiting, diarrhea, constipation, motor weakness, insomnia, weight loss, syncope, dizziness, lightheadedness, palpitations, PND, orthopnea, or claudication. No nitro use. BP and hr are good. CAD is stable. No LE discoloration or ulcers. No LE edema. No CHF type symptoms. Lipid profile is normal. No recent hospitalization. No change in meds. BS is controlled. HGA1c is 7. EKg with NSR, no ischemia. No smoking. 2-1-19: experiencing chest pain/discomfort on and off and more frequent lately. States its worsened with activity, walking around at [employer], in the office. Did have an episode yesterday that felt like GERD type symptoms. Radiates to the back and right. Pt denies dyspnea, dyspnea on exertion, change in exercise capacity, fatigue,  nausea, vomiting, diarrhea, constipation, motor weakness, insomnia, weight loss, syncope, dizziness, lightheadedness, palpitations, PND, orthopnea, or claudication. No diaphoresis. No smoking. Drinks a couple of cups of tea a day, even when she went caffeine free still had symptoms. On PPI. Imdur gave her HA.     3-29-19: continues to have episodes of CP/angina despite increasing Ranexa and lorpessor. Mainly symptoms are at night and worse with going up stairs. Was seen by GI, placed on PP 2x/day. Will have an EGD done Wednesday. Pt denies nausea, vomiting, diarrhea, constipation, motor weakness, insomnia, weight loss, syncope, dizziness, lightheadedness, palpitations, PND, orthopnea, or claudication. 9-21-20: continues to have CP with exertion, along with SOB, has to rest to get relief. She was taken off of Ranexa, reduced lisinopril to 5mg daily and helped with her vertigo. S/p EGD which was ok with patient. Was placed on Celebrex for Sciatica and did not really help with her CP. Does not reproduce with palpation, deep breathing or coughing. Pain mainly brought on by walking/exercising/exertion but not all the time. No N/V diaphoresis. Nitro did help before with the pain.    No smoking for 15 yrs.     11-19-21: doing well. Hx of vasospastic angina. Considering bariatric surgery. Started walking and now having some chest and back discomfort. Sometimes its minor and at times lasting >15 min. Hx of normal LHC in 2018. On Norvasc and Lopressor. Did not tolerate Ranexxa in the past. IMDUR gave her a HA in the past.   EKG with nSR, t wave inversion. Chronic.     5-13-22: doing ok. S/p gastric bypass surgery 4 weeks ago. Was noted to have a large hiatal hernia apparently. Her meds were adjusted. Has lost 32 pounds since surgery. Hx of normal LHC in 2018. Hx of vasospastic angina. Pt denies chest pain, dyspnea, dyspnea on exertion, change in exercise capacity, fatigue,  nausea, vomiting, diarrhea, constipation, motor weakness, insomnia, weight loss, syncope, dizziness, lightheadedness, palpitations, PND, orthopnea, or claudication. Patient Active Problem List   Diagnosis    Vitamin D deficiency    Generalized anxiety disorder    Sleep apnea, obstructive    Type II diabetes mellitus, uncontrolled ([address]    Hypothyroidism    Essential hypertension    Family history of coronary artery disease    Personal history of tobacco use    Non-cardiac chest pain    History of colon polyps    Polyp of colon    Tobacco abuse, in remission    Irregular Z line of esophagus    Gastric polyp    BELTRAN (nonalcoholic steatohepatitis)    Joint stiffness    Fatty infiltration of liver    Angina pectoris syndrome (HCC)    Obesity (BMI 30-39. 9)    Status post gastric bypass for obesity    Complications of bariatric procedures    Dehydration    Morbid obesity due to excess calories ([address]    Hiatal hernia with GERD       Past Surgical History:   Procedure Laterality Date    CARDIAC CATHETERIZATION  2018    no stents    CARPAL TUNNEL RELEASE Left 01/13/2021    LEFT CARPAL TUNNEL RELEASE, LEFT MIDDLE FINGER TRIGGER RELEASE, TENOSYNOVECTOMY FDPFDS performed by Laraine Prader, MD at [address] [facility] TUNNEL RELEASE Right 2021    RIGHT CARPAL TUNNEL RELEASE, TRIGGER FINGER RELEASE RIGHT MIDDLE FINGER, FDP, FDS, TENOSYNOVECTOMY performed by Judy Kaminski MD at [address], [address] [facility] COLONOSCOPY N/A 2020    COLONOSCOPY DIAGNOSTIC performed by Faith Jacobsen MD at [address] [place] [address] CATH LAB PROCEDURE      ENDOMETRIAL ABLATION      metrorrhagia    ENDOSCOPY, COLON, DIAGNOSTIC      HYSTERECTOMY  2012    fibroid, cervicitis, ovaries intact    MA COLON CA SCRN NOT HI RSK IND N/A 05/15/2018    COLONOSCOPY performed by Faith Jacobsen MD at [facility]  2022     ROBOTIC LAPAROSCOPIC     SHEILA-EN-Y GASTRIC BYPASS N/A 2022    XI ROBOTIC LAPAROSCOPIC SHEILA-EN-Y GASTRIC BYPASS, EGD, HIATAL HERNIA REPAIR performed by Jensen Robertson DO at [place] [address]    UPPER GASTROINTESTINAL ENDOSCOPY N/A 2019    EGD ESOPHAGOGASTRODUODENOSCOPY performed by Faith Jacobsen MD at [place] [address]  2022       Social History     Socioeconomic History    Marital status:      Spouse name: Not on file    Number of children: 3    Years of education: Not on file    Highest education level: Not on file   Occupational History    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   Tobacco Use    Smoking status: Former Smoker     Packs/day: 1.00     Years: 30.00     Pack years: 30.00     Types: Cigarettes     Start date: 3/5/1975     Quit date: 2005     Years since quittin.9    Smokeless tobacco: Never Used    Tobacco comment: 3/15/18 started age 17   Vaping Use    Vaping Use: Never used   Substance and Sexual Activity    Alcohol use: No     Comment: not at all     Drug use: No    Sexual activity: Not on file   Other Topics Concern    Not on file   Social History Narrative    Born in [place], one of 4 children (2 boys and 2 girls), both parents in the [employer], moved to [place]    Mother in [place], has memory problems    Jain Rastafari     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    Has 2 dogs, Blossom and Martha Flies    Children 3, 2 sons in the area, one daughter in [address] [place] [address] Avenue: dogs, sawing, machine embroidery, baking     Social Determinants of Health     Financial Resource Strain: Low Risk     Difficulty of Paying Living Expenses: Not hard at all   Food Insecurity: No Food Insecurity    Worried About [address] in the Last Year: Never true    [address] [employer] in the Last Year: Never true   Transportation Needs:     Lack of Transportation (Medical): Not on file    Lack of Transportation (Non-Medical):  Not on file   Physical Activity:     Days of Exercise per Week: Not on file    Minutes of Exercise per Session: Not on file   Stress:     Feeling of Stress : Not on file   Social Connections:     Frequency of Communication with Friends and Family: Not on file    Frequency of Social Gatherings with Friends and Family: Not on file    Attends Restorationist Services: Not on file    Active Member of Clubs or Organizations: Not on file    Attends Club or Organization Meetings: Not on file    Marital Status: Not on file   Intimate Partner Violence:     Fear of Current or Ex-Partner: Not on file    Emotionally Abused: Not on file    Physically Abused: Not on file    Sexually Abused: Not on file   Housing Stability:     Unable to Pay for Housing in the Last Year: Not on file    Number of [place] in the Last Year: Not on file    Unstable Housing in the Last Year: Not on file       Family History   Problem Relation Age of Onset    Arrhythmia Mother     Hypertension Mother     Dementia Mother     COPD Father         mesothelioma    Alcohol Abuse Father     Diabetes Paternal Grandmother     Breast Cancer Paternal Grandmother     Diabetes Paternal Grandfather     Colon Cancer Paternal Grandfather     Schizophrenia Brother     Colon Cancer Paternal Uncle        Current Outpatient Medications   Medication Sig Dispense Refill    levothyroxine (SYNTHROID) 50 MCG tablet TAKE 1 TABLET BY MOUTH ONE TIME A DAY 90 tablet 1    pravastatin (PRAVACHOL) 20 MG tablet TAKE ONE TABLET BY MOUTH EVERY EVENING 90 tablet 1    ranolazine (RANEXA) 500 MG extended release tablet Take 1 tablet by mouth 2 times daily 180 tablet 1    buPROPion (WELLBUTRIN XL) 300 MG extended release tablet TAKE ONE TABLET BY MOUTH EVERY MORNING 90 tablet 4    pantoprazole (PROTONIX) 20 MG tablet Take 1 tablet by mouth every morning (before breakfast) 90 tablet 4    sucralfate (CARAFATE) 1 GM/10ML suspension Take 10 mLs by mouth 4 times daily for 14 days 560 mL 0    blood glucose test strips (AGAMATRIX JAZZ TEST) strip USE TO TEST BLOOD SUGAR ONE TIME DAILY 100 each 3    Blood Glucose Monitoring Suppl (AGAMATRKipu Systems JAZZ WIRELESS 2) w/Device KIT 1 each by Does not apply route once for 1 dose 1 kit 0    AgaMatrix Ultra-Thin Lancets MISC Use to test blood sugar 1 time daily 100 each 3    nitroGLYCERIN (NITROSTAT) 0.4 MG SL tablet Place 1 tablet under the tongue every 5 minutes as needed for Chest pain Indications: never used up to max of 3 total doses. If no relief after 1 dose, call 911. (Patient not taking: Reported on 4/21/2022) 20 tablet 3    Blood Glucose Calibration (AGAMATRIX CONTROL) SOLN Use as directed for control solution test 1 each 0     No current facility-administered medications for this visit. Seasonal and Morphine    Review of Systems:  General ROS: negative  Psychological ROS: negative  Hematological and Lymphatic ROS: No history of blood clots or bleeding disorder.    Respiratory ROS: no cough, shortness of breath, or wheezing  Cardiovascular ROS: positive for - chest pain  Gastrointestinal ROS: no abdominal pain, change in bowel habits, or black or bloody stools  Genito-Urinary ROS: no dysuria, trouble voiding, or hematuria  Musculoskeletal ROS: negative  Neurological ROS: no TIA or stroke symptoms  Dermatological ROS: negative    VITALS:  Blood pressure (!) 150/70, pulse 86, weight 185 lb (83.9 kg), SpO2 98 %, not currently breastfeeding. Body mass index is 31.76 kg/m². Physical Examination:  General appearance - alert, well appearing, and in no distress  Mental status - alert, oriented to person, place, and time  Neck - Neck is supple, no JVD or carotid bruits. No thyromegaly or adenopathy. Chest - clear to auscultation, no wheezes, rales or rhonchi, symmetric air entry  Heart - normal rate, regular rhythm, normal S1, S2, no murmurs, rubs, clicks or gallops  Abdomen - soft, nontender, nondistended, no masses or organomegaly  Neurological - alert, oriented, normal speech, no focal findings or movement disorder noted  Extremities - peripheral pulses normal, no pedal edema, no clubbing or cyanosis  Skin - normal coloration and turgor, no rashes, no suspicious skin lesions noted      EKG: normal sinus rhythm, T wave inversion in lateral leads. No orders of the defined types were placed in this encounter. ASSESSMENT:     Diagnosis Orders   1. Essential hypertension     2. Angina pectoris syndrome ([address]     3. Non-cardiac chest pain     4. Obesity (BMI 30-39.9)       ? Coronary vasospasm. Hx of DM  HTN  HLD  Obesity class II  Remote hx of tob abuse  HIATAL HERNIA S/P REPAIR  S/P BARIATRIC SURGERY. PLAN:     Patient will need to continue to follow up with you for their general medical care     As always, aggressive risk factor modification is strongly recommended. We should adhere to the JNC VIII guidelines for HTN management and the NCEP ATP III guidelines for LDL-C management. Cardiac diet is always recommended with low fat, cholesterol, calories and sodium. Continue medications at current doses.      Check EKG next ov    OK TO STOP [address]. ALSO DIURETIC WAS STOPPED. SL nitro. The proper use and anticipated side effects of nitroglycerine has been carefully explained. If a single episode of chest pain is not relieved by one tablet, the patient will try another within 5 minutes; and if this doesn't relieve the pain, the patient is instructed to call 911 for transportation to an emergency department. Patient is to avoid any excessive caffeine, chocolate, or OTC stimulants. Patient was advised and encouraged to check blood pressure at home or at a pharmacy, maintain a logbook, and also call us back if blood pressure are above the target ranges or if it is low. Patient clearly understands and agrees to the instructions. We will need to continue to monitor muscle and liver enzymes, BUN, CR, and electrolytes. Details of medical condition explained and patient was warned about adverse consequences of uncontrolled medical conditions and possible side effects of prescribed medications.

## 2022-05-15 PROBLEM — E86.0 DEHYDRATION: Status: RESOLVED | Noted: 2022-04-15 | Resolved: 2022-05-15

## 2022-05-16 ENCOUNTER — CARE COORDINATION (OUTPATIENT)
Dept: OTHER | Facility: CLINIC | Age: 62
End: 2022-05-16

## 2022-05-16 NOTE — CARE COORDINATION
said she is doing well, works from home. She had 1/3 of cream of wheat today and it took her 40 minutes to eat it. Bowels are still an issue, she took 30 cc of mom this morning and it worked. She is going to cut the mom in half to 15 cc when needed. She is tolerating her fluid intake getting 64 ounces in on most days. Some days its only 40 ounces. She takes carafate prn for what she feels is heart burn she feels the mom is causing the heart burn and some loose stool. Is eating applesauce with high fiber and fruit in it daily to help with the stools . Weight today is # 181.8           Care Transitions Subsequent and Final Call    Subsequent and Final Calls  Do you have any ongoing symptoms?: No  Do you have any questions related to your medications?: No  Do you currently have any active services?: No  Do you have any needs or concerns that I can assist you with?: No  Identified Barriers: None  Care Transitions Interventions  Other Interventions:            Follow Up  Future Appointments   Date Time Provider Elizabeth Brock   6/2/2022 12:00 PM SCHEDULE, P BARIATRIC SURG bariatric sher [facility]   6/7/2022  3:15 PM NAYANA Ko [facility] EMERGENCY [facility] AT [place]   7/13/2022  4:00 PM SCHEDULE, [facility] BARIATRIC DIETICIAN bariatric sher Bia Mcgrath RN

## 2022-05-16 NOTE — PROGRESS NOTES
[address] [facility] [address] INVASIVE BARIATRIC SURG  [address]  [address]  [facility] [address]  Dept: [phone]    5/11/2022    CC: Post Bariatric Surgery    History:  63year old female 1 month post bariatric surgery. Overall doing well. Tolerating diet. No nausea or vomiting. Pain is controlled. Having stools. No new complaints. No chest pain, shortness of breath, fevers/chills.     Review of Systems:  General  Negative for: [] Weight Change   [x] Fatigue   [x] Fevers & Chills    [] Appetite change [] Other:    Positive for: [x] Weight Change   [] Fatigue   [] Fevers & Chills    [] Appetite change [] Other:   Cardiac  Negative for: [x] Chest Pain   [x] Difficulty Breathing   [] Leg Cramps [x] Edema of Lower Extremeties    [] Left   [] Right      Positive for: [] Chest Pain   [] Difficulty Breathing   [] Leg Cramps [] Edema of Lower Extremeties    [] Left   [] Right   Pulmonary  Negative for: [x] Shortness of Breath [] Wheeze [x] Cough  [] Calf Pain     Positive for: [] Shortness of Breath [] Wheeze [] Cough  [] Calf Pain   Gastro-Intestinal Negative for: [] Heartburn   [] Reflux   [] Dysphagia   [] Melena   [] BRBPR  [x] Vomiting   [x] Abdominal Pain   [] Diarrhea     [] Constipation  [] Other:     Positive for: [] Heartburn   [] Reflux   [] Dysphagia   [] Melena   [] BRBPR  [] Vomiting   [] Abdominal Pain   [] Diarrhea     [] Constipation  [] Other:    Muskuloskeletal Negative for: [] Joint pain   [] Back pain   [] Knee Pain   [x] Muscle weakness [x] Hernia   [] Edema [] Other:     Positive for: [] Joint pain   [] Back pain   [] Knee Pain   [] Muscle weakness [] Hernia   [] Edema [] Other:    Neurologic Negative for: [x] Syncope   [x] Insomnia   [] Being treated for depression  [] Other:     Positive for: [] Syncope   [] Insomnia   [] Being treated for depression  [] Other:    Skin Negative for: [] Wound:   [] Open   [] Draining   [] Incisional     [x] Rash   [x] Hair Loss  [] Other:     Positive for: [] Wound:   [] Open   [] Draining    [] Incisional  [] Rash   [] Hair Loss  [] Other:        Physical Exam:  /78 (Site: Right Upper Arm, Position: Sitting, Cuff Size: Medium Adult)   Pulse 81   Resp 20   Ht 5' 4\" (1.626 m)   Wt 186 lb (84.4 kg)   BMI 31.93 kg/m²     Constitutional:  Vital signs are normal. The patient appears well-developed and well-nourished. HEENT:   Head: Normocephalic. Atraumatic  Eyes: pupils are equal and reactive. No scleral icterus is present. Neck: No mass and no thyromegaly present. Cardiovascular: Normal rate, regular rhythm, S1 normal and S2 normal.  Radial pulses present   Pulmonary/Chest: Effort normal and breath sounds normal. No retractions  Abdominal: Soft. Normal appearance. There is no organomegaly. No tenderness. There is no rigidity, no rebound, no guarding and no Soares's sign. Musculoskeletal:        Right lower leg: Normal. No tenderness and no edema. Left lower leg: Normal. No tenderness and no edema. Lymphadenopathy:     No cervical adenopathy, No Exrtemity Adenopathy. Neurological: The patient is alert and oriented. Moving all 4 extremities, sensation grossly intact bilateral  Skin: Skin is warm, dry and intact. Incisions CDI  Psychiatric: The patient has a normal mood and affect.  Speech is normal and behavior is normal. Judgment and thought content normal. Cognition and memory are normal.     Assessment:  1 month post bypass  Progressing well    Plan:  Regular Bariatric Diet  Vitamin Replacement  Exercise  Overall doing well  Dietician visit  All questions answered  Labs in 2 months

## 2022-06-01 NOTE — H&P
Patient Name: Dov Crews  : 1960  MRN: [id number]  DATE: 19      ENDOSCOPY  History and Physical    Procedure:    [] Diagnostic Colonoscopy       [] Screening Colonoscopy  [x] EGD      [] ERCP      [] EUS       [] Other    [x] Previous office notes/History and Physical reviewed from the patients chart. Please see EMR for further details of HPI. I have examined the patient's status immediately prior to the procedure and:      Indications/HPI:    []Abdominal Pain  []Cancer- GI/Lung  []Fhx of colon CA/polyps  []History of Polyps  []Hamiltons   []Melena  []Abnormal Imaging  []Dysphagia    []Persistent Pneumonia  []Anemia  []Food Impaction  []History of Polyps  []GI Bleed  []Pulmonary nodule/Mass  []Change in bowel habits []Heartburn/Reflux  []Rectal Bleed (BRBPR)  []Chest Pain - Non Cardiac []Heme (+) Stoo  l[]Ulcers  []Constipation  []Hemoptysis   []Varices  []Diarrhea  []Hypoxemia  []Nausea/Vomiting  []Screening   []Crohns/Colitis  [x]Other: chest pain    Anesthesia:   [x] MAC [] Moderate Sedation   [] General   [] None     ROS: 12 pt Review of Symptoms was negative unless mentioned above    Medications:   Prior to Admission medications    Medication Sig Start Date End Date Taking?  Authorizing Provider   isosorbide dinitrate (ISORDIL) 10 MG tablet Take 1 tablet by mouth 3 times daily 3/29/19  Yes Shivam Michael, DO   lisinopril-hydrochlorothiazide (PRINZIDE;ZESTORETIC) 10-12.5 MG per tablet Take 1 tablet by mouth daily 3/21/19  Yes Gita Prince MD   omeprazole (PRILOSEC) 40 MG delayed release capsule Take 1 capsule by mouth daily  Patient taking differently: Take 40 mg by mouth 2 times daily  3/15/19  Yes Libertad Pop MD   metoprolol tartrate (LOPRESSOR) 25 MG tablet Take 1 tablet by mouth 2 times daily 19  Yes Shivam Michael, DO   ranolazine (RANEXA) 1000 MG extended release tablet Take 1 tablet by mouth 2 times daily 19  Yes Shivam Michael, DO   aspirin 81 MG EC tablet Take 1 tablet by mouth daily 1/15/19  Yes Ashley Perla MD   buPROPion (WELLBUTRIN SR) 200 MG extended release tablet TAKE 1 TABLET BY MOUTH ONE TIME A DAY 1/14/19  Yes Tanika Franco MD   pravastatin (PRAVACHOL) 20 MG tablet Take 1 tablet by mouth every evening 1/14/19  Yes Tanika Franco MD   Dulaglutide (TRULICITY) 8.33 CU/3.4ZO SOPN Once  A day 10/25/18  Yes Ashley Perla MD   SYNTHROID 50 MCG tablet TAKE ONE TABLET BY MOUTH ONE TIME A DAY 10/8/18  Yes Ashley Perla MD   metFORMIN (GLUCOPHAGE) 500 MG tablet TAKE ONE TABLET BY MOUTH TWICE A DAY WITH MEALS 10/8/18  Yes Taniak Franco MD   Cholecalciferol (VITAMIN D) 2000 UNITS CAPS capsule Take by mouth daily   Yes Historical Provider, MD   ibuprofen (ADVIL;MOTRIN) 200 MG tablet Take 200 mg by mouth every 6 hours as needed for Pain   Yes Historical Provider, MD   loratadine (CLARITIN) 10 MG tablet Take 10 mg by mouth daily as needed (Allergies)    Yes Historical Provider, MD   Blood Glucose Monitoring Suppl (PRODIGY AUTOCODE BLOOD GLUCOSE) w/Device KIT Use as directed to test up to 1 time daily 1/23/19   MD KAITLIN Hawkins LANCETS 28G MISC Use as directed to test up to 1 time daily 1/15/19   Ashley Perla MD   blood glucose test strips (PRODIGY NO CODING BLOOD GLUC) strip 1 each by In Vitro route daily As needed. 1/15/19   Ashley Perla MD   albuterol sulfate HFA (PROAIR HFA) 108 (90 Base) MCG/ACT inhaler Inhale 2 puffs into the lungs every 6 hours as needed for Wheezing or Shortness of Breath 6/13/18   Ellen Youngblood PA-C   Respiratory Therapy Supplies CLARA Please give patient the farshad view mask 5/31/18   Ellen Youngblood PA-C   nitroGLYCERIN (NITROSTAT) 0.4 MG SL tablet Place 1 tablet under the tongue every 5 minutes as needed for Chest pain up to max of 3 total doses. If no relief after 1 dose, call 911. Patient taking differently: Place 0.4 mg under the tongue every 5 minutes as needed for Chest pain up to max of 3 total doses.  If no relief after 1 dose, call 911. 3/23/18 Shivam SIMON Holiday, DO       Allergies: Allergies   Allergen Reactions    Seasonal Itching        History of allergic reaction to anesthesia:  No    Past Medical History:  Past Medical History:   Diagnosis Date    Abnormal EKG 3/23/2018    Diverticulosis of colon     DMII (diabetes mellitus, type 2) ([facility] 2000    Essential hypertension 1/15/2016    Family history of coronary artery disease 1/15/2016    Generalized anxiety disorder     H/O: hysterectomy     History of tobacco abuse 1/15/2016    Obesity (BMI 30-39. 9)     Other specified acquired hypothyroidism     Precordial pain 2018    (? Prinzmetal?) Dr Martha Mar S/P colonoscopy with polypectomy ,     Dr Mago Pearce, Dr Zamzam Mark, tubulovillous adenoma     S/P vaginal hysterectomy     benign    Sleep apnea, obstructive     was on CPAP, not using it at present    [facility] 3/23/2018    Vertigo        Past Surgical History:  Past Surgical History:   Procedure Laterality Date    CHOLECYSTECTOMY, LAPAROSCOPIC      DIAGNOSTIC CARDIAC CATH LAB PROCEDURE      ENDOMETRIAL ABLATION      metrorrhagia    HYSTERECTOMY  12    fibroid, cervicitis, ovaries intact    [place] COLON CA SCRN NOT  [address]  IND N/A 5/15/2018    COLONOSCOPY performed by Yang Haines MD at [address] [place] [address]       Social History:  Social History     Tobacco Use    Smoking status: Former Smoker     Packs/day: 1.00     Years: 30.00     Pack years: 30.00     Types: Cigarettes     Start date: 3/5/1975     Last attempt to quit: 2005     Years since quittin.8    Smokeless tobacco: Never Used    Tobacco comment: 3/15/18 started age 17   Substance Use Topics    Alcohol use: No     Comment: not at all     Drug use: No       Vital Signs:   Vitals:    19 0833   BP: 134/72   Pulse: 72   Resp: 16   Temp: 97.6 °F (36.4 °C)   SpO2: 97%        Physical Double O-Z Flap Text: The defect edges were debeveled with a #15 scalpel blade.  Given the location of the defect, shape of the defect and the proximity to free margins a Double O-Z flap was deemed most appropriate.  Using a sterile surgical marker, an appropriate transposition flap was drawn incorporating the defect and placing the expected incisions within the relaxed skin tension lines where possible. The area thus outlined was incised deep to adipose tissue with a #15 scalpel blade.  The skin margins were undermined to an appropriate distance in all directions utilizing iris scissors.

## 2022-06-02 ENCOUNTER — NURSE ONLY (OUTPATIENT)
Dept: BARIATRICS/WEIGHT MGMT | Age: 62
End: 2022-06-02

## 2022-06-02 ENCOUNTER — CARE COORDINATION (OUTPATIENT)
Dept: OTHER | Facility: CLINIC | Age: 62
End: 2022-06-02

## 2022-06-02 NOTE — CARE COORDINATION
condition. Barriers: none  Plan for overcoming my barriers: N/A  Confidence: 9/10  Anticipated Goal Completion Date: 5/30/22 and ongoing          Patient Stated (pt-stated)   On track      Pt will begin increasing her exercise and improve eating habits in order to lower her A1C  By walking on her lunch break 2-3x per week     Barriers: working from home  Plan for overcoming my barriers: Pt will make a conscious effort to leave her desk for exercise daily   Confidence: 7/10  Anticipated Goal Completion Date: 8/30/20        Patient Stated (pt-stated)   On track      Pt will check blood sugars twice daily 2-3 x per week     Barriers: none  Plan for overcoming my barriers: N/A  Confidence: 8/10  Anticipated Goal Completion Date: 9/23/2020            Prior to Admission medications    Medication Sig Start Date End Date Taking?  Authorizing Provider   levothyroxine (SYNTHROID) 50 MCG tablet TAKE 1 TABLET BY MOUTH ONE TIME A DAY 4/14/22   Harmony Tyson PA-C   sucralfate (CARAFATE) 1 GM/10ML suspension Take 10 mLs by mouth 4 times daily for 14 days 4/14/22 4/28/22  [facility] DO Karl   pravastatin (PRAVACHOL) 20 MG tablet TAKE ONE TABLET BY MOUTH EVERY EVENING 12/29/21   Harmony Tyson PA-C   blood glucose test strips (AGAMATRIX JAZZ TEST) strip USE TO TEST BLOOD SUGAR ONE TIME DAILY 12/2/21   MARCI Salomon   ranolazine (RANEXA) 500 MG extended release tablet Take 1 tablet by mouth 2 times daily 11/19/21   Shivam Michael, DO   buPROPion (WELLBUTRIN XL) 300 MG extended release tablet TAKE ONE TABLET BY MOUTH EVERY MORNING 6/29/21   Harmony Tyson PA-C   pantoprazole (PROTONIX) 20 MG tablet Take 1 tablet by mouth every morning (before breakfast) 6/29/21   Harmony Tyson PA-C   Blood Glucose Monitoring Suppl (AGAMATRIX JAZZ WIRELESS 2) w/Device KIT 1 each by Does not apply route once for 1 dose 1/14/21 2/7/22  Harmony Tyson PA-C   AgaMatrix Ultra-Thin Lancets MISC Use to test blood sugar 1 time daily 12/23/20   Vasu DELACRUZ NAYANA Tyson   nitroGLYCERIN (NITROSTAT) 0.4 MG SL tablet Place 1 tablet under the tongue every 5 minutes as needed for Chest pain Indications: never used up to max of 3 total doses. If no relief after 1 dose, call 911. Patient not taking: Reported on 4/21/2022 9/21/20   Shivam J Holiday, DO   Blood Glucose Calibration (AGAMATRIX CONTROL) SOLN Use as directed for control solution test 4/28/20   Adilia Faith MD       Future Appointments   Date Time Provider Elizabeth Brock   6/7/2022  3:15 PM NAYANA Wilson [facility]   6/8/2022  3:00 PM Luda Toledo MD  [address]   7/13/2022  4:00 PM SCHEDULE, [facility] BARIATRIC DIETICIAN bariatric sher [facility]       Diabetes Assessment    Medic Alert ID: No  How often do you test your blood sugar?: Daily (Comment: sometimes)   Do you have barriers with adherence to non-pharmacologic self-management interventions?  (Nutrition/Exercise/Self-Monitoring): No   Have you ever had to go to the ED for symptoms of low blood sugar?: No       Do you have hyperglycemia symptoms?: No   Do you have hypoglycemia symptoms?: No   Last Blood Sugar Value: 18[address] Kanu HARRISN, RN- [facility]  Associate Care Manager  [phone]

## 2022-06-07 ENCOUNTER — TELEPHONE (OUTPATIENT)
Dept: BARIATRICS/WEIGHT MGMT | Age: 62
End: 2022-06-07

## 2022-06-07 ENCOUNTER — OFFICE VISIT (OUTPATIENT)
Dept: FAMILY MEDICINE CLINIC | Age: 62
End: 2022-06-07
Payer: COMMERCIAL

## 2022-06-07 VITALS
HEART RATE: 79 BPM | BODY MASS INDEX: 30.39 KG/M2 | SYSTOLIC BLOOD PRESSURE: 120 MMHG | HEIGHT: 64 IN | WEIGHT: 178 LBS | RESPIRATION RATE: 16 BRPM | DIASTOLIC BLOOD PRESSURE: 60 MMHG | OXYGEN SATURATION: 97 %

## 2022-06-07 DIAGNOSIS — E55.9 VITAMIN D DEFICIENCY: ICD-10-CM

## 2022-06-07 DIAGNOSIS — E03.9 HYPOTHYROIDISM, UNSPECIFIED TYPE: ICD-10-CM

## 2022-06-07 DIAGNOSIS — Z98.84 S/P BARIATRIC SURGERY: ICD-10-CM

## 2022-06-07 DIAGNOSIS — I20.9 ANGINA PECTORIS SYNDROME (HCC): ICD-10-CM

## 2022-06-07 DIAGNOSIS — E11.65 UNCONTROLLED TYPE 2 DIABETES MELLITUS WITH HYPERGLYCEMIA (HCC): Primary | ICD-10-CM

## 2022-06-07 DIAGNOSIS — K21.9 HIATAL HERNIA WITH GERD: ICD-10-CM

## 2022-06-07 DIAGNOSIS — M76.61 ACHILLES TENDINITIS OF RIGHT LOWER EXTREMITY: ICD-10-CM

## 2022-06-07 DIAGNOSIS — G47.33 SLEEP APNEA, OBSTRUCTIVE: ICD-10-CM

## 2022-06-07 DIAGNOSIS — K44.9 HIATAL HERNIA WITH GERD: ICD-10-CM

## 2022-06-07 DIAGNOSIS — E11.65 UNCONTROLLED TYPE 2 DIABETES MELLITUS WITH HYPERGLYCEMIA (HCC): ICD-10-CM

## 2022-06-07 DIAGNOSIS — E66.9 OBESITY (BMI 30-39.9): ICD-10-CM

## 2022-06-07 DIAGNOSIS — I10 ESSENTIAL HYPERTENSION: ICD-10-CM

## 2022-06-07 LAB
ALBUMIN SERPL-MCNC: 4.5 G/DL (ref 3.5–4.6)
ALP BLD-CCNC: 84 U/L (ref 40–130)
ALT SERPL-CCNC: 29 U/L (ref 0–33)
ANION GAP SERPL CALCULATED.3IONS-SCNC: 14 MEQ/L (ref 9–15)
AST SERPL-CCNC: 28 U/L (ref 0–35)
BILIRUB SERPL-MCNC: 0.4 MG/DL (ref 0.2–0.7)
BUN BLDV-MCNC: 19 MG/DL (ref 6–20)
CALCIUM SERPL-MCNC: 9.6 MG/DL (ref 8.5–9.9)
CHLORIDE BLD-SCNC: 105 MEQ/L (ref 95–107)
CHOLESTEROL, TOTAL: 105 MG/DL (ref 0–199)
CO2: 25 MEQ/L (ref 20–31)
CREAT SERPL-MCNC: 0.79 MG/DL (ref 0.5–0.9)
GFR AFRICAN AMERICAN: >60
GFR NON-AFRICAN AMERICAN: >60
GLOBULIN: 2.9 G/DL (ref 2.3–3.5)
GLUCOSE BLD-MCNC: 91 MG/DL (ref 70–99)
HBA1C MFR BLD: 6.1 % (ref 4.8–5.9)
HCT VFR BLD CALC: 38.1 % (ref 37–47)
HDLC SERPL-MCNC: 41 MG/DL (ref 40–59)
HEMOGLOBIN: 12.3 G/DL (ref 12–16)
LDL CHOLESTEROL CALCULATED: 45 MG/DL (ref 0–129)
MCH RBC QN AUTO: 27.4 PG (ref 27–31.3)
MCHC RBC AUTO-ENTMCNC: 32.3 % (ref 33–37)
MCV RBC AUTO: 84.7 FL (ref 82–100)
PDW BLD-RTO: 15.9 % (ref 11.5–14.5)
PLATELET # BLD: 294 K/UL (ref 130–400)
POTASSIUM SERPL-SCNC: 4 MEQ/L (ref 3.4–4.9)
RBC # BLD: 4.5 M/UL (ref 4.2–5.4)
SODIUM BLD-SCNC: 144 MEQ/L (ref 135–144)
T4 FREE: 1.32 NG/DL (ref 0.84–1.68)
TOTAL PROTEIN: 7.4 G/DL (ref 6.3–8)
TRIGL SERPL-MCNC: 97 MG/DL (ref 0–150)
TSH SERPL DL<=0.05 MIU/L-ACNC: 0.56 UIU/ML (ref 0.44–3.86)
WBC # BLD: 9.6 K/UL (ref 4.8–10.8)

## 2022-06-07 PROCEDURE — 99214 OFFICE O/P EST MOD 30 MIN: CPT | Performed by: PHYSICIAN ASSISTANT

## 2022-06-07 PROCEDURE — 3044F HG A1C LEVEL LT 7.0%: CPT | Performed by: PHYSICIAN ASSISTANT

## 2022-06-07 RX ORDER — METHYLPREDNISOLONE 4 MG/1
TABLET ORAL
Qty: 1 KIT | Refills: 0 | Status: SHIPPED | OUTPATIENT
Start: 2022-06-07 | End: 2022-06-13

## 2022-06-07 NOTE — TELEPHONE ENCOUNTER
Try to avoid use of steroids. However, if it must be prescribed, take the lowest dose for the least amount of time.

## 2022-06-07 NOTE — TELEPHONE ENCOUNTER
Pt had surgery on 4/12/22 bypass; today PCP prescribed medrol dosepak and she would like to verify that it is okay for her to take this medication;

## 2022-06-07 NOTE — PROGRESS NOTES
Subjective  Edie Bacon, 63 y.o. female presents today with:  Chief Complaint   Patient presents with    Diabetes     4 month follow up     Foot Pain     heel pain radiating in leg        HPI   In the office today for routine follow up. Last OV with me: 2/7/22  Due for routine labs. Obesity s/p bariatric surgery. Had gastric bypass 4/12/22 with lap braydon-en-y with hiatal hernia repair. Doing very well post-op. Has tolerated po diet. Down over 30 lb. Exercising (walking over a mile daily). Atypical chest pain/palpitations/HTN. Since resolved after bariatric surgery/hiatal hernia repair. Had f/u with cardiology on 5/13. DONNIE. Has appointment tomorrow as NP with pulmonology. Would like to discuss need for CPAP/oxygen at night. Has not since used since post-op due to hypoxia related to pain medication. Sleeping well. Denies excessive daytime sleepiness. Losing weight. Type 2 diabetes. Monitoring blood sugars. No long on po medication. R heel pain. C/o posterior foot/heel pain. Pain is along achilles insertion at R heel. No injury/trauma. Has been stretching. No NSAID due to bariatric surgery. Pain will radiate into R calf with overuse. NO swelling, erythema, calf tightness. Review of Systems   Constitutional: Negative for activity change, appetite change, chills, diaphoresis, fatigue, fever and unexpected weight change. HENT: Negative for congestion, nosebleeds, postnasal drip and trouble swallowing. Respiratory: Negative for chest tightness, shortness of breath and wheezing. Cardiovascular: Negative for chest pain, palpitations and leg swelling. Gastrointestinal: Negative for abdominal distention, abdominal pain, blood in stool, constipation, diarrhea and nausea. Genitourinary: Negative for difficulty urinating, dysuria, urgency, vaginal discharge and vaginal pain. Musculoskeletal: Positive for arthralgias, back pain and myalgias (R heel/calf). Negative for gait problem, joint swelling and neck pain. Skin: Negative for color change and rash. Neurological: Negative for dizziness, weakness, light-headedness, numbness and headaches. Psychiatric/Behavioral: Negative for dysphoric mood and sleep disturbance. The patient is not nervous/anxious. Past Medical History:   Diagnosis Date    Abnormal EKG 03/23/2018    Angina pectoris syndrome ([facility] 03/23/2018    Diverticulosis of colon     DMII (diabetes mellitus, type 2) ([facility] 2000    Dr Hoa Haines hypertension 01/15/2016    Family history of coronary artery disease 01/15/2016    Fatty infiltration of liver 2020    Gastric polyp 2019    Dr Katheryn Kirkpatrick, 5 mm    Generalized anxiety disorder     H/O: hysterectomy 2012    History of tobacco abuse 01/15/2016    Obesity (BMI 30-39. 9)     Other specified acquired hypothyroidism 2000    Precordial pain 03/23/2018    (? Prinzmetal?) Dr Fuentes November S/P colonoscopy with polypectomy 2013, 2018, 2019    Dr Ratna Gaspar, Dr Katheryn Kirkpatrick, tubulovillous adenoma, hyperplastic polyp    S/P vaginal hysterectomy 2012    benign    Seasonal allergies     Sleep apnea, obstructive 2007    was on CPAP, not using it at present    Under care of team 03/28/2022    pcp-Harmony Blanca-last visit nov 2021    Under care of team 03/28/2022    cardiac-Holiday-lana-last visit dec 2021    Vasospastic angina [facility])     Dr. Lalitha Rice (cardiology)    Vertigo 2018    Wears dentures     upper and lower full dentures    Wears glasses      Past Surgical History:   Procedure Laterality Date    CARDIAC CATHETERIZATION  2018    no stents    CARPAL TUNNEL RELEASE Left 01/13/2021    LEFT CARPAL TUNNEL RELEASE, LEFT MIDDLE FINGER TRIGGER RELEASE, TENOSYNOVECTOMY FDPFDS performed by Kati Franklin MD at [address] Right 02/03/2021    RIGHT CARPAL TUNNEL RELEASE, TRIGGER FINGER RELEASE RIGHT MIDDLE FINGER, FDP, FDS, TENOSYNOVECTOMY performed by Hayley England Kajal Shields MD at [address], [address] [facility] COLONOSCOPY N/A 2020    COLONOSCOPY DIAGNOSTIC performed by Guillermo Hough MD at [facility] CATH LAB PROCEDURE      ENDOMETRIAL ABLATION      metrorrhagia    ENDOSCOPY, COLON, DIAGNOSTIC      HYSTERECTOMY (CERVIX STATUS UNKNOWN)  2012    fibroid, cervicitis, ovaries intact    [place] COLON CA SCRN NOT [place] [facility] IND N/A 05/15/2018    COLONOSCOPY performed by Guillermo Hough MD at [address] [facility]  2022     ROBOTIC LAPAROSCOPIC     SHEILA-EN-Y GASTRIC BYPASS N/A 2022    XI ROBOTIC LAPAROSCOPIC SHEILA-EN-Y GASTRIC BYPASS, EGD, HIATAL HERNIA REPAIR performed by Raul Gibbs DO at [place] [address]    UPPER GASTROINTESTINAL ENDOSCOPY N/A 2019    EGD ESOPHAGOGASTRODUODENOSCOPY performed by Guillermo Hough MD at [facility]  2022     Social History     Socioeconomic History    Marital status:      Spouse name: Not on file    Number of children: 3    Years of education: Not on file    Highest education level: Not on file   Occupational History    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[address] , RN     Employer: [employer]   Tobacco Use    Smoking status: Former Smoker     Packs/day: 1.00     Years: 30.00     Pack years: 30.00     Types: Cigarettes     Start date: 3/5/1975     Quit date: 2005     Years since quittin.0    Smokeless tobacco: Never Used    Tobacco comment: 3/15/18 started age 17   Vaping Use    Vaping Use: Never used   Substance and Sexual Activity    Alcohol use: No     Comment: not at all     Drug use: No    Sexual activity: Not on file   Other Topics Concern    Not on file   Social History Narrative    Born in [place], one of 4 children (2 boys and 2 girls), both parents in the [employer], moved to [place]    Mother in [place], has memory problems    Nondenominational Confucianist     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    Has 2 dogs, Blossom and Orissaare    Children 3, 2 sons in the area, one daughter in [address]: dogs, sawing, machine embroidery, baking     Social Determinants of Health     Financial Resource Strain: Low Risk     Difficulty of Paying Living Expenses: Not hard at all   Food Insecurity: No Food Insecurity    Worried About [address] [employer] in the Last Year: Never true    [address] [facility] [address] in the Last Year: Never true   Transportation Needs:     Lack of Transportation (Medical): Not on file    Lack of Transportation (Non-Medical):  Not on file   Physical Activity:     Days of Exercise per Week: Not on file    Minutes of Exercise per Session: Not on file   Stress:     Feeling of Stress : Not on file   Social Connections:     Frequency of Communication with Friends and Family: Not on file    Frequency of Social Gatherings with Friends and Family: Not on file    Attends Druze Services: Not on file    Active Member of Clubs or Organizations: Not on file    Attends Club or Organization Meetings: Not on file    Marital Status: Not on file   Intimate Partner Violence:     Fear of Current or Ex-Partner: Not on file    Emotionally Abused: Not on file    Physically Abused: Not on file    Sexually Abused: Not on file   Housing Stability:     Unable to Pay for Housing in the Last Year: Not on file    Number of [place] in the Last Year: Not on file    Unstable Housing in the Last Year: Not on file     Family History   Problem Relation Age of Onset    Arrhythmia Mother     Hypertension Mother     Dementia Mother     COPD Father         mesothelioma    Alcohol Abuse Father     Diabetes Paternal Grandmother     Breast Cancer Paternal Grandmother     Diabetes Paternal Grandfather     Colon Cancer Paternal Grandfather     Schizophrenia Brother     Colon Cancer Paternal Uncle      Allergies   Allergen Reactions    Seasonal Itching    Morphine Dizziness or Vertigo     IV morphine caused dizziness     Current Outpatient Medications   Medication Sig Dispense Refill    methylPREDNISolone (MEDROL DOSEPACK) 4 MG tablet Take by mouth. 1 kit 0    levothyroxine (SYNTHROID) 50 MCG tablet TAKE 1 TABLET BY MOUTH ONE TIME A DAY 90 tablet 1    pravastatin (PRAVACHOL) 20 MG tablet TAKE ONE TABLET BY MOUTH EVERY EVENING 90 tablet 1    blood glucose test strips (AGAMATRIX JAZZ TEST) strip USE TO TEST BLOOD SUGAR ONE TIME DAILY 100 each 3    buPROPion (WELLBUTRIN XL) 300 MG extended release tablet TAKE ONE TABLET BY MOUTH EVERY MORNING 90 tablet 4    pantoprazole (PROTONIX) 20 MG tablet Take 1 tablet by mouth every morning (before breakfast) 90 tablet 4    Blood Glucose Monitoring Suppl (AGAMATRIX JAZZ WIRELESS 2) w/Device KIT 1 each by Does not apply route once for 1 dose 1 kit 0    AgaMatrix Ultra-Thin Lancets MISC Use to test blood sugar 1 time daily 100 each 3    Blood Glucose Calibration (AGAMATRIX CONTROL) SOLN Use as directed for control solution test 1 each 0     No current facility-administered medications for this visit. PMH, Surgical Hx, Family Hx, and Social Hx reviewed and updated. Health Maintenance reviewed. Objective  Vitals:    06/07/22 1522 06/07/22 1535   BP: (!) 160/80 120/60   Site: Left Upper Arm Left Upper Arm   Position: Sitting Sitting   Cuff Size: Medium Adult Medium Adult   Pulse: 79    Resp: 16    SpO2: 97%    Weight: 178 lb (80.7 kg)    Height: 5' 4\" (1.626 m)      BP Readings from Last 3 Encounters:   06/08/22 118/72   06/07/22 120/60   05/13/22 (!) 150/70     Wt Readings from Last 3 Encounters:   06/08/22 177 lb (80.3 kg)   06/07/22 178 lb (80.7 kg)   05/13/22 185 lb (83.9 kg)     Physical Exam  Vitals reviewed. Constitutional:       Appearance: Normal appearance. She is not ill-appearing or toxic-appearing.       Comments: Weight loss noted   HENT: Head: Normocephalic and atraumatic. Right Ear: Tympanic membrane, ear canal and external ear normal.      Left Ear: Tympanic membrane, ear canal and external ear normal.      Nose: Nose normal.      Mouth/Throat:      Mouth: Mucous membranes are moist.   Eyes:      Conjunctiva/sclera: Conjunctivae normal.   Cardiovascular:      Rate and Rhythm: Normal rate and regular rhythm. Pulmonary:      Effort: Pulmonary effort is normal.      Breath sounds: Normal breath sounds. Musculoskeletal:         General: Tenderness (TTP of R achilles tendon at insertion point of R heel) present. No swelling. Right lower leg: No edema. Left lower leg: No edema. Skin:     General: Skin is warm. Coloration: Skin is not pale. Findings: No erythema. Neurological:      General: No focal deficit present. Mental Status: She is alert and oriented to person, place, and time. Psychiatric:         Mood and Affect: Mood normal.         Behavior: Behavior normal.         Thought Content: Thought content normal.         Judgment: Judgment normal.       Assessment & Plan   Leighann Ruiz was seen today for diabetes and foot pain. Diagnoses and all orders for this visit:    Uncontrolled type 2 diabetes mellitus with hyperglycemia ([facility]  -     Hemoglobin A1C; Future  -     CBC; Future  -     Comprehensive Metabolic Panel; Future  -     Lipid Panel; Future    Achilles tendinitis of right lower extremity  -     methylPREDNISolone (MEDROL DOSEPACK) 4 MG tablet; Take by mouth. Vitamin D deficiency  -     Vitamin D 25 Hydroxy; Future    Hypothyroidism, unspecified type  -     TSH; Future  -     T4, Free; Future    S/P bariatric surgery    Essential hypertension    Angina pectoris syndrome (HCC)    Sleep apnea, obstructive    Hiatal hernia with GERD    Obesity (BMI 30-39. 9)    Routine labs today. Exercises/stretches provided for patient to start with heel.     Steroid pack if OK by bariatric surgery team.   Follow up with me in 4 months. Contact office with any questions or concerns. Doing really well post-surgery. Orders Placed This Encounter   Procedures    Hemoglobin A1C     Standing Status:   Future     Number of Occurrences:   1     Standing Expiration Date:   6/7/2023    CBC     Standing Status:   Future     Number of Occurrences:   1     Standing Expiration Date:   6/7/2023    Comprehensive Metabolic Panel     Standing Status:   Future     Number of Occurrences:   1     Standing Expiration Date:   6/7/2023    Vitamin D 25 Hydroxy     Standing Status:   Future     Number of Occurrences:   1     Standing Expiration Date:   6/7/2023    Lipid Panel     Standing Status:   Future     Number of Occurrences:   1     Standing Expiration Date:   6/7/2023     Order Specific Question:   Is Patient Fasting?/# of Hours     Answer:   8+ hours    TSH     Standing Status:   Future     Number of Occurrences:   1     Standing Expiration Date:   6/7/2023    T4, Free     Standing Status:   Future     Number of Occurrences:   1     Standing Expiration Date:   6/7/2023     Orders Placed This Encounter   Medications    methylPREDNISolone (MEDROL DOSEPACK) 4 MG tablet     Sig: Take by mouth. Dispense:  1 kit     Refill:  0     Medications Discontinued During This Encounter   Medication Reason    sucralfate (CARAFATE) 1 GM/10ML suspension LIST CLEANUP    nitroGLYCERIN (NITROSTAT) 0.4 MG SL tablet LIST CLEANUP    ranolazine (RANEXA) 500 MG extended release tablet Therapy completed     No follow-ups on file. Reviewed with the patient: current clinical status, medications, activities and diet. Side effects, adverse effects of the medication prescribed today, as well as treatment plan/ rationale and result expectations have been discussed with the patient who expresses understanding and desires to proceed. Close follow up to evaluate treatment results and for coordination of care.   I have reviewed the patient's

## 2022-06-07 NOTE — PATIENT INSTRUCTIONS
Patient Education        Achilles Tendon: Exercises  Introduction  Here are some examples of exercises for you to try. The exercises may be suggested for a condition or for rehabilitation. Start each exercise slowly. Ease off the exercises if you start to have pain. You will be told when to start these exercises and which ones will work bestfor you. How to do the exercises  Toe stretch    1. Sit in a chair, and extend your affected leg so that your heel is on the floor. 2. With your hand, reach down and pull your big toe up and back. Pull toward your ankle and away from the floor. 3. Hold the position for at least 15 to 30 seconds. 4. Repeat 2 to 4 times a session, several times a day. Calf-plantar fascia stretch    1. Sit with your legs extended and knees straight. 2. Place a towel around your foot just under the toes. 3. Hold each end of the towel in each hand, with your hands above your knees. 4. Pull back with the towel so that your foot stretches toward you. 5. Hold the position for at least 15 to 30 seconds. 6. Repeat 2 to 4 times a session, up to 5 sessions a day. Floor stretch    1. Stand about 2 feet from a wall, and place your hands on the wall at about shoulder height. Or you can stand behind a chair, placing your hands on the back of it for balance. 2. Step back with the leg you want to stretch. Keep the leg straight, and press your heel into the floor with your toe turned slightly in.  3. Lean forward, and bend your other leg slightly. Feel the stretch in the Achilles tendon of your back leg. Hold for at least 15 to 30 seconds. 4. Repeat 2 to 4 times a session, up to 5 sessions a day. Stair stretch    1. Stand with the balls of both feet on the edge of a step or curb (or a medium-sized phone book). With at least one hand, hold onto something solid for balance, such as a banister or handrail.   2. Keeping your affected leg straight, slowly let that heel hang down off of the step or curb until you feel a stretch in the back of your calf and/or Achilles area. Some of your weight should still be on the other leg. 3. Hold this position for at least 15 to 30 seconds. 4. Repeat 2 to 4 times a session, up to 5 times a day or whenever your Achilles tendon starts to feel tight. This stretch can also be done with your knee slightly bent. Strength exercise    1. This exercise will get you started on building strength after an Achilles tendon injury. Your doctor or physical therapist can help you move on to more challenging exercises as you heal and get stronger. 2. Stand on a step with your heel off the edge of the step. Hold on to a handrail or wall for balance. 3. Push up on your toes, then slowly count to 10 as you lower yourself back down until your heel is below the step. If it hurts to push up on your toes, try putting most of your weight on your other foot as you push up, or try using your arms to help you. If you can't do this exercise without causing pain, stop the exercise and talk to your doctor. 4. Repeat the exercise 8 to 12 times, half with the knee straight and half with the knee bent. Follow-up care is a key part of your treatment and safety. Be sure to make and go to all appointments, and call your doctor if you are having problems. It's also a good idea to know your test results and keep alist of the medicines you take. Where can you learn more? Go to [email or web address]://[email or web address].[email or web address]. org and sign in to your [employer] account. Enter D986 in the [facility] box to learn more about \"Achilles Tendon: Exercises. \"     If you do not have an account, please click on the \"Sign Up Now\" link. Current as of: July 1, 2021               Content Version: 13.2  © 2006-2022 Healthwise, Incorporated. Care instructions adapted under license by [facility] ([facility]).  If you have questions about a medical condition or this instruction, always ask your healthcare professional. [employer] disclaims any warranty or liability for your use of this information.

## 2022-06-08 ENCOUNTER — OFFICE VISIT (OUTPATIENT)
Dept: PULMONOLOGY | Age: 62
End: 2022-06-08
Payer: COMMERCIAL

## 2022-06-08 VITALS
DIASTOLIC BLOOD PRESSURE: 72 MMHG | HEIGHT: 64 IN | OXYGEN SATURATION: 98 % | TEMPERATURE: 97.6 F | SYSTOLIC BLOOD PRESSURE: 118 MMHG | BODY MASS INDEX: 30.22 KG/M2 | HEART RATE: 88 BPM | WEIGHT: 177 LBS

## 2022-06-08 DIAGNOSIS — G47.33 OSA ON CPAP: Primary | ICD-10-CM

## 2022-06-08 DIAGNOSIS — Z99.89 OSA ON CPAP: Primary | ICD-10-CM

## 2022-06-08 DIAGNOSIS — I10 HYPERTENSION, UNSPECIFIED TYPE: ICD-10-CM

## 2022-06-08 DIAGNOSIS — E66.9 OBESITY, UNSPECIFIED CLASSIFICATION, UNSPECIFIED OBESITY TYPE, UNSPECIFIED WHETHER SERIOUS COMORBIDITY PRESENT: ICD-10-CM

## 2022-06-08 LAB — VITAMIN D 25-HYDROXY: 69.1 NG/ML

## 2022-06-08 PROCEDURE — 99203 OFFICE O/P NEW LOW 30 MIN: CPT | Performed by: INTERNAL MEDICINE

## 2022-06-08 NOTE — PROGRESS NOTES
NEW PATIENT VISIT-PULMONARY/SLEEP    6/8/2022     REFERRING PHYSICIAN:  Harmony Tyson PA-C     REASON FOR REFERRAL:  DONNIE    HPI:     Samia Small is a 63 y.o. female who was referred to sleep and pulmonary clinic for evaluation. She has been diagnosed with severe obstructive sleep apnea in 2018. She is presumed to be on CPAP at a pressure of 10 cm H2O. She has not been using her machine lately. She has not been sleeping in her bedroom that often when she is sleeping on the sofa downstairs. She had weight loss surgery 2 months ago and she lost 40 pounds of far. She is planning to lose another 60 pounds. She was very compliant with her machine prior to her surgery. She wants us to restart using it again and she needs supplies. Past Medical History   Past Medical History:   Diagnosis Date    Abnormal EKG 03/23/2018    Angina pectoris syndrome ([facility] [address] 03/23/2018    Diverticulosis of colon     DMII (diabetes mellitus, type 2) ([facility] [address] 2000    Dr Lisa Harvey hypertension 01/15/2016    Family history of coronary artery disease 01/15/2016    Fatty infiltration of liver 2020    Gastric polyp 2019    Dr Morin Nurse, 5 mm    Generalized anxiety disorder     H/O: hysterectomy 2012    History of tobacco abuse 01/15/2016    Obesity (BMI 30-39. 9)     Other specified acquired hypothyroidism 2000    Precordial pain 03/23/2018    (? Prinzmetal?) Dr Wily Finn S/P colonoscopy with polypectomy 2013, 2018, 2019    Dr Dc Eubanks, Dr Morin Nurse, tubulovillous adenoma, hyperplastic polyp    S/P vaginal hysterectomy 2012    benign    Seasonal allergies     Sleep apnea, obstructive 2007    was on CPAP, not using it at present    Under care of team 03/28/2022    pcp-Harmony Tyson-lana-last visit nov 2021    Under care of team 03/28/2022    cardiac-[facility]-lana-last visit dec 2021    Vasospastic angina [facility])     Dr. Kirstin Glaser (cardiology)    Vertigo 2018    Wears dentures     upper and lower full dentures    Wears glasses        Past Surgical History  Past Surgical History:   Procedure Laterality Date    CARDIAC CATHETERIZATION  2018    no stents    CARPAL TUNNEL RELEASE Left 01/13/2021    LEFT CARPAL TUNNEL RELEASE, LEFT MIDDLE FINGER TRIGGER RELEASE, TENOSYNOVECTOMY FDPFDS performed by Victorino Salvador MD at [address] [facility] Right 02/03/2021    RIGHT CARPAL TUNNEL RELEASE, TRIGGER FINGER RELEASE RIGHT MIDDLE FINGER, FDP, FDS, TENOSYNOVECTOMY performed by Victorino Salvador MD at [address] [facility] [address] COLONOSCOPY      COLONOSCOPY N/A 06/30/2020    COLONOSCOPY DIAGNOSTIC performed by Vipin Marquez MD at [address] [facility] CATH LAB PROCEDURE      ENDOMETRIAL ABLATION  2002    metrorrhagia    ENDOSCOPY, COLON, DIAGNOSTIC      HYSTERECTOMY (CERVIX STATUS UNKNOWN)  09/07/2012    fibroid, cervicitis, ovaries intact    OR COLON CA SCRN NOT HI RSK IND N/A 05/15/2018    COLONOSCOPY performed by Vipin Marquez MD at [address] [facility]  04/12/2022     ROBOTIC LAPAROSCOPIC     SHEILA-EN-Y GASTRIC BYPASS N/A 4/12/2022    XI ROBOTIC LAPAROSCOPIC SHEILA-EN-Y GASTRIC BYPASS, EGD, HIATAL HERNIA REPAIR performed by Mariana Soto DO at [place] [address]    UPPER GASTROINTESTINAL ENDOSCOPY N/A 04/02/2019    EGD ESOPHAGOGASTRODUODENOSCOPY performed by Vipin Marquez MD at [address] [place] [address]  04/12/2022       Allergies  Allergies   Allergen Reactions    Seasonal Itching    Morphine Dizziness or Vertigo     IV morphine caused dizziness       Medications  Current Outpatient Medications   Medication Sig Dispense Refill    methylPREDNISolone (MEDROL DOSEPACK) 4 MG tablet Take by mouth.  1 kit 0    levothyroxine (SYNTHROID) 50 MCG tablet TAKE 1 TABLET BY MOUTH ONE TIME A DAY 90 tablet 1    pravastatin (PRAVACHOL) 20 MG tablet TAKE ONE TABLET BY Normocephalic, without obvious abnormality, atraumatic   Eyes:Pupils bilateral equal and reactive, EOM intact. Normal sclera and conjunctiva   Nose: Mucosa pink  Throat: Clear,  [place] 3  Neck: Supple, No JVD. Nothyromegaly. Neck is thick  Lungs: Clear bilaterally. No wheezing. No crackles. No use of accessory muscles. Heart: RRR, S1, S2 normal, no murmur, click, rub or gallop   Abdomen: soft, non-tender, nondistended. Bowel sounds normal. No hernia. No organomegaly. Extremities: extremities normal, atraumatic, no cyanosis, no edema  Skin: Skin color, texture, turgor normal. No rashes or lesions   Neurological: No focal deficits,cranial nerves grossly intact. No weakness. Sensation normal   Psych: Normal Mood  Musculoskeletal: No joint abnormalities. Impression:   Diagnosis Orders   1. DONNIE on CPAP  Home Sleep Study   2. Obesity, unspecified classification, unspecified obesity type, unspecified whether serious comorbidity present     3. Hypertension, unspecified type              Recommendations:     -She was advised to go back on her machine. -  I will change setting to auto with a minimum pressure of 5 maximum pressure 20 cm H2O.  -We will send prescription for new supplies.  -We will repeat sleep study at the end of the year. This will give her close to 10 months after surgery. Return in about 6 months (around 12/8/2022).        Electronically signed by Guerda Donaldson MD on 6/8/2022 at 3:42 PM

## 2022-06-09 PROBLEM — R07.89 NON-CARDIAC CHEST PAIN: Status: RESOLVED | Noted: 2018-03-23 | Resolved: 2022-06-09

## 2022-06-09 PROBLEM — M76.61 ACHILLES TENDINITIS OF RIGHT LOWER EXTREMITY: Status: ACTIVE | Noted: 2022-06-09

## 2022-06-09 PROBLEM — M25.60 JOINT STIFFNESS: Status: RESOLVED | Noted: 2020-11-29 | Resolved: 2022-06-09

## 2022-06-09 PROBLEM — K95.89 COMPLICATIONS OF BARIATRIC PROCEDURES: Status: RESOLVED | Noted: 2022-04-15 | Resolved: 2022-06-09

## 2022-06-09 PROBLEM — K76.0 FATTY INFILTRATION OF LIVER: Status: RESOLVED | Noted: 2020-01-01 | Resolved: 2022-06-09

## 2022-06-09 ASSESSMENT — ENCOUNTER SYMPTOMS
CHEST TIGHTNESS: 0
CONSTIPATION: 0
COLOR CHANGE: 0
WHEEZING: 0
BLOOD IN STOOL: 0
NAUSEA: 0
BACK PAIN: 1
TROUBLE SWALLOWING: 0
SHORTNESS OF BREATH: 0
ABDOMINAL PAIN: 0
DIARRHEA: 0
ABDOMINAL DISTENTION: 0

## 2022-06-22 ENCOUNTER — CARE COORDINATION (OUTPATIENT)
Dept: OTHER | Facility: CLINIC | Age: 62
End: 2022-06-22

## 2022-06-22 NOTE — CARE COORDINATION
Ambulatory Care Coordination Note  6/22/2022  CM Risk Score: 5  Charlson 10 Year Mortality Risk Score: 23%     ACC: Andrea Gusman RN    Summary Note: Pt returned from vacation and got covid she said. There was 17 of them and 4 total had covid. They returned last Friday. No fever today, symptoms are better, gets tired easy and nose is burning. She said she has been doing the exercises for her heel. She has called to check on her cpap supplies and she should get it soon. Pt has lost #60 since her last sleep study 2018. Diet is going good, she struggles with what to eat sometimes, this morning was a protein shake for breakfast. She ate low fat bread and low fat cheese and made a grilled cheese was a little heavy for her. Ate 900 calories yesterday. She walked today. Blood sugar was 116 this morning A1C is down to 6.1 now. Blood sugar on 6/17 was 104 and Saturday 113, Monday was 113. Weight is 169.7 on Monday. Total weight loss from surgery date  38.2 # . Goal is 149.0 Pt is 5' 5\" tall. Care Coordination Interventions    Referral from Primary Care Provider: No  Suggested Interventions and Community Resources  Disease Specific Clinic: Completed (Comment: Dr Angelique Elizabeth ( cardiology) , Dr Hardik Bowers ( bariatrics)  Dr Samuel Dyson ( Pulmonology ) )         Goals Addressed                    This Visit's Progress      Conditions and Symptoms   On track      I will schedule office visits, as directed by my provider. I will notify my provider of any barriers to my plan of care. I will notify my provider of any symptoms that indicate a worsening of my condition. Barriers: none  Plan for overcoming my barriers: N/A  Confidence: 9/10  Anticipated Goal Completion Date: 5/30/22 and ongoing          Medication Management   On track      I will take my medication as directed.     Barriers: none  Plan for overcoming my barriers: will work with pharmacist and ACM to stay on track with adherence  Confidence: 8/10  Anticipated Goal Completion Date: 9/15/20        Patient Stated (pt-stated)   On track      A1C will go back down to previous level of around 5    Barriers: medication side effects - pt will be starting jardiance when PA has gone through, was unable to tolerate Trulicity   Plan for overcoming my barriers: working with pcp and pharmacy to get jardiance approved  Confidence: 10/10  Anticipated Goal Completion Date: 6/30/3030    Kurt Armijo approved    7/23 - A1C is now up to 7.3            Prior to Admission medications    Medication Sig Start Date End Date Taking?  Authorizing Provider   levothyroxine (SYNTHROID) 50 MCG tablet TAKE 1 TABLET BY MOUTH ONE TIME A DAY 4/14/22   Harmony Tyson PA-C   pravastatin (PRAVACHOL) 20 MG tablet TAKE ONE TABLET BY MOUTH EVERY EVENING 12/29/21   Harmony Tyson PA-C   blood glucose test strips (AGAMATRIX JAZZ TEST) strip USE TO TEST BLOOD SUGAR ONE TIME DAILY 12/2/21   MARCI Lang   buPROPion (WELLBUTRIN XL) 300 MG extended release tablet TAKE ONE TABLET BY MOUTH EVERY MORNING 6/29/21   Harmony Tyson PA-C   pantoprazole (PROTONIX) 20 MG tablet Take 1 tablet by mouth every morning (before breakfast) 6/29/21   Harmony Tyson PA-C   Blood Glucose Monitoring Suppl (AGAMATRIX JAZZ WIRELESS 2) w/Device KIT 1 each by Does not apply route once for 1 dose 1/14/21 6/7/22  Harmony Tyson PA-C   AgaMatrix Ultra-Thin Lancets MISC Use to test blood sugar 1 time daily 12/23/20   Harmony Tyson PA-C   Blood Glucose Calibration (AGAMATRIX CONTROL) SOLN Use as directed for control solution test 4/28/20   Andreas Suárez MD       Future Appointments   Date Time Provider Elizabeth Brock   7/13/2022  4:00 PM [facility] BARIATRIC DIETICIAN bariatric sher [facility]   10/7/2022  3:15 PM NAYANA Andrews   12/19/2022  3:15 PM Hernesto Abbasi MD [address] BSN, RN- [facility]  Associate Care Manager  [phone]

## 2022-07-07 ENCOUNTER — TELEPHONE (OUTPATIENT)
Dept: BARIATRICS/WEIGHT MGMT | Age: 62
End: 2022-07-07

## 2022-07-07 DIAGNOSIS — Z98.84 STATUS POST GASTRIC BYPASS FOR OBESITY: Primary | ICD-10-CM

## 2022-07-07 NOTE — TELEPHONE ENCOUNTER
Next Visit Date:  7/13/2022    Patient Surgery Date  4-12-22    Type of  Surgery  bypass    Patient calls complaining of  Having very pale bowel movements, denies any pain, does not have a gallbladder      Associated Symptoms: none    Any allergy  To medications    morphine    Current  Pharmacy   [facility]    Patient advised:   If  Symptoms worsen seek treatment at  Emergency Room. You will receive a call back from the office within 24-48 hours.     Is it ok to leave a message if we call back yes

## 2022-07-08 DIAGNOSIS — Z98.84 STATUS POST GASTRIC BYPASS FOR OBESITY: ICD-10-CM

## 2022-07-08 LAB
ALBUMIN SERPL-MCNC: 4.4 G/DL (ref 3.5–4.6)
ALP BLD-CCNC: 93 U/L (ref 40–130)
ALT SERPL-CCNC: 27 U/L (ref 0–33)
ANION GAP SERPL CALCULATED.3IONS-SCNC: 11 MEQ/L (ref 9–15)
AST SERPL-CCNC: 24 U/L (ref 0–35)
BILIRUB SERPL-MCNC: 0.5 MG/DL (ref 0.2–0.7)
BUN BLDV-MCNC: 21 MG/DL (ref 6–20)
CALCIUM SERPL-MCNC: 9.4 MG/DL (ref 8.5–9.9)
CHLORIDE BLD-SCNC: 102 MEQ/L (ref 95–107)
CO2: 25 MEQ/L (ref 20–31)
CREAT SERPL-MCNC: 0.79 MG/DL (ref 0.5–0.9)
GFR AFRICAN AMERICAN: >60
GFR NON-AFRICAN AMERICAN: >60
GLOBULIN: 2.9 G/DL (ref 2.3–3.5)
GLUCOSE BLD-MCNC: 91 MG/DL (ref 70–99)
POTASSIUM SERPL-SCNC: 3.5 MEQ/L (ref 3.4–4.9)
SODIUM BLD-SCNC: 138 MEQ/L (ref 135–144)
TOTAL PROTEIN: 7.3 G/DL (ref 6.3–8)

## 2022-07-13 ENCOUNTER — CARE COORDINATION (OUTPATIENT)
Dept: OTHER | Facility: CLINIC | Age: 62
End: 2022-07-13

## 2022-07-13 ENCOUNTER — OFFICE VISIT (OUTPATIENT)
Dept: BARIATRICS/WEIGHT MGMT | Age: 62
End: 2022-07-13
Payer: COMMERCIAL

## 2022-07-13 VITALS
HEIGHT: 65 IN | WEIGHT: 164 LBS | DIASTOLIC BLOOD PRESSURE: 70 MMHG | HEART RATE: 83 BPM | SYSTOLIC BLOOD PRESSURE: 140 MMHG | RESPIRATION RATE: 20 BRPM | BODY MASS INDEX: 27.32 KG/M2

## 2022-07-13 DIAGNOSIS — Z98.84 STATUS POST GASTRIC BYPASS FOR OBESITY: ICD-10-CM

## 2022-07-13 DIAGNOSIS — K90.89 OTHER SPECIFIED INTESTINAL MALABSORPTION: Primary | ICD-10-CM

## 2022-07-13 PROCEDURE — 99213 OFFICE O/P EST LOW 20 MIN: CPT | Performed by: SURGERY

## 2022-07-13 RX ORDER — M-VIT,TX,IRON,MINS/CALC/FOLIC 27MG-0.4MG
1 TABLET ORAL DAILY
COMMUNITY

## 2022-07-13 NOTE — CARE COORDINATION
Ambulatory Care Coordination Note  7/13/2022  CM Risk Score: 5  Charlson 10 Year Mortality Risk Score: 23%     ACC: Gloria Villalta RN    Summary Note: She is doing better was having pale stools which has resolved. Urine is yellow not as dark. She is still exercising still. She has a follow up with her surgeon today. Her blood sugars are running good she said. She is continuing to lose weight. Current weight is 163.4. She is getting her 64 ounces in mostly in the evening. She overall feels well. She was able to stop taking her pravastatin, she is glad about. Will continue to follow        Care Coordination Interventions    Referral from Primary Care Provider: No  Suggested Interventions and Community Resources  Disease Specific Clinic: Completed (Comment: Dr Marjorie Ng ( cardiology) , Dr Allan Meneses ( bariatrics)  Dr Sudha Ferreira ( Pulmonology ) )         Goals Addressed                    This Visit's Progress      Conditions and Symptoms   On track      I will schedule office visits, as directed by my provider. I will notify my provider of any barriers to my plan of care. I will notify my provider of any symptoms that indicate a worsening of my condition. Barriers: none  Plan for overcoming my barriers: N/A  Confidence: 9/10  Anticipated Goal Completion Date: 5/30/22 and ongoing          Patient Stated (pt-stated)   On track      Pt will check blood sugars twice daily 2-3 x per week     Barriers: none  Plan for overcoming my barriers: N/A  Confidence: 8/10  Anticipated Goal Completion Date: 9/23/2020            Prior to Admission medications    Medication Sig Start Date End Date Taking?  Authorizing Provider   levothyroxine (SYNTHROID) 50 MCG tablet TAKE 1 TABLET BY MOUTH ONE TIME A DAY 4/14/22   Harmony Tyson PA-C   pravastatin (PRAVACHOL) 20 MG tablet TAKE ONE TABLET BY MOUTH EVERY EVENING 12/29/21   Harmony Tyson PA-C   blood glucose test strips (AGAMATRIX JAZZ TEST) strip USE TO TEST BLOOD SUGAR ONE TIME DAILY 12/2/21   MARCI Dalton   buPROPion (WELLBUTRIN XL) 300 MG extended release tablet TAKE ONE TABLET BY MOUTH EVERY MORNING 6/29/21   Harmony Tyson PA-C   pantoprazole (PROTONIX) 20 MG tablet Take 1 tablet by mouth every morning (before breakfast) 6/29/21   Harmony Tyson PA-C   Blood Glucose Monitoring Suppl (AGAMATRIX JAZZ WIRELESS 2) w/Device KIT 1 each by Does not apply route once for 1 dose 1/14/21 6/7/22  Harmony Tyson PA-C   AgaMatrix Ultra-Thin Lancets MISC Use to test blood sugar 1 time daily 12/23/20   Harmony Tyson PA-C   Blood Glucose Calibration (AGAMATRIX CONTROL) SOLN Use as directed for control solution test 4/28/20   Sarabjit Nobles MD       Future Appointments   Date Time Provider Elizabeth Brock   7/13/2022  4:00 PM SCHEDULE, [facility] BARIATRIC DIETICIAN bariatric sher [facility]   10/7/2022  3:15 PM NAYANA Lombardo [facility]   12/19/2022  3:15 PM Conrad Kennedy MD [facility]        Diabetes Assessment    Medic Alert ID: No  How often do you test your blood sugar?: Daily (Comment: sometimes)   Do you have barriers with adherence to non-pharmacologic self-management interventions?  (Nutrition/Exercise/Self-Monitoring): No   Have you ever had to go to the ED for symptoms of low blood sugar?: No       Do you have hyperglycemia symptoms?: No   Do you have hypoglycemia symptoms?: No   Last Blood Sugar Value: 124   Blood Sugar Monitoring Regimen: Once a Day   Blood Sugar Trends: Steady Decrease          Elian HARRISN, RN- [facility]  Associate Care Manager  [phone]

## 2022-07-13 NOTE — PROGRESS NOTES
Medical Nutrition Therapy  Metabolic and Bariatric surgery  3 month follow up        Vitals:   Vitals:    07/13/22 1544   BP: (!) 147/74   Site: Left Upper Arm   Position: Sitting   Cuff Size: Medium Adult   Pulse: 83   Resp: 20   Weight: 164 lb (74.4 kg)   Height: 5' 4.5\" (1.638 m)      Body mass index is 27.72 kg/m². Labs reviewed:     Multivitamin/mineral intake:Bariatric Choice, daily  Calcium intake:   Generic Ca, daily  Other:            Nutrition Assessment:   PES: Inadequate food and beverage intake r/t WLS as evidenced by loss of excess body weight lost 48 lbs over 3 mo. Goals   60-80gm of protein  48-64oz of fluid  Vitamin adherance  Basic adherance to WLS behavious and this document has been scanned into the chart.        [x] met     []  Not met        Plan:   F/u 6 months after surgery         Chandana Winston, MS, RD, LD

## 2022-07-18 NOTE — PROGRESS NOTES
[address] [facility] [address] INVASIVE BARIATRIC SURG  [address] [facility] [place]  [address]  [address] [place] [id number]  Dept: [phone]    7/13/2022    CC: Post Bariatric Surgery    History:  63year old female 3 month post bariatric surgery. Overall doing well. Tolerating diet. No nausea or vomiting. Pain is controlled. Having stools. No new complaints. No chest pain, shortness of breath, fevers/chills. States her stool changes have resolved. No abdominal pain or signs of jaundice.       Review of Systems:  General  Negative for: [] Weight Change   [x] Fatigue   [x] Fevers & Chills    [] Appetite change [] Other:    Positive for: [x] Weight Change   [] Fatigue   [] Fevers & Chills    [] Appetite change [] Other:   Cardiac  Negative for: [x] Chest Pain   [x] Difficulty Breathing   [] Leg Cramps [x] Edema of Lower Extremeties    [] Left   [] Right      Positive for: [] Chest Pain   [] Difficulty Breathing   [] Leg Cramps [] Edema of Lower Extremeties    [] Left   [] Right   Pulmonary  Negative for: [x] Shortness of Breath [] Wheeze [x] Cough  [] Calf Pain     Positive for: [] Shortness of Breath [] Wheeze [] Cough  [] Calf Pain   Gastro-Intestinal Negative for: [] Heartburn   [] Reflux   [] Dysphagia   [] Melena   [] BRBPR  [x] Vomiting   [x] Abdominal Pain   [] Diarrhea     [] Constipation  [] Other:     Positive for: [] Heartburn   [] Reflux   [] Dysphagia   [] Melena   [] BRBPR  [] Vomiting   [] Abdominal Pain   [] Diarrhea     [] Constipation  [] Other:    Muskuloskeletal Negative for: [] Joint pain   [] Back pain   [] Knee Pain   [x] Muscle weakness [x] Hernia   [] Edema [] Other:     Positive for: [] Joint pain   [] Back pain   [] Knee Pain   [] Muscle weakness [] Hernia   [] Edema [] Other:    Neurologic Negative for: [x] Syncope   [x] Insomnia   [] Being treated for depression  [] Other:     Positive for: [] Syncope   [] Insomnia   [] Being treated for depression  [] Other:    Skin Negative for: [] Wound:   [] Open   [] Draining   [] Incisional     [x] Rash   [x] Hair Loss  [] Other:     Positive for: [] Wound:   [] Open   [] Draining    [] Incisional  [] Rash   [] Hair Loss  [] Other:        Physical Exam:  BP (!) 140/70   Pulse 83   Resp 20   Ht 5' 4.5\" (1.638 m)   Wt 164 lb (74.4 kg)   BMI 27.72 kg/m²     Constitutional:  Vital signs are normal. The patient appears well-developed and well-nourished. HEENT:   Head: Normocephalic. Atraumatic  Eyes: pupils are equal and reactive. No scleral icterus is present. Neck: No mass and no thyromegaly present. Cardiovascular: Normal rate, regular rhythm, S1 normal and S2 normal.  Radial pulses present   Pulmonary/Chest: Effort normal and breath sounds normal. No retractions  Abdominal: Soft. Normal appearance. There is no organomegaly. No tenderness. There is no rigidity, no rebound, no guarding and no Soares's sign. Musculoskeletal:        Right lower leg: Normal. No tenderness and no edema. Left lower leg: Normal. No tenderness and no edema. Lymphadenopathy:     No cervical adenopathy, No Exrtemity Adenopathy. Neurological: The patient is alert and oriented. Moving all 4 extremities, sensation grossly intact bilateral  Skin: Skin is warm, dry and intact. Psychiatric: The patient has a normal mood and affect.  Speech is normal and behavior is normal. Judgment and thought content normal. Cognition and memory are normal.       Assessment:  3 month post bypass  Progressing well  Expected Intestinal malabsorption post bypass    Plan:  Regular Bariatric Diet  Vitamin Replacement discussed  Check bariatric labs  Exercise 30 minutes daily  Overall doing well  Dietician visit

## 2022-07-19 DIAGNOSIS — F41.1 GENERALIZED ANXIETY DISORDER: ICD-10-CM

## 2022-07-19 DIAGNOSIS — K22.9 IRREGULAR Z LINE OF ESOPHAGUS: ICD-10-CM

## 2022-07-19 NOTE — TELEPHONE ENCOUNTER
Rx request   Requested Prescriptions     Pending Prescriptions Disp Refills    buPROPion (WELLBUTRIN XL) 300 MG extended release tablet 90 tablet 4     Sig: TAKE ONE TABLET BY MOUTH EVERY MORNING    pantoprazole (PROTONIX) 20 MG tablet 90 tablet 4     Sig: Take 1 tablet by mouth every morning (before breakfast)     LOV 6/7/2022  Next Visit Date:  Future Appointments   Date Time Provider Elizabeth Brock   10/7/2022  3:15 PM NAYANA Metz [facility] EMERGENCY [facility] [place]   10/20/2022  4:15 PM SCHEDULE, P BARIATRIC DIETICIAN bariatric [facility]   12/19/2022  3:15 PM Fausto Munguia MD [address] [place] [address]

## 2022-07-20 DIAGNOSIS — Z12.2 SCREENING FOR LUNG CANCER: Primary | ICD-10-CM

## 2022-07-20 RX ORDER — BUPROPION HYDROCHLORIDE 300 MG/1
TABLET ORAL
Qty: 90 TABLET | Refills: 4 | Status: SHIPPED | OUTPATIENT
Start: 2022-07-20

## 2022-07-20 RX ORDER — PANTOPRAZOLE SODIUM 20 MG/1
20 TABLET, DELAYED RELEASE ORAL
Qty: 90 TABLET | Refills: 4 | Status: SHIPPED | OUTPATIENT
Start: 2022-07-20

## 2022-08-02 ENCOUNTER — TELEPHONE (OUTPATIENT)
Dept: PULMONOLOGY | Age: 62
End: 2022-08-02

## 2022-08-03 ENCOUNTER — CARE COORDINATION (OUTPATIENT)
Dept: OTHER | Facility: CLINIC | Age: 62
End: 2022-08-03

## 2022-08-08 ENCOUNTER — HOSPITAL ENCOUNTER (OUTPATIENT)
Dept: CT IMAGING | Age: 62
Discharge: HOME | End: 2022-08-10
Payer: COMMERCIAL

## 2022-08-08 DIAGNOSIS — Z12.2 SCREENING FOR LUNG CANCER: ICD-10-CM

## 2022-08-08 PROCEDURE — 71271 CT THORAX LUNG CANCER SCR C-: CPT

## 2022-08-09 DIAGNOSIS — Z98.84 STATUS POST GASTRIC BYPASS FOR OBESITY: ICD-10-CM

## 2022-08-09 LAB
ALBUMIN SERPL-MCNC: 4.4 G/DL (ref 3.5–4.6)
ALP BLD-CCNC: 93 U/L (ref 40–130)
ALT SERPL-CCNC: 20 U/L (ref 0–33)
ANION GAP SERPL CALCULATED.3IONS-SCNC: 11 MEQ/L (ref 9–15)
AST SERPL-CCNC: 21 U/L (ref 0–35)
BASOPHILS ABSOLUTE: 0.1 K/UL (ref 0–0.2)
BASOPHILS RELATIVE PERCENT: 0.9 %
BILIRUB SERPL-MCNC: 0.4 MG/DL (ref 0.2–0.7)
BUN BLDV-MCNC: 20 MG/DL (ref 6–20)
CALCIUM SERPL-MCNC: 10.1 MG/DL (ref 8.5–9.9)
CHLORIDE BLD-SCNC: 106 MEQ/L (ref 95–107)
CHOLESTEROL, TOTAL: 145 MG/DL (ref 0–199)
CO2: 25 MEQ/L (ref 20–31)
CREAT SERPL-MCNC: 0.86 MG/DL (ref 0.5–0.9)
EOSINOPHILS ABSOLUTE: 0.5 K/UL (ref 0–0.7)
EOSINOPHILS RELATIVE PERCENT: 7.7 %
FERRITIN: 79 NG/ML (ref 13–150)
GFR AFRICAN AMERICAN: >60
GFR NON-AFRICAN AMERICAN: >60
GLOBULIN: 3 G/DL (ref 2.3–3.5)
GLUCOSE BLD-MCNC: 90 MG/DL (ref 70–99)
HBA1C MFR BLD: 5 % (ref 4.8–5.9)
HCT VFR BLD CALC: 37.9 % (ref 37–47)
HDLC SERPL-MCNC: 47 MG/DL (ref 40–59)
HEMOGLOBIN: 12.7 G/DL (ref 12–16)
IRON SATURATION: 30 % (ref 20–55)
IRON: 90 UG/DL (ref 37–145)
LDL CHOLESTEROL CALCULATED: 82 MG/DL (ref 0–129)
LYMPHOCYTES ABSOLUTE: 1.7 K/UL (ref 1–4.8)
LYMPHOCYTES RELATIVE PERCENT: 24 %
MCH RBC QN AUTO: 28.8 PG (ref 27–31.3)
MCHC RBC AUTO-ENTMCNC: 33.5 % (ref 33–37)
MCV RBC AUTO: 86 FL (ref 82–100)
MONOCYTES ABSOLUTE: 0.3 K/UL (ref 0.2–0.8)
MONOCYTES RELATIVE PERCENT: 4.1 %
NEUTROPHILS ABSOLUTE: 4.5 K/UL (ref 1.4–6.5)
NEUTROPHILS RELATIVE PERCENT: 63.3 %
PDW BLD-RTO: 15.5 % (ref 11.5–14.5)
PLATELET # BLD: 280 K/UL (ref 130–400)
POTASSIUM SERPL-SCNC: 4 MEQ/L (ref 3.4–4.9)
RBC # BLD: 4.41 M/UL (ref 4.2–5.4)
SODIUM BLD-SCNC: 142 MEQ/L (ref 135–144)
T4 FREE: 1.25 NG/DL (ref 0.84–1.68)
TOTAL IRON BINDING CAPACITY: 299 UG/DL (ref 250–450)
TOTAL PROTEIN: 7.4 G/DL (ref 6.3–8)
TRIGL SERPL-MCNC: 80 MG/DL (ref 0–150)
TSH REFLEX: 0.22 UIU/ML (ref 0.44–3.86)
UNSATURATED IRON BINDING CAPACITY: 209 UG/DL (ref 112–347)
VITAMIN D 25-HYDROXY: 75 NG/ML
WBC # BLD: 7.1 K/UL (ref 4.8–10.8)

## 2022-08-10 ENCOUNTER — CARE COORDINATION (OUTPATIENT)
Dept: OTHER | Facility: CLINIC | Age: 62
End: 2022-08-10

## 2022-08-10 LAB
FOLATE: 17.8 NG/ML
VITAMIN B-12: >2000 PG/ML (ref 232–1245)

## 2022-08-10 NOTE — CARE COORDINATION
Ambulatory Care Coordination Note  8/10/2022    ACC: Madie Mas RN    Summary Note: Pt and I discussed her recent labs, she is awaiting a call from the bariatric surgeon. Her B 12 is high at 2000 it was 480 in December 2021. Pt will discuss her supplements with the surgeon and the abnormal labs. Current weight is # 154.6 . Total weight loss since surgery # 53.35. She is doing well. On 8/29/22 she will start at [address] [place] [address] as . Goals         Conditions and Symptoms       I will schedule office visits, as directed by my provider. I will notify my provider of any barriers to my plan of care. I will notify my provider of any symptoms that indicate a worsening of my condition. Barriers: none  Plan for overcoming my barriers: N/A  Confidence: 9/10  Anticipated Goal Completion Date: 5/30/22 and ongoing          Medication Management       I will take my medication as directed.     Barriers: none  Plan for overcoming my barriers: will work with pharmacist and ACM to stay on track with adherence  Confidence: 8/10  Anticipated Goal Completion Date: 9/15/20        Patient Stated (pt-stated)       Pt will begin increasing her exercise and improve eating habits in order to lower her A1C  By walking on her lunch break 2-3x per week     Barriers: working from home  Plan for overcoming my barriers: Pt will make a conscious effort to leave her desk for exercise daily   Confidence: 7/10  Anticipated Goal Completion Date: 8/30/20        Patient Stated (pt-stated)       A1C will go back down to previous level of around 5    Barriers: medication side effects - pt will be starting jardiance when PA has gone through, was unable to tolerate Trulicity   Plan for overcoming my barriers: working with pcp and pharmacy to get jardiance approved  Confidence: 10/10  Anticipated Goal Completion Date: 6/30/3030    Jardiance approved    7/23 - A1C is now up to 7.3        Patient Stated (pt-stated)       Pt will check blood sugars twice daily 2-3 x per week     Barriers: none  Plan for overcoming my barriers: N/A  Confidence: 8/10  Anticipated Goal Completion Date: 9/23/2020                 Care Coordination Interventions    Referral from Primary Care Provider: No  Suggested Interventions and Community Resources  Disease Specific Clinic: Completed (Comment: Dr Marjorie Ng ( cardiology) , Dr Allan Meneses ( [facility])  Dr Sudha Ferreira ( Pulmonology ) )         Prior to Admission medications    Medication Sig Start Date End Date Taking?  Authorizing Provider   buPROPion (WELLBUTRIN XL) 300 MG extended release tablet TAKE ONE TABLET BY MOUTH EVERY MORNING 7/20/22   Harmony Tyson PA-C   pantoprazole (PROTONIX) 20 MG tablet Take 1 tablet by mouth every morning (before breakfast) 7/20/22   Harmony Tyson PA-C   Multiple Vitamins-Minerals (THERAPEUTIC MULTIVITAMIN-MINERALS) tablet Take 1 tablet by mouth daily    Historical Provider, MD   levothyroxine (SYNTHROID) 50 MCG tablet TAKE 1 TABLET BY MOUTH ONE TIME A DAY 4/14/22   Harmony Tyson PA-C   pravastatin (PRAVACHOL) 20 MG tablet TAKE ONE TABLET BY MOUTH EVERY EVENING 12/29/21   Harmony Tyson PA-C   blood glucose test strips (AGAMATRIX JAZZ TEST) strip USE TO TEST BLOOD SUGAR ONE TIME DAILY 12/2/21   MARCI Garcia   Blood Glucose Monitoring Suppl (AGAMATRIX JAZZ WIRELESS 2) w/Device KIT 1 each by Does not apply route once for 1 dose 1/14/21 6/7/22  Harmony Tyson PA-C   AgaMatrix Ultra-Thin Lancets MISC Use to test blood sugar 1 time daily 12/23/20   Harmony Tyson PA-C   Blood Glucose Calibration (AGAMATRIX CONTROL) SOLN Use as directed for control solution test 4/28/20   Vianey Thakur MD       Future Appointments   Date Time Provider Elizabeth Brock   10/7/2022  3:15 PM NAYANA An [facility] EMERGENCY [facility] [place]   10/20/2022  4:15 PM SCHEDULE, P BARIATRIC DIETICIAN bariatric [facility] [place]   12/19/2022  3:15 PM Sylvia Huang MD [address] [place] [address]

## 2022-08-11 LAB
MISCELLANEOUS LAB TEST ORDER: NORMAL
WHOPPER PROMPT: NORMAL

## 2022-08-12 LAB
RETINYL PALMITATE: <0.02 MG/L (ref 0–0.1)
VITAMIN A LEVEL: 0.55 MG/L (ref 0.3–1.2)
VITAMIN A, INTERP: NORMAL

## 2022-08-13 LAB — VITAMIN B1 WHOLE BLOOD: 229 NMOL/L (ref 70–180)

## 2022-08-18 LAB — ZINC: 83.9 UG/DL (ref 60–120)

## 2022-08-24 ENCOUNTER — CARE COORDINATION (OUTPATIENT)
Dept: OTHER | Facility: CLINIC | Age: 62
End: 2022-08-24

## 2022-08-24 SDOH — ECONOMIC STABILITY: INCOME INSECURITY: IN THE LAST 12 MONTHS, WAS THERE A TIME WHEN YOU WERE NOT ABLE TO PAY THE MORTGAGE OR RENT ON TIME?: NO

## 2022-08-24 SDOH — ECONOMIC STABILITY: FOOD INSECURITY: WITHIN THE PAST 12 MONTHS, THE FOOD YOU BOUGHT JUST DIDN'T LAST AND YOU DIDN'T HAVE MONEY TO GET MORE.: NEVER TRUE

## 2022-08-24 SDOH — ECONOMIC STABILITY: HOUSING INSECURITY
IN THE LAST 12 MONTHS, WAS THERE A TIME WHEN YOU DID NOT HAVE A STEADY PLACE TO SLEEP OR SLEPT IN A SHELTER (INCLUDING NOW)?: NO

## 2022-08-24 SDOH — HEALTH STABILITY: PHYSICAL HEALTH: ON AVERAGE, HOW MANY MINUTES DO YOU ENGAGE IN EXERCISE AT THIS LEVEL?: 60 MIN

## 2022-08-24 SDOH — ECONOMIC STABILITY: HOUSING INSECURITY: IN THE LAST 12 MONTHS, HOW MANY PLACES HAVE YOU LIVED?: 1

## 2022-08-24 SDOH — ECONOMIC STABILITY: FOOD INSECURITY: WITHIN THE PAST 12 MONTHS, YOU WORRIED THAT YOUR FOOD WOULD RUN OUT BEFORE YOU GOT MONEY TO BUY MORE.: NEVER TRUE

## 2022-08-24 SDOH — HEALTH STABILITY: PHYSICAL HEALTH: ON AVERAGE, HOW MANY DAYS PER WEEK DO YOU ENGAGE IN MODERATE TO STRENUOUS EXERCISE (LIKE A BRISK WALK)?: 7 DAYS

## 2022-08-24 ASSESSMENT — SOCIAL DETERMINANTS OF HEALTH (SDOH)
WITHIN THE LAST YEAR, HAVE YOU BEEN HUMILIATED OR EMOTIONALLY ABUSED IN OTHER WAYS BY YOUR PARTNER OR EX-PARTNER?: NO
HOW HARD IS IT FOR YOU TO PAY FOR THE VERY BASICS LIKE FOOD, HOUSING, MEDICAL CARE, AND HEATING?: NOT HARD AT ALL
WITHIN THE LAST YEAR, HAVE YOU BEEN KICKED, HIT, SLAPPED, OR OTHERWISE PHYSICALLY HURT BY YOUR PARTNER OR EX-PARTNER?: NO
WITHIN THE LAST YEAR, HAVE TO BEEN RAPED OR FORCED TO HAVE ANY KIND OF SEXUAL ACTIVITY BY YOUR PARTNER OR EX-PARTNER?: NO
WITHIN THE LAST YEAR, HAVE YOU BEEN AFRAID OF YOUR PARTNER OR EX-PARTNER?: NO

## 2022-08-24 ASSESSMENT — LIFESTYLE VARIABLES
HOW OFTEN DO YOU HAVE A DRINK CONTAINING ALCOHOL: NEVER
HOW MANY STANDARD DRINKS CONTAINING ALCOHOL DO YOU HAVE ON A TYPICAL DAY: PATIENT DOES NOT DRINK

## 2022-08-24 NOTE — CARE COORDINATION
Ambulatory Care Coordination Note  8/24/2022    ACC: Bess Armstrong, RN    Summary Note: Pt weight today is #155.2  A1C is down to 5.0. Total weight loss since just before surgery is # 61. She is exercising every day for 60 minutes. She is now going to a cardio sculpting class. 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 Average calorie intake 1000 calories . She is feeling well. Goal Weight is 140 # for a healthy BMI . Her Levothyroxine dose has been cut in half due to her TSH level. She will work 8-4:30 Monday -Friday . She is going to try to walk at lunch when she starts going into the hospital/physician office. She is feeling well overall. Care Coordination Interventions    Referral from Primary Care Provider: No  Suggested Interventions and Community Resources  Disease Specific Clinic: Completed (Comment: Dr Ceci Jerome ( cardiology) , Dr Zaida Cai ( bariatrics)  Dr Dale Brice ( Pulmonology ) )          Goals Addressed                      This Visit's Progress      Conditions and Symptoms   On track      I will schedule office visits, as directed by my provider. I will notify my provider of any barriers to my plan of care. I will notify my provider of any symptoms that indicate a worsening of my condition. Barriers: none  Plan for overcoming my barriers: N/A  Confidence: 9/10  Anticipated Goal Completion Date: 5/30/22 and ongoing          Medication Management   On track      I will take my medication as directed.     Barriers: none  Plan for overcoming my barriers: will work with pharmacist and ACM to stay on track with adherence  Confidence: 8/10  Anticipated Goal Completion Date: 9/15/20        COMPLETED: Patient Stated (pt-stated)         Pt will begin increasing her exercise and improve eating habits in order to lower her A1C  By walking on her lunch break 2-3x per week     Barriers: working from home  Plan for overcoming my barriers: Pt will make a conscious effort to leave her desk for exercise daily   Confidence: 7/10  Anticipated Goal Completion Date: 8/30/20        COMPLETED: Patient Stated (pt-stated)         A1C will go back down to previous level of around 5    Barriers: medication side effects - pt will be starting jardiance when PA has gone through, was unable to tolerate Trulicity   Plan for overcoming my barriers: working with pcp and pharmacy to get jardiance approved  Confidence: 10/10  Anticipated Goal Completion Date: 6/30/2022    Yakaity Tomy approved    7/23 - A1C is now up to 7.3  A1C is now 5.0 Goal met              Prior to Admission medications    Medication Sig Start Date End Date Taking?  Authorizing Provider   buPROPion (WELLBUTRIN XL) 300 MG extended release tablet TAKE ONE TABLET BY MOUTH EVERY MORNING 7/20/22   Harmony Tyson PA-C   pantoprazole (PROTONIX) 20 MG tablet Take 1 tablet by mouth every morning (before breakfast) 7/20/22   Harmony Tyson PA-C   Multiple Vitamins-Minerals (THERAPEUTIC MULTIVITAMIN-MINERALS) tablet Take 1 tablet by mouth daily    Historical Provider, MD   levothyroxine (SYNTHROID) 50 MCG tablet TAKE 1 TABLET BY MOUTH ONE TIME A DAY 4/14/22   Harmony Tyson PA-C   pravastatin (PRAVACHOL) 20 MG tablet TAKE ONE TABLET BY MOUTH EVERY EVENING 12/29/21   Harmony Tyson PA-C   blood glucose test strips (AGAMATRIX JAZZ TEST) strip USE TO TEST BLOOD SUGAR ONE TIME DAILY 12/2/21   MARCI Alba   Blood Glucose Monitoring Suppl (AGAMATRIX JAZZ WIRELESS 2) w/Device KIT 1 each by Does not apply route once for 1 dose 1/14/21 6/7/22  Harmony Tyson PA-C   AgaMatrix Ultra-Thin Lancets MISC Use to test blood sugar 1 time daily 12/23/20   Harmony Tyson PA-C   Blood Glucose Calibration (AGAMATRIX CONTROL) SOLN Use as directed for control solution test 4/28/20   Germain Lord MD       Future Appointments   Date Time Provider Elizabeth Brock   10/7/2022  3:15 PM NAYANA [facility]respo [facility] [place]   10/20/2022  4:15 PM SCHEDULE, P BARIATRIC DIETICIAN [facility]   12/19/2022  3:15 PM Isabelle Duenas MD [facility] BSN, [facility]  Associate Care Manager  [phone]

## 2022-08-30 DIAGNOSIS — E03.9 HYPOTHYROIDISM, UNSPECIFIED TYPE: Primary | ICD-10-CM

## 2022-09-02 ENCOUNTER — HOSPITAL ENCOUNTER (OUTPATIENT)
Dept: ULTRASOUND IMAGING | Age: 62
Discharge: HOME | End: 2022-09-04
Payer: COMMERCIAL

## 2022-09-02 DIAGNOSIS — E03.9 HYPOTHYROIDISM, UNSPECIFIED TYPE: ICD-10-CM

## 2022-09-02 PROCEDURE — 76536 US EXAM OF HEAD AND NECK: CPT

## 2022-09-15 ENCOUNTER — NURSE TRIAGE (OUTPATIENT)
Dept: OTHER | Facility: CLINIC | Age: 62
End: 2022-09-15

## 2022-09-15 ENCOUNTER — CARE COORDINATION (OUTPATIENT)
Dept: OTHER | Facility: CLINIC | Age: 62
End: 2022-09-15

## 2022-09-15 NOTE — CARE COORDINATION
Ambulatory Care Coordination Note  9/15/2022    ACC: Cisco Hernandez, RN    Summary Note: Pt is on vacation in [place] with friends til Sunday. Pt is doing well   . Pt now down to #149.4 she was at #155 on our last call on 8/24/22. She is continuing to exercise daily. Goal weight is #140.0  She is doing well with her diet and exercise. She has limited sugar intake in her diet. She is doing weight training classes 2x a week. She has been walking 3-5 miles a day. She gets up early and walks before she starts work. Pt has no needs or questions today she only has 9# left to lose. Her last A1C was 5.0. Care Coordination Interventions    Referral from Primary Care Provider: No  Suggested Interventions and Community Resources  Disease Specific Clinic: Completed (Comment: Dr Rob Kitchen ( cardiology) , Dr Kayla Vu ( [facility])  Dr Marlin Antoine ( Pulmonology ) )             Prior to Admission medications    Medication Sig Start Date End Date Taking?  Authorizing Provider   buPROPion (WELLBUTRIN XL) 300 MG extended release tablet TAKE ONE TABLET BY MOUTH EVERY MORNING 7/20/22   Harmony Tyson PA-C   pantoprazole (PROTONIX) 20 MG tablet Take 1 tablet by mouth every morning (before breakfast) 7/20/22   Harmony Tyson PA-C   Multiple Vitamins-Minerals (THERAPEUTIC MULTIVITAMIN-MINERALS) tablet Take 1 tablet by mouth daily    Historical Provider, MD   levothyroxine (SYNTHROID) 50 MCG tablet TAKE 1 TABLET BY MOUTH ONE TIME A DAY 4/14/22   Harmony Tyson PA-C   pravastatin (PRAVACHOL) 20 MG tablet TAKE ONE TABLET BY MOUTH EVERY EVENING 12/29/21   Harmony Tyson PA-C   blood glucose test strips (AGAMATRIX JAZZ TEST) strip USE TO TEST BLOOD SUGAR ONE TIME DAILY 12/2/21   MARCI Dalton   Blood Glucose Monitoring Suppl (AGAMATRIX JAZZ WIRELESS 2) w/Device KIT 1 each by Does not apply route once for 1 dose 1/14/21 6/7/22  Harmony Tyson PA-C   AgaMatrix Ultra-Thin Lancets MISC Use to test blood sugar 1 time daily 12/23/20 Harmony Tyson PA-C   Blood Glucose Calibration (AGAMATRIX CONTROL) SOLN Use as directed for control solution test 4/28/20   Ann-Marie Villalobos MD       Future Appointments   Date Time Provider Elizabeth Brock   10/7/2022  3:15 PM Isael Bradley PA-C [place] [address]   10/20/2022  4:15 PM SCHEDULE, [facility] BARIATRIC DIETICIAN bariatric [email or web address]   12/19/2022  3:15 PM Ean Sung MD [facility]

## 2022-09-15 NOTE — TELEPHONE ENCOUNTER
Reason for Disposition   Red streak or red line and length > 2 inches (5 cm)    Protocols used: Spider Bite - [place]-ADULT-[place]    Subjective: Caller states \"I am in [place] and have a spider bite\"     Current Symptoms: Caller reports she has had a spider bite for the past 4 days on her hip, today there is a two inch red streak and warm, red and tender to the touch    Onset: 4 days    Associated Symptoms: Insect bite    Pain Severity: mild discomfort    Temperature: Denies fever    What has been tried: Cleaning, antibiotic ointment and band aid    LMP: NA Pregnant: NA    Recommended disposition: Go to [facility]    Care advice provided, patient verbalizes understanding; denies any other questions or concerns; instructed to call back for any new or worsening symptoms. Provided .com info, caller also suggest s she will go to [address] nearby. Did state that I would not be able to speak for coverage at [facility], if needed would need to call [address] customer service, caller agreeable. Attention Provider: Thank you for allowing me to participate in the care of your patient. The patient was connected to triage in response to symptoms provided. Please do not respond through this encounter as the response is not directed to a shared pool.

## 2022-10-05 ENCOUNTER — TELEPHONE (OUTPATIENT)
Dept: FAMILY MEDICINE CLINIC | Age: 62
End: 2022-10-05

## 2022-10-05 DIAGNOSIS — E03.9 HYPOTHYROIDISM, UNSPECIFIED TYPE: Primary | ICD-10-CM

## 2022-10-06 ENCOUNTER — PATIENT MESSAGE (OUTPATIENT)
Dept: FAMILY MEDICINE CLINIC | Age: 62
End: 2022-10-06

## 2022-10-07 ENCOUNTER — OFFICE VISIT (OUTPATIENT)
Dept: FAMILY MEDICINE CLINIC | Age: 62
End: 2022-10-07
Payer: COMMERCIAL

## 2022-10-07 VITALS
WEIGHT: 149 LBS | HEART RATE: 69 BPM | RESPIRATION RATE: 16 BRPM | BODY MASS INDEX: 24.83 KG/M2 | DIASTOLIC BLOOD PRESSURE: 80 MMHG | SYSTOLIC BLOOD PRESSURE: 122 MMHG | HEIGHT: 65 IN | OXYGEN SATURATION: 99 %

## 2022-10-07 DIAGNOSIS — K75.81 NASH (NONALCOHOLIC STEATOHEPATITIS): Primary | ICD-10-CM

## 2022-10-07 DIAGNOSIS — E03.9 HYPOTHYROIDISM, UNSPECIFIED TYPE: ICD-10-CM

## 2022-10-07 DIAGNOSIS — Z98.84 S/P BARIATRIC SURGERY: ICD-10-CM

## 2022-10-07 DIAGNOSIS — R21 RASH AND NONSPECIFIC SKIN ERUPTION: ICD-10-CM

## 2022-10-07 DIAGNOSIS — L98.7 EXCESSIVE SKIN AND SUBCUTANEOUS TISSUE: ICD-10-CM

## 2022-10-07 DIAGNOSIS — G47.33 SLEEP APNEA, OBSTRUCTIVE: ICD-10-CM

## 2022-10-07 PROBLEM — M76.61 ACHILLES TENDINITIS OF RIGHT LOWER EXTREMITY: Status: RESOLVED | Noted: 2022-06-09 | Resolved: 2022-10-07

## 2022-10-07 LAB
T4 FREE: 1.07 NG/DL (ref 0.84–1.68)
TSH SERPL DL<=0.05 MIU/L-ACNC: 0.77 UIU/ML (ref 0.44–3.86)

## 2022-10-07 PROCEDURE — 99214 OFFICE O/P EST MOD 30 MIN: CPT | Performed by: PHYSICIAN ASSISTANT

## 2022-10-07 RX ORDER — DIPHENHYDRAMINE HYDROCHLORIDE 25 MG/1
TABLET ORAL
COMMUNITY
Start: 2022-08-01

## 2022-10-07 NOTE — TELEPHONE ENCOUNTER
From: Marta Cook  To: Vaishnavi Tyson  Sent: 10/6/2022 3:11 PM EDT  Subject: Need lab order    Ant Antunez,  I need an order to have my Thyroid level rechecked.  Thanks, Shannon Zafar

## 2022-10-07 NOTE — PROGRESS NOTES
Subjective  Corinne Pederson, 63 y.o. female presents today with:  Chief Complaint   Patient presents with    Follow-up     4 month follow up needs repeat lab done   Follow up on liver      HPI  Arch Ben is in the office today for routine follow up. Last OV with me: 6/7/22    Obesity s/p bariatric surgery. Had gastric bypass 4/12/22 with lap braydon-en-y with hiatal hernia repair. Doing very well post-op. Down over 60 lb since surgery. Has tolerated po diet. Exercising daily with weights/walking. Doing well.     +loose/excessive skin with weight loss. Does have areas of skin erythema/breakdown. Areas of concern: umbilicus, lower pelvic region due to excess skin. Worse after exercise/heat. Pain and malodor from belly button. Has been using antiseptic spray to keep clean/dry. Atypical chest pain/palpitations/HTN. Since resolved after bariatric surgery/hiatal hernia repair. DONNIE. Denies excessive daytime sleepiness. Had consult with Dr. Dominique Jacobsen; has repeat sleep study without cpap to evaluate need for CPAP. Type 2 diabetes. Controlled/resolved at this time. Monitoring blood sugars. No long on po medication. Review of Systems   Constitutional:  Negative for activity change, appetite change, chills, diaphoresis, fatigue, fever and unexpected weight change. HENT:  Negative for congestion, nosebleeds, postnasal drip and trouble swallowing. Respiratory:  Negative for chest tightness, shortness of breath and wheezing. Cardiovascular:  Negative for chest pain, palpitations and leg swelling. Gastrointestinal:  Negative for abdominal distention, abdominal pain, blood in stool, constipation, diarrhea and nausea. Genitourinary:  Negative for difficulty urinating, dysuria, urgency, vaginal discharge and vaginal pain. Musculoskeletal:  Negative for arthralgias, back pain, gait problem, joint swelling, myalgias and neck pain. Skin:  Positive for rash. Negative for color change. Neurological:  Negative for dizziness, weakness, light-headedness, numbness and headaches. Psychiatric/Behavioral:  Negative for dysphoric mood and sleep disturbance. The patient is not nervous/anxious. Past Medical History:   Diagnosis Date    Abnormal EKG 03/23/2018    Angina pectoris syndrome ([facility] [address] 03/23/2018    Diverticulosis of colon     DMII (diabetes mellitus, type 2) ([facility] [address] 2000    Dr Amy Partida hypertension 01/15/2016    Family history of coronary artery disease 01/15/2016    Fatty infiltration of liver 2020    Gastric polyp 2019    Dr Nando Hillman, 5 mm    Generalized anxiety disorder     H/O: hysterectomy 2012    History of tobacco abuse 01/15/2016    Obesity (BMI 30-39. 9)     Other specified acquired hypothyroidism 2000    Precordial pain 03/23/2018    (? Prinzmetal?) Dr Tiana Lewis    S/P colonoscopy with polypectomy 2013, 2018, 2019    Dr Marvin Antunez, Dr Nando Hillman, tubulovillous adenoma, hyperplastic polyp    S/P vaginal hysterectomy 2012    benign    Seasonal allergies     Sleep apnea, obstructive 2007    was on CPAP, not using it at present    Type II diabetes mellitus, uncontrolled 11/13/2013    Under care of team 03/28/2022    pcp-Harmony Blanca-last visit nov 2021    Under care of team 03/28/2022    cardiac-[facility]-last visit dec 2021    Vasospastic angina [facility])     Dr. Tiana Lewis (cardiology)    Vertigo 2018    Wears dentures     upper and lower full dentures    Wears glasses      Past Surgical History:   Procedure Laterality Date    CARDIAC CATHETERIZATION  2018    no stents    CARPAL TUNNEL RELEASE Left 01/13/2021    LEFT CARPAL TUNNEL RELEASE, LEFT MIDDLE FINGER TRIGGER RELEASE, TENOSYNOVECTOMY FDPFDS performed by Kanu Woodson MD at [address] Right 02/03/2021    RIGHT CARPAL TUNNEL RELEASE, TRIGGER FINGER RELEASE RIGHT MIDDLE FINGER, FDP, FDS, TENOSYNOVECTOMY performed by Kanu Woodson MD at [address], [address] [place] [address] COLONOSCOPY N/A 2020    COLONOSCOPY DIAGNOSTIC performed by Rubi Woodruff MD at [facility] [facility] CATH LAB PROCEDURE      ENDOMETRIAL ABLATION      metrorrhagia    ENDOSCOPY, COLON, DIAGNOSTIC      HYSTERECTOMY (CERVIX STATUS UNKNOWN)  2012    fibroid, cervicitis, ovaries intact    ND COLON CA SCRN NOT HI RSK IND N/A 05/15/2018    COLONOSCOPY performed by Rubi Woodruff MD at [address] [facility]  2022     ROBOTIC LAPAROSCOPIC     SHEILA-EN-Y GASTRIC BYPASS N/A 2022    XI ROBOTIC LAPAROSCOPIC SHEILA-EN-Y GASTRIC BYPASS, EGD, HIATAL HERNIA REPAIR performed by Roderick Anne DO at [address] N/A 2019    EGD ESOPHAGOGASTRODUODENOSCOPY performed by Rubi Woodruff MD at [address] [facility]  2022     Social History     Socioeconomic History    Marital status:      Spouse name: Not on file    Number of children: 3    Years of education: Not on file    Highest education level: Not on file   Occupational History    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[address] , RN     Employer: [employer]   Tobacco Use    Smoking status: Former     Packs/day: 1.00     Years: 30.00     Pack years: 30.00     Types: Cigarettes     Start date: 3/5/1975     Quit date: 2005     Years since quittin.3    Smokeless tobacco: Never    Tobacco comments:     3/15/18 started age 17   Vaping Use    Vaping Use: Never used   Substance and Sexual Activity    Alcohol use: No     Comment: not at all     Drug use: No    Sexual activity: Not on file   Other Topics Concern    Not on file   Social History Narrative    Born in [place], one of 4 children (2 boys and 2 girls), both parents in the [employer], moved to [address] [place] [address] Dr    Mother in [place], has memory problems    Hoahaoism Zoroastrianism     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    Has 2 dogs, Blossom and Nivia and Hien 3, 2 sons in the area, one daughter in [address]: dogs, sawing, machine embroidery, baking     Social Determinants of Health     Financial Resource Strain: Low Risk     Difficulty of Paying Living Expenses: Not hard at all   Food Insecurity: No Food Insecurity    Worried About [address] in the Last Year: Never true    [address] [facility] [employer] in the Last Year: Never true   Transportation Needs: No Transportation Needs    Lack of Transportation (Medical): No    Lack of Transportation (Non-Medical):  No   Physical Activity: Sufficiently Active    Days of Exercise per Week: 7 days    Minutes of Exercise per Session: 60 min   Stress: No Stress Concern Present    Feeling of Stress : Not at all   Social Connections: Not on file   Intimate Partner Violence: Not At Risk    Fear of Current or Ex-Partner: No    Emotionally Abused: No    Physically Abused: No    Sexually Abused: No   Housing Stability: Low Risk     Unable to Pay for Housing in the Last Year: No    Number of Places Lived in the Last Year: 1    Unstable Housing in the Last Year: No     Family History   Problem Relation Age of Onset    Arrhythmia Mother     Hypertension Mother     Dementia Mother     COPD Father         mesothelioma    Alcohol Abuse Father     Diabetes Paternal Grandmother     Breast Cancer Paternal Grandmother     Diabetes Paternal Grandfather     Colon Cancer Paternal Grandfather     Schizophrenia Brother     Colon Cancer Paternal Uncle      Allergies   Allergen Reactions    Seasonal Itching    Morphine Dizziness or Vertigo     IV morphine caused dizziness    Nsaids Other (See Comments)     Cannot take due to gastic bypass surgery     Current Outpatient Medications   Medication Sig Dispense Refill    Calcium Carbonate Antacid 1000 MG CHEW Take 1,000 mg by mouth 3 times daily      Biotin 5 MG CAPS       buPROPion (WELLBUTRIN XL) 300 MG extended release tablet TAKE ONE TABLET BY MOUTH EVERY MORNING 90 tablet 4    pantoprazole (PROTONIX) 20 MG tablet Take 1 tablet by mouth every morning (before breakfast) 90 tablet 4    Multiple Vitamins-Minerals (THERAPEUTIC MULTIVITAMIN-MINERALS) tablet Take 1 tablet by mouth daily      levothyroxine (SYNTHROID) 50 MCG tablet TAKE 1 TABLET BY MOUTH ONE TIME A DAY (Patient taking differently: Take 25 mcg by mouth Daily TAKE 1 TABLET BY MOUTH ONE TIME A DAY) 90 tablet 1     No current facility-administered medications for this visit. PMH, Surgical Hx, Family Hx, and Social Hx reviewed and updated. Health Maintenance reviewed. Objective  Vitals:    10/07/22 1508   BP: 122/80   Site: Left Upper Arm   Position: Sitting   Cuff Size: Medium Adult   Pulse: 69   Resp: 16   SpO2: 99%   Weight: 149 lb (67.6 kg)   Height: 5' 4.5\" (1.638 m)     BP Readings from Last 3 Encounters:   10/07/22 122/80   07/13/22 (!) 140/70   06/08/22 118/72     Wt Readings from Last 3 Encounters:   10/07/22 149 lb (67.6 kg)   07/13/22 164 lb (74.4 kg)   06/08/22 177 lb (80.3 kg)     Physical Exam  Vitals reviewed. Constitutional:       Appearance: Normal appearance. She is not ill-appearing or toxic-appearing. Comments: Weight loss noted   HENT:      Head: Normocephalic and atraumatic. Right Ear: Tympanic membrane, ear canal and external ear normal.      Left Ear: Tympanic membrane, ear canal and external ear normal.      Nose: Nose normal.      Mouth/Throat:      Mouth: Mucous membranes are moist.   Eyes:      Conjunctiva/sclera: Conjunctivae normal.   Cardiovascular:      Rate and Rhythm: Normal rate and regular rhythm. Pulmonary:      Effort: Pulmonary effort is normal.      Breath sounds: Normal breath sounds. Musculoskeletal:         General: No swelling or tenderness. Right lower leg: No edema. Left lower leg: No edema. Skin:     General: Skin is warm. Coloration: Skin is not pale. Findings: Erythema and rash present.       Comments: Excessive/loss skin of lower abdomen/pelvic region with erythema in skin fold. Irritation and erythema periumbilical.    Neurological:      General: No focal deficit present. Mental Status: She is alert and oriented to person, place, and time. Psychiatric:         Mood and Affect: Mood normal.         Behavior: Behavior normal.         Thought Content: Thought content normal.         Judgment: Judgment normal.     Assessment & Plan   Linus Caruso was seen today for follow-up. Diagnoses and all orders for this visit:    BELTRAN (nonalcoholic steatohepatitis)  -     US FIBROSCAN; Future    Hypothyroidism, unspecified type    BMI 25.0-25.9,adult    Sleep apnea, obstructive    S/P bariatric surgery    Doing extremely well in her weight loss journey. HTN and diabetes resolved. Continue to monitor. 6 month follow up with me. Orders Placed This Encounter   Procedures    US FIBROSCAN     This procedure can be scheduled via Definition 6. Access your Definition [employer] account by visiting [email or web address]. Standing Status:   Future     Number of Occurrences:   1     Standing Expiration Date:   10/7/2023     Order Specific Question:   Reason for exam:     Answer:   history of BELTRAN, routine monitoring. No orders of the defined types were placed in this encounter. Medications Discontinued During This Encounter   Medication Reason    pravastatin (PRAVACHOL) 20 MG tablet LIST CLEANUP    blood glucose test strips (AGAMATRIX JAZZ TEST) strip LIST CLEANUP    Blood Glucose Monitoring Suppl (AGAMATRIX JAZZ WIRELESS 2) w/Device KIT LIST CLEANUP    AgaMatrix Ultra-Thin Lancets MISC LIST CLEANUP    Blood Glucose Calibration (AGAMATRIX CONTROL) SOLN LIST CLEANUP     No follow-ups on file. Reviewed with the patient: current clinical status, medications, activities and diet.      Side effects, adverse effects of the medication prescribed today, as well as treatment plan/ rationale and result expectations have been discussed with the patient who expresses understanding and desires to proceed. Close follow up to evaluate treatment results and for coordination of care. I have reviewed the patient's medical history in detail and updated the computerized patient record.     Harmony Tyson PA-C

## 2022-10-10 ENCOUNTER — TELEPHONE (OUTPATIENT)
Dept: PHARMACY | Facility: CLINIC | Age: 62
End: 2022-10-10

## 2022-10-10 NOTE — TELEPHONE ENCOUNTER
Patient calls regarding Vodio Labshart message received. Patient states she had gastric bypass surgery April 2022 and has lost over 73 lbs. She has recently seen her PCP, who cancelled the order for her to have a micro albumin urine, stating that it was no longer needed. Since losing the weight, she is off all DM medication and last A1c was 5. Will route to [facility] DM staff.

## 2022-10-11 ENCOUNTER — CARE COORDINATION (OUTPATIENT)
Dept: OTHER | Facility: CLINIC | Age: 62
End: 2022-10-11

## 2022-10-11 NOTE — CARE COORDINATION
directed. Barriers: none  Plan for overcoming my barriers: will work with pharmacist and ACM to stay on track with adherence  Confidence: 8/10  Anticipated Goal Completion Date: 9/15/20        COMPLETED: Patient Stated (pt-stated)         Pt will check blood sugars twice daily 2-3 x per week     Barriers: none  Plan for overcoming my barriers: N/A  Confidence: 8/10  Anticipated Goal Completion Date: 9/23/2020        COMPLETED: Patient Stated (pt-stated)         Pt will continue to keep a food diary daily and exercising daily     Barriers: stress  Plan for overcoming my barriers: Continue with support of ACM and bariatric staff to assist in meeting goals  Confidence: 10/10  Anticipated Goal Completion Date: 8/20/2022    10/11 - Pt is meeting goal and is consistent               Prior to Admission medications    Medication Sig Start Date End Date Taking?  Authorizing Provider   Calcium Carbonate Antacid 1000 MG CHEW Take 1,000 mg by mouth 3 times daily    Historical Provider, MD   Biotin 5 MG CAPS  8/1/22   Historical Provider, MD   buPROPion (WELLBUTRIN XL) 300 MG extended release tablet TAKE ONE TABLET BY MOUTH EVERY MORNING 7/20/22   Harmony Tyson PA-C   pantoprazole (PROTONIX) 20 MG tablet Take 1 tablet by mouth every morning (before breakfast) 7/20/22   Harmony Tyson PA-C   Multiple Vitamins-Minerals (THERAPEUTIC MULTIVITAMIN-MINERALS) tablet Take 1 tablet by mouth daily    Historical Provider, MD   levothyroxine (SYNTHROID) 50 MCG tablet TAKE 1 TABLET BY MOUTH ONE TIME A DAY  Patient taking differently: Take 25 mcg by mouth Daily TAKE 1 TABLET BY MOUTH ONE TIME A DAY 4/14/22   Rodríguez Barr PA-C       Future Appointments   Date Time Provider Elizabeth Brock   10/20/2022  4:15 PM [facility] [employer] BARIATRIC DIETICIAN bariatric sher [facility]   12/19/2022  3:15 PM Ro Hannon MD [facility]

## 2022-10-11 NOTE — TELEPHONE ENCOUNTER
Noted. Patient given an override for urine albumin requirement for the DM Program due to previous message.

## 2022-10-13 ENCOUNTER — ANCILLARY PROCEDURE (OUTPATIENT)
Dept: ENDOSCOPY | Age: 62
End: 2022-10-13
Payer: COMMERCIAL

## 2022-10-13 DIAGNOSIS — K75.81 NASH (NONALCOHOLIC STEATOHEPATITIS): ICD-10-CM

## 2022-10-13 PROCEDURE — 91200 LIVER ELASTOGRAPHY: CPT

## 2022-10-13 PROCEDURE — 91200 LIVER ELASTOGRAPHY: CPT | Performed by: INTERNAL MEDICINE

## 2022-10-14 PROBLEM — R21 RASH AND NONSPECIFIC SKIN ERUPTION: Status: ACTIVE | Noted: 2022-10-14

## 2022-10-14 PROBLEM — L98.7 EXCESSIVE SKIN AND SUBCUTANEOUS TISSUE: Status: ACTIVE | Noted: 2022-10-14

## 2022-10-14 ASSESSMENT — ENCOUNTER SYMPTOMS
ABDOMINAL PAIN: 0
SHORTNESS OF BREATH: 0
NAUSEA: 0
TROUBLE SWALLOWING: 0
BACK PAIN: 0
ABDOMINAL DISTENTION: 0
CONSTIPATION: 0
COLOR CHANGE: 0
BLOOD IN STOOL: 0
WHEEZING: 0
DIARRHEA: 0
CHEST TIGHTNESS: 0

## 2022-10-20 ENCOUNTER — OFFICE VISIT (OUTPATIENT)
Dept: BARIATRICS/WEIGHT MGMT | Age: 62
End: 2022-10-20
Payer: COMMERCIAL

## 2022-10-20 VITALS
WEIGHT: 145 LBS | HEIGHT: 65 IN | DIASTOLIC BLOOD PRESSURE: 79 MMHG | HEART RATE: 101 BPM | RESPIRATION RATE: 20 BRPM | BODY MASS INDEX: 24.16 KG/M2 | SYSTOLIC BLOOD PRESSURE: 128 MMHG

## 2022-10-20 DIAGNOSIS — Z98.84 STATUS POST GASTRIC BYPASS FOR OBESITY: ICD-10-CM

## 2022-10-20 DIAGNOSIS — K90.89 OTHER SPECIFIED INTESTINAL MALABSORPTION: Primary | ICD-10-CM

## 2022-10-20 PROCEDURE — 99213 OFFICE O/P EST LOW 20 MIN: CPT | Performed by: SURGERY

## 2022-10-20 NOTE — PROGRESS NOTES
[address] [facility] INVASIVE BARIATRIC SURG  [address] [place] [address] [address]  [address]  [facility] [address]  Dept: [phone]    SURGICAL WEIGHT LOSS MANAGEMENT PROGRAM  PROGRESS NOTE     CC: Weight Loss    Patient: Adriana Vela      Service Date: 10/20/2022  Visit:   6 month    Medical Record #: [id number]  Date of Surgery:   4/12/22    History: 63 y.o. female who presents today for a post op follow up . Patient reports regular bowel movements. She denies nausea, vomiting, fever, and chills. She states that she walks for an hour every morning and regularly attends strength training workouts. She reports taking her vitamins daily. She states that her PCP has recently adjusted her levothyroxine dosage for management of hypothyroidism. Height: 5' 4.5\" (1.638 m)  Highest Weight:  212 lbs    Current Visit Weight Information  Weight: 145 lb (65.8 kg)   BMI: Body mass index is 24.5 kg/m². Weight loss since surgery:  67 lbs    1 wk - D/C'd home on VTE Prohylaxis? [x] Yes   [] No   On VTE Proph as directed? [x] Yes   [] No    Liver pathology:    [] NA    [] No Gross path    [] Liver Steatosis   [x] Discussed w/ pt   [] Referred to GI    Exercising?    [x] Yes    [] No     Review of Systems:     General  Negative for: [] Weight Change   [x] Fatigue   [x] Fevers & Chills    [] Appetite change [] Other:    Positive for: [x] Weight Change   [] Fatigue   [] Fevers & Chills    [] Appetite change [] Other:   Cardiac  Negative for: [x] Chest Pain   [] Difficulty Breathing   [] Leg Cramps [x] Edema of Lower Extremeties    [] Left   [] Right      Positive for: [] Chest Pain   [] Difficulty Breathing   [] Leg Cramps [] Edema of Lower Extremeties    [] Left   [] Right   Pulmonary  Negative for: [x] Shortness of Breath [] Wheeze [x] Cough  [x] Calf Pain     Positive for: [] Shortness of Breath [] Wheeze [] Cough  [] Calf Pain   Gastro-Intestinal Negative for: [] Heartburn   [] Reflux   [] Dysphagia   [] Melena   [] BRBPR  [x] Vomiting   [x] Abdominal Pain   [] Diarrhea     [] Constipation  [] Other:     Positive for: [] Heartburn   [] Reflux   [] Dysphagia   [] Melena   [] BRBPR  [] Vomiting   [] Abdominal Pain   [] Diarrhea     [] Constipation  [] Other:    Muskuloskeletal Negative for: [] Joint pain   [] Back pain   [] Knee Pain   [] Muscle weakness [x] Hernia   [x] Edema [] Other:     Positive for: [] Joint pain   [] Back pain   [] Knee Pain   [] Muscle weakness [] Hernia   [] Edema [] Other:    Neurologic Negative for: [x] Syncope   [] Insomnia   [x] Being treated for depression  [] Other:     Positive for: [] Syncope   [] Insomnia   [] Being treated for depression  [] Other:    Skin Negative for: [x] Wound:   [] Open   [] Draining   [] Incisional     [x] Rash   [] Hair Loss  [] Other:     Positive for: [] Wound:   [] Open   [] Draining    [] Incisional  [] Rash   [] Hair Loss  [] Other:          Physical Assessment:     /79 (Site: Left Upper Arm, Position: Sitting, Cuff Size: Medium Adult)   Pulse (!) 101   Resp 20   Ht 5' 4.5\" (1.638 m)   Wt 145 lb (65.8 kg)   BMI 24.50 kg/m²   General Negative for:  [x] Lapses in memory   [x] Unusual Stress   [x] Diffic Concen [] Unable to sleep   [] Eating in response to stress   [] Other:    Positive for:  [] Lapses in memory   [] Unusual Stress   [] Diffic Concen [] Unable to sleep   [] Eating in response to stress   [] Other:   Cardio-Pulmonary   [x] Heart RRR   [x] No murmurs   [] Pulses nl x4 extrem    [x] Good inspiratory effort   [x] No wheezing     [x] Lungs clear to auscultation bilaterally    [] Other:    Gastro-Intestinal   [x] Abd S/NT/ND/Benign   [x] No abdominal mass/hernia    [x] No Splenomegaly    [] Other:    Muskuloskeletal   [x] Good muscle strength x4 extremities   [x] nl gait and ambul    [x] Nl ROM x4 extremities    [] Other:    Neurologic   [x] Alert and oriented x3    [] Other:   Skin   [x] Intact w/ no open wounds   [x] Incisions C/D/I    [] Steri strips removed   [x] No drainage or Infection    [] Other:      Assessment & Plan:      1. Other specified intestinal malabsorption    2. Status post gastric bypass for obesity         [x] Advance Diet    [x] Daily protein (65-75gm/day)   [x] Take Vitamins   [x] Attend Support Group    [x] Exercise Regularly   [x] Use contraception    Need for long term compliance and follow up stressed to patient  Dietician consult for post bypass state  Continue taking daily Vitamins  Continue taking Protonix, as prescribed  Labs ordered for malabsorption  Doing well    Follow up: 1 year    Orders placed this encounter:   Orders Placed This Encounter   Procedures    Vitamin A    Vitamin B1    Vitamin B12 & Folate    Vitamin D 25 Hydroxy    Zinc    PTH, Intact    Hemoglobin A1C    Comprehensive Metabolic Panel    CBC         New Prescriptions:   No orders of the defined types were placed in this encounter. Scribe Attestation:  manuel Greenibed on behalf of Altaf Mueller DO. Electronically signed by Altaf Mueller DO on 10/26/2022 at 9:24 PM    Please note that this chart was generated using voice recognition Dragon dictation software. Although every effort was made to ensure the accuracy of this automated transcription, some errors in transcription may have occurred. Octavio Shannon DO DO, personally performed the services described in this documentation. All medical record entries made by the scribe were at my direction and in my presence. I have reviewed the chart and discharge instructions (if applicable) and agree that the record reflects my personal performance and is accurate and complete.     Electronically Signed: Alatf Mueller DO. 10/26/22. 9:26 PM.

## 2022-10-21 NOTE — PROGRESS NOTES
Medical Nutrition Therapy  Metabolic and Bariatric surgery  6 month follow up note         Pt reports: She has been eating protein at each meal and snack and eating protein first. She is eating every 2 hours in small portions and sipping on fluids throughout the day. She is exercising everyday by walking and some days she exercises twice a day. Vitals:   Vitals:    10/20/22 1630   BP: 128/79   Site: Left Upper Arm   Position: Sitting   Cuff Size: Medium Adult   Pulse: (!) 101   Resp: 20   Weight: 145 lb (65.8 kg)   Height: 5' 4.5\" (1.638 m)      Body mass index is 24.5 kg/m². Labs reviewed:     Multivitamin/mineral intake: Bariatric Choice  Calcium intake: daily supple. Nutrition Assessment:   PES: Inadequate food and beverage intake r/t WLS as evidenced by loss of excess body weight lost 67 lbs over 6 mo. Goals   60-80gm of protein  48-64oz of fluid  Vitamin adherance  Basic adherance to WLS behavious and this document has been scanned into the chart.        [x] met     []  Not met        Plan:   F/u 9 months after surgery         Sylvia Baker, MS, RD, LD

## 2022-11-21 DIAGNOSIS — K90.89 OTHER SPECIFIED INTESTINAL MALABSORPTION: ICD-10-CM

## 2022-11-21 DIAGNOSIS — Z98.84 STATUS POST GASTRIC BYPASS FOR OBESITY: ICD-10-CM

## 2022-11-21 LAB
ALBUMIN SERPL-MCNC: 4.3 G/DL (ref 3.5–4.6)
ALP BLD-CCNC: 109 U/L (ref 40–130)
ALT SERPL-CCNC: 46 U/L (ref 0–33)
ANION GAP SERPL CALCULATED.3IONS-SCNC: 10 MEQ/L (ref 9–15)
AST SERPL-CCNC: 37 U/L (ref 0–35)
BILIRUB SERPL-MCNC: 0.5 MG/DL (ref 0.2–0.7)
BUN BLDV-MCNC: 24 MG/DL (ref 6–20)
CALCIUM SERPL-MCNC: 10 MG/DL (ref 8.5–9.9)
CHLORIDE BLD-SCNC: 104 MEQ/L (ref 95–107)
CO2: 30 MEQ/L (ref 20–31)
CREAT SERPL-MCNC: 0.82 MG/DL (ref 0.5–0.9)
FOLATE: 12.6 NG/ML
GFR SERPL CREATININE-BSD FRML MDRD: >60 ML/MIN/{1.73_M2}
GLOBULIN: 3.1 G/DL (ref 2.3–3.5)
GLUCOSE BLD-MCNC: 80 MG/DL (ref 70–99)
HBA1C MFR BLD: 5.6 % (ref 4.8–5.9)
HCT VFR BLD CALC: 39.9 % (ref 37–47)
HEMOGLOBIN: 13.3 G/DL (ref 12–16)
MCH RBC QN AUTO: 29.1 PG (ref 27–31.3)
MCHC RBC AUTO-ENTMCNC: 33.2 % (ref 33–37)
MCV RBC AUTO: 87.7 FL (ref 79.4–94.8)
PDW BLD-RTO: 14 % (ref 11.5–14.5)
PLATELET # BLD: 266 K/UL (ref 130–400)
POTASSIUM SERPL-SCNC: 4.2 MEQ/L (ref 3.4–4.9)
RBC # BLD: 4.55 M/UL (ref 4.2–5.4)
SODIUM BLD-SCNC: 144 MEQ/L (ref 135–144)
TOTAL PROTEIN: 7.4 G/DL (ref 6.3–8)
VITAMIN B-12: 1083 PG/ML (ref 232–1245)
WBC # BLD: 6.1 K/UL (ref 4.8–10.8)

## 2022-11-22 LAB
MISCELLANEOUS LAB TEST ORDER: NORMAL
WHOPPER PROMPT: NORMAL

## 2022-11-24 LAB
RETINYL PALMITATE: 0.02 MG/L (ref 0–0.1)
VITAMIN A LEVEL: 0.71 MG/L (ref 0.3–1.2)
VITAMIN A, INTERP: NORMAL

## 2022-11-25 LAB — VITAMIN B1, PLASMA: 27 NMOL/L (ref 4–15)

## 2022-12-02 ENCOUNTER — HOSPITAL ENCOUNTER (OUTPATIENT)
Dept: SLEEP CENTER | Age: 62
Discharge: HOME | End: 2022-12-04
Payer: COMMERCIAL

## 2022-12-13 PROCEDURE — 95806 SLEEP STUDY UNATT&RESP EFFT: CPT

## 2022-12-19 ENCOUNTER — OFFICE VISIT (OUTPATIENT)
Dept: PULMONOLOGY | Age: 62
End: 2022-12-19
Payer: COMMERCIAL

## 2022-12-19 VITALS
OXYGEN SATURATION: 99 % | BODY MASS INDEX: 23.99 KG/M2 | HEIGHT: 65 IN | DIASTOLIC BLOOD PRESSURE: 64 MMHG | HEART RATE: 76 BPM | TEMPERATURE: 97.6 F | SYSTOLIC BLOOD PRESSURE: 118 MMHG | WEIGHT: 144 LBS

## 2022-12-19 DIAGNOSIS — G47.33 OSA ON CPAP: ICD-10-CM

## 2022-12-19 DIAGNOSIS — Z99.89 OSA ON CPAP: ICD-10-CM

## 2022-12-19 PROCEDURE — 99213 OFFICE O/P EST LOW 20 MIN: CPT | Performed by: INTERNAL MEDICINE

## 2022-12-19 NOTE — PROGRESS NOTES
PATIENT VISIT-PULMONARY/SLEEP    12/19/2022     REFERRING PHYSICIAN:  Harmony Tyson PA-C     REASON FOR REFERRAL:  DONNIE    HPI:     Elly Epperson is a 63 y.o. female who was referred to sleep and pulmonary clinic for evaluation. She has been diagnosed with severe obstructive sleep apnea in 2018. She is presumed to be on CPAP at a pressure of 10 cm H2O. She has not been using her machine lately. She has not been sleeping in her bedroom that often when she is sleeping on the sofa downstairs. She had weight loss surgery 2 months ago and she lost 40 pounds of far. She is planning to lose another 60 pounds. She was very compliant with her machine prior to her surgery. She wants us to restart using it again and she needs supplies. 12/19/22:    She comes back for follow up. She underwent a repeat home sleep study after weight loss surgery. Her AHI was 13 on the study. Her CPAP settings were changed to auto during last visit. Of note that her initial case was severe with an AHI of 43. She feels overall well. She believes her weight has stabilized. She does not want to lose more weight. She does not have hypertension now. She does not have diabetes or coronary artery disease. Her diabetes has resolved as well. Past Medical History   Past Medical History:   Diagnosis Date    Abnormal EKG 03/23/2018    Angina pectoris syndrome ([facility] [address] 03/23/2018    Diverticulosis of colon     DMII (diabetes mellitus, type 2) ([facility] [address] 2000    Dr Jonha Lang hypertension 01/15/2016    Family history of coronary artery disease 01/15/2016    Fatty infiltration of liver 2020    Gastric polyp 2019    Dr Tana Banerjee, 5 mm    Generalized anxiety disorder     H/O: hysterectomy 2012    History of tobacco abuse 01/15/2016    Obesity (BMI 30-39. 9)     Other specified acquired hypothyroidism 2000    Precordial pain 03/23/2018    (? Prinzmetal?) Dr Denisse Puri    S/P colonoscopy with polypectomy 2013, 2018, 2019    Dr Nyasia Hook, Dr Natalia Cervantes, tubulovillous adenoma, hyperplastic polyp    S/P vaginal hysterectomy 2012    benign    Seasonal allergies     Sleep apnea, obstructive 2007    was on CPAP, not using it at present    Type II diabetes mellitus, uncontrolled 11/13/2013    Under care of team 03/28/2022    pcp-[facility]arm[facility] Blanca-last visit nov 2021    Under care of team 03/28/2022    cardiac-[facility]-last visit dec 2021    Vasospastic angina [facility])     Dr. Yamile Deleon (cardiology)    Vertigo 2018    Wears dentures     upper and lower full dentures    Wears glasses        Past Surgical History  Past Surgical History:   Procedure Laterality Date    CARDIAC CATHETERIZATION  2018    no stents    CARPAL TUNNEL RELEASE Left 01/13/2021    LEFT CARPAL TUNNEL RELEASE, LEFT MIDDLE FINGER TRIGGER RELEASE, TENOSYNOVECTOMY FDPFDS performed by Frances Delgado MD at [address] Right 02/03/2021    RIGHT CARPAL TUNNEL RELEASE, TRIGGER FINGER RELEASE RIGHT MIDDLE FINGER, FDP, FDS, TENOSYNOVECTOMY performed by Frances Delgado MD at [address] [place] [address] [address] [facility]      COLONOSCOPY N/A 06/30/2020    COLONOSCOPY DIAGNOSTIC performed by Ting Hewitt MD at [address] [facility] CATH LAB PROCEDURE      ENDOMETRIAL ABLATION  2002    metrorrhagia    ENDOSCOPY, COLON, DIAGNOSTIC      HYSTERECTOMY (CERVIX STATUS UNKNOWN)  09/07/2012    fibroid, cervicitis, ovaries intact    [place] COLON CA SCRN NOT HI RSK IND N/A 05/15/2018    COLONOSCOPY performed by Ting Hewitt MD at [address] [facility]  04/12/2022     ROBOTIC LAPAROSCOPIC     SHEILA-EN-Y GASTRIC BYPASS N/A 4/12/2022    XI ROBOTIC LAPAROSCOPIC SHEILA-EN-Y GASTRIC BYPASS, EGD, HIATAL HERNIA REPAIR performed by Agnes Guerra DO at [address] N/A 04/02/2019    EGD ESOPHAGOGASTRODUODENOSCOPY performed by Ting Hewitt MD at [facility]    UPPER GASTROINTESTINAL ENDOSCOPY  2022       Allergies  Allergies   Allergen Reactions    Seasonal Itching    Morphine Dizziness or Vertigo     IV morphine caused dizziness    Nsaids Other (See Comments)     Cannot take due to gastic bypass surgery       Medications  Current Outpatient Medications   Medication Sig Dispense Refill    Calcium Citrate 333 MG TABS Take 2 tablets by mouth daily      Biotin 5 MG CAPS       buPROPion (WELLBUTRIN XL) 300 MG extended release tablet TAKE ONE TABLET BY MOUTH EVERY MORNING 90 tablet 4    pantoprazole (PROTONIX) 20 MG tablet Take 1 tablet by mouth every morning (before breakfast) 90 tablet 4    Multiple Vitamins-Minerals (THERAPEUTIC MULTIVITAMIN-MINERALS) tablet Take 1 tablet by mouth daily      levothyroxine (SYNTHROID) 50 MCG tablet TAKE 1 TABLET BY MOUTH ONE TIME A DAY (Patient taking differently: Take 25 mcg by mouth Daily TAKE 1 TABLET BY MOUTH ONE TIME A DAY) 90 tablet 1    Calcium Carbonate Antacid 1000 MG CHEW Take 1,000 mg by mouth 3 times daily (Patient not taking: Reported on 2022)       No current facility-administered medications for this visit.        Social History  Social History     Tobacco Use    Smoking status: Former     Packs/day: 1.00     Years: 30.00     Pack years: 30.00     Types: Cigarettes     Start date: 3/5/1975     Quit date: 2005     Years since quittin.5    Smokeless tobacco: Never    Tobacco comments:     3/15/18 started age 17   Substance Use Topics    Alcohol use: No     Comment: not at all        Family History  Family History   Problem Relation Age of Onset    Arrhythmia Mother     Hypertension Mother     Dementia Mother     COPD Father         mesothelioma    Alcohol Abuse Father     Diabetes Paternal Grandmother     Breast Cancer Paternal Grandmother     Diabetes Paternal Grandfather     Colon Cancer Paternal Grandfather     Schizophrenia Brother     Colon Cancer Paternal Uncle        Review of Systems  All review of systems has been obtained and negative other than what was mentionedin HPI. Physical Exam                 Vitals:    12/19/22 1531   BP: 118/64   Pulse: 76   Temp: 97.6 °F (36.4 °C)   TempSrc: Tympanic   SpO2: 99%   Weight: 144 lb (65.3 kg)   Height: 5' 4.5\" (1.638 m)          General appearance: Well appearing. No acute distress. AAOX3  Head: Normocephalic, without obvious abnormality, atraumatic   Eyes:Pupils bilateral equal and reactive, EOM intact. Normal sclera and conjunctiva   Nose: Mucosa pink  Throat: Clear,  [place] 3  Neck: Supple, No JVD. Nothyromegaly. Neck is thick  Lungs: Clear bilaterally. No wheezing. No crackles. No use of accessory muscles. Heart: RRR, S1, S2 normal, no murmur, click, rub or gallop   Abdomen: soft, non-tender, nondistended. Bowel sounds normal. No hernia. No organomegaly. Extremities: extremities normal, atraumatic, no cyanosis, no edema  Skin: Skin color, texture, turgor normal. No rashes or lesions   Neurological: No focal deficits,cranial nerves grossly intact. No weakness. Sensation normal   Psych: Normal Mood  Musculoskeletal: No joint abnormalities. Impression:   Diagnosis Orders   1. DONNIE on CPAP  Home Sleep Study               Recommendations:     -I repeat sleep study showed mild case of sleep apnea. -She has no comorbidities and no hypersomnia. We can continue AutoPap for now. Advised the if she gains weight, her sleep apnea might becomes moderate.  -Continue to maintain current weight.  -Follow-up in 1 year. Return in about 1 year (around 12/19/2023).        Electronically signed by Jelly Macedo MD on 12/19/2022 at 4:14 PM

## 2022-12-29 ENCOUNTER — OFFICE VISIT (OUTPATIENT)
Dept: OBGYN CLINIC | Age: 62
End: 2022-12-29
Payer: COMMERCIAL

## 2022-12-29 VITALS
DIASTOLIC BLOOD PRESSURE: 64 MMHG | WEIGHT: 144 LBS | HEART RATE: 63 BPM | SYSTOLIC BLOOD PRESSURE: 126 MMHG | BODY MASS INDEX: 24.34 KG/M2

## 2022-12-29 DIAGNOSIS — N95.8 GENITOURINARY SYNDROME OF MENOPAUSE: ICD-10-CM

## 2022-12-29 DIAGNOSIS — Z01.419 SMEAR, VAGINAL, AS PART OF ROUTINE GYNECOLOGICAL EXAMINATION: Primary | ICD-10-CM

## 2022-12-29 DIAGNOSIS — Z12.72 SMEAR, VAGINAL, AS PART OF ROUTINE GYNECOLOGICAL EXAMINATION: Primary | ICD-10-CM

## 2022-12-29 PROCEDURE — 99386 PREV VISIT NEW AGE 40-64: CPT | Performed by: ADVANCED PRACTICE MIDWIFE

## 2022-12-29 RX ORDER — ESTRADIOL 0.1 MG/G
CREAM VAGINAL
Qty: 42.5 G | Refills: 1 | Status: SHIPPED | OUTPATIENT
Start: 2022-12-29 | End: 2023-12-29

## 2022-12-30 ASSESSMENT — ENCOUNTER SYMPTOMS
VOMITING: 0
ABDOMINAL PAIN: 0
COUGH: 0
NAUSEA: 0
TROUBLE SWALLOWING: 0
SHORTNESS OF BREATH: 0
DIARRHEA: 0
VOICE CHANGE: 0
SORE THROAT: 0
RHINORRHEA: 0
CONSTIPATION: 0

## 2022-12-30 NOTE — PROGRESS NOTES
Chief Complaint:     Marta Cook is a 63 y.o. female who presents here today for complaints of:      Chief Complaint   Patient presents with    Annual Exam     Pap smear. History of Present Illness:     Marta Cook is a 63 y.o. female who presents for her annual exam.    Concerns Today:    Discomfort with intercourse, vaginal dryness    Prior Pap History:  7/11/12 - NILM, HPV Negative    History of Hysterectomy  Remaining Cervix:  Surgically removed  Menopausal Symptoms:   None  Sexually Active:  Yes  Dyspareunia:  Yes  Vaginitis Symptoms:  No  Frequent UTI's (3 or more within 12 months):  No    Past Medical History: Allergies:  Seasonal, Morphine, and Nsaids  No LMP recorded. Patient has had a hysterectomy. Obstetrical History:  [id number]     Past Medical History:   Diagnosis Date    Abnormal EKG 03/23/2018    Angina pectoris syndrome ([facility] [address] 03/23/2018    Diverticulosis of colon     DMII (diabetes mellitus, type 2) ([facility] [address] 2000    Dr Xavier Landry hypertension 01/15/2016    Family history of coronary artery disease 01/15/2016    Fatty infiltration of liver 2020    Gastric polyp 2019    Dr Alison Caal, 5 mm    Generalized anxiety disorder     H/O: hysterectomy 2012    History of tobacco abuse 01/15/2016    Obesity (BMI 30-39. 9)     Other specified acquired hypothyroidism 2000    Precordial pain 03/23/2018    (? Prinzmetal?) Dr Smith Fung    S/P colonoscopy with polypectomy 2013, 2018, 2019    Dr Flora Solorio, Dr Alison Caal, tubulovillous adenoma, hyperplastic polyp    S/P vaginal hysterectomy 2012    benign    Seasonal allergies     Sleep apnea, obstructive 2007    was on CPAP, not using it at present    Type II diabetes mellitus, uncontrolled 11/13/2013    Under care of team 03/28/2022    pcp-[facility]-last visit nov 2021    Under care of team 03/28/2022    cardiac-[facility]-last visit dec 2021    Vasospastic angina [facility])     Dr. Smith Fung (cardiology)    Vertigo 2018    Wears dentures     upper and lower full dentures    Wears glasses      Medications:     Current Outpatient Medications on File Prior to Visit   Medication Sig Dispense Refill    Calcium Citrate 333 MG TABS Take 2 tablets by mouth daily      Biotin 5 MG CAPS       buPROPion (WELLBUTRIN XL) 300 MG extended release tablet TAKE ONE TABLET BY MOUTH EVERY MORNING 90 tablet 4    pantoprazole (PROTONIX) 20 MG tablet Take 1 tablet by mouth every morning (before breakfast) 90 tablet 4    Multiple Vitamins-Minerals (THERAPEUTIC MULTIVITAMIN-MINERALS) tablet Take 1 tablet by mouth daily      levothyroxine (SYNTHROID) 50 MCG tablet TAKE 1 TABLET BY MOUTH ONE TIME A DAY (Patient taking differently: Take 25 mcg by mouth Daily TAKE 1/2 TABLET BY MOUTH ONE TIME A DAY) 90 tablet 1    Calcium Carbonate Antacid 1000 MG CHEW Take 1,000 mg by mouth 3 times daily (Patient not taking: No sig reported)       No current facility-administered medications on file prior to visit. Review of Systems:     Review of Systems   Constitutional:  Negative for activity change, appetite change, chills, diaphoresis, fatigue, fever and unexpected weight change. HENT:  Negative for congestion, postnasal drip, rhinorrhea, sneezing, sore throat, trouble swallowing and voice change. Respiratory:  Negative for cough and shortness of breath. Cardiovascular:  Negative for chest pain. Gastrointestinal:  Negative for abdominal pain, constipation, diarrhea, nausea and vomiting. Genitourinary:  Positive for dyspareunia and vaginal pain. Negative for difficulty urinating, dysuria, frequency, genital sores, menstrual problem, pelvic pain, vaginal bleeding and vaginal discharge. Musculoskeletal:  Negative for arthralgias and myalgias. Neurological:  Negative for dizziness, syncope and headaches. Hematological:  Negative for adenopathy. All other systems reviewed and are negative.     Physical Exam:     Vitals:  /64   Pulse 63   Wt 144 lb (65.3 kg)   BMI 24.34 kg/m² Physical Exam  Appearance:  Normal appearance  Cardiovascular:  Normal rate, Capillary refill less than 2 seconds  Pulmonary:  Normal effort, no distress  Abdominal:  No tenderness  MS:  No Swelling, No dependent edema  Skin:  Warm, dry  Neuro:  Alert and oriented x3, reflexes normal.  Psychiatric:  Normal mood and behavior    Assessment:      Diagnosis Orders   1. Smear, vaginal, as part of routine gynecological examination        2. Genitourinary syndrome of menopause  estradiol (ESTRACE VAGINAL) 0.1 MG/GM vaginal cream        Plan:     Annual Exam, Screening for STD's  Pap -  History of hysterectomy  Screening for STD's - Declined    Genitourinary Syndrome of Menopause  Begin vaginal estradiol, taper instructions given    Follow Up:  Return in about 1 year (around 12/29/2023) for Annual Well Woman Visit. No orders of the defined types were placed in this encounter. Orders Placed This Encounter   Medications    estradiol (ESTRACE VAGINAL) 0.1 MG/GM vaginal cream     Sig: Place 0.5 g vaginally nightly for 21 days, THEN 0.5 g every other day for 21 days, THEN 0.5 g Twice a Week.      Dispense:  42.5 g     Refill:  [facility]

## 2023-01-03 ENCOUNTER — TELEPHONE (OUTPATIENT)
Dept: FAMILY MEDICINE CLINIC | Age: 63
End: 2023-01-03

## 2023-01-03 NOTE — TELEPHONE ENCOUNTER
Patient decided to schedule two appointments one for you to look at the wort and one to have it removed.

## 2023-01-06 ENCOUNTER — TELEPHONE (OUTPATIENT)
Dept: PHARMACY | Facility: CLINIC | Age: 63
End: 2023-01-06

## 2023-01-06 NOTE — TELEPHONE ENCOUNTER
2023 Annual Pharmacist Visit  **Patient is [facility]**     Called patient to schedule 2023 yearly pharmacist appointment to discuss medications for Diabetes Management Program.     Patient no longer DM nor medications. See 10/10/22 encounter. Left message to call back regarding officially disenrolling. Belkis Blackburn CP.    [facility] free: [phone]

## 2023-01-06 NOTE — TELEPHONE ENCOUNTER
Patient returned call, confirmed she is not taking any DM medications, and is told by her doctors that her diabetes is in remission. Patient advised she will no longer be enrolled in the [facility] DM program.  Patient verbalized understanding. Will route to .

## 2023-01-12 ENCOUNTER — NURSE TRIAGE (OUTPATIENT)
Dept: OTHER | Facility: CLINIC | Age: 63
End: 2023-01-12

## 2023-01-12 ENCOUNTER — APPOINTMENT (OUTPATIENT)
Dept: CT IMAGING | Age: 63
End: 2023-01-12
Payer: COMMERCIAL

## 2023-01-12 ENCOUNTER — HOSPITAL ENCOUNTER (EMERGENCY)
Age: 63
Discharge: HOME | End: 2023-01-13
Attending: EMERGENCY MEDICINE
Payer: COMMERCIAL

## 2023-01-12 ENCOUNTER — NURSE TRIAGE (OUTPATIENT)
Dept: OTHER | Age: 63
End: 2023-01-12

## 2023-01-12 DIAGNOSIS — K59.00 CONSTIPATION, UNSPECIFIED CONSTIPATION TYPE: Primary | ICD-10-CM

## 2023-01-12 DIAGNOSIS — K29.00 ACUTE SUPERFICIAL GASTRITIS WITHOUT HEMORRHAGE: ICD-10-CM

## 2023-01-12 LAB
ALBUMIN SERPL-MCNC: 4.1 G/DL (ref 3.5–4.6)
ALP BLD-CCNC: 71 U/L (ref 40–130)
ALT SERPL-CCNC: 43 U/L (ref 0–33)
ANION GAP SERPL CALCULATED.3IONS-SCNC: 12 MEQ/L (ref 9–15)
AST SERPL-CCNC: 73 U/L (ref 0–35)
BASOPHILS ABSOLUTE: 0 K/UL (ref 0–0.2)
BASOPHILS RELATIVE PERCENT: 0.3 %
BILIRUB SERPL-MCNC: 0.3 MG/DL (ref 0.2–0.7)
BUN BLDV-MCNC: 29 MG/DL (ref 8–23)
CALCIUM SERPL-MCNC: 9.3 MG/DL (ref 8.5–9.9)
CHLORIDE BLD-SCNC: 99 MEQ/L (ref 95–107)
CO2: 27 MEQ/L (ref 20–31)
CREAT SERPL-MCNC: 0.87 MG/DL (ref 0.5–0.9)
EOSINOPHILS ABSOLUTE: 0.3 K/UL (ref 0–0.7)
EOSINOPHILS RELATIVE PERCENT: 3.4 %
GFR SERPL CREATININE-BSD FRML MDRD: >60 ML/MIN/{1.73_M2}
GLOBULIN: 3.6 G/DL (ref 2.3–3.5)
GLUCOSE BLD-MCNC: 82 MG/DL (ref 70–99)
HCT VFR BLD CALC: 39.4 % (ref 37–47)
HEMOGLOBIN: 13.1 G/DL (ref 12–16)
LACTIC ACID: 0.7 MMOL/L (ref 0.5–2.2)
LIPASE: 45 U/L (ref 12–95)
LYMPHOCYTES ABSOLUTE: 2.7 K/UL (ref 1–4.8)
LYMPHOCYTES RELATIVE PERCENT: 29.3 %
MCH RBC QN AUTO: 29.2 PG (ref 27–31.3)
MCHC RBC AUTO-ENTMCNC: 33.3 % (ref 33–37)
MCV RBC AUTO: 87.9 FL (ref 79.4–94.8)
MONOCYTES ABSOLUTE: 0.5 K/UL (ref 0.2–0.8)
MONOCYTES RELATIVE PERCENT: 5 %
NEUTROPHILS ABSOLUTE: 5.7 K/UL (ref 1.4–6.5)
NEUTROPHILS RELATIVE PERCENT: 62 %
PDW BLD-RTO: 14 % (ref 11.5–14.5)
PLATELET # BLD: 288 K/UL (ref 130–400)
POTASSIUM SERPL-SCNC: 5.7 MEQ/L (ref 3.4–4.9)
RBC # BLD: 4.48 M/UL (ref 4.2–5.4)
SODIUM BLD-SCNC: 138 MEQ/L (ref 135–144)
TOTAL PROTEIN: 7.7 G/DL (ref 6.3–8)
TROPONIN: <0.01 NG/ML (ref 0–0.01)
WBC # BLD: 9.2 K/UL (ref 4.8–10.8)

## 2023-01-12 PROCEDURE — 83605 ASSAY OF LACTIC ACID: CPT

## 2023-01-12 PROCEDURE — 81001 URINALYSIS AUTO W/SCOPE: CPT

## 2023-01-12 PROCEDURE — 2500000003 HC RX 250 WO HCPCS: Performed by: PHYSICIAN ASSISTANT

## 2023-01-12 PROCEDURE — 84484 ASSAY OF TROPONIN QUANT: CPT

## 2023-01-12 PROCEDURE — 96361 HYDRATE IV INFUSION ADD-ON: CPT

## 2023-01-12 PROCEDURE — 83690 ASSAY OF LIPASE: CPT

## 2023-01-12 PROCEDURE — A4216 STERILE WATER/SALINE, 10 ML: HCPCS | Performed by: PHYSICIAN ASSISTANT

## 2023-01-12 PROCEDURE — 6360000004 HC RX CONTRAST MEDICATION: Performed by: PHYSICIAN ASSISTANT

## 2023-01-12 PROCEDURE — 99285 EMERGENCY DEPT VISIT HI MDM: CPT

## 2023-01-12 PROCEDURE — 85025 COMPLETE CBC W/AUTO DIFF WBC: CPT

## 2023-01-12 PROCEDURE — 36415 COLL VENOUS BLD VENIPUNCTURE: CPT

## 2023-01-12 PROCEDURE — 2580000003 HC RX 258: Performed by: PHYSICIAN ASSISTANT

## 2023-01-12 PROCEDURE — 80053 COMPREHEN METABOLIC PANEL: CPT

## 2023-01-12 PROCEDURE — 74177 CT ABD & PELVIS W/CONTRAST: CPT

## 2023-01-12 PROCEDURE — 96374 THER/PROPH/DIAG INJ IV PUSH: CPT

## 2023-01-12 PROCEDURE — 93005 ELECTROCARDIOGRAM TRACING: CPT | Performed by: PHYSICIAN ASSISTANT

## 2023-01-12 RX ORDER — 0.9 % SODIUM CHLORIDE 0.9 %
1000 INTRAVENOUS SOLUTION INTRAVENOUS ONCE
Status: COMPLETED | OUTPATIENT
Start: 2023-01-12 | End: 2023-01-12

## 2023-01-12 RX ADMIN — IOPAMIDOL 50 ML: 612 INJECTION, SOLUTION INTRAVENOUS at 23:26

## 2023-01-12 RX ADMIN — SODIUM CHLORIDE 1000 ML: 9 INJECTION, SOLUTION INTRAVENOUS at 22:34

## 2023-01-12 RX ADMIN — FAMOTIDINE 20 MG: 10 INJECTION, SOLUTION INTRAVENOUS at 22:35

## 2023-01-12 ASSESSMENT — ENCOUNTER SYMPTOMS
EYE PAIN: 0
ALLERGIC/IMMUNOLOGIC NEGATIVE: 1
ABDOMINAL PAIN: 1
APNEA: 0
SHORTNESS OF BREATH: 0
TROUBLE SWALLOWING: 0
COLOR CHANGE: 0

## 2023-01-12 NOTE — Clinical Note
Adolfo Wu was seen and treated in our emergency department on 1/12/2023. She may return to work on 01/14/2023. If you have any questions or concerns, please don't hesitate to call.       Rula Lucas PA-C

## 2023-01-13 VITALS
TEMPERATURE: 98.2 F | BODY MASS INDEX: 23.66 KG/M2 | RESPIRATION RATE: 15 BRPM | WEIGHT: 140 LBS | SYSTOLIC BLOOD PRESSURE: 127 MMHG | HEART RATE: 65 BPM | OXYGEN SATURATION: 98 % | DIASTOLIC BLOOD PRESSURE: 54 MMHG

## 2023-01-13 LAB
BACTERIA: NEGATIVE /HPF
BILIRUBIN URINE: NEGATIVE
BLOOD, URINE: NEGATIVE
CLARITY: CLEAR
COLOR: YELLOW
EKG ATRIAL RATE: 64 BPM
EKG P AXIS: 45 DEGREES
EKG P-R INTERVAL: 170 MS
EKG Q-T INTERVAL: 412 MS
EKG QRS DURATION: 100 MS
EKG QTC CALCULATION (BAZETT): 425 MS
EKG R AXIS: -11 DEGREES
EKG T AXIS: 85 DEGREES
EKG VENTRICULAR RATE: 64 BPM
EPITHELIAL CELLS, UA: NORMAL /HPF (ref 0–5)
GLUCOSE URINE: NEGATIVE MG/DL
HYALINE CASTS: NORMAL /HPF (ref 0–5)
KETONES, URINE: ABNORMAL MG/DL
LEUKOCYTE ESTERASE, URINE: ABNORMAL
NITRITE, URINE: NEGATIVE
PH UA: 5.5 (ref 5–9)
PROTEIN UA: NEGATIVE MG/DL
RBC UA: NORMAL /HPF (ref 0–5)
SPECIFIC GRAVITY UA: 1.02 (ref 1–1.03)
URINE REFLEX TO CULTURE: ABNORMAL
UROBILINOGEN, URINE: 0.2 E.U./DL
WBC UA: NORMAL /HPF (ref 0–5)

## 2023-01-13 PROCEDURE — 6370000000 HC RX 637 (ALT 250 FOR IP): Performed by: PHYSICIAN ASSISTANT

## 2023-01-13 PROCEDURE — 93010 ELECTROCARDIOGRAM REPORT: CPT | Performed by: INTERNAL MEDICINE

## 2023-01-13 RX ORDER — LACTULOSE 10 G/15ML
20 SOLUTION ORAL 2 TIMES DAILY PRN
Qty: 300 ML | Refills: 1 | Status: SHIPPED | OUTPATIENT
Start: 2023-01-13 | End: 2023-01-17

## 2023-01-13 RX ORDER — FAMOTIDINE 20 MG/1
20 TABLET, FILM COATED ORAL 2 TIMES DAILY
Qty: 10 TABLET | Refills: 0 | Status: SHIPPED | OUTPATIENT
Start: 2023-01-13

## 2023-01-13 RX ADMIN — Medication: at 00:27

## 2023-01-13 ASSESSMENT — PAIN - FUNCTIONAL ASSESSMENT: PAIN_FUNCTIONAL_ASSESSMENT: NONE - DENIES PAIN

## 2023-01-13 NOTE — ED PROVIDER NOTES
[address] [facility]  EMERGENCY DEPARTMENT ENCOUNTER      Pt Name: Chelsey Caruso  MRN: [id number]  Margaritogfse 1960  Date of evaluation: 1/12/2023  Provider: Cherry Cherry PA-C    CHIEF COMPLAINT       Chief Complaint   Patient presents with    Abdominal Pain         HISTORY OF PRESENT ILLNESS   (Location/Symptom, Timing/Onset, Context/Setting, Quality, Duration, Modifying Factors, Severity)  Note limiting factors. Chelsey Caruso is a 63 y.o. female who presents to the emergency department with complaints of epigastric abdominal pain that radiates into the back, postprandial in nature, ongoing all day today. Patient states history of gastric bypass in April but has not had issues like this in the past.  She does describe the pain as a burning sensation. Patient denies any vomiting. Patient does state that she has been constipated but did have a small bowel movement today. Patient denies any radiation of pain into the chest or shortness of breath. Nothing was taken for the symptoms    HPI    Nursing Notes were reviewed. REVIEW OF SYSTEMS    (2-9 systems for level 4, 10 or more for level 5)     Review of Systems   Constitutional:  Negative for diaphoresis and fever. HENT:  Negative for hearing loss and trouble swallowing. Eyes:  Negative for pain. Respiratory:  Negative for apnea and shortness of breath. Cardiovascular:  Negative for chest pain. Gastrointestinal:  Positive for abdominal pain. Endocrine: Negative. Genitourinary:  Negative for hematuria. Musculoskeletal:  Negative for neck pain and neck stiffness. Skin:  Negative for color change. Allergic/Immunologic: Negative. Neurological:  Negative for dizziness and numbness. Hematological: Negative. Psychiatric/Behavioral: Negative. All other systems reviewed and are negative. Except as noted above the remainder of the review of systems was reviewed and negative.        PAST MEDICAL HISTORY     Past Medical History:   Diagnosis Date    Abnormal EKG 03/23/2018    Angina pectoris syndrome ([facility] [address] 03/23/2018    Diverticulosis of colon     DMII (diabetes mellitus, type 2) ([facility] [address] 2000    Dr Winifred Recinos hypertension 01/15/2016    Family history of coronary artery disease 01/15/2016    Fatty infiltration of liver 2020    Gastric polyp 2019    Dr Audelia Welch, 5 mm    Generalized anxiety disorder     H/O: hysterectomy 2012    History of tobacco abuse 01/15/2016    Obesity (BMI 30-39. 9)     Other specified acquired hypothyroidism 2000    Precordial pain 03/23/2018    (? Prinzmetal?) Dr Ellen Daniel    S/P colonoscopy with polypectomy 2013, 2018, 2019    Dr Mercedez Tripp, Dr Audelia Welch, tubulovillous adenoma, hyperplastic polyp    S/P vaginal hysterectomy 2012    benign    Seasonal allergies     Sleep apnea, obstructive 2007    was on CPAP, not using it at present    Type II diabetes mellitus, uncontrolled 11/13/2013    Under care of team 03/28/2022    pcp-[facility]-last visit nov 2021    Under care of team 03/28/2022    cardiac-[facility]-last visit dec 2021    Vasospastic angina [facility])     Dr. Ellen Daniel (cardiology)    Vertigo 2018    Wears dentures     upper and lower full dentures    Wears glasses          SURGICAL HISTORY       Past Surgical History:   Procedure Laterality Date    CARDIAC CATHETERIZATION  2018    no stents    CARPAL TUNNEL RELEASE Left 01/13/2021    LEFT CARPAL TUNNEL RELEASE, LEFT MIDDLE FINGER TRIGGER RELEASE, TENOSYNOVECTOMY FDPFDS performed by Martha Story MD at [facility] Right 02/03/2021    RIGHT CARPAL TUNNEL RELEASE, TRIGGER FINGER RELEASE RIGHT MIDDLE FINGER, FDP, FDS, TENOSYNOVECTOMY performed by Martha Story MD at [address] [facility], [address] [facility]      COLONOSCOPY N/A 06/30/2020    COLONOSCOPY DIAGNOSTIC performed by Jama De Paz MD at [address] [place] [address] CATH LAB PROCEDURE      ENDOMETRIAL ABLATION  2002    metrorrhagia ENDOSCOPY, COLON, DIAGNOSTIC      HYSTERECTOMY (CERVIX STATUS UNKNOWN)  09/07/2012    fibroid, cervicitis, ovaries intact    PA COLON CA SCRN NOT HI RSK IND N/A 05/15/2018    COLONOSCOPY performed by Marty Jaramillo MD at [address] [facility]  04/12/2022     ROBOTIC LAPAROSCOPIC     SHEILA-EN-Y GASTRIC BYPASS N/A 4/12/2022    XI ROBOTIC LAPAROSCOPIC SHEILA-EN-Y GASTRIC BYPASS, EGD, HIATAL HERNIA REPAIR performed by Keturah Cm DO at [address] ENDOSCOPY N/A 04/02/2019    EGD ESOPHAGOGASTRODUODENOSCOPY performed by Marty Jaramillo MD at [address] [facility]  04/12/2022         CURRENT MEDICATIONS       Previous Medications    BIOTIN 5 MG CAPS        BUPROPION (WELLBUTRIN XL) 300 MG EXTENDED RELEASE TABLET    TAKE ONE TABLET BY MOUTH EVERY MORNING    CALCIUM CARBONATE ANTACID 1000 MG CHEW    Take 1,000 mg by mouth 3 times daily    CALCIUM CITRATE 333 MG TABS    Take 2 tablets by mouth daily    ESTRADIOL (ESTRACE VAGINAL) 0.1 MG/GM VAGINAL CREAM    Place 0.5 g vaginally nightly for 21 days, THEN 0.5 g every other day for 21 days, THEN 0.5 g Twice a Week.     LEVOTHYROXINE (SYNTHROID) 50 MCG TABLET    TAKE 1 TABLET BY MOUTH ONE TIME A DAY    MULTIPLE VITAMINS-MINERALS (THERAPEUTIC MULTIVITAMIN-MINERALS) TABLET    Take 1 tablet by mouth daily    PANTOPRAZOLE (PROTONIX) 20 MG TABLET    Take 1 tablet by mouth every morning (before breakfast)       ALLERGIES     Seasonal, Morphine, and Nsaids    FAMILY HISTORY       Family History   Problem Relation Age of Onset    Arrhythmia Mother     Hypertension Mother     Dementia Mother     COPD Father         mesothelioma    Alcohol Abuse Father     Diabetes Paternal Grandmother     Breast Cancer Paternal Grandmother     Diabetes Paternal Grandfather     Colon Cancer Paternal Grandfather     Schizophrenia Brother     Colon Cancer Paternal Uncle SOCIAL HISTORY       Social History     Socioeconomic History    Marital status:      Spouse name: None    Number of children: 3    Years of education: None    Highest education level: None   Occupational History    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[address] , RN     Employer: [employer]   Tobacco Use    Smoking status: Former     Packs/day: 1.00     Years: 30.00     Pack years: 30.00     Types: Cigarettes     Start date: 3/5/1975     Quit date: 2005     Years since quittin.6    Smokeless tobacco: Never    Tobacco comments:     3/15/18 started age 17   Vaping Use    Vaping Use: Never used   Substance and Sexual Activity    Alcohol use: No     Comment: not at all     Drug use: No   Social History Narrative    Born in [place], one of 4 children (2 boys and 2 girls), both parents in the [place], moved to [place]    Mother in [place], has memory problems    Muslim Jainism     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    Has 2 dogs, Blossom and Orissaare    Children 3, 2 sons in the area, one daughter in [address] [place] [address] Avenue: dogs, sawing, machine embroidery, baking     Social Determinants of Health     Financial Resource Strain: Low Risk     Difficulty of Paying Living Expenses: Not hard at all   Food Insecurity: No Food Insecurity    Worried About [facility] in the Last Year: Never true    [facility] in the Last Year: Never true   Transportation Needs: No Transportation Needs    Lack of Transportation (Medical): No    Lack of Transportation (Non-Medical):  No   Physical Activity: Sufficiently Active    Days of Exercise per Week: 7 days    Minutes of Exercise per Session: 60 min   Stress: No Stress Concern Present    Feeling of Stress : Not at all   Intimate Partner Violence: Not At Risk    Fear of Current or Ex-Partner: No    Emotionally Abused: No    Physically Abused: No    Sexually Abused: No   Housing Stability: Low Risk     Unable to Pay for Housing in the Last Year: No    Number of Places Lived in the Last Year: 1    Unstable Housing in the Last Year: No       SCREENINGS        Karley Coma Scale  Eye Opening: Spontaneous  Best Verbal Response: Oriented  Best Motor Response: Obeys commands  [place] Coma Scale Score: 15               PHYSICAL EXAM    (up to 7 for level 4, 8 or more for level 5)     ED Triage Vitals [01/12/23 2100]   BP Temp Temp Source Heart Rate Resp SpO2 Height Weight   123/67 98.2 °F (36.8 °C) Temporal 66 18 96 % -- 140 lb (63.5 kg)       Physical Exam  Vitals and nursing note reviewed. Constitutional:       General: She is not in acute distress. Appearance: She is well-developed. She is not diaphoretic. HENT:      Head: Normocephalic and atraumatic. Mouth/Throat:      Mouth: Mucous membranes are moist.      Pharynx: No oropharyngeal exudate. Eyes:      General: No scleral icterus. Conjunctiva/sclera: Conjunctivae normal.      Pupils: Pupils are equal, round, and reactive to light. Neck:      Trachea: No tracheal deviation. Cardiovascular:      Rate and Rhythm: Normal rate. Heart sounds: Normal heart sounds. Pulmonary:      Effort: Pulmonary effort is normal. No respiratory distress. Breath sounds: Normal breath sounds. Abdominal:      General: Bowel sounds are normal. There is no distension. Palpations: Abdomen is soft. Tenderness: There is abdominal tenderness in the epigastric area and left upper quadrant. Musculoskeletal:         General: Normal range of motion. Cervical back: Normal range of motion and neck supple. No rigidity or tenderness. Skin:     General: Skin is warm and dry. Findings: No erythema or rash. Neurological:      Mental Status: She is alert and oriented to person, place, and time. Cranial Nerves: No cranial nerve deficit. Motor: No abnormal muscle tone. Psychiatric:         Behavior: Behavior normal.         Thought Content:  Thought content normal. Judgment: Judgment normal.       DIAGNOSTIC RESULTS     EKG: All EKG's are interpreted by the Emergency Department Physician who either signs or Co-signs this chart in the absence of a cardiologist.  Mina Kohli by me:  Normal sinus rhythm, rate 64 bpm, no acute ST elevation, QTc interval of 425 ms    RADIOLOGY:   Non-plain film images such as CT, Ultrasound and MRI are read by the radiologist. Plain radiographic images are visualized and preliminarily interpreted by the emergency physician with the below findings:    CT reading by me shows constipation but is otherwise unremarkable which is consistent with radiology read    Interpretation per the Radiologist below, if available at the time of this note:    CT ABDOMEN PELVIS W IV CONTRAST Additional Contrast? None   Final Result   Diffuse colonic fecal retention with significant stool burden. Cholecystectomy. ED BEDSIDE ULTRASOUND:   Performed by ED Physician - none    LABS:  Labs Reviewed   COMPREHENSIVE METABOLIC PANEL - Abnormal; Notable for the following components:       Result Value    Potassium 5.7 (*)     BUN 29 (*)     ALT 43 (*)     AST 73 (*)     Globulin 3.6 (*)     All other components within normal limits   URINALYSIS WITH REFLEX TO CULTURE - Abnormal; Notable for the following components:    Ketones, Urine TRACE (*)     Leukocyte Esterase, Urine MODERATE (*)     All other components within normal limits   CBC WITH AUTO DIFFERENTIAL   LACTIC ACID   LIPASE   TROPONIN   MICROSCOPIC URINALYSIS       All other labs were within normal range or not returned as of this dictation.     EMERGENCY DEPARTMENT COURSE and DIFFERENTIAL DIAGNOSIS/MDM:   Vitals:    Vitals:    01/12/23 2226 01/12/23 2230 01/12/23 2300 01/13/23 0000   BP: 127/60 120/61 (!) 127/58 (!) 127/54   Pulse: 66 65 63 65   Resp: 17 19 15 15   Temp:       TempSrc:       SpO2: 99% 98% 97% 98%   Weight:               Medical Decision Making  Amount and/or Complexity of Data Reviewed  Labs: ordered. Radiology: ordered. ECG/medicine tests: ordered. Risk  Prescription drug management. REASSESSMENT        Patient presented the emergency department with postprandial epigastric abdominal burning sensation. CT scan does reveal constipation. Differential diagnosis included bowel obstruction, ulceration and gastritis. Laboratory testing is unremarkable. Patient improved symptoms after IV Pepcid and fluids. Clinically the patient has a gastritis along with an associated constipation. She will be treated with a brief course of Pepcid as well as lactulose for constipation. Admission was considered though testing is unremarkable and patient improved symptoms so she will be discharged home    CONSULTS:  None    PROCEDURES:  Unless otherwise noted below, none     Procedures        FINAL IMPRESSION      1. Constipation, unspecified constipation type    2. Acute superficial gastritis without hemorrhage          DISPOSITION/PLAN   DISPOSITION Decision To Discharge 01/13/2023 12:24:50 AM      PATIENT REFERRED TO:  Kris Blevins PA-C  [address]  [address] [facility]  [phone]    Call in 2 days      DISCHARGE MEDICATIONS:  New Prescriptions    FAMOTIDINE (PEPCID) 20 MG TABLET    Take 1 tablet by mouth 2 times daily    LACTULOSE (CHRONULAC) 10 GM/15ML SOLUTION    Take 30 mLs by mouth 2 times daily as needed (constipation)     Controlled Substances Monitoring:     RX Monitoring 8/9/2019   Periodic Controlled Substance Monitoring Possible medication side effects, risk of tolerance/dependence & alternative treatments discussed. ;No signs of potential drug abuse or diversion identified. ;Assessed functional status.        (Please note that portions of this note were completed with a voice recognition program.  Efforts were made to edit the dictations but occasionally words are mis-transcribed.)    Ede Kamara PA-C (electronically signed)  Attending Emergency Physician Beatriz Garay PA-C  01/13/23 0031

## 2023-01-13 NOTE — ED NOTES
PT MEDICATED PER ORDERS, ISABELLE. WELL. PT RESTING IN BED, A&OX4, SKIN W/D/PINK, 0 DISTRESS, 0 N&V, 0 PROBLEMS.      Feliberto Maddox, BERT  01/12/23 9658

## 2023-01-13 NOTE — TELEPHONE ENCOUNTER
Reason for Disposition   Health Information question, no triage required and triager able to answer question    Answer Assessment - Initial Assessment Questions  1. REASON FOR CALL or QUESTION: \"What is your reason for calling today? \" or \"How can I best help you? \" or \"What question do you have that I can help answer? \"      \"I am 9 months post op and today at lunch I started to feel uncomfortable. \"     States she is having abdominal pain. Pain increases with walking, it aggravates the pain. States she stopped at the store on her way home and was unable to navigate the store due to the pain. Pain radiates to her back. She took a suppository and passed gas but no stool. Protocols used: Information Only Call - No Triage-ADULT-  Spoke with Dr. Lexi Diego and he advised that Forrest Hetal go to the ER for evaluation. Forrest Fong advised of same and she will go to [email or web address] ER now.

## 2023-01-13 NOTE — TELEPHONE ENCOUNTER
Location of patient: [place]    Subjective: Caller states \"9 months post gastric bypass had lunch today then began having cramps after exercise class wasn't able to tolerate sips of water. Pt was able to have a small bowel movement earlier which temporally relieved the pain. Spoke to surgeon and was told to be seen in the ER\"     Denies nausea    Current Symptoms: feels bloated    Onset: 8 hours ago;        Pain Severity: 7/10; burning/ cramping    Temperature: denies       What has been tried: suppository         Recommended disposition: Go to ED Now    Care advice provided, patient verbalizes understanding; denies any other questions or concerns; instructed to call back for any new or worsening symptoms. Patient/caller agrees to proceed to nearest Emergency Department    Attention Provider: Thank you for allowing me to participate in the care of your patient. The patient was connected to triage in response to symptoms provided. Please do not respond through this encounter as the response is not directed to a shared pool.       Reason for Disposition   [1] SEVERE pain (e.g., excruciating) AND [2] present > 1 hour    Protocols used: Abdominal Pain - Female-ADULT-

## 2023-01-13 NOTE — ED TRIAGE NOTES
Pt presents c/o abd pain. Pt point to epigastric region when asked where it hurts. Pt describes the pain as a burning. Pt reports worse with eating. Pt reports gastric bypass in April. Pt denies nausea and vomiting. Pt A&Ox4, ABCs intact, ambulatory, GCS15, skin, pink, warm, and dry, no obvious s/s of distress/deformities noted on assessment.

## 2023-01-15 ENCOUNTER — NURSE TRIAGE (OUTPATIENT)
Dept: OTHER | Facility: CLINIC | Age: 63
End: 2023-01-15

## 2023-01-15 ENCOUNTER — APPOINTMENT (OUTPATIENT)
Dept: CT IMAGING | Age: 63
End: 2023-01-15
Payer: COMMERCIAL

## 2023-01-15 ENCOUNTER — ANESTHESIA EVENT (OUTPATIENT)
Dept: OPERATING ROOM | Age: 63
End: 2023-01-15
Payer: COMMERCIAL

## 2023-01-15 ENCOUNTER — HOSPITAL ENCOUNTER (OUTPATIENT)
Age: 63
Setting detail: OBSERVATION
Discharge: HOME | End: 2023-01-16
Attending: EMERGENCY MEDICINE | Admitting: SURGERY
Payer: COMMERCIAL

## 2023-01-15 DIAGNOSIS — R10.13 EPIGASTRIC PAIN: Primary | ICD-10-CM

## 2023-01-15 PROBLEM — R10.9 ABDOMINAL PAIN: Status: ACTIVE | Noted: 2023-01-15

## 2023-01-15 LAB
ABSOLUTE EOS #: 0.31 K/UL (ref 0–0.44)
ABSOLUTE IMMATURE GRANULOCYTE: <0.03 K/UL (ref 0–0.3)
ABSOLUTE LYMPH #: 2.16 K/UL (ref 1.1–3.7)
ABSOLUTE MONO #: 0.38 K/UL (ref 0.1–1.2)
ALBUMIN SERPL-MCNC: 4 G/DL (ref 3.5–5.2)
ALBUMIN/GLOBULIN RATIO: 1.3 (ref 1–2.5)
ALP BLD-CCNC: 87 U/L (ref 35–104)
ALT SERPL-CCNC: 61 U/L (ref 5–33)
ANION GAP SERPL CALCULATED.3IONS-SCNC: 9 MMOL/L (ref 9–17)
AST SERPL-CCNC: 58 U/L
BASOPHILS # BLD: 1 % (ref 0–2)
BASOPHILS ABSOLUTE: 0.07 K/UL (ref 0–0.2)
BILIRUB SERPL-MCNC: 0.3 MG/DL (ref 0.3–1.2)
BILIRUBIN URINE: NEGATIVE
BUN BLDV-MCNC: 18 MG/DL (ref 8–23)
CALCIUM SERPL-MCNC: 9.4 MG/DL (ref 8.6–10.4)
CHLORIDE BLD-SCNC: 103 MMOL/L (ref 98–107)
CO2: 27 MMOL/L (ref 20–31)
COLOR: YELLOW
COMMENT UA: NORMAL
CREAT SERPL-MCNC: 0.72 MG/DL (ref 0.5–0.9)
EOSINOPHILS RELATIVE PERCENT: 4 % (ref 1–4)
GFR SERPL CREATININE-BSD FRML MDRD: >60 ML/MIN/1.73M2
GLUCOSE BLD-MCNC: 91 MG/DL (ref 70–99)
GLUCOSE URINE: NEGATIVE
HCT VFR BLD CALC: 39.1 % (ref 36.3–47.1)
HEMOGLOBIN: 12.7 G/DL (ref 11.9–15.1)
IMMATURE GRANULOCYTES: 0 %
KETONES, URINE: NEGATIVE
LACTIC ACID, WHOLE BLOOD: 1 MMOL/L (ref 0.7–2.1)
LEUKOCYTE ESTERASE, URINE: NEGATIVE
LIPASE: 35 U/L (ref 13–60)
LYMPHOCYTES # BLD: 29 % (ref 24–43)
MCH RBC QN AUTO: 29.3 PG (ref 25.2–33.5)
MCHC RBC AUTO-ENTMCNC: 32.5 G/DL (ref 28.4–34.8)
MCV RBC AUTO: 90.3 FL (ref 82.6–102.9)
MONOCYTES # BLD: 5 % (ref 3–12)
NITRITE, URINE: NEGATIVE
NRBC AUTOMATED: 0 PER 100 WBC
PDW BLD-RTO: 13 % (ref 11.8–14.4)
PH UA: 7.5 (ref 5–8)
PLATELET # BLD: 282 K/UL (ref 138–453)
PMV BLD AUTO: 9.1 FL (ref 8.1–13.5)
POTASSIUM SERPL-SCNC: 4.3 MMOL/L (ref 3.7–5.3)
PROTEIN UA: NEGATIVE
RBC # BLD: 4.33 M/UL (ref 3.95–5.11)
SEG NEUTROPHILS: 61 % (ref 36–65)
SEGMENTED NEUTROPHILS ABSOLUTE COUNT: 4.65 K/UL (ref 1.5–8.1)
SODIUM BLD-SCNC: 139 MMOL/L (ref 135–144)
SPECIFIC GRAVITY UA: 1.01 (ref 1–1.03)
TOTAL PROTEIN: 7.1 G/DL (ref 6.4–8.3)
TURBIDITY: CLEAR
URINE HGB: NEGATIVE
UROBILINOGEN, URINE: NORMAL
WBC # BLD: 7.6 K/UL (ref 3.5–11.3)

## 2023-01-15 PROCEDURE — 2580000003 HC RX 258: Performed by: STUDENT IN AN ORGANIZED HEALTH CARE EDUCATION/TRAINING PROGRAM

## 2023-01-15 PROCEDURE — 83690 ASSAY OF LIPASE: CPT

## 2023-01-15 PROCEDURE — 6360000004 HC RX CONTRAST MEDICATION

## 2023-01-15 PROCEDURE — 6360000004 HC RX CONTRAST MEDICATION: Performed by: STUDENT IN AN ORGANIZED HEALTH CARE EDUCATION/TRAINING PROGRAM

## 2023-01-15 PROCEDURE — 6370000000 HC RX 637 (ALT 250 FOR IP): Performed by: STUDENT IN AN ORGANIZED HEALTH CARE EDUCATION/TRAINING PROGRAM

## 2023-01-15 PROCEDURE — 83605 ASSAY OF LACTIC ACID: CPT

## 2023-01-15 PROCEDURE — 96361 HYDRATE IV INFUSION ADD-ON: CPT

## 2023-01-15 PROCEDURE — G0378 HOSPITAL OBSERVATION PER HR: HCPCS

## 2023-01-15 PROCEDURE — 80053 COMPREHEN METABOLIC PANEL: CPT

## 2023-01-15 PROCEDURE — 6370000000 HC RX 637 (ALT 250 FOR IP)

## 2023-01-15 PROCEDURE — 6360000002 HC RX W HCPCS

## 2023-01-15 PROCEDURE — 81003 URINALYSIS AUTO W/O SCOPE: CPT

## 2023-01-15 PROCEDURE — 74177 CT ABD & PELVIS W/CONTRAST: CPT

## 2023-01-15 PROCEDURE — 99285 EMERGENCY DEPT VISIT HI MDM: CPT

## 2023-01-15 PROCEDURE — 85025 COMPLETE CBC W/AUTO DIFF WBC: CPT

## 2023-01-15 PROCEDURE — 96374 THER/PROPH/DIAG INJ IV PUSH: CPT

## 2023-01-15 RX ORDER — SODIUM CHLORIDE 0.9 % (FLUSH) 0.9 %
10 SYRINGE (ML) INJECTION PRN
Status: DISCONTINUED | OUTPATIENT
Start: 2023-01-15 | End: 2023-01-16 | Stop reason: HOSPADM

## 2023-01-15 RX ORDER — FENTANYL CITRATE 50 UG/ML
50 INJECTION, SOLUTION INTRAMUSCULAR; INTRAVENOUS ONCE
Status: COMPLETED | OUTPATIENT
Start: 2023-01-15 | End: 2023-01-15

## 2023-01-15 RX ORDER — ONDANSETRON 2 MG/ML
4 INJECTION INTRAMUSCULAR; INTRAVENOUS EVERY 6 HOURS PRN
Status: DISCONTINUED | OUTPATIENT
Start: 2023-01-15 | End: 2023-01-16 | Stop reason: HOSPADM

## 2023-01-15 RX ORDER — DOCUSATE SODIUM 100 MG/1
100 CAPSULE, LIQUID FILLED ORAL 2 TIMES DAILY PRN
Status: DISCONTINUED | OUTPATIENT
Start: 2023-01-15 | End: 2023-01-16 | Stop reason: HOSPADM

## 2023-01-15 RX ORDER — ACETAMINOPHEN 325 MG/1
650 TABLET ORAL EVERY 6 HOURS PRN
Status: DISCONTINUED | OUTPATIENT
Start: 2023-01-15 | End: 2023-01-16 | Stop reason: HOSPADM

## 2023-01-15 RX ORDER — SUCRALFATE 1 G/1
1 TABLET ORAL
Status: DISCONTINUED | OUTPATIENT
Start: 2023-01-15 | End: 2023-01-16 | Stop reason: HOSPADM

## 2023-01-15 RX ORDER — SODIUM CHLORIDE 9 MG/ML
INJECTION, SOLUTION INTRAVENOUS PRN
Status: DISCONTINUED | OUTPATIENT
Start: 2023-01-15 | End: 2023-01-16 | Stop reason: HOSPADM

## 2023-01-15 RX ORDER — BISACODYL 10 MG
10 SUPPOSITORY, RECTAL RECTAL DAILY PRN
Status: DISCONTINUED | OUTPATIENT
Start: 2023-01-15 | End: 2023-01-16 | Stop reason: HOSPADM

## 2023-01-15 RX ORDER — SODIUM CHLORIDE, SODIUM LACTATE, POTASSIUM CHLORIDE, CALCIUM CHLORIDE 600; 310; 30; 20 MG/100ML; MG/100ML; MG/100ML; MG/100ML
INJECTION, SOLUTION INTRAVENOUS CONTINUOUS
Status: DISCONTINUED | OUTPATIENT
Start: 2023-01-16 | End: 2023-01-16 | Stop reason: HOSPADM

## 2023-01-15 RX ORDER — SODIUM CHLORIDE 0.9 % (FLUSH) 0.9 %
10 SYRINGE (ML) INJECTION EVERY 12 HOURS SCHEDULED
Status: DISCONTINUED | OUTPATIENT
Start: 2023-01-15 | End: 2023-01-16 | Stop reason: HOSPADM

## 2023-01-15 RX ADMIN — MAGNESIUM HYDROXIDE 30 ML: 400 SUSPENSION ORAL at 21:29

## 2023-01-15 RX ADMIN — SODIUM CHLORIDE, POTASSIUM CHLORIDE, SODIUM LACTATE AND CALCIUM CHLORIDE: 600; 310; 30; 20 INJECTION, SOLUTION INTRAVENOUS at 17:26

## 2023-01-15 RX ADMIN — FENTANYL CITRATE 50 MCG: 50 INJECTION INTRAMUSCULAR; INTRAVENOUS at 13:30

## 2023-01-15 RX ADMIN — IOPAMIDOL 75 ML: 755 INJECTION, SOLUTION INTRAVENOUS at 15:30

## 2023-01-15 RX ADMIN — SUCRALFATE 1 G: 1 TABLET ORAL at 20:45

## 2023-01-15 RX ADMIN — DIATRIZOATE MEGLUMINE AND DIATRIZOATE SODIUM 30 ML: 660; 100 LIQUID ORAL; RECTAL at 15:30

## 2023-01-15 ASSESSMENT — ENCOUNTER SYMPTOMS
VOMITING: 0
NAUSEA: 0
ABDOMINAL PAIN: 1

## 2023-01-15 NOTE — ED PROVIDER NOTES
[facility] ED  Emergency Department Encounter  Emergency Medicine Resident     Pt Name:Geovanna Rodriguez  MRN: [id number]  Birthdate 1960  Date of evaluation: 1/15/23  PCP:  Harmony Tyson PA-C  Note Started: 12:46 PM EST      CHIEF COMPLAINT       Chief Complaint   Patient presents with    Abdominal Pain    Constipation       HISTORY OF PRESENT ILLNESS  (Location/Symptom, Timing/Onset, Context/Setting, Quality, Duration, Modifying Factors, Severity.)      Geovanna Rodriguez is a 62 y.o. female with a history of Rebecca-en-Y gastric bypass in April 2022 who presents with epigastric abdominal pain that is been going on for the past 4 days.  Patient states on Thursday she began having an aching pain in her epigastric region especially with eating or drinking.  She is only been able to tolerate liquids for the past few days because of the pain.  Her abdomen feels distended with pain now wrapping from her epigastric region around to her back.  Patient was seen at [facility] 1/12/22 and diagnosed with constipation and gastritis after CT with IV contrast that showed significant stool burden.  She was given lactulose at that time which initially improved her pain but now she states the pain is worsened.  Patient is having bowel movements however she feels they are decreased in size.  Patient denies fever chills, chest pain, shortness of breath, diarrhea, melena or hematochezia, nausea, vomiting.     PAST MEDICAL / SURGICAL / SOCIAL / FAMILY HISTORY      has a past medical history of Abnormal EKG, Angina pectoris syndrome (HCC), Diverticulosis of colon, DMII (diabetes mellitus, type 2) (HCC), Essential hypertension, Family history of coronary artery disease, Fatty infiltration of liver, Gastric polyp, Generalized anxiety disorder, H/O: hysterectomy, History of tobacco abuse, Obesity (BMI 30-39.9), Other specified acquired hypothyroidism, Precordial pain, S/P colonoscopy with polypectomy, S/P  vaginal hysterectomy, Seasonal allergies, Sleep apnea, obstructive, Type II diabetes mellitus, uncontrolled, Under care of team, Under care of team, Vasospastic angina ([address] Vertigo, Wears dentures, and Wears glasses. has a past surgical history that includes Endometrial ablation (); Cholecystectomy, laparoscopic (); Tubal ligation (); Tonsillectomy; Hysterectomy (2012); pr colon ca scrn not hi rsk ind (N/A, 05/15/2018); Diagnostic Cardiac Cath Lab Procedure; Upper gastrointestinal endoscopy (N/A, 2019); Colonoscopy; Endoscopy, colon, diagnostic; Colonoscopy (N/A, 2020); Carpal tunnel release (Left, 2021); Carpal tunnel release (Right, 2021); Cardiac catheterization (); Rebecca-en-Y Gastric Bypass (2022); Upper gastrointestinal endoscopy (2022); and Rebecca-en-Y Gastric Bypass (N/A, 2022).       Social History     Socioeconomic History    Marital status:      Spouse name: Not on file    Number of children: 3    Years of education: Not on file    Highest education level: Not on file   Occupational History    Occupation: [address] , RN     Employer: [employer]   Tobacco Use    Smoking status: Former     Packs/day: 1.00     Years: 30.00     Pack years: 30.00     Types: Cigarettes     Start date: 3/5/1975     Quit date: 2005     Years since quittin.6    Smokeless tobacco: Never    Tobacco comments:     3/15/18 started age 17   Vaping Use    Vaping Use: Never used   Substance and Sexual Activity    Alcohol use: No     Comment: not at all     Drug use: No    Sexual activity: Not on file   Other Topics Concern    Not on file   Social History Narrative    Born in [place], one of 4 children (2 boys and 2 girls), both parents in the [employer], moved to [place]    Mother in [place], has memory problems    [employer]     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 in the Last Year: Never true    [facility] in the Last Year: Never true   Transportation Needs: No Transportation Needs    Lack of Transportation (Medical): No    Lack of Transportation (Non-Medical): No   Physical Activity: Sufficiently Active    Days of Exercise per Week: 7 days    Minutes of Exercise per Session: 60 min   Stress: No Stress Concern Present    Feeling of Stress : Not at all   Social Connections: Not on file   Intimate Partner Violence: Not At Risk    Fear of Current or Ex-Partner: No    Emotionally Abused: No    Physically Abused: No    Sexually Abused: No   Housing Stability: Low Risk     Unable to Pay for Housing in the Last Year: No    Number of Places Lived in the Last Year: 1    Unstable Housing in the Last Year: No       Family History   Problem Relation Age of Onset    Arrhythmia Mother     Hypertension Mother     Dementia Mother     COPD Father         mesothelioma    Alcohol Abuse Father     Diabetes Paternal Grandmother     Breast Cancer Paternal Grandmother     Diabetes Paternal Grandfather     Colon Cancer Paternal Grandfather     Schizophrenia Brother     Colon Cancer Paternal Uncle        Allergies:  Seasonal, Morphine, and Nsaids    Home Medications:  Prior to Admission medications    Medication Sig Start Date End Date Taking?  Authorizing Provider   famotidine (PEPCID) 20 MG tablet Take 1 tablet by mouth 2 times daily 1/13/23   Monique Johnson PA-C   lactulose [facility]) 10 GM/15ML solution Take 30 mLs by mouth 2 times daily as needed (constipation) 1/13/23   Monique Johnson PA-C   estradiol (ESTRACE VAGINAL) 0.1 MG/GM vaginal cream Place 0.5 g vaginally nightly for 21 days, THEN 0.5 g every other day for 21 days, THEN 0.5 g Twice a Week. 12/29/22 12/29/23  Rob Bates APRN - CNSYLVAIN   Calcium Citrate 333 MG TABS Take 2 tablets by mouth daily    Historical Provider, MD   Calcium Carbonate Antacid 1000 MG CHEW Take 1,000 mg by mouth 3 times daily  Patient not taking: No sig reported    Historical Provider, MD   Biotin 5 MG CAPS  8/1/22   Historical Provider, MD   buPROPion (WELLBUTRIN XL) 300 MG extended release tablet TAKE ONE TABLET BY MOUTH EVERY MORNING 7/20/22   Harmony Tyson PA-C   pantoprazole (PROTONIX) 20 MG tablet Take 1 tablet by mouth every morning (before breakfast) 7/20/22   Harmony Tyson PA-C   Multiple Vitamins-Minerals (THERAPEUTIC MULTIVITAMIN-MINERALS) tablet Take 1 tablet by mouth daily    Historical Provider, MD   levothyroxine (SYNTHROID) 50 MCG tablet TAKE 1 TABLET BY MOUTH ONE TIME A DAY  Patient taking differently: Take 25 mcg by mouth Daily TAKE 1/2 TABLET BY MOUTH ONE TIME A DAY 4/14/22   Harmony Tyson PA-C         REVIEW OF SYSTEMS       Review of Systems   Gastrointestinal:  Positive for abdominal pain. Negative for nausea and vomiting. Genitourinary:  Negative for dysuria. PHYSICAL EXAM      INITIAL VITALS:   BP (!) 164/79   Temp 98.2 °F (36.8 °C) (Oral)   Resp 16     Physical Exam  Vitals and nursing note reviewed. Constitutional:       General: She is not in acute distress. Appearance: Normal appearance. She is not ill-appearing. HENT:      Head: Normocephalic and atraumatic. Mouth/Throat:      Mouth: Mucous membranes are moist.   Cardiovascular:      Rate and Rhythm: Normal rate and regular rhythm. Pulmonary:      Effort: Pulmonary effort is normal. No respiratory distress. Abdominal:      General: Abdomen is flat. Bowel sounds are normal.      Palpations: Abdomen is soft. There is no mass. Tenderness: There is no right CVA tenderness, left CVA tenderness, guarding or rebound.       Comments: Healed abdominal scars consistent with past surgeries. Minimal epigastric tenderness. Abdomen without significant distention   Skin:     General: Skin is warm and dry. Neurological:      General: No focal deficit present. Mental Status: She is alert and oriented to person, place, and time. DDX/DIAGNOSTIC RESULTS / EMERGENCY DEPARTMENT COURSE / [facility]     Medical Decision Making  Patient with epigastric tenderness status post Rebecca-en-Y gastric bypass in April 2022. Patient appears nontoxic the emergency department. Slightly hypertensive vital signs otherwise within normal limits. Patient without significant abdominal tenderness although abdomen does feel slightly distended. Amount and/or Complexity of Data Reviewed  External Data Reviewed: labs and radiology. Labs: ordered. Decision-making details documented in ED Course. Radiology: ordered and independent interpretation performed. Details: Significant stool burden no obvious obstruction  Discussion of management or test interpretation with external provider(s): Bariatric surgery team consulted. Plan to admit patient for EGD tomorrow morning    Risk  Prescription drug management. Decision regarding hospitalization. EMERGENCY DEPARTMENT COURSE:      ED Course as of 01/15/23 1641   Sun Albert 15, 2023   1350 Patient CBC without acute anemia or leukocytosis. Patient's lactic acid is 1.0 [TD]   1357 ALT elevated at 61, AST elevated to 58. Lipase is within normal limits at 35. Pending surgery recommendations for CT scan [TD]   1417 CT abdomen pelvis with IV contrast and oral contrast ordered by general surgery [TD]   1432 On reexam. patient states pain controlled after given 50 of IV fentanyl. Resting comfortably in bed drinking oral contrast [TD]   1613 CT abdomen pelvis without acute obstruction. Constipation seen on study. Await recommendations from bariatric surgery team [TD]   [phone] Patient to be admitted by bariatric surgery for EGD.   Patient aware of plan to be admitted. Resting comfortably in bed at this time [TD]      ED Course User Index  [TD] Sam Sullivan DO       CONSULTS:  IP CONSULT TO GENERAL SURGERY      FINAL IMPRESSION      1. Epigastric pain          DISPOSITION / [address] Admitted 01/15/2023 04:32:50 PM      PATIENT REFERRED TO:  No follow-up provider specified.     DISCHARGE MEDICATIONS:  New Prescriptions    No medications on file       Sam Sullivan DO  Emergency Medicine Resident    (Please note that portions of thisnote were completed with a voice recognition program.  Efforts were made to edit the dictations but occasionally words are mis-transcribed.)      Sam Sullivan DO  Resident  01/15/23 8429

## 2023-01-15 NOTE — ED PROVIDER NOTES
[facility] ED     Emergency Department     Faculty Attestation        I performed a history and physical examination of the patient and discussed management with the resident. I reviewed the resident’s note and agree with the documented findings and plan of care. Any areas of disagreement are noted on the chart. I was personally present for the key portions of any procedures. I have documented in the chart those procedures where I was not present during the key portions. I have reviewed the emergency nurses triage note. I agree with the chief complaint, past medical history, past surgical history, allergies, medications, social and family history as documented unless otherwise noted below.  For Physician Assistant/ Nurse Practitioner cases/documentation I have personally evaluated this patient and have completed at least one if not all key elements of the E/M (history, physical exam, and MDM). Additional findings are as noted.    BP (!) 164/79   Temp 98.2 °F (36.8 °C) (Oral)   Resp 16   PCP:  Harmony Tyson PA-C    Pertinent Comments:     Patient is a 62-year-old female with history of less than a year ago having Rebecca-en-Y gastric bypass and now having abdominal pain over the last almost week and a half   Does seem to be made worse by food when was seen at previous/different ER 4 days ago with CT abdomen/pelvis negative except for possible constipation placed on bowel regimen and pain has been increasing.   On exam patient has epigastric tenderness to palpation but no lower abdominal tenderness whatsoever.   Lungs are clear to station bilateral with midline trachea heart regular rate and rhythm.   Assessment/plan: Patient with abdominal pain with significant history of previous Rebecca-en-Y gastric bypass.   Will obtain abdominal laboratories including screening lactate and likely repeat CT abdomen/pelvis but will obtain general surgery/bariatric surgery  consultation to assure which kind of CT they would like. Attempt pain control as well. Consult with bariatric surgery    Critical Care  None      (Please note that portions of this note were completed with a voice recognition program. Efforts were made to edit the dictations but occasionally words are mis-transcribed.  Whenever words are used in this note in any gender, they shall be construed as though they were used in the gender appropriate to the circumstances; and whenever words are used in this note in the singular or plural form, they shall be construed as though they were used in the form appropriate to the circumstances.)    Maurilio Alder, MD Mauricio Soulier  Attending Emergency Medicine Physician           Rashmi Zamorano MD  01/15/23 [address] Fitzpatrick MD  01/15/23 6051

## 2023-01-15 NOTE — CARE COORDINATION
Case Management Assessment  Initial Evaluation    Date/Time of Evaluation: 1/15/2023 6:37 PM  Assessment Completed by: Jazmin Mcnamara RN    If patient is discharged prior to next notation, then this note serves as note for discharge by case management. Patient Name: Sarika Monterroso                   YOB: 1960  Diagnosis: Epigastric pain [R10.13]  Abdominal pain [R10.9]                   Date / Time: 1/15/2023 12:43 PM    Patient Admission Status: Observation   Readmission Risk (Low < 19, Mod (19-27), High > 27): Readmission Risk Score: 8.4 ( )    Current PCP: Missy Ybarra PA-C  PCP verified by CM? Yes    Chart Reviewed: Yes      History Provided by: Patient  Patient Orientation: Alert and Oriented    Patient Cognition: Alert    Hospitalization in the last 30 days (Readmission):  No    If yes, Readmission Assessment in CM Navigator will be completed. Advance Directives:      Code Status: Full Code   Patient's Primary Decision Maker is:        Discharge Planning:    Patient lives with: Spouse/Significant Other Type of Home: House  Primary Care Giver: Self  Patient Support Systems include: Spouse/Significant Other, Family Members   Current Financial resources:    Current community resources:    Current services prior to admission: C-pap            Current DME:              Type of Home Care services:  None    ADLS  Prior functional level: Independent in ADLs/IADLs  Current functional level: Independent in ADLs/IADLs    PT AM-PAC:   /24  OT AM-PAC:   /24    Family can provide assistance at DC: Yes  Would you like Case Management to discuss the discharge plan with any other family members/significant others, and if so, who?     Plans to Return to Present Housing: Yes  Other Identified Issues/Barriers to RETURNING to current housing: none identified   Potential Assistance needed at discharge: N/A            Potential DME:    Patient expects to discharge to: [address] [place] [address] for transportation at discharge: Financial    Payor: BCBS / Plan: VERONIKA [employer] [id number] / Product Type: *No Product type* /     Does insurance require precert for SNF: Yes    Potential assistance Purchasing Medications: No  Meds-to-Beds request:        [address] [phone] - F [phone]  [address] [facility] [address]  Phone: [phone] Fax: [address] [address], [address] [place] [address]  [address] [place] [address], [address] [place] [address]  [address] [facility] [address]  Phone: [phone] Fax: [phone]    [place] [address]  Phone: [phone] Fax: [phone]    [address] [facility] [phone] Herb Douse [phone]  [address] [address]  Phone: [phone] Fax: [phone]      Notes:    Factors facilitating achievement of predicted outcomes: Family support    Barriers to discharge: Pain    Additional Case Management Notes: Patient plans to return home independently     The Plan for Transition of Care is related to the following treatment goals of Epigastric pain [R10.13]  Abdominal pain [S49.0]    IF APPLICABLE: The Patient and/or patient representative Xavier Cope and her family were provided with a choice of provider and agrees with the discharge plan. Freedom of choice list with basic dialogue that supports the patient's individualized plan of care/goals and shares the quality data associated with the providers was provided to:     Patient Representative Name:       The Patient and/or Patient Representative Agree with the Discharge Plan?       Gold Reid RN  Case Management Department  Ph: [phone]

## 2023-01-15 NOTE — ED NOTES
Patient presents to ED for abdominal/ epigastric pain that started Thursday. Patient reports that she went to [facility] and was diagnosed with constipation and given some medications including lactulose which she is still taking but feels \"very full\" and develops worsening pain after taking. Patient reports that she is still having bowel movements but does not feel like she is fully emptying her bowels. Has hx of gastric bypass surgery back in April 2022 and has not had any problems since. Patient is a/o x 4 with even non labored respirations. Patient changed into a gown and is resting on cot with call light in reach. Dr Zacarias Fraire at bedside to assess patient.          Marylee Brought, DEDRAN  97/82/30 1119

## 2023-01-15 NOTE — H&P
General Surgery: H&P        PATIENT NAME: Trevon Bonilla   YOB: 1960    ADMISSION DATE: 1/15/2023 12:43 PM     Admitting Provider: Dr. Lucina Senior DATE: 1/15/2023    Chief Complaint:  abdominal pain    HISTORY OF PRESENT ILLNESS:  The patient is a 63 y.o. female with abdominal pain. Patient is status post Rebecca-en-Y gastric bypass on 4/12/2022, history of diabetes, hypertension. Patient states that on Thursday she developed mid abdominal pain that prompted her to go to the emergency department at [facility]. CT abdomen pelvis at that time demonstrated no acute pathology constipation and patient was discharged home with lactulose. Patient states that her last bowel movement was yesterday. Typically has normal daily bowel movements but yesterday's bowel movements were smaller in nature despite taking the lactulose. Denies any nausea or vomiting currently but did have nausea due to the pain. Patient states that the abdominal pain continued through this morning which prompted her to come to the emergency department here as her bariatric surgeon was located here. Afebrile, vitals normal and stable. Unremarkable labs, mildly elevated ALT and AST at 61 and 58. Bilirubin normal.  Bariatric surgery was consulted due to abdominal pain and prior surgical history. Denies smoking, alcohol or drug use - quit smoking many years ago. Past Medical History:        Diagnosis Date    Abnormal EKG 03/23/2018    Angina pectoris syndrome ([address] 03/23/2018    Diverticulosis of colon     DMII (diabetes mellitus, type 2) ([facility] 2000    Dr Nani Jean hypertension 01/15/2016    Family history of coronary artery disease 01/15/2016    Fatty infiltration of liver 2020    Gastric polyp 2019    Dr Flex Anderson, 5 mm    Generalized anxiety disorder     H/O: hysterectomy 2012    History of tobacco abuse 01/15/2016    Obesity (BMI 30-39. 9)     Other specified acquired hypothyroidism 2000    Precordial pain 03/23/2018 (? Prinzmetal?) Dr Michael    S/P colonoscopy with polypectomy 2013, 2018, 2019    Dr Armendariz, Dr Hernandez, tubulovillous adenoma, hyperplastic polyp    S/P vaginal hysterectomy 2012    benign    Seasonal allergies     Sleep apnea, obstructive 2007    was on CPAP, not using it at present    Type II diabetes mellitus, uncontrolled 11/13/2013    Under care of team 03/28/2022    pcp-[facility]-last visit nov 2021    Under care of team 03/28/2022    cardiac-[facility]-last visit dec 2021    Vasospastic angina (HCC)     Dr. Michael (cardiology)    Vertigo 2018    Wears dentures     upper and lower full dentures    Wears glasses        Past Surgical History:        Procedure Laterality Date    CARDIAC CATHETERIZATION  2018    no stents    CARPAL TUNNEL RELEASE Left 01/13/2021    LEFT CARPAL TUNNEL RELEASE, LEFT MIDDLE FINGER TRIGGER RELEASE, TENOSYNOVECTOMY FDPFDS performed by Anthony Grossman MD at [facility] [place]    CARPAL TUNNEL RELEASE Right 02/03/2021    RIGHT CARPAL TUNNEL RELEASE, TRIGGER FINGER RELEASE RIGHT MIDDLE FINGER, FDP, FDS, TENOSYNOVECTOMY performed by Anthony Grossman MD at [facility] [place]    CHOLECYSTECTOMY, LAPAROSCOPIC  1995    COLONOSCOPY      COLONOSCOPY N/A 06/30/2020    COLONOSCOPY DIAGNOSTIC performed by David Devi MD at [facility]    DIAGNOSTIC CARDIAC CATH LAB PROCEDURE      ENDOMETRIAL ABLATION  2002    metrorrhagia    ENDOSCOPY, COLON, DIAGNOSTIC      HYSTERECTOMY (CERVIX STATUS UNKNOWN)  09/07/2012    fibroid, cervicitis, ovaries intact    SD COLON CA SCRN NOT HI RSK IND N/A 05/15/2018    COLONOSCOPY performed by David Devi MD at [facility]    SHEILA-EN-Y GASTRIC BYPASS  04/12/2022     ROBOTIC LAPAROSCOPIC     SHEILA-EN-Y GASTRIC BYPASS N/A 4/12/2022    XI ROBOTIC LAPAROSCOPIC SHEILA-EN-Y GASTRIC BYPASS, EGD, HIATAL HERNIA REPAIR performed by Martín Hermosillo DO at [facility] OR    TONSILLECTOMY      TUBAL LIGATION  1984    UPPER GASTROINTESTINAL ENDOSCOPY N/A 04/02/2019    EGD  ESOPHAGOGASTRODUODENOSCOPY performed by Kandis Coats MD at [facility]  2022       Medications Prior to Admission:   Not in a hospital admission. 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[address] , RN     Employer: [employer]   Tobacco Use    Smoking status: Former     Packs/day: 1.00     Years: 30.00     Pack years: 30.00     Types: Cigarettes     Start date: 3/5/1975     Quit date: 2005     Years since quittin.6    Smokeless tobacco: Never    Tobacco comments:     3/15/18 started age 17   Vaping Use    Vaping Use: Never used   Substance and Sexual Activity    Alcohol use: No     Comment: not at all     Drug use: No    Sexual activity: Not on file   Other Topics Concern    Not on file   Social History Narrative    Born in [place], one of 4 children (2 boys and 2 girls), both parents in the [place], moved to [place]    Mother in [place], has memory problems    [employer]     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    Has 2 dogs, Blossom and Orissaare    Children 3, 2 sons in the area, one daughter in [address] [place] [address] Avenue: dogs, sawing, machine embroidery, baking     Social Determinants of Health     Financial Resource Strain: Low Risk     Difficulty of Paying Living Expenses: Not hard at all   Food Insecurity: No Food Insecurity    Worried About [facility] in the Last Year: Never true    [facility] in the Last Year: Never true   Transportation Needs: No Transportation Needs    Lack of Transportation (Medical): No    Lack of Transportation (Non-Medical):  No   Physical Activity: Sufficiently Active    Days of Exercise per Week: 7 days Minutes of Exercise per Session: 60 min   Stress: No Stress Concern Present    Feeling of Stress : Not at all   Social Connections: Not on file   Intimate Partner Violence: Not At Risk    Fear of Current or Ex-Partner: No    Emotionally Abused: No    Physically Abused: No    Sexually Abused: No   Housing Stability: Low Risk     Unable to Pay for Housing in the Last Year: No    Number of Places Lived in the Last Year: 1    Unstable Housing in the Last Year: No       Family History:       Problem Relation Age of Onset    Arrhythmia Mother     Hypertension Mother     Dementia Mother     COPD Father         mesothelioma    Alcohol Abuse Father     Diabetes Paternal Grandmother     Breast Cancer Paternal Grandmother     Diabetes Paternal Grandfather     Colon Cancer Paternal Grandfather     Schizophrenia Brother     Colon Cancer Paternal Uncle        REVIEW OF SYSTEMS:    CONSTITUTIONAL: Denies recent weight loss, fatigue, fevers, chills. HEENT: Denies rhinorrhea, dysphagia, odynphagia. CARDIOVASCULAR: Denies history of MI, recent chest pain. RESPIRATORY: Denies recent history of shortness of breath or history of PE. GASTROINTESTINAL: see HPI  GENITOURINARY: Denies increased frequency or dysuria. HEMATOLOGIC/LYMPHATIC: Denies history of anemia or DVTs. ENDOCRINE: Denies history of thyroid problems or diabetes. MSK: Denies rashes or lesions  NEURO: Denies history of CVA, TIA. PHYSICAL EXAM:    VITALS:  BP (!) 164/79   Temp 98.2 °F (36.8 °C) (Oral)   Resp 16   INTAKE/OUTPUT:   No intake or output data in the 24 hours ending 01/15/23 1511    CONSTITUTIONAL:  awake, alert, not distressed and normal weight  ENT:  normocephalic/atraumatic, without obvious abnormality  NECK:  supple, symmetrical, trachea midline   LUNGS: normal effort with symmetric rise and fall of chest wall  CARDIOVASCULAR:  regular rate and rhythm  ABDOMEN: soft, nondistended, mildly TTP in epigastric region.   No guarding or rebound tenderness  MUSCULOSKELETAL: Muscle strength intact in all extremities bilaterally. NEUROLOGIC: CN II- XII intact. Gross motor intact without focal weakness. SKIN: No cyanosis, rashes, or edema noted. Hematology:  Recent Labs     01/12/23  2215 01/15/23  1324   WBC 9.2 7.6   RBC 4.48 4.33   HGB 13.1 12.7   HCT 39.4 39.1   MCV 87.9 90.3   MCH 29.2 29.3   MCHC 33.3 32.5   RDW 14.0 13.0    282   MPV  --  9.1     Chemistry:  Recent Labs     01/12/23  2215 01/15/23  1324    139   K 5.7* 4.3   CL 99 103   CO2 27 27   GLUCOSE 82 91   BUN 29* 18   CREATININE 0.87 0.72   ANIONGAP 12 9   LABGLOM >60.0 >60   CALCIUM 9.3 9.4   LACTACIDWB  --  1.0     Recent Labs     01/12/23  2215 01/15/23  1324   PROT 7.7 7.1   LABALBU 4.1 4.0   AST 73* 58*   ALT 43* 61*   ALKPHOS 71 87   BILITOT 0.3 0.3   LIPASE 45 35       Pertinent Radiology:   CT ABDOMEN PELVIS W IV CONTRAST Additional Contrast? Oral    Result Date: 1/15/2023  EXAMINATION: CT OF THE ABDOMEN AND PELVIS WITH CONTRAST 1/15/2023 3:18 pm TECHNIQUE: CT of the abdomen and pelvis was performed with the administration of intravenous contrast. Multiplanar reformatted images are provided for review. Automated exposure control, iterative reconstruction, and/or weight based adjustment of the mA/kV was utilized to reduce the radiation dose to as low as reasonably achievable. COMPARISON: None. HISTORY: ORDERING SYSTEM PROVIDED HISTORY: abdominal pain s/p Rebecca-en-Y gastric bypass April 22. Decision Support Exception - unselect if not a suspected or confirmed emergency medical condition->Emergency Medical Condition (MA) Abdominopelvic pain beginning 3 days ago, previously diagnosed with constipation. FINDINGS: Lower Chest: Visualized portions of the lungs are clear. Cardiac and posterior mediastinal structures visualized are unremarkable. Organs: Normal attenuation pattern throughout the liver. No discrete hepatic lesion.  Mild intrahepatic bile duct dilatation is seen. Common bile duct measures 9 mm. The gallbladder is absent status post cholecystectomy. The kidneys, spleen, adrenal glands and pancreas appear unremarkable. GI/Bowel: Oral contrast reaches the distal ileum. No acute abnormality of the small bowel is evident. Enteroenterostomy is demonstrated at the left mid abdomen. Sequela from Rebecca-en-Y gastrojejunostomy. Moderate volume fecal debris throughout the colon may reflect constipation. No colonic inflammatory change, wall thickening or obstruction evident. Normal appearing retrocecal appendix demonstrated right lower quadrant. Pelvis: Partially filled urinary bladder appears unremarkable. No pelvic adenopathy or free fluid is seen. Hysterectomy. Peritoneum/Retroperitoneum: No pneumoperitoneum. Mild calcific atherosclerotic disease aorta. No aneurysm. Unremarkable appearance of the IVC. No adenopathy or fluid. Bones/Soft Tissues: No acute superficial soft tissue or osseous structure abnormality evident. 1. Unremarkable bowel appearance status post Rebecca-en-Y gastrojejunostomy. 2. Moderate volume fecal debris may reflect constipation. 3.  Mild intra and extrahepatic bile duct dilatation status post cholecystectomy typical of reservoir effect. ASSESSMENT:  72-year-old female status post Rebecca-en-Y gastric bypass in April 2022 with abdominal pain concerning for ulcer    Plan:  Admit for observation  We will plan for EGJ tomorrow - okay for diet today. N.p.o. at midnight. This procedure, including risks, benefits, alternatives and complications have been fully reviewed with the patient and informed consent was obtained. Risks discussed include infection, bleeding, injury to surrounding structures, need for more procedures, chronic pain, scarring, risks of anesthesia, including myocardial infarction, stroke, fatal arrhythmias. Patient understands and all questions were answered appropriately.    PPI and carafate  UA - positive UA two days prior      Electronically signed by Sarthak Elliott DO  on 1/15/2023 at 3:11 PM

## 2023-01-16 ENCOUNTER — ANESTHESIA (OUTPATIENT)
Dept: OPERATING ROOM | Age: 63
End: 2023-01-16
Payer: COMMERCIAL

## 2023-01-16 VITALS
SYSTOLIC BLOOD PRESSURE: 114 MMHG | WEIGHT: 142 LBS | TEMPERATURE: 97.7 F | HEIGHT: 65 IN | HEART RATE: 60 BPM | DIASTOLIC BLOOD PRESSURE: 67 MMHG | RESPIRATION RATE: 16 BRPM | BODY MASS INDEX: 23.66 KG/M2 | OXYGEN SATURATION: 98 %

## 2023-01-16 PROCEDURE — 2580000003 HC RX 258: Performed by: NURSE ANESTHETIST, CERTIFIED REGISTERED

## 2023-01-16 PROCEDURE — 2580000003 HC RX 258: Performed by: STUDENT IN AN ORGANIZED HEALTH CARE EDUCATION/TRAINING PROGRAM

## 2023-01-16 PROCEDURE — 3700000001 HC ADD 15 MINUTES (ANESTHESIA): Performed by: SURGERY

## 2023-01-16 PROCEDURE — 3609017100 HC EGD: Performed by: SURGERY

## 2023-01-16 PROCEDURE — G0378 HOSPITAL OBSERVATION PER HR: HCPCS

## 2023-01-16 PROCEDURE — 6370000000 HC RX 637 (ALT 250 FOR IP): Performed by: STUDENT IN AN ORGANIZED HEALTH CARE EDUCATION/TRAINING PROGRAM

## 2023-01-16 PROCEDURE — 7100000001 HC PACU RECOVERY - ADDTL 15 MIN: Performed by: SURGERY

## 2023-01-16 PROCEDURE — 3700000000 HC ANESTHESIA ATTENDED CARE: Performed by: SURGERY

## 2023-01-16 PROCEDURE — 7100000000 HC PACU RECOVERY - FIRST 15 MIN: Performed by: SURGERY

## 2023-01-16 PROCEDURE — 2500000003 HC RX 250 WO HCPCS: Performed by: NURSE ANESTHETIST, CERTIFIED REGISTERED

## 2023-01-16 PROCEDURE — 99222 1ST HOSP IP/OBS MODERATE 55: CPT | Performed by: SURGERY

## 2023-01-16 PROCEDURE — C9113 INJ PANTOPRAZOLE SODIUM, VIA: HCPCS | Performed by: STUDENT IN AN ORGANIZED HEALTH CARE EDUCATION/TRAINING PROGRAM

## 2023-01-16 PROCEDURE — 6360000002 HC RX W HCPCS: Performed by: STUDENT IN AN ORGANIZED HEALTH CARE EDUCATION/TRAINING PROGRAM

## 2023-01-16 PROCEDURE — 43235 EGD DIAGNOSTIC BRUSH WASH: CPT | Performed by: SURGERY

## 2023-01-16 PROCEDURE — 96361 HYDRATE IV INFUSION ADD-ON: CPT

## 2023-01-16 PROCEDURE — 6360000002 HC RX W HCPCS: Performed by: NURSE ANESTHETIST, CERTIFIED REGISTERED

## 2023-01-16 PROCEDURE — 6370000000 HC RX 637 (ALT 250 FOR IP)

## 2023-01-16 PROCEDURE — 96375 TX/PRO/DX INJ NEW DRUG ADDON: CPT

## 2023-01-16 RX ORDER — PROPOFOL 10 MG/ML
INJECTION, EMULSION INTRAVENOUS CONTINUOUS PRN
Status: DISCONTINUED | OUTPATIENT
Start: 2023-01-16 | End: 2023-01-16 | Stop reason: SDUPTHER

## 2023-01-16 RX ORDER — PROPOFOL 10 MG/ML
INJECTION, EMULSION INTRAVENOUS PRN
Status: DISCONTINUED | OUTPATIENT
Start: 2023-01-16 | End: 2023-01-16 | Stop reason: SDUPTHER

## 2023-01-16 RX ORDER — SODIUM CHLORIDE 0.9 % (FLUSH) 0.9 %
5-40 SYRINGE (ML) INJECTION PRN
Status: CANCELLED | OUTPATIENT
Start: 2023-01-16

## 2023-01-16 RX ORDER — LIDOCAINE HYDROCHLORIDE 10 MG/ML
INJECTION, SOLUTION EPIDURAL; INFILTRATION; INTRACAUDAL; PERINEURAL PRN
Status: DISCONTINUED | OUTPATIENT
Start: 2023-01-16 | End: 2023-01-16 | Stop reason: SDUPTHER

## 2023-01-16 RX ORDER — SODIUM CHLORIDE 0.9 % (FLUSH) 0.9 %
5-40 SYRINGE (ML) INJECTION EVERY 12 HOURS SCHEDULED
Status: CANCELLED | OUTPATIENT
Start: 2023-01-16

## 2023-01-16 RX ORDER — SODIUM CHLORIDE 9 MG/ML
INJECTION, SOLUTION INTRAVENOUS PRN
Status: CANCELLED | OUTPATIENT
Start: 2023-01-16

## 2023-01-16 RX ORDER — SODIUM CHLORIDE, SODIUM LACTATE, POTASSIUM CHLORIDE, CALCIUM CHLORIDE 600; 310; 30; 20 MG/100ML; MG/100ML; MG/100ML; MG/100ML
INJECTION, SOLUTION INTRAVENOUS CONTINUOUS PRN
Status: DISCONTINUED | OUTPATIENT
Start: 2023-01-16 | End: 2023-01-16 | Stop reason: SDUPTHER

## 2023-01-16 RX ADMIN — SODIUM CHLORIDE, PRESERVATIVE FREE 40 MG: 5 INJECTION INTRAVENOUS at 08:40

## 2023-01-16 RX ADMIN — SUCRALFATE 1 G: 1 TABLET ORAL at 08:41

## 2023-01-16 RX ADMIN — PROPOFOL 50 MCG/KG/MIN: 10 INJECTION, EMULSION INTRAVENOUS at 14:49

## 2023-01-16 RX ADMIN — PROPOFOL 20 MG: 10 INJECTION, EMULSION INTRAVENOUS at 14:55

## 2023-01-16 RX ADMIN — SODIUM CHLORIDE, POTASSIUM CHLORIDE, SODIUM LACTATE AND CALCIUM CHLORIDE: 600; 310; 30; 20 INJECTION, SOLUTION INTRAVENOUS at 14:34

## 2023-01-16 RX ADMIN — SODIUM CHLORIDE, POTASSIUM CHLORIDE, SODIUM LACTATE AND CALCIUM CHLORIDE: 600; 310; 30; 20 INJECTION, SOLUTION INTRAVENOUS at 00:24

## 2023-01-16 RX ADMIN — PROPOFOL 30 MG: 10 INJECTION, EMULSION INTRAVENOUS at 14:46

## 2023-01-16 RX ADMIN — BISACODYL 10 MG: 10 SUPPOSITORY RECTAL at 00:30

## 2023-01-16 RX ADMIN — SODIUM CHLORIDE, POTASSIUM CHLORIDE, SODIUM LACTATE AND CALCIUM CHLORIDE: 600; 310; 30; 20 INJECTION, SOLUTION INTRAVENOUS at 06:06

## 2023-01-16 RX ADMIN — PROPOFOL 80 MG: 10 INJECTION, EMULSION INTRAVENOUS at 14:44

## 2023-01-16 RX ADMIN — LIDOCAINE HYDROCHLORIDE 100 MG: 10 INJECTION, SOLUTION EPIDURAL; INFILTRATION; INTRACAUDAL; PERINEURAL at 14:42

## 2023-01-16 RX ADMIN — PROPOFOL 100 MCG/KG/MIN: 10 INJECTION, EMULSION INTRAVENOUS at 14:44

## 2023-01-16 ASSESSMENT — PAIN DESCRIPTION - DESCRIPTORS: DESCRIPTORS: TENDER

## 2023-01-16 ASSESSMENT — PAIN DESCRIPTION - FREQUENCY: FREQUENCY: CONTINUOUS

## 2023-01-16 ASSESSMENT — PAIN DESCRIPTION - ORIENTATION: ORIENTATION: RIGHT;LEFT;UPPER

## 2023-01-16 ASSESSMENT — PAIN DESCRIPTION - LOCATION: LOCATION: ABDOMEN

## 2023-01-16 ASSESSMENT — PAIN - FUNCTIONAL ASSESSMENT
PAIN_FUNCTIONAL_ASSESSMENT: ACTIVITIES ARE NOT PREVENTED
PAIN_FUNCTIONAL_ASSESSMENT: 0-10

## 2023-01-16 NOTE — DISCHARGE INSTRUCTIONS
Discharge Instructions for Bariatric Surgery         Diet    regular    Medications    Remember to avoid aspirin, aspirin-containing products, and nonsteroidal anti-inflammatory drugs (NSAIDs, such as ibuprofen, naproxen, etc.). If you were taking these medications before the procedure, and had to stop, ask your doctor when you can resume taking them. Be sure to review your medications with your primary care provider at your follow up office visit. Lifestyle Changes    Changing your diet and level of activity are the biggest lifestyle changes associated with your success. Be aware that you may have emotional ups and downs after this surgery. Follow-up   Follow up with your primary care physician in one week. Follow up with Dr. Last Crandall. If you don't already have a scheduled  appointment, please call the office at [phone]. Call Your Doctor If Any of the Following Occurs   Monitor your recovery once you leave the hospital. If any of the following occur, call your doctor:   Signs of infection, including fever above 100.5F    Redness, swelling, increasing pain, excessive bleeding, or any discharge from the incision site   Persistent nausea and/or vomiting   Pain that you can't control with the medications you've been given   Shortness of breath and/or chest pain   Tachycardia (racing heart sensation) unrelieved by rest  Pain, redness and/or swelling in your feet or legs    Sudden onset of severe left shoulder pain or severe abdominal pain  Any symptoms that are causing you concern   In case of an emergency, call 911 immediately.

## 2023-01-16 NOTE — ANESTHESIA POSTPROCEDURE EVALUATION
Department of Anesthesiology  Postprocedure Note    Patient: Laura Zamora  MRN: [id number]  YOB: 1960  Date of evaluation: 1/16/2023      Procedure Summary     Date: 01/16/23 Room / Location: [address]    Anesthesia Start: 6147 Anesthesia Stop:     Procedure: EGD ESOPHAGOGASTRODUODENOSCOPY WITH BIPSPY , GI UNIT Diagnosis:       Epigastric abdominal pain      (ABDOMINAL PAIN)    Surgeons: Citlali Real DO Responsible Provider: Wendi Gonzalez MD    Anesthesia Type: MAC ASA Status: 2          Anesthesia Type: No value filed.     Sergo Phase I:      Sergo Phase II:        Anesthesia Post Evaluation    Patient location during evaluation: bedside  Patient participation: complete - patient participated  Level of consciousness: awake  Airway patency: patent  Nausea & Vomiting: no nausea and no vomiting  Complications: no  Cardiovascular status: hemodynamically stable  Respiratory status: acceptable  Hydration status: stable  Comments: /68   Pulse 72   Temp 98.8 °F (37.1 °C) (Temporal)   Resp 18   Ht 5' 4.5\" (1.638 m)   Wt 142 lb (64.4 kg)   SpO2 98%   BMI 24.00 kg/m²

## 2023-01-16 NOTE — ANESTHESIA PRE PROCEDURE
Department of Anesthesiology  Preprocedure Note       Name:  Yomaira Carrizales   Age:  63 y.o.  :  1960                                          MRN:  [id number]         Date:  1/15/2023      Surgeon: Keke Bills):  Raul Gibbs DO    Procedure: Procedure(s):  EGD ESOPHAGOGASTRODUODENOSCOPY WITH BIPSPY , GI UNIT    Medications prior to admission:   Prior to Admission medications    Medication Sig Start Date End Date Taking? Authorizing Provider   Multiple Vitamins-Minerals (BARIATRIC MULTIVITAMINS/IRON PO) Take 1 tablet by mouth daily   Yes Historical Provider, MD   famotidine (PEPCID) 20 MG tablet Take 1 tablet by mouth 2 times daily 23   Melindamonisha Melchor PA-C   lactulose [facility]) 10 GM/15ML solution Take 30 mLs by mouth 2 times daily as needed (constipation) 23   Melinda Melchor PA-C   estradiol (ESTRACE VAGINAL) 0.1 MG/GM vaginal cream Place 0.5 g vaginally nightly for 21 days, THEN 0.5 g every other day for 21 days, THEN 0.5 g Twice a Week.  22  Nakul Almeida APRN - CNSYLVAIN   Calcium Citrate 333 MG TABS Take 2 tablets by mouth in the morning and at bedtime    Historical Provider, MD   Biotin 5 MG CAPS 5,000 Units daily 22   Historical Provider, MD   buPROPion (WELLBUTRIN XL) 300 MG extended release tablet TAKE ONE TABLET BY MOUTH EVERY MORNING 22   Harmony Tyson PA-C   pantoprazole (PROTONIX) 20 MG tablet Take 1 tablet by mouth every morning (before breakfast) 22   Harmony Tyson PA-C   levothyroxine (SYNTHROID) 50 MCG tablet TAKE 1 TABLET BY MOUTH ONE TIME A DAY  Patient taking differently: Take 25 mcg by mouth Daily TAKE 1/2 TABLET BY MOUTH ONE TIME A DAY 22   Harmony Tyson PA-C       Current medications:    Current Facility-Administered Medications   Medication Dose Route Frequency Provider Last Rate Last Admin    [START ON 2023] pantoprazole (PROTONIX) 40 mg in sodium chloride (PF) 0.9 % 10 mL injection  40 mg IntraVENous Daily Livia Zaragoza DO        sodium chloride flush 0.9 % injection 10 mL  10 mL IntraVENous 2 times per day Lillian Anand, DO        sodium chloride flush 0.9 % injection 10 mL  10 mL IntraVENous PRN Lillian Anand, DO        0.9 % sodium chloride infusion   IntraVENous PRN Lillian Anand,         [START ON 1/16/2023] lactated ringers infusion   IntraVENous Continuous Llilian Anand DO 75 mL/hr at 01/15/23 1726 New Bag at 01/15/23 1726    ondansetron (ZOFRAN) injection 4 mg  4 mg IntraVENous Q6H PRN Lillian Anand, DO        sucralfate (CARAFATE) tablet 1 g  1 g Oral 4x Daily AC & HS Lillian Anand, DO        acetaminophen (TYLENOL) tablet 650 mg  650 mg Oral Q6H PRN Lillian Anand, DO           Allergies:     Allergies   Allergen Reactions    Seasonal Itching    Morphine Dizziness or Vertigo     IV morphine caused dizziness    Nsaids Other (See Comments)     Cannot take due to gastic bypass surgery       Problem List:    Patient Active Problem List   Diagnosis Code    Vitamin D deficiency E55.9    Generalized anxiety disorder F41.1    DONNIE (obstructive sleep apnea) G47.33    Hypothyroidism E03.9    Family history of coronary artery disease Z80.55    Personal history of tobacco use Z87.891    History of colon polyps Z86.010    Polyp of colon K63.5    Irregular Z line of esophagus K22.9    Gastric polyp K31.7    BELTRAN (nonalcoholic steatohepatitis) K75.81    Acquired trigger finger of left middle finger M65.332    Angina pectoris syndrome (HCC) I20.9    S/P bariatric surgery Z98.84    Hiatal hernia with GERD K44.9, K21.9    Excessive skin and subcutaneous tissue L98.7    Rash and nonspecific skin eruption R21    Abdominal pain R10.9       Past Medical History:        Diagnosis Date    Abnormal EKG 03/23/2018    Angina pectoris syndrome ([facility] [address] 03/23/2018    Diverticulosis of colon     DMII (diabetes mellitus, type 2) ([facility] [address] 2000    Dr Manan Thakur    Essential hypertension 01/15/2016    Family history of coronary artery disease 01/15/2016    Fatty infiltration of liver 2020    Gastric polyp 2019    Dr Bay Fox, 5 mm    Generalized anxiety disorder     H/O: hysterectomy 2012    History of tobacco abuse 01/15/2016    Obesity (BMI 30-39. 9)     Other specified acquired hypothyroidism 2000    Precordial pain 03/23/2018    (? Prinzmetal?) Dr Erminia Opitz S/P colonoscopy with polypectomy 2013, 2018, 2019    Dr Neel Cannon, Dr Bay Fox, tubulovillous adenoma, hyperplastic polyp    S/P vaginal hysterectomy 2012    benign    Seasonal allergies     Sleep apnea, obstructive 2007    was on CPAP, not using it at present    Type II diabetes mellitus, uncontrolled 11/13/2013    Under care of team 03/28/2022    pcp-[facility] Blanca-last visit nov 2021    Under care of team 03/28/2022    cardiac-[facility]-last visit dec 2021    Vasospastic angina [facility])     Dr. Chris Berger (cardiology)    Vertigo 2018    Wears dentures     upper and lower full dentures    Wears glasses        Past Surgical History:        Procedure Laterality Date    CARDIAC CATHETERIZATION  2018    no stents    CARPAL TUNNEL RELEASE Left 01/13/2021    LEFT CARPAL TUNNEL RELEASE, LEFT MIDDLE FINGER TRIGGER RELEASE, TENOSYNOVECTOMY FDPFDS performed by Aura Bello MD at [facility] Right 02/03/2021    RIGHT CARPAL TUNNEL RELEASE, TRIGGER FINGER RELEASE RIGHT MIDDLE FINGER, FDP, FDS, TENOSYNOVECTOMY performed by Aura Bello MD at [facility], [address] [place] [address] COLONOSCOPY N/A 06/30/2020    COLONOSCOPY DIAGNOSTIC performed by Alan Gonzalez MD at [address] [place] [address] CATH LAB PROCEDURE      ENDOMETRIAL ABLATION  2002    metrorrhagia    ENDOSCOPY, COLON, DIAGNOSTIC      HYSTERECTOMY (CERVIX STATUS UNKNOWN)  09/07/2012    fibroid, cervicitis, ovaries intact    IL COLON CA SCRN NOT HI RSK IND N/A 05/15/2018    COLONOSCOPY performed by Alan Gonzalez MD at [address] 2022     ROBOTIC LAPAROSCOPIC     SHEILA-EN-Y GASTRIC BYPASS N/A 2022    XI ROBOTIC LAPAROSCOPIC SHEILA-EN-Y GASTRIC BYPASS, EGD, HIATAL HERNIA REPAIR performed by Juli Rosario DO at [address] [facility] ENDOSCOPY N/A 2019    EGD ESOPHAGOGASTRODUODENOSCOPY performed by Angi Tucker MD at [address]  2022       Social History:    Social History     Tobacco Use    Smoking status: Former     Packs/day: 1.00     Years: 30.00     Pack years: 30.00     Types: Cigarettes     Start date: 3/5/1975     Quit date: 2005     Years since quittin.6    Smokeless tobacco: Never    Tobacco comments:     3/15/18 started age 17   Substance Use Topics    Alcohol use: No     Comment: not at all                                 Counseling given: Not Answered  Tobacco comments: 3/15/18 started age 17      Vital Signs (Current):   Vitals:    01/15/23 1628 01/15/23 1630 01/15/23 1726 01/15/23 1745   BP: 139/67 135/65 (!) 145/76    Pulse:   86    Resp:   16    Temp:   98.4 °F (36.9 °C)    TempSrc:   Oral    SpO2: 97% 93% 98%    Weight:    140 lb (63.5 kg)   Height:    5' 4.5\" (1.638 m)                                              BP Readings from Last 3 Encounters:   01/15/23 (!) 145/76   23 (!) 127/54   22 126/64       NPO Status:                                                                                 BMI:   Wt Readings from Last 3 Encounters:   01/15/23 140 lb (63.5 kg)   23 140 lb (63.5 kg)   22 144 lb (65.3 kg)     Body mass index is 23.66 kg/m².     CBC:   Lab Results   Component Value Date/Time    WBC 7.6 01/15/2023 01:24 PM    RBC 4.33 01/15/2023 01:24 PM    HGB 12.7 01/15/2023 01:24 PM    HCT 39.1 01/15/2023 01:24 PM    MCV 90.3 01/15/2023 01:24 PM    RDW 13.0 01/15/2023 01:24 PM     01/15/2023 01:24 PM       CMP:   Lab Results   Component Value Date/Time     01/15/2023 01:24 PM    K 4.3 01/15/2023 01:24 PM     01/15/2023 01:24 PM    CO2 27 01/15/2023 01:24 PM    BUN 18 01/15/2023 01:24 PM    CREATININE 0.72 01/15/2023 01:24 PM    GFRAA >60.0 08/09/2022 10:36 AM    LABGLOM >60 01/15/2023 01:24 PM    GLUCOSE 91 01/15/2023 01:24 PM    PROT 7.1 01/15/2023 01:24 PM    CALCIUM 9.4 01/15/2023 01:24 PM    BILITOT 0.3 01/15/2023 01:24 PM    ALKPHOS 87 01/15/2023 01:24 PM    AST 58 01/15/2023 01:24 PM    ALT 61 01/15/2023 01:24 PM       POC Tests:   No results for input(s): POCGLU, POCNA, POCK, POCCL, POCBUN, POCHEMO, POCHCT in the last 72 hours.    Coags:   Lab Results   Component Value Date/Time    PROTIME 10.4 03/28/2022 10:15 AM    INR 1.0 03/28/2022 10:15 AM    APTT 26.4 03/28/2022 10:15 AM    APTT 25.1 03/23/2018 01:15 PM       HCG (If Applicable): No results found for: PREGTESTUR, PREGSERUM, HCG, HCGQUANT     ABGs: No results found for: PHART, PO2ART, QMZ5QNK, LXT6MQA, BEART, Y9GAXQSF     Type & Screen (If Applicable):  No results found for: LABABO, LABRH    Drug/Infectious Status (If Applicable):  No results found for: HIV, HEPCAB    COVID-19 Screening (If Applicable):   Lab Results   Component Value Date/Time    COVID19 Not Detected 01/29/2021 03:43 PM           Anesthesia Evaluation  Patient summary reviewed and Nursing notes reviewed no history of anesthetic complications:   Airway: Mallampati: I  TM distance: >3 FB   Neck ROM: full  Mouth opening: > = 3 FB   Dental:    (+) edentulous      Pulmonary:normal exam    (+) sleep apnea:                            ROS comment: 30 pack year smoker quit 2005   Cardiovascular:    (+) hypertension:, angina:, CAD:,                   Neuro/Psych:   (+) neuromuscular disease:, psychiatric history:depression/anxiety             GI/Hepatic/Renal:   (+) GERD:, liver disease (BELTRAN):, renal disease: CRI,           Endo/Other:    (+) DiabetesType II DM, , hypothyroidism::., .                 Abdominal:            Vascular: negative vascular ROS. Other Findings:             Anesthesia Plan      MAC     ASA 2       Induction: intravenous. Anesthetic plan and risks discussed with patient. Use of blood products discussed with patient whom consented to blood products. Plan discussed with CRNA.

## 2023-01-16 NOTE — BRIEF OP NOTE
Brief Postoperative Note      Patient: Lynette Chacon  YOB: 1960  MRN: [id number]    Date of Procedure: 1/16/2023    Pre-Op Diagnosis: EPIGASTRIC PAIN    Post-Op Diagnosis: Same       Procedure(s):  EGD ESOPHAGOGASTRODUODENOSCOPY WITH BIPSPY , GI UNIT    Surgeon(s):  Huy Dawn DO    Assistant:  First Assistant: Fatoumata Miles RN  Resident: Ramirez Doss DO    Anesthesia: Monitor Anesthesia Care    Estimated Blood Loss (mL): Minimal    Complications: None    Specimens:   * No specimens in log *    Implants:  * No implants in log *      Drains: * No LDAs found *    Findings:   NO ULCER  SMALL SLIDING HIATAL HERNIA    Electronically signed by Huy Dawn DO on 1/16/2023 at 3:02 PM

## 2023-01-16 NOTE — PROGRESS NOTES
General Surgery:  Daily Progress Note                    PATIENT NAME: Weston Keane     TODAY'S DATE: 1/16/2023, 9:17 AM  CC:  Feeling better    SUBJECTIVE:     Pt seen and examined at bedside. Pt with decreased nausea and vomiting. She has now had a bowel movement after the stool softeners and does think most of the pain was related to constipation. Denies fever, chills, chest pain, shortness of breath. OBJECTIVE:   VITALS:  BP (!) 106/58   Pulse 60   Temp 97.3 °F (36.3 °C) (Oral)   Resp 16   Ht 5' 4.5\" (1.638 m)   Wt 140 lb (63.5 kg)   SpO2 97%   BMI 23.66 kg/m²      INTAKE/OUTPUT:      Intake/Output Summary (Last 24 hours) at 1/16/2023 5661  Last data filed at 1/16/2023 0408  Gross per 24 hour   Intake --   Output 1550 ml   Net -1550 ml       PHYSICAL EXAM:  General Appearance: awake, alert, oriented, in no acute distress  HEENT:  Normocephalic, atraumatic, mucus membranes moist   Heart: Heart sounds are normal.  Regular rate and rhythm without murmur, gallop or rub. Lungs: Normal expansion. Clear to auscultation. No rales, rhonchi, or wheezing. Abdomen: Soft, minimally tender to palpation, non-distended   Extremities: No cyanosis, pitting edema, rashes noted. Skin: Skin color, texture, turgor normal. No rashes or lesions.     Data:  CBC with Differential:    Lab Results   Component Value Date/Time    WBC 7.6 01/15/2023 01:24 PM    RBC 4.33 01/15/2023 01:24 PM    HGB 12.7 01/15/2023 01:24 PM    HCT 39.1 01/15/2023 01:24 PM     01/15/2023 01:24 PM    MCV 90.3 01/15/2023 01:24 PM    MCH 29.3 01/15/2023 01:24 PM    MCHC 32.5 01/15/2023 01:24 PM    RDW 13.0 01/15/2023 01:24 PM    LYMPHOPCT 29 01/15/2023 01:24 PM    LYMPHOPCT 29.3 01/12/2023 10:15 PM    MONOPCT 5 01/15/2023 01:24 PM    MONOPCT 5.0 01/12/2023 10:15 PM    BASOPCT 1 01/15/2023 01:24 PM    BASOPCT 0.3 01/12/2023 10:15 PM    MONOSABS 0.38 01/15/2023 01:24 PM    MONOSABS 0.5 01/12/2023 10:15 PM    LYMPHSABS 2.16 01/15/2023 01:24 PM LYMPHSABS 2.7 01/12/2023 10:15 PM    EOSABS 0.31 01/15/2023 01:24 PM    EOSABS 0.3 01/12/2023 10:15 PM    BASOSABS 0.07 01/15/2023 01:24 PM    DIFFTYPE NOT REPORTED 12/15/2021 09:56 AM     BMP:    Lab Results   Component Value Date/Time     01/15/2023 01:24 PM    K 4.3 01/15/2023 01:24 PM     01/15/2023 01:24 PM    CO2 27 01/15/2023 01:24 PM    BUN 18 01/15/2023 01:24 PM    LABALBU 4.0 01/15/2023 01:24 PM    CREATININE 0.72 01/15/2023 01:24 PM    CALCIUM 9.4 01/15/2023 01:24 PM    GFRAA >60.0 08/09/2022 10:36 AM    LABGLOM >60 01/15/2023 01:24 PM    GLUCOSE 91 01/15/2023 01:24 PM       Radiology Review:    CT ABDOMEN PELVIS W IV CONTRAST Additional Contrast? Oral   Final Result   1. Unremarkable bowel appearance status post Rebecca-en-Y gastrojejunostomy. 2. Moderate volume fecal debris may reflect constipation. 3.  Mild intra and extrahepatic bile duct dilatation status post   cholecystectomy typical of reservoir effect. ASSESSMENT:  Active Hospital Problems    Diagnosis Date Noted    Abdominal pain [R10.9] 01/15/2023     Priority: Medium       63 y.o. female s/p RnY April 2022 with nausea, vomiting, epigastric pain    Plan:  Keep pt NPO  EGD scheduled today. Risks and benefits of procedure were discussed with patient and all questions answered. Written consent obtained.     Electronically signed by Og Reyes DO  on 1/16/2023 at 9:17 AM

## 2023-01-16 NOTE — FLOWSHEET NOTE
Discharge instructions reviewed with patient,patient verbalizes understanding. Copy of discharge instructions given to patient. Denies complaintsDischarged from unit per wheelchair.

## 2023-01-17 ENCOUNTER — OFFICE VISIT (OUTPATIENT)
Dept: FAMILY MEDICINE CLINIC | Age: 63
End: 2023-01-17
Payer: COMMERCIAL

## 2023-01-17 VITALS
RESPIRATION RATE: 16 BRPM | SYSTOLIC BLOOD PRESSURE: 122 MMHG | BODY MASS INDEX: 23.66 KG/M2 | WEIGHT: 142 LBS | DIASTOLIC BLOOD PRESSURE: 80 MMHG | HEIGHT: 65 IN | OXYGEN SATURATION: 99 % | HEART RATE: 68 BPM

## 2023-01-17 DIAGNOSIS — K59.04 CHRONIC IDIOPATHIC CONSTIPATION: Primary | ICD-10-CM

## 2023-01-17 DIAGNOSIS — Z98.84 S/P BARIATRIC SURGERY: ICD-10-CM

## 2023-01-17 DIAGNOSIS — G47.33 OSA (OBSTRUCTIVE SLEEP APNEA): ICD-10-CM

## 2023-01-17 PROBLEM — K75.81 NASH (NONALCOHOLIC STEATOHEPATITIS): Status: RESOLVED | Noted: 2020-07-16 | Resolved: 2023-01-17

## 2023-01-17 PROCEDURE — 99214 OFFICE O/P EST MOD 30 MIN: CPT | Performed by: PHYSICIAN ASSISTANT

## 2023-01-17 RX ORDER — PLECANATIDE 3 MG/1
3 TABLET ORAL
Qty: 90 TABLET | Refills: 2 | Status: SHIPPED | OUTPATIENT
Start: 2023-01-17

## 2023-01-17 ASSESSMENT — ENCOUNTER SYMPTOMS
ABDOMINAL DISTENTION: 1
RECTAL PAIN: 0
COLOR CHANGE: 0
SHORTNESS OF BREATH: 0
BACK PAIN: 0
CONSTIPATION: 1
DIARRHEA: 0
WHEEZING: 0
NAUSEA: 0
TROUBLE SWALLOWING: 0
VOMITING: 0
ABDOMINAL PAIN: 1
BLOOD IN STOOL: 0
CHEST TIGHTNESS: 0
ANAL BLEEDING: 0

## 2023-01-17 NOTE — OP NOTE
[facility]  [facility]  [address] [facility] [address]  Dept: [phone]  Loc: [phone]    Preoperative Diagnosis: epigastric pain    Postoperative Diagnosis: same    Procedure: Esophagogastroduodenoscopy     Surgeon: Alto Carrel, DO    Findings: no ulcer, no ischemia, no stricture    EBL: None    Anesthesia: MAC, See Anesthesia Records    Specimen:    None    Operative Narrative: The risks and benefits of the procedure were explained to the patient in detail, including but not limited to: bleeding, infection, need for further surgery, damage to surrounding structures and perforation. The patient consented with the procedure. The patient was taken to the endoscopic suite and placed in lateral position. Oxygen was administered via nasal cannula and a mouth guard placed. MAC was administered via the anesthesia team.  The endoscope was then advanced into the oropharynx and passed into the esophagus under direct visualization. The scope was further advanced under direct visualization into the esophageal lumen and down into the gastric pouch. The scope was advanced past the anastomosis and the braydon limb surveyed. The braydon limb appeared patent and without signs of ischemia. The scope was withdrawn and the blind pouch surveyed, without lesion. The scope withdrawn. The braydon limb and gastric pouch without trauma or bleeding.  The esophagus without lesion    The patient tolerated the procedure well    PPI

## 2023-01-17 NOTE — PROGRESS NOTES
Subjective  Amauri Maldonado, 63 y.o. female presents today with:  Chief Complaint   Patient presents with    Other     Wart on left leg has been treating it at home would like removed        HPI  Last OV with me: 10/7/2022  Completed sleep study, results show mild DONNIE. Repeat imaging of liver no longer showing BELTRAN. Presented to [facility] ED on 1/12/23 with acute epigastric pain/discomfort. History of braydon-en-y gastric bypass. CT scan of abdomen /pelvis showed moderate stool throughout colon. She was discharged home with Rx for lactulose. Started medication, no improvement. Continued to have pain, patient presented to ED in [facility] for evaluation as Dr. Annemarie Jang is there. She had EGD which was normal.   Started back on milk of magnesium with mild improvement in symptoms. Open to trying different medication. In the office today to discuss LLE skin lesion. Present for several months. It gets irritated and catches on pants. Has tried OTC topical treatment, no relief. It is getting larger. Review of Systems   Constitutional:  Negative for activity change, appetite change, chills, diaphoresis, fatigue, fever and unexpected weight change. HENT:  Negative for congestion, nosebleeds, postnasal drip and trouble swallowing. Respiratory:  Negative for chest tightness, shortness of breath and wheezing. Cardiovascular:  Negative for chest pain, palpitations and leg swelling. Gastrointestinal:  Positive for abdominal distention, abdominal pain and constipation. Negative for anal bleeding, blood in stool, diarrhea, nausea, rectal pain and vomiting. Genitourinary:  Negative for difficulty urinating, dysuria, urgency, vaginal discharge and vaginal pain. Musculoskeletal:  Negative for arthralgias, back pain, gait problem, joint swelling, myalgias and neck pain. Skin:  Negative for color change and rash.    Neurological:  Negative for dizziness, weakness, light-headedness, numbness and headaches. Psychiatric/Behavioral:  Negative for dysphoric mood and sleep disturbance. The patient is not nervous/anxious. Past Medical History:   Diagnosis Date    Abnormal EKG 03/23/2018    Angina pectoris syndrome ([facility] [address] 03/23/2018    Diverticulosis of colon     DMII (diabetes mellitus, type 2) ([facility] [address] 2000    Dr Ted Bunn hypertension 01/15/2016    Family history of coronary artery disease 01/15/2016    Fatty infiltration of liver 2020    Gastric polyp 2019    Dr Anayeli Terrell, 5 mm    Generalized anxiety disorder     H/O: hysterectomy 2012    History of tobacco abuse 01/15/2016    Obesity (BMI 30-39. 9)     Other specified acquired hypothyroidism 2000    Precordial pain 03/23/2018    (? Prinzmetal?) Dr Dave Laws    S/P colonoscopy with polypectomy 2013, 2018, 2019    Dr Zhao Isabel, Dr Anayeli Terrell, tubulovillous adenoma, hyperplastic polyp    S/P vaginal hysterectomy 2012    benign    Seasonal allergies     Sleep apnea, obstructive 2007    was on CPAP, not using it at present    Type II diabetes mellitus, uncontrolled 11/13/2013    Under care of team 03/28/2022    pcp-Harmony Blanca-last visit nov 2021    Under care of team 03/28/2022    cardiac-Holiday-lana-last visit dec 2021    Vasospastic angina [facility])     Dr. Dave Laws (cardiology)    Vertigo 2018    Wears dentures     upper and lower full dentures    Wears glasses      Past Surgical History:   Procedure Laterality Date    CARDIAC CATHETERIZATION  2018    no stents    CARPAL TUNNEL RELEASE Left 01/13/2021    LEFT CARPAL TUNNEL RELEASE, LEFT MIDDLE FINGER TRIGGER RELEASE, TENOSYNOVECTOMY FDPFDS performed by Ann Marie Gonsales MD at [address] Right 02/03/2021    RIGHT CARPAL TUNNEL RELEASE, TRIGGER FINGER RELEASE RIGHT MIDDLE FINGER, FDP, FDS, TENOSYNOVECTOMY performed by Ann Marie Gonsales MD at [address] [facility], [facility]      COLONOSCOPY N/A 06/30/2020    COLONOSCOPY DIAGNOSTIC performed by Fabrizio Sandhu Anthony Gamboa MD at [address] [facility] CATH LAB PROCEDURE      ENDOMETRIAL ABLATION      metrorrhagia    ENDOSCOPY, COLON, DIAGNOSTIC      ESOPHAGOGASTRODUODENOSCOPY  2023    HYSTERECTOMY (CERVIX STATUS UNKNOWN)  2012    fibroid, cervicitis, ovaries intact    LA COLON CA SCRN NOT HI RSK IND N/A 05/15/2018    COLONOSCOPY performed by Guillermo Hough MD at [address] 2022    XI ROBOTIC LAPAROSCOPIC SHEILA-EN-Y GASTRIC BYPASS, EGD, HIATAL HERNIA REPAIR performed by Raul Gibbs DO at [address] N/A 2019    EGD ESOPHAGOGASTRODUODENOSCOPY performed by Guillermo Hough MD at [address] History    Marital status:      Spouse name: Not on file    Number of children: 3    Years of education: Not on file    Highest education level: Not on file   Occupational History    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[address] , RN     Employer: [employer]   Tobacco Use    Smoking status: Former     Packs/day: 1.00     Years: 30.00     Pack years: 30.00     Types: Cigarettes     Start date: 3/5/1975     Quit date: 2005     Years since quittin.6    Smokeless tobacco: Never    Tobacco comments:     3/15/18 started age 17   Vaping Use    Vaping Use: Never used   Substance and Sexual Activity    Alcohol use: No     Comment: not at all     Drug use: No    Sexual activity: Not on file   Other Topics Concern    Not on file   Social History Narrative    Born in [place], one of 4 children (2 boys and 2 girls), both parents in the [place], moved to [place]    Mother in [place], has memory problems    Zoroastrianism Roman Catholic     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    Has 2 dogs, Blossom and [address], 2 sons in the area, one daughter in [address]: dogs, sawing, machine embroidery, baking Social Determinants of Health     Financial Resource Strain: Low Risk     Difficulty of Paying Living Expenses: Not hard at all   Food Insecurity: No Food Insecurity    Worried About [address] in the Last Year: Never true    Billie Henriquez in the Last Year: Never true   Transportation Needs: No Transportation Needs    Lack of Transportation (Medical): No    Lack of Transportation (Non-Medical):  No   Physical Activity: Sufficiently Active    Days of Exercise per Week: 7 days    Minutes of Exercise per Session: 60 min   Stress: No Stress Concern Present    Feeling of Stress : Not at all   Social Connections: Not on file   Intimate Partner Violence: Not At Risk    Fear of Current or Ex-Partner: No    Emotionally Abused: No    Physically Abused: No    Sexually Abused: No   Housing Stability: Low Risk     Unable to Pay for Housing in the Last Year: No    Number of Places Lived in the Last Year: 1    Unstable Housing in the Last Year: No     Family History   Problem Relation Age of Onset    Arrhythmia Mother     Hypertension Mother     Dementia Mother     COPD Father         mesothelioma    Alcohol Abuse Father     Diabetes Paternal Grandmother     Breast Cancer Paternal Grandmother     Diabetes Paternal Grandfather     Colon Cancer Paternal Grandfather     Schizophrenia Brother     Colon Cancer Paternal Uncle      Allergies   Allergen Reactions    Seasonal Itching    Morphine Dizziness or Vertigo     IV morphine caused dizziness    Nsaids Other (See Comments)     Cannot take due to gastic bypass surgery     Current Outpatient Medications   Medication Sig Dispense Refill    Plecanatide (TRULANCE) 3 MG TABS Take 3 mg by mouth daily (with breakfast) 90 tablet 2    Multiple Vitamins-Minerals (BARIATRIC MULTIVITAMINS/IRON PO) Take 1 tablet by mouth daily      famotidine (PEPCID) 20 MG tablet Take 1 tablet by mouth 2 times daily 10 tablet 0    estradiol (ESTRACE VAGINAL) 0.1 MG/GM vaginal cream Place 0.5 g vaginally nightly for 21 days, THEN 0.5 g every other day for 21 days, THEN 0.5 g Twice a Week. 42.5 g 1    Calcium Citrate 333 MG TABS Take 2 tablets by mouth in the morning and at bedtime      Biotin 5 MG CAPS 5,000 Units daily      buPROPion (WELLBUTRIN XL) 300 MG extended release tablet TAKE ONE TABLET BY MOUTH EVERY MORNING 90 tablet 4    pantoprazole (PROTONIX) 20 MG tablet Take 1 tablet by mouth every morning (before breakfast) 90 tablet 4    levothyroxine (SYNTHROID) 50 MCG tablet TAKE 1 TABLET BY MOUTH ONE TIME A DAY (Patient taking differently: Take 25 mcg by mouth Daily TAKE 1/2 TABLET BY MOUTH ONE TIME A DAY) 90 tablet 1     No current facility-administered medications for this visit. PMH, Surgical Hx, Family Hx, and Social Hx reviewed and updated. Health Maintenance reviewed. Objective  Vitals:    01/17/23 1402   BP: 122/80   Site: Left Upper Arm   Position: Sitting   Cuff Size: Medium Adult   Pulse: 68   Resp: 16   SpO2: 99%   Weight: 142 lb (64.4 kg)   Height: 5' 4.5\" (1.638 m)     BP Readings from Last 3 Encounters:   01/17/23 122/80   01/16/23 114/67   01/13/23 (!) 127/54     Wt Readings from Last 3 Encounters:   01/17/23 142 lb (64.4 kg)   01/16/23 142 lb (64.4 kg)   01/12/23 140 lb (63.5 kg)     Physical Exam  Vitals reviewed. Constitutional:       Appearance: Normal appearance. HENT:      Head: Normocephalic and atraumatic. Mouth/Throat:      Mouth: Mucous membranes are moist.   Cardiovascular:      Rate and Rhythm: Normal rate. Pulmonary:      Effort: Pulmonary effort is normal.   Musculoskeletal:         General: No tenderness. Skin:     Findings: Lesion (pedunculated skin lesion of L upper thigh, diamter is 3 mm) present. Neurological:      General: No focal deficit present. Mental Status: She is alert and oriented to person, place, and time. Assessment & Plan   Hans Hendrickson was seen today for other.     Diagnoses and all orders for this visit:    Chronic idiopathic constipation  -     Plecanatide (TRULANCE) 3 MG TABS; Take 3 mg by mouth daily (with breakfast)    DONNIE (obstructive sleep apnea)    S/P bariatric surgery    Trial trulance for constipation. Hold off on miralax/milk of mag. Orders Placed This Encounter   Medications    Plecanatide (TRULANCE) 3 MG TABS     Sig: Take 3 mg by mouth daily (with breakfast)     Dispense:  90 tablet     Refill:  2     Medications Discontinued During This Encounter   Medication Reason    lactulose (CHRONULAC) 10 GM/15ML solution LIST CLEANUP     No follow-ups on file. Reviewed with the patient: current clinical status, medications, activities and diet. Side effects, adverse effects of the medication prescribed today, as well as treatment plan/ rationale and result expectations have been discussed with the patient who expresses understanding and desires to proceed. Close follow up to evaluate treatment results and for coordination of care. I have reviewed the patient's medical history in detail and updated the computerized patient record.     Harmony Tyson PA-C

## 2023-01-18 ENCOUNTER — PROCEDURE VISIT (OUTPATIENT)
Dept: FAMILY MEDICINE CLINIC | Age: 63
End: 2023-01-18
Payer: COMMERCIAL

## 2023-01-18 VITALS
SYSTOLIC BLOOD PRESSURE: 124 MMHG | OXYGEN SATURATION: 97 % | TEMPERATURE: 96.9 F | HEART RATE: 68 BPM | DIASTOLIC BLOOD PRESSURE: 70 MMHG | BODY MASS INDEX: 24.24 KG/M2 | WEIGHT: 142 LBS | HEIGHT: 64 IN

## 2023-01-18 DIAGNOSIS — D36.9 BENIGN NEOPLASM: ICD-10-CM

## 2023-01-18 DIAGNOSIS — L98.9 SKIN LESION OF LEFT LEG: Primary | ICD-10-CM

## 2023-01-18 PROCEDURE — 11400 EXC TR-EXT B9+MARG 0.5 CM<: CPT | Performed by: PHYSICIAN ASSISTANT

## 2023-01-18 RX ORDER — LIDOCAINE HYDROCHLORIDE AND EPINEPHRINE 10; 10 MG/ML; UG/ML
1 INJECTION, SOLUTION INFILTRATION; PERINEURAL ONCE
Status: COMPLETED | OUTPATIENT
Start: 2023-01-18 | End: 2023-01-18

## 2023-01-18 RX ORDER — PLECANATIDE 3 MG/1
3 TABLET ORAL ONCE
Qty: 30 TABLET | Refills: 0 | COMMUNITY
Start: 2023-01-18 | End: 2023-01-18

## 2023-01-18 RX ADMIN — LIDOCAINE HYDROCHLORIDE AND EPINEPHRINE 1 ML: 10; 10 INJECTION, SOLUTION INFILTRATION; PERINEURAL at 10:13

## 2023-01-18 ASSESSMENT — PATIENT HEALTH QUESTIONNAIRE - PHQ9
7. TROUBLE CONCENTRATING ON THINGS, SUCH AS READING THE NEWSPAPER OR WATCHING TELEVISION: 0
9. THOUGHTS THAT YOU WOULD BE BETTER OFF DEAD, OR OF HURTING YOURSELF: 0
5. POOR APPETITE OR OVEREATING: 0
10. IF YOU CHECKED OFF ANY PROBLEMS, HOW DIFFICULT HAVE THESE PROBLEMS MADE IT FOR YOU TO DO YOUR WORK, TAKE CARE OF THINGS AT HOME, OR GET ALONG WITH OTHER PEOPLE: 0
SUM OF ALL RESPONSES TO PHQ9 QUESTIONS 1 & 2: 0
1. LITTLE INTEREST OR PLEASURE IN DOING THINGS: 0
8. MOVING OR SPEAKING SO SLOWLY THAT OTHER PEOPLE COULD HAVE NOTICED. OR THE OPPOSITE, BEING SO FIGETY OR RESTLESS THAT YOU HAVE BEEN MOVING AROUND A LOT MORE THAN USUAL: 0
SUM OF ALL RESPONSES TO PHQ QUESTIONS 1-9: 0
6. FEELING BAD ABOUT YOURSELF - OR THAT YOU ARE A FAILURE OR HAVE LET YOURSELF OR YOUR FAMILY DOWN: 0
4. FEELING TIRED OR HAVING LITTLE ENERGY: 0
2. FEELING DOWN, DEPRESSED OR HOPELESS: 0
3. TROUBLE FALLING OR STAYING ASLEEP: 0
SUM OF ALL RESPONSES TO PHQ QUESTIONS 1-9: 0

## 2023-01-18 ASSESSMENT — ENCOUNTER SYMPTOMS
COLOR CHANGE: 0
ROS SKIN COMMENTS: SKIN LESION

## 2023-01-18 NOTE — PROGRESS NOTES
Subjective  Nemo Mems, 63 y.o. female presents today with:  Chief Complaint   Patient presents with    Procedure     Wart removal        HPI  In the office today for excision of skin lesion. L upper thigh lesion that has been getting larger, rubbing on thigh. Has tried OTC creams/ointments without relief. Review of Systems   Constitutional:  Negative for activity change. Skin:  Negative for color change, pallor and rash. Skin lesion   Neurological:  Negative for weakness. Hematological:  Does not bruise/bleed easily. Past Medical History:   Diagnosis Date    Abnormal EKG 03/23/2018    Angina pectoris syndrome ([facility] [address] 03/23/2018    Diverticulosis of colon     DMII (diabetes mellitus, type 2) ([facility] [address] 2000    Dr Ana M Koroma hypertension 01/15/2016    Family history of coronary artery disease 01/15/2016    Fatty infiltration of liver 2020    Gastric polyp 2019    Dr Gavino Guzman, 5 mm    Generalized anxiety disorder     H/O: hysterectomy 2012    History of tobacco abuse 01/15/2016    Obesity (BMI 30-39. 9)     Other specified acquired hypothyroidism 2000    Precordial pain 03/23/2018    (? Prinzmetal?) Dr Joey Reid    S/P colonoscopy with polypectomy 2013, 2018, 2019    Dr Wilbur Phoenix, Dr Gavino Guzman, tubulovillous adenoma, hyperplastic polyp    S/P vaginal hysterectomy 2012    benign    Seasonal allergies     Sleep apnea, obstructive 2007    was on CPAP, not using it at present    Type II diabetes mellitus, uncontrolled 11/13/2013    Under care of team 03/28/2022    pcp-Harmony Blanca-last visit nov 2021    Under care of team 03/28/2022    cardiac-[facility]-last visit dec 2021    Vasospastic angina [facility])     Dr. Joey Reid (cardiology)    Vertigo 2018    Wears dentures     upper and lower full dentures    Wears glasses      Past Surgical History:   Procedure Laterality Date    CARDIAC CATHETERIZATION  2018    no stents    CARPAL TUNNEL RELEASE Left 01/13/2021    LEFT CARPAL TUNNEL RELEASE, LEFT MIDDLE FINGER TRIGGER RELEASE, TENOSYNOVECTOMY FDPFDS performed by Yuko Lou MD at [facility] [address] Right 2021    RIGHT CARPAL TUNNEL RELEASE, TRIGGER FINGER RELEASE RIGHT MIDDLE FINGER, FDP, FDS, TENOSYNOVECTOMY performed by Yuko Lou MD at [address] [facility], [address] [facility]      COLONOSCOPY N/A 2020    COLONOSCOPY DIAGNOSTIC performed by Shira Gonzalez MD at [facility] [facility] CATH LAB PROCEDURE      ENDOMETRIAL ABLATION  2002    metrorrhagia    ENDOSCOPY, COLON, DIAGNOSTIC      ESOPHAGOGASTRODUODENOSCOPY  2023    HYSTERECTOMY (CERVIX STATUS UNKNOWN)  2012    fibroid, cervicitis, ovaries intact    DE COLON CA SCRN NOT HI RSK IND N/A 05/15/2018    COLONOSCOPY performed by Shira Gonzalez MD at [address] [facility] N/A 2022    XI ROBOTIC LAPAROSCOPIC SHEILA-EN-Y GASTRIC BYPASS, EGD, HIATAL HERNIA REPAIR performed by Lamar Smith DO at [address] N/A 2019    EGD ESOPHAGOGASTRODUODENOSCOPY performed by Shira Gonzalez MD at [address] [place] [address] 2023    EGD ESOPHAGOGASTRODUODENOSCOPY WITH BIPSPY , GI UNIT performed by Lamar Smith DO at [address] [place] [address] History     Socioeconomic History    Marital status:      Spouse name: Not on file    Number of children: 3    Years of education: Not on file    Highest education level: Not on file   Occupational History    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[address] , RN     Employer: [employer]   Tobacco Use    Smoking status: Former     Packs/day: 1.00     Years: 30.00     Pack years: 30.00     Types: Cigarettes     Start date: 3/5/1975     Quit date: 2005     Years since quittin.6    Smokeless tobacco: Never    Tobacco comments:     3/15/18 started age 17   Vaping Use    Vaping Use: Never used   Substance and Sexual Activity    Alcohol use: No     Comment: not at all     Drug use: No    Sexual activity: Not on file   Other Topics Concern    Not on file   Social History Narrative    Born in [place], one of 4 children (2 boys and 2 girls), both parents in the [employer], moved to [place]    Mother in [place], has memory problems    [employer]     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    Has 2 dogs, Blossom and Orissaare    Children 3, 2 sons in the area, one daughter in [address] [place] [address]: dogs, sawing, machine embroidery, baking     Social Determinants of Health     Financial Resource Strain: Low Risk     Difficulty of Paying Living Expenses: Not hard at all   Food Insecurity: No Food Insecurity    Worried About [address] [facility] in the Last Year: Never true    [address] [facility] in the Last Year: Never true   Transportation Needs: No Transportation Needs    Lack of Transportation (Medical): No    Lack of Transportation (Non-Medical):  No   Physical Activity: Sufficiently Active    Days of Exercise per Week: 7 days    Minutes of Exercise per Session: 60 min   Stress: No Stress Concern Present    Feeling of Stress : Not at all   Social Connections: Not on file   Intimate Partner Violence: Not At Risk    Fear of Current or Ex-Partner: No    Emotionally Abused: No    Physically Abused: No    Sexually Abused: No   Housing Stability: Low Risk     Unable to Pay for Housing in the Last Year: No    Number of Places Lived in the Last Year: 1    Unstable Housing in the Last Year: No     Family History   Problem Relation Age of Onset    Arrhythmia Mother     Hypertension Mother     Dementia Mother     COPD Father         mesothelioma    Alcohol Abuse Father     Diabetes Paternal Grandmother     Breast Cancer Paternal Grandmother     Diabetes Paternal Grandfather     Colon Cancer Paternal Grandfather     Schizophrenia Brother     Colon Cancer Paternal Uncle      Allergies   Allergen Reactions    Seasonal Itching    Morphine Dizziness or Vertigo     IV morphine caused dizziness    Nsaids Other (See Comments)     Cannot take due to gastic bypass surgery     Current Outpatient Medications   Medication Sig Dispense Refill    Plecanatide (TRULANCE) 3 MG TABS Take 3 mg by mouth once for 1 dose LOT 22D25  Exp 05/2024  X1 box 30 tablet 0    Plecanatide (TRULANCE) 3 MG TABS Take 3 mg by mouth daily (with breakfast) 90 tablet 2    Multiple Vitamins-Minerals (BARIATRIC MULTIVITAMINS/IRON PO) Take 1 tablet by mouth daily      famotidine (PEPCID) 20 MG tablet Take 1 tablet by mouth 2 times daily 10 tablet 0    estradiol (ESTRACE VAGINAL) 0.1 MG/GM vaginal cream Place 0.5 g vaginally nightly for 21 days, THEN 0.5 g every other day for 21 days, THEN 0.5 g Twice a Week. 42.5 g 1    Calcium Citrate 333 MG TABS Take 2 tablets by mouth in the morning and at bedtime      Biotin 5 MG CAPS 5,000 Units daily      buPROPion (WELLBUTRIN XL) 300 MG extended release tablet TAKE ONE TABLET BY MOUTH EVERY MORNING 90 tablet 4    pantoprazole (PROTONIX) 20 MG tablet Take 1 tablet by mouth every morning (before breakfast) 90 tablet 4    levothyroxine (SYNTHROID) 50 MCG tablet TAKE 1 TABLET BY MOUTH ONE TIME A DAY (Patient taking differently: Take 25 mcg by mouth Daily TAKE 1/2 TABLET BY MOUTH ONE TIME A DAY) 90 tablet 1     Current Facility-Administered Medications   Medication Dose Route Frequency Provider Last Rate Last Admin    lidocaine-EPINEPHrine 1 %-1:482473 injection 1 mL  1 mL IntraDERmal Once Harmony Tyson PA-C         PMH, Surgical Hx, Family Hx, and Social Hx reviewed and updated. Health Maintenance reviewed.     Objective  Vitals:    01/18/23 0924   BP: 124/70   Pulse: 68   Temp: 96.9 °F (36.1 °C)   TempSrc: Temporal   SpO2: 97%   Weight: 142 lb (64.4 kg)   Height: 5' 4.45\" (1.637 m)     BP Readings from Last 3 Encounters:   01/18/23 124/70   01/17/23 122/80   01/16/23 114/67     Wt Readings from Last 3 Encounters:   01/18/23 142 lb (64.4 kg)   01/17/23 142 lb (64.4 kg)   01/16/23 142 lb (64.4 kg)     Physical Exam  Vitals reviewed. HENT:      Head: Normocephalic. Cardiovascular:      Rate and Rhythm: Normal rate. Pulmonary:      Effort: Pulmonary effort is normal.   Skin:     General: Skin is warm. Findings: Lesion (L thigh, 3 mm pedunculated skin lesion.) present. Neurological:      Mental Status: She is alert. PROCEDURE:  A timeout was performed immediately prior to the start of the procedure and included the correct patient (two identifiers), correct procedure and correct site(s). Procedure consent and allergies were also verified. The patient was placed in the supine position with the head of bed elevated. The skin was prepped using alcohol swab and anesthestized using 1 cc of 1% lidocaine w/ epinephrine. The skin was then prepped and draped in a sterile fashion using alcohol and betadine swabs x 3. Thee skin mass was completely excised using #15 blade. Total excision size was 5 mm. Bleeding was controlled with pressure and cautery. The wound was closed using 4-0 prolene. One simple knots were placed with good eversion of skin tissue. Wound site was cleaned and dressed with antibiotic ointment and a band aid. Minimal blood loss; patient tolerated the procedure well. Proper wound care discussed. Patient may clean with mild soap and warm water. May keep covered with band aid. Discussed the signs and symptoms of local skin infection such as worsening & expanding erythema, purulent drainage, skin warmth and tenderness. Assessment & Plan   Jellyjonathan Valenzuela was seen today for procedure.     Diagnoses and all orders for this visit:    Skin lesion of left leg  -     lidocaine-EPINEPHrine 1 %-1:169803 injection 1 mL  -     07868 - PA EXC SKIN BENIG <5MM TRUNK,ARM,LEG    Benign neoplasm  -     lidocaine-EPINEPHrine 1 %-1:513429 injection 1 mL  -     47129 - PA EXC SKIN BENIG <5MM TRUNK,ARM,LEG    Suture removal in 7-10 days. Contact office with any questions or concerns. Orders Placed This Encounter   Procedures    63880 - NC EXC SKIN BENIG <5MM TRUNK,ARM,LEG       Orders Placed This Encounter   Medications    lidocaine-EPINEPHrine 1 %-1:390394 injection 1 mL       There are no discontinued medications. No follow-ups on file. Reviewed with the patient: current clinical status, medications, activities and diet. Side effects, adverse effects of the medication prescribed today, as well as treatment plan/ rationale and result expectations have been discussed with the patient who expresses understanding and desires to proceed. Close follow up to evaluate treatment results and for coordination of care. I have reviewed the patient's medical history in detail and updated the computerized patient record.     Harmony Tyson PA-C

## 2023-01-26 ENCOUNTER — OFFICE VISIT (OUTPATIENT)
Dept: BARIATRICS/WEIGHT MGMT | Age: 63
End: 2023-01-26
Payer: COMMERCIAL

## 2023-01-26 ENCOUNTER — CLINICAL DOCUMENTATION (OUTPATIENT)
Dept: PHARMACY | Facility: CLINIC | Age: 63
End: 2023-01-26

## 2023-01-26 VITALS
HEIGHT: 64 IN | SYSTOLIC BLOOD PRESSURE: 123 MMHG | WEIGHT: 144 LBS | DIASTOLIC BLOOD PRESSURE: 67 MMHG | RESPIRATION RATE: 20 BRPM | HEART RATE: 70 BPM | BODY MASS INDEX: 24.59 KG/M2

## 2023-01-26 DIAGNOSIS — Z98.84 STATUS POST GASTRIC BYPASS FOR OBESITY: ICD-10-CM

## 2023-01-26 DIAGNOSIS — K90.89 OTHER SPECIFIED INTESTINAL MALABSORPTION: Primary | ICD-10-CM

## 2023-01-26 PROCEDURE — 99213 OFFICE O/P EST LOW 20 MIN: CPT | Performed by: SURGERY

## 2023-01-26 NOTE — PROGRESS NOTES
[address] [facility] MIN INVASIVE BARIATRIC SURG  [address]  [address] [facility]  Dept: [phone]    SURGICAL WEIGHT LOSS MANAGEMENT PROGRAM  PROGRESS NOTE     CC: Weight Loss    Patient: Noemi Ayala      Service Date: 1/26/2023  Visit:   9 month    Medical Record #: [id number]  Date of Surgery:   4/12/2022    History:61 y.o. female who presents today for a post op follow up for gastric bypass. Patient reports she has been started on Trulance, with relief. She denies nausea, vomiting, fever, and chills. She states that she walks for an hour every morning and regularly attends strength training workouts. She reports taking her vitamins daily. Height: 5' 4.45\" (1.637 m)  Highest Weight:  212 lbs      Current Visit Weight Information  Weight: 144 lb (65.3 kg)   BMI: Body mass index is 24.37 kg/m². Weight loss since surgery:  68 lbs    1 wk - D/C'd home on VTE Prohylaxis? [x] Yes   [] No   On VTE Proph as directed? [x] Yes   [] No    Liver pathology:    [] NA    [] No Gross path    [] Liver Steatosis   [x] Discussed w/ pt   [] Referred to GI    Exercising?    [x] Yes    [] No     Review of Systems:     General  Negative for: [] Weight Change   [x] Fatigue   [x] Fevers & Chills    [] Appetite change [] Other:    Positive for: [x] Weight Change   [] Fatigue   [] Fevers & Chills    [] Appetite change [] Other:   Cardiac  Negative for: [x] Chest Pain   [] Difficulty Breathing   [] Leg Cramps [x] Edema of Lower Extremeties    [] Left   [] Right      Positive for: [] Chest Pain   [] Difficulty Breathing   [] Leg Cramps [] Edema of Lower Extremeties    [] Left   [] Right   Pulmonary  Negative for: [x] Shortness of Breath [] Wheeze [x] Cough  [x] Calf Pain     Positive for: [] Shortness of Breath [] Wheeze [] Cough  [] Calf Pain   Gastro-Intestinal Negative for: [] Heartburn   [] Reflux   [] Dysphagia   [] Melena   [] BRBPR  [x] Vomiting   [x] Abdominal Pain   [] Diarrhea     [] Constipation  [] Other:     Positive for: [] Heartburn   [] Reflux   [] Dysphagia   [] Melena   [] BRBPR  [] Vomiting   [] Abdominal Pain   [] Diarrhea     [] Constipation  [] Other:    Muskuloskeletal Negative for: [] Joint pain   [] Back pain   [] Knee Pain   [] Muscle weakness [x] Hernia   [x] Edema [] Other:     Positive for: [] Joint pain   [] Back pain   [] Knee Pain   [] Muscle weakness [] Hernia   [] Edema [] Other:    Neurologic Negative for: [x] Syncope   [] Insomnia   [x] Being treated for depression  [] Other:     Positive for: [] Syncope   [] Insomnia   [] Being treated for depression  [] Other:    Skin Negative for: [x] Wound:   [] Open   [] Draining   [] Incisional     [x] Rash   [] Hair Loss  [] Other:     Positive for: [] Wound:   [] Open   [] Draining    [] Incisional  [] Rash   [] Hair Loss  [] Other:          Physical Assessment:     /67 (Site: Right Upper Arm, Position: Sitting, Cuff Size: Medium Adult)   Pulse 70   Resp 20   Ht 5' 4.45\" (1.637 m)   Wt 144 lb (65.3 kg)   BMI 24.37 kg/m²   General Negative for:  [x] Lapses in memory   [x] Unusual Stress   [x] Diffic Concen [] Unable to sleep   [] Eating in response to stress   [] Other:    Positive for:  [] Lapses in memory   [] Unusual Stress   [] Diffic Concen [] Unable to sleep   [] Eating in response to stress   [] Other:   Cardio-Pulmonary   [x] Heart RRR   [x] No murmurs   [] Pulses nl x4 extrem    [x] Good inspiratory effort   [x] No wheezing     [x] Lungs clear to auscultation bilaterally    [] Other:    Gastro-Intestinal   [x] Abd S/NT/ND/Benign   [x] No abdominal mass/hernia    [x] No Splenomegaly    [] Other:    Muskuloskeletal   [x] Good muscle strength x4 extremities   [x] nl gait and ambul    [x] Nl ROM x4 extremities    [] Other:    Neurologic   [x] Alert and oriented x3    [] Other:   Skin   [x] Intact w/ no open wounds   [x] Incisions C/D/I    [] Steri strips removed   [x] No drainage or Infection    [] Other:      Assessment & Plan:      1. Other specified intestinal malabsorption    2. Status post gastric bypass for obesity           [x] Advance Diet    [x] Daily protein (65-75gm/day)   [x] Take Vitamins   [x] Attend Support Group    [x] Exercise Regularly   [x] Use contraception      S/P GASTRIC BYPASS WITH EXPECTED MALABSORPTION  Need for long term compliance and follow up stressed to patient  Dietician consult  Continue taking daily Vitamins  Continue taking, as prescribed  Labs ordered  Doing well  Abdominal pain improved. Will continue to monitor      Follow up: 3 months    Orders placed this encounter:   Orders Placed This Encounter   Procedures    CBC with Auto Differential    Comprehensive Metabolic Panel    Hemoglobin A1C    Iron and TIBC    Lipid Panel    Magnesium    PTH, Intact    TSH    Vitamin A    Vitamin B1    Vitamin B12 & Folate    Vitamin D 25 Hydroxy    Zinc         New Prescriptions:   No orders of the defined types were placed in this encounter. Scribe Attestation:  lawrence Joshied on behalf of Iliana Chi DO. Electronically signed by Iliana Chi DO on 2/3/2023 at 3:07 PM    Please note that this chart was generated using voice recognition Dragon dictation software. Although every effort was made to ensure the accuracy of this automated transcription, some errors in transcription may have occurred. Genny Ruffin DO DO, personally performed the services described in this documentation. All medical record entries made by the scribe were at my direction and in my presence. I have reviewed the chart and discharge instructions (if applicable) and agree that the record reflects my personal performance and is accurate and complete.     Electronically Signed: Iliana Chi DO. 02/03/23. 3:07 PM.

## 2023-01-26 NOTE — PROGRESS NOTES
Pharmacy Pop Care Documentation:     Jaimee Maddox is being removed from the diabetes management program for the following reason(s):  1/06/23: Per patient request    Herman 18 Tracking Only    Program: [facility] in place:  No  Gap Closed?: Yes   Time Spent (min): 5

## 2023-01-26 NOTE — PROGRESS NOTES
Medical Nutrition Therapy  Metabolic and Bariatric surgery  9 months after surgery follow up note         Pt reports: She has been eating protein at each meal and snack and eating protein first. She is eating every 2 hours in small portions and sipping on fluids throughout the day. She is exercising everyday by walking, cycling, and some power/strength classes. Vitals:   Vitals:    01/26/23 1537   BP: 123/67   Site: Right Upper Arm   Position: Sitting   Cuff Size: Medium Adult   Pulse: 70   Resp: 20   Weight: 144 lb (65.3 kg)   Height: 5' 4.45\" (1.637 m)      Body mass index is 24.37 kg/m². Labs reviewed:     Multivitamin/mineral intake: Bariatric Choice with iron  Calcium intake:  1300 mg of Ca Citrate  Other:            Nutrition Assessment:   PES: Inadequate food and beverage intake r/t WLS as evidenced by loss of excess body weight lost 68 lbs over 9 months. Goals   60-80gm of protein  48-64oz of fluid  Vitamin adherance  Basic adherance to WLS behavious and this document has been scanned into the chart.        [x] met     []  Not met        Plan:   F/u one year         Delano Rand, MS, RD, LD

## 2023-01-26 NOTE — LETTER
Francois [facility]  [address], [place] [address]  Phone: toll free [phone] option [phone] [id number] [facility]           01/26/23     Dear Brigido Parks,    We regret to inform you that you have been disqualified from The [address] [place] [address] With Diabetes Program because of the following:     Per your request on 1/06/23     You will not receive the copay waiver up to $600 towards your diabetic medications and supplies in 2023. If you qualify once again, you will be eligible to reapply to The [address] [place] [address] Diabetes Management Program the following calendar year. Francois 2 Team  [phone] Option #3  Email: [email or web address]  Fax Number: [phone]

## 2023-04-04 ENCOUNTER — PATIENT MESSAGE (OUTPATIENT)
Dept: FAMILY MEDICINE CLINIC | Age: 63
End: 2023-04-04

## 2023-04-04 DIAGNOSIS — R21 RASH AND NONSPECIFIC SKIN ERUPTION: ICD-10-CM

## 2023-04-04 DIAGNOSIS — Z98.84 S/P BARIATRIC SURGERY: ICD-10-CM

## 2023-04-04 DIAGNOSIS — L98.7 EXCESSIVE SKIN AND SUBCUTANEOUS TISSUE: Primary | ICD-10-CM

## 2023-04-04 NOTE — TELEPHONE ENCOUNTER
From: Ken Lugo  To: Josr Loved  Sent: 4/4/2023 12:57 PM EDT  Subject: Referral request    Can you please send a referral to Carley Riley MD a [facility] Specialty Provider (Reconstructive Plastic Surgery) in the [address] [place] [address] regarding pannicilectomy/abdominoplasty .  Thanks, Emily Vogel

## 2023-04-18 ENCOUNTER — TRANSCRIBE ORDERS (OUTPATIENT)
Dept: WOMENS IMAGING | Age: 63
End: 2023-04-18

## 2023-04-18 ENCOUNTER — HOSPITAL ENCOUNTER (OUTPATIENT)
Dept: WOMENS IMAGING | Age: 63
Discharge: HOME | End: 2023-04-20
Payer: COMMERCIAL

## 2023-04-18 DIAGNOSIS — Z12.31 SCREENING MAMMOGRAM FOR BREAST CANCER: ICD-10-CM

## 2023-04-18 DIAGNOSIS — Z12.31 SCREENING MAMMOGRAM FOR BREAST CANCER: Primary | ICD-10-CM

## 2023-04-18 PROCEDURE — 77063 BREAST TOMOSYNTHESIS BI: CPT

## 2023-04-19 LAB
MISCELLANEOUS LAB TEST ORDER: NORMAL
WHOPPER PROMPT: NORMAL

## 2023-04-20 DIAGNOSIS — E03.9 HYPOTHYROIDISM, UNSPECIFIED TYPE: ICD-10-CM

## 2023-04-21 ENCOUNTER — OFFICE VISIT (OUTPATIENT)
Dept: BARIATRICS/WEIGHT MGMT | Age: 63
End: 2023-04-21
Payer: COMMERCIAL

## 2023-04-21 VITALS
WEIGHT: 148 LBS | SYSTOLIC BLOOD PRESSURE: 110 MMHG | DIASTOLIC BLOOD PRESSURE: 70 MMHG | BODY MASS INDEX: 25.05 KG/M2 | TEMPERATURE: 72 F

## 2023-04-21 DIAGNOSIS — G47.33 OSA (OBSTRUCTIVE SLEEP APNEA): Primary | ICD-10-CM

## 2023-04-21 DIAGNOSIS — E03.9 HYPOTHYROIDISM, UNSPECIFIED TYPE: ICD-10-CM

## 2023-04-21 DIAGNOSIS — E66.3 OVERWEIGHT (BMI 25.0-29.9): ICD-10-CM

## 2023-04-21 PROCEDURE — 99213 OFFICE O/P EST LOW 20 MIN: CPT | Performed by: NURSE PRACTITIONER

## 2023-04-21 RX ORDER — LEVOTHYROXINE SODIUM 0.05 MG/1
TABLET ORAL
Qty: 90 TABLET | Refills: 1 | Status: SHIPPED | OUTPATIENT
Start: 2023-04-21

## 2023-04-21 NOTE — TELEPHONE ENCOUNTER
Rx request   Requested Prescriptions     Pending Prescriptions Disp Refills    levothyroxine (SYNTHROID) 50 MCG tablet 90 tablet 1     Sig: TAKE 1 TABLET BY MOUTH ONE TIME A DAY     LOV 1/18/2023  Last refill 4/14/22  Next Visit Date:  Future Appointments   Date Time Provider Elizabeth Brock   4/21/2023  3[facility] bariatric sher Dian Edge   6/2/2023 10:30 AM Nurys Lake MD [facility]   12/19/2023  3:15 PM Allen Goldman MD [facility]

## 2023-04-21 NOTE — PROGRESS NOTES
Medical Nutrition Therapy  Metabolic and Bariatric surgery  1 year follow up        Nutrition Assessment:    Patient is tolerating diet. Patient is eating protein first and eating small, frequent meals. Patient is drinking at least 64 oz of fluid and sugar free beverages. Consistently taking vitamins and minerals. Patient is doing 5 mi walks and group fitness classes to remain active. 24-hr diet recall scanned into chart. Multivitamin/mineral intake: Bariatric mvi with iron    Calcium intake: 1300 mg daily    Vitals:   Vitals:    04/21/23 1511   BP: 110/70   Site: Left Upper Arm   Position: Sitting   Temp: (!) 72 °F (22.2 °C)   Weight: 148 lb (67.1 kg)        Body mass index is 25.05 kg/m². Nutrition Diagnosis:     Inadequate food and beverage intake r/t WLS as evidenced by loss of excess body weight lost 64 lbs over 1 year. Nutrition Intervention:    Diet: Bariatric Diet, 60-80gm of protein, and 48-64oz of fluid    Nutrition Education: Bariatric Binder (diet guidelines and recipes)    Plan: Continue bariatric diet and continue to add variety to diet as tolerated. Sip on water or sugar-free fluid throughout the day. Eat small, frequent meals containing protein, as tolerated. Consistently take MVIs and minerals to prevent nutrient deficiencies. Continue activity. Recommend attending support groups as able. Monitoring/Evaluating:  Diet tolerance, protein intake, vitamin adherence, fluid intake, frequency of meals/snacks, activity, and follow-up at 18 months post-op.       Husam Suazo, MS, RD, LD

## 2023-04-21 NOTE — PROGRESS NOTES
Post-op Bariatric Surgery Note    Subjective     Patient is 1 year s/p laparoscopic Rroux-en-Y gastric bypass, down 64 lbs. Overall, doing well. Incisions well healed. Consistent use of MVI and calcium. Physical activity includes cardio and strength training classes and walking. No pain or other specific problems or concerns. Allergies: Allergies   Allergen Reactions    Seasonal Itching    Morphine Dizziness or Vertigo     IV morphine caused dizziness    Nsaids Other (See Comments)     Cannot take due to gastic bypass surgery       Past Medical History:     Past Medical History:   Diagnosis Date    Abnormal EKG 03/23/2018    Angina pectoris syndrome ([facility] [address] 03/23/2018    Diverticulosis of colon     DMII (diabetes mellitus, type 2) ([facility] [address] 2000    Dr Yasmeen Aguilar hypertension 01/15/2016    Family history of coronary artery disease 01/15/2016    Fatty infiltration of liver 2020    Gastric polyp 2019    Dr Xander Fuentes, 5 mm    Generalized anxiety disorder     H/O: hysterectomy 2012    History of tobacco abuse 01/15/2016    Obesity (BMI 30-39. 9)     Other specified acquired hypothyroidism 2000    Precordial pain 03/23/2018    (? Prinzmetal?) Dr Nurys Richards    S/P colonoscopy with polypectomy 2013, 2018, 2019    Dr Kathie Zavala, Dr Xander Fuentes, tubulovillous adenoma, hyperplastic polyp    S/P vaginal hysterectomy 2012    benign    Seasonal allergies     Sleep apnea, obstructive 2007    was on CPAP, not using it at present    Type II diabetes mellitus, uncontrolled 11/13/2013    Under care of team 03/28/2022    pcp-[facility] Blanca-last visit nov 2021    Under care of team 03/28/2022    cardiac-[facility]-last visit dec 2021    Vasospastic angina [facility])     Dr. Nurys Richards (cardiology)    Vertigo 2018    Wears dentures     upper and lower full dentures    Wears glasses    .     Past Surgical History:  Past Surgical History:   Procedure Laterality Date    CARDIAC CATHETERIZATION  2018    no stents    CARPAL TUNNEL RELEASE Left

## 2023-04-22 LAB — VIT B1 BLD-MCNC: 207 NMOL/L (ref 70–180)

## 2023-05-04 ENCOUNTER — NURSE ONLY (OUTPATIENT)
Dept: BARIATRICS/WEIGHT MGMT | Age: 63
End: 2023-05-04

## 2023-05-25 NOTE — TELEPHONE ENCOUNTER
Pharmacy Pop Care Documentation:   Called patient with reminder for requirements for Diabetes Management Program.     According to our records, patient is missing the following requirement(s) that must be completed by July 1st 2021:   · 1st 2021 Provider Visit for DM  · 1st 2021 A1C      Patient not available at the time of call. Left message on home TAD with the above information. [employer] message sent. Belkis Blackburn CPhT.   [address]   Department, toll free: [phone], option 7
Bed/Stretcher in lowest position, wheels locked, appropriate side rails in place/Call bell, personal items and telephone in reach/Instruct patient to call for assistance before getting out of bed/chair/stretcher/Non-slip footwear applied when patient is off stretcher/[place] to call system/Physically safe environment - no spills, clutter or unnecessary equipment/Purposeful proactive rounding/Room/bathroom lighting operational, light cord in reach

## 2023-05-30 LAB
CHOLEST SERPL-MCNC: 164 MG/DL (ref 0–199)
GLUCOSE SERPL-MCNC: 85 MG/DL (ref 70–99)
HDLC SERPL-MCNC: 71 MG/DL (ref 40–59)
LDLC SERPL CALC-MCNC: 79 MG/DL (ref 0–129)
TRIGL SERPL-MCNC: 70 MG/DL (ref 0–150)

## 2023-06-02 ENCOUNTER — HOSPITAL ENCOUNTER (OUTPATIENT)
Age: 63
Setting detail: SPECIMEN
Discharge: HOME | End: 2023-06-02

## 2023-06-02 ENCOUNTER — TELEPHONE (OUTPATIENT)
Dept: SURGERY | Age: 63
End: 2023-06-02

## 2023-06-02 ENCOUNTER — OFFICE VISIT (OUTPATIENT)
Dept: SURGERY | Age: 63
End: 2023-06-02
Payer: COMMERCIAL

## 2023-06-02 VITALS — HEIGHT: 64 IN | WEIGHT: 148 LBS | OXYGEN SATURATION: 100 % | BODY MASS INDEX: 25.27 KG/M2 | RESPIRATION RATE: 16 BRPM

## 2023-06-02 DIAGNOSIS — Z01.818 PRE-OP TESTING: ICD-10-CM

## 2023-06-02 DIAGNOSIS — M62.08 DIASTASIS OF RECTUS ABDOMINIS: ICD-10-CM

## 2023-06-02 DIAGNOSIS — E65 PANNUS, ABDOMINAL: Primary | ICD-10-CM

## 2023-06-02 DIAGNOSIS — E65 PANNUS, ABDOMINAL: ICD-10-CM

## 2023-06-02 DIAGNOSIS — E65 SYMPTOMATIC ABDOMINAL PANNICULUS: Primary | ICD-10-CM

## 2023-06-02 PROBLEM — E11.9 TYPE 2 DIABETES MELLITUS (HCC): Status: ACTIVE | Noted: 2023-06-02

## 2023-06-02 PROCEDURE — 99203 OFFICE O/P NEW LOW 30 MIN: CPT | Performed by: PLASTIC SURGERY

## 2023-06-02 NOTE — PROGRESS NOTES
PANNICULECTOMY HEALTH CHECKLIST:  Height:     Weight:      BMI: There is no height or weight on file to calculate BMI. Does your pannus affect your daily activities and quality of life? Yes  X    No    How long: Which daily living activities are affected in the following ways? Cleaning of feet X   Odor X   Interferes with exercise X   [place] X   Painful pulling of abdominal skin X   Clothes not fitting    x         Urination x   Pulling of pubic hair x   Seat belt not fitting x   Other:      Back pain? Yes      No        Any Treatment? Yes      No        Any Testing? Yes      No   Where/What:     Have you had bariatric surgery? Yes  x   4/12/2022 No        Initial weight: 234 LB        Weight stable for how long? 8 MONTHS    Do you have a hernia? Yes      No x        Testing:  CT      Date/location:   Have you had a hernia repair in the past?  Yes  x  umbilical and hital hernia    No         Was mesh used? Yes      No x        Surgeon for hernia:    Any Rashes/Ulcers under folds of skin?   Yes x     No         Medications used:                                                        OTC POWDER                                                                                               RX          Notes:

## 2023-06-02 NOTE — PROGRESS NOTES
[address] [facility] SURGICAL SPECIALISTS  [address] [facility] [address]       History and Physical     Chief Complaint   Patient presents with    New Patient     Excessive skin and subcutaneous tissue, panniculectomy consult        HPI:   Ishan Tena is a 63 y.o. female who presents bariatric surgery. Patient's initial weight was 234 pounds. Patient has lost her weight and has maintained it for greater than 8 months. Patient has excess skin that affects her daily activities. Affects her cleaning her feet, as ordered. Reviewed with her doing additional exercises. Interferes with intercourse. It is painful and pulls on abdominal skin. Prevents her clothing from a female. It prevents her seatbelt from not fitting well. It interferes with her urination. It is painful and pulls on her pubic hair. Patient continually gets rashes. Patient uses powder with constant recurrence. Patient has an umbilical and hiatal hernia that has been repaired. Patient is here to discuss her options regarding her skin in her lower abdominal region. Medications:     Current Outpatient Medications   Medication Sig Dispense Refill    levothyroxine (SYNTHROID) 50 MCG tablet TAKE 1 TABLET BY MOUTH ONE TIME A DAY 90 tablet 1    Plecanatide (TRULANCE) 3 MG TABS Take 3 mg by mouth daily (with breakfast) 90 tablet 2    Multiple Vitamins-Minerals (BARIATRIC MULTIVITAMINS/IRON PO) Take 1 tablet by mouth daily      estradiol (ESTRACE VAGINAL) 0.1 MG/GM vaginal cream Place 0.5 g vaginally nightly for 21 days, THEN 0.5 g every other day for 21 days, THEN 0.5 g Twice a Week.  42.5 g 1    Calcium Citrate 333 MG TABS Take 2 tablets by mouth in the morning and at bedtime      Biotin 5 MG CAPS 5,000 Units daily      buPROPion (WELLBUTRIN XL) 300 MG extended release tablet TAKE ONE TABLET BY MOUTH EVERY MORNING 90 tablet 4    pantoprazole (PROTONIX) 20 MG tablet Take 1

## 2023-06-05 LAB
3-OH-COTININE URINE: <50 NG/ML
ANABASINE URINE: <5 NG/ML
COTININE, URINE: <15 NG/ML
NICOTINE URINE: <15 NG/ML

## 2023-06-22 ENCOUNTER — TELEPHONE (OUTPATIENT)
Dept: SURGERY | Age: 63
End: 2023-06-22

## 2023-06-22 NOTE — TELEPHONE ENCOUNTER
Surgery Scheduled/Eslami  Panniculectomy  Abdominoplasty  Biopatch around W[place]EN  Incisional wound vac  Pre op TAP block  1st assist  11/28/23  9:00 am  Aj 37 11/13/23   3:00 pm    Patient advised by phone/mail    Surgery has to be after November 1 as this will be her 25 month post bariatric that insurance requires.

## 2023-07-17 ENCOUNTER — PATIENT MESSAGE (OUTPATIENT)
Dept: FAMILY MEDICINE CLINIC | Age: 63
End: 2023-07-17

## 2023-07-17 DIAGNOSIS — Z87.891 PERSONAL HISTORY OF TOBACCO USE: Primary | ICD-10-CM

## 2023-07-21 DIAGNOSIS — K22.9 IRREGULAR Z LINE OF ESOPHAGUS: ICD-10-CM

## 2023-07-21 DIAGNOSIS — F41.1 GENERALIZED ANXIETY DISORDER: ICD-10-CM

## 2023-07-21 RX ORDER — BUPROPION HYDROCHLORIDE 300 MG/1
TABLET ORAL
Qty: 90 TABLET | Refills: 4 | Status: SHIPPED | OUTPATIENT
Start: 2023-07-21

## 2023-07-21 RX ORDER — PANTOPRAZOLE SODIUM 20 MG/1
TABLET, DELAYED RELEASE ORAL
Qty: 90 TABLET | Refills: 4 | Status: SHIPPED | OUTPATIENT
Start: 2023-07-21

## 2023-07-21 NOTE — TELEPHONE ENCOUNTER
Comments: sent my chart message     Last Office Visit (last PCP visit):   1/18/2023    Next Visit Date:  Future Appointments   Date Time Provider [address]   10/20/2023  4:15 PM SCHEDULE, [facility] BARIATRIC DIETICIAN bariatric sher [address] [facility]   11/13/2023  3:00 PM STA PAT RM [address] [place]   12/6/2023 11:15 AM Betsy Gutierrez MD [facility]   12/13/2023 11:15 AM Betsy Gutierrez MD [facility]   12/19/2023  3:15 PM Toma Lanes, MD [facility]       **If hasn't been seen in over a year OR hasn't followed up according to last diabetes/ADHD visit, make appointment for patient before sending refill to provider.     Rx requested:  Requested Prescriptions     Pending Prescriptions Disp Refills    pantoprazole (PROTONIX) 20 MG tablet [Pharmacy Med Name: PANTOPRAZOLE SODIUM 20MG TBEC] 90 tablet 4     Sig: TAKE ONE TABLET BY MOUTH EVERY MORNING BEFORE BREAKFAST    buPROPion (WELLBUTRIN XL) 300 MG extended release tablet [Pharmacy Med Name: BUPROPION HCL ER (XL) 300MG TB24] 90 tablet 4     Sig: TAKE ONE TABLET BY MOUTH EVERY MORNING

## 2023-07-25 ENCOUNTER — OFFICE VISIT (OUTPATIENT)
Dept: FAMILY MEDICINE CLINIC | Age: 63
End: 2023-07-25
Payer: COMMERCIAL

## 2023-07-25 VITALS
SYSTOLIC BLOOD PRESSURE: 128 MMHG | HEIGHT: 64 IN | HEART RATE: 69 BPM | WEIGHT: 156 LBS | DIASTOLIC BLOOD PRESSURE: 62 MMHG | RESPIRATION RATE: 16 BRPM | BODY MASS INDEX: 26.63 KG/M2 | OXYGEN SATURATION: 99 %

## 2023-07-25 DIAGNOSIS — L98.7 EXCESSIVE SKIN AND SUBCUTANEOUS TISSUE: Primary | ICD-10-CM

## 2023-07-25 DIAGNOSIS — K59.04 CHRONIC IDIOPATHIC CONSTIPATION: ICD-10-CM

## 2023-07-25 DIAGNOSIS — Z98.84 S/P BARIATRIC SURGERY: ICD-10-CM

## 2023-07-25 DIAGNOSIS — E03.9 HYPOTHYROIDISM, UNSPECIFIED TYPE: ICD-10-CM

## 2023-07-25 DIAGNOSIS — R21 RASH AND NONSPECIFIC SKIN ERUPTION: ICD-10-CM

## 2023-07-25 PROBLEM — I20.9 ANGINA PECTORIS SYNDROME (HCC): Status: RESOLVED | Noted: 2018-03-23 | Resolved: 2023-07-25

## 2023-07-25 PROCEDURE — 99214 OFFICE O/P EST MOD 30 MIN: CPT | Performed by: PHYSICIAN ASSISTANT

## 2023-07-25 RX ORDER — PLECANATIDE 3 MG/1
3 TABLET ORAL
Qty: 90 TABLET | Refills: 2 | Status: SHIPPED | OUTPATIENT
Start: 2023-07-25

## 2023-07-25 RX ORDER — LEVOTHYROXINE SODIUM 0.05 MG/1
TABLET ORAL
Qty: 90 TABLET | Refills: 2 | Status: SHIPPED | OUTPATIENT
Start: 2023-07-25

## 2023-07-25 ASSESSMENT — ENCOUNTER SYMPTOMS
SHORTNESS OF BREATH: 0
CONSTIPATION: 1
ABDOMINAL DISTENTION: 1
WHEEZING: 0
CHEST TIGHTNESS: 0
COLOR CHANGE: 0
COUGH: 0
ROS SKIN COMMENTS: SKIN LESION

## 2023-07-25 NOTE — PROGRESS NOTES
[address], 63 y.o. female presents today with:  Chief Complaint   Patient presents with    Follow-up     Follow up needs refills        HPI  In the office for follow up. Last OV with me: 1/18/23. Gained about 10 lbs from last OV. Noted that she has had some changes to her portion size. Working on losing about 8-10 lbs. Exercises almost 7 days/week. Continues to eat well. Scheduled for panniculectomy with Dr. Anuj Giles in November. Patient is almost 18 months out from braydon-en-y gastric bypass surgery. Has had continued complications from her abdominal pannus s/p weight loss. She had initial consult visit on 6/2 to discuss surgery. She has been doing daily core exercises without improvement. Has noted to have continued constipation. She is taking trulance daily. Will need     History of hypothyroidism. Taking 25 mcg daily. Has had weight gain and constipation. Review of Systems   Constitutional:  Positive for appetite change (increase) and unexpected weight change. Negative for activity change and fatigue. Respiratory:  Negative for cough, chest tightness, shortness of breath and wheezing. Cardiovascular:  Negative for chest pain, palpitations and leg swelling. Gastrointestinal:  Positive for abdominal distention and constipation. Skin:  Positive for rash. Negative for color change and pallor. Skin lesion   Neurological:  Negative for weakness. Hematological:  Does not bruise/bleed easily.      Past Medical History:   Diagnosis Date    Abnormal EKG 03/23/2018    Angina pectoris syndrome ([address] [facility]) 03/23/2018    Diverticulosis of colon     DMII (diabetes mellitus, type 2) ([address] [facility]) 2000    Dr Barb Borrero hypertension 01/15/2016    Family history of coronary artery disease 01/15/2016    Fatty infiltration of liver 2020    Gastric polyp 2019    Dr Carlton Romero, 5 mm    Generalized anxiety disorder     H/O: hysterectomy 2012    History of tobacco abuse 01/15/2016

## 2023-07-26 DIAGNOSIS — E03.9 HYPOTHYROIDISM, UNSPECIFIED TYPE: ICD-10-CM

## 2023-07-26 LAB
T4 FREE SERPL-MCNC: 1.04 NG/DL (ref 0.84–1.68)
TSH SERPL-MCNC: 1.14 UIU/ML (ref 0.44–3.86)

## 2023-09-04 ENCOUNTER — NURSE TRIAGE (OUTPATIENT)
Dept: OTHER | Facility: CLINIC | Age: 63
End: 2023-09-04

## 2023-09-04 ENCOUNTER — HOSPITAL ENCOUNTER (EMERGENCY)
Age: 63
Discharge: HOME | End: 2023-09-04
Payer: COMMERCIAL

## 2023-09-04 VITALS
RESPIRATION RATE: 20 BRPM | BODY MASS INDEX: 26.46 KG/M2 | HEART RATE: 85 BPM | TEMPERATURE: 99 F | WEIGHT: 155 LBS | OXYGEN SATURATION: 96 % | HEIGHT: 64 IN | SYSTOLIC BLOOD PRESSURE: 156 MMHG | DIASTOLIC BLOOD PRESSURE: 85 MMHG

## 2023-09-04 DIAGNOSIS — Z23 NEED FOR TDAP VACCINATION: ICD-10-CM
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
## 2023-09-04 PROCEDURE — 6360000002 HC RX W HCPCS: Performed by: NURSE PRACTITIONER

## 2023-09-04 PROCEDURE — 6370000000 HC RX 637 (ALT 250 FOR IP): Performed by: NURSE PRACTITIONER

## 2023-09-04 PROCEDURE — 99284 EMERGENCY DEPT VISIT MOD MDM: CPT

## 2023-09-04 PROCEDURE — 90715 TDAP VACCINE 7 YRS/> IM: CPT | Performed by: NURSE PRACTITIONER

## 2023-09-04 PROCEDURE — 90471 IMMUNIZATION ADMIN: CPT | Performed by: NURSE PRACTITIONER

## 2023-09-04 RX ORDER — AMOXICILLIN AND CLAVULANATE POTASSIUM 875; 125 MG/1; MG/1
1 TABLET, FILM COATED ORAL 2 TIMES DAILY
Qty: 20 TABLET | Refills: 0 | Status: SHIPPED | OUTPATIENT
Start: 2023-09-04 | End: 2023-09-14

## 2023-09-04 RX ORDER — AMOXICILLIN AND CLAVULANATE POTASSIUM 875; 125 MG/1; MG/1
1 TABLET, FILM COATED ORAL ONCE
Status: COMPLETED | OUTPATIENT
Start: 2023-09-04 | End: 2023-09-04

## 2023-09-04 RX ADMIN — AMOXICILLIN AND CLAVULANATE POTASSIUM 1 TABLET: 875; 125 TABLET, FILM COATED ORAL at 19:45

## 2023-09-04 RX ADMIN — TETANUS TOXOID, REDUCED DIPHTHERIA TOXOID AND ACELLULAR PERTUSSIS VACCINE, ADSORBED 0.5 ML: 5; 2.5; 8; 8; 2.5 SUSPENSION INTRAMUSCULAR at 19:47

## 2023-09-04 ASSESSMENT — ENCOUNTER SYMPTOMS
ABDOMINAL PAIN: 0
SHORTNESS OF BREATH: 0
COUGH: 0
BACK PAIN: 0

## 2023-09-04 ASSESSMENT — PAIN DESCRIPTION - LOCATION: LOCATION: OTHER (COMMENT)

## 2023-09-04 ASSESSMENT — PAIN DESCRIPTION - DESCRIPTORS: DESCRIPTORS: STABBING

## 2023-09-04 ASSESSMENT — PAIN - FUNCTIONAL ASSESSMENT: PAIN_FUNCTIONAL_ASSESSMENT: 0-10

## 2023-09-04 ASSESSMENT — PAIN SCALES - GENERAL: PAINLEVEL_OUTOF10: 1

## 2023-09-04 ASSESSMENT — PAIN DESCRIPTION - ORIENTATION: ORIENTATION: RIGHT

## 2023-09-04 NOTE — ED PROVIDER NOTES
[facility] ED  eMERGENCY dEPARTMENT eNCOUnter      Pt Name: Dakota Salguero  MRN: [id number]  [address] [facility] 1960  Date of evaluation: 9/4/2023  Provider: Sherren Gordon, APRN - CNP      HISTORY OF PRESENT ILLNESS    Dakota Salguero is a 63 y.o. female who presents to the Emergency Department with R thumb PW from a dog bite that occurred at noon today. Patient does know the dog but is unsure of vaccination status. Pain is minimal.  No bleeding from site. REVIEW OF SYSTEMS       Review of Systems   Constitutional:  Negative for activity change, appetite change and fever. HENT:  Negative for congestion. Respiratory:  Negative for cough and shortness of breath. Cardiovascular:  Negative for chest pain. Gastrointestinal:  Negative for abdominal pain. Genitourinary:  Negative for dysuria. Musculoskeletal:  Negative for arthralgias and back pain. Skin:  Positive for wound (R thumb dog bite). Negative for rash. All other systems reviewed and are negative. PAST MEDICAL HISTORY     Past Medical History:   Diagnosis Date    Abnormal EKG 03/23/2018    Angina pectoris syndrome ([address]) 03/23/2018    Diverticulosis of colon     DMII (diabetes mellitus, type 2) ([address]) 2000    Dr Brandy Srivastava hypertension 01/15/2016    Family history of coronary artery disease 01/15/2016    Fatty infiltration of liver 2020    Gastric polyp 2019    Dr Gerianne Oppenheim, 5 mm    Generalized anxiety disorder     H/O: hysterectomy 2012    History of tobacco abuse 01/15/2016    Obesity (BMI 30-39. 9)     Other specified acquired hypothyroidism 2000    Precordial pain 03/23/2018    (? Prinzmetal?) Dr Ben Wilde    S/P colonoscopy with polypectomy 2013, 2018, 2019    Dr Sonia Sanchez, Dr Gerianne Oppenheim, tubulovillous adenoma, hyperplastic polyp    S/P vaginal hysterectomy 2012    benign    Seasonal allergies     Sleep apnea, obstructive 2007    was on CPAP, not using it at present    Type II diabetes mellitus, uncontrolled 11/13/2013    Under care

## 2023-09-04 NOTE — ED TRIAGE NOTES
Pt presents to the ER with a dog bite that occurred today around 15. It was here granddaughter's boyfriends mothers dog, but she is not sure if the dog is fully vaccinated or not. Pt does not have any redness at this time but does have some minor bruising. Pt is A&Ox4, warm and dry at this time.

## 2023-09-04 NOTE — TELEPHONE ENCOUNTER
Location of patient: [place]    Subjective: Caller states \"I got bit by a dog and know I am not up to date on my tetanus. \"     Current Symptoms: dog bite     Onset:  today    Associated Symptoms:     Pain Severity: 1/10    Temperature: unsure     What has been tried: alcohol pads, peroxide, antibiotic ointment     LMP: NA Pregnant: NA    Recommended disposition: Go to ED Now    Care advice provided, patient verbalizes understanding; denies any other questions or concerns; instructed to call back for any new or worsening symptoms. Patient/caller agrees to proceed to nearest Emergency Department    Attention Provider: Thank you for allowing me to participate in the care of your patient. The patient was connected to triage in response to symptoms provided. Please do not respond through this encounter as the response is not directed to a shared pool. Reason for Disposition   [1] Cut (length > 1/8 inch or 3 mm) or skin tear AND [2] any animal  (Exception: Superficial scratch that doesn't go through dermis.)    Protocols used:  Animal Bite-ADULT-

## 2023-09-07 ENCOUNTER — OFFICE VISIT (OUTPATIENT)
Dept: FAMILY MEDICINE CLINIC | Age: 63
End: 2023-09-07
Payer: COMMERCIAL

## 2023-09-07 VITALS
RESPIRATION RATE: 16 BRPM | BODY MASS INDEX: 26.46 KG/M2 | HEIGHT: 64 IN | WEIGHT: 155 LBS | OXYGEN SATURATION: 98 % | DIASTOLIC BLOOD PRESSURE: 80 MMHG | HEART RATE: 78 BPM | SYSTOLIC BLOOD PRESSURE: 138 MMHG

## 2023-09-07 DIAGNOSIS — R05.1 ACUTE COUGH: ICD-10-CM
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
## 2023-09-07 DIAGNOSIS — R50.9 FEVER AND CHILLS: ICD-10-CM

## 2023-09-07 DIAGNOSIS — J06.9 VIRAL URI: Primary | ICD-10-CM

## 2023-09-07 PROBLEM — E11.9 TYPE 2 DIABETES MELLITUS (HCC): Status: RESOLVED | Noted: 2023-06-02 | Resolved: 2023-09-07

## 2023-09-07 PROCEDURE — 87426 SARSCOV CORONAVIRUS AG IA: CPT | Performed by: PHYSICIAN ASSISTANT

## 2023-09-07 PROCEDURE — 99213 OFFICE O/P EST LOW 20 MIN: CPT | Performed by: PHYSICIAN ASSISTANT

## 2023-09-07 PROCEDURE — 87804 INFLUENZA ASSAY W/OPTIC: CPT | Performed by: PHYSICIAN ASSISTANT

## 2023-09-07 RX ORDER — BENZONATATE 100 MG/1
100 CAPSULE ORAL EVERY 8 HOURS
Qty: 30 CAPSULE | Refills: 0 | Status: SHIPPED | OUTPATIENT
Start: 2023-09-07 | End: 2023-09-14

## 2023-09-07 RX ORDER — DEXTROMETHORPHAN HYDROBROMIDE AND PROMETHAZINE HYDROCHLORIDE 15; 6.25 MG/5ML; MG/5ML
5 SYRUP ORAL 4 TIMES DAILY PRN
Qty: 240 ML | Refills: 0 | Status: SHIPPED | OUTPATIENT
Start: 2023-09-07 | End: 2023-09-14

## 2023-09-07 SDOH — ECONOMIC STABILITY: FOOD INSECURITY: WITHIN THE PAST 12 MONTHS, THE FOOD YOU BOUGHT JUST DIDN'T LAST AND YOU DIDN'T HAVE MONEY TO GET MORE.: NEVER TRUE

## 2023-09-07 SDOH — ECONOMIC STABILITY: FOOD INSECURITY: WITHIN THE PAST 12 MONTHS, YOU WORRIED THAT YOUR FOOD WOULD RUN OUT BEFORE YOU GOT MONEY TO BUY MORE.: NEVER TRUE

## 2023-09-07 SDOH — ECONOMIC STABILITY: INCOME INSECURITY: HOW HARD IS IT FOR YOU TO PAY FOR THE VERY BASICS LIKE FOOD, HOUSING, MEDICAL CARE, AND HEATING?: NOT HARD AT ALL

## 2023-09-07 ASSESSMENT — ENCOUNTER SYMPTOMS
ABDOMINAL DISTENTION: 1
SHORTNESS OF BREATH: 0
CHEST TIGHTNESS: 0
ROS SKIN COMMENTS: SKIN LESION
SINUS PRESSURE: 1
COLOR CHANGE: 0
WHEEZING: 0
CONSTIPATION: 1
COUGH: 1

## 2023-09-07 NOTE — PROGRESS NOTES
[address] [facility] [address], 63 y.o. female presents today with:  Chief Complaint   Patient presents with    URI     Sore throat, swelling in throat and burning onset x1 week        HPI  In the office today for same day. Last OV with me: 7/25/23. Started have uri symptoms last Friday. Low-grade fever, chills. Home covid test negative. Presented to [facility] ED on 9/4/23 after sustaining a dog bite injury/laceration to R thumb. Tetanus updated. She was prescribed augmentin therapy. She is taking. Continues to have mild arthralgias, productive cough. Yellow/green. No longer having fever, body aches have improved. 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 Review of Systems   Constitutional:  Positive for appetite change (increase) and unexpected weight change. Negative for activity change and fatigue. HENT:  Positive for congestion, postnasal drip and sinus pressure. Respiratory:  Positive for cough. Negative for chest tightness, shortness of breath and wheezing. Cardiovascular:  Negative for chest pain, palpitations and leg swelling. Gastrointestinal:  Positive for abdominal distention and constipation. Musculoskeletal:  Positive for arthralgias. Skin:  Positive for rash. Negative for color change and pallor. Skin lesion   Neurological:  Negative for weakness. Hematological:  Does not bruise/bleed easily. Past Medical History:   Diagnosis Date    Abnormal EKG 03/23/2018    Angina pectoris syndrome ([facility]) 03/23/2018    Diverticulosis of colon     DMII (diabetes mellitus, type 2) ([address] [facility]) 2000    Dr Park Body hypertension 01/15/2016    Family history of coronary artery disease 01/15/2016    Fatty infiltration of liver 2020    Gastric polyp 2019    Dr Lance Gardner, 5 mm    Generalized anxiety disorder     H/O: hysterectomy 2012    History of tobacco abuse 01/15/2016    Obesity (BMI 30-39. 9)     Other specified acquired hypothyroidism 2000

## 2023-10-03 DIAGNOSIS — R63.5 WEIGHT GAIN: Primary | ICD-10-CM

## 2023-10-03 RX ORDER — PHENTERMINE HYDROCHLORIDE 37.5 MG/1
37.5 TABLET ORAL
Qty: 30 TABLET | Refills: 0 | Status: SHIPPED | OUTPATIENT
Start: 2023-10-03 | End: 2023-11-02

## 2023-10-09 ENCOUNTER — HOSPITAL ENCOUNTER (OUTPATIENT)
Dept: CT IMAGING | Age: 63
Discharge: HOME | End: 2023-10-11
Payer: COMMERCIAL

## 2023-10-09 DIAGNOSIS — Z87.891 PERSONAL HISTORY OF TOBACCO USE: ICD-10-CM

## 2023-10-09 PROCEDURE — 71271 CT THORAX LUNG CANCER SCR C-: CPT

## 2023-10-20 ENCOUNTER — OFFICE VISIT (OUTPATIENT)
Dept: BARIATRICS/WEIGHT MGMT | Age: 63
End: 2023-10-20
Payer: COMMERCIAL

## 2023-10-20 VITALS
HEART RATE: 70 BPM | HEIGHT: 65 IN | BODY MASS INDEX: 25.66 KG/M2 | WEIGHT: 154 LBS | DIASTOLIC BLOOD PRESSURE: 76 MMHG | SYSTOLIC BLOOD PRESSURE: 138 MMHG

## 2023-10-20 DIAGNOSIS — G47.33 OSA (OBSTRUCTIVE SLEEP APNEA): Primary | ICD-10-CM

## 2023-10-20 DIAGNOSIS — E66.3 OVERWEIGHT (BMI 25.0-29.9): ICD-10-CM

## 2023-10-20 DIAGNOSIS — E03.9 HYPOTHYROIDISM, UNSPECIFIED TYPE: ICD-10-CM

## 2023-10-20 PROCEDURE — 99213 OFFICE O/P EST LOW 20 MIN: CPT | Performed by: NURSE PRACTITIONER

## 2023-10-25 LAB
MISCELLANEOUS LAB TEST ORDER: NORMAL
WHOPPER PROMPT: NORMAL

## 2023-11-01 ENCOUNTER — OFFICE VISIT (OUTPATIENT)
Dept: FAMILY MEDICINE CLINIC | Age: 63
End: 2023-11-01
Payer: COMMERCIAL

## 2023-11-01 VITALS
SYSTOLIC BLOOD PRESSURE: 130 MMHG | BODY MASS INDEX: 25.83 KG/M2 | WEIGHT: 155 LBS | HEIGHT: 65 IN | DIASTOLIC BLOOD PRESSURE: 80 MMHG | TEMPERATURE: 97.3 F | HEART RATE: 69 BPM | OXYGEN SATURATION: 99 % | RESPIRATION RATE: 16 BRPM

## 2023-11-01 DIAGNOSIS — R21 RASH AND NONSPECIFIC SKIN ERUPTION: ICD-10-CM

## 2023-11-01 DIAGNOSIS — Z98.84 S/P BARIATRIC SURGERY: ICD-10-CM

## 2023-11-01 DIAGNOSIS — M62.08 DIASTASIS RECTI: ICD-10-CM

## 2023-11-01 DIAGNOSIS — L98.7 EXCESSIVE SKIN AND SUBCUTANEOUS TISSUE: ICD-10-CM

## 2023-11-01 DIAGNOSIS — Z01.818 PREOP EXAMINATION: Primary | ICD-10-CM

## 2023-11-01 PROCEDURE — 93000 ELECTROCARDIOGRAM COMPLETE: CPT | Performed by: PHYSICIAN ASSISTANT

## 2023-11-01 PROCEDURE — 99214 OFFICE O/P EST MOD 30 MIN: CPT | Performed by: PHYSICIAN ASSISTANT

## 2023-11-01 RX ORDER — PHENTERMINE HYDROCHLORIDE 37.5 MG/1
37.5 TABLET ORAL
Qty: 30 TABLET | Refills: 0 | Status: SHIPPED | OUTPATIENT
Start: 2023-11-01 | End: 2023-12-01

## 2023-11-01 NOTE — PROGRESS NOTES
Neck supple. No JVD present. Carotid bruit is not present. No mass and no thyromegaly present. Cardiovascular: Normal rate, regular rhythm, normal heart sounds and intact distal pulses. Exam reveals no gallop and no friction rub. No murmur heard. Pulmonary/Chest: Effort normal and breath sounds normal. No respiratory distress. She has no wheezes. She has no rales. Abdominal: Soft. Normal aorta and bowel sounds are normal. She exhibits no distension and no mass. There is no hepatosplenomegaly. No tenderness. Musculoskeletal: She exhibits no edema and no tenderness. Neurological: She is alert and oriented to person, place, and time. She has normal strength. No cranial nerve deficit or sensory deficit. Coordination and gait normal.   Skin: Skin is warm and dry. No rash noted. No erythema. Psychiatric: She has a normal mood and affect. Her behavior is normal.     EKG Interpretation:  normal EKG, normal sinus rhythm, unchanged from previous tracings.     Lab Review   Orders Only on 10/23/2023   Component Date Value    [place] Prompt 10/23/2023 70,346     Misc Test Order 10/23/2023 SEE NOTE    Orders Only on 10/23/2023   Component Date Value    Ferritin 10/23/2023 43     Zinc 10/23/2023 100.5     WBC 10/23/2023 6.7     RBC 10/23/2023 4.30     Hemoglobin 10/23/2023 12.4     Hematocrit 10/23/2023 38.4     MCV 10/23/2023 89.3     MCH 10/23/2023 28.8     MCHC 10/23/2023 32.3 (L)     RDW 10/23/2023 12.9     Platelets 03/73/7932 257     Neutrophils % 10/23/2023 61.7     Lymphocytes % 10/23/2023 27.1     Monocytes % 10/23/2023 5.7     Eosinophils % 10/23/2023 4.2     Basophils % 10/23/2023 1.2     Neutrophils Absolute 10/23/2023 4.1     Lymphocytes Absolute 10/23/2023 1.8     Monocytes Absolute 10/23/2023 0.4     Eosinophils Absolute 10/23/2023 0.3     Basophils Absolute 10/23/2023 0.1     Cholesterol, Total 10/23/2023 137     Triglycerides 10/23/2023 53     HDL 10/23/2023 54     LDL Calculated 10/23/2023 72

## 2023-11-13 ENCOUNTER — HOSPITAL ENCOUNTER (OUTPATIENT)
Dept: PREADMISSION TESTING | Age: 63
Discharge: HOME | End: 2023-11-17
Payer: COMMERCIAL

## 2023-11-13 VITALS
HEIGHT: 65 IN | RESPIRATION RATE: 14 BRPM | BODY MASS INDEX: 25.71 KG/M2 | HEART RATE: 74 BPM | WEIGHT: 154.32 LBS | OXYGEN SATURATION: 96 % | SYSTOLIC BLOOD PRESSURE: 149 MMHG | TEMPERATURE: 96.9 F | DIASTOLIC BLOOD PRESSURE: 64 MMHG

## 2023-11-13 LAB
ANION GAP SERPL CALCULATED.3IONS-SCNC: 8 MMOL/L (ref 9–17)
BUN SERPL-MCNC: 25 MG/DL (ref 8–23)
BUN/CREAT SERPL: 31 (ref 9–20)
CALCIUM SERPL-MCNC: 9.2 MG/DL (ref 8.6–10.4)
CHLORIDE SERPL-SCNC: 102 MMOL/L (ref 98–107)
CO2 SERPL-SCNC: 27 MMOL/L (ref 20–31)
CREAT SERPL-MCNC: 0.8 MG/DL (ref 0.5–0.9)
ERYTHROCYTE [DISTWIDTH] IN BLOOD BY AUTOMATED COUNT: 12.9 % (ref 11.8–14.4)
GFR SERPL CREATININE-BSD FRML MDRD: >60 ML/MIN/1.73M2
GLUCOSE SERPL-MCNC: 82 MG/DL (ref 70–99)
HCT VFR BLD AUTO: 39.1 % (ref 36.3–47.1)
HGB BLD-MCNC: 12.7 G/DL (ref 11.9–15.1)
MCH RBC QN AUTO: 29.3 PG (ref 25.2–33.5)
MCHC RBC AUTO-ENTMCNC: 32.5 G/DL (ref 28.4–34.8)
MCV RBC AUTO: 90.3 FL (ref 82.6–102.9)
NRBC BLD-RTO: 0 PER 100 WBC
PLATELET # BLD AUTO: 260 K/UL (ref 138–453)
PMV BLD AUTO: 8.8 FL (ref 8.1–13.5)
POTASSIUM SERPL-SCNC: 4.2 MMOL/L (ref 3.7–5.3)
RBC # BLD AUTO: 4.33 M/UL (ref 3.95–5.11)
SODIUM SERPL-SCNC: 137 MMOL/L (ref 135–144)
WBC OTHER # BLD: 7.7 K/UL (ref 3.5–11.3)

## 2023-11-13 PROCEDURE — 85027 COMPLETE CBC AUTOMATED: CPT

## 2023-11-13 PROCEDURE — 80048 BASIC METABOLIC PNL TOTAL CA: CPT

## 2023-11-13 PROCEDURE — 36415 COLL VENOUS BLD VENIPUNCTURE: CPT

## 2023-11-13 PROCEDURE — G0480 DRUG TEST DEF 1-7 CLASSES: HCPCS

## 2023-11-13 NOTE — H&P
History and Physical Service   [address] [place] [address]     HISTORY AND PHYSICAL EXAMINATION            Date of Evaluation: 11/13/2023  Patient name:  Nataliia Valenzuela  MRN:   [id number]  YOB: 1960  PCP:    Arnoldo Linda PA-C    History Obtained From:     Patient, medical records    History of Present Illness: This is Nataliia Valenzuela a 63 y.o. female who presents for a pre-admission testing appointment for an upcoming PANNICULECTOMY ABDOMINOPLASTY by Meena Cutler MD scheduled on 12/5/2023 at 58 due to Localized adiposity. Patient is s/p braydon-en-y gastric bypass on 4/12/2022. Patient has lost 95lbs and now has complaints of excess skin on abdomen. Patient states it interferes with daily activities and exercising. Patient has difficulty sitting straight up and bending down due to core muscle separation. She has intermittent skin irritation treated with spray. Patient was evaluated by Dr. Anuj Giles who recommended surgical intervention. Functional Capacity per pt:  1) Pt is able to walk 2 city blocks on level ground without SOB. 2) Pt is able to climb 2 flights of stairs without SOB. 3) Pt is able to walk up a hill for 1-2 city blocks without SOB. Past Medical History:     Past Medical History:   Diagnosis Date    Abnormal EKG 03/23/2018    Angina pectoris syndrome ([address]) 03/23/2018    per pt they found a hiatal hernia    Arthritis     Constipation     COPD (chronic obstructive pulmonary disease) (HCC)     Diverticulosis of colon     DMII (diabetes mellitus, type 2) ([facility]) 2000    Dr Denise Green- now managed by PCP    Essential hypertension 01/15/2016    -cardiology    Family history of coronary artery disease 01/15/2016    Fatty infiltration of liver 2020    Gastric polyp 2019    Dr Carlton Romero, 5 mm    Generalized anxiety disorder     H/O: hysterectomy 2012    History of tobacco abuse 01/15/2016    Migraine     Obesity (BMI 30-39. 9)     Other specified acquired hypothyroidism 2000

## 2023-11-13 NOTE — H&P (VIEW-ONLY)
History and Physical Service   [address] [place] [address]     HISTORY AND PHYSICAL EXAMINATION            Date of Evaluation: 11/13/2023  Patient name:  Luis Virgen  MRN:   [id number]  YOB: 1960  PCP:    Abraham Santiago PA-C    History Obtained From:     Patient, medical records    History of Present Illness: This is Luis Virgen a 63 y.o. female who presents for a pre-admission testing appointment for an upcoming PANNICULECTOMY ABDOMINOPLASTY by Raj Ashton MD scheduled on 12/5/2023 at 58 due to Localized adiposity. Patient is s/p braydon-en-y gastric bypass on 4/12/2022. Patient has lost 95lbs and now has complaints of excess skin on abdomen. Patient states it interferes with daily activities and exercising. Patient has difficulty sitting straight up and bending down due to core muscle separation. She has intermittent skin irritation treated with spray. Patient was evaluated by Dr. Martin Thomas who recommended surgical intervention. Functional Capacity per pt:  1) Pt is able to walk 2 city blocks on level ground without SOB. 2) Pt is able to climb 2 flights of stairs without SOB. 3) Pt is able to walk up a hill for 1-2 city blocks without SOB. Past Medical History:     Past Medical History:   Diagnosis Date    Abnormal EKG 03/23/2018    Angina pectoris syndrome ([address]) 03/23/2018    per pt they found a hiatal hernia    Arthritis     Constipation     COPD (chronic obstructive pulmonary disease) (HCC)     Diverticulosis of colon     DMII (diabetes mellitus, type 2) (HCC) 2000    Dr Judi Carols- now managed by PCP    Essential hypertension 01/15/2016    -cardiology    Family history of coronary artery disease 01/15/2016    Fatty infiltration of liver 2020    Gastric polyp 2019    Dr Kole Lizarraga, 5 mm    Generalized anxiety disorder     H/O: hysterectomy 2012    History of tobacco abuse 01/15/2016    Migraine     Obesity (BMI 30-39. 9)     Other specified acquired hypothyroidism 2000

## 2023-11-13 NOTE — DISCHARGE INSTRUCTIONS
[facility] you can expect quality medical and nursing care that is centered on you individual needs. Our goal is to make your surgical experience as comfortable as possible.     Any questions about preparing for your surgery please call [phone].      ____________________________   ____________________________  Signature (Patient)                                 Signature (Nurse)                     Date

## 2023-11-14 NOTE — FLOWSHEET NOTE
11/14/23 1349   Anesthesia PAT Clearance   Anesthesia Review Status Anes has reviewed patient for surgery  ( reviews history,all EKG's & made aware that medical clearance is on chart.   No further clearances or interventions received)

## 2023-11-29 ENCOUNTER — SCHEDULED TELEPHONE ENCOUNTER (OUTPATIENT)
Dept: SURGERY | Age: 63
End: 2023-11-29
Payer: COMMERCIAL

## 2023-11-29 DIAGNOSIS — E65 PANNUS, ABDOMINAL: Primary | ICD-10-CM

## 2023-11-29 PROCEDURE — 99421 OL DIG E/M SVC 5-10 MIN: CPT | Performed by: PLASTIC SURGERY

## 2023-11-29 NOTE — PROGRESS NOTES
I discussed with the patient that her insurance is not approving an abdominoplasty. After an extensive discussion with the patient patient elected to undergo extended panniculectomy. We will I discussed with the patient the limitation of the extended panniculectomy. I also discussed with the patient the risks which are similar to appendectomy and abdominoplasty. Patient mention understanding. All questions were answered.   Will plan for extended panniculectomy for patient Splint

## 2023-12-04 ENCOUNTER — ANESTHESIA EVENT (OUTPATIENT)
Dept: OPERATING ROOM | Age: 63
End: 2023-12-04
Payer: COMMERCIAL

## 2023-12-05 ENCOUNTER — HOSPITAL ENCOUNTER (OUTPATIENT)
Age: 63
Setting detail: OBSERVATION
Discharge: HOME | End: 2023-12-06
Attending: PLASTIC SURGERY | Admitting: PLASTIC SURGERY
Payer: COMMERCIAL

## 2023-12-05 ENCOUNTER — ANESTHESIA (OUTPATIENT)
Dept: OPERATING ROOM | Age: 63
End: 2023-12-05
Payer: COMMERCIAL

## 2023-12-05 DIAGNOSIS — E65 SYMPTOMATIC ABDOMINAL PANNICULUS: ICD-10-CM

## 2023-12-05 DIAGNOSIS — M62.08 RECTUS DIASTASIS: ICD-10-CM

## 2023-12-05 LAB — GLUCOSE BLD-MCNC: 93 MG/DL (ref 65–105)

## 2023-12-05 PROCEDURE — 2580000003 HC RX 258: Performed by: PLASTIC SURGERY

## 2023-12-05 PROCEDURE — 15830 EXC EXCESSIVE SKIN ABDOMEN: CPT | Performed by: PLASTIC SURGERY

## 2023-12-05 PROCEDURE — 3600000002 HC SURGERY LEVEL 2 BASE: Performed by: PLASTIC SURGERY

## 2023-12-05 PROCEDURE — 6370000000 HC RX 637 (ALT 250 FOR IP): Performed by: PLASTIC SURGERY

## 2023-12-05 PROCEDURE — 3600000012 HC SURGERY LEVEL 2 ADDTL 15MIN: Performed by: PLASTIC SURGERY

## 2023-12-05 PROCEDURE — 6360000002 HC RX W HCPCS: Performed by: NURSE ANESTHETIST, CERTIFIED REGISTERED

## 2023-12-05 PROCEDURE — 6360000002 HC RX W HCPCS: Performed by: ANESTHESIOLOGY

## 2023-12-05 PROCEDURE — 7100000001 HC PACU RECOVERY - ADDTL 15 MIN: Performed by: PLASTIC SURGERY

## 2023-12-05 PROCEDURE — 2500000003 HC RX 250 WO HCPCS: Performed by: NURSE ANESTHETIST, CERTIFIED REGISTERED

## 2023-12-05 PROCEDURE — 2709999900 HC NON-CHARGEABLE SUPPLY: Performed by: PLASTIC SURGERY

## 2023-12-05 PROCEDURE — 3700000000 HC ANESTHESIA ATTENDED CARE: Performed by: PLASTIC SURGERY

## 2023-12-05 PROCEDURE — L0450 TLSO FLEX TRUNK/THOR PRE OTS: HCPCS | Performed by: PLASTIC SURGERY

## 2023-12-05 PROCEDURE — 3700000001 HC ADD 15 MINUTES (ANESTHESIA): Performed by: PLASTIC SURGERY

## 2023-12-05 PROCEDURE — 2580000003 HC RX 258: Performed by: ANESTHESIOLOGY

## 2023-12-05 PROCEDURE — 6360000002 HC RX W HCPCS: Performed by: PLASTIC SURGERY

## 2023-12-05 PROCEDURE — G0378 HOSPITAL OBSERVATION PER HR: HCPCS

## 2023-12-05 PROCEDURE — 7100000000 HC PACU RECOVERY - FIRST 15 MIN: Performed by: PLASTIC SURGERY

## 2023-12-05 PROCEDURE — 64488 TAP BLOCK BI INJECTION: CPT | Performed by: ANESTHESIOLOGY

## 2023-12-05 PROCEDURE — 82947 ASSAY GLUCOSE BLOOD QUANT: CPT

## 2023-12-05 PROCEDURE — 88305 TISSUE EXAM BY PATHOLOGIST: CPT

## 2023-12-05 RX ORDER — HYDROMORPHONE HYDROCHLORIDE 1 MG/ML
0.5 INJECTION, SOLUTION INTRAMUSCULAR; INTRAVENOUS; SUBCUTANEOUS EVERY 5 MIN PRN
Status: DISCONTINUED | OUTPATIENT
Start: 2023-12-05 | End: 2023-12-05 | Stop reason: HOSPADM

## 2023-12-05 RX ORDER — SODIUM CHLORIDE 0.9 % (FLUSH) 0.9 %
5-40 SYRINGE (ML) INJECTION EVERY 12 HOURS SCHEDULED
Status: DISCONTINUED | OUTPATIENT
Start: 2023-12-05 | End: 2023-12-05 | Stop reason: HOSPADM

## 2023-12-05 RX ORDER — SODIUM CHLORIDE 0.9 % (FLUSH) 0.9 %
5-40 SYRINGE (ML) INJECTION PRN
Status: DISCONTINUED | OUTPATIENT
Start: 2023-12-05 | End: 2023-12-05 | Stop reason: HOSPADM

## 2023-12-05 RX ORDER — SODIUM CHLORIDE 9 MG/ML
INJECTION, SOLUTION INTRAVENOUS CONTINUOUS
Status: DISCONTINUED | OUTPATIENT
Start: 2023-12-05 | End: 2023-12-06

## 2023-12-05 RX ORDER — SODIUM CHLORIDE 9 MG/ML
INJECTION, SOLUTION INTRAVENOUS PRN
Status: DISCONTINUED | OUTPATIENT
Start: 2023-12-05 | End: 2023-12-06

## 2023-12-05 RX ORDER — GABAPENTIN 100 MG/1
100 CAPSULE ORAL 3 TIMES DAILY
Qty: 30 CAPSULE | Refills: 0 | Status: SHIPPED | OUTPATIENT
Start: 2023-12-05 | End: 2023-12-15

## 2023-12-05 RX ORDER — ONDANSETRON 2 MG/ML
INJECTION INTRAMUSCULAR; INTRAVENOUS PRN
Status: DISCONTINUED | OUTPATIENT
Start: 2023-12-05 | End: 2023-12-05 | Stop reason: SDUPTHER

## 2023-12-05 RX ORDER — ROPIVACAINE HYDROCHLORIDE 2 MG/ML
INJECTION, SOLUTION EPIDURAL; INFILTRATION; PERINEURAL
Status: COMPLETED | OUTPATIENT
Start: 2023-12-05 | End: 2023-12-05

## 2023-12-05 RX ORDER — SODIUM CHLORIDE 0.9 % (FLUSH) 0.9 %
5-40 SYRINGE (ML) INJECTION PRN
Status: DISCONTINUED | OUTPATIENT
Start: 2023-12-05 | End: 2023-12-06

## 2023-12-05 RX ORDER — PROPOFOL 10 MG/ML
INJECTION, EMULSION INTRAVENOUS PRN
Status: DISCONTINUED | OUTPATIENT
Start: 2023-12-05 | End: 2023-12-05 | Stop reason: SDUPTHER

## 2023-12-05 RX ORDER — LIDOCAINE HYDROCHLORIDE 20 MG/ML
INJECTION, SOLUTION EPIDURAL; INFILTRATION; INTRACAUDAL; PERINEURAL PRN
Status: DISCONTINUED | OUTPATIENT
Start: 2023-12-05 | End: 2023-12-05 | Stop reason: SDUPTHER

## 2023-12-05 RX ORDER — SODIUM CHLORIDE 9 MG/ML
INJECTION, SOLUTION INTRAVENOUS PRN
Status: DISCONTINUED | OUTPATIENT
Start: 2023-12-05 | End: 2023-12-05 | Stop reason: HOSPADM

## 2023-12-05 RX ORDER — FENTANYL CITRATE 50 UG/ML
25 INJECTION, SOLUTION INTRAMUSCULAR; INTRAVENOUS EVERY 5 MIN PRN
Status: DISCONTINUED | OUTPATIENT
Start: 2023-12-05 | End: 2023-12-05 | Stop reason: HOSPADM

## 2023-12-05 RX ORDER — BISACODYL 5 MG/1
5 TABLET, DELAYED RELEASE ORAL DAILY
Status: DISCONTINUED | OUTPATIENT
Start: 2023-12-05 | End: 2023-12-06

## 2023-12-05 RX ORDER — GABAPENTIN 100 MG/1
100 CAPSULE ORAL 3 TIMES DAILY
Status: DISCONTINUED | OUTPATIENT
Start: 2023-12-05 | End: 2023-12-06 | Stop reason: HOSPADM

## 2023-12-05 RX ORDER — CEPHALEXIN 500 MG/1
500 CAPSULE ORAL 4 TIMES DAILY
Qty: 40 CAPSULE | Refills: 0 | Status: SHIPPED | OUTPATIENT
Start: 2023-12-05 | End: 2023-12-15

## 2023-12-05 RX ORDER — PHENYLEPHRINE HCL IN 0.9% NACL 1 MG/10 ML
SYRINGE (ML) INTRAVENOUS PRN
Status: DISCONTINUED | OUTPATIENT
Start: 2023-12-05 | End: 2023-12-05 | Stop reason: SDUPTHER

## 2023-12-05 RX ORDER — ONDANSETRON 2 MG/ML
4 INJECTION INTRAMUSCULAR; INTRAVENOUS
Status: DISCONTINUED | OUTPATIENT
Start: 2023-12-05 | End: 2023-12-05 | Stop reason: HOSPADM

## 2023-12-05 RX ORDER — FENTANYL CITRATE 50 UG/ML
INJECTION, SOLUTION INTRAMUSCULAR; INTRAVENOUS PRN
Status: DISCONTINUED | OUTPATIENT
Start: 2023-12-05 | End: 2023-12-05 | Stop reason: SDUPTHER

## 2023-12-05 RX ORDER — ONDANSETRON 4 MG/1
4 TABLET, ORALLY DISINTEGRATING ORAL EVERY 8 HOURS PRN
Status: DISCONTINUED | OUTPATIENT
Start: 2023-12-05 | End: 2023-12-06

## 2023-12-05 RX ORDER — HYDROCODONE BITARTRATE AND ACETAMINOPHEN 5; 325 MG/1; MG/1
1 TABLET ORAL EVERY 6 HOURS PRN
Qty: 28 TABLET | Refills: 0 | Status: SHIPPED | OUTPATIENT
Start: 2023-12-05 | End: 2023-12-12

## 2023-12-05 RX ORDER — BACLOFEN 10 MG/1
10 TABLET ORAL 3 TIMES DAILY
Status: DISCONTINUED | OUTPATIENT
Start: 2023-12-05 | End: 2023-12-06 | Stop reason: HOSPADM

## 2023-12-05 RX ORDER — ROCURONIUM BROMIDE 10 MG/ML
INJECTION, SOLUTION INTRAVENOUS PRN
Status: DISCONTINUED | OUTPATIENT
Start: 2023-12-05 | End: 2023-12-05 | Stop reason: SDUPTHER

## 2023-12-05 RX ORDER — ONDANSETRON 2 MG/ML
4 INJECTION INTRAMUSCULAR; INTRAVENOUS EVERY 6 HOURS PRN
Status: DISCONTINUED | OUTPATIENT
Start: 2023-12-05 | End: 2023-12-06

## 2023-12-05 RX ORDER — BACLOFEN 10 MG/1
10 TABLET ORAL 3 TIMES DAILY
Qty: 30 TABLET | Refills: 0 | Status: SHIPPED | OUTPATIENT
Start: 2023-12-05 | End: 2023-12-15

## 2023-12-05 RX ORDER — DEXAMETHASONE SODIUM PHOSPHATE 10 MG/ML
INJECTION, SOLUTION INTRAMUSCULAR; INTRAVENOUS PRN
Status: DISCONTINUED | OUTPATIENT
Start: 2023-12-05 | End: 2023-12-05 | Stop reason: SDUPTHER

## 2023-12-05 RX ORDER — SODIUM CHLORIDE 0.9 % (FLUSH) 0.9 %
5-40 SYRINGE (ML) INJECTION EVERY 12 HOURS SCHEDULED
Status: DISCONTINUED | OUTPATIENT
Start: 2023-12-05 | End: 2023-12-06

## 2023-12-05 RX ORDER — OXYCODONE HYDROCHLORIDE 5 MG/1
5 TABLET ORAL EVERY 4 HOURS PRN
Status: DISCONTINUED | OUTPATIENT
Start: 2023-12-05 | End: 2023-12-06

## 2023-12-05 RX ORDER — SODIUM CHLORIDE, SODIUM LACTATE, POTASSIUM CHLORIDE, CALCIUM CHLORIDE 600; 310; 30; 20 MG/100ML; MG/100ML; MG/100ML; MG/100ML
INJECTION, SOLUTION INTRAVENOUS CONTINUOUS
Status: DISCONTINUED | OUTPATIENT
Start: 2023-12-05 | End: 2023-12-05

## 2023-12-05 RX ORDER — SODIUM CHLORIDE 9 MG/ML
INJECTION, SOLUTION INTRAVENOUS CONTINUOUS
Status: DISCONTINUED | OUTPATIENT
Start: 2023-12-05 | End: 2023-12-05

## 2023-12-05 RX ORDER — MIDAZOLAM HYDROCHLORIDE 1 MG/ML
INJECTION INTRAMUSCULAR; INTRAVENOUS PRN
Status: DISCONTINUED | OUTPATIENT
Start: 2023-12-05 | End: 2023-12-05 | Stop reason: SDUPTHER

## 2023-12-05 RX ORDER — KETAMINE HCL IN NACL, ISO-OSM 100MG/10ML
SYRINGE (ML) INJECTION PRN
Status: DISCONTINUED | OUTPATIENT
Start: 2023-12-05 | End: 2023-12-05 | Stop reason: SDUPTHER

## 2023-12-05 RX ORDER — LIDOCAINE HYDROCHLORIDE 10 MG/ML
1 INJECTION, SOLUTION EPIDURAL; INFILTRATION; INTRACAUDAL; PERINEURAL
Status: DISCONTINUED | OUTPATIENT
Start: 2023-12-05 | End: 2023-12-05 | Stop reason: HOSPADM

## 2023-12-05 RX ORDER — ACETAMINOPHEN 325 MG/1
650 TABLET ORAL EVERY 6 HOURS
Status: DISCONTINUED | OUTPATIENT
Start: 2023-12-05 | End: 2023-12-06 | Stop reason: HOSPADM

## 2023-12-05 RX ORDER — ENOXAPARIN SODIUM 100 MG/ML
40 INJECTION SUBCUTANEOUS DAILY
Status: DISCONTINUED | OUTPATIENT
Start: 2023-12-05 | End: 2023-12-06

## 2023-12-05 RX ADMIN — HYDROMORPHONE HYDROCHLORIDE 0.5 MG: 1 INJECTION, SOLUTION INTRAMUSCULAR; INTRAVENOUS; SUBCUTANEOUS at 18:45

## 2023-12-05 RX ADMIN — Medication 30 MG: at 16:43

## 2023-12-05 RX ADMIN — Medication 2000 MG: at 15:00

## 2023-12-05 RX ADMIN — HYDROMORPHONE HYDROCHLORIDE 0.5 MG: 1 INJECTION, SOLUTION INTRAMUSCULAR; INTRAVENOUS; SUBCUTANEOUS at 18:23

## 2023-12-05 RX ADMIN — PROPOFOL 150 MG: 10 INJECTION, EMULSION INTRAVENOUS at 14:50

## 2023-12-05 RX ADMIN — Medication 200 MCG: at 15:23

## 2023-12-05 RX ADMIN — CEFAZOLIN 1000 MG: 1 INJECTION, POWDER, FOR SOLUTION INTRAMUSCULAR; INTRAVENOUS at 22:00

## 2023-12-05 RX ADMIN — ROCURONIUM BROMIDE 50 MG: 10 INJECTION, SOLUTION INTRAVENOUS at 14:50

## 2023-12-05 RX ADMIN — SODIUM CHLORIDE: 9 INJECTION, SOLUTION INTRAVENOUS at 20:17

## 2023-12-05 RX ADMIN — GABAPENTIN 100 MG: 100 CAPSULE ORAL at 00:32

## 2023-12-05 RX ADMIN — HYDROMORPHONE HYDROCHLORIDE 0.5 MG: 1 INJECTION, SOLUTION INTRAMUSCULAR; INTRAVENOUS; SUBCUTANEOUS at 18:28

## 2023-12-05 RX ADMIN — FENTANYL CITRATE 50 MCG: 50 INJECTION INTRAMUSCULAR; INTRAVENOUS at 14:50

## 2023-12-05 RX ADMIN — ROPIVACAINE HYDROCHLORIDE 60 ML: 2 INJECTION, SOLUTION EPIDURAL; INFILTRATION at 15:04

## 2023-12-05 RX ADMIN — MIDAZOLAM 2 MG: 1 INJECTION INTRAMUSCULAR; INTRAVENOUS at 14:46

## 2023-12-05 RX ADMIN — ONDANSETRON 4 MG: 2 INJECTION INTRAMUSCULAR; INTRAVENOUS at 14:58

## 2023-12-05 RX ADMIN — FENTANYL CITRATE 50 MCG: 50 INJECTION INTRAMUSCULAR; INTRAVENOUS at 15:32

## 2023-12-05 RX ADMIN — SODIUM CHLORIDE, POTASSIUM CHLORIDE, SODIUM LACTATE AND CALCIUM CHLORIDE: 600; 310; 30; 20 INJECTION, SOLUTION INTRAVENOUS at 18:33

## 2023-12-05 RX ADMIN — Medication 20 MG: at 14:54

## 2023-12-05 RX ADMIN — DEXAMETHASONE SODIUM PHOSPHATE 10 MG: 10 INJECTION, SOLUTION INTRAMUSCULAR; INTRAVENOUS at 14:58

## 2023-12-05 RX ADMIN — LIDOCAINE HYDROCHLORIDE 60 MG: 20 INJECTION, SOLUTION EPIDURAL; INFILTRATION; INTRACAUDAL; PERINEURAL at 14:50

## 2023-12-05 RX ADMIN — SUGAMMADEX 200 MG: 100 INJECTION, SOLUTION INTRAVENOUS at 17:28

## 2023-12-05 RX ADMIN — SODIUM CHLORIDE, POTASSIUM CHLORIDE, SODIUM LACTATE AND CALCIUM CHLORIDE: 600; 310; 30; 20 INJECTION, SOLUTION INTRAVENOUS at 15:14

## 2023-12-05 RX ADMIN — ONDANSETRON 4 MG: 2 INJECTION INTRAMUSCULAR; INTRAVENOUS at 21:57

## 2023-12-05 RX ADMIN — SODIUM CHLORIDE, POTASSIUM CHLORIDE, SODIUM LACTATE AND CALCIUM CHLORIDE: 600; 310; 30; 20 INJECTION, SOLUTION INTRAVENOUS at 12:08

## 2023-12-05 ASSESSMENT — PAIN DESCRIPTION - ORIENTATION: ORIENTATION: LOWER

## 2023-12-05 ASSESSMENT — PAIN DESCRIPTION - LOCATION
LOCATION: ABDOMEN

## 2023-12-05 ASSESSMENT — PAIN SCALES - GENERAL
PAINLEVEL_OUTOF10: 6
PAINLEVEL_OUTOF10: 8
PAINLEVEL_OUTOF10: 10

## 2023-12-05 ASSESSMENT — PAIN DESCRIPTION - DESCRIPTORS
DESCRIPTORS: PATIENT UNABLE TO DESCRIBE
DESCRIPTORS: BURNING;STABBING
DESCRIPTORS: BURNING

## 2023-12-05 ASSESSMENT — PAIN - FUNCTIONAL ASSESSMENT
PAIN_FUNCTIONAL_ASSESSMENT: ACTIVITIES ARE NOT PREVENTED
PAIN_FUNCTIONAL_ASSESSMENT: 0-10

## 2023-12-05 ASSESSMENT — PAIN DESCRIPTION - FREQUENCY
FREQUENCY: CONTINUOUS
FREQUENCY: CONTINUOUS

## 2023-12-05 ASSESSMENT — PAIN DESCRIPTION - PAIN TYPE
TYPE: SURGICAL PAIN

## 2023-12-05 ASSESSMENT — LIFESTYLE VARIABLES: HOW OFTEN DO YOU HAVE A DRINK CONTAINING ALCOHOL: NEVER

## 2023-12-05 NOTE — H&P
Name band;
your pulse or heart rate may cause bruising, swelling, fluid accumulation and the need for return to surgery. It is wise to refrain from intimate physical activities after surgery until your physician states it is safe. It is important that you participate in follow-up care, return for aftercare, and promote your recovery after surgery. REVISION POLICY  Surgical revision surgery is a common part of elective surgery. Your procedure will not stop you from aging, sagging, scarring, or experiencing ongoing skin changes that are more genetically controlled. If revision surgery is either desired or advisable within one year after the initial surgery, there may be a Fees associated with it. HEALTH INSURANCE  Most health insurance companies exclude coverage for cosmetic surgical operations or any resulting complications. Please carefully review your health insurance subscriber-information pamphlet. Most insurance plans exclude coverage for secondary or revisionary surgery due to complications of cosmetic surgery. It is unethical and fraudulent to bill insurance for cosmetic procedures. We cannot participate in such activities. FINANCIAL RESPONSIBILITIES   The cost of surgery involves several charges for the services provided. The total includes fees charged by your surgeon, the cost of surgical supplies, anesthesia, laboratory tests, and possible outpatient hospital charges, depending on where the surgery is performed. Depending on whether the cost of surgery is covered by an insurance plan, you will be responsible for necessary co-payments, deductibles, and charges not covered. The fees charged for this procedure do not include any potential future costs for additional procedures that you elect to have or require in order to revise, optimize, or complete your outcome. Additional costs may occur should complications develop from the surgery.  Secondary surgery or hospital day-surgery charges involved with revision surgery

## 2023-12-05 NOTE — ANESTHESIA POSTPROCEDURE EVALUATION
Department of Anesthesiology  Postprocedure Note    Patient: Binu Lehman  MRN: [id number]  [facility] [place]: 1960  Date of evaluation: 12/5/2023      Procedure Summary       Date: 12/05/23 Room / Location: [facility] [address] - INPATIENT    Anesthesia Start: 8653 Anesthesia Stop: 3134    Procedure: EXTENDED PANNICULECTOMY (Abdomen) Diagnosis:       Symptomatic abdominal panniculus      Rectus diastasis      (Symptomatic abdominal panniculus [E65])      (Rectus diastasis [M62.08])    Surgeons: Kim Fernandez MD Responsible Provider: Ugo Vuong MD    Anesthesia Type: General ASA Status: 2            Anesthesia Type: General    Sergo Phase I:      Sergo Phase II:        Anesthesia Post Evaluation    Patient location during evaluation: PACU  Patient participation: complete - patient participated  Level of consciousness: awake and alert  Airway patency: patent  Nausea & Vomiting: no nausea and no vomiting  Complications: no  Cardiovascular status: hemodynamically stable  Respiratory status: acceptable  Hydration status: euvolemic  Pain management: adequate

## 2023-12-05 NOTE — OP NOTE
Operative Note      Patient: Ann-Marie Castellon  YOB: 1960  MRN: [id number]    Date of Procedure: 12/5/2023    Pre-Op Diagnosis Codes: * Symptomatic abdominal panniculus [E65]     * Rectus diastasis [M62.08]    Post-Op Diagnosis: Same       Procedure(s):  EXTENDED PANNICULECTOMY  The pannus weighed 6 pounds    Surgeon(s):  Yarelis Hamlin MD    Assistant:   First Assistant: Crispin Caldwell RN    Anesthesia: General    Estimated Blood Loss (mL): 275     Complications: None    Specimens:   ID Type Source Tests Collected by Time Destination   A : PANNIS Tissue Abdomen SURGICAL PATHOLOGY Yarelis Hamlin MD 12/5/2023 1656        Implants:  * No implants in log *      Drains:   Closed/Suction Drain Right RLQ Bulb (Active)       Closed/Suction Drain Left LLQ (Active)         INDICATION FOR PROCEDURE:  The patient is 63 y.o. female . All the risks, benefits, and alternative treatments including but not limited to infection, bleeding, loss of umbilicus, wound healing issues, need for blood transfusion, and increased risk for all surgical aspect due to her multiple medical problems were explained to the patient and an informed consent was obtained. DESCRIPTION OF PROCEDURE:  The patient was marked in the holding area. The midlines were marked. The amount of resection was also marked. Procedure: The patient was brought into the room. SCDs were placed and turned on prior to induction of anesthesia via endotracheal intubation. Then all areas were prepped and draped in usual customary sterile manner. After all the areas were prepped and draped in usual customary sterile manner, a time-out was performed and everybody in the room was in agreement with the time-out. Then, an incision was made over the inferior border of the incision in the horizontal manner. This was done using #10 blade, then a cautery was used, and dissection was continued in the following manner:  To the subcutaneous tissue

## 2023-12-05 NOTE — ANESTHESIA PROCEDURE NOTES
Peripheral Block    Patient location during procedure: OR  Reason for block: post-op pain management and at surgeon's request  Start time: 12/5/2023 3:04 PM  End time: 12/5/2023 3:15 PM  Staffing  Performed: anesthesiologist   Anesthesiologist: Laney Pina MD  Performed by: Laney Pina MD  Authorized by:  Laney Pina MD    Preanesthetic Checklist  Completed: patient identified, IV checked, site marked, risks and benefits discussed, surgical/procedural consents, equipment checked, pre-op evaluation, timeout performed, anesthesia consent given, oxygen available, monitors applied/VS acknowledged, fire risk safety assessment completed and verbalized and blood product R/B/A discussed and consented  Peripheral Block   Patient position: supine  Prep: ChloraPrep  Provider prep: mask and sterile gloves  Patient monitoring: cardiac monitor, continuous pulse ox, IV access and oxygen  Block type: TAP  Laterality: bilateral  Injection technique: single-shot  Guidance: ultrasound guided    Needle   Needle type: insulated echogenic nerve stimulator needle   Needle localization: ultrasound guidance  Assessment   Injection assessment: negative aspiration for heme, no paresthesia on injection, local visualized surrounding nerve on ultrasound and no intravascular symptoms  Paresthesia pain: none  Slow fractionated injection: yes  Hemodynamics: stable  Real-time US image taken/store: yes  Outcomes: patient tolerated procedure well and uncomplicated    Additional Notes  30cc of 0.2% PF Ropivicaine used for right TAP block    30cc of 0.2% PF Ropivicaine used for left TAP block      Medications Administered  ropivacaine (NAROPIN) injection 0.2% - Perineural   60 mL - 12/5/2023 3:04:00 PM

## 2023-12-05 NOTE — INTERVAL H&P NOTE
This is a 63 y.o. female who is pleasant, cooperative, alert and oriented x3, in no acute distress. Heart: Heart sounds are normal.  HR 69 regular rate and rhythm without murmur, gallop or rub. Lungs: Normal respiratory effort with equal expansion, good air exchange, unlabored and clear to auscultation without wheezes or rales bilaterally   Abdomen: soft,excess abdominal skin, abdomen nontender, nondistended with bowel sounds . Labs:  Recent Labs     11/13/23  1527   HGB 12.7   HCT 39.1   WBC 7.7   MCV 90.3         K 4.2      CO2 27   BUN 25*   CREATININE 0.8   GLUCOSE 82       No results for input(s): \"COVID19\" in the last 720 hours.     ELISEO Jones CNP  Electronically signed 12/5/2023 at 12:12 PM

## 2023-12-05 NOTE — DISCHARGE INSTRUCTIONS
[facility]  [facility] OR  [address] [address]  [phone]       POST-OPERATIVE INSTRUCTIONS FOR SUTURE LINECARE   Incisions and suture lines are a necessary part of surgery. These lines take many months to fully heal. Part of the healing process requires proper cleansing and care. In addition, there are treatments that can help resulting scars to be flatter, finer, and less noticeable. There is no guarantee to what a scar will look like once it has fully healed, however the following instructions are important to a good outcome. Do not smoke. You have been advised to quit before surgery. Do not start after surgery. Smoking decreases the oxygen in your blood and greatly impacts your ability to heal.  Do not smoke until your incisions have fully healed. You have an incisional wound VAC in place. You may sponge bath until the office visit. You may remove your abdominal binder to sponge bath. Otherwise you need to wear your abdominal binder 24 hours a day 7 days a week. If you have sutures, they will remain in the skin anywhere from 7-21 days, or longer depending on the area and your healing. Please call for an appointment if one has not been scheduled. You may notice a slight drainage of either blood or a clear type of liquid from the area, which is normal.  If the drainage has an odor to it call the office. DIET: Resume your previous diet. ACTIVITY: Avoid straining, strenuous exercise, or lifting over five pounds for 14 days, unless instructed otherwise. Depending on they type of surgery you have had this may be as long as [address]. (Do not bend over for the first 24 hours after your surgery). Direct trauma and physical stress may result in a separation of the suture line or wider scars. MEDICATIONS: If you have been given an antibiotic, take until finished. Please take pain medication as prescribed, follow directions CAREFULLY.    Note: As Needed  Do not

## 2023-12-06 VITALS
SYSTOLIC BLOOD PRESSURE: 106 MMHG | TEMPERATURE: 98.2 F | HEIGHT: 65 IN | OXYGEN SATURATION: 97 % | DIASTOLIC BLOOD PRESSURE: 53 MMHG | BODY MASS INDEX: 25.24 KG/M2 | RESPIRATION RATE: 16 BRPM | WEIGHT: 151.5 LBS | HEART RATE: 73 BPM

## 2023-12-06 PROCEDURE — 6370000000 HC RX 637 (ALT 250 FOR IP): Performed by: PLASTIC SURGERY

## 2023-12-06 PROCEDURE — 2580000003 HC RX 258: Performed by: PLASTIC SURGERY

## 2023-12-06 PROCEDURE — 6360000002 HC RX W HCPCS: Performed by: PLASTIC SURGERY

## 2023-12-06 PROCEDURE — G0378 HOSPITAL OBSERVATION PER HR: HCPCS

## 2023-12-06 RX ORDER — GABAPENTIN 100 MG/1
100 CAPSULE ORAL ONCE
Status: COMPLETED | OUTPATIENT
Start: 2023-12-06 | End: 2023-12-06

## 2023-12-06 RX ADMIN — ACETAMINOPHEN 650 MG: 325 TABLET ORAL at 06:17

## 2023-12-06 RX ADMIN — OXYCODONE HYDROCHLORIDE 5 MG: 5 TABLET ORAL at 06:17

## 2023-12-06 RX ADMIN — BACLOFEN 10 MG: 10 TABLET ORAL at 00:32

## 2023-12-06 RX ADMIN — CEFAZOLIN 1000 MG: 1 INJECTION, POWDER, FOR SOLUTION INTRAMUSCULAR; INTRAVENOUS at 05:23

## 2023-12-06 RX ADMIN — OXYCODONE HYDROCHLORIDE 5 MG: 5 TABLET ORAL at 00:32

## 2023-12-06 RX ADMIN — ACETAMINOPHEN 650 MG: 325 TABLET ORAL at 00:32

## 2023-12-06 RX ADMIN — BISACODYL 5 MG: 5 TABLET, COATED ORAL at 09:02

## 2023-12-06 RX ADMIN — GABAPENTIN 100 MG: 100 CAPSULE ORAL at 12:16

## 2023-12-06 RX ADMIN — BACLOFEN 10 MG: 10 TABLET ORAL at 09:02

## 2023-12-06 RX ADMIN — GABAPENTIN 100 MG: 100 CAPSULE ORAL at 09:02

## 2023-12-06 RX ADMIN — ENOXAPARIN SODIUM 40 MG: 100 INJECTION SUBCUTANEOUS at 09:02

## 2023-12-06 RX ADMIN — ACETAMINOPHEN 650 MG: 325 TABLET ORAL at 12:17

## 2023-12-06 ASSESSMENT — PAIN - FUNCTIONAL ASSESSMENT
PAIN_FUNCTIONAL_ASSESSMENT: ACTIVITIES ARE NOT PREVENTED
PAIN_FUNCTIONAL_ASSESSMENT: ACTIVITIES ARE NOT PREVENTED

## 2023-12-06 ASSESSMENT — PAIN DESCRIPTION - DESCRIPTORS
DESCRIPTORS: BURNING
DESCRIPTORS: DISCOMFORT

## 2023-12-06 ASSESSMENT — PAIN DESCRIPTION - PAIN TYPE
TYPE: SURGICAL PAIN
TYPE: SURGICAL PAIN

## 2023-12-06 ASSESSMENT — PAIN SCALES - GENERAL
PAINLEVEL_OUTOF10: 4
PAINLEVEL_OUTOF10: 1
PAINLEVEL_OUTOF10: 3
PAINLEVEL_OUTOF10: 3
PAINLEVEL_OUTOF10: 6

## 2023-12-06 ASSESSMENT — PAIN DESCRIPTION - FREQUENCY
FREQUENCY: CONTINUOUS
FREQUENCY: CONTINUOUS

## 2023-12-06 ASSESSMENT — PAIN DESCRIPTION - LOCATION
LOCATION: ABDOMEN

## 2023-12-06 ASSESSMENT — PAIN DESCRIPTION - ONSET
ONSET: ON-GOING
ONSET: ON-GOING

## 2023-12-06 ASSESSMENT — PAIN DESCRIPTION - ORIENTATION
ORIENTATION: LOWER
ORIENTATION: LOWER

## 2023-12-06 NOTE — PROGRESS NOTES
The pt was discharged to home. The discharge instructions were given and reviewed with the pt.  She was escorted to thein stable condition

## 2023-12-06 NOTE — PROGRESS NOTES
Patient admitted to room 2026  From PACU via stretcher with belongings. V as charted  Patient oriented to room and call light. Assessment as charted. Admission database done. Orders and plan of care reviewed. Call light in reach. Will continue to monitor.

## 2023-12-06 NOTE — PLAN OF CARE
Patient admitted with extended panniculectomy for pain control. Patient with 2 WARREN and large lower abdominal dressing. Up with standby assist. PRN pain medication given. Patient to be discharged later today.          Problem: Chronic Conditions and Co-morbidities  Goal: Patient's chronic conditions and co-morbidity symptoms are monitored and maintained or improved  Outcome: Progressing  Flowsheets (Taken 12/6/2023 0325)  Care Plan - Patient's Chronic Conditions and Co-Morbidity Symptoms are Monitored and Maintained or Improved:   Monitor and assess patient's chronic conditions and comorbid symptoms for stability, deterioration, or improvement   Collaborate with multidisciplinary team to address chronic and comorbid conditions and prevent exacerbation or deterioration   Update acute care plan with appropriate goals if chronic or comorbid symptoms are exacerbated and prevent overall improvement and discharge     Problem: Discharge Planning  Goal: Discharge to home or other facility with appropriate resources  Outcome: Progressing  Flowsheets (Taken 12/5/2023 2000)  Discharge to home or other facility with appropriate resources:   Identify barriers to discharge with patient and caregiver   Identify discharge learning needs (meds, wound care, etc)   Refer to discharge planning if patient needs post-hospital services based on physician order or complex needs related to functional status, cognitive ability or social support system   Arrange for needed discharge resources and transportation as appropriate     Problem: Pain  Goal: Verbalizes/displays adequate comfort level or baseline comfort level  Outcome: Progressing  Flowsheets (Taken 12/6/2023 0325)  Verbalizes/displays adequate comfort level or baseline comfort level:   Encourage patient to monitor pain and request assistance   Administer analgesics based on type and severity of pain and evaluate response   Consider cultural and social influences on pain and pain

## 2023-12-06 NOTE — CARE COORDINATION
that supports the patient's individualized plan of care/goals, treatment preferences, and shares the quality data associated with the providers? Yes     Plan is home with spouse independently. Declines any skilled needs. Independent. Plan home today.

## 2023-12-06 NOTE — PLAN OF CARE
Problem: Chronic Conditions and Co-morbidities  Goal: Patient's chronic conditions and co-morbidity symptoms are monitored and maintained or improved  12/6/2023 0325 by Dasha Jones RN  Outcome: Progressing  Flowsheets (Taken 12/6/2023 0325)  Care Plan - Patient's Chronic Conditions and Co-Morbidity Symptoms are Monitored and Maintained or Improved:   Monitor and assess patient's chronic conditions and comorbid symptoms for stability, deterioration, or improvement   Collaborate with multidisciplinary team to address chronic and comorbid conditions and prevent exacerbation or deterioration   Update acute care plan with appropriate goals if chronic or comorbid symptoms are exacerbated and prevent overall improvement and discharge     Problem: Discharge Planning  Goal: Discharge to home or other facility with appropriate resources  12/6/2023 1150 by Ara Carrasquillo RN  Outcome: Progressing  12/6/2023 0325 by Dasha Jones RN  Outcome: Progressing  Flowsheets (Taken 12/5/2023 2000)  Discharge to home or other facility with appropriate resources:   Identify barriers to discharge with patient and caregiver   Identify discharge learning needs (meds, wound care, etc)   Refer to discharge planning if patient needs post-hospital services based on physician order or complex needs related to functional status, cognitive ability or social support system   Arrange for needed discharge resources and transportation as appropriate

## 2023-12-06 NOTE — PLAN OF CARE
Problem: Chronic Conditions and Co-morbidities  Goal: Patient's chronic conditions and co-morbidity symptoms are monitored and maintained or improved  12/6/2023 0325 by Juan Ramon Sewell RN  Outcome: Progressing  Flowsheets (Taken 12/6/2023 0325)  Care Plan - Patient's Chronic Conditions and Co-Morbidity Symptoms are Monitored and Maintained or Improved:   Monitor and assess patient's chronic conditions and comorbid symptoms for stability, deterioration, or improvement   Collaborate with multidisciplinary team to address chronic and comorbid conditions and prevent exacerbation or deterioration   Update acute care plan with appropriate goals if chronic or comorbid symptoms are exacerbated and prevent overall improvement and discharge     Problem: Discharge Planning  Goal: Discharge to home or other facility with appropriate resources  12/6/2023 1325 by Kimber Ha RN  Outcome: Completed  12/6/2023 1150 by Kimber Ha RN  Outcome: Progressing  12/6/2023 0325 by Juan Ramon Sewell RN  Outcome: Progressing  Flowsheets (Taken 12/5/2023 2000)  Discharge to home or other facility with appropriate resources:   Identify barriers to discharge with patient and caregiver   Identify discharge learning needs (meds, wound care, etc)   Refer to discharge planning if patient needs post-hospital services based on physician order or complex needs related to functional status, cognitive ability or social support system   Arrange for needed discharge resources and transportation as appropriate     Problem: Pain  Goal: Verbalizes/displays adequate comfort level or baseline comfort level  12/6/2023 1325 by Kimber Ha RN  Outcome: Completed  12/6/2023 1150 by Kimber Ha RN  Outcome: Progressing  12/6/2023 0325 by Juan Ramon Sewell RN  Outcome: Progressing  Flowsheets (Taken 12/6/2023 0325)  Verbalizes/displays adequate comfort level or baseline comfort level:   Encourage patient to monitor

## 2023-12-07 ENCOUNTER — CARE COORDINATION (OUTPATIENT)
Dept: OTHER | Facility: CLINIC | Age: 63
End: 2023-12-07

## 2023-12-07 LAB — SURGICAL PATHOLOGY REPORT: NORMAL

## 2023-12-07 NOTE — CARE COORDINATION
time. Were discharge instructions available to patient? Yes. Reviewed appropriate site of care based on symptoms and resources available to patient including: Specialist. The patient agrees to contact the PCP office for questions related to their healthcare. Advance Care Planning:   Does patient have an Advance Directive: reviewed and current. Medication reconciliation was performed with patient, who verbalizes understanding of administration of home medications. Was patient discharged with a pulse oximeter? no    Non-face-to-face services provided:  Obtained and reviewed discharge summary and/or continuity of care documents        Care Transitions 24 Hour Call    Do you have any ongoing symptoms?: No  Do you have all of your prescriptions and are they filled?: Yes  Were you discharged with any Home Care or Post Acute Services: No  Patient Home Equipment: CPAP  Do you have support at home?: Partner/Spouse/SO  Are you an active caregiver in your home?: No  Care Transitions Interventions                                   Discussed follow-up appointments. If no appointment was previously scheduled, appointment scheduling offered: Yes. Is follow up appointment scheduled within 7 days of discharge? Yes. Follow Up  Future Appointments   Date Time Provider [address] [place] [address]   12/13/2023 11:15 AM Julio Kenney MD [email or web address]   12/20/2023  9:45 AM Julio Kenney MD [facility]   12/21/2023  8:30 AM Dorita Kiran MD [facility]   1/15/2024  3:15 PM Radha Tyson PA-C [place]  [address] [facility]   4/19/2024  4:00 PM SCHEDULE, [facility] BARIATRIC DIETICIAN bariatric [address] Transition Nurse provided contact information. Plan for follow-up call in 5-7 days based on severity of symptoms and risk factors.   Plan for next call: symptom management-pain and drain concerns    Sunshine Mei RN

## 2023-12-13 ENCOUNTER — OFFICE VISIT (OUTPATIENT)
Dept: SURGERY | Age: 63
End: 2023-12-13

## 2023-12-13 VITALS
DIASTOLIC BLOOD PRESSURE: 77 MMHG | OXYGEN SATURATION: 99 % | WEIGHT: 155.2 LBS | HEIGHT: 64 IN | HEART RATE: 68 BPM | BODY MASS INDEX: 26.5 KG/M2 | SYSTOLIC BLOOD PRESSURE: 128 MMHG

## 2023-12-13 DIAGNOSIS — Z98.890 POSTOPERATIVE STATE: Primary | ICD-10-CM

## 2023-12-13 PROCEDURE — 99024 POSTOP FOLLOW-UP VISIT: CPT | Performed by: PLASTIC SURGERY

## 2023-12-14 ENCOUNTER — CARE COORDINATION (OUTPATIENT)
Dept: OTHER | Facility: CLINIC | Age: 63
End: 2023-12-14

## 2023-12-14 NOTE — CARE COORDINATION
Care Transitions Follow Up Call    Patient Current Location:  Home: [address] [facility]    Care Transition Nurse contacted the patient by telephone to follow up after admission on 2023. Verified name and  with patient as identifiers. Patient: Esthela Reina  Patient : 1960   MRN: [id number]  Reason for Admission: Abdominal Panniculectomy  Discharge Date: 23 RARS: No data recorded    Needs to be reviewed by the provider   Additional needs identified to be addressed with provider: No  none             Method of communication with provider: none. Telephonic outreach to the patient to follow up with care transitions. She reports that she is doing well. She had a follow up appointment with her surgeon yesterday. Her left drain was pulled, the right drain remains intact. She reports getting anywhere from 50cc to 70cc daily. She has a follow up appointment next week, likely to pull that drain. The drain site looks heathy. She reports that her incision sites are health as well. She does have some open skin areas around the incisions, related to blistering and irritation likely from the tape. She states that it is itchy and irritating. She is putting triple antibiotic cream on the guaze to avoid the skin continuing to peel from her bandages. She had previous concerns with constipation prior to surgery, but has been moving her bowels well since her surgery. She is drinking at least 64 ounces of water daily and trying to eat high protein meals. Addressed changes since last contact:  none  Discussed follow-up appointments. If no appointment was previously scheduled, appointment scheduling offered: Yes. Is follow up appointment scheduled within 7 days of discharge? Yes.     Follow Up  Future Appointments   Date Time Provider [address]   2023  9:45 AM Tisha Burrell MD [facility]   2023  8:30 AM Umang Clark MD [facility]   1/15/2024  3:15

## 2023-12-20 PROBLEM — E11.9 TYPE 2 DIABETES MELLITUS (HCC): Status: ACTIVE | Noted: 2023-12-20

## 2023-12-27 ENCOUNTER — OFFICE VISIT (OUTPATIENT)
Dept: SURGERY | Age: 63
End: 2023-12-27

## 2023-12-27 VITALS — WEIGHT: 155 LBS | BODY MASS INDEX: 26.2 KG/M2

## 2023-12-27 DIAGNOSIS — Z98.890 POSTOPERATIVE STATE: Primary | ICD-10-CM

## 2023-12-27 PROCEDURE — 99024 POSTOP FOLLOW-UP VISIT: CPT | Performed by: PLASTIC SURGERY

## 2023-12-29 ENCOUNTER — CARE COORDINATION (OUTPATIENT)
Dept: OTHER | Facility: CLINIC | Age: 63
End: 2023-12-29

## 2023-12-29 NOTE — CARE COORDINATION
Ambulatory Care Coordination Note  2023    Patient Current Location:  Home: [address] [place] [address]    ACM contacted the patient by telephone. Verified name and  with patient as identifiers. Provided introduction to self, and explanation of the ACM role. ACM: Ritesh Melissa RN    Challenges to be reviewed by the provider   Additional needs identified to be addressed with provider: No  none               Method of communication with provider: none. Telephonic outreach to follow up with the patient and her healing process. The patient states that she did have another appointment with her surgeon and both drains have now been pulled. The incision looks healthy per patient, with some areas of scabbing. The tape burns/irritations are healing well, leaving some marks on the skin. She voices that she is concerned about her belly button being red. She is cleaning the area with dial soap and putting antibiotic ointment on the area. She also has an area on the right groin that is more swollen then the left side. She is going to watch the area and report any worsening or changes to her surgeon. The patient states that this time she is most concerned about her lifting restriction and doing \"too much\". She has a 5 pound lifting restriction for 6-8 weeks post surgery. She describes this as being difficult to adhere to, but she is doing her best. She is attempting to stay as mobile as possible, but sometimes notices at the end of the day that she is exhausted and needing to rest. Encouraged the patient to listen to her body and rest when able. She is only taking pain medication on occasion, specifically at the end of the date if needed. She has an appointment with her PCP on 1/15 and her next follow up with her surgeon on . Will continue outreaches to assist as needed.        Ambulatory Care Coordination Assessment    Care Coordination Protocol  Referral from Primary Care Provider: No  Week 1 -

## 2024-01-16 ENCOUNTER — CARE COORDINATION (OUTPATIENT)
Dept: OTHER | Facility: CLINIC | Age: 64
End: 2024-01-16

## 2024-01-16 NOTE — CARE COORDINATION
Ambulatory Care Coordination Note  2024    Patient Current Location:  Home: [address] [place] [address]     ACM contacted the patient by telephone. Verified name and  with patient as identifiers. Provided introduction to self, and explanation of the ACM role.     Challenges to be reviewed by the provider   Additional needs identified to be addressed with provider: No  none               Method of communication with provider: none.    ACM: Darling Brown RN    Telephonic outreach to follow up with the patient post surgery. Patient continues to heal well, however she remains a little swollen. She states that swelling does seem to be decreasing. Patient's belly button area is slow to heal and looks as though it could be infected. Patient does not believe that it is infected, it just looks bad. Reviewed symptoms of infection and the patient denies draining, odor, or fever. She remains on a 5 pound lifting restriction. She states that this is challenging, because almost everything weighs at least 5 pounds. Sometimes she feels like she does to much, so she tries to relax.   The patient has a follow up appointment with her surgeon on . She is hopeful that the restriction will be lifted and that her belly button area can be assessed. This ACM will follow up with the patient next week to discuss her appointment and assess further CM needs.     Lab Results       None            Care Coordination Interventions    Referral from Primary Care Provider: No  Suggested Interventions and Community Resources          Goals Addressed                   This Visit's Progress     Attends follow up appointments on schedule   On track     Patient will follow up with Dr. Rashid on 2023-completed    Patient has her next appointment with Dr. Rashid on 2024  Follow up with PCP on 1/15/2024       Conditions and Symptoms   On track     I will schedule office visits, as directed by my provider.  I will notify my

## 2024-01-19 ENCOUNTER — OFFICE VISIT (OUTPATIENT)
Dept: SURGERY | Age: 64
End: 2024-01-19

## 2024-01-19 VITALS — WEIGHT: 155 LBS | BODY MASS INDEX: 26.2 KG/M2

## 2024-01-19 DIAGNOSIS — Z98.890 POSTOPERATIVE STATE: Primary | ICD-10-CM

## 2024-01-19 PROCEDURE — 99024 POSTOP FOLLOW-UP VISIT: CPT | Performed by: PLASTIC SURGERY

## 2024-01-19 NOTE — PROGRESS NOTES
[facility] PHYSICIANS [facility] SURGICAL SPECIALISTS  [address]  [facility] [address]       OFFICE POST-OP NOTE    Patient Name:  Geovanna Rodriguez    :  1960    MRN:  [id number]  STATUS POST  Chief Complaint   Patient presents with    Post-Op Check     Extended panniculectomy 23       SUBJECTIVE  Patient seen and examined.  Patient is status post an extended panniculectomy.  The pannus weighed 6 pounds.  Her surgery was on 2023  The pathology was consistent with a pannus.      PHYSICAL EXAM  Vital Signs:  Wt 70.3 kg (155 lb)   BMI 26.20 kg/m²     Incisions:  Suture line clean dry and intact.  The umbilicus is viable.  Skin:  No evidence of infection.   Neurologic:  Alert & oriented x 3.                    ASSESSMENT   Diagnosis Orders   1. Postoperative state              PLAN  Follow-up in 3 months  Continue compression.  Plan discussed with patient.    Electronically signed by:  CLEVELAND SANTIAGO M.D.   2024

## 2024-01-30 ENCOUNTER — CARE COORDINATION (OUTPATIENT)
Dept: OTHER | Facility: CLINIC | Age: 64
End: 2024-01-30

## 2024-01-30 NOTE — CARE COORDINATION
chart when appropriate        Consistently take medications as prescribed   On track            Future Appointments   Date Time Provider Department Center   3/8/2024  8:30 AM Harmony Tyson PA-C [place] [facility] [place]   4/19/2024 10:15 AM Geri Rashid MD [facility]   4/19/2024 11:30 AM SCHEDULE, [facility]RIC [facility]ETICIAN [facility]   12/30/2024  8:30 AM Sree Patel MD [place] [facility] [address]

## 2024-02-21 ENCOUNTER — CARE COORDINATION (OUTPATIENT)
Dept: OTHER | Facility: CLINIC | Age: 64
End: 2024-02-21

## 2024-02-21 NOTE — CARE COORDINATION
Ambulatory Care Coordination Note    ACM attempted to reach patient for care management follow up call regarding previous surgery. HIPAA compliant message left requesting a return phone call at patient convenience.     Plan for follow-up call in 7-10 days    Future Appointments   Date Time Provider Department Center   3/8/2024  8:30 AM Harmony Tyson PA-C [place] [facility] [place]   4/19/2024 10:15 AM Geri Rashid MD [facility]   4/19/2024 11:30 AM SCHEDULE, [facility] BARIATRIC DIETICIAN bariatric [facility]   12/30/2024  8:30 AM Sree Patel MD [place] [facility] [address]

## 2024-02-23 ENCOUNTER — CARE COORDINATION (OUTPATIENT)
Dept: OTHER | Facility: CLINIC | Age: 64
End: 2024-02-23

## 2024-02-23 NOTE — CARE COORDINATION
Ambulatory Care Coordination Note  2024    Patient Current Location:  Home: [address] [place] [address]     ACM contacted the patient by telephone. Verified name and  with patient as identifiers. Provided introduction to self, and explanation of the ACM role.     Challenges to be reviewed by the provider   Additional needs identified to be addressed with provider: No  none               Method of communication with provider: none.    ACM: Darling Brown RN    Telephonic outreach to the patient to follow up. She reports that she is back at work and doing well. She does voice concerns around the fact that she may be retaining water. She is trying to eat foods with minimal salt in them, but feel as though her skin feels tight. She does wear a compression garment at work and then at home she wears and abdominal binder.   The patient voices that when she wears leggings and then takes them off, there are lines in her legs, where the leggings were. She voices that is experiencing some pitting edema. She is walking approx 1.5 miles a day while at work. She is not allowed to resume her previous physical activity until .   Encouraged the patient to elevate her feet when returning home for the evening and getting some compression stockings to wear during the day. Encouraged the patient to monitor her weight if she notices excessive weight gain to reach out to her PCP. She does have a scheduled appointment with her PCP on 3/5.     Lab Results       None            Care Coordination Interventions    Referral from Primary Care Provider: No  Suggested Interventions and Community Resources          Goals Addressed                   This Visit's Progress     Attends follow up appointments on schedule   On track     Patient will follow up with Dr. Rashid on 2023-completed    Patient has her next appointment with Dr. Rashid on 2024  Follow up with PCP on 1/15/2024       Conditions and Symptoms   On track

## 2024-03-04 ASSESSMENT — PATIENT HEALTH QUESTIONNAIRE - PHQ9
2. FEELING DOWN, DEPRESSED OR HOPELESS: 0
SUM OF ALL RESPONSES TO PHQ QUESTIONS 1-9: 0
1. LITTLE INTEREST OR PLEASURE IN DOING THINGS: NOT AT ALL
SUM OF ALL RESPONSES TO PHQ QUESTIONS 1-9: 0
1. LITTLE INTEREST OR PLEASURE IN DOING THINGS: 0
SUM OF ALL RESPONSES TO PHQ9 QUESTIONS 1 & 2: 0
SUM OF ALL RESPONSES TO PHQ QUESTIONS 1-9: 0
SUM OF ALL RESPONSES TO PHQ9 QUESTIONS 1 & 2: 0
SUM OF ALL RESPONSES TO PHQ QUESTIONS 1-9: 0
2. FEELING DOWN, DEPRESSED OR HOPELESS: NOT AT ALL

## 2024-03-08 ENCOUNTER — OFFICE VISIT (OUTPATIENT)
Dept: FAMILY MEDICINE CLINIC | Age: 64
End: 2024-03-08
Payer: COMMERCIAL

## 2024-03-08 VITALS
HEART RATE: 78 BPM | RESPIRATION RATE: 16 BRPM | HEIGHT: 64 IN | BODY MASS INDEX: 27.4 KG/M2 | SYSTOLIC BLOOD PRESSURE: 120 MMHG | OXYGEN SATURATION: 99 % | WEIGHT: 160.5 LBS | DIASTOLIC BLOOD PRESSURE: 78 MMHG

## 2024-03-08 DIAGNOSIS — E03.9 HYPOTHYROIDISM, UNSPECIFIED TYPE: ICD-10-CM

## 2024-03-08 DIAGNOSIS — Z98.84 S/P BARIATRIC SURGERY: ICD-10-CM

## 2024-03-08 DIAGNOSIS — R06.02 SOB (SHORTNESS OF BREATH) ON EXERTION: ICD-10-CM

## 2024-03-08 DIAGNOSIS — K59.04 CHRONIC IDIOPATHIC CONSTIPATION: Primary | ICD-10-CM

## 2024-03-08 DIAGNOSIS — R63.5 WEIGHT GAIN: ICD-10-CM

## 2024-03-08 PROBLEM — R10.9 ABDOMINAL PAIN: Status: RESOLVED | Noted: 2023-01-15 | Resolved: 2024-03-08

## 2024-03-08 PROBLEM — E11.9 TYPE 2 DIABETES MELLITUS (HCC): Status: RESOLVED | Noted: 2023-12-20 | Resolved: 2024-03-08

## 2024-03-08 PROBLEM — E65 SYMPTOMATIC ABDOMINAL PANNICULUS: Status: RESOLVED | Noted: 2023-12-05 | Resolved: 2024-03-08

## 2024-03-08 PROBLEM — R21 RASH AND NONSPECIFIC SKIN ERUPTION: Status: RESOLVED | Noted: 2022-10-14 | Resolved: 2024-03-08

## 2024-03-08 PROBLEM — L98.7 EXCESSIVE SKIN AND SUBCUTANEOUS TISSUE: Status: RESOLVED | Noted: 2022-10-14 | Resolved: 2024-03-08

## 2024-03-08 PROBLEM — M62.08 DIASTASIS RECTI: Status: RESOLVED | Noted: 2023-11-01 | Resolved: 2024-03-08

## 2024-03-08 LAB
ALBUMIN SERPL-MCNC: 4.4 G/DL (ref 3.5–4.6)
ALP SERPL-CCNC: 87 U/L (ref 40–130)
ALT SERPL-CCNC: 41 U/L (ref 0–33)
ANION GAP SERPL CALCULATED.3IONS-SCNC: 13 MEQ/L (ref 9–15)
AST SERPL-CCNC: 32 U/L (ref 0–35)
BASOPHILS # BLD: 0.1 K/UL (ref 0–0.2)
BASOPHILS NFR BLD: 0.8 %
BILIRUB SERPL-MCNC: 0.3 MG/DL (ref 0.2–0.7)
BNP BLD-MCNC: 371 PG/ML
BUN SERPL-MCNC: 27 MG/DL (ref 8–23)
CALCIUM SERPL-MCNC: 9.7 MG/DL (ref 8.5–9.9)
CHLORIDE SERPL-SCNC: 102 MEQ/L (ref 95–107)
CO2 SERPL-SCNC: 28 MEQ/L (ref 20–31)
CREAT SERPL-MCNC: 0.92 MG/DL (ref 0.5–0.9)
EOSINOPHIL # BLD: 0.3 K/UL (ref 0–0.7)
EOSINOPHIL NFR BLD: 4.4 %
ERYTHROCYTE [DISTWIDTH] IN BLOOD BY AUTOMATED COUNT: 13.2 % (ref 11.5–14.5)
GLOBULIN SER CALC-MCNC: 3 G/DL (ref 2.3–3.5)
GLUCOSE SERPL-MCNC: 88 MG/DL (ref 70–99)
HCT VFR BLD AUTO: 36.5 % (ref 37–47)
HGB BLD-MCNC: 11.5 G/DL (ref 12–16)
LYMPHOCYTES # BLD: 1.9 K/UL (ref 1–4.8)
LYMPHOCYTES NFR BLD: 29.7 %
MCH RBC QN AUTO: 27.3 PG (ref 27–31.3)
MCHC RBC AUTO-ENTMCNC: 31.5 % (ref 33–37)
MCV RBC AUTO: 86.5 FL (ref 79.4–94.8)
MONOCYTES # BLD: 0.4 K/UL (ref 0.2–0.8)
MONOCYTES NFR BLD: 5.9 %
NEUTROPHILS # BLD: 3.8 K/UL (ref 1.4–6.5)
NEUTS SEG NFR BLD: 59 %
PLATELET # BLD AUTO: 332 K/UL (ref 130–400)
POTASSIUM SERPL-SCNC: 4 MEQ/L (ref 3.4–4.9)
PROT SERPL-MCNC: 7.4 G/DL (ref 6.3–8)
RBC # BLD AUTO: 4.22 M/UL (ref 4.2–5.4)
SODIUM SERPL-SCNC: 143 MEQ/L (ref 135–144)
WBC # BLD AUTO: 6.4 K/UL (ref 4.8–10.8)

## 2024-03-08 PROCEDURE — 99214 OFFICE O/P EST MOD 30 MIN: CPT | Performed by: PHYSICIAN ASSISTANT

## 2024-03-08 RX ORDER — DOCUSATE SODIUM 100 MG/1
100 CAPSULE, LIQUID FILLED ORAL 2 TIMES DAILY
COMMUNITY

## 2024-03-08 RX ORDER — PLECANATIDE 3 MG/1
3 TABLET ORAL
Qty: 90 TABLET | Refills: 2 | Status: SHIPPED | OUTPATIENT
Start: 2024-03-08

## 2024-03-08 ASSESSMENT — ENCOUNTER SYMPTOMS
CONSTIPATION: 1
CHEST TIGHTNESS: 0
SINUS PRESSURE: 0
SHORTNESS OF BREATH: 1
WHEEZING: 0
COLOR CHANGE: 0
COUGH: 0
ABDOMINAL DISTENTION: 1

## 2024-03-08 NOTE — PROGRESS NOTES
pain 01/15/2023    Abnormal EKG 03/23/2018    Angina pectoris syndrome (HCC) 03/23/2018    per pt they found a hiatal hernia    Arthritis     Constipation     COPD (chronic obstructive pulmonary disease) (HCC)     Diastasis recti 11/01/2023    Diverticulosis of colon     DMII (diabetes mellitus, type 2) (HCC) 2000    After weight loss, patient is no longer on any medications.    Essential hypertension 01/15/2016    Dr.Holiday-cardiology    Excessive skin and subcutaneous tissue 10/14/2022    Family history of coronary artery disease 01/15/2016    Fatty infiltration of liver 2020    Gastric polyp 2019    Dr Hernandez, 5 mm    Generalized anxiety disorder     H/O: hysterectomy 2012    History of tobacco abuse 01/15/2016    Migraine     Obesity (BMI 30-39.9)     Other specified acquired hypothyroidism 2000    Precordial pain 03/23/2018    (? Prinzmetal?) Dr Michael    Rash and nonspecific skin eruption 10/14/2022    S/P colonoscopy with polypectomy 2013, 2018, 2019    Dr Armendariz, Dr Hernandez, tubulovillous adenoma, hyperplastic polyp    S/P vaginal hysterectomy 2012    benign    Seasonal allergies     Sleep apnea, obstructive 2007    was on CPAP, not using it at present, retested 12/2023 after weight loss    Symptomatic abdominal panniculus 12/05/2023    Type II diabetes mellitus, uncontrolled 11/13/2013    After weight loss, patient is no longer on any medications.    Under care of team 03/28/2022    pcp-Harmony Blanca-last visit nov 2021    Under care of team 03/28/2022    cardiac-Holiday-lana-last visit dec 2021    Vasospastic angina (HCC)     Dr. Michael (cardiology)    Vertigo 2018    Wears dentures     upper and lower full dentures    Wears glasses      Past Surgical History:   Procedure Laterality Date    CARDIAC CATHETERIZATION  2018    no stents    CARPAL TUNNEL RELEASE Left 01/13/2021    LEFT CARPAL TUNNEL RELEASE, LEFT MIDDLE FINGER TRIGGER RELEASE, TENOSYNOVECTOMY FDPFDS performed by Anthony Grossman MD

## 2024-03-12 ENCOUNTER — CARE COORDINATION (OUTPATIENT)
Dept: OTHER | Facility: CLINIC | Age: 64
End: 2024-03-12

## 2024-03-12 NOTE — CARE COORDINATION
Ambulatory Care Coordination Note  3/12/2024    Patient Current Location:  Home: [address] [place] [address]     ACM contacted the patient by telephone. Verified name and  with patient as identifiers. Provided introduction to self, and explanation of the ACM role.     Challenges to be reviewed by the provider   Additional needs identified to be addressed with provider: No  none               Method of communication with provider: none.    ACM: NICHOLAS DUARTE RN    Telephonic outreach to follow up with the patient, who was working at the time of the call. The patient reports that she has been released to go back to exercising as of 3/5. She went to her first strength training, cardio workout yesterday and reports being very sore and swollen today.   Patient saw her PCP on 3/8 regarding the swelling and lymph drainage. The patient acknowledged that the increase in activity can cause an increase in swelling. The patient is scheduled to have an ECHO on 3/26 to rule out any cardiac issues that may cause the swelling. The patient admits that she was SOB during her cardio workout, but she has not been active for 12 weeks.   The patient's weight has stabilized and her incision has healed nicely. This ACM will outreach following the ECHO and discuss graduation from care management at that time.     Lab Results       None            Care Coordination Interventions    Referral from Primary Care Provider: No  Suggested Interventions and Community Resources          Goals Addressed                   This Visit's Progress     Conditions and Symptoms   On track     I will schedule office visits, as directed by my provider.  I will notify my provider of any barriers to my plan of care.  I will notify my provider of any symptoms that indicate a worsening of my condition.    Barriers: none  Plan for overcoming my barriers: N/A  Confidence: 9/10  Anticipated Goal Completion Date: 22 and ongoing    10/22/22 - Pt is doing

## 2024-03-26 ENCOUNTER — HOSPITAL ENCOUNTER (OUTPATIENT)
Age: 64
Discharge: HOME | End: 2024-03-28
Payer: COMMERCIAL

## 2024-03-26 VITALS
SYSTOLIC BLOOD PRESSURE: 120 MMHG | WEIGHT: 160.5 LBS | DIASTOLIC BLOOD PRESSURE: 78 MMHG | BODY MASS INDEX: 27.4 KG/M2 | HEIGHT: 64 IN

## 2024-03-26 DIAGNOSIS — R06.02 SOB (SHORTNESS OF BREATH) ON EXERTION: ICD-10-CM

## 2024-03-26 DIAGNOSIS — R63.5 WEIGHT GAIN: ICD-10-CM

## 2024-03-26 LAB
ECHO AV AREA PEAK VELOCITY: 2.3 CM2
ECHO AV AREA VTI: 2.8 CM2
ECHO AV AREA/BSA PEAK VELOCITY: 1.3 CM2/M2
ECHO AV AREA/BSA VTI: 1.6 CM2/M2
ECHO AV CUSP MM: 1.5 CM
ECHO AV MEAN GRADIENT: 5 MMHG
ECHO AV MEAN VELOCITY: 1 M/S
ECHO AV PEAK GRADIENT: 11 MMHG
ECHO AV PEAK VELOCITY: 1.7 M/S
ECHO AV VELOCITY RATIO: 0.71
ECHO AV VTI: 35.8 CM
ECHO BSA: 1.82 M2
ECHO EST RA PRESSURE: 3 MMHG
ECHO LA DIAMETER INDEX: 2.18 CM/M2
ECHO LA DIAMETER: 3.9 CM
ECHO LA VOL A-L A2C: 50 ML (ref 22–52)
ECHO LA VOL A-L A4C: 41 ML (ref 22–52)
ECHO LA VOL MOD A2C: 48 ML (ref 22–52)
ECHO LA VOL MOD A4C: 38 ML (ref 22–52)
ECHO LA VOLUME AREA LENGTH: 45 ML
ECHO LA VOLUME INDEX A-L A2C: 28 ML/M2 (ref 16–34)
ECHO LA VOLUME INDEX A-L A4C: 23 ML/M2 (ref 16–34)
ECHO LA VOLUME INDEX AREA LENGTH: 25 ML/M2 (ref 16–34)
ECHO LA VOLUME INDEX MOD A2C: 27 ML/M2 (ref 16–34)
ECHO LA VOLUME INDEX MOD A4C: 21 ML/M2 (ref 16–34)
ECHO LV E' LATERAL VELOCITY: 12 CM/S
ECHO LV E' SEPTAL VELOCITY: 5 CM/S
ECHO LV EDV A2C: 48 ML
ECHO LV EDV A4C: 82 ML
ECHO LV EDV BP: 64 ML (ref 56–104)
ECHO LV EDV INDEX A4C: 46 ML/M2
ECHO LV EDV INDEX BP: 36 ML/M2
ECHO LV EDV NDEX A2C: 27 ML/M2
ECHO LV EJECTION FRACTION A2C: 43 %
ECHO LV EJECTION FRACTION A4C: 68 %
ECHO LV EJECTION FRACTION BIPLANE: 58 % (ref 55–100)
ECHO LV ESV A2C: 27 ML
ECHO LV ESV A4C: 26 ML
ECHO LV ESV BP: 27 ML (ref 19–49)
ECHO LV ESV INDEX A2C: 15 ML/M2
ECHO LV ESV INDEX A4C: 15 ML/M2
ECHO LV ESV INDEX BP: 15 ML/M2
ECHO LV FRACTIONAL SHORTENING: 37 % (ref 28–44)
ECHO LV INTERNAL DIMENSION DIASTOLE INDEX: 2.4 CM/M2
ECHO LV INTERNAL DIMENSION DIASTOLIC: 4.3 CM (ref 3.9–5.3)
ECHO LV INTERNAL DIMENSION SYSTOLIC INDEX: 1.51 CM/M2
ECHO LV INTERNAL DIMENSION SYSTOLIC: 2.7 CM
ECHO LV IVSD: 1.4 CM (ref 0.6–0.9)
ECHO LV IVSS: 2.1 CM
ECHO LV MASS 2D: 184.7 G (ref 67–162)
ECHO LV MASS INDEX 2D: 103.2 G/M2 (ref 43–95)
ECHO LV POSTERIOR WALL DIASTOLIC: 1 CM (ref 0.6–0.9)
ECHO LV POSTERIOR WALL SYSTOLIC: 1.2 CM
ECHO LV RELATIVE WALL THICKNESS RATIO: 0.47
ECHO LVOT AREA: 3.1 CM2
ECHO LVOT AV VTI INDEX: 0.91
ECHO LVOT DIAM: 2 CM
ECHO LVOT MEAN GRADIENT: 3 MMHG
ECHO LVOT PEAK GRADIENT: 6 MMHG
ECHO LVOT PEAK VELOCITY: 1.2 M/S
ECHO LVOT STROKE VOLUME INDEX: 56.8 ML/M2
ECHO LVOT SV: 101.7 ML
ECHO LVOT VTI: 32.4 CM
ECHO MV A VELOCITY: 0.83 M/S
ECHO MV AREA VTI: 4.3 CM2
ECHO MV E DECELERATION TIME (DT): 176.5 MS
ECHO MV E VELOCITY: 0.77 M/S
ECHO MV E/A RATIO: 0.93
ECHO MV E/E' LATERAL: 6.42
ECHO MV E/E' RATIO (AVERAGED): 10.91
ECHO MV LVOT VTI INDEX: 0.74
ECHO MV MAX VELOCITY: 0.8 M/S
ECHO MV MEAN GRADIENT: 1 MMHG
ECHO MV MEAN VELOCITY: 0.5 M/S
ECHO MV PEAK GRADIENT: 3 MMHG
ECHO MV VTI: 23.9 CM
ECHO PV MAX VELOCITY: 1.2 M/S
ECHO PV PEAK GRADIENT: 6 MMHG
ECHO RIGHT VENTRICULAR SYSTOLIC PRESSURE (RVSP): 26 MMHG
ECHO RV INTERNAL DIMENSION: 2.5 CM
ECHO RV TAPSE: 2.3 CM (ref 1.7–?)
ECHO TV REGURGITANT MAX VELOCITY: 2.4 M/S
ECHO TV REGURGITANT PEAK GRADIENT: 23 MMHG

## 2024-03-26 PROCEDURE — 93306 TTE W/DOPPLER COMPLETE: CPT | Performed by: INTERNAL MEDICINE

## 2024-03-26 PROCEDURE — 93306 TTE W/DOPPLER COMPLETE: CPT

## 2024-04-09 ENCOUNTER — CARE COORDINATION (OUTPATIENT)
Dept: OTHER | Facility: CLINIC | Age: 64
End: 2024-04-09

## 2024-04-09 NOTE — CARE COORDINATION
Ambulatory Care Coordination Note  2024    Patient Current Location:  Home: [address] [place] [address]     ACM contacted the patient by telephone. Verified name and  with patient as identifiers. Provided introduction to self, and explanation of the ACM role.     Challenges to be reviewed by the provider   Additional needs identified to be addressed with provider: No  none               Method of communication with provider: none.    ACM: NICHOLAS DUARTE RN    Telephonic outreach to the patient to follow up regarding her water retention. The patient had her ECHO done and it came back unchanged from her previous one. She has a follow up with her surgeon on  and plans to discuss additional concerns regarding her symptoms. Patient states that her weight is climbing, she is currently at 165 and 6lbs were taken off during surgery. Pre surgery her weight was 154. She describes her symptoms as swelling particularly her chest. She is wearing an abdominal binder and a compression stockings. She states that when she takes the compression stockings off she has indentations visible. She is attempting to be more active but becomes SOB easier than she used to. She is walking and admits that she may have gained some of the weight with less activity and a little more eating, but still feels like her skin is tight and she is swollen.   She plans to follow up with her surgeon on , then if okayed she will go to massage therapy. Patient is encouraged to drink water and stay hydrated. Encouraged her to watch her salt intake and elevate her feet.   Will continue to outreach and assist as needed.     Lab Results       None            Care Coordination Interventions    Referral from Primary Care Provider: No  Suggested Interventions and Community Resources          Goals Addressed                   This Visit's Progress     Attends follow up appointments on schedule   On track     Patient will follow up with Dr. Rashid on

## 2024-04-15 DIAGNOSIS — E03.9 HYPOTHYROIDISM, UNSPECIFIED TYPE: ICD-10-CM

## 2024-04-16 RX ORDER — LEVOTHYROXINE SODIUM 0.05 MG/1
TABLET ORAL
Qty: 90 TABLET | Refills: 2 | Status: SHIPPED | OUTPATIENT
Start: 2024-04-16

## 2024-04-19 ENCOUNTER — OFFICE VISIT (OUTPATIENT)
Dept: SURGERY | Age: 64
End: 2024-04-19
Payer: COMMERCIAL

## 2024-04-19 ENCOUNTER — OFFICE VISIT (OUTPATIENT)
Dept: BARIATRICS/WEIGHT MGMT | Age: 64
End: 2024-04-19
Payer: COMMERCIAL

## 2024-04-19 VITALS
WEIGHT: 165 LBS | BODY MASS INDEX: 28.17 KG/M2 | DIASTOLIC BLOOD PRESSURE: 60 MMHG | HEART RATE: 60 BPM | SYSTOLIC BLOOD PRESSURE: 120 MMHG | HEIGHT: 64 IN

## 2024-04-19 VITALS
HEART RATE: 76 BPM | RESPIRATION RATE: 18 BRPM | SYSTOLIC BLOOD PRESSURE: 128 MMHG | OXYGEN SATURATION: 100 % | HEIGHT: 64 IN | WEIGHT: 166.2 LBS | BODY MASS INDEX: 28.38 KG/M2 | DIASTOLIC BLOOD PRESSURE: 68 MMHG

## 2024-04-19 DIAGNOSIS — Z87.891 PERSONAL HISTORY OF TOBACCO USE: ICD-10-CM

## 2024-04-19 DIAGNOSIS — Z98.84 S/P GASTRIC BYPASS: ICD-10-CM

## 2024-04-19 DIAGNOSIS — Z98.890 POSTOPERATIVE STATE: Primary | ICD-10-CM

## 2024-04-19 DIAGNOSIS — G47.33 OSA (OBSTRUCTIVE SLEEP APNEA): Primary | ICD-10-CM

## 2024-04-19 DIAGNOSIS — E66.3 OVERWEIGHT (BMI 25.0-29.9): ICD-10-CM

## 2024-04-19 DIAGNOSIS — K59.04 CHRONIC IDIOPATHIC CONSTIPATION: ICD-10-CM

## 2024-04-19 DIAGNOSIS — E03.9 HYPOTHYROIDISM, UNSPECIFIED TYPE: ICD-10-CM

## 2024-04-19 DIAGNOSIS — F41.1 GENERALIZED ANXIETY DISORDER: ICD-10-CM

## 2024-04-19 DIAGNOSIS — Z87.19 HISTORY OF REPAIR OF HIATAL HERNIA: ICD-10-CM

## 2024-04-19 DIAGNOSIS — Z98.890 HISTORY OF REPAIR OF HIATAL HERNIA: ICD-10-CM

## 2024-04-19 PROCEDURE — 99213 OFFICE O/P EST LOW 20 MIN: CPT | Performed by: NURSE PRACTITIONER

## 2024-04-19 PROCEDURE — 99213 OFFICE O/P EST LOW 20 MIN: CPT | Performed by: PLASTIC SURGERY

## 2024-04-19 NOTE — PROGRESS NOTES
Medical Nutrition Therapy for Metabolic and Bariatric Surgery  Annual Post-Op RNY Bypass Nutrition Follow-Up      Nutrition Assessment:  Patient reports:  tolerating diet, always eating small, frequent meals.  always eating protein first,  always eating protein portions with all meals and snacks.  Drinking at least 64 oz of fluid and sugar free beverages daily. Patient typically drinking home brewed decaf tea with artificial sweetener.  consistently taking vitamins and minerals.   Circuit training 30-45 minutes 5x/week and walking  to remain active.   Patient's main concern is snacking/feeling hungry. Discussed keeping meals/snacks ~20-30 minutes and having scheduled meals and snacks to avoid grazing, discussed snack with protein and fruit/vegetable.    All questions answered. Patient aware of how to contact RD if needs arise. RD to remain available.    24-hr diet recall scanned into chart.    Multivitamin/Mineral Intake: Yes, bariatric choice    Calcium Intake: Yes, 1000 mg citrate    Vitals:   Vitals:    04/19/24 1057   BP: 120/60   Site: Right Upper Arm   Position: Sitting   Cuff Size: Large Adult   Pulse: 60   Weight: 74.8 kg (165 lb)   Height: 1.626 m (5' 4\")      Body mass index is 28.32 kg/m².    Nutrition Diagnosis:   Inadequate food and beverage intake r/t WLS as evidenced by loss of excess body weight lost 47 lbs over 2 years.    Nutrition Intervention:  Diet: Bariatric Diet, 60-80gm of protein, and 48-64oz of fluid    Nutrition Education: Bariatric Binder (diet guidelines and recipes)    Goal:   Continue bariatric diet and continue to add variety to diet as tolerated.   Sip on water or sugar-free fluid throughout the day. Aiming for a minimum of 64 oz per day.   Eat small, frequent meals containing protein, as tolerated.   Consistently take MVIs and minerals to prevent nutrient deficiencies.   Discuss activity with physician and determine a plan for remaining active.    Monitor/Evaluate:  Diet

## 2024-04-19 NOTE — PROGRESS NOTES
[facility] PHYSICIANS [facility] SURGICAL SPECIALISTS  [address]  [facility] [address]       OFFICE POST-OP NOTE    Patient Name:  Geovanna Rodriguez    :  1960    MRN:  [id number]  STATUS POST  Chief Complaint   Patient presents with    Follow-up     Extended Panniculectomy DOS: 2023 - Patient has complaints of swelling.       SUBJECTIVE  Patient seen and examined.  Patient is status post an extended panniculectomy.  The pannus weighed 6 pounds.  Her surgery was on 2023  The pathology was consistent with a pannus.      Medications:     Current Outpatient Medications   Medication Sig Dispense Refill    levothyroxine (SYNTHROID) 50 MCG tablet TAKE 1 TABLET BY MOUTH ONE TIME A DAY 90 tablet 1    Plecanatide (TRULANCE) 3 MG TABS Take 3 mg by mouth daily (with breakfast) 90 tablet 2    Multiple Vitamins-Minerals (BARIATRIC MULTIVITAMINS/IRON PO) Take 1 tablet by mouth daily      estradiol (ESTRACE VAGINAL) 0.1 MG/GM vaginal cream Place 0.5 g vaginally nightly for 21 days, THEN 0.5 g every other day for 21 days, THEN 0.5 g Twice a Week. 42.5 g 1    Calcium Citrate 333 MG TABS Take 2 tablets by mouth in the morning and at bedtime      Biotin 5 MG CAPS 5,000 Units daily      buPROPion (WELLBUTRIN XL) 300 MG extended release tablet TAKE ONE TABLET BY MOUTH EVERY MORNING 90 tablet 4    pantoprazole (PROTONIX) 20 MG tablet Take 1 tablet by mouth every morning (before breakfast) 90 tablet 4     No current facility-administered medications for this visit.      Allergies:     Allergies   Allergen Reactions    Seasonal Itching    Morphine Dizziness or Vertigo     IV morphine caused dizziness    Nsaids Other (See Comments)     Cannot take due to gastic bypass surgery     Review of Systems:   Constitutional: Negative for fever, chills, fatigue and unexpected weight change.   HENT: Patient with history vertigo.  Eyes: Negative for pain and discharge.

## 2024-04-19 NOTE — PROGRESS NOTES
Post-op Bariatric Surgery Note    Subjective     Patient is 2 years s/p laparoscopic Rroux-en-Y gastric bypass and hiatal hernia repair, down 47 lbs. Lowest weight achieved was 138 lbs. Overall, doing well.  Incisions well healed.  Consistent use of MVI and calcium. Fluid intake is 80-90 oz daily. Tolerating a variety of foods and getting adequate protein. Using Trulance to manage constipation. Physical activity includes cardio and strength training classes and walking.  No pain or other bariatric specific problems or concerns.  Had  panniculectomy done 12/5/23.  Experiencing some generalized body edema and shortness of breath triggered by activity. This started after she returned to work after the panniculectomy and increased her activity. Denies chest pain.  She discussed this with the plastic surgeon and her PCP.  PCP ordered Cardiac Echo, results acceptable.  Seen by plastic surgeon this am.        Allergies:  Allergies   Allergen Reactions    Seasonal Itching    Morphine Dizziness or Vertigo     IV morphine caused dizziness    Nsaids Other (See Comments)     Cannot take due to gastic bypass surgery       Past Medical History:     Past Medical History:   Diagnosis Date    Abdominal pain 01/15/2023    Abnormal EKG 03/23/2018    Angina pectoris syndrome (HCC) 03/23/2018    per pt they found a hiatal hernia    Arthritis     Constipation     COPD (chronic obstructive pulmonary disease) ([facility])     Diastasis recti 11/01/2023    Diverticulosis of colon     DMII (diabetes mellitus, type 2) (Formerly [facility]) 2000    After weight loss, patient is no longer on any medications.    Essential hypertension 01/15/2016    Dr.Holiday-cardiology    Excessive skin and subcutaneous tissue 10/14/2022    Family history of coronary artery disease 01/15/2016    Fatty infiltration of liver 2020    Gastric polyp 2019    Dr Hernandez, 5 mm    Generalized anxiety disorder     H/O: hysterectomy 2012    History of tobacco abuse 01/15/2016    Migraine

## 2024-04-24 DIAGNOSIS — E66.3 OVERWEIGHT (BMI 25.0-29.9): ICD-10-CM

## 2024-04-24 DIAGNOSIS — Z87.19 HISTORY OF REPAIR OF HIATAL HERNIA: ICD-10-CM

## 2024-04-24 DIAGNOSIS — Z98.84 S/P GASTRIC BYPASS: ICD-10-CM

## 2024-04-24 DIAGNOSIS — G47.33 OSA (OBSTRUCTIVE SLEEP APNEA): ICD-10-CM

## 2024-04-24 DIAGNOSIS — E03.9 HYPOTHYROIDISM, UNSPECIFIED TYPE: ICD-10-CM

## 2024-04-24 DIAGNOSIS — Z87.891 PERSONAL HISTORY OF TOBACCO USE: ICD-10-CM

## 2024-04-24 DIAGNOSIS — F41.1 GENERALIZED ANXIETY DISORDER: ICD-10-CM

## 2024-04-24 DIAGNOSIS — Z98.890 HISTORY OF REPAIR OF HIATAL HERNIA: ICD-10-CM

## 2024-04-24 DIAGNOSIS — K59.04 CHRONIC IDIOPATHIC CONSTIPATION: ICD-10-CM

## 2024-04-24 LAB
ALBUMIN SERPL-MCNC: 4.3 G/DL (ref 3.5–4.6)
ALP SERPL-CCNC: 84 U/L (ref 40–130)
ALT SERPL-CCNC: 30 U/L (ref 0–33)
ANION GAP SERPL CALCULATED.3IONS-SCNC: 12 MEQ/L (ref 9–15)
AST SERPL-CCNC: 29 U/L (ref 0–35)
BASOPHILS # BLD: 0.1 K/UL (ref 0–0.2)
BASOPHILS NFR BLD: 1.3 %
BILIRUB SERPL-MCNC: 0.3 MG/DL (ref 0.2–0.7)
BUN SERPL-MCNC: 27 MG/DL (ref 8–23)
CALCIUM SERPL-MCNC: 9.6 MG/DL (ref 8.5–9.9)
CHLORIDE SERPL-SCNC: 105 MEQ/L (ref 95–107)
CHOLEST SERPL-MCNC: 160 MG/DL (ref 0–199)
CO2 SERPL-SCNC: 25 MEQ/L (ref 20–31)
CREAT SERPL-MCNC: 0.85 MG/DL (ref 0.5–0.9)
EOSINOPHIL # BLD: 0.3 K/UL (ref 0–0.7)
EOSINOPHIL NFR BLD: 5 %
ERYTHROCYTE [DISTWIDTH] IN BLOOD BY AUTOMATED COUNT: 14.6 % (ref 11.5–14.5)
ESTIMATED AVERAGE GLUCOSE: 131 MG/DL
FERRITIN: 10 NG/ML (ref 13–150)
FOLATE: 18.9 NG/ML (ref 4.8–24.2)
GLOBULIN SER CALC-MCNC: 3 G/DL (ref 2.3–3.5)
GLUCOSE SERPL-MCNC: 96 MG/DL (ref 70–99)
HBA1C MFR BLD: 6.2 % (ref 4–6)
HCT VFR BLD AUTO: 35.7 % (ref 37–47)
HDLC SERPL-MCNC: 61 MG/DL (ref 40–59)
HGB BLD-MCNC: 11.2 G/DL (ref 12–16)
IRON % SATURATION: 8 % (ref 20–55)
IRON: 35 UG/DL (ref 37–145)
LDLC SERPL CALC-MCNC: 83 MG/DL (ref 0–129)
LYMPHOCYTES # BLD: 2 K/UL (ref 1–4.8)
LYMPHOCYTES NFR BLD: 32.7 %
MAGNESIUM SERPL-MCNC: 2.1 MG/DL (ref 1.7–2.4)
MCH RBC QN AUTO: 26.7 PG (ref 27–31.3)
MCHC RBC AUTO-ENTMCNC: 31.4 % (ref 33–37)
MCV RBC AUTO: 85.2 FL (ref 79.4–94.8)
MONOCYTES # BLD: 0.4 K/UL (ref 0.2–0.8)
MONOCYTES NFR BLD: 6.2 %
NEUTROPHILS # BLD: 3.3 K/UL (ref 1.4–6.5)
NEUTS SEG NFR BLD: 54.6 %
PLATELET # BLD AUTO: 313 K/UL (ref 130–400)
POTASSIUM SERPL-SCNC: 4 MEQ/L (ref 3.4–4.9)
PROT SERPL-MCNC: 7.3 G/DL (ref 6.3–8)
RBC # BLD AUTO: 4.19 M/UL (ref 4.2–5.4)
SODIUM SERPL-SCNC: 142 MEQ/L (ref 135–144)
TOTAL IRON BINDING CAPACITY: 437 UG/DL (ref 250–450)
TRIGL SERPL-MCNC: 82 MG/DL (ref 0–150)
TSH SERPL-MCNC: 1.77 UIU/ML (ref 0.44–3.86)
UNSATURATED IRON BINDING CAPACITY: 402 UG/DL (ref 112–347)
VITAMIN B-12: 1175 PG/ML (ref 232–1245)
VITAMIN D 25-HYDROXY: 59.7 NG/ML (ref 30–100)
WBC # BLD AUTO: 6 K/UL (ref 4.8–10.8)

## 2024-04-25 DIAGNOSIS — E61.1 LOW IRON: Primary | ICD-10-CM

## 2024-04-25 RX ORDER — FERROUS SULFATE 325(65) MG
325 TABLET ORAL
Qty: 90 TABLET | Refills: 1 | Status: SHIPPED | OUTPATIENT
Start: 2024-04-25

## 2024-04-26 LAB
ANNOTATION COMMENT IMP: NORMAL
MISCELLANEOUS LAB TEST ORDER: NORMAL
RETINYL PALMITATE SERPL-MCNC: 0.03 MG/L (ref 0–0.1)
VIT A SERPL-MCNC: 0.89 MG/L (ref 0.3–1.2)
WHOPPER PROMPT: NORMAL
ZINC SERPL-MCNC: 87.3 UG/DL (ref 60–120)

## 2024-04-29 ENCOUNTER — CARE COORDINATION (OUTPATIENT)
Dept: OTHER | Facility: CLINIC | Age: 64
End: 2024-04-29

## 2024-04-29 LAB — VIT B1 BLD-MCNC: 213 NMOL/L (ref 70–180)

## 2024-04-29 NOTE — CARE COORDINATION
Ambulatory Care Coordination Note  2024    Patient Current Location:  Home: [address] [place] [address]     ACM contacted the patient by telephone. Verified name and  with patient as identifiers. Provided introduction to self, and explanation of the ACM role.     Challenges to be reviewed by the provider   Additional needs identified to be addressed with provider: No  none               Method of communication with provider: none.    ACM: NICHOLAS DUARTE RN    Telephonic outreach to the patient to follow up regarding post surgery symptoms. The patient states that she continues to have the swelling, however it is more noticeable in the areas where she is not wearing compression. She continued to have symptoms of fatigue and being easily winded. The patient had blood work drawn, which revealed that the patient was anemic. She was started on iron pills and feels better as now she understands why she has had low energy. She understand that it may take some time before is feeling full of energy, but at least now she knows why she was feeling so fatigued. She has also been told that she has RLS. Her A1C came back at 6.2, so she is now a little concerned about that level. Discussed with her on the reasons her A1C may be higher, due to previous surgery, stress, and decrease exercise.   Will follow up with the patient again in 2-3 weeks to assess progress and energy levels.     Lab Results       None            Care Coordination Interventions    Referral from Primary Care Provider: No  Suggested Interventions and Community Resources          Goals Addressed                   This Visit's Progress     Attends follow up appointments on schedule   On track     Patient will follow up with Dr. Rashid on 2023-completed    Patient has her next appointment with Dr. Rashid on 2024  Follow up with PCP on 1/15/2024       COMPLETED: Conditions and Symptoms        I will schedule office visits, as directed by my

## 2024-05-07 DIAGNOSIS — E61.1 LOW IRON: Primary | ICD-10-CM

## 2024-05-23 ENCOUNTER — CARE COORDINATION (OUTPATIENT)
Dept: OTHER | Facility: CLINIC | Age: 64
End: 2024-05-23

## 2024-05-23 NOTE — CARE COORDINATION
Ambulatory Care Coordination Note  2024    Patient Current Location:  Home: [address] [place] [address]     ACM contacted the patient by telephone. Verified name and  with patient as identifiers. Provided introduction to self, and explanation of the ACM role.     Challenges to be reviewed by the provider   Additional needs identified to be addressed with provider: No  none               Method of communication with provider: none.    ACM: NICHOLAS DUARTE RN    Telephonic outreach to the patient to follow up and assess progress. The patient reports that she still has noticeable swelling, especially when she is on her feet. She states over the weekend she was busy throughout the day and noticed pitting edema in the evening.   She states that she is still feeling pretty fatigued and easily out of breath. She acknowledges that it is slightly improved since being on the iron supplements and understands that is will take some time to feel more energy.   The patient is scheduled to follow up for repeat labs next month. This ACM will outreach after her next labs to assess progress and needs.     Lab Results       None            Care Coordination Interventions    Referral from Primary Care Provider: No  Suggested Interventions and Community Resources          Goals Addressed                   This Visit's Progress     Attends follow up appointments on schedule   On track     Patient will follow up with Dr. Rashid on 2023-completed    Patient has her next appointment with Dr. Rashid on 2024  Follow up with PCP on 1/15/2024       Consistently take medications as prescribed   On track            Future Appointments   Date Time Provider Department Center   6/10/2024  9:15 AM Harmony Tyson PA-C [facility]   2024  8:45 AM Geri Rashid MD [email or web address]   2024  8:30 AM Sree Patel MD [facility] [place]

## 2024-05-28 DIAGNOSIS — E61.1 LOW IRON: ICD-10-CM

## 2024-05-28 LAB
FERRITIN: 33 NG/ML (ref 13–150)
IRON % SATURATION: 28 % (ref 20–55)
IRON: 106 UG/DL (ref 37–145)
TOTAL IRON BINDING CAPACITY: 382 UG/DL (ref 250–450)
UNSATURATED IRON BINDING CAPACITY: 276 UG/DL (ref 112–347)

## 2024-06-10 ENCOUNTER — OFFICE VISIT (OUTPATIENT)
Dept: FAMILY MEDICINE CLINIC | Age: 64
End: 2024-06-10
Payer: COMMERCIAL

## 2024-06-10 VITALS
HEIGHT: 64 IN | HEART RATE: 58 BPM | OXYGEN SATURATION: 98 % | SYSTOLIC BLOOD PRESSURE: 110 MMHG | BODY MASS INDEX: 28.17 KG/M2 | RESPIRATION RATE: 16 BRPM | WEIGHT: 165 LBS | DIASTOLIC BLOOD PRESSURE: 68 MMHG

## 2024-06-10 DIAGNOSIS — R73.03 PREDIABETES: ICD-10-CM

## 2024-06-10 DIAGNOSIS — Z98.84 S/P GASTRIC BYPASS: ICD-10-CM

## 2024-06-10 DIAGNOSIS — G47.09 TROUBLE GETTING TO SLEEP: ICD-10-CM

## 2024-06-10 DIAGNOSIS — M65.341 TRIGGER RING FINGER OF RIGHT HAND: Primary | ICD-10-CM

## 2024-06-10 DIAGNOSIS — M79.644 CHRONIC PAIN OF RIGHT THUMB: ICD-10-CM

## 2024-06-10 DIAGNOSIS — G89.29 CHRONIC PAIN OF RIGHT THUMB: ICD-10-CM

## 2024-06-10 DIAGNOSIS — E66.3 OVERWEIGHT (BMI 25.0-29.9): ICD-10-CM

## 2024-06-10 PROBLEM — M65.332 ACQUIRED TRIGGER FINGER OF LEFT MIDDLE FINGER: Status: RESOLVED | Noted: 2021-01-06 | Resolved: 2024-06-10

## 2024-06-10 PROCEDURE — 99214 OFFICE O/P EST MOD 30 MIN: CPT | Performed by: PHYSICIAN ASSISTANT

## 2024-06-10 RX ORDER — ASCORBIC ACID 500 MG
500 TABLET ORAL DAILY
COMMUNITY

## 2024-06-10 RX ORDER — PHENTERMINE HYDROCHLORIDE 37.5 MG/1
37.5 TABLET ORAL
Qty: 30 TABLET | Refills: 2 | Status: SHIPPED | OUTPATIENT
Start: 2024-06-10 | End: 2024-06-11 | Stop reason: SDUPTHER

## 2024-06-10 ASSESSMENT — ENCOUNTER SYMPTOMS
WHEEZING: 0
COLOR CHANGE: 0
SHORTNESS OF BREATH: 0
SINUS PRESSURE: 0
CONSTIPATION: 0
COUGH: 0
ABDOMINAL DISTENTION: 0
CHEST TIGHTNESS: 0

## 2024-06-10 NOTE — PROGRESS NOTES
(cardiology)    Vertigo 2018    Wears dentures     upper and lower full dentures    Wears glasses      Past Surgical History:   Procedure Laterality Date    CARDIAC CATHETERIZATION  2018    no stents    CARPAL TUNNEL RELEASE Left 01/13/2021    LEFT CARPAL TUNNEL RELEASE, LEFT MIDDLE FINGER TRIGGER RELEASE, TENOSYNOVECTOMY FDPFDS performed by [place]man [place] [facility] [place]    CARPAL TUNNEL RELEASE Right 02/03/2021    RIGHT CARPAL TUNNEL RELEASE, TRIGGER FINGER RELEASE RIGHT MIDDLE FINGER, FDP, FDS, TENOSYNOVECTOMY performed by Anthony Grossman [place] at [facility] [place]    CHOLECYSTECTOMY, LAPAROSCOPIC  1995    COLONOSCOPY      COLONOSCOPY N/A 06/30/2020    COLONOSCOPY DIAGNOSTIC performed by David Devi MD at [facility]    DIAGNOSTIC CARDIAC CATH LAB PROCEDURE      ENDOMETRIAL ABLATION  2002    metrorrhagia    ENDOSCOPY, COLON, DIAGNOSTIC      ESOPHAGOGASTRODUODENOSCOPY  01/16/2023    HYSTERECTOMY (CERVIX STATUS UNKNOWN)  09/07/2012    fibroid, cervicitis, ovaries intact    LIPECTOMY N/A 12/5/2023    EXTENDED PANNICULECTOMY performed by Geri Rashid MD at [facility] OR    WI COLON CA SCRN NOT HI RSK IND N/A 05/15/2018    COLONOSCOPY performed by David Devi MD at [facility]    SHEILA-EN-Y GASTRIC BYPASS N/A 04/12/2022    XI ROBOTIC LAPAROSCOPIC SHEILA-EN-Y GASTRIC BYPASS, EGD, HIATAL HERNIA REPAIR performed by Martín Hermosillo DO at [facility] OR    TONSILLECTOMY      TUBAL LIGATION  1984    UPPER GASTROINTESTINAL ENDOSCOPY N/A 04/02/2019    EGD ESOPHAGOGASTRODUODENOSCOPY performed by David Devi MD at [facility]    UPPER GASTROINTESTINAL ENDOSCOPY N/A 1/16/2023    EGD ESOPHAGOGASTRODUODENOSCOPY WITH BIPSPY , GI UNIT performed by Martín Hermosillo DO at [facility] OR     Social History     Socioeconomic History    Marital status:      Spouse name: Not on file    Number of children: 3    Years of education: Not on file    Highest education level: Not on file   Occupational History    Occupation:

## 2024-06-11 ENCOUNTER — TELEPHONE (OUTPATIENT)
Dept: FAMILY MEDICINE CLINIC | Age: 64
End: 2024-06-11

## 2024-06-11 DIAGNOSIS — E66.3 OVERWEIGHT (BMI 25.0-29.9): ICD-10-CM

## 2024-06-11 RX ORDER — PHENTERMINE HYDROCHLORIDE 37.5 MG/1
37.5 TABLET ORAL
Qty: 30 TABLET | Refills: 2 | Status: SHIPPED | OUTPATIENT
Start: 2024-06-11 | End: 2024-09-09

## 2024-06-24 ENCOUNTER — CARE COORDINATION (OUTPATIENT)
Dept: OTHER | Facility: CLINIC | Age: 64
End: 2024-06-24

## 2024-06-24 NOTE — CARE COORDINATION
Ambulatory Care Coordination Note     6/24/2024 10:23 AM     Patient outreach attempt by this ACM today to perform care management follow up . ACM was unable to reach the patient by telephone today; left voice message requesting a return phone call to this ACM.     ACM: NICHOLAS DUARTE RN     Care Summary Note: Attempted to contact the patient to discuss symptoms and lab work. Will continue to outreach.     PCP/Specialist follow up:   Future Appointments         Provider Specialty Dept Phone    6/25/2024 3:15 PM Letty Sanderson MD Orthopedic Surgery [phone]    7/19/2024 8:45 AM Geri Rashid MD General Surgery [phone]    9/9/2024 2:45 PM Harmony Tyson PA-C Family Medicine [phone]    12/30/2024 8:30 AM Sree Patel MD Pulmonology [phone]            Follow Up:   Plan for next ACM outreach in approximately 2 weeks to complete:  - goal progression.

## 2024-06-25 ENCOUNTER — OFFICE VISIT (OUTPATIENT)
Dept: ORTHOPEDIC SURGERY | Age: 64
End: 2024-06-25
Payer: COMMERCIAL

## 2024-06-25 VITALS
TEMPERATURE: 97.6 F | HEIGHT: 65 IN | BODY MASS INDEX: 26.73 KG/M2 | HEART RATE: 64 BPM | WEIGHT: 160.4 LBS | OXYGEN SATURATION: 99 %

## 2024-06-25 DIAGNOSIS — M65.311 TRIGGER THUMB OF RIGHT HAND: ICD-10-CM

## 2024-06-25 DIAGNOSIS — M79.641 PAIN OF RIGHT HAND: Primary | ICD-10-CM

## 2024-06-25 PROCEDURE — 99204 OFFICE O/P NEW MOD 45 MIN: CPT | Performed by: STUDENT IN AN ORGANIZED HEALTH CARE EDUCATION/TRAINING PROGRAM

## 2024-06-25 PROCEDURE — 20550 NJX 1 TENDON SHEATH/LIGAMENT: CPT | Performed by: STUDENT IN AN ORGANIZED HEALTH CARE EDUCATION/TRAINING PROGRAM

## 2024-06-25 RX ORDER — LIDOCAINE HYDROCHLORIDE 10 MG/ML
0.5 INJECTION, SOLUTION INFILTRATION; PERINEURAL ONCE
Status: COMPLETED | OUTPATIENT
Start: 2024-06-25 | End: 2024-06-25

## 2024-06-25 RX ORDER — TRIAMCINOLONE ACETONIDE 40 MG/ML
20 INJECTION, SUSPENSION INTRA-ARTICULAR; INTRAMUSCULAR ONCE
Status: COMPLETED | OUTPATIENT
Start: 2024-06-25 | End: 2024-06-25

## 2024-06-25 RX ADMIN — LIDOCAINE HYDROCHLORIDE 0.5 ML: 10 INJECTION, SOLUTION INFILTRATION; PERINEURAL at 13:50

## 2024-06-25 RX ADMIN — TRIAMCINOLONE ACETONIDE 20 MG: 40 INJECTION, SUSPENSION INTRA-ARTICULAR; INTRAMUSCULAR at 13:51

## 2024-06-25 NOTE — PROGRESS NOTES
baking     Social Determinants of Health     Financial Resource Strain: Low Risk  (9/7/2023)    Overall Financial Resource Strain (CARDIA)     Difficulty of Paying Living Expenses: Not hard at all   Food Insecurity: No Food Insecurity (12/5/2023)    Hunger Vital Sign     Worried About Running Out of Food in the Last Year: Never true     Ran Out of Food in the Last Year: Never true   Transportation Needs: No Transportation Needs (12/5/2023)    PRAPARE - Transportation     Lack of Transportation (Medical): No     Lack of Transportation (Non-Medical): No   Physical Activity: Sufficiently Active (8/24/2022)    Exercise Vital Sign     Days of Exercise per Week: 7 days     Minutes of Exercise per Session: 60 min   Stress: No Stress Concern Present (9/15/2022)    [employer] [place] [employer] - Occupational Stress Questionnaire     Feeling of Stress : Not at all   Social Connections: Moderately Isolated (6/5/2020)    Social Connection and Isolation Panel [NHANES]     Frequency of Communication with Friends and Family: More than three times a week     Frequency of Social Gatherings with Friends and Family: More than three times a week     Attends Sikhism Services: Never     Active Member of Clubs or Organizations: Not on file     Attends Club or Organization Meetings: Never     Marital Status:    Intimate Partner Violence: Not At Risk (8/24/2022)    Humiliation, Afraid, Rape, and Kick questionnaire     Fear of Current or Ex-Partner: No     Emotionally Abused: No     Physically Abused: No     Sexually Abused: No   Housing Stability: Low Risk  (12/5/2023)    Housing Stability Vital Sign     Unable to Pay for Housing in the Last Year: No     Number of Places Lived in the Last Year: 1     Unstable Housing in the Last Year: No     Family History   Problem Relation Age of Onset    Arrhythmia Mother     Hypertension Mother     Dementia Mother     COPD Father         mesothelioma    Alcohol Abuse Father

## 2024-07-11 ENCOUNTER — CARE COORDINATION (OUTPATIENT)
Dept: OTHER | Facility: CLINIC | Age: 64
End: 2024-07-11

## 2024-07-11 NOTE — CARE COORDINATION
Ambulatory Care Coordination Note     7/11/2024 3:44 PM     Patient has graduated from the Complex Case Management  program on 7/11/2024.  Patient/family has the ability to self-manage at this time.  Care management goals have been completed. No further Ambulatory Care Manager follow up scheduled.     Goals Addressed                   This Visit's Progress     COMPLETED: Attends follow up appointments on schedule        Patient will follow up with Dr. Rashid on 12/13/2023-completed    Patient has her next appointment with Dr. Rashid on 1/19/2024  Follow up with PCP on 1/15/2024       COMPLETED: Consistently take medications as prescribed               Patient has Ambulatory Care Manager's contact information for any further questions, concerns, or needs.  Patients upcoming visits:    Future Appointments   Date Time Provider Department Center   7/19/2024  8:45 AM Geri Rashid MD [email or web address]   9/9/2024  2:45 PM Harmony Tyson PA-C [facility]   12/30/2024  8:30 AM Sree Patel MD [facility] [place]

## 2024-07-19 ENCOUNTER — HOSPITAL ENCOUNTER (OUTPATIENT)
Age: 64
Setting detail: SPECIMEN
Discharge: HOME | End: 2024-07-19

## 2024-07-19 ENCOUNTER — OFFICE VISIT (OUTPATIENT)
Dept: SURGERY | Age: 64
End: 2024-07-19

## 2024-07-19 VITALS
BODY MASS INDEX: 27.31 KG/M2 | OXYGEN SATURATION: 98 % | SYSTOLIC BLOOD PRESSURE: 136 MMHG | WEIGHT: 160 LBS | DIASTOLIC BLOOD PRESSURE: 74 MMHG | HEIGHT: 64 IN

## 2024-07-19 DIAGNOSIS — R52 PAINFUL SCAR: Primary | ICD-10-CM

## 2024-07-19 DIAGNOSIS — L90.5 PAINFUL SCAR: Primary | ICD-10-CM

## 2024-07-19 DIAGNOSIS — Z01.818 PRE-OP TESTING: ICD-10-CM

## 2024-07-19 DIAGNOSIS — Z01.818 PRE-OP TESTING: Primary | ICD-10-CM

## 2024-07-19 NOTE — PROGRESS NOTES
process and coordination.  Do not drive, do not operate complex equipment, do not make any important decisions, and do not drink any alcohol while taking these medications.  Be sure to take your prescribed medication only as directed.  If you are on blood thinners such as aspirin, Coumadin, and Plavix etc. you must check with the physician who prescribed him to you prior to stopping them.    ADDITIONAL SURGERY NECESSARY  In some situations, it may not be possible to achieve optimal revision of scarring with a single surgical procedure.  Multiple procedures may be necessary.  Should complications occur, additional surgery or other treatments may be necessary.  Even though risks and complications occur infrequently, the risks cited are the ones that are particularly associated with scar revision surgery.  Other complications and risks can occur but are even more uncommon.  The practice of medicine and surgery is not an exact science.  Although good results are expected, there cannot be any guarantee or warranty expressed or implied on the results that may be obtained.    PATIENT COMPLIANCE   Follow all physician instructions carefully; this is essential for the success of your outcome.  Patient compliance with post-operative activity restriction is critical.  It is important that the surgical incisions are not subjected to excessive force, swelling, abrasion, or motion during the time of healing.  Personal and vocational activities that involve the potential for re-injury to the scar revision must be avoided until healing is completed. Protective dressings and drains should not be removed unless instructed by your plastic surgeon.  Successful post-operative function depends on both surgery and subsequent care.  Physical activity that increases your pulse or heart rate may cause bruising, swelling, fluid accumulation and the need for return to surgery.  It is wise to refrain from intimate physical activities after

## 2024-07-24 NOTE — PROGRESS NOTES
Daily Note     Today's date: 2024  Patient name: José Miguel Lazaro  : 1974  MRN: [id number]  Referring provider: Ines Oliveira MD  Dx:   Encounter Diagnosis     ICD-10-CM    1. Lumbar spondylosis  M47.816       2. Poor posture  R29.3       3. Lumbar back pain  M54.50       4. Weakness of both hips  R29.898           Start Time: 0800  Stop Time: 0845  Total time in clinic (min): 45 minutes    Subjective: José Miguel reports feeling pretty good after previous session, however experienced bilateral lumbar pain yesterday while bending over for prolonged period of time to work on a refrigerator.       Objective: See treatment diary below      Assessment: Tolerated treatment well. José Miguel experienced some discomfort laying flat in supine position; felt improvement of symptoms following repeated lumbar flexion with overpressure. Pt continues to require cueing for posterior pelvic tilt and TA activation, however is able to maintain position following cueing. Focused on re-enforcing hip hinge and squat mechanics to promote reduction of stress on lumbar spine during work. Patient exhibited good technique with therapeutic exercises and would benefit from continued PT      Plan: Continue per plan of care.  Progress treatment as tolerated.       Precautions: none     Access Code: [email or web address]  URL: [email or web address]  Date: 2024  Prepared by: Lavonne Quintero    Exercises  - Supine Lower Trunk Rotation  - 1 x daily - 7 x weekly - 2 sets - 10 reps  - Supine Gluteus Stretch  - 1 x daily - 7 x weekly - 1 sets - 4 reps - 30sec hold  - Supine Posterior Pelvic Tilt  - 1 x daily - 7 x weekly - 2 sets - 10 reps - 5sec hold  - Supine Bridge with Mini Swiss Ball Between Knees  - 1 x daily - 7 x weekly - 2 sets - 10 reps - 5sec hold  - Sidelying Thoracic Rotation with Open Book  - 1 x daily - 7 x weekly - 2 sets - 10 reps - 5sec hold     Insurance:  AMA/CMS Eval/ Re-eval Auth #/ Referral # Total units or  [facility]      Subjective:      Chief Complaint   Patient presents with    Post-Op Check     left carpal tunnel surgery, would like to get the other hand done. She is still having the same pain she had before the surgery. 1-2/10, at night 10/10       HPI: Slade Cochran is a 64 y.o. female who is here after a carpal tunnel surgery and trigger finger release on the left hand. Visions look good, sutures removed. She is also complaining about right sided symptoms. This is worse at night. Is numbing and tingling involving the first through third digits. EMG shows severe carpal tunnel bilaterally. Past Medical History:   Diagnosis Date    Abnormal EKG 3/23/2018    Angina pectoris syndrome ([facility] [address] 03/23/2018    Diverticulosis of colon     DMII (diabetes mellitus, type 2) ([facility] [address] 2000    Dr Mendoza Hnederson hypertension 1/15/2016    Family history of coronary artery disease 1/15/2016    Fatty infiltration of liver 2020    Gastric polyp 2019    Dr Alton Haines, 5 mm    Generalized anxiety disorder     H/O: hysterectomy 2012    History of tobacco abuse 1/15/2016    Obesity (BMI 30-39. 9)     Other specified acquired hypothyroidism 2000    Precordial pain 03/23/2018    (? Prinzmetal?) Dr Les Rollins S/P colonoscopy with polypectomy 2013, 2018, 2019    Dr Sarah Witt, Dr Alton Haines, tubulovillous adenoma, hyperplastic polyp    S/P vaginal hysterectomy 2012    benign    Sleep apnea, obstructive 2007    was on CPAP, not using it at present    Vertigo 2018      Past Surgical History:   Procedure Laterality Date    CARPAL TUNNEL RELEASE Left 1/13/2021    LEFT CARPAL TUNNEL RELEASE, LEFT MIDDLE FINGER TRIGGER RELEASE, TENOSYNOVECTOMY FDPFDS performed by Brittany Louie MD at [address] [facility] [address], [address] [facility] COLONOSCOPY N/A 6/30/2020    COLONOSCOPY DIAGNOSTIC performed by Jenaro Howell MD at [facility] [address] CATH LAB PROCEDURE      ENDOMETRIAL ABLATION  2002    metrorrhagia    ENDOSCOPY, COLON, DIAGNOSTIC      HYSTERECTOMY  9/7/12    fibroid, cervicitis, ovaries intact    MS COLON CA SCRN NOT HI RSK IND N/A 5/15/2018    COLONOSCOPY performed by Prabhu Grider MD at [address] 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[address] , RN     Employer: [employer]   Social Needs    Financial resource strain: Not hard at all    Food insecurity     Worry: Never true     Inability: Never true    Transportation needs     Medical: No     Non-medical: No   Tobacco Use    Smoking status: Former Smoker     Packs/day: 1.00     Years: 30.00     Pack years: 30.00     Types: Cigarettes     Start date: 3/5/1975     Quit date: 6/12/2005     Years since quitting: 15.6    Smokeless tobacco: Never Used    Tobacco comment: 3/15/18 started age 17   Substance and Sexual Activity    Alcohol use: No     Comment: not at all     Drug use: No    Sexual activity: Not on file   Lifestyle    Physical activity     Days per week: 0 days     Minutes per session: 0 min    Stress: Not at all   Relationships    Social connections     Talks on phone: More than three times a week     Gets together: More than three times a week     Attends Holiness service: Never     Active member of club or organization: Not on file     Attends meetings of clubs or organizations: Never     Relationship status:     Intimate partner violence     Fear of current or ex partner: No     Emotionally abused:  No visits Start date  Expiration date KX? Visit limitation?  PT only or  PT+OT? Co-Insurance   CMS 7/9/24 [id number]  12V 7/9 10/9  BOMN  No copay, no coins                 POC Start Date POC Expiration Date Signed POC?   7/9/24 9/3/24 pending     [id number]  Date 7/9 7/16 7/19 7/24           Visits/Units:  Used 1 2 3 4           Authed: 12 visits  Remaining  11 10 9                     7/24 7/19 7/16 7/9    Manuals 4 3 2 IE   Lumbar PA mobs   Gr II-III     STM   R QL, R lumbar paraspinals                  Neuro Re-Ed       Hooklying TA activation 20x 5s hold  20x 5sec hold w/ add iso  20x 5sec hold  10x w/ 5sec hold with tactile cueing    Bridge with add iso  2x10 5s hold  2x10 5s hold  2x10 5s hold 10x w/ 5sec hold   STS   15x tactile and verbal cueing for mechanics  Training 10x with multimodal cueing    squats 2x10 3lb counter balance  2x10 w/ cueing for hip hinge     Hip hinge Training w/ dowel 10x  Training w/ dowel and TB resistance at hips      Standing rows  [place] 8.4 2x15     Pallof press  W/ walkouts in mini squat rishi 3.0  2x15 Rishi 5.0      Supine marches  2x10 cues for TA activation       Ther Ex       HEP  Reviewed + updated  Reviewed + updated - hold cross over stretch, knee rocks only to R  Initiated    Education Positioning at work, biomechanics    Posture, positioning for driving, biomechanics, POC    Cross over glute stretch    3x30s ea  2x30s ea   LTR 20x ea direction 20x ea direction    20x to R only     20x ea     10x R only - pain reduction  10x   Piriformis stretch    3x30s ea    Modified per str   1x30s ea side     Open book stretch   Laying on R side 10x 5s hold      DKTC 10x w/ overpressure - improved       Ther Activity                     Gait Training                     Modalities                               Physically abused: No     Forced sexual activity: No   Other Topics Concern    Not on file   Social History Narrative    Born in [place], one of 4 children (2 boys and 2 girls), both parents in the [employer], moved to [place]    Mother in [place], has memory problems    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    Has 2 dogs, Blossom and [address], 2 sons in the area, one daughter in [address]: dogs, sawing, machine embroidery, baking     Family History   Problem Relation Age of Onset    Arrhythmia Mother     Hypertension Mother     Dementia Mother     COPD Father         mesothelioma    Alcohol Abuse Father     Diabetes Paternal Grandmother     Diabetes Paternal Grandfather     Colon Cancer Paternal Grandfather     Schizophrenia Brother     Colon Cancer Paternal Uncle      Allergies   Allergen Reactions    Seasonal Itching     Current Outpatient Medications on File Prior to Visit   Medication Sig Dispense Refill    buPROPion (WELLBUTRIN XL) 300 MG extended release tablet TAKE ONE TABLET BY MOUTH EVERY MORNING 90 tablet 1    empagliflozin (JARDIANCE) 25 MG tablet Take 1 tablet by mouth daily 90 tablet 1    acetaminophen (TYLENOL) 500 MG tablet Take 1,000 mg by mouth every 6 hours as needed for Pain      pantoprazole (PROTONIX) 20 MG tablet Take 1 tablet by mouth every morning (before breakfast) 90 tablet 1    AgaMatrix Ultra-Thin Lancets MISC Use to test blood sugar 1 time daily 100 each 3    metoprolol tartrate (LOPRESSOR) 25 MG tablet Take 1 tablet by mouth 2 times daily 180 tablet 1    Misc Natural Products (GLUCOSAMINE CHOND COMPLEX/MSM) TABS Take 1 tablet by mouth daily 90 tablet 3    hydroCHLOROthiazide (HYDRODIURIL) 25 MG tablet TAKE 1 TABLET BY MOUTH ONE TIME A DAY IN THE MORNING 90 tablet 1    amLODIPine (NORVASC) 5 MG tablet Take 1 tablet by mouth daily 30 tablet 3  nitroGLYCERIN (NITROSTAT) 0.4 MG SL tablet Place 1 tablet under the tongue every 5 minutes as needed for Chest pain Indications: never used up to max of 3 total doses. If no relief after 1 dose, call 911. 20 tablet 3    levothyroxine (SYNTHROID) 50 MCG tablet TAKE 1 TABLET BY MOUTH ONE TIME A DAY 90 tablet 1    blood glucose test strips (AGAMATRIX JAZZ TEST) strip Use to test blood sugar 1 time daily 100 each 3    Blood Glucose Calibration (AGAMATRIX CONTROL) SOLN Use as directed for control solution test 1 each 0    pravastatin (PRAVACHOL) 20 MG tablet TAKE ONE TABLET BY MOUTH EVERY EVENING 90 tablet 1    Respiratory Therapy Supplies CLARA Please give patient the farshad view mask 1 Device 0    Cholecalciferol (VITAMIN D) 2000 UNITS CAPS capsule Take 1 capsule by mouth daily       loratadine (CLARITIN) 10 MG tablet Take 10 mg by mouth daily as needed (Allergies)       Blood Glucose Monitoring Suppl (AGAMATRIX JAZZ WIRELESS 2) w/Device KIT 1 each by Does not apply route once for 1 dose 1 kit 0     No current facility-administered medications on file prior to visit. Objective:   Pulse 64   Temp 97.5 °F (36.4 °C) (Temporal)   Ht 5' 8\" (1.727 m)   Wt 228 lb (103.4 kg)   SpO2 98%   BMI 34.67 kg/m²     General: WDWN, well appearing, in no distress   Skin: without breakdown, rash, normal in color    Ortho Exam  Right wrist has a positive Sonia tap. Positive Phalen's. There is a nodule at theA2 pulley. Locking noted with active flexion . Radiographs and Laboratory Studies:   Previous diagnostic imaging studies were reviewed.    EMG: severe b/l CTS    Laboratory Studies:   Lab Results   Component Value Date    WBC 9.9 01/07/2021    HGB 13.7 01/07/2021    HCT 41.0 01/07/2021    MCV 85.4 01/07/2021     (H) 01/07/2021     Lab Results   Component Value Date    SEDRATE 33 (H) 11/21/2020     Lab Results   Component Value Date    CRP 9.3 (H) 11/21/2020       Assessment and Plan: Home Phone:  [phone] Cell Phone: [phone]  Emergency Contact:  Shawn Rodriguez   Phone: [phone]  Payor: Wm Perry /  /  /    ID No.: [id number]      PROVIDER TO COMPLETE:  Diagnosis: Right carpal tunnel syndrome, Right middle trigger finger   Procedure/Consent: Left carpal tunnel release, tynosynovectomy   Case Comments/Implants:  Surgery Scheduled as:  Outpatient  Anesthesia Requested: Naa Fragoso  Referring Family Doctor: NAYANA Isabel  [] [facility] Date/Time:                                                   [x] History & Physical from 1/07/2021 [] Physician will Provide [] Attached [] Dictated [] Other  [x] Follow Anesthesia Pre-Op Orders for X-rays, [facility]     [] SN & PT to evaluate and treat/educate disease management, medications, home safety & equipment needs for total joint patients  [] Other: ____________________________________________________  Consults: Medical/Cardiac Clearance done by  ____________________  PRE-OP ORDERS:   Allergies: Seasonal Latex Allergies:             Diabetic:           [] IV ________________________  [x] IV Start with J-loop     Preprinted Orders: Attached [] Yes [] No   ANTIBIOTIC PRE-OP: [x] ANCEF 2 gram IVPB if > 120 kg 3 grams IVPB within 1 hour of incision, if ALLERGIC, use VANCOMYCIN 1 gram IV, 2 hours pre-op  [] TXA Protocol [] Other:   [x] NPO   [] Betablocker (if needed) _____________________________________   [] Knee high anti-embolic hose [] Thigh high anti-embolic hose   Other: ______________________________________________________    Physician Signature Required:    Date/Time: 1/27/2021

## 2024-07-26 LAB
CHOLEST SERPL-MCNC: 167 MG/DL (ref 0–199)
GLUCOSE SERPL-MCNC: 89 MG/DL (ref 70–99)
HDLC SERPL-MCNC: 59 MG/DL (ref 40–59)
LDLC SERPL CALC-MCNC: 88 MG/DL (ref 0–129)
TRIGL SERPL-MCNC: 98 MG/DL (ref 0–150)

## 2024-08-01 ENCOUNTER — TELEPHONE (OUTPATIENT)
Dept: SURGERY | Age: 64
End: 2024-08-01

## 2024-08-01 NOTE — TELEPHONE ENCOUNTER
Spoke to patient, surgery scheduled at [facility]. Patient confirmed and information sent to patient(s) art.    Surgery date/time: 4/15/25 at 9am  Arrival time: 7am  PAT: 4/3/25 at 3pm  Post op: 4/23/25 at 10:15am  Post op: 4/30/25 at 10:15am

## 2024-09-09 ENCOUNTER — OFFICE VISIT (OUTPATIENT)
Dept: FAMILY MEDICINE CLINIC | Age: 64
End: 2024-09-09
Payer: COMMERCIAL

## 2024-09-09 VITALS
WEIGHT: 164 LBS | DIASTOLIC BLOOD PRESSURE: 70 MMHG | HEIGHT: 64 IN | SYSTOLIC BLOOD PRESSURE: 130 MMHG | RESPIRATION RATE: 16 BRPM | BODY MASS INDEX: 28 KG/M2 | HEART RATE: 75 BPM | OXYGEN SATURATION: 98 %

## 2024-09-09 DIAGNOSIS — E66.3 OVERWEIGHT (BMI 25.0-29.9): ICD-10-CM

## 2024-09-09 DIAGNOSIS — R52 PAINFUL SCAR: ICD-10-CM

## 2024-09-09 DIAGNOSIS — Z98.84 S/P GASTRIC BYPASS: ICD-10-CM

## 2024-09-09 DIAGNOSIS — R63.5 WEIGHT GAIN: Primary | ICD-10-CM

## 2024-09-09 DIAGNOSIS — L90.5 PAINFUL SCAR: ICD-10-CM

## 2024-09-09 PROCEDURE — 99213 OFFICE O/P EST LOW 20 MIN: CPT | Performed by: PHYSICIAN ASSISTANT

## 2024-09-09 SDOH — ECONOMIC STABILITY: FOOD INSECURITY: WITHIN THE PAST 12 MONTHS, YOU WORRIED THAT YOUR FOOD WOULD RUN OUT BEFORE YOU GOT MONEY TO BUY MORE.: NEVER TRUE

## 2024-09-09 SDOH — ECONOMIC STABILITY: INCOME INSECURITY: HOW HARD IS IT FOR YOU TO PAY FOR THE VERY BASICS LIKE FOOD, HOUSING, MEDICAL CARE, AND HEATING?: NOT HARD AT ALL

## 2024-09-09 SDOH — ECONOMIC STABILITY: FOOD INSECURITY: WITHIN THE PAST 12 MONTHS, THE FOOD YOU BOUGHT JUST DIDN'T LAST AND YOU DIDN'T HAVE MONEY TO GET MORE.: NEVER TRUE

## 2024-09-09 ASSESSMENT — ENCOUNTER SYMPTOMS
CHEST TIGHTNESS: 0
ABDOMINAL DISTENTION: 0
SINUS PRESSURE: 0
COLOR CHANGE: 0
CONSTIPATION: 0
COUGH: 0
SHORTNESS OF BREATH: 0
WHEEZING: 0

## 2024-10-14 ENCOUNTER — TRANSCRIBE ORDERS (OUTPATIENT)
Dept: ADMINISTRATIVE | Age: 64
End: 2024-10-14

## 2024-10-14 DIAGNOSIS — Z12.31 ENCOUNTER FOR MAMMOGRAM TO ESTABLISH BASELINE MAMMOGRAM: Primary | ICD-10-CM

## 2024-10-16 DIAGNOSIS — K22.9 IRREGULAR Z LINE OF ESOPHAGUS: ICD-10-CM

## 2024-10-16 DIAGNOSIS — F50.810 MILD BINGE-EATING DISORDER: Primary | ICD-10-CM

## 2024-10-16 DIAGNOSIS — F41.1 GENERALIZED ANXIETY DISORDER: ICD-10-CM

## 2024-10-16 NOTE — TELEPHONE ENCOUNTER
Comments: Last refill 7/21/24    Last Office Visit (last PCP visit):   9/9/2024    Next Visit Date:  Future Appointments   Date Time Provider Department Center   10/22/2024  3:40 PM [facility] ROOM 2 [facility] Fac RAD   12/6/2024 10:15 AM Geri Rashid MD [facility]   12/9/2024  9:00 AM Harmony Tyson PA-C [place] [facility] DEP   12/30/2024  8:30 AM Sree Patel MD [place] [facility]   4/3/2025  3:00 PM [facility] PAT RM 1 [facility]   4/23/2025 10:15 AM Geri Rashid MD [facility]   4/30/2025 10:15 AM Geri Rashid MD [facility]       **If hasn't been seen in over a year OR hasn't followed up according to last diabetes/ADHD visit, make appointment for patient before sending refill to provider.    Rx requested:  Requested Prescriptions     Pending Prescriptions Disp Refills    buPROPion (WELLBUTRIN XL) 300 MG extended release tablet 90 tablet 4     Sig: TAKE ONE TABLET BY MOUTH EVERY MORNING    pantoprazole (PROTONIX) 20 MG tablet 90 tablet 4     Sig: Take 1 tablet by mouth every morning (before breakfast)

## 2024-10-18 RX ORDER — BUPROPION HYDROCHLORIDE 300 MG/1
TABLET ORAL
Qty: 90 TABLET | Refills: 4 | Status: SHIPPED | OUTPATIENT
Start: 2024-10-18

## 2024-10-18 RX ORDER — PANTOPRAZOLE SODIUM 20 MG/1
20 TABLET, DELAYED RELEASE ORAL
Qty: 90 TABLET | Refills: 4 | Status: SHIPPED | OUTPATIENT
Start: 2024-10-18

## 2024-10-18 RX ORDER — LISDEXAMFETAMINE DIMESYLATE 30 MG/1
30 CAPSULE ORAL DAILY
Qty: 30 CAPSULE | Refills: 0 | Status: SHIPPED | OUTPATIENT
Start: 2024-10-18 | End: 2024-11-17

## 2024-10-21 ENCOUNTER — PATIENT MESSAGE (OUTPATIENT)
Dept: FAMILY MEDICINE CLINIC | Age: 64
End: 2024-10-21

## 2024-10-21 DIAGNOSIS — E61.1 LOW IRON: ICD-10-CM

## 2024-10-22 ENCOUNTER — HOSPITAL ENCOUNTER (OUTPATIENT)
Dept: WOMENS IMAGING | Age: 64
Discharge: HOME | End: 2024-10-24
Payer: COMMERCIAL

## 2024-10-22 ENCOUNTER — TRANSCRIBE ORDERS (OUTPATIENT)
Dept: WOMENS IMAGING | Age: 64
End: 2024-10-22

## 2024-10-22 DIAGNOSIS — Z12.31 SCREENING MAMMOGRAM FOR BREAST CANCER: Primary | ICD-10-CM

## 2024-10-22 DIAGNOSIS — Z12.31 ENCOUNTER FOR MAMMOGRAM TO ESTABLISH BASELINE MAMMOGRAM: ICD-10-CM

## 2024-10-22 DIAGNOSIS — Z12.31 SCREENING MAMMOGRAM FOR BREAST CANCER: ICD-10-CM

## 2024-10-22 PROCEDURE — 77063 BREAST TOMOSYNTHESIS BI: CPT

## 2024-10-22 RX ORDER — FERROUS SULFATE 325(65) MG
325 TABLET ORAL
Qty: 90 TABLET | Refills: 1 | Status: SHIPPED | OUTPATIENT
Start: 2024-10-22

## 2024-10-25 ENCOUNTER — HOSPITAL ENCOUNTER (OUTPATIENT)
Dept: ORTHOPEDIC SURGERY | Age: 64
Discharge: HOME | End: 2024-10-27
Payer: COMMERCIAL

## 2024-10-25 ENCOUNTER — OFFICE VISIT (OUTPATIENT)
Dept: ORTHOPEDIC SURGERY | Age: 64
End: 2024-10-25

## 2024-10-25 ENCOUNTER — TELEPHONE (OUTPATIENT)
Dept: FAMILY MEDICINE CLINIC | Age: 64
End: 2024-10-25

## 2024-10-25 VITALS — OXYGEN SATURATION: 99 % | BODY MASS INDEX: 28.15 KG/M2 | HEART RATE: 62 BPM | HEIGHT: 64 IN

## 2024-10-25 DIAGNOSIS — M25.551 RIGHT HIP PAIN: ICD-10-CM

## 2024-10-25 DIAGNOSIS — M76.892 HAMSTRING TENDONITIS OF LEFT THIGH: ICD-10-CM

## 2024-10-25 DIAGNOSIS — M76.899 HAMSTRING TENDINITIS: Primary | ICD-10-CM

## 2024-10-25 DIAGNOSIS — M25.552 LEFT HIP PAIN: ICD-10-CM

## 2024-10-25 PROCEDURE — 73502 X-RAY EXAM HIP UNI 2-3 VIEWS: CPT

## 2024-10-25 NOTE — PROGRESS NOTES
Orthopedic Surgery and Sports Medicine    Subjective:      Patient ID: Geovanna Rodriguez is a 63 y.o. female who presents today for:  Chief Complaint   Patient presents with    Hip Pain     Pt presents today for Left Hip pain. Her his has been really uncomfortable for about 3 weeks, she thought it was from exercising. She walks often and last Saturday she could barley get through her walk without so much pain. Right now she is feeling a mild deep ache. The up back of her Thigh is also sore time to time. It radiates to each other. She takes extra strength Tylenol.      LORA Asutin is a 63-year-old female presents today for evaluation of her left hip/buttock pain.  This has been present for approximately 3 weeks.  She has previously seen me for her right trigger thumb that has resolved with an injection.  Her left hip/buttock is a new problem.  She reports pain along the ischial tuberosity.  She does a significant amount of walking for cardio exercise.  She also does some exercise class/weight lifting.  Pain is along the hamstring and buttock.  She reports some mild pain in the hamstring.  She denies any groin pain.  She does not stretch before or after walking.    Previous treatment: none  NSAIDs: No, cannot take due to previous gastric bypass   Physical therapy: No    Occupation: nurse  Workers Compensation:   Have you missed work for this issue? No  Is this issue being addressed under a worker's compensation claim? No    Past Medical History:   Diagnosis Date    Abdominal pain 01/15/2023    Abnormal EKG 03/23/2018    Angina pectoris syndrome ([facility]) 03/23/2018    per pt they found a hiatal hernia    Arthritis     Constipation     COPD (chronic obstructive pulmonary disease) ([facility])     Diastasis recti 11/01/2023    Diverticulosis of colon     DMII (diabetes mellitus, type 2) ([facility]) 2000    After weight loss, patient is no longer on any medications.    Essential hypertension 01/15/2016    Dr.Holiday-cardiology    Excessive

## 2024-10-28 ENCOUNTER — HOSPITAL ENCOUNTER (OUTPATIENT)
Dept: PHYSICAL THERAPY | Age: 64
Setting detail: THERAPIES SERIES
Discharge: HOME | End: 2024-10-28
Attending: STUDENT IN AN ORGANIZED HEALTH CARE EDUCATION/TRAINING PROGRAM
Payer: COMMERCIAL

## 2024-10-28 PROCEDURE — 97161 PT EVAL LOW COMPLEX 20 MIN: CPT

## 2024-10-28 PROCEDURE — 97110 THERAPEUTIC EXERCISES: CPT

## 2024-10-28 NOTE — PLAN OF CARE
buPROPion (WELLBUTRIN XL) 300 MG extended release tablet TAKE ONE TABLET BY MOUTH EVERY MORNING 90 tablet 4    pantoprazole (PROTONIX) 20 MG tablet Take 1 tablet by mouth every morning (before breakfast) 90 tablet 4    lisdexamfetamine (VYVANSE) 30 MG capsule Take 1 capsule by mouth daily for 30 days. Max Daily Amount: 30 mg (Patient not taking: Reported on 10/25/2024) 30 capsule 0    vitamin C (ASCORBIC ACID) 500 MG tablet Take 1 tablet by mouth daily      Probiotic Product (PROBIOTIC BLEND PO) Take 1 capsule by mouth at bedtime      levothyroxine (SYNTHROID) 50 MCG tablet TAKE 1 TABLET BY MOUTH ONE TIME A DAY (Patient taking differently: 0.5 tablets TAKE 1 TABLET BY MOUTH ONE TIME A DAY) 90 tablet 2    Plecanatide (TRULANCE) 3 MG TABS Take 3 mg by mouth daily (with breakfast) (Patient taking differently: Take 3 mg by mouth as needed) 90 tablet 2    Multiple Vitamins-Minerals (BARIATRIC MULTIVITAMINS/IRON PO) Take 1 tablet by mouth daily      estradiol (ESTRACE VAGINAL) 0.1 MG/GM vaginal cream Place 0.5 g vaginally nightly for 21 days, THEN 0.5 g every other day for 21 days, THEN 0.5 g Twice a Week. 42.5 g 1    Calcium Citrate 333 MG TABS Take 2 tablets by mouth in the morning and at bedtime       No current facility-administered medications for this encounter.       Allergies: Seasonal, Morphine, and Nsaids    Family Member Present: no    Occupation: [employer]    Hobbies/Recreational interests: working out for general exercise, sewing, baking    Prior Level of Function: 100%    Reported Current Level of Function: 100%    REPORTED FUNCTIONAL STATUS  Home:  Lives with spouse in a 2 level- no increased pain on the steps   Self-Care Dressing Independent    Bathing Independent   Home Management Cooking Independent    Cleaning Independent    Laundry Independent   Driving Distance Independent     OBJECTIVE:     Estimated body mass index is 28.15 kg/m² as calculated from the following:    Height as of 10/25/24: 1.626 m (5'

## 2024-11-05 ENCOUNTER — HOSPITAL ENCOUNTER (OUTPATIENT)
Dept: PHYSICAL THERAPY | Age: 64
Setting detail: THERAPIES SERIES
Discharge: HOME | End: 2024-11-05
Attending: STUDENT IN AN ORGANIZED HEALTH CARE EDUCATION/TRAINING PROGRAM
Payer: COMMERCIAL

## 2024-11-05 PROCEDURE — 97110 THERAPEUTIC EXERCISES: CPT

## 2024-11-05 ASSESSMENT — PAIN SCALES - GENERAL: PAINLEVEL_OUTOF10: 4

## 2024-11-05 ASSESSMENT — PAIN DESCRIPTION - ORIENTATION: ORIENTATION: LEFT

## 2024-11-05 ASSESSMENT — PAIN DESCRIPTION - LOCATION: LOCATION: BUTTOCKS;HIP

## 2024-11-05 NOTE — PROGRESS NOTES
[facility]  Outpatient Physical Therapy   Treatment Note        Date: 2024  Patient: Geovanna Rodriguez  : 1960   Confirmed: Yes  MRN: [id number]  Referring Provider: Letty Sanderson MD      Medical Diagnosis: Hamstring tendinitis [M76.899]  Hamstring tendonitis of left thigh [M76.892]   Diagnosis: hamstring tendinitis L thigh  Treatment Diagnosis: decreased proximal hip strength and pain/ tightness feeling in muscles of LEs    Visit Information:  Insurance: Payor: [facility] / Plan: [employer] [facility] [employer] EM[employer]YEES / Product Type: *No Product type* /   PT Visit Information  Onset Date:  (3 wks ago)  PT Insurance Information: UMR  Total # of Visits Approved: 30  Total # of Visits to Date: 2  No Show: 0  Canceled Appointment: 0  Progress Note Counter:     Subjective Information:  Subjective: Pt reports not walking the past 2 weekends and has not been to exercises class due to fear of aggravating it.  HEP Compliance:  [x] Good [] Fair [] Poor [] Reports not doing due to:    Pain Screening  Patient Currently in Pain: Yes  Pain Assessment: 0-10  Pain Level: 4  Pain Location: Buttocks, Hip  Pain Orientation: Left    Treatment:  Exercises:  Exercises  Exercise 1: Seated hamstring stretch 30s x 2 MARY  Exercise 2: prone quad stretch with strap 30s X 2, reviewed standing quad stretch  Exercise 3: fig 4 seated R/L 30s X 2  Exercise 5: 3 way SLR x 10 EA MARY  Exercise 6: Standing dead bugs x10 MARY  Exercise 7: single leg bridge x 5 MARY with TA activation  Exercise 20: HEP: 3 way SLR, single leg bridge      Objective Measures:     Ambulation  Surface: Carpet  Device: No Device  Assistance: Independent  Quality of Gait: Mild decreased MARY push off, increased gait speed,  Distance: Throughout clinic     LTG 3 Current Status:: ; Pt reports 4/10 pain w/less walking at home    Assessment:   Body Structures, Functions, Activity Limitations Requiring Skilled Therapeutic Intervention: Decreased functional mobility , Decreased

## 2024-11-14 ENCOUNTER — HOSPITAL ENCOUNTER (OUTPATIENT)
Dept: PHYSICAL THERAPY | Age: 64
Setting detail: THERAPIES SERIES
Discharge: HOME | End: 2024-11-14
Attending: STUDENT IN AN ORGANIZED HEALTH CARE EDUCATION/TRAINING PROGRAM
Payer: COMMERCIAL

## 2024-11-14 PROCEDURE — 97110 THERAPEUTIC EXERCISES: CPT

## 2024-11-14 NOTE — PROGRESS NOTES
[facility]  Outpatient Physical Therapy   Treatment Note        Date: 2024  Patient: Geovanna Rodriguez  : 1960   Confirmed: Yes  MRN: [id number]  Referring Provider: Letty Sanderson MD      Medical Diagnosis: Hamstring tendinitis [M76.899]  Hamstring tendonitis of left thigh [M76.892]      Treatment Diagnosis: decreased proximal hip strength and pain/ tightness feeling in muscles of LEs    Visit Information:  Insurance: Payor: R / Plan: [facility] EMPLOYEES / Product Type: *No Product type* /   PT Visit Information  Onset Date:  (3 wks ago)  PT Insurance Information: UMR  Total # of Visits Approved: 30  Total # of Visits to Date: 3  No Show: 0  Canceled Appointment: 0  Progress Note Counter: 3/6    Subjective Information:  Subjective: patient reports she fell in the parking lot at work this morning, states she is ok, report she did take a tylenol today and feels ok right now. States she went back to her exercise class and felt good. Feels like doing the stretches helps with soreness.  HEP Compliance:  [x] Good [] Fair [] Poor [] Reports not doing due to:    Pain Screening  Patient Currently in Pain: Denies    Treatment:  Exercises:  Exercises  Exercise 1: supine hamstring str with strap 3 reps x 20\" holds (L only )  Exercise 2: modified shiraz str with strap (for quad and hip flexor) 3 reps x 20\" holds (L only )  Exercise 4: S/L ITB str 3 reps x3 reps (L only)  Exercise 7: bridge with march x10 reps  Exercise 8: steamboats 4-way RTB x10-15 reps  Exercise 9: LE Scifit 1.5 x5 mine for ROM  Exercise 10: PPT x15 reps  Exercise 11: clamshells RTB & reverse clams x15 reps, each (L)  Exercise 12: hip hikes [id number]  Exercise 20: HEP: continue current + clams, reverse, hip hikes    Manual:   Manual Therapy  Soft Tissue Mobilizaton: massage stick to L hip, glute ITB x5 min    Modalities:  Cryotherapy (CPT 25587)  Patient Position: Seated  Number Minutes Cryotherapy: 10  Cryotherapy location: Left,

## 2024-11-19 ENCOUNTER — HOSPITAL ENCOUNTER (OUTPATIENT)
Dept: PHYSICAL THERAPY | Age: 64
Setting detail: THERAPIES SERIES
Discharge: HOME | End: 2024-11-19
Attending: STUDENT IN AN ORGANIZED HEALTH CARE EDUCATION/TRAINING PROGRAM
Payer: COMMERCIAL

## 2024-11-19 PROCEDURE — 97110 THERAPEUTIC EXERCISES: CPT

## 2024-11-19 NOTE — PROGRESS NOTES
[facility]  Outpatient Physical Therapy   Treatment Note        Date: 2024  Patient: Geovanna Rodriguez  : 1960   Confirmed: Yes  MRN: [id number]  Referring Provider: Letty Sanderson MD      Medical Diagnosis: Hamstring tendinitis [M76.899]  Hamstring tendonitis of left thigh [M76.892]      Treatment Diagnosis: decreased proximal hip strength and pain/ tightness feeling in muscles of LEs    Visit Information:  Insurance: Payor: [facility] / Plan: [facility] EMPLOYEES / Product Type: *No Product type* /   PT Visit Information  Onset Date:  (3 wks ago)  PT Insurance Information: UMR  Total # of Visits Approved: 30  Total # of Visits to Date: 4  No Show: 0  Canceled Appointment: 0  Progress Note Counter:     Subjective Information:  Subjective: Pt reports L hip feeling a little better. Took a few days off of long walks to decrease aggrivation.  HEP Compliance:  [x] Good [] Fair [] Poor [] Reports not doing due to:  Pain Screening  Patient Currently in Pain: No    Treatment:  Exercises:  Exercises  Exercise 2: Miguel stretch (for quad and hip flexor) 3 reps x 20\" holds MARY  Exercise 3: Warm up: fig 4 supine stretch, supine pigeon  Exercise 6: Standing dead bugs x10 MARY  Exercise 7: bridge with march x10 reps  Exercise 10: Bosu ball lunges x10 MARY for balance  Exercise 11: clamshells RTB & reverse clams x15 reps MARY  Exercise 12: hip hikes k10rsev,mary  Exercise 13: 4 way cable column L4, x5 EA way  Exercise 20: HEP: continue current + clams, reverse, hip hikes       Manual:   Manual Therapy  Soft Tissue Mobilizaton: massage stick to L hip, glute ITB x5 min     *Indicates exercise, modality, or manual techniques to be initiated when appropriate    Objective Measures:     Assessment:   Body Structures, Functions, Activity Limitations Requiring Skilled Therapeutic Intervention: Decreased functional mobility , Decreased strength, Increased pain  Assessment: Pt progressed hip strengthening exercises with

## 2024-11-26 ENCOUNTER — HOSPITAL ENCOUNTER (OUTPATIENT)
Dept: PHYSICAL THERAPY | Age: 64
Setting detail: THERAPIES SERIES
Discharge: HOME | End: 2024-11-26
Attending: STUDENT IN AN ORGANIZED HEALTH CARE EDUCATION/TRAINING PROGRAM
Payer: COMMERCIAL

## 2024-11-26 PROCEDURE — 97110 THERAPEUTIC EXERCISES: CPT

## 2024-11-26 ASSESSMENT — PAIN SCALES - GENERAL: PAINLEVEL_OUTOF10: 3

## 2024-11-26 ASSESSMENT — PAIN DESCRIPTION - LOCATION: LOCATION: HIP

## 2024-11-26 NOTE — PROGRESS NOTES
[facility]  Outpatient Physical Therapy   Treatment Note        Date: 2024  Patient: Geovanna Rodriguez  : 1960   Confirmed: Yes  MRN: [id number]  Referring Provider: Letty Sanderson MD      Medical Diagnosis: Hamstring tendinitis [M76.899]  Hamstring tendonitis of left thigh [M76.892]      Treatment Diagnosis: decreased proximal hip strength and pain/ tightness feeling in muscles of LEs    Visit Information:  Insurance: Payor: [facility] / Plan: [facility] EMPLOYEES / Product Type: *No Product type* /   PT Visit Information  Onset Date:  (3 wks ago)  PT Insurance Information: UMR  Total # of Visits Approved: 30  Total # of Visits to Date: 5  No Show: 0  Canceled Appointment: 0  Progress Note Counter:     Subjective Information:  Subjective: Pt reported R hip tightness today, L hip feels ok, 3/10.  States needs to leave early due to conflict.  HEP Compliance:  [x] Good [] Fair [] Poor [] Reports not doing due to:    Pain Screening  Patient Currently in Pain: Yes  Pain Level: 3  Pain Location: Hip    Treatment:  Exercises:  Exercises  Exercise 1: Sidelying:  hip ABDx 10, hip circles CW/CWW x10, heel/toe rainbows x10  Exercise 2: Rockerboard F/L x10 all ways  Exercise 3: Warm up: fig 4 supine stretch  Exercise 4: Cable column L4 all directions x4     *Indicates exercise, modality, or manual techniques to be initiated when appropriate    Objective Measures:       Strength RLE  R Hip Flexion: 5/5  R Hip Extension: 4/5  R Hip ABduction: 4+/5  R Hip Internal Rotation: 5/5  R Hip External Rotation: 5/5  R Knee Flexion: 5/5  R Knee Extension: 5/5  R Ankle Dorsiflexion: 5/5  Strength LLE  L Hip Flexion: 5/5  L Hip Extension: 4/5  L Hip ABduction: 4+/5  L Hip Internal Rotation: 5/5  L Hip External Rotation: 5/5  L Knee Flexion: 5/5  L Knee Extension: 5/5  L Ankle Dorsiflexion: 5/5     Assessment:   Body Structures, Functions, Activity Limitations Requiring Skilled Therapeutic Intervention: Decreased

## 2024-12-02 ENCOUNTER — OFFICE VISIT (OUTPATIENT)
Dept: FAMILY MEDICINE CLINIC | Age: 64
End: 2024-12-02

## 2024-12-02 VITALS
HEIGHT: 64 IN | WEIGHT: 164 LBS | SYSTOLIC BLOOD PRESSURE: 138 MMHG | BODY MASS INDEX: 28 KG/M2 | DIASTOLIC BLOOD PRESSURE: 72 MMHG | HEART RATE: 67 BPM | OXYGEN SATURATION: 98 %

## 2024-12-02 DIAGNOSIS — R35.0 FREQUENCY OF URINATION: ICD-10-CM

## 2024-12-02 DIAGNOSIS — R30.0 DYSURIA: ICD-10-CM

## 2024-12-02 DIAGNOSIS — N30.01 ACUTE CYSTITIS WITH HEMATURIA: ICD-10-CM

## 2024-12-02 DIAGNOSIS — N30.01 ACUTE CYSTITIS WITH HEMATURIA: Primary | ICD-10-CM

## 2024-12-02 LAB
BILIRUBIN, POC: NORMAL
BLOOD URINE, POC: NORMAL
CLARITY, POC: CLEAR
COLOR, POC: NORMAL
GLUCOSE URINE, POC: NORMAL MG/DL
KETONES, POC: NORMAL MG/DL
LEUKOCYTE EST, POC: NORMAL
NITRITE, POC: NORMAL
PH, POC: 6
PROTEIN, POC: NORMAL MG/DL
SPECIFIC GRAVITY, POC: 1
UROBILINOGEN, POC: NORMAL MG/DL

## 2024-12-02 RX ORDER — CEPHALEXIN 500 MG/1
500 CAPSULE ORAL 2 TIMES DAILY
Qty: 14 CAPSULE | Refills: 0 | Status: SHIPPED | OUTPATIENT
Start: 2024-12-02 | End: 2024-12-09

## 2024-12-02 ASSESSMENT — ENCOUNTER SYMPTOMS: NAUSEA: 0

## 2024-12-02 NOTE — PROGRESS NOTES
Subjective  Geovanna Rodriguez, 63 y.o. female presents today with:  Chief Complaint   Patient presents with    Urinary Tract Infection     States burning with urination, some blood as well and seems she going frequently started Wednesday evening        HPI  Presents to walk-in clinic for urinary concerns   Initially vaginal dryness on Wed. Tried estradiol cream.   Further developed frequency/urgency of urination & dysuria   Left-sided flank pain   Urethral irritation/inflammation   Denies fever or chills   Denies nausea   Eating and drinking   Sleep interrupted d/t frequency of urination                           Past Medical History:   Diagnosis Date    Abdominal pain 01/15/2023    Abnormal EKG 03/23/2018    Angina pectoris syndrome (HCC) 03/23/2018    per pt they found a hiatal hernia    Arthritis     Constipation     COPD (chronic obstructive pulmonary disease) (HCC)     Diastasis recti 11/01/2023    Diverticulosis of colon     DMII (diabetes mellitus, type 2) ([facility]) 2000    After weight loss, patient is no longer on any medications.    Essential hypertension 01/15/2016    Dr.Holiday-cardiology    Excessive skin and subcutaneous tissue 10/14/2022    Family history of coronary artery disease 01/15/2016    Fatty infiltration of liver 2020    Gastric polyp 2019    Dr Hernandez, 5 mm    Generalized anxiety disorder     H/O: hysterectomy 2012    History of tobacco abuse 01/15/2016    Migraine     Obesity (BMI 30-39.9)     Other specified acquired hypothyroidism 2000    Precordial pain 03/23/2018    (? Prinzmetal?) Dr Michael    Rash and nonspecific skin eruption 10/14/2022    S/P colonoscopy with polypectomy 2013, 2018, 2019    Dr Armendariz, Dr Hernandez, tubulovillous adenoma, hyperplastic polyp    S/P vaginal hysterectomy 2012    benign    Seasonal allergies     Sleep apnea, obstructive 2007    was on CPAP, not using it at present, retested 12/2023 after weight loss    Symptomatic abdominal panniculus 12/05/2023    Type II

## 2024-12-05 LAB
BACTERIA UR CULT: ABNORMAL
BACTERIA UR CULT: ABNORMAL
ORGANISM: ABNORMAL

## 2024-12-06 ENCOUNTER — OFFICE VISIT (OUTPATIENT)
Dept: SURGERY | Age: 64
End: 2024-12-06

## 2024-12-06 VITALS
HEART RATE: 65 BPM | DIASTOLIC BLOOD PRESSURE: 63 MMHG | RESPIRATION RATE: 16 BRPM | BODY MASS INDEX: 28.34 KG/M2 | WEIGHT: 166 LBS | HEIGHT: 64 IN | SYSTOLIC BLOOD PRESSURE: 133 MMHG

## 2024-12-06 DIAGNOSIS — R52 PAINFUL SCAR: Primary | ICD-10-CM

## 2024-12-06 DIAGNOSIS — L90.5 PAINFUL SCAR: Primary | ICD-10-CM

## 2024-12-06 NOTE — PROGRESS NOTES
surgeon for further instructions.  If the reaction is severe, go immediately to the nearest emergency room.  When taking the prescribed pain medications after surgery, realize that they can affect your thought process and coordination.  Do not drive, do not operate complex equipment, do not make any important decisions, and do not drink any alcohol while taking these medications.  Be sure to take your prescribed medication only as directed.  If you are on blood thinners such as aspirin, Coumadin, and Plavix etc. you must check with the physician who prescribed him to you prior to stopping them.    ADDITIONAL SURGERY NECESSARY  In some situations, it may not be possible to achieve optimal revision of scarring with a single surgical procedure.  Multiple procedures may be necessary.  Should complications occur, additional surgery or other treatments may be necessary.  Even though risks and complications occur infrequently, the risks cited are the ones that are particularly associated with scar revision surgery.  Other complications and risks can occur but are even more uncommon.  The practice of medicine and surgery is not an exact science.  Although good results are expected, there cannot be any guarantee or warranty expressed or implied on the results that may be obtained.    PATIENT COMPLIANCE   Follow all physician instructions carefully; this is essential for the success of your outcome.  Patient compliance with post-operative activity restriction is critical.  It is important that the surgical incisions are not subjected to excessive force, swelling, abrasion, or motion during the time of healing.  Personal and vocational activities that involve the potential for re-injury to the scar revision must be avoided until healing is completed. Protective dressings and drains should not be removed unless instructed by your plastic surgeon.  Successful post-operative function depends on both surgery and subsequent care.

## 2024-12-09 ENCOUNTER — TELEPHONE (OUTPATIENT)
Age: 64
End: 2024-12-09

## 2024-12-09 ENCOUNTER — OFFICE VISIT (OUTPATIENT)
Age: 64
End: 2024-12-09
Payer: COMMERCIAL

## 2024-12-09 VITALS
HEART RATE: 63 BPM | HEIGHT: 64 IN | RESPIRATION RATE: 16 BRPM | DIASTOLIC BLOOD PRESSURE: 60 MMHG | SYSTOLIC BLOOD PRESSURE: 118 MMHG | WEIGHT: 167 LBS | OXYGEN SATURATION: 96 % | BODY MASS INDEX: 28.51 KG/M2

## 2024-12-09 DIAGNOSIS — F98.8 ATTENTION DEFICIT DISORDER (ADD) IN ADULT: ICD-10-CM

## 2024-12-09 DIAGNOSIS — R73.03 PREDIABETES: Primary | ICD-10-CM

## 2024-12-09 DIAGNOSIS — E55.9 VITAMIN D DEFICIENCY: ICD-10-CM

## 2024-12-09 DIAGNOSIS — E03.9 HYPOTHYROIDISM, UNSPECIFIED TYPE: ICD-10-CM

## 2024-12-09 DIAGNOSIS — Z98.84 S/P GASTRIC BYPASS: ICD-10-CM

## 2024-12-09 DIAGNOSIS — L90.5 PAINFUL SCAR: ICD-10-CM

## 2024-12-09 DIAGNOSIS — R63.5 WEIGHT GAIN: ICD-10-CM

## 2024-12-09 DIAGNOSIS — R52 PAINFUL SCAR: ICD-10-CM

## 2024-12-09 DIAGNOSIS — E61.1 IRON DEFICIENCY: ICD-10-CM

## 2024-12-09 PROCEDURE — 99214 OFFICE O/P EST MOD 30 MIN: CPT | Performed by: PHYSICIAN ASSISTANT

## 2024-12-09 RX ORDER — DEXTROAMPHETAMINE SACCHARATE, AMPHETAMINE ASPARTATE MONOHYDRATE, DEXTROAMPHETAMINE SULFATE AND AMPHETAMINE SULFATE 3.75; 3.75; 3.75; 3.75 MG/1; MG/1; MG/1; MG/1
15 CAPSULE, EXTENDED RELEASE ORAL DAILY
Qty: 30 CAPSULE | Refills: 0 | Status: SHIPPED | OUTPATIENT
Start: 2024-12-09 | End: 2024-12-09 | Stop reason: SDUPTHER

## 2024-12-09 RX ORDER — DEXTROAMPHETAMINE SACCHARATE, AMPHETAMINE ASPARTATE MONOHYDRATE, DEXTROAMPHETAMINE SULFATE AND AMPHETAMINE SULFATE 3.75; 3.75; 3.75; 3.75 MG/1; MG/1; MG/1; MG/1
15 CAPSULE, EXTENDED RELEASE ORAL DAILY
Qty: 30 CAPSULE | Refills: 0 | Status: SHIPPED | OUTPATIENT
Start: 2024-12-09 | End: 2025-01-08

## 2024-12-09 ASSESSMENT — ENCOUNTER SYMPTOMS
SINUS PRESSURE: 0
WHEEZING: 0
CHEST TIGHTNESS: 0
ABDOMINAL DISTENTION: 0
BACK PAIN: 0
CONSTIPATION: 0
COUGH: 0
SHORTNESS OF BREATH: 0
COLOR CHANGE: 0

## 2024-12-09 NOTE — TELEPHONE ENCOUNTER
Patient is asking if Aderall can be sent to [facility]. [facility] does not have it.

## 2024-12-09 NOTE — PROGRESS NOTES
MG TABS Take 3 mg by mouth daily (with breakfast) (Patient taking differently: Take 3 mg by mouth as needed) 90 tablet 2    Multiple Vitamins-Minerals (BARIATRIC MULTIVITAMINS/IRON PO) Take 1 tablet by mouth daily      estradiol (ESTRACE VAGINAL) 0.1 MG/GM vaginal cream Place 0.5 g vaginally nightly for 21 days, THEN 0.5 g every other day for 21 days, THEN 0.5 g Twice a Week. 42.5 g 1    Calcium Citrate 333 MG TABS Take 2 tablets by mouth in the morning and at bedtime       No current facility-administered medications for this visit.     PMH, Surgical Hx, Family Hx, and Social Hx reviewed and updated.  Health Maintenance reviewed.    Objective  Vitals:    12/09/24 0901 12/09/24 0913   BP: (!) 140/70 118/60   Site: Right Upper Arm Right Upper Arm   Position: Sitting Sitting   Cuff Size: Medium Adult Medium Adult   Pulse: 63    Resp: 16    SpO2: 96%    Weight: 75.8 kg (167 lb)    Height: 1.626 m (5' 4\")      BP Readings from Last 3 Encounters:   12/09/24 118/60   12/06/24 133/63   12/02/24 138/72     Wt Readings from Last 3 Encounters:   12/09/24 75.8 kg (167 lb)   12/06/24 75.3 kg (166 lb)   12/02/24 74.4 kg (164 lb)     Physical Exam  Vitals reviewed.   Constitutional:       Appearance: Normal appearance. She is not ill-appearing.   HENT:      Head: Normocephalic and atraumatic.      Right Ear: External ear normal.      Left Ear: External ear normal.      Nose: No congestion.      Mouth/Throat:      Pharynx: Oropharynx is clear.   Cardiovascular:      Rate and Rhythm: Normal rate and regular rhythm.      Heart sounds: No murmur heard.  Pulmonary:      Effort: Pulmonary effort is normal.      Breath sounds: Normal breath sounds.   Abdominal:      Comments: Post-op scars noted.     Musculoskeletal:      Right lower leg: No edema.      Left lower leg: No edema.   Skin:     General: Skin is warm and dry.      Comments: Bilateral dog ear scars of flank.   Neurological:      General: No focal deficit present.      Mental

## 2024-12-10 DIAGNOSIS — E03.9 HYPOTHYROIDISM, UNSPECIFIED TYPE: ICD-10-CM

## 2024-12-10 DIAGNOSIS — R73.03 PREDIABETES: ICD-10-CM

## 2024-12-10 DIAGNOSIS — E61.1 IRON DEFICIENCY: ICD-10-CM

## 2024-12-10 LAB
ALBUMIN SERPL-MCNC: 4.1 G/DL (ref 3.5–4.6)
ALP SERPL-CCNC: 91 U/L (ref 40–130)
ALT SERPL-CCNC: 33 U/L (ref 0–33)
ANION GAP SERPL CALCULATED.3IONS-SCNC: 8 MEQ/L (ref 9–15)
AST SERPL-CCNC: 30 U/L (ref 0–35)
BILIRUB SERPL-MCNC: 0.3 MG/DL (ref 0.2–0.7)
BUN SERPL-MCNC: 16 MG/DL (ref 8–23)
CALCIUM SERPL-MCNC: 9.4 MG/DL (ref 8.5–9.9)
CHLORIDE SERPL-SCNC: 104 MEQ/L (ref 95–107)
CO2 SERPL-SCNC: 29 MEQ/L (ref 20–31)
CREAT SERPL-MCNC: 0.91 MG/DL (ref 0.5–0.9)
ERYTHROCYTE [DISTWIDTH] IN BLOOD BY AUTOMATED COUNT: 12.1 % (ref 11.5–14.5)
GLOBULIN SER CALC-MCNC: 2.6 G/DL (ref 2.3–3.5)
GLUCOSE SERPL-MCNC: 92 MG/DL (ref 70–99)
HCT VFR BLD AUTO: 39.1 % (ref 37–47)
HGB BLD-MCNC: 12.9 G/DL (ref 12–16)
MCH RBC QN AUTO: 29.7 PG (ref 27–31.3)
MCHC RBC AUTO-ENTMCNC: 33 % (ref 33–37)
MCV RBC AUTO: 90.1 FL (ref 79.4–94.8)
PLATELET # BLD AUTO: 270 K/UL (ref 130–400)
POTASSIUM SERPL-SCNC: 4 MEQ/L (ref 3.4–4.9)
PROT SERPL-MCNC: 6.7 G/DL (ref 6.3–8)
RBC # BLD AUTO: 4.34 M/UL (ref 4.2–5.4)
SODIUM SERPL-SCNC: 141 MEQ/L (ref 135–144)
T4 FREE SERPL-MCNC: 1.16 NG/DL (ref 0.84–1.68)
TSH SERPL-MCNC: 0.84 UIU/ML (ref 0.44–3.86)
WBC # BLD AUTO: 6.8 K/UL (ref 4.8–10.8)

## 2024-12-11 LAB
ESTIMATED AVERAGE GLUCOSE: 120 MG/DL
FERRITIN: 97 NG/ML
HBA1C MFR BLD: 5.8 % (ref 4–6)
IRON % SATURATION: 25 % (ref 20–55)
IRON: 78 UG/DL (ref 37–145)
TOTAL IRON BINDING CAPACITY: 316 UG/DL (ref 250–450)
UNSATURATED IRON BINDING CAPACITY: 238 UG/DL (ref 112–347)

## 2024-12-13 ENCOUNTER — HOSPITAL ENCOUNTER (OUTPATIENT)
Dept: PHYSICAL THERAPY | Age: 64
Setting detail: THERAPIES SERIES
Discharge: HOME | End: 2024-12-13
Attending: STUDENT IN AN ORGANIZED HEALTH CARE EDUCATION/TRAINING PROGRAM
Payer: COMMERCIAL

## 2024-12-13 PROCEDURE — 97110 THERAPEUTIC EXERCISES: CPT

## 2024-12-13 NOTE — PROGRESS NOTES
additions or modifications this date.    Therapy Time:      PT Individual Minutes  Time In: 0802  Time Out: 0849  Minutes: 47  Timed Code Treatment Minutes: 46 Minutes  Procedure Minutes:0  Timed Activity Minutes Units   Ther Ex 41 3   Manual  5 0     Electronically signed by Ann Marie Hickman PT on 12/13/24 at 8:08 AM EST

## 2024-12-13 NOTE — PLAN OF CARE
[facility]  PHYSICAL THERAPY PLAN OF CARE                                    [facility] [place] [address]     Ph: [phone]  Fax: [phone]    [] Certification  [] Recertification []  Plan of Care  [] Progress Note [] Discharge      Referring Provider: Letty Sanderson MD    From:  Ann Marie Hickman PT     Patient: Geovanna Rodriguez (64 y.o. female) : 1960 Date: 2024   Medical Diagnosis: Hamstring tendinitis [M76.899]  Hamstring tendonitis of left thigh [M76.892]    Treatment Diagnosis: decreased proximal hip strength and pain/ tightness feeling in muscles of LEs    Progress Report Period from: 10/28/2024  to 2024    Visits to Date: 6 No Show: 0 Cancelled Appts: 0    OBJECTIVE:     Long Term Goals - Time Frame for Long Term Goals : 3-4 wks  Goals Current/ Discharge status Status   Long Term Goal 1: Pt will demonstrate indep and 100% compliance with HEP for self management of pain.  Independent with compliance   In progress   Long Term Goal 2: Pt will demonstrate improved score on LEFS 80/80 for improved quality of life.     In progress   Long Term Goal 3: Pt will demonstrate decreased pain L buttock and L ischial tuberosity </= 2/10 after 6 mile walk for return to prior general daily exercise.  3/10, reports increased pain with walking ~5 miles   In progress    NEW GOAL:  Pt will improve fito LE strength to 5/5 to improve stability with walking longer distances.  Strength RLE  R Hip Flexion: 5/5  R Hip Extension: 4/5  R Hip ABduction: 4+/5  R Hip Internal Rotation: 5/5  R Hip External Rotation: 5/5  R Knee Flexion: 5/5  R Knee Extension: 5/5  R Ankle Dorsiflexion: 5/5  Strength LLE  L Hip Flexion: 5/5  L Hip Extension: 4/5  L Hip ABduction: 4+/5  L Hip Internal Rotation: 5/5  L Hip External Rotation: 5/5  L Knee Flexion: 5/5  L Knee Extension: 5/5  L Ankle Dorsiflexion: 5/5                                                                       Body Structures, Functions,

## 2024-12-16 ENCOUNTER — TELEPHONE (OUTPATIENT)
Dept: FAMILY MEDICINE CLINIC | Age: 64
End: 2024-12-16

## 2024-12-16 DIAGNOSIS — N30.01 ACUTE CYSTITIS WITH HEMATURIA: Primary | ICD-10-CM

## 2024-12-16 RX ORDER — NITROFURANTOIN 25; 75 MG/1; MG/1
100 CAPSULE ORAL 2 TIMES DAILY
Qty: 10 CAPSULE | Refills: 0 | Status: SHIPPED | OUTPATIENT
Start: 2024-12-16 | End: 2024-12-21

## 2024-12-16 NOTE — TELEPHONE ENCOUNTER
Pt stopped by today due she feels her symptoms have returned and was wondering is she can get a rx or what to do. Pt was advised and a new antibiotic was sent to her pharmacy.

## 2024-12-19 ENCOUNTER — HOSPITAL ENCOUNTER (OUTPATIENT)
Dept: PHYSICAL THERAPY | Age: 64
Setting detail: THERAPIES SERIES
Discharge: HOME | End: 2024-12-19
Attending: STUDENT IN AN ORGANIZED HEALTH CARE EDUCATION/TRAINING PROGRAM
Payer: COMMERCIAL

## 2024-12-19 PROCEDURE — 97110 THERAPEUTIC EXERCISES: CPT

## 2024-12-19 ASSESSMENT — PAIN DESCRIPTION - LOCATION: LOCATION: HIP;BUTTOCKS

## 2024-12-19 ASSESSMENT — PAIN DESCRIPTION - ORIENTATION: ORIENTATION: LEFT

## 2024-12-19 ASSESSMENT — PAIN SCALES - GENERAL: PAINLEVEL_OUTOF10: 1

## 2024-12-19 NOTE — PROGRESS NOTES
[facility]  Outpatient Physical Therapy   Treatment Note        Date: 2024  Patient: Geovanna Rodriguez  : 1960   Confirmed: Yes  MRN: [id number]  Referring Provider: Letty Sanderson MD      Medical Diagnosis: Hamstring tendinitis [M76.899]  Hamstring tendonitis of left thigh [M76.892]      Treatment Diagnosis: decreased proximal hip strength and pain/ tightness feeling in muscles of LEs    Visit Information:  Insurance: Payor: [facility] / Plan: [facility] EMPLOYEES / Product Type: *No Product type* /   PT Visit Information  Onset Date:  (3 wks ago)  PT Insurance Information: UMR  Total # of Visits Approved: 30  Total # of Visits to Date: 7  No Show: 0  Canceled Appointment: 0  Progress Note Counter:     Subjective Information:  Subjective: Patient reports pain improved, but not completly gone. Feels it mostly when walking fast  HEP Compliance:  [x] Good [] Fair [] Poor [] Reports not doing due to:    Pain Screening  Patient Currently in Pain: Yes  Pain Level: 1  Pain Location: Hip, Buttocks  Pain Orientation: Left    Treatment:  Exercises:  Exercises  Exercise 1: STS with 5sec lower (focus on eccentric lower) x15 reps  Exercise 2: donkey kick 3# x15 reps, fito ( no weight on R)  Exercise 4: RDLs 10# KB from 6\" box x15-20 reps  Exercise 6: SLS with reach in // bars x10 reps, fito  Exercise 7: supine ham curl 3# eccentric lower x15 reps  Exercise 9: Treadmill: BWD up to 0.8mph x 5' x 2.5 incline  Exercise 20: HEP: continue current - modifiying activity during exercise classes    Modalities:  Cryotherapy (CPT 08668)  Patient Position: Seated  Number Minutes Cryotherapy: 10  Cryotherapy location: Left, Hip (glute)  Post treatment skin assessment: Intact  Limitations addressed: Pain modulation    *Indicates exercise, modality, or manual techniques to be initiated when appropriate    Objective Measures: NT    Assessment:   Body Structures, Functions, Activity Limitations Requiring Skilled Therapeutic

## 2024-12-27 ENCOUNTER — HOSPITAL ENCOUNTER (OUTPATIENT)
Dept: PHYSICAL THERAPY | Age: 64
Setting detail: THERAPIES SERIES
Discharge: HOME | End: 2024-12-27
Attending: STUDENT IN AN ORGANIZED HEALTH CARE EDUCATION/TRAINING PROGRAM
Payer: COMMERCIAL

## 2024-12-27 NOTE — PROGRESS NOTES
Therapy                            Cancellation/No-show Note    Date: 2024  Patient: Geovanna Rodriguez (64 y.o. female)  : 1960  MRN:  [id number]  Referring Physician: Letty Sanderson MD    Medical Diagnosis: Hamstring tendinitis [M76.899]  Hamstring tendonitis of left thigh [M76.892]      Visit Information:  Insurance: Payor: [facility] / Plan: [employer] EMPLOYEES / Product Type: *No Product type* /   Visits to Date: 7   No Show/Cancelled Appts: 0 /       For today's appointment patient:  [x]  Cancelled  []  Rescheduled appointment  []  No-show   []  Called pt to remind of next appointment     Reason given by patient:  []  Patient ill  []  Conflicting appointment  []  No transportation    [x]  Conflict with work  []  No reason given  []  Other:      [x] Pt has future appointments scheduled, no follow up needed  [] Pt requests to be on hold.    Reason:   If > 2 weeks please discuss with therapist.  [] Therapist to call pt for follow up  []  to call pt to reschedule   Comments:       Signature: Electronically signed by Joya Dunne PTA on 24 at 2:19 PM EST

## 2024-12-30 ENCOUNTER — OFFICE VISIT (OUTPATIENT)
Dept: PULMONOLOGY | Age: 64
End: 2024-12-30
Payer: COMMERCIAL

## 2024-12-30 VITALS
OXYGEN SATURATION: 97 % | HEART RATE: 95 BPM | DIASTOLIC BLOOD PRESSURE: 64 MMHG | HEIGHT: 64 IN | RESPIRATION RATE: 16 BRPM | WEIGHT: 167 LBS | SYSTOLIC BLOOD PRESSURE: 132 MMHG | BODY MASS INDEX: 28.51 KG/M2 | TEMPERATURE: 97.4 F

## 2024-12-30 DIAGNOSIS — G47.33 OSA (OBSTRUCTIVE SLEEP APNEA): Primary | ICD-10-CM

## 2024-12-30 DIAGNOSIS — R91.1 LUNG NODULE: ICD-10-CM

## 2024-12-30 PROCEDURE — 99213 OFFICE O/P EST LOW 20 MIN: CPT | Performed by: INTERNAL MEDICINE

## 2024-12-30 NOTE — PROGRESS NOTES
PATIENT VISIT-PULMONARY/SLEEP    12/30/2024     REFERRING PHYSICIAN:  Harmony Tyson PA-C     REASON FOR REFERRAL:  DONNIE    HPI:     Geovanna Rodriguez is a 64 y.o. female who was referred to sleep and pulmonary clinic for evaluation.     She has been diagnosed with severe obstructive sleep apnea in 2018.  She is presumed to be on CPAP at a pressure of 10 cm H2O.  She has not been using her machine lately.  She has not been sleeping in her bedroom that often when she is sleeping on the sofa downstairs.  She had weight loss surgery 2 months ago and she lost 40 pounds of far.  She is planning to lose another 60 pounds.  She was very compliant with her machine prior to her surgery.  She wants us to restart using it again and she needs supplies.        12/19/22:    She comes back for follow up.  She underwent a repeat home sleep study after weight loss surgery.  Her AHI was 13 on the study.  Her CPAP settings were changed to auto during last visit.  Of note that her initial case was severe with an AHI of 43.  She feels overall well.  She believes her weight has stabilized.  She does not want to lose more weight.  She does not have hypertension now.  She does not have diabetes or coronary artery disease.  Her diabetes has resolved as well.    12/21/23:    She comes back for follow up. Has been on AutoPAP but has not been using it consistently.  Weight has been essentially the same.  Breathing has been stable.        12/30/24:    She is here for follow-up.  Has not been using the machine consistently.  Gained about 25 pounds since her sleep study in 2023.  Has noticing some fatigue and tiredness.  Denies any respiratory issues.  Most recent CT chest last year showed multiple nodules with the largest measuring 5 mm.      Past Medical History   Past Medical History:   Diagnosis Date    Abdominal pain 01/15/2023    Abnormal EKG 03/23/2018    Angina pectoris syndrome (HCC) 03/23/2018    per pt they found a hiatal hernia

## 2024-12-31 ENCOUNTER — HOSPITAL ENCOUNTER (OUTPATIENT)
Dept: PHYSICAL THERAPY | Age: 64
Setting detail: THERAPIES SERIES
Discharge: HOME | End: 2024-12-31
Attending: STUDENT IN AN ORGANIZED HEALTH CARE EDUCATION/TRAINING PROGRAM
Payer: COMMERCIAL

## 2024-12-31 PROCEDURE — 97110 THERAPEUTIC EXERCISES: CPT

## 2024-12-31 NOTE — PROGRESS NOTES
[facility]  Outpatient Physical Therapy   Treatment Note        Date: 2024  Patient: Geovanna Rodriguez  : 1960   Confirmed: Yes  MRN: [id number]  Referring Provider: Letty Sanderson MD      Medical Diagnosis: Hamstring tendinitis [M76.899]  Hamstring tendonitis of left thigh [M76.892]      Treatment Diagnosis: decreased proximal hip strength and pain/ tightness feeling in muscles of LEs    Visit Information:  Insurance: Payor: [facility] / Plan: [facility] EMPLOYEES / Product Type: *No Product type* /   PT Visit Information  Onset Date:  (3 wks ago)  PT Insurance Information: UMR  Total # of Visits Approved: 30  Total # of Visits to Date: 8  No Show: 0  Canceled Appointment: 1  Progress Note Counter:     Subjective Information:  Subjective: Patient reports she hasn't been exercising for the 2 weeks. Reports the ache is not gone, but not bothering like usual. Feels good with exercises and ok to discharge today.  HEP Compliance:  [x] Good [] Fair [] Poor [] Reports not doing due to:    Pain Screening  Patient Currently in Pain: No    Treatment:  Exercises:  Exercises  Exercise 3: Seated hams str, piriformis str 30sec holds x 3 reps, ea fito  Exercise 5: S/L ITB str 3 reps x 30\" holds  Exercise 7: prone ham curl 3# eccentric lower x20 reps  Exercise 8: bridge with hamstring walkouts x15 reps  Exercise 12: single leg bridges x10 reps, fito  Exercise 13: prone hip ext x15 reps.  Exercise 20: HEP: continue current - stretches    *Indicates exercise, modality, or manual techniques to be initiated when appropriate    Objective Measures:     LTG 1 Current Status:: : patient demonstrates understanding and compliance with HEP.  LTG 2 Current Status:: : 80/80  LTG 3 Current Status:: : reports avg pain of 1-2/10 ; not very consistent but 1-2/10 is worst with walking.    Assessment:   Body Structures, Functions, Activity Limitations Requiring Skilled Therapeutic Intervention: Decreased functional

## 2024-12-31 NOTE — DISCHARGE SUMMARY
[facility]  PHYSICAL THERAPY PLAN OF CARE                                    [facility] [place] [address]     Ph: [phone]  Fax: [phone]    [] Certification  [] Recertification []  Plan of Care  [] Progress Note [x] Discharge      Referring Provider: Letty Sanderson MD    From:  Cielo Ugarte, PT, DPT    Patient: Geovanna Rodriguez (64 y.o. female) : 1960 Date: 2024   Medical Diagnosis: Hamstring tendinitis [M76.899]  Hamstring tendonitis of left thigh [M76.892]    Treatment Diagnosis: decreased proximal hip strength and pain/ tightness feeling in muscles of LEs    Plan of Care/Certification Expiration Date:     Progress Report Period from: 2024  to 2024    Visits to Date: 8 No Show: 0 Cancelled Appts: 1    OBJECTIVE:     Long Term Goals - Time Frame for Long Term Goals : 3-4 wks  Goals Current/ Discharge status Status   Long Term Goal 1: Pt will demonstrate indep and 100% compliance with HEP for self management of pain. LTG 1 Current Status:: : patient demonstrates understanding and compliance with HEP.   Met   Long Term Goal 2: Pt will demonstrate improved score on LEFS 80/80 for improved quality of life. LTG 2 Current Status:: : 80/80   Met   Long Term Goal 3: Pt will demonstrate decreased pain L buttock and L ischial tuberosity </= 2/10 after 6 mile walk for return to prior general daily exercise. LTG 3 Current Status:: : reports avg pain of 1-2/10 ; not very consistent but 1-2/10 is worst with walking.   Met     Body Structures, Functions, Activity Limitations Requiring Skilled Therapeutic Intervention: Decreased functional mobility , Decreased strength, Increased pain  Assessment: Patient is a 63 yo who presented to therapy for L glute pain. She completed 8 visits of skilled PT. Objective measures assessed with patient meeting all goals at this time. She reports understanding and compliance of HEP and feels ok to progress as able. Discussed

## 2025-01-07 ENCOUNTER — HOSPITAL ENCOUNTER (OUTPATIENT)
Dept: CT IMAGING | Age: 65
Discharge: HOME | End: 2025-01-09
Attending: INTERNAL MEDICINE
Payer: COMMERCIAL

## 2025-01-07 DIAGNOSIS — R91.1 LUNG NODULE: ICD-10-CM

## 2025-01-07 PROCEDURE — 71250 CT THORAX DX C-: CPT

## 2025-01-13 ENCOUNTER — OFFICE VISIT (OUTPATIENT)
Dept: OBGYN CLINIC | Age: 65
End: 2025-01-13
Payer: COMMERCIAL

## 2025-01-13 VITALS
WEIGHT: 167 LBS | SYSTOLIC BLOOD PRESSURE: 158 MMHG | HEIGHT: 64 IN | DIASTOLIC BLOOD PRESSURE: 74 MMHG | BODY MASS INDEX: 28.51 KG/M2

## 2025-01-13 DIAGNOSIS — N32.81 OAB (OVERACTIVE BLADDER): Primary | ICD-10-CM

## 2025-01-13 PROCEDURE — 99213 OFFICE O/P EST LOW 20 MIN: CPT | Performed by: OBSTETRICS & GYNECOLOGY

## 2025-01-13 RX ORDER — MIRABEGRON 50 MG/1
50 TABLET, FILM COATED, EXTENDED RELEASE ORAL DAILY
Qty: 14 TABLET | Refills: 0 | Status: SHIPPED | COMMUNITY
Start: 2025-01-13

## 2025-01-13 SDOH — ECONOMIC STABILITY: INCOME INSECURITY: IN THE LAST 12 MONTHS, WAS THERE A TIME WHEN YOU WERE NOT ABLE TO PAY THE MORTGAGE OR RENT ON TIME?: NO

## 2025-01-13 SDOH — ECONOMIC STABILITY: FOOD INSECURITY: WITHIN THE PAST 12 MONTHS, YOU WORRIED THAT YOUR FOOD WOULD RUN OUT BEFORE YOU GOT MONEY TO BUY MORE.: NEVER TRUE

## 2025-01-13 SDOH — ECONOMIC STABILITY: FOOD INSECURITY: WITHIN THE PAST 12 MONTHS, THE FOOD YOU BOUGHT JUST DIDN'T LAST AND YOU DIDN'T HAVE MONEY TO GET MORE.: NEVER TRUE

## 2025-01-13 ASSESSMENT — PATIENT HEALTH QUESTIONNAIRE - PHQ9
SUM OF ALL RESPONSES TO PHQ9 QUESTIONS 1 & 2: 0
SUM OF ALL RESPONSES TO PHQ QUESTIONS 1-9: 0
2. FEELING DOWN, DEPRESSED OR HOPELESS: NOT AT ALL
SUM OF ALL RESPONSES TO PHQ9 QUESTIONS 1 & 2: 0
SUM OF ALL RESPONSES TO PHQ QUESTIONS 1-9: 0
SUM OF ALL RESPONSES TO PHQ QUESTIONS 1-9: 0
1. LITTLE INTEREST OR PLEASURE IN DOING THINGS: NOT AT ALL
SUM OF ALL RESPONSES TO PHQ QUESTIONS 1-9: 0
2. FEELING DOWN, DEPRESSED OR HOPELESS: NOT AT ALL
1. LITTLE INTEREST OR PLEASURE IN DOING THINGS: NOT AT ALL

## 2025-01-13 NOTE — PROGRESS NOTES
Subjective:      Patient ID:  Geovanna Rodriguez is a 64 y.o. female with chief complaint of:  Chief Complaint   Patient presents with    Other     Pt believes she has a recto/cystocele. Pt can feel a bulge in her vagina    Repeat bp 138/80    Complains of urinary frequency and urgency having to rush to bathroom. She was concerned that maybe she had prolapse        Past Medical History:   Diagnosis Date    Abdominal pain 01/15/2023    Abnormal EKG 03/23/2018    Angina pectoris syndrome (HCC) 03/23/2018    per pt they found a hiatal hernia    Arthritis     Constipation     COPD (chronic obstructive pulmonary disease) ([facility])     Diastasis recti 11/01/2023    Diverticulosis of colon     DMII (diabetes mellitus, type 2) ([facility]) 2000    After weight loss, patient is no longer on any medications.    Essential hypertension 01/15/2016    Dr.Holiday-cardiology    Excessive skin and subcutaneous tissue 10/14/2022    Family history of coronary artery disease 01/15/2016    Fatty infiltration of liver 2020    Gastric polyp 2019    Dr Hernandez, 5 mm    Generalized anxiety disorder     H/O: hysterectomy 2012    History of tobacco abuse 01/15/2016    Migraine     Obesity (BMI 30-39.9)     Other specified acquired hypothyroidism 2000    Precordial pain 03/23/2018    (? Prinzmetal?) Dr Michael    Rash and nonspecific skin eruption 10/14/2022    S/P colonoscopy with polypectomy 2013, 2018, 2019    Dr Armendariz, Dr Hernandez, tubulovillous adenoma, hyperplastic polyp    S/P vaginal hysterectomy 2012    benign    Seasonal allergies     Sleep apnea, obstructive 2007    was on CPAP, not using it at present, retested 12/2023 after weight loss    Symptomatic abdominal panniculus 12/05/2023    Type II diabetes mellitus, uncontrolled 11/13/2013    After weight loss, patient is no longer on any medications.    Under care of team 03/28/2022    pcp-Harmony Blanca-last visit nov 2021    Under care of team 03/28/2022    cardiac-Holiday-lana-last visit dec

## 2025-01-23 ENCOUNTER — PATIENT MESSAGE (OUTPATIENT)
Age: 65
End: 2025-01-23

## 2025-01-23 DIAGNOSIS — F50.811 MODERATE BINGE-EATING DISORDER: ICD-10-CM

## 2025-01-23 DIAGNOSIS — F98.8 ATTENTION DEFICIT DISORDER (ADD) IN ADULT: Primary | ICD-10-CM

## 2025-01-24 RX ORDER — MIRABEGRON 50 MG/1
50 TABLET, FILM COATED, EXTENDED RELEASE ORAL DAILY
Qty: 30 TABLET | Refills: 3 | Status: SHIPPED | OUTPATIENT
Start: 2025-01-24

## 2025-01-24 NOTE — TELEPHONE ENCOUNTER
Requested Prescriptions     Pending Prescriptions Disp Refills    mirabegron (MYRBETRIQ) 50 MG TB24 30 tablet 3     Sig: Take 50 mg by mouth daily

## 2025-01-27 RX ORDER — LISDEXAMFETAMINE DIMESYLATE 70 MG/1
70 CAPSULE ORAL DAILY
Qty: 30 CAPSULE | Refills: 0 | Status: SHIPPED | OUTPATIENT
Start: 2025-01-27 | End: 2025-02-26

## 2025-02-25 ENCOUNTER — OFFICE VISIT (OUTPATIENT)
Age: 65
End: 2025-02-25
Payer: COMMERCIAL

## 2025-02-25 VITALS
HEART RATE: 85 BPM | OXYGEN SATURATION: 98 % | WEIGHT: 168 LBS | DIASTOLIC BLOOD PRESSURE: 66 MMHG | BODY MASS INDEX: 28.68 KG/M2 | HEIGHT: 64 IN | SYSTOLIC BLOOD PRESSURE: 126 MMHG

## 2025-02-25 DIAGNOSIS — L90.5 PAINFUL SCAR: ICD-10-CM

## 2025-02-25 DIAGNOSIS — Z98.84 S/P GASTRIC BYPASS: ICD-10-CM

## 2025-02-25 DIAGNOSIS — E61.1 IRON DEFICIENCY: ICD-10-CM

## 2025-02-25 DIAGNOSIS — R06.02 SOB (SHORTNESS OF BREATH) ON EXERTION: ICD-10-CM

## 2025-02-25 DIAGNOSIS — F98.8 ATTENTION DEFICIT DISORDER (ADD) IN ADULT: ICD-10-CM

## 2025-02-25 DIAGNOSIS — R63.5 WEIGHT GAIN: ICD-10-CM

## 2025-02-25 DIAGNOSIS — F50.811 MODERATE BINGE-EATING DISORDER: Primary | ICD-10-CM

## 2025-02-25 DIAGNOSIS — F43.0 ACUTE STRESS REACTION: ICD-10-CM

## 2025-02-25 DIAGNOSIS — R52 PAINFUL SCAR: ICD-10-CM

## 2025-02-25 PROCEDURE — 99214 OFFICE O/P EST MOD 30 MIN: CPT | Performed by: PHYSICIAN ASSISTANT

## 2025-02-25 RX ORDER — LISDEXAMFETAMINE DIMESYLATE 70 MG/1
70 CAPSULE ORAL DAILY
Qty: 30 CAPSULE | Refills: 0 | Status: SHIPPED | OUTPATIENT
Start: 2025-02-25 | End: 2025-03-27

## 2025-02-25 ASSESSMENT — ENCOUNTER SYMPTOMS
COLOR CHANGE: 0
SINUS PRESSURE: 0
COUGH: 0
CHEST TIGHTNESS: 0
BACK PAIN: 0
CONSTIPATION: 0
SHORTNESS OF BREATH: 1
WHEEZING: 0
ABDOMINAL DISTENTION: 0

## 2025-02-25 NOTE — PROGRESS NOTES
Subjective  Geovanna Rodriguez, 64 y.o. female presents today with:  Chief Complaint   Patient presents with    Follow-up     Pt is here to follow up  Needs paper Rx for bob PAULINO  Last OV with me: 12/9/24.     Still planning on going through revision tummy tuck surgery due to scars due to excess skin that I still causing rashes, discomfort.  Surgery is planned for July.  Has gained a few pounds; she is eating healthy/balanced meals.  Exercising.  NOT sleeping the best.     Would be interest in try a non-stimulant medication for weight loss.   Vyvanse started for binge eating type behavior and for her attention.  She has noticed a positive change with the speed at which she is eating, the amount and impulsiveness has decreased.     Recently signed up for insurance program ([employer]) for health promotion.    Labs were completed, elevated HgbA1c at 6.4%.  Working at reducing/lowering.   They are providing her:  Smartscale  BP cuff  Activity tracker  Sensor for sugars.     Personal stressors with family.    Work has been stressful.  At this time, she is managing and handling everything.      Remains on iron replacement therapy, oral.  Very occasional sob.  Denies tightness.     Review of Systems   Constitutional:  Positive for unexpected weight change. Negative for activity change, appetite change and fatigue.   HENT:  Negative for congestion, postnasal drip and sinus pressure.    Respiratory:  Positive for shortness of breath. Negative for cough, chest tightness and wheezing.    Cardiovascular:  Negative for chest pain, palpitations and leg swelling.   Gastrointestinal:  Negative for abdominal distention and constipation.   Musculoskeletal:  Positive for arthralgias and myalgias. Negative for back pain, gait problem and joint swelling.   Skin:  Negative for color change, pallor and rash.   Neurological:  Negative for weakness.   Hematological:  Does not bruise/bleed easily.   Psychiatric/Behavioral:

## 2025-02-26 DIAGNOSIS — E61.1 IRON DEFICIENCY: ICD-10-CM

## 2025-02-26 PROBLEM — F50.811 MODERATE BINGE-EATING DISORDER: Status: ACTIVE | Noted: 2025-02-26

## 2025-02-26 PROBLEM — F43.0 ACUTE STRESS REACTION: Status: ACTIVE | Noted: 2025-02-26

## 2025-02-26 LAB
ERYTHROCYTE [DISTWIDTH] IN BLOOD BY AUTOMATED COUNT: 12.6 % (ref 11.5–14.5)
HCT VFR BLD AUTO: 40.1 % (ref 37–47)
HGB BLD-MCNC: 13.7 G/DL (ref 12–16)
MCH RBC QN AUTO: 30.6 PG (ref 27–31.3)
MCHC RBC AUTO-ENTMCNC: 34.2 % (ref 33–37)
MCV RBC AUTO: 89.5 FL (ref 79.4–94.8)
PLATELET # BLD AUTO: 255 K/UL (ref 130–400)
RBC # BLD AUTO: 4.48 M/UL (ref 4.2–5.4)
WBC # BLD AUTO: 7.7 K/UL (ref 4.8–10.8)

## 2025-02-27 LAB
IRON % SATURATION: 31 % (ref 20–55)
IRON: 101 UG/DL (ref 37–145)
TOTAL IRON BINDING CAPACITY: 329 UG/DL (ref 250–450)
UNSATURATED IRON BINDING CAPACITY: 228 UG/DL (ref 112–347)

## 2025-03-07 ENCOUNTER — OFFICE VISIT (OUTPATIENT)
Dept: SURGERY | Age: 65
End: 2025-03-07

## 2025-03-07 ENCOUNTER — OFFICE VISIT (OUTPATIENT)
Dept: BARIATRICS/WEIGHT MGMT | Age: 65
End: 2025-03-07

## 2025-03-07 VITALS
WEIGHT: 167 LBS | BODY MASS INDEX: 28.51 KG/M2 | HEART RATE: 77 BPM | SYSTOLIC BLOOD PRESSURE: 135 MMHG | DIASTOLIC BLOOD PRESSURE: 76 MMHG | HEIGHT: 64 IN

## 2025-03-07 VITALS
DIASTOLIC BLOOD PRESSURE: 74 MMHG | OXYGEN SATURATION: 98 % | SYSTOLIC BLOOD PRESSURE: 135 MMHG | HEIGHT: 65 IN | HEART RATE: 78 BPM | WEIGHT: 166 LBS | BODY MASS INDEX: 27.66 KG/M2

## 2025-03-07 DIAGNOSIS — L90.5 PAINFUL SCAR: Primary | ICD-10-CM

## 2025-03-07 DIAGNOSIS — K90.89 OTHER SPECIFIED INTESTINAL MALABSORPTION: ICD-10-CM

## 2025-03-07 DIAGNOSIS — Z98.84 S/P GASTRIC BYPASS: Primary | ICD-10-CM

## 2025-03-07 DIAGNOSIS — R52 PAINFUL SCAR: Primary | ICD-10-CM

## 2025-03-07 NOTE — PROGRESS NOTES
expected, there cannot be any guarantee or warranty expressed or implied on the results that may be obtained.    PATIENT COMPLIANCE   Follow all physician instructions carefully; this is essential for the success of your outcome.  Patient compliance with post-operative activity restriction is critical.  It is important that the surgical incisions are not subjected to excessive force, swelling, abrasion, or motion during the time of healing.  Personal and vocational activities that involve the potential for re-injury to the scar revision must be avoided until healing is completed. Protective dressings and drains should not be removed unless instructed by your plastic surgeon.  Successful post-operative function depends on both surgery and subsequent care.  Physical activity that increases your pulse or heart rate may cause bruising, swelling, fluid accumulation and the need for return to surgery.  It is wise to refrain from intimate physical activities after surgery until your physician states it is safe.  It is important that you participate in follow-up care, return for aftercare, and promote your recovery after surgery.      HEALTH INSURANCE  Most health insurance companies exclude coverage for cosmetic surgical operations or any complications that might occur from surgery.  Please carefully review your health insurance subscriber information pamphlet.  Most insurance plans exclude coverage for secondary or revisionary surgery.  The cost of surgery involves several charges for the services provided.  The total includes fees charged by your surgeon, the cost of surgical supplies, anesthesia, laboratory tests, and possible hospital charges, depending on where the surgery is performed.  Depending on whether the cost of surgery is covered by an insurance plan, you will be responsible for necessary co-payments, deductibles, and charges not covered.  The fees charged for this procedure do not include any potential

## 2025-03-07 NOTE — PROGRESS NOTES
[facility] INVASIVE BARIATRIC SURG  [address]  [address]  [place] [id number]  Dept: [phone]    SURGICAL WEIGHT LOSS MANAGEMENT PROGRAM  PROGRESS NOTE     CC: Weight Loss    Patient: Geovanna Rodriguez      Service Date: 3/7/2025  Visit:   3 year    Medical Record #: [id number]  Date of Surgery:  22     History: 64 y.o. female who presents today for a post op follow up for laparoscopic Rroux-en-Y gastric bypass and hiatal hernia repair. Geovanna is doing well with no abdominal pain. She reports her mother passed away so she was been feeling down as her  was yesterday. Also has not been sleeping well. Will be following with Dr. Rachid vee, underwent extended penectomy 23. Compliant with protonix and vitamin intake. Patient reports regular bowel movements; occasionally has some constipation. She denies nausea, vomiting, fever, and chills. She states the pain is well controlled.    Height: 1.638 m (5' 4.5\")  Highest Weight: 212  lbs      Current Visit Weight Information  Weight: 75.3 kg (166 lb)   BMI: Body mass index is 28.05 kg/m².    Weight loss since surgery:  46 lbs    Exercising?   [x] Yes    [] No     Review of Systems:     General  Negative for: [] Weight Change   [x] Fatigue   [x] Fevers & Chills    [] Appetite change [] Other:    Positive for: [x] Weight Change   [] Fatigue   [] Fevers & Chills    [] Appetite change [] Other:   Cardiac  Negative for: [x] Chest Pain   [] Difficulty Breathing   [] Leg Cramps [x] Edema of Lower Extremeties    [] Left   [] Right      Positive for: [] Chest Pain   [] Difficulty Breathing   [] Leg Cramps [] Edema of Lower Extremeties    [] Left   [] Right   Pulmonary  Negative for: [x] Shortness of Breath [] Wheeze [x] Cough  [x] Calf Pain     Positive for: [] Shortness of Breath [] Wheeze [] Cough  [] Calf Pain   Gastro-Intestinal Negative for: [] Heartburn   [] Reflux   [] Dysphagia   [] Melena   []

## 2025-03-17 DIAGNOSIS — G47.33 OSA (OBSTRUCTIVE SLEEP APNEA): ICD-10-CM

## 2025-03-24 DIAGNOSIS — K90.89 OTHER SPECIFIED INTESTINAL MALABSORPTION: ICD-10-CM

## 2025-03-24 DIAGNOSIS — Z98.84 S/P GASTRIC BYPASS: ICD-10-CM

## 2025-03-24 LAB
ALBUMIN SERPL-MCNC: 4.4 G/DL (ref 3.5–4.6)
ALP SERPL-CCNC: 85 U/L (ref 40–130)
ALT SERPL-CCNC: 26 U/L (ref 0–33)
ANION GAP SERPL CALCULATED.3IONS-SCNC: 9 MEQ/L (ref 9–15)
AST SERPL-CCNC: 27 U/L (ref 0–35)
BASOPHILS # BLD: 0.1 K/UL (ref 0–0.2)
BASOPHILS NFR BLD: 1 %
BILIRUB SERPL-MCNC: 0.4 MG/DL (ref 0.2–0.7)
BUN SERPL-MCNC: 19 MG/DL (ref 8–23)
CALCIUM SERPL-MCNC: 10.1 MG/DL (ref 8.5–9.9)
CHLORIDE SERPL-SCNC: 105 MEQ/L (ref 95–107)
CHOLEST SERPL-MCNC: 158 MG/DL (ref 0–199)
CHOLEST SERPL-MCNC: 166 MG/DL (ref 0–199)
CO2 SERPL-SCNC: 28 MEQ/L (ref 20–31)
CREAT SERPL-MCNC: 0.98 MG/DL (ref 0.5–0.9)
EOSINOPHIL # BLD: 0.8 K/UL (ref 0–0.7)
EOSINOPHIL NFR BLD: 10.3 %
ERYTHROCYTE [DISTWIDTH] IN BLOOD BY AUTOMATED COUNT: 12.3 % (ref 11.5–14.5)
ESTIMATED AVERAGE GLUCOSE: 123 MG/DL
FOLATE: 29.4 NG/ML (ref 4.8–24.2)
GLOBULIN SER CALC-MCNC: 3.3 G/DL (ref 2.3–3.5)
GLUCOSE SERPL-MCNC: 93 MG/DL (ref 70–99)
GLUCOSE SERPL-MCNC: 98 MG/DL (ref 70–99)
HBA1C MFR BLD: 5.9 % (ref 4–6)
HCT VFR BLD AUTO: 41.4 % (ref 37–47)
HDLC SERPL-MCNC: 54 MG/DL (ref 40–59)
HDLC SERPL-MCNC: 56 MG/DL (ref 40–59)
HGB BLD-MCNC: 14.1 G/DL (ref 12–16)
IRON % SATURATION: 38 % (ref 20–55)
IRON: 120 UG/DL (ref 37–145)
LDLC SERPL CALC-MCNC: 89 MG/DL (ref 0–129)
LDLC SERPL CALC-MCNC: 95 MG/DL (ref 0–129)
LYMPHOCYTES # BLD: 1.9 K/UL (ref 1–4.8)
LYMPHOCYTES NFR BLD: 24.8 %
MAGNESIUM SERPL-MCNC: 2.1 MG/DL (ref 1.7–2.4)
MCH RBC QN AUTO: 29.9 PG (ref 27–31.3)
MCHC RBC AUTO-ENTMCNC: 34.1 % (ref 33–37)
MCV RBC AUTO: 87.7 FL (ref 79.4–94.8)
MONOCYTES # BLD: 0.4 K/UL (ref 0.2–0.8)
MONOCYTES NFR BLD: 4.7 %
NEUTROPHILS # BLD: 4.5 K/UL (ref 1.4–6.5)
NEUTS SEG NFR BLD: 59.1 %
PLATELET # BLD AUTO: 282 K/UL (ref 130–400)
POTASSIUM SERPL-SCNC: 4.1 MEQ/L (ref 3.4–4.9)
PROT SERPL-MCNC: 7.7 G/DL (ref 6.3–8)
PTH-INTACT SERPL-MCNC: 47.5 PG/ML (ref 15–65)
RBC # BLD AUTO: 4.72 M/UL (ref 4.2–5.4)
SODIUM SERPL-SCNC: 142 MEQ/L (ref 135–144)
TOTAL IRON BINDING CAPACITY: 316 UG/DL (ref 250–450)
TRIGL SERPL-MCNC: 73 MG/DL (ref 0–150)
TRIGL SERPL-MCNC: 73 MG/DL (ref 0–150)
TSH SERPL-MCNC: 1.2 UIU/ML (ref 0.44–3.86)
UNSATURATED IRON BINDING CAPACITY: 196 UG/DL (ref 112–347)
VITAMIN B-12: >2000 PG/ML (ref 232–1245)
VITAMIN D 25-HYDROXY: 83.1 NG/ML (ref 30–100)
WBC # BLD AUTO: 7.7 K/UL (ref 4.8–10.8)

## 2025-03-26 LAB
ANNOTATION COMMENT IMP: NORMAL
RETINYL PALMITATE SERPL-MCNC: 0.02 MG/L (ref 0–0.1)
VIT A SERPL-MCNC: 0.78 MG/L (ref 0.3–1.2)
ZINC SERPL-MCNC: 107.2 UG/DL (ref 60–120)

## 2025-03-27 ENCOUNTER — OFFICE VISIT (OUTPATIENT)
Age: 65
End: 2025-03-27
Payer: COMMERCIAL

## 2025-03-27 VITALS
DIASTOLIC BLOOD PRESSURE: 80 MMHG | OXYGEN SATURATION: 97 % | SYSTOLIC BLOOD PRESSURE: 132 MMHG | RESPIRATION RATE: 16 BRPM | WEIGHT: 164.8 LBS | HEART RATE: 89 BPM | BODY MASS INDEX: 27.46 KG/M2 | HEIGHT: 65 IN

## 2025-03-27 DIAGNOSIS — N32.81 OAB (OVERACTIVE BLADDER): ICD-10-CM

## 2025-03-27 DIAGNOSIS — F50.811 MODERATE BINGE-EATING DISORDER: ICD-10-CM

## 2025-03-27 DIAGNOSIS — Z98.84 S/P GASTRIC BYPASS: ICD-10-CM

## 2025-03-27 DIAGNOSIS — F98.8 ATTENTION DEFICIT DISORDER (ADD) IN ADULT: ICD-10-CM

## 2025-03-27 PROBLEM — F43.21 GRIEF REACTION: Status: ACTIVE | Noted: 2025-02-26

## 2025-03-27 PROBLEM — F43.20 GRIEF REACTION: Status: ACTIVE | Noted: 2025-02-26

## 2025-03-27 LAB — VIT B1 BLD-MCNC: 229 NMOL/L (ref 70–180)

## 2025-03-27 PROCEDURE — 99214 OFFICE O/P EST MOD 30 MIN: CPT | Performed by: PHYSICIAN ASSISTANT

## 2025-03-27 RX ORDER — LISDEXAMFETAMINE DIMESYLATE 70 MG/1
70 CAPSULE ORAL DAILY
Qty: 30 CAPSULE | Refills: 0 | Status: SHIPPED | OUTPATIENT
Start: 2025-03-27 | End: 2025-04-26

## 2025-03-27 ASSESSMENT — ENCOUNTER SYMPTOMS
WHEEZING: 0
COUGH: 0
COLOR CHANGE: 0
CONSTIPATION: 0
SHORTNESS OF BREATH: 0
CHEST TIGHTNESS: 0
SINUS PRESSURE: 0
ABDOMINAL DISTENTION: 0
BACK PAIN: 0

## 2025-03-27 NOTE — PROGRESS NOTES
CARPAL TUNNEL RELEASE Left 01/13/2021    LEFT CARPAL TUNNEL RELEASE, LEFT MIDDLE FINGER TRIGGER RELEASE, TENOSYNOVECTOMY FDPFDS performed by [place] [facility]man [place] [facility] [place]    CARPAL TUNNEL RELEASE Right 02/03/2021    RIGHT CARPAL TUNNEL RELEASE, TRIGGER FINGER RELEASE RIGHT MIDDLE FINGER, FDP, FDS, TENOSYNOVECTOMY performed by [place]man [place] [facility] [place]    CHOLECYSTECTOMY, LAPAROSCOPIC  1995    COLONOSCOPY      COLONOSCOPY N/A 06/30/2020    COLONOSCOPY DIAGNOSTIC performed by David Devi MD at [facility]    DIAGNOSTIC CARDIAC CATH LAB PROCEDURE      ENDOMETRIAL ABLATION  2002    metrorrhagia    ENDOSCOPY, COLON, DIAGNOSTIC      ESOPHAGOGASTRODUODENOSCOPY  01/16/2023    HYSTERECTOMY (CERVIX STATUS UNKNOWN)  09/07/2012    fibroid, cervicitis, ovaries intact    LIPECTOMY N/A 12/5/2023    EXTENDED PANNICULECTOMY performed by Geri Rashid MD at [facility] OR    MD COLON CA SCRN NOT HI RSK IND N/A 05/15/2018    COLONOSCOPY performed by David Devi MD at [facility]    SHEILA-EN-Y GASTRIC BYPASS N/A 04/12/2022    XI ROBOTIC LAPAROSCOPIC SHEILA-EN-Y GASTRIC BYPASS, EGD, HIATAL HERNIA REPAIR performed by Martín Hermosillo DO at [facility] OR    TONSILLECTOMY      TUBAL LIGATION  1984    UPPER GASTROINTESTINAL ENDOSCOPY N/A 04/02/2019    EGD ESOPHAGOGASTRODUODENOSCOPY performed by David Devi MD at [facility]    UPPER GASTROINTESTINAL ENDOSCOPY N/A 1/16/2023    EGD ESOPHAGOGASTRODUODENOSCOPY WITH BIPSPY , GI UNIT performed by Martín Hermosillo DO at [facility] OR     Social History     Socioeconomic History    Marital status:      Spouse name: Not on file    Number of children: 3    Years of education: Not on file    Highest education level: Not on file   Occupational History    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Care, RN     Employer: [employer]   Tobacco Use    Smoking status: Former     Current packs/day: 0.00     Average packs/day: 1 pack/day for 30.3 years (30.3 ttl pk-yrs)

## 2025-04-23 ENCOUNTER — RESULTS FOLLOW-UP (OUTPATIENT)
Dept: BARIATRICS/WEIGHT MGMT | Age: 65
End: 2025-04-23

## 2025-05-06 ENCOUNTER — OFFICE VISIT (OUTPATIENT)
Age: 65
End: 2025-05-06
Payer: COMMERCIAL

## 2025-05-06 VITALS
TEMPERATURE: 97.9 F | HEART RATE: 72 BPM | BODY MASS INDEX: 28 KG/M2 | WEIGHT: 164 LBS | OXYGEN SATURATION: 99 % | HEIGHT: 64 IN

## 2025-05-06 DIAGNOSIS — E03.9 HYPOTHYROIDISM, UNSPECIFIED TYPE: ICD-10-CM

## 2025-05-06 DIAGNOSIS — F98.8 ATTENTION DEFICIT DISORDER (ADD) IN ADULT: ICD-10-CM

## 2025-05-06 DIAGNOSIS — E61.1 LOW IRON: ICD-10-CM

## 2025-05-06 DIAGNOSIS — F50.811 MODERATE BINGE-EATING DISORDER: ICD-10-CM

## 2025-05-06 DIAGNOSIS — M65.311 TRIGGER THUMB OF RIGHT HAND: Primary | ICD-10-CM

## 2025-05-06 PROCEDURE — 99213 OFFICE O/P EST LOW 20 MIN: CPT | Performed by: STUDENT IN AN ORGANIZED HEALTH CARE EDUCATION/TRAINING PROGRAM

## 2025-05-06 PROCEDURE — 20550 NJX 1 TENDON SHEATH/LIGAMENT: CPT | Performed by: STUDENT IN AN ORGANIZED HEALTH CARE EDUCATION/TRAINING PROGRAM

## 2025-05-06 RX ORDER — LIDOCAINE HYDROCHLORIDE 10 MG/ML
0.5 INJECTION, SOLUTION INFILTRATION; PERINEURAL ONCE
Status: COMPLETED | OUTPATIENT
Start: 2025-05-06 | End: 2025-05-06

## 2025-05-06 RX ORDER — TRIAMCINOLONE ACETONIDE 40 MG/ML
20 INJECTION, SUSPENSION INTRA-ARTICULAR; INTRAMUSCULAR ONCE
Status: COMPLETED | OUTPATIENT
Start: 2025-05-06 | End: 2025-05-06

## 2025-05-06 RX ADMIN — LIDOCAINE HYDROCHLORIDE 0.5 ML: 10 INJECTION, SOLUTION INFILTRATION; PERINEURAL at 15:14

## 2025-05-06 RX ADMIN — TRIAMCINOLONE ACETONIDE 20 MG: 40 INJECTION, SUSPENSION INTRA-ARTICULAR; INTRAMUSCULAR at 15:13

## 2025-05-06 NOTE — TELEPHONE ENCOUNTER
Comments:     Last Office Visit (last PCP visit):   3/27/2025    Next Visit Date:  Future Appointments   Date Time Provider Department Center   6/24/2025  1:15 PM Harmony Tyson PA-C [facility]   7/17/2025 11:00 AM ST[facility] PAT RM 2 [facility]   8/6/2025  9:45 AM Geri Rashid MD [facility]   8/13/2025 10:45 AM Geri Rashid MD [facility]       **If hasn't been seen in over a year OR hasn't followed up according to last diabetes/ADHD visit, make appointment for patient before sending refill to provider.    Rx requested:  Requested Prescriptions     Pending Prescriptions Disp Refills    lisdexamfetamine (VYVANSE) 70 MG capsule 30 capsule 0     Sig: Take 1 capsule by mouth daily for 30 days. Max Daily Amount: 70 mg

## 2025-05-06 NOTE — PROGRESS NOTES
Lab Results   Component Value Date    CRP 9.3 (H) 11/21/2020     Lab results have been personally reviewed by me.    Procedure Note: Right thumb trigger finger injection     Time Out  [x] Patient Verified  [x] Site Verified  [x] Laterality Verified  [x] Procedure Verified     The risks and benefits of a trigger finger injection were discussed with the patient.  This includes injury to soft tissue, nerves, and vessels and residual numbness and loss of protective sensation for several hours to the anesthetic.  The patient wished to proceed and the patient is consented.     A mixture of 0.5 cc of Kenalog and 0.5 cc of lidocaine without epinephrine was used for the injection.  The volar base of the digit at the level of the A1 pulley was prepped with Betadine and alcohol.  The solution was placed directly into the A1 pulley/flexor tendon sheath of the right thumb finger using a 25 gauge needle.  A Band-Aid was applied and the patient was reminded of loss of protective sensation for a period of time. The patient tolerated the injection well without any complications.     Assessment:      Diagnosis Orders   1. Trigger thumb of right hand  INJECT TENDON SHEATH/LIGAMENT    lidocaine 1 % injection 0.5 mL    triamcinolone acetonide (KENALOG-40) injection 20 mg        Geovanna Rodriguez is a 64 y.o. female with a history and exam consistent with right trigger thumb. This is an acute exacerbation of a chronic condition .     Plan:     She has a right trigger thumb.  She has a previous history of carpal tunnel release and trigger finger releases by Dr. Grossman in the past.  I gave her a right trigger thumb cortisone injection in June 2024 which was very helpful until recently.  We discussed a repeat injection versus trigger finger release surgery.  She states that she would like to repeat the injection and continue with nonoperative treatment.  She was given a right trigger thumb cortisone injection in clinic today which she

## 2025-05-06 NOTE — TELEPHONE ENCOUNTER
Comments:     Last Office Visit (last PCP visit):   3/27/2025    Next Visit Date:  Future Appointments   Date Time Provider Department Center   6/24/2025  1:15 PM Harmony Tyson PA-C [facility]   7/17/2025 11:00 AM ST[facility] PAT RM 2 [facility]   8/6/2025  9:45 AM Geri Rashid MD [facility]   8/13/2025 10:45 AM Geri Rashid MD [facility]       **If hasn't been seen in over a year OR hasn't followed up according to last diabetes/ADHD visit, make appointment for patient before sending refill to provider.    Rx requested:  Requested Prescriptions     Pending Prescriptions Disp Refills    ferrous sulfate (IRON 325) 325 (65 Fe) MG tablet 90 tablet 1     Sig: Take 1 tablet by mouth daily (with breakfast)

## 2025-05-07 RX ORDER — LEVOTHYROXINE SODIUM 50 UG/1
TABLET ORAL
Qty: 90 TABLET | Refills: 2 | Status: SHIPPED | OUTPATIENT
Start: 2025-05-07

## 2025-05-07 RX ORDER — LISDEXAMFETAMINE DIMESYLATE 70 MG/1
70 CAPSULE ORAL DAILY
Qty: 30 CAPSULE | Refills: 0 | Status: SHIPPED | OUTPATIENT
Start: 2025-05-07 | End: 2025-06-06

## 2025-05-07 RX ORDER — FERROUS SULFATE 325(65) MG
325 TABLET ORAL
Qty: 90 TABLET | Refills: 1 | Status: SHIPPED | OUTPATIENT
Start: 2025-05-07

## 2025-06-06 DIAGNOSIS — F50.811 MODERATE BINGE-EATING DISORDER: ICD-10-CM

## 2025-06-06 DIAGNOSIS — F98.8 ATTENTION DEFICIT DISORDER (ADD) IN ADULT: ICD-10-CM

## 2025-06-06 RX ORDER — LISDEXAMFETAMINE DIMESYLATE 70 MG/1
70 CAPSULE ORAL DAILY
Qty: 30 CAPSULE | Refills: 0 | Status: SHIPPED | OUTPATIENT
Start: 2025-06-06 | End: 2025-07-06

## 2025-06-06 NOTE — TELEPHONE ENCOUNTER
Comments:     Last Office Visit (last PCP visit):   3/27/2025    Next Visit Date:  Future Appointments   Date Time Provider Department Center   6/24/2025  1:15 PM Harmony Tyson PA-C [facility]   7/17/2025 11:00 AM STA PAT RM 2 [facility] PAT [facility]   8/6/2025  9:45 AM Geri Rashid MD [facility]   8/13/2025 10:45 AM Geri Rashid MD [facility]       **If hasn't been seen in over a year OR hasn't followed up according to last diabetes/ADHD visit, make appointment for patient before sending refill to provider.    Rx requested:  Requested Prescriptions     Pending Prescriptions Disp Refills    lisdexamfetamine (VYVANSE) 70 MG capsule 30 capsule 0     Sig: Take 1 capsule by mouth daily for 30 days. Max Daily Amount: 70 mg

## 2025-06-24 ENCOUNTER — OFFICE VISIT (OUTPATIENT)
Age: 65
End: 2025-06-24
Payer: COMMERCIAL

## 2025-06-24 VITALS
HEART RATE: 78 BPM | OXYGEN SATURATION: 97 % | WEIGHT: 167 LBS | BODY MASS INDEX: 28.51 KG/M2 | RESPIRATION RATE: 16 BRPM | HEIGHT: 64 IN | SYSTOLIC BLOOD PRESSURE: 120 MMHG | DIASTOLIC BLOOD PRESSURE: 70 MMHG

## 2025-06-24 DIAGNOSIS — R63.5 WEIGHT GAIN: ICD-10-CM

## 2025-06-24 DIAGNOSIS — F98.8 ATTENTION DEFICIT DISORDER (ADD) IN ADULT: Primary | ICD-10-CM

## 2025-06-24 DIAGNOSIS — F50.811 MODERATE BINGE-EATING DISORDER: ICD-10-CM

## 2025-06-24 PROCEDURE — 99213 OFFICE O/P EST LOW 20 MIN: CPT | Performed by: PHYSICIAN ASSISTANT

## 2025-06-24 RX ORDER — LISDEXAMFETAMINE DIMESYLATE 70 MG/1
70 CAPSULE ORAL DAILY
Qty: 30 CAPSULE | Refills: 0 | Status: SHIPPED | OUTPATIENT
Start: 2025-06-24 | End: 2025-07-24

## 2025-06-24 ASSESSMENT — ENCOUNTER SYMPTOMS
BACK PAIN: 0
CHEST TIGHTNESS: 0
ABDOMINAL DISTENTION: 0
CONSTIPATION: 0
WHEEZING: 0
COLOR CHANGE: 0
SINUS PRESSURE: 0
SHORTNESS OF BREATH: 0
COUGH: 0

## 2025-06-24 NOTE — PROGRESS NOTES
Not on file   Social History Narrative    Born in [place], one of 4 children (2 boys and 2 girls), both parents in the [employer], moved to [place]    Mother in [place], has memory problems    Hinduism Hindu     RN with [employer]    Lives in [place] with spouse    Has 2 dogs, Blossom and Carlo    Children 3, 2 sons in the area, one daughter in [place]    Grandchildren 7    Hobbies: dogs, sawing, machine embroidery, baking     Social Drivers of Health     Financial Resource Strain: Low Risk  (9/9/2024)    Overall Financial Resource Strain (CARDIA)     Difficulty of Paying Living Expenses: Not hard at all   Food Insecurity: No Food Insecurity (1/13/2025)    Hunger Vital Sign     Worried About Running Out of Food in the Last Year: Never true     Ran Out of Food in the Last Year: Never true   Transportation Needs: No Transportation Needs (1/13/2025)    PRAPARE - Transportation     Lack of Transportation (Medical): No     Lack of Transportation (Non-Medical): No   Physical Activity: Sufficiently Active (8/24/2022)    Exercise Vital Sign     Days of Exercise per Week: 7 days     Minutes of Exercise per Session: 60 min   Stress: No Stress Concern Present (9/15/2022)    [employer] [employer] - Occupational Stress Questionnaire     Feeling of Stress : Not at all   Social Connections: Moderately Isolated (6/5/2020)    Social Connection and Isolation Panel [NHANES]     Frequency of Communication with Friends and Family: More than three times a week     Frequency of Social Gatherings with Friends and Family: More than three times a week     Attends Orthodox Services: Never     Active Member of Clubs or Organizations: Not on file     Attends Club or Organization Meetings: Never     Marital Status:    Intimate Partner Violence: Unknown (9/22/2023)    [facility]    Intimate Partner Violence     Fear of Current or Ex-Partner: Not on file   Housing Stability: Low Risk

## 2025-07-11 ENCOUNTER — APPOINTMENT (OUTPATIENT)
Dept: GENERAL RADIOLOGY | Age: 65
End: 2025-07-11
Payer: COMMERCIAL

## 2025-07-11 ENCOUNTER — HOSPITAL ENCOUNTER (EMERGENCY)
Age: 65
Discharge: HOME | End: 2025-07-11
Payer: COMMERCIAL

## 2025-07-11 ENCOUNTER — NURSE TRIAGE (OUTPATIENT)
Dept: OTHER | Facility: CLINIC | Age: 65
End: 2025-07-11

## 2025-07-11 ENCOUNTER — OFFICE VISIT (OUTPATIENT)
Age: 65
End: 2025-07-11
Payer: COMMERCIAL

## 2025-07-11 VITALS
OXYGEN SATURATION: 98 % | TEMPERATURE: 98.2 F | HEART RATE: 71 BPM | RESPIRATION RATE: 21 BRPM | SYSTOLIC BLOOD PRESSURE: 137 MMHG | DIASTOLIC BLOOD PRESSURE: 67 MMHG

## 2025-07-11 VITALS
WEIGHT: 168 LBS | RESPIRATION RATE: 16 BRPM | OXYGEN SATURATION: 97 % | DIASTOLIC BLOOD PRESSURE: 80 MMHG | BODY MASS INDEX: 28.68 KG/M2 | HEIGHT: 64 IN | HEART RATE: 92 BPM | SYSTOLIC BLOOD PRESSURE: 126 MMHG

## 2025-07-11 DIAGNOSIS — R79.89 ELEVATED TROPONIN: ICD-10-CM

## 2025-07-11 DIAGNOSIS — R94.31 ABNORMAL EKG: ICD-10-CM

## 2025-07-11 DIAGNOSIS — R07.9 CHEST PAIN, UNSPECIFIED TYPE: Primary | ICD-10-CM

## 2025-07-11 DIAGNOSIS — L90.5 PAINFUL SCAR: ICD-10-CM

## 2025-07-11 DIAGNOSIS — R73.03 PREDIABETES: ICD-10-CM

## 2025-07-11 DIAGNOSIS — E55.9 VITAMIN D DEFICIENCY: ICD-10-CM

## 2025-07-11 DIAGNOSIS — E61.1 IRON DEFICIENCY: ICD-10-CM

## 2025-07-11 DIAGNOSIS — R94.31 ST SEGMENT DEPRESSION: Primary | ICD-10-CM

## 2025-07-11 DIAGNOSIS — R06.09 DOE (DYSPNEA ON EXERTION): ICD-10-CM

## 2025-07-11 DIAGNOSIS — Z01.818 PREOP EXAMINATION: ICD-10-CM

## 2025-07-11 DIAGNOSIS — I51.7 LVH (LEFT VENTRICULAR HYPERTROPHY): ICD-10-CM

## 2025-07-11 DIAGNOSIS — Z98.84 S/P GASTRIC BYPASS: ICD-10-CM

## 2025-07-11 DIAGNOSIS — R52 PAINFUL SCAR: ICD-10-CM

## 2025-07-11 DIAGNOSIS — R94.31 ELECTROCARDIOGRAM SHOWING T WAVE ABNORMALITIES: ICD-10-CM

## 2025-07-11 LAB
ALBUMIN SERPL-MCNC: 4.4 G/DL (ref 3.5–4.6)
ALP SERPL-CCNC: 83 U/L (ref 40–130)
ALT SERPL-CCNC: 30 U/L (ref 0–33)
ANION GAP SERPL CALCULATED.3IONS-SCNC: 12 MEQ/L (ref 9–15)
AST SERPL-CCNC: 32 U/L (ref 0–35)
BASOPHILS # BLD: 0.1 K/UL (ref 0–0.2)
BASOPHILS NFR BLD: 0.8 %
BILIRUB SERPL-MCNC: 0.3 MG/DL (ref 0.2–0.7)
BNP BLD-MCNC: 429 PG/ML
BUN SERPL-MCNC: 21 MG/DL (ref 8–23)
CALCIUM SERPL-MCNC: 9.6 MG/DL (ref 8.5–9.9)
CHLORIDE SERPL-SCNC: 100 MEQ/L (ref 95–107)
CO2 SERPL-SCNC: 27 MEQ/L (ref 20–31)
CREAT SERPL-MCNC: 0.89 MG/DL (ref 0.5–0.9)
EOSINOPHIL # BLD: 0.4 K/UL (ref 0–0.7)
EOSINOPHIL NFR BLD: 5.1 %
ERYTHROCYTE [DISTWIDTH] IN BLOOD BY AUTOMATED COUNT: 12.3 % (ref 11.5–14.5)
GLOBULIN SER CALC-MCNC: 3.5 G/DL (ref 2.3–3.5)
GLUCOSE SERPL-MCNC: 95 MG/DL (ref 70–99)
HCT VFR BLD AUTO: 41.5 % (ref 37–47)
HGB BLD-MCNC: 14 G/DL (ref 12–16)
LYMPHOCYTES # BLD: 2 K/UL (ref 1–4.8)
LYMPHOCYTES NFR BLD: 23.9 %
MAGNESIUM SERPL-MCNC: 2.1 MG/DL (ref 1.7–2.4)
MCH RBC QN AUTO: 30.5 PG (ref 27–31.3)
MCHC RBC AUTO-ENTMCNC: 33.7 % (ref 33–37)
MCV RBC AUTO: 90.4 FL (ref 79.4–94.8)
MONOCYTES # BLD: 0.5 K/UL (ref 0.2–0.8)
MONOCYTES NFR BLD: 5.6 %
NEUTROPHILS # BLD: 5.4 K/UL (ref 1.4–6.5)
NEUTS SEG NFR BLD: 64.4 %
PLATELET # BLD AUTO: 287 K/UL (ref 130–400)
POTASSIUM SERPL-SCNC: 3.9 MEQ/L (ref 3.4–4.9)
PROT SERPL-MCNC: 7.9 G/DL (ref 6.3–8)
RBC # BLD AUTO: 4.59 M/UL (ref 4.2–5.4)
SODIUM SERPL-SCNC: 139 MEQ/L (ref 135–144)
TROPONIN, HIGH SENSITIVITY: 29 NG/L (ref 0–19)
TROPONIN, HIGH SENSITIVITY: 32 NG/L (ref 0–19)
WBC # BLD AUTO: 8.4 K/UL (ref 4.8–10.8)

## 2025-07-11 PROCEDURE — 99215 OFFICE O/P EST HI 40 MIN: CPT | Performed by: PHYSICIAN ASSISTANT

## 2025-07-11 PROCEDURE — 71046 X-RAY EXAM CHEST 2 VIEWS: CPT

## 2025-07-11 PROCEDURE — 84484 ASSAY OF TROPONIN QUANT: CPT

## 2025-07-11 PROCEDURE — 85025 COMPLETE CBC W/AUTO DIFF WBC: CPT

## 2025-07-11 PROCEDURE — 99285 EMERGENCY DEPT VISIT HI MDM: CPT

## 2025-07-11 PROCEDURE — 83735 ASSAY OF MAGNESIUM: CPT

## 2025-07-11 PROCEDURE — 93000 ELECTROCARDIOGRAM COMPLETE: CPT | Performed by: PHYSICIAN ASSISTANT

## 2025-07-11 PROCEDURE — 93005 ELECTROCARDIOGRAM TRACING: CPT

## 2025-07-11 PROCEDURE — 80053 COMPREHEN METABOLIC PANEL: CPT

## 2025-07-11 PROCEDURE — 83880 ASSAY OF NATRIURETIC PEPTIDE: CPT

## 2025-07-11 ASSESSMENT — ENCOUNTER SYMPTOMS
COUGH: 0
COLOR CHANGE: 0
CONSTIPATION: 0
SHORTNESS OF BREATH: 1
CHEST TIGHTNESS: 0
SINUS PRESSURE: 0
WHEEZING: 0
BACK PAIN: 0
ABDOMINAL DISTENTION: 0

## 2025-07-11 ASSESSMENT — PAIN - FUNCTIONAL ASSESSMENT
PAIN_FUNCTIONAL_ASSESSMENT: NONE - DENIES PAIN
PAIN_FUNCTIONAL_ASSESSMENT: NONE - DENIES PAIN

## 2025-07-11 ASSESSMENT — LIFESTYLE VARIABLES
HOW MANY STANDARD DRINKS CONTAINING ALCOHOL DO YOU HAVE ON A TYPICAL DAY: PATIENT DOES NOT DRINK
HOW OFTEN DO YOU HAVE A DRINK CONTAINING ALCOHOL: NEVER

## 2025-07-11 NOTE — TELEPHONE ENCOUNTER
Subjective: Caller states she was at her PCP for pre op clearance. Pt. Received a EKG and it showed some abnormalities such as ST elevation. Pt. Declined the provider to call 911 she wanted to drive herself. Pt. Call in to report that she was driving to the ED. Pt. Stated she felt short of breath when working out the other day but denies now. Pt. Denies chest pain but has palpations currently. Pt. Stated the palpations could be from the stress of the situation.    Current Symptoms: Palpations     Associated Symptoms: NA    Pain Severity: 0/10; N/A; none      What has been tried: nothing    Recommended disposition: Go to ED Now    Care advice provided, caller verbalizes understanding; denies any other questions or concerns.    Outcome: Patient/caller agrees to proceed to   Emergency Department  Patient was on the way to ED    Attention Provider: Thank you for allowing me to participate in the care of your patient. Please do not respond through this encounter as the response is not directed to a shared pool.    This triage is a result of a call to the [facility] Children's After-Hours Nurse Line    Reason for Disposition  • Patient sounds very sick or weak to the triager     Abnormal EKG    Protocols used: Heart Rate and Heartbeat Questions-ADULT-[place]    
see HPI

## 2025-07-11 NOTE — ED PROVIDER NOTES
Basic Information   Time Seen: 5:01 PM   Primary Care Provider: Harmony Bashir PA-C     Chief Complaint   Patient presents with    OTHER     Sent by MD due to EKG at office   Pt had SOB Wednesday evening when doing workout and when going up the stairs       HPI   Geovanna Rodriguez is a 64 yrs female who presents with new dyspnea on exertion. She states that she went to her PCP today to have an EKG done and they recommended they come to the ED. She denies chest pain or SOB currently. She denies fever or chills. She endorses occasional \"fluttering\" in chest but unsure if it is her vyvanse.    Physical Exam     BP      Temp      Pulse     Resp      SpO2         General: Awake and Alert, no acute distress   CV: RRR, S1, S2   Resp: LCTAB, even and non labored   Other:   Impression and Plan     Labs Reviewed   COMPREHENSIVE METABOLIC PANEL   CBC WITH AUTO DIFFERENTIAL   TROPONIN   TROPONIN   BRAIN NATRIURETIC PEPTIDE   MAGNESIUM        XR CHEST (2 VW)    (Results Pending)      Final Impression   I have performed a medical screening exam on Geovanna Rodriguez. Based on this patient's chief complaint/symptoms of   Chief Complaint   Patient presents with    OTHER     Sent by MD due to EKG at office   Pt had SOB Wednesday evening when doing workout and when going up the stairs     and my focused exam, their care will be started and transitioned to provider when room is available       Dread Bennett PA-C  07/11/25 0065    
        DIAGNOSTIC RESULTS     EKG: All EKG's are interpreted by the Emergency Department Physician who either signs or Co-signs this chart in the absence of a cardiologist.        RADIOLOGY:   Non-plain film images such as CT, Ultrasound and MRI are read by the radiologist. Plain radiographic images are visualized and preliminarily interpreted by the emergency physician with the below findings:        Interpretation per the Radiologist below, if available at the time of this note:    XR CHEST (2 VW)   Final Result   No acute process.             LABS:  Labs Reviewed   TROPONIN - Abnormal; Notable for the following components:       Result Value    Troponin, High Sensitivity 32 (*)     All other components within normal limits   TROPONIN - Abnormal; Notable for the following components:    Troponin, High Sensitivity 29 (*)     All other components within normal limits   COMPREHENSIVE METABOLIC PANEL   CBC WITH AUTO DIFFERENTIAL   BRAIN NATRIURETIC PEPTIDE   MAGNESIUM       All other labs were within normal range or not returned as of this dictation.    EMERGENCY DEPARTMENT COURSE and DIFFERENTIAL DIAGNOSIS/MDM:     Vitals:    Vitals:    07/11/25 1530 07/11/25 1824 07/11/25 1900 07/11/25 1915   BP: (!) 153/88 (!) 153/81 137/67    Pulse: 91 87 74 71   Resp: 16 18 24 21   Temp: 98.2 °F (36.8 °C)      TempSrc: Tympanic      SpO2: 95% 97% 96% 98%       MDM     Amount and/or Complexity of Data Reviewed  Clinical lab tests: ordered and reviewed  Tests in the radiology section of CPT®: ordered and reviewed  Tests in the medicine section of CPT®: ordered and reviewed  Discussion of test results with the performing providers: yes  Decide to obtain previous medical records or to obtain history from someone other than the patient: yes  Obtain history from someone other than the patient: yes  Review and summarize past medical records: yes  Discuss the patient with other providers: yes  Independent visualization of images,

## 2025-07-11 NOTE — PROGRESS NOTES
Subjective  Geovanna SYLVAIN Rodriguez, 64 y.o. female presents today with:  Chief Complaint   Patient presents with    Pre-op Exam     Surgery 7/29/25 will be done at [facility] [place] has medical clearance 7/17/25       History of Present Illness  The patient presents for a preoperative visit.    She has been experiencing stress, weight gain, and a decrease in her walking routine. Despite these issues, she has been attending strength training sessions twice a week since her last surgery. These sessions include a combination of weights and cardio exercises on Mondays and barbell exercises on Wednesdays. However, she experienced difficulty keeping up with the exercises during her last Wednesday session due to feeling winded, although she felt fine afterward. She also reported feeling dizzy and having an elevated heart rate. She is currently taking Vyvanse and is considering discontinuing it.    She is scheduled for a preoperative visit with her surgeon on 07/17/2025 and a scar revision surgery on 07/29/2025. She is unsure if liposuction will be performed during the surgery. She has been wearing an abdominal binder, which she finds uncomfortable as it causes her clothes to roll down and bulge. She had an extended panniculectomy performed by Dr. oJhn, which initially caused swelling but has since reduced in size.    She is considering getting her iron levels checked again. She has been taking iron supplements but occasionally forgets to take her calcium supplement. She has noticed that her under-eyes appear pale and has difficulty seeing them clearly with her glasses on. She is currently taking a bariatric multivitamin that contains high doses of various nutrients, including vitamin B12, which she reports is always high. She does not take her multivitamin until after her blood tests. Her calcium supplement also contains vitamin D, as does her multivitamin.    She experiences constipation, which she manages with Trulance as

## 2025-07-11 NOTE — ED NOTES
Discharge instructions reviewed with patient. Verbalized understanding. Denies additional questions. No chest pain or SOB at discharge.

## 2025-07-11 NOTE — ED TRIAGE NOTES
Pt arrived due to being sent from physicians office due to abnormal EKG   Pt states had SOB when working out on Wednesday   Pt has had bariatric surgery

## 2025-07-13 LAB
EKG ATRIAL RATE: 72 BPM
EKG DIAGNOSIS: NORMAL
EKG P AXIS: 40 DEGREES
EKG P-R INTERVAL: 158 MS
EKG Q-T INTERVAL: 388 MS
EKG QRS DURATION: 98 MS
EKG QTC CALCULATION (BAZETT): 424 MS
EKG R AXIS: -26 DEGREES
EKG T AXIS: 93 DEGREES
EKG VENTRICULAR RATE: 72 BPM

## 2025-07-14 ENCOUNTER — OFFICE VISIT (OUTPATIENT)
Age: 65
End: 2025-07-14
Payer: COMMERCIAL

## 2025-07-14 VITALS
WEIGHT: 169 LBS | SYSTOLIC BLOOD PRESSURE: 136 MMHG | DIASTOLIC BLOOD PRESSURE: 74 MMHG | BODY MASS INDEX: 29.01 KG/M2 | HEART RATE: 81 BPM | OXYGEN SATURATION: 98 %

## 2025-07-14 DIAGNOSIS — Z01.810 PRE-OPERATIVE CARDIOVASCULAR EXAMINATION: ICD-10-CM

## 2025-07-14 DIAGNOSIS — R07.9 CHEST PAIN, UNSPECIFIED TYPE: ICD-10-CM

## 2025-07-14 DIAGNOSIS — R01.1 HEART MURMUR: ICD-10-CM

## 2025-07-14 DIAGNOSIS — E61.1 IRON DEFICIENCY: ICD-10-CM

## 2025-07-14 DIAGNOSIS — Z98.84 S/P GASTRIC BYPASS: ICD-10-CM

## 2025-07-14 DIAGNOSIS — R06.02 SHORTNESS OF BREATH: ICD-10-CM

## 2025-07-14 DIAGNOSIS — R06.02 SOB (SHORTNESS OF BREATH) ON EXERTION: Primary | ICD-10-CM

## 2025-07-14 DIAGNOSIS — E55.9 VITAMIN D DEFICIENCY: ICD-10-CM

## 2025-07-14 DIAGNOSIS — R73.03 PREDIABETES: ICD-10-CM

## 2025-07-14 DIAGNOSIS — R94.31 ABNORMAL ELECTROCARDIOGRAPHY: ICD-10-CM

## 2025-07-14 LAB
EKG ATRIAL RATE: 77 BPM
EKG DIAGNOSIS: NORMAL
EKG P AXIS: 37 DEGREES
EKG P-R INTERVAL: 162 MS
EKG Q-T INTERVAL: 382 MS
EKG QRS DURATION: 96 MS
EKG QTC CALCULATION (BAZETT): 432 MS
EKG R AXIS: -27 DEGREES
EKG T AXIS: 86 DEGREES
EKG VENTRICULAR RATE: 77 BPM
ESTIMATED AVERAGE GLUCOSE: 128 MG/DL
FERRITIN: 170 NG/ML
FOLATE: 17.4 NG/ML (ref 4.8–24.2)
HBA1C MFR BLD: 6.1 % (ref 4–6)
IRON % SATURATION: 36 % (ref 20–55)
IRON: 109 UG/DL (ref 37–145)
TOTAL IRON BINDING CAPACITY: 299 UG/DL (ref 250–450)
UNSATURATED IRON BINDING CAPACITY: 190 UG/DL (ref 112–347)
VITAMIN B-12: 1555 PG/ML (ref 232–1245)
VITAMIN D 25-HYDROXY: 83.5 NG/ML (ref 30–100)

## 2025-07-14 PROCEDURE — 99204 OFFICE O/P NEW MOD 45 MIN: CPT | Performed by: INTERNAL MEDICINE

## 2025-07-14 PROCEDURE — 93000 ELECTROCARDIOGRAM COMPLETE: CPT | Performed by: INTERNAL MEDICINE

## 2025-07-14 NOTE — PATIENT INSTRUCTIONS
Stress test  Echocardiogram  Continue current medications  Follow up in 2 weeks.     *You were seen today by Dr. Kelsey and JERAD Arroyo. You may receive a survey regarding your experience today. If you had a good experience, please consider giving a positive review.

## 2025-07-14 NOTE — PROGRESS NOTES
ferrous sulfate (IRON 325) 325 (65 Fe) MG tablet Take 1 tablet by mouth daily (with breakfast) 90 tablet 1    levothyroxine (SYNTHROID) 50 MCG tablet TAKE 1 TABLET BY MOUTH ONE TIME A DAY (Patient taking differently: TAKE 0.5mg TABLET BY MOUTH ONE TIME A DAY) 90 tablet 2    buPROPion (WELLBUTRIN XL) 300 MG extended release tablet TAKE ONE TABLET BY MOUTH EVERY MORNING 90 tablet 4    pantoprazole (PROTONIX) 20 MG tablet Take 1 tablet by mouth every morning (before breakfast) 90 tablet 4    vitamin C (ASCORBIC ACID) 500 MG tablet Take 2 tablets by mouth daily      Probiotic Product (PROBIOTIC BLEND PO) Take 1 capsule by mouth at bedtime      Plecanatide (TRULANCE) 3 MG TABS Take 3 mg by mouth daily (with breakfast) (Patient taking differently: Take 3 mg by mouth daily (with breakfast) prn) 90 tablet 2    Multiple Vitamins-Minerals (BARIATRIC MULTIVITAMINS/IRON PO) Take 1 tablet by mouth daily      Calcium Citrate 333 MG TABS Take 2 tablets by mouth in the morning and at bedtime      estradiol (ESTRACE VAGINAL) 0.1 MG/GM vaginal cream Place 0.5 g vaginally nightly for 21 days, THEN 0.5 g every other day for 21 days, THEN 0.5 g Twice a Week. 42.5 g 1     No current facility-administered medications for this visit.       Allergies: Environmental/seasonal, Morphine, Other, and Nsaids     Review of Systems   General: Fatigues more easily  Cardiovascular: See HPI. No orthopnea or PND.   Respiratory: Dyspnea is present with mild to moderate degrees of activity.  Gastrointestinal: No melena or hematochezia.  History of gastric bypass  Genitourinary: No hematuria.   Hematological: No easy bruising or bleeding.   Vascular: No lower extremity edema or claudication.  Neurological: No TIA or CVA symptoms. No paresthesias.   Musculoskeletal: No chest wall pain.  Psychiatric: + Anxiety.  + Increased stressors at work  *All other systems were reviewed and found to be negative unless otherwise noted in the HPI*    Physical

## 2025-07-16 ENCOUNTER — HOSPITAL ENCOUNTER (OUTPATIENT)
Dept: NUCLEAR MEDICINE | Age: 65
Discharge: HOME | End: 2025-07-18
Attending: INTERNAL MEDICINE
Payer: COMMERCIAL

## 2025-07-16 ENCOUNTER — HOSPITAL ENCOUNTER (OUTPATIENT)
Age: 65
Discharge: HOME | End: 2025-07-18
Attending: INTERNAL MEDICINE
Payer: COMMERCIAL

## 2025-07-16 VITALS
DIASTOLIC BLOOD PRESSURE: 74 MMHG | WEIGHT: 169 LBS | SYSTOLIC BLOOD PRESSURE: 136 MMHG | HEIGHT: 64 IN | BODY MASS INDEX: 28.85 KG/M2

## 2025-07-16 DIAGNOSIS — R07.9 CHEST PAIN, UNSPECIFIED TYPE: ICD-10-CM

## 2025-07-16 DIAGNOSIS — R94.31 ABNORMAL ELECTROCARDIOGRAPHY: ICD-10-CM

## 2025-07-16 DIAGNOSIS — Z01.810 PRE-OPERATIVE CARDIOVASCULAR EXAMINATION: ICD-10-CM

## 2025-07-16 DIAGNOSIS — R01.1 HEART MURMUR: ICD-10-CM

## 2025-07-16 DIAGNOSIS — R06.02 SHORTNESS OF BREATH: ICD-10-CM

## 2025-07-16 LAB
ECHO AO ROOT DIAM: 2.4 CM
ECHO AO ROOT INDEX: 1.32 CM/M2
ECHO AV MEAN GRADIENT: 10 MMHG
ECHO AV MEAN VELOCITY: 1.5 M/S
ECHO AV PEAK GRADIENT: 16 MMHG
ECHO AV PEAK VELOCITY: 2 M/S
ECHO AV VTI: 37.3 CM
ECHO BSA: 1.86 M2
ECHO BSA: 1.86 M2
ECHO EST RA PRESSURE: 3 MMHG
ECHO LA DIAMETER INDEX: 2.25 CM/M2
ECHO LA DIAMETER: 4.1 CM
ECHO LA TO AORTIC ROOT RATIO: 1.71
ECHO LA VOL A-L A2C: 54 ML (ref 22–52)
ECHO LA VOL A-L A4C: 76 ML (ref 22–52)
ECHO LA VOL MOD A2C: 53 ML (ref 22–52)
ECHO LA VOL MOD A4C: 70 ML (ref 22–52)
ECHO LA VOLUME AREA LENGTH: 66 ML
ECHO LA VOLUME INDEX A-L A2C: 30 ML/M2 (ref 16–34)
ECHO LA VOLUME INDEX A-L A4C: 42 ML/M2 (ref 16–34)
ECHO LA VOLUME INDEX AREA LENGTH: 36 ML/M2 (ref 16–34)
ECHO LA VOLUME INDEX MOD A2C: 29 ML/M2 (ref 16–34)
ECHO LA VOLUME INDEX MOD A4C: 38 ML/M2 (ref 16–34)
ECHO LV E' LATERAL VELOCITY: 10 CM/S
ECHO LV E' SEPTAL VELOCITY: 5.6 CM/S
ECHO LV EDV A2C: 52 ML
ECHO LV EDV A4C: 65 ML
ECHO LV EDV BP: 59 ML (ref 56–104)
ECHO LV EDV INDEX A4C: 36 ML/M2
ECHO LV EDV INDEX BP: 32 ML/M2
ECHO LV EDV NDEX A2C: 29 ML/M2
ECHO LV EF PHYSICIAN: 65 %
ECHO LV EJECTION FRACTION A2C: 61 %
ECHO LV EJECTION FRACTION A4C: 73 %
ECHO LV EJECTION FRACTION BIPLANE: 66 % (ref 55–100)
ECHO LV ESV A2C: 21 ML
ECHO LV ESV A4C: 18 ML
ECHO LV ESV BP: 20 ML (ref 19–49)
ECHO LV ESV INDEX A2C: 12 ML/M2
ECHO LV ESV INDEX A4C: 10 ML/M2
ECHO LV ESV INDEX BP: 11 ML/M2
ECHO LV FRACTIONAL SHORTENING: 36 % (ref 28–44)
ECHO LV INTERNAL DIMENSION DIASTOLE INDEX: 2.47 CM/M2
ECHO LV INTERNAL DIMENSION DIASTOLIC: 4.5 CM (ref 3.9–5.3)
ECHO LV INTERNAL DIMENSION SYSTOLIC INDEX: 1.59 CM/M2
ECHO LV INTERNAL DIMENSION SYSTOLIC: 2.9 CM
ECHO LV IVSD: 1.1 CM (ref 0.6–0.9)
ECHO LV IVSS: 1.5 CM
ECHO LV MASS 2D: 164 G (ref 67–162)
ECHO LV MASS INDEX 2D: 90.1 G/M2 (ref 43–95)
ECHO LV POSTERIOR WALL DIASTOLIC: 1 CM (ref 0.6–0.9)
ECHO LV POSTERIOR WALL SYSTOLIC: 1.8 CM
ECHO LV RELATIVE WALL THICKNESS RATIO: 0.44
ECHO LVOT AREA: 2.8 CM2
ECHO LVOT DIAM: 1.9 CM
ECHO MV A VELOCITY: 0.98 M/S
ECHO MV E DECELERATION TIME (DT): 153.3 MS
ECHO MV E VELOCITY: 0.69 M/S
ECHO MV E/A RATIO: 0.7
ECHO MV E/E' LATERAL: 6.9
ECHO MV E/E' RATIO (AVERAGED): 9.61
ECHO MV E/E' SEPTAL: 12.32
ECHO RIGHT VENTRICULAR SYSTOLIC PRESSURE (RVSP): 40 MMHG
ECHO RV INTERNAL DIMENSION: 2.7 CM
ECHO RV TAPSE: 2.3 CM (ref 1.7–?)
ECHO TV REGURGITANT MAX VELOCITY: 3.05 M/S
ECHO TV REGURGITANT PEAK GRADIENT: 37 MMHG
NUC STRESS EJECTION FRACTION: 67 %
STRESS ANGINA INDEX: 0
STRESS BASELINE DIAS BP: 75 MMHG
STRESS BASELINE HR: 75 BPM
STRESS BASELINE ST DEPRESSION: 0 MM
STRESS BASELINE SYS BP: 154 MMHG
STRESS ESTIMATED WORKLOAD: 9 METS
STRESS EXERCISE DUR MIN: 6 MIN
STRESS EXERCISE DUR SEC: 40 SEC
STRESS PEAK DIAS BP: 73 MMHG
STRESS PEAK SYS BP: 174 MMHG
STRESS PERCENT HR ACHIEVED: 89 %
STRESS POST PEAK HR: 141 BPM
STRESS RATE PRESSURE PRODUCT: NORMAL BPM*MMHG
STRESS SR DUKE TREADMILL SCORE: 7
STRESS ST DEPRESSION: 0 MM
STRESS TARGET HR: 158 BPM
TID: 1.1

## 2025-07-16 PROCEDURE — 93306 TTE W/DOPPLER COMPLETE: CPT | Performed by: INTERNAL MEDICINE

## 2025-07-16 PROCEDURE — 93306 TTE W/DOPPLER COMPLETE: CPT

## 2025-07-16 PROCEDURE — 93016 CV STRESS TEST SUPVJ ONLY: CPT | Performed by: INTERNAL MEDICINE

## 2025-07-16 PROCEDURE — 78452 HT MUSCLE IMAGE SPECT MULT: CPT | Performed by: INTERNAL MEDICINE

## 2025-07-16 PROCEDURE — 78452 HT MUSCLE IMAGE SPECT MULT: CPT

## 2025-07-16 PROCEDURE — 93018 CV STRESS TEST I&R ONLY: CPT | Performed by: INTERNAL MEDICINE

## 2025-07-16 PROCEDURE — 3430000000 HC RX DIAGNOSTIC RADIOPHARMACEUTICAL: Performed by: INTERNAL MEDICINE

## 2025-07-16 PROCEDURE — A9502 TC99M TETROFOSMIN: HCPCS | Performed by: INTERNAL MEDICINE

## 2025-07-16 PROCEDURE — 93017 CV STRESS TEST TRACING ONLY: CPT

## 2025-07-16 PROCEDURE — 2500000003 HC RX 250 WO HCPCS: Performed by: INTERNAL MEDICINE

## 2025-07-16 RX ORDER — SODIUM CHLORIDE 0.9 % (FLUSH) 0.9 %
10 SYRINGE (ML) INJECTION PRN
Status: DISCONTINUED | OUTPATIENT
Start: 2025-07-16 | End: 2025-07-19 | Stop reason: HOSPADM

## 2025-07-16 RX ORDER — REGADENOSON 0.08 MG/ML
0.4 INJECTION, SOLUTION INTRAVENOUS
Status: COMPLETED | OUTPATIENT
Start: 2025-07-16 | End: 2025-07-16

## 2025-07-16 RX ADMIN — TETROFOSMIN 11.5 MILLICURIE: 1.38 INJECTION, POWDER, LYOPHILIZED, FOR SOLUTION INTRAVENOUS at 09:50

## 2025-07-16 RX ADMIN — Medication 10 ML: at 11:44

## 2025-07-16 RX ADMIN — Medication 10 ML: at 09:50

## 2025-07-16 RX ADMIN — TETROFOSMIN 34.9 MILLICURIE: 1.38 INJECTION, POWDER, LYOPHILIZED, FOR SOLUTION INTRAVENOUS at 11:34

## 2025-07-17 ENCOUNTER — HOSPITAL ENCOUNTER (OUTPATIENT)
Dept: PREADMISSION TESTING | Age: 65
Discharge: HOME | End: 2025-07-21
Payer: COMMERCIAL

## 2025-07-17 ENCOUNTER — TELEPHONE (OUTPATIENT)
Dept: SURGERY | Age: 65
End: 2025-07-17

## 2025-07-17 VITALS
BODY MASS INDEX: 27.99 KG/M2 | HEIGHT: 65 IN | TEMPERATURE: 97.1 F | DIASTOLIC BLOOD PRESSURE: 70 MMHG | HEART RATE: 72 BPM | WEIGHT: 168 LBS | OXYGEN SATURATION: 97 % | SYSTOLIC BLOOD PRESSURE: 141 MMHG | RESPIRATION RATE: 16 BRPM

## 2025-07-17 DIAGNOSIS — Z01.818 PRE-OP TESTING: Primary | ICD-10-CM

## 2025-07-17 PROCEDURE — G0480 DRUG TEST DEF 1-7 CLASSES: HCPCS

## 2025-07-17 RX ORDER — MULTIVIT WITH MINERALS/LUTEIN
1000 TABLET ORAL DAILY
COMMUNITY

## 2025-07-17 NOTE — TELEPHONE ENCOUNTER
Patient recently seen by cardiology Dr. Kelsey. She had an EKG and stress test. Please send a clearance to Dr. Kelsey and one to her PCP.

## 2025-07-17 NOTE — PRE-PROCEDURE INSTRUCTIONS
On the Day of Your Surgery, Tuesday, 7/29/25, Please Arrive At 6:30 AM     Enter [facility] @ [address] through the Main Entrance, take the lobby elevators to the second floor and check in at the Surgery Registration desk.     Continue to take your home medications as you normally do up to and including the night before surgery with the exception of blood thinning medications.    Blood Thinning Medications:  Please stop prescription blood thinning medications such as Apixaban (Eliquis); Clopidogrel (Plavix); Dabigatran (Pradaxa); Prasugrel (Effient); Rivaroxaban (Xarelto); Ticagrelor (Brilinta); Warfarin (Coumadin) only as directed by your surgeon and/or the prescribing physician    Some common examples of other medications that can thin your blood are: Aspirin, Ibuprofen (Advil, Motrin), Naproxen (Aleve), Meloxicam (Mobic), Celecoxib (Celebrex), Fish Oil, many Herbal Supplements.  These medications should usually be stopped at least 7 days prior to surgery.        Stop using OTC pain relievers containing Aspirin, Ibuprofen (Advil, Motrin) or Naproxen (Aleve) seven days prior to surgery.  It is OK to continue using Tylenol for pain the week prior to surgery.    Failure to stop certain medications may interfere with your scheduled surgery.    If you receive instructions from your surgeon regarding what medications to stop prior to surgery, please follow those specific instructions.      Please take the following medication(s) the day of surgery with small sips of water:              Pantoprazole, Bupropion & Levothyroxine        Showering Before Surgery:     You can play an important role in your own health by carefully washing before surgery. Shower the night before and the morning of surgery using the instructions below. If you are allergic to Chlorhexidine Gluconate (CHG) use antibacterial soap instead.    If you were given Chlorhexidine soap, please follow the instructions included with  do not swallow the water.    3. If you wear glasses bring a case for them if you have one.  No contacts should be worn the day of surgery.  You may also bring your hearing aids. If you have dentures, most surgical procedures involving anesthesia will require that you remove them prior to surgery.    4. If you sleep with a CPAP or BiPAP machine at home and plan on staying in the hospital overnight after surgery, please bring your machine with you.     5. Do not wear any jewelry or body piercings the day of surgery.  No nail polish on the operative extremity (arm/hand or leg/foot surgeries)     6. If you are staying overnight with us, you may bring a small bag of necessary personal items.     7. Please wear loose, comfortable clothing.  If you are potentially going to have a cast, sling, brace or bulky dressing, make sure to wear clothing that will fit over it.     8. In case of illness - If you have cold or flu like symptoms (high fever, runny nose, sore throat, cough, etc.) rash, nausea, vomiting, loose stools, and/or recent contact with someone who has a contagious disease (chicken pox, measles, COVID-19, etc.).  Please call your surgeon before coming to the hospital.    Transportation After Your Surgery/Procedure:     If you are going home the same day of surgery you need someone to drive you home.  Your  must be at least 18 years of age.  A taxi cab or other nonmedical public transportation is not acceptable unless you have someone to ride home in the vehicle with you.   For your safety, someone must remain with you for the first 24 hours after your surgery if you receive anesthesia or medication.  If you do not have someone to stay with you, your procedure may be cancelled.  As a patient at [facility] you can expect quality medical and nursing care that is centered on you individual needs.  Our goal is to make your surgical experience as comfortable as possible.    Any questions about

## 2025-07-18 ENCOUNTER — TELEPHONE (OUTPATIENT)
Dept: SURGERY | Age: 65
End: 2025-07-18

## 2025-07-18 NOTE — TELEPHONE ENCOUNTER
Pt called in with concerns of her surgery being cancelled due to lack of prior authorization from insurance. When patient called her insurance they said the liposuction is denied and so the whole surgery is denied. Patient states the lipo is only possible and her main concern is with scar revision. Patient does not want her whole surgery to be cancelled. I told her I would send a message to dr meeks's surg  for further discussion. Patient voiced understanding

## 2025-07-23 ENCOUNTER — OFFICE VISIT (OUTPATIENT)
Age: 65
End: 2025-07-23
Payer: COMMERCIAL

## 2025-07-23 VITALS — HEART RATE: 85 BPM | SYSTOLIC BLOOD PRESSURE: 138 MMHG | OXYGEN SATURATION: 97 % | DIASTOLIC BLOOD PRESSURE: 82 MMHG

## 2025-07-23 DIAGNOSIS — Z01.810 PRE-OPERATIVE CARDIOVASCULAR EXAMINATION: Primary | ICD-10-CM

## 2025-07-23 PROCEDURE — 99214 OFFICE O/P EST MOD 30 MIN: CPT | Performed by: INTERNAL MEDICINE

## 2025-07-25 ENCOUNTER — TELEPHONE (OUTPATIENT)
Age: 65
End: 2025-07-25

## 2025-07-25 NOTE — TELEPHONE ENCOUNTER
Writer contact prior auth department who confirmed with insurance that the prior auth has not been denied. It is still under nurse review. Writer contacted patient to relay this information and informed patient she would be notified when we get a response.

## 2025-07-25 NOTE — TELEPHONE ENCOUNTER
Aranza called to have the medical clearance form that is in the media tab from 7/18 signed and faxed back. Pt is having surgery 7/29. Please advise

## 2025-07-29 ENCOUNTER — TELEPHONE (OUTPATIENT)
Dept: SURGERY | Age: 65
End: 2025-07-29

## 2025-07-29 ENCOUNTER — ANESTHESIA EVENT (OUTPATIENT)
Dept: OPERATING ROOM | Age: 65
End: 2025-07-29
Payer: COMMERCIAL

## 2025-07-29 ENCOUNTER — ANESTHESIA (OUTPATIENT)
Dept: OPERATING ROOM | Age: 65
End: 2025-07-29
Payer: COMMERCIAL

## 2025-07-29 ENCOUNTER — HOSPITAL ENCOUNTER (OUTPATIENT)
Age: 65
Setting detail: OUTPATIENT SURGERY
Discharge: HOME | End: 2025-07-29
Attending: PLASTIC SURGERY | Admitting: PLASTIC SURGERY
Payer: COMMERCIAL

## 2025-07-29 VITALS
BODY MASS INDEX: 27.49 KG/M2 | TEMPERATURE: 97.9 F | SYSTOLIC BLOOD PRESSURE: 162 MMHG | HEART RATE: 82 BPM | DIASTOLIC BLOOD PRESSURE: 69 MMHG | RESPIRATION RATE: 16 BRPM | OXYGEN SATURATION: 90 % | HEIGHT: 65 IN | WEIGHT: 165 LBS

## 2025-07-29 DIAGNOSIS — L90.5 PAINFUL SCAR: Primary | ICD-10-CM

## 2025-07-29 DIAGNOSIS — R52 PAINFUL SCAR: Primary | ICD-10-CM

## 2025-07-29 PROCEDURE — 11406 EXC TR-EXT B9+MARG >4.0 CM: CPT | Performed by: PLASTIC SURGERY

## 2025-07-29 PROCEDURE — 3600000002 HC SURGERY LEVEL 2 BASE: Performed by: PLASTIC SURGERY

## 2025-07-29 PROCEDURE — 2500000003 HC RX 250 WO HCPCS: Performed by: NURSE ANESTHETIST, CERTIFIED REGISTERED

## 2025-07-29 PROCEDURE — L0450 TLSO FLEX TRUNK/THOR PRE OTS: HCPCS | Performed by: PLASTIC SURGERY

## 2025-07-29 PROCEDURE — 2580000003 HC RX 258: Performed by: ANESTHESIOLOGY

## 2025-07-29 PROCEDURE — 3600000012 HC SURGERY LEVEL 2 ADDTL 15MIN: Performed by: PLASTIC SURGERY

## 2025-07-29 PROCEDURE — 6360000002 HC RX W HCPCS: Performed by: PLASTIC SURGERY

## 2025-07-29 PROCEDURE — 13101 CMPLX RPR TRUNK 2.6-7.5 CM: CPT | Performed by: PLASTIC SURGERY

## 2025-07-29 PROCEDURE — 6370000000 HC RX 637 (ALT 250 FOR IP): Performed by: ANESTHESIOLOGY

## 2025-07-29 PROCEDURE — 2709999900 HC NON-CHARGEABLE SUPPLY: Performed by: PLASTIC SURGERY

## 2025-07-29 PROCEDURE — 6360000002 HC RX W HCPCS: Performed by: ANESTHESIOLOGY

## 2025-07-29 PROCEDURE — 7100000011 HC PHASE II RECOVERY - ADDTL 15 MIN: Performed by: PLASTIC SURGERY

## 2025-07-29 PROCEDURE — 7100000010 HC PHASE II RECOVERY - FIRST 15 MIN: Performed by: PLASTIC SURGERY

## 2025-07-29 PROCEDURE — 2500000003 HC RX 250 WO HCPCS: Performed by: PLASTIC SURGERY

## 2025-07-29 PROCEDURE — 13102 CMPLX RPR TRUNK ADDL 5CM/<: CPT | Performed by: PLASTIC SURGERY

## 2025-07-29 PROCEDURE — 88304 TISSUE EXAM BY PATHOLOGIST: CPT

## 2025-07-29 PROCEDURE — 7100000000 HC PACU RECOVERY - FIRST 15 MIN: Performed by: PLASTIC SURGERY

## 2025-07-29 PROCEDURE — 2720000010 HC SURG SUPPLY STERILE: Performed by: PLASTIC SURGERY

## 2025-07-29 PROCEDURE — 7100000001 HC PACU RECOVERY - ADDTL 15 MIN: Performed by: PLASTIC SURGERY

## 2025-07-29 PROCEDURE — 6360000002 HC RX W HCPCS: Performed by: NURSE ANESTHETIST, CERTIFIED REGISTERED

## 2025-07-29 PROCEDURE — 3700000000 HC ANESTHESIA ATTENDED CARE: Performed by: PLASTIC SURGERY

## 2025-07-29 PROCEDURE — 3700000001 HC ADD 15 MINUTES (ANESTHESIA): Performed by: PLASTIC SURGERY

## 2025-07-29 RX ORDER — HYDROMORPHONE HYDROCHLORIDE 2 MG/ML
INJECTION, SOLUTION INTRAMUSCULAR; INTRAVENOUS; SUBCUTANEOUS
Status: DISCONTINUED | OUTPATIENT
Start: 2025-07-29 | End: 2025-07-29 | Stop reason: SDUPTHER

## 2025-07-29 RX ORDER — DEXAMETHASONE SODIUM PHOSPHATE 10 MG/ML
INJECTION, SOLUTION INTRAMUSCULAR; INTRAVENOUS
Status: DISCONTINUED | OUTPATIENT
Start: 2025-07-29 | End: 2025-07-29 | Stop reason: SDUPTHER

## 2025-07-29 RX ORDER — ONDANSETRON 2 MG/ML
INJECTION INTRAMUSCULAR; INTRAVENOUS
Status: DISCONTINUED | OUTPATIENT
Start: 2025-07-29 | End: 2025-07-29 | Stop reason: SDUPTHER

## 2025-07-29 RX ORDER — METOCLOPRAMIDE HYDROCHLORIDE 5 MG/ML
10 INJECTION INTRAMUSCULAR; INTRAVENOUS
Status: DISCONTINUED | OUTPATIENT
Start: 2025-07-29 | End: 2025-07-29 | Stop reason: HOSPADM

## 2025-07-29 RX ORDER — FENTANYL CITRATE 50 UG/ML
INJECTION, SOLUTION INTRAMUSCULAR; INTRAVENOUS
Status: DISCONTINUED | OUTPATIENT
Start: 2025-07-29 | End: 2025-07-29 | Stop reason: SDUPTHER

## 2025-07-29 RX ORDER — SODIUM CHLORIDE 9 MG/ML
INJECTION, SOLUTION INTRAVENOUS PRN
Status: DISCONTINUED | OUTPATIENT
Start: 2025-07-29 | End: 2025-07-29 | Stop reason: HOSPADM

## 2025-07-29 RX ORDER — CEPHALEXIN 500 MG/1
500 CAPSULE ORAL 4 TIMES DAILY
Qty: 40 CAPSULE | Refills: 0 | Status: SHIPPED | OUTPATIENT
Start: 2025-07-29 | End: 2025-08-08

## 2025-07-29 RX ORDER — ONDANSETRON 2 MG/ML
4 INJECTION INTRAMUSCULAR; INTRAVENOUS
Status: DISCONTINUED | OUTPATIENT
Start: 2025-07-29 | End: 2025-07-29 | Stop reason: HOSPADM

## 2025-07-29 RX ORDER — HYDROCODONE BITARTRATE AND ACETAMINOPHEN 5; 325 MG/1; MG/1
1 TABLET ORAL EVERY 4 HOURS PRN
Qty: 42 TABLET | Refills: 0 | Status: SHIPPED | OUTPATIENT
Start: 2025-07-29 | End: 2025-08-05

## 2025-07-29 RX ORDER — SODIUM CHLORIDE 9 MG/ML
INJECTION, SOLUTION INTRAVENOUS CONTINUOUS
Status: DISCONTINUED | OUTPATIENT
Start: 2025-07-29 | End: 2025-07-29 | Stop reason: HOSPADM

## 2025-07-29 RX ORDER — SODIUM CHLORIDE, SODIUM LACTATE, POTASSIUM CHLORIDE, CALCIUM CHLORIDE 600; 310; 30; 20 MG/100ML; MG/100ML; MG/100ML; MG/100ML
INJECTION, SOLUTION INTRAVENOUS CONTINUOUS
Status: DISCONTINUED | OUTPATIENT
Start: 2025-07-29 | End: 2025-07-29 | Stop reason: HOSPADM

## 2025-07-29 RX ORDER — KETAMINE HCL IN 0.9 % NACL 50 MG/5 ML
SYRINGE (ML) INTRAVENOUS
Status: DISCONTINUED | OUTPATIENT
Start: 2025-07-29 | End: 2025-07-29 | Stop reason: SDUPTHER

## 2025-07-29 RX ORDER — LIDOCAINE HYDROCHLORIDE 10 MG/ML
1 INJECTION, SOLUTION EPIDURAL; INFILTRATION; INTRACAUDAL; PERINEURAL
Status: DISCONTINUED | OUTPATIENT
Start: 2025-07-30 | End: 2025-07-29 | Stop reason: HOSPADM

## 2025-07-29 RX ORDER — SODIUM CHLORIDE 0.9 % (FLUSH) 0.9 %
5-40 SYRINGE (ML) INJECTION EVERY 12 HOURS SCHEDULED
Status: DISCONTINUED | OUTPATIENT
Start: 2025-07-29 | End: 2025-07-29 | Stop reason: HOSPADM

## 2025-07-29 RX ORDER — SODIUM CHLORIDE 0.9 % (FLUSH) 0.9 %
5-40 SYRINGE (ML) INJECTION PRN
Status: DISCONTINUED | OUTPATIENT
Start: 2025-07-29 | End: 2025-07-29 | Stop reason: HOSPADM

## 2025-07-29 RX ORDER — LIDOCAINE HYDROCHLORIDE 20 MG/ML
INJECTION, SOLUTION EPIDURAL; INFILTRATION; INTRACAUDAL; PERINEURAL
Status: DISCONTINUED | OUTPATIENT
Start: 2025-07-29 | End: 2025-07-29 | Stop reason: SDUPTHER

## 2025-07-29 RX ORDER — ONDANSETRON 4 MG/1
4 TABLET, ORALLY DISINTEGRATING ORAL 3 TIMES DAILY PRN
Qty: 21 TABLET | Refills: 0 | Status: SHIPPED | OUTPATIENT
Start: 2025-07-29

## 2025-07-29 RX ORDER — FENTANYL CITRATE 50 UG/ML
25 INJECTION, SOLUTION INTRAMUSCULAR; INTRAVENOUS EVERY 5 MIN PRN
Status: DISCONTINUED | OUTPATIENT
Start: 2025-07-29 | End: 2025-07-29 | Stop reason: HOSPADM

## 2025-07-29 RX ORDER — PROPOFOL 10 MG/ML
INJECTION, EMULSION INTRAVENOUS
Status: DISCONTINUED | OUTPATIENT
Start: 2025-07-29 | End: 2025-07-29 | Stop reason: SDUPTHER

## 2025-07-29 RX ORDER — ROCURONIUM BROMIDE 10 MG/ML
INJECTION, SOLUTION INTRAVENOUS
Status: DISCONTINUED | OUTPATIENT
Start: 2025-07-29 | End: 2025-07-29 | Stop reason: SDUPTHER

## 2025-07-29 RX ORDER — BUPIVACAINE HYDROCHLORIDE AND EPINEPHRINE 2.5; 5 MG/ML; UG/ML
INJECTION, SOLUTION EPIDURAL; INFILTRATION; INTRACAUDAL; PERINEURAL PRN
Status: DISCONTINUED | OUTPATIENT
Start: 2025-07-29 | End: 2025-07-29 | Stop reason: ALTCHOICE

## 2025-07-29 RX ORDER — PROMETHAZINE HYDROCHLORIDE 12.5 MG/1
12.5 TABLET ORAL ONCE
Status: COMPLETED | OUTPATIENT
Start: 2025-07-29 | End: 2025-07-29

## 2025-07-29 RX ORDER — HYDROMORPHONE HYDROCHLORIDE 1 MG/ML
0.5 INJECTION, SOLUTION INTRAMUSCULAR; INTRAVENOUS; SUBCUTANEOUS EVERY 5 MIN PRN
Status: DISCONTINUED | OUTPATIENT
Start: 2025-07-29 | End: 2025-07-29 | Stop reason: HOSPADM

## 2025-07-29 RX ORDER — GLYCOPYRROLATE 0.2 MG/ML
INJECTION INTRAMUSCULAR; INTRAVENOUS
Status: DISCONTINUED | OUTPATIENT
Start: 2025-07-29 | End: 2025-07-29 | Stop reason: SDUPTHER

## 2025-07-29 RX ORDER — OXYCODONE HYDROCHLORIDE 5 MG/1
5 TABLET ORAL
Status: COMPLETED | OUTPATIENT
Start: 2025-07-29 | End: 2025-07-29

## 2025-07-29 RX ORDER — APREPITANT 40 MG/1
40 CAPSULE ORAL ONCE
Status: COMPLETED | OUTPATIENT
Start: 2025-07-29 | End: 2025-07-29

## 2025-07-29 RX ORDER — PHENYLEPHRINE HCL IN 0.9% NACL 1 MG/10 ML
SYRINGE (ML) INTRAVENOUS
Status: DISCONTINUED | OUTPATIENT
Start: 2025-07-29 | End: 2025-07-29 | Stop reason: SDUPTHER

## 2025-07-29 RX ORDER — BACLOFEN 10 MG/1
10 TABLET ORAL 3 TIMES DAILY
Qty: 30 TABLET | Refills: 0 | Status: SHIPPED | OUTPATIENT
Start: 2025-07-29 | End: 2025-08-08

## 2025-07-29 RX ORDER — GABAPENTIN 100 MG/1
100 CAPSULE ORAL 3 TIMES DAILY
Qty: 30 CAPSULE | Refills: 0 | Status: SHIPPED | OUTPATIENT
Start: 2025-07-29 | End: 2025-08-08

## 2025-07-29 RX ORDER — MIDAZOLAM HYDROCHLORIDE 1 MG/ML
INJECTION, SOLUTION INTRAMUSCULAR; INTRAVENOUS
Status: DISCONTINUED | OUTPATIENT
Start: 2025-07-29 | End: 2025-07-29 | Stop reason: SDUPTHER

## 2025-07-29 RX ADMIN — Medication 10 MG: at 14:27

## 2025-07-29 RX ADMIN — DEXAMETHASONE SODIUM PHOSPHATE 10 MG: 10 INJECTION, SOLUTION INTRAMUSCULAR; INTRAVENOUS at 14:22

## 2025-07-29 RX ADMIN — Medication 2000 MG: at 14:16

## 2025-07-29 RX ADMIN — SODIUM CHLORIDE, POTASSIUM CHLORIDE, SODIUM LACTATE AND CALCIUM CHLORIDE: 600; 310; 30; 20 INJECTION, SOLUTION INTRAVENOUS at 11:49

## 2025-07-29 RX ADMIN — OXYCODONE HYDROCHLORIDE 5 MG: 5 TABLET ORAL at 17:39

## 2025-07-29 RX ADMIN — MIDAZOLAM 2 MG: 1 INJECTION INTRAMUSCULAR; INTRAVENOUS at 14:00

## 2025-07-29 RX ADMIN — Medication 100 MCG: at 14:54

## 2025-07-29 RX ADMIN — FENTANYL CITRATE 25 MCG: 50 INJECTION INTRAMUSCULAR; INTRAVENOUS at 16:50

## 2025-07-29 RX ADMIN — SUGAMMADEX 200 MG: 100 INJECTION, SOLUTION INTRAVENOUS at 15:55

## 2025-07-29 RX ADMIN — Medication 100 MCG: at 14:46

## 2025-07-29 RX ADMIN — PROPOFOL 150 MG: 10 INJECTION, EMULSION INTRAVENOUS at 14:05

## 2025-07-29 RX ADMIN — SODIUM CHLORIDE, POTASSIUM CHLORIDE, SODIUM LACTATE AND CALCIUM CHLORIDE: 600; 310; 30; 20 INJECTION, SOLUTION INTRAVENOUS at 14:42

## 2025-07-29 RX ADMIN — LIDOCAINE HYDROCHLORIDE 100 MG: 20 INJECTION, SOLUTION EPIDURAL; INFILTRATION; INTRACAUDAL; PERINEURAL at 14:05

## 2025-07-29 RX ADMIN — FENTANYL CITRATE 50 MCG: 50 INJECTION INTRAMUSCULAR; INTRAVENOUS at 14:31

## 2025-07-29 RX ADMIN — ROCURONIUM BROMIDE 50 MG: 10 INJECTION, SOLUTION INTRAVENOUS at 14:05

## 2025-07-29 RX ADMIN — Medication 100 MCG: at 15:28

## 2025-07-29 RX ADMIN — FENTANYL CITRATE 50 MCG: 50 INJECTION INTRAMUSCULAR; INTRAVENOUS at 14:04

## 2025-07-29 RX ADMIN — ONDANSETRON 4 MG: 2 INJECTION, SOLUTION INTRAMUSCULAR; INTRAVENOUS at 15:32

## 2025-07-29 RX ADMIN — Medication 100 MCG: at 15:02

## 2025-07-29 RX ADMIN — Medication 100 MCG: at 15:20

## 2025-07-29 RX ADMIN — Medication 10 MG: at 14:31

## 2025-07-29 RX ADMIN — APREPITANT 40 MG: 40 CAPSULE ORAL at 11:52

## 2025-07-29 RX ADMIN — PROMETHAZINE HYDROCHLORIDE 12.5 MG: 12.5 TABLET ORAL at 11:52

## 2025-07-29 RX ADMIN — FENTANYL CITRATE 25 MCG: 50 INJECTION INTRAMUSCULAR; INTRAVENOUS at 16:57

## 2025-07-29 RX ADMIN — HYDROMORPHONE HYDROCHLORIDE 0.4 MG: 2 INJECTION INTRAMUSCULAR; INTRAVENOUS; SUBCUTANEOUS at 15:52

## 2025-07-29 RX ADMIN — Medication 100 MCG: at 15:11

## 2025-07-29 RX ADMIN — GLYCOPYRROLATE 0.2 MG: 0.2 INJECTION INTRAMUSCULAR; INTRAVENOUS at 14:43

## 2025-07-29 RX ADMIN — PROPOFOL 35 MCG/KG/MIN: 10 INJECTION, EMULSION INTRAVENOUS at 14:30

## 2025-07-29 ASSESSMENT — PAIN SCALES - GENERAL
PAINLEVEL_OUTOF10: 8
PAINLEVEL_OUTOF10: 10
PAINLEVEL_OUTOF10: 4

## 2025-07-29 ASSESSMENT — PAIN DESCRIPTION - ORIENTATION
ORIENTATION: RIGHT;LEFT

## 2025-07-29 ASSESSMENT — PAIN - FUNCTIONAL ASSESSMENT
PAIN_FUNCTIONAL_ASSESSMENT: 0-10
PAIN_FUNCTIONAL_ASSESSMENT: FACE, LEGS, ACTIVITY, CRY, AND CONSOLABILITY (FLACC)

## 2025-07-29 ASSESSMENT — PAIN DESCRIPTION - LOCATION
LOCATION: FLANK

## 2025-07-29 ASSESSMENT — PAIN DESCRIPTION - DESCRIPTORS
DESCRIPTORS: ACHING
DESCRIPTORS: BURNING
DESCRIPTORS: BURNING;ACHING

## 2025-07-29 NOTE — H&P
constantly irritated.  Patient is here to discuss her options regarding her dogears.     Medications:      Current Facility-Administered Medications          Current Outpatient Medications   Medication Sig Dispense Refill    lisdexamfetamine (VYVANSE) 70 MG capsule Take 1 capsule by mouth daily for 30 days. Max Daily Amount: 70 mg 30 capsule 0    naltrexone-buPROPion (CONTRAVE) 8-90 MG per extended release tablet 1 tab daily in the AM x 7 days then 1 tab BID x 7 days then 2 tabs daily in the AM and 1 tab in the PM x 7 days then 2 tabs BID 70 tablet 0    mirabegron (MYRBETRIQ) 50 MG TB24 Take 50 mg by mouth daily 30 tablet 3    ferrous sulfate (IRON 325) 325 (65 Fe) MG tablet Take 1 tablet by mouth daily (with breakfast) 90 tablet 1    buPROPion (WELLBUTRIN XL) 300 MG extended release tablet TAKE ONE TABLET BY MOUTH EVERY MORNING 90 tablet 4    pantoprazole (PROTONIX) 20 MG tablet Take 1 tablet by mouth every morning (before breakfast) 90 tablet 4    vitamin C (ASCORBIC ACID) 500 MG tablet Take 1 tablet by mouth daily        Probiotic Product (PROBIOTIC BLEND PO) Take 1 capsule by mouth at bedtime        levothyroxine (SYNTHROID) 50 MCG tablet TAKE 1 TABLET BY MOUTH ONE TIME A DAY (Patient taking differently: 0.5 tablets TAKE 1 TABLET BY MOUTH ONE TIME A DAY) 90 tablet 2    Plecanatide (TRULANCE) 3 MG TABS Take 3 mg by mouth daily (with breakfast) (Patient taking differently: Take 3 mg by mouth as needed) 90 tablet 2    Multiple Vitamins-Minerals (BARIATRIC MULTIVITAMINS/IRON PO) Take 1 tablet by mouth daily        Calcium Citrate 333 MG TABS Take 2 tablets by mouth in the morning and at bedtime        estradiol (ESTRACE VAGINAL) 0.1 MG/GM vaginal cream Place 0.5 g vaginally nightly for 21 days, THEN 0.5 g every other day for 21 days, THEN 0.5 g Twice a Week. 42.5 g 1      No current facility-administered medications for this visit.         Allergies:      Allergies         Allergies   Allergen Reactions    Seasonal 
becoming trapped in scar tissue.    Skin Sensitivity- Itching, tenderness, or exaggerated responses to hot or cold temperatures may occur after surgery.  Usually this resolve during healing, but in rare situations it may be chronic.    Damage to Deeper Structures- There is the potential for injury to deeper structures including nerves, blood vessels, muscles, and lungs (pneumothorax) during any surgical procedure.  The potential for this to occur varies according to the type of procedure being performed.  Injury to deeper structures may be temporary or permanent.    Delayed Healing- Wound disruption or delayed wound healing is possible.  Some areas may not heal normally and may take a long time to heal.  Some areas of skin may die.  This may require frequent dressing changes or further surgery to remove the non-healed tissue.  Smokers have a greater risk of skin loss and wound healing complications.    Allergic Reactions- In rare cases, local allergies to tape, suture material and glues, blood products, topical preparations or injected agents have been reported.  Serious systemic reactions including shock (anaphylaxis) may occur to drugs used during surgery and prescription medications.  Allergic reactions may require additional treatment.      Fat Necrosis- Fatty tissue found deep in the skin might die.  This may produce areas of firmness within the skin.  Additional surgery to remove areas of fat necrosis may be necessary.  There is the possibility of contour irregularities in the skin that may result from fat necrosis.    Pubic Distortion- It is possible, though unusual, for women to develop distortion of their labia and pubic area.  Should this occur, additional treatment including surgery may be necessary.     Umbilicus- Malposition, scarring, unacceptable appearance or loss of the umbilicus (navel) may occur.    Persistent Swelling (Lymphedema)- Persistent swelling in the legs can occur following liposuction.

## 2025-07-29 NOTE — TELEPHONE ENCOUNTER
Writer reached out to patient to notify her that our office has not received an approval or denial from her insurance company regarding her surgery today and that the pre cert department has been working on it. Patient voiced understanding and said she would still like to proceed with the surgery without the prior authorization. Patient stated she is willing to file an appeal in the event her insurance company denies the procedure.

## 2025-07-29 NOTE — DISCHARGE INSTRUCTIONS
POST-OPERATIVE INSTRUCTIONS FOR SUTURE LINECARE   Incisions and suture lines are a necessary part of surgery.  These lines take many months to fully heal. Part of the healing process requires proper cleansing and care.  In addition, there are treatments that can help resulting scars to be flatter, finer, and less noticeable. There is no guarantee to what a scar will look like once it has fully healed, however the following instructions are important to a good outcome.  Do not smoke.  You have been advised to quit before surgery.  Do not start after surgery.  Smoking decreases the oxygen in your blood and greatly impacts your ability to heal.  Do not smoke until your incisions have fully healed.   You may remove the bandage from the area in 48 hours, unless you have a bolster dressing.  A bolster dressing is a yellow dressing that is sutured to the skin. Do not attempt to remove this kind of dressing.  Keep the dressing intact on this dressing.      You may shower 24 hours post your procedure unless instructed other wise.  You may remove the outer dressings to shower.  If you have Steri-Strips in place keep them intact.  If you have no visible sutures but only tape (steri-strips) over the incision, you do not need to do anything until the strips fall off.  You may use antibiotic ointment on the area after the steri-strips have fallen off.      IF YOU HAVE AN INCISIONAL WOUND VAC, DO NOT REMOVE THE DRESSING UNTIL YOU SEE DR. SANTIAGO BACK IN THE OFFICE POSTOPERATIVELY.  You may sponge bath until office visit.  We do not expect too much drainage in the canister.  You may remove your surgical bra in 24 to 48 hours and wash it and place it back on.  If you develop any rashes please call the office and we will remove the wound VAC.  The adhesive is closer to the Tegaderm that is used for the IVs.    If you have sutures, they will remain in the skin anywhere from 7-21 days, or longer depending on the area and your

## 2025-07-29 NOTE — OP NOTE
Operative Note      Patient: Geovanna Rodriguez  YOB: 1960  MRN: [id number]    Date of Procedure: 7/29/2025    Pre-Op Diagnosis Codes:      * Painful scar [R52, L90.5]    Post-Op Diagnosis: Same       Procedure(s):  BILATERAL SCAR REVISION/EXCISION BACK AND FLANKS measuring 60 cm x 15 cm with complex surgery of the defect size measuring 62 x 16 cm  The specimen weighed 2.8 pounds.    Surgeon(s):  Geri Rashid MD    Assistant:   First Assistant: Darling Awad    Anesthesia: General    Estimated Blood Loss (mL): less than 100     Complications: None    Specimens:   ID Type Source Tests Collected by Time Destination   A : BACK SCAR Tissue Back SURGICAL PATHOLOGY Geri Rashid MD 7/29/2025 1510        Implants:  * No implants in log *      Drains:   Closed/Suction Drain Right;Inferior Back Bulb (Active)       Closed/Suction Drain Inferior;Left Back Bulb (Active)         INDICATION FOR PROCEDURE:  The patient is 64 y.o. female .   All the risks, benefits, and alternative treatments were explained to the patient and an informed consent was obtained.    DESCRIPTION OF PROCEDURE:  The patient was marked in the holding area.   The patient was brought into the room, patient was intubated after SCDs were placed and turned on.  And was placed on the OR table in the prone position.  Everybody was in agreement that all areas were well-padded.  Then all areas were prepped and draped in usual customary sterile manner.  A timeout was performed.  Everybody in the room was in agreement with the timeout.  Then an incision was made over the previously marked area.  First inferior incision was made.  Then dissection was made superiorly.  Then it was determined that will disappear remark was that closure could be performed.  Then the incision was made on the superior marked.  Dissection was made using the cautery.  Dissection was continued through the skin and subcutaneous tissue through the subcutaneous fat to just above the  Patient Instructions by Dean Arvizu MD at 06/06/17 09:34 AM     Author:  Dean Arvizu MD Service:  (none) Author Type:  Physician     Filed:  06/06/17 09:39 AM Encounter Date:  6/6/2017 Status:  Addendum     :  Dean Arvizu MD (Physician)              The following changes were made to your medications today:   Start torsemide 10 mg daily    The following lifestyle modifications are encouraged:  Diet  Exercise  Med Compliance    The following tests have been ordered:  Enter rehab treadmill stress test      Return to Clinic in 6 month or sooner if needed.    Additional Educational Resources:  For additional resources regarding your symptoms, diagnosis, or further health information, please visit the Health Resources section on [email or web address] or the Online Health Resources section in [employer].          Revision History        User Key Date/Time User Provider Type Action    > [N/A] 06/06/17 09:39 AM Dean Arvizu MD Physician Addend     [N/A] 06/06/17 09:35 AM Dean Arvizu MD Physician Sign

## 2025-07-29 NOTE — ANESTHESIA POSTPROCEDURE EVALUATION
Department of Anesthesiology  Postprocedure Note    Patient: Geovanna Rodriguez  MRN: [id number]  YOB: 1960  Date of evaluation: 7/29/2025    Procedure Summary       Date: 07/29/25 Room / Location: [address]    Anesthesia Start: 1401 Anesthesia Stop: 1614    Procedure: BILATERAL SCAR REVISION/EXCISION BACK AND FLANKS (Bilateral) Diagnosis:       Painful scar      (Painful scar [R52, L90.5])    Surgeons: Geri Rashid MD Responsible Provider: El Escobar MD    Anesthesia Type: general ASA Status: 2            Anesthesia Type: No value filed.    Sergo Phase I: Sergo Score: 10    Sergo Phase II: Sergo Score: 10    Anesthesia Post Evaluation    Patient location during evaluation: PACU  Patient participation: complete - patient participated  Level of consciousness: awake  Airway patency: patent  Nausea & Vomiting: no nausea  Cardiovascular status: blood pressure returned to baseline  Respiratory status: acceptable  Hydration status: euvolemic  Comments: Multimodal analgesia pain management as indicated by procedure  Multimodal analgesia pain management approach  Pain management: adequate    No notable events documented.

## 2025-07-29 NOTE — ANESTHESIA PRE PROCEDURE
Department of Anesthesiology  Preprocedure Note       Name:  Geovanna Rodriguez   Age:  64 y.o.  :  1960                                          MRN:  [id number]         Date:  2025      Surgeon: Surgeon(s):  Geri Rashid MD    Procedure: Procedure(s):  BILATERAL SCAR REVISION/EXCISION BACK AND FLANKS , POSSIBLE LIPOSUCTION  BACK LIPECTOMY SUCTION    Medications prior to admission:   Prior to Admission medications    Medication Sig Start Date End Date Taking? Authorizing Provider   Multiple Minerals-Vitamins (CITRACAL PLUS PO) Take 2 caplets by mouth 2 times daily    Ying Beltran MD   Ascorbic Acid (VITAMIN C) 1000 MG tablet Take 1 tablet by mouth daily Takes with iron supplement    Ying Beltran MD   NONFORMULARY Take 10 mLs by mouth daily Collagen/Biotin 2 tsps daily    Ying Beltran MD   ferrous sulfate (IRON 325) 325 (65 Fe) MG tablet Take 1 tablet by mouth daily (with breakfast) 25   Harmony Bashir PA-C   levothyroxine (SYNTHROID) 50 MCG tablet TAKE 1 TABLET BY MOUTH ONE TIME A DAY  Patient taking differently: TAKE 0.5mg TABLET BY MOUTH ONE TIME A DAY 25   Harmony Bashir PA-C   buPROPion (WELLBUTRIN XL) 300 MG extended release tablet TAKE ONE TABLET BY MOUTH EVERY MORNING  Patient taking differently: Take 1 tablet by mouth every morning TAKE ONE TABLET BY MOUTH EVERY MORNING 10/18/24   Harmony Bashir PA-C   pantoprazole (PROTONIX) 20 MG tablet Take 1 tablet by mouth every morning (before breakfast) 10/18/24   Harmony Bashir PA-C   Probiotic Product (PROBIOTIC BLEND PO) Take 1 capsule by mouth at bedtime    Ying Beltran MD   Plecanatide (TRULANCE) 3 MG TABS Take 3 mg by mouth daily (with breakfast)  Patient taking differently: Take 3 mg by mouth daily as needed (constipation) prn 3/8/24   Harmony Bashir PA-C   Multiple Vitamins-Minerals (BARIATRIC MULTIVITAMINS/IRON PO) Take 1 tablet by mouth daily    Ying Beltran MD   estradiol (ESTRACE VAGINAL) 
stress.  ·  Image quality is good.     7/2025  ·  Left Ventricle: Normal left ventricular systolic function with a visually estimated EF of 60 - 65%. Left ventricle size is normal. Moderately increased wall thickness. Findings consistent with moderate concentric hypertrophy. Normal wall motion. Abnormal diastolic function.  ·  Right Ventricle: Right ventricle size is normal. Normal systolic function.  ·  Tricuspid Valve: Mild regurgitation. Mildly elevated RVSP, consistent with mild pulmonary hypertension. RVSP is 40 mmHg.  ·  Image quality is fair.         EKG 1/2023:  Normal sinus rhythm  Septal infarct (cited on or before 27-JUL-2019)  Abnormal ECG  When compared with ECG of 27-JUL-2019 21:50,  Vent. rate has decreased BY  32 BPM  Questionable change in initial forces of Septal leads  Confirmed by Shivam Michael ([id number]) on 1/13/2023 8:43:58 AM    Cardiac cath 2018:  Conclusions  Procedure Summary  Normal coronaries.  Normal LV systolic function.  Normal LVEDP.  Recommendations  Aggressive risk factor management.  Maximize medical therapy.  Evaluate patient for non-cardiac causes of symptoms   Angiographic Findings   Cardiac Arteries and Lesion Findings  LMCA: Normal.  LAD: Normal.  LCx: Normal.  RCA: Normal.  LV function assessed .  Ejection Fraction    - Method: LV gram. EF%: 65.    Echo 2015:   Intraventricular septal hypertrophy without LVOT obstruction  LVEF 60%     Neuro/Psych:   (+) headaches: migraine headaches, psychiatric history (Generalized anxiety disorder):             ROS comment: Vertigo GI/Hepatic/Renal:   (+) hiatal hernia         ROS comment: S/p bariatric surgery  Colon polyps  Gastric polyp.   Endo/Other:    (+) Diabetes, hypothyroidism: arthritis:..                  ROS comment: S/p hysterectomy Abdominal:             Vascular: negative vascular ROS.         Other Findings:          Anesthesia Plan      general     ASA 2       Induction: intravenous.    MIPS: Postoperative opioids intended,

## 2025-07-29 NOTE — TELEPHONE ENCOUNTER
Writer called and left a vm for the patient to go over an estimated/potential quote for the procedure she is having today with Dr. Rashid. Writer scanned quote into media and sent a [employer] message to patient with the quote attached.

## 2025-07-31 LAB — SURGICAL PATHOLOGY REPORT: NORMAL

## 2025-08-06 ENCOUNTER — TELEPHONE (OUTPATIENT)
Dept: SURGERY | Age: 65
End: 2025-08-06

## 2025-08-06 ENCOUNTER — OFFICE VISIT (OUTPATIENT)
Dept: SURGERY | Age: 65
End: 2025-08-06

## 2025-08-06 VITALS
DIASTOLIC BLOOD PRESSURE: 75 MMHG | HEART RATE: 71 BPM | HEIGHT: 65 IN | SYSTOLIC BLOOD PRESSURE: 133 MMHG | WEIGHT: 171 LBS | BODY MASS INDEX: 28.49 KG/M2

## 2025-08-06 DIAGNOSIS — Z98.890 POSTOPERATIVE STATE: Primary | ICD-10-CM

## 2025-08-06 PROCEDURE — 99024 POSTOP FOLLOW-UP VISIT: CPT | Performed by: PLASTIC SURGERY

## 2025-08-13 ENCOUNTER — TELEPHONE (OUTPATIENT)
Dept: SURGERY | Age: 65
End: 2025-08-13

## 2025-08-13 ENCOUNTER — OFFICE VISIT (OUTPATIENT)
Dept: SURGERY | Age: 65
End: 2025-08-13

## 2025-08-13 VITALS
BODY MASS INDEX: 28.66 KG/M2 | SYSTOLIC BLOOD PRESSURE: 136 MMHG | HEIGHT: 65 IN | HEART RATE: 75 BPM | WEIGHT: 172 LBS | DIASTOLIC BLOOD PRESSURE: 78 MMHG

## 2025-08-13 DIAGNOSIS — Z98.890 POSTOPERATIVE STATE: Primary | ICD-10-CM

## 2025-08-13 PROCEDURE — 99024 POSTOP FOLLOW-UP VISIT: CPT | Performed by: PLASTIC SURGERY

## 2025-08-20 ENCOUNTER — OFFICE VISIT (OUTPATIENT)
Dept: SURGERY | Age: 65
End: 2025-08-20

## 2025-08-20 VITALS
DIASTOLIC BLOOD PRESSURE: 78 MMHG | OXYGEN SATURATION: 96 % | SYSTOLIC BLOOD PRESSURE: 136 MMHG | HEIGHT: 64 IN | WEIGHT: 174 LBS | BODY MASS INDEX: 29.71 KG/M2 | HEART RATE: 84 BPM

## 2025-08-20 DIAGNOSIS — Z98.890 POSTOPERATIVE STATE: Primary | ICD-10-CM

## 2025-08-20 PROCEDURE — 99024 POSTOP FOLLOW-UP VISIT: CPT | Performed by: PLASTIC SURGERY

## 2025-08-29 ENCOUNTER — OFFICE VISIT (OUTPATIENT)
Dept: SURGERY | Age: 65
End: 2025-08-29

## 2025-08-29 ENCOUNTER — TELEPHONE (OUTPATIENT)
Dept: SURGERY | Age: 65
End: 2025-08-29

## 2025-08-29 VITALS
WEIGHT: 172.8 LBS | HEART RATE: 72 BPM | BODY MASS INDEX: 29.66 KG/M2 | SYSTOLIC BLOOD PRESSURE: 144 MMHG | DIASTOLIC BLOOD PRESSURE: 82 MMHG

## 2025-08-29 DIAGNOSIS — Z98.890 POSTOPERATIVE STATE: Primary | ICD-10-CM

## 2025-08-29 DIAGNOSIS — L90.5 PAINFUL SCAR: ICD-10-CM

## 2025-08-29 DIAGNOSIS — R52 PAINFUL SCAR: ICD-10-CM

## 2025-08-29 PROCEDURE — 99024 POSTOP FOLLOW-UP VISIT: CPT | Performed by: PLASTIC SURGERY

## 2025-08-29 RX ORDER — GABAPENTIN 100 MG/1
100 CAPSULE ORAL 3 TIMES DAILY
Qty: 30 CAPSULE | Refills: 0 | Status: SHIPPED | OUTPATIENT
Start: 2025-08-29 | End: 2025-09-08

## 2025-09-04 PROBLEM — Z01.810 PRE-OPERATIVE CARDIOVASCULAR EXAMINATION: Status: ACTIVE | Noted: 2025-09-04

## 2025-09-04 PROBLEM — R07.9 CHEST PAIN: Status: ACTIVE | Noted: 2018-03-23

## (undated) DEVICE — NEEDLE HYPO 25GA L1.5IN BLU POLYPR HUB S STL REG BVL STR

## (undated) DEVICE — TOWEL,OR,DSP,ST,BLUE,DLX,XR,4/PK,20PK/CS: Brand: MEDLINE

## (undated) DEVICE — SEAL

## (undated) DEVICE — 450 ML BOTTLE OF 0.05% CHLORHEXIDINE GLUCONATE IN 99.95% STERILE WATER FOR IRRIGATION, USP AND APPLICATOR.: Brand: IRRISEPT ANTIMICROBIAL WOUND LAVAGE

## (undated) DEVICE — LABEL MED MINI W/ MARKER

## (undated) DEVICE — RESERVOIR,SUCTION,100CC,SILICONE: Brand: MEDLINE

## (undated) DEVICE — STAPLER 60: Brand: SUREFORM

## (undated) DEVICE — SUTURE VCRL + SZ 3-0 L27IN ABSRB UD L26MM SH 1/2 CIR VCP416H

## (undated) DEVICE — COVER LT HNDL BLU PLAS

## (undated) DEVICE — SHEET, T, LAPAROTOMY, STERILE: Brand: MEDLINE

## (undated) DEVICE — SUTURE PROL SZ 4-0 L18IN NONABSORBABLE BLU L16MM PC-3 3/8 8634G

## (undated) DEVICE — DRAIN SURG 19FR 100% SIL RADPQ RND CHN FULL FLUT

## (undated) DEVICE — STRIP SKIN CLSR W1XL5IN NYLON REINF CURAD STERIL

## (undated) DEVICE — SUTURE NONABSORBABLE MONOFILAMENT 3-0 PS-1 18 IN BLK ETHILON 1663H

## (undated) DEVICE — MARKER,SKIN,WI/RULER AND LABELS: Brand: MEDLINE

## (undated) DEVICE — AEGIS 1" DISK 4MM HOLE, PEEL OPEN: Brand: MEDLINE

## (undated) DEVICE — STAZ MAJOR PLASTIC: Brand: MEDLINE INDUSTRIES, INC.

## (undated) DEVICE — SOLUTION IRRIG 1000ML 09% SOD CHL USP PIC PLAS CONTAINER

## (undated) DEVICE — SUTURE MONOCRYL STRATAFIX SPRL + SZ 2-0 L18IN ABSRB UD CT-1 SXMP1B413

## (undated) DEVICE — HAND II: Brand: MEDLINE INDUSTRIES, INC.

## (undated) DEVICE — SUTURE VICRYL + SZ 2-0 L36IN ABSRB UD L36MM CT-1 1/2 CIR VCP945H

## (undated) DEVICE — CORD,CAUTERY,BIPOLAR,STERILE: Brand: MEDLINE

## (undated) DEVICE — CANNULA SEAL

## (undated) DEVICE — SUTURE ETHLN 5-0 L18IN NONABSORBABLE BLK PS-3 L16MM 3/8 CIR 1668H

## (undated) DEVICE — BANDAGE TRANSPARENT LIQ 2/3 CC MASTISOL

## (undated) DEVICE — SET TUBING LIPOSURG

## (undated) DEVICE — FILTER BACTERIA LIPOSALES

## (undated) DEVICE — SPONGE,LAP,18"X18",STD,XR,ST,5/PK,40PK/C: Brand: MEDLINE

## (undated) DEVICE — SUTURE VICRYL + SZ 3-0 L27IN ABSRB UD L26MM SH 1/2 CIR VCP416H

## (undated) DEVICE — SUTURE MONOCRYL STRATAFIX SPRL + 3 0 SGL ARMED PS 1 24 IN LEN SXMP1B103

## (undated) DEVICE — APPLICATOR MEDICATED 26 CC SOLUTION HI LT ORNG CHLORAPREP

## (undated) DEVICE — BLADE ES ELASTOMERIC COAT INSUL DURABLE BEND UPTO 90DEG

## (undated) DEVICE — STAZ MAJOR BASIN: Brand: MEDLINE INDUSTRIES, INC.

## (undated) DEVICE — DRESSING GZ W1XL8IN COT XRFRM N ADH OVERWRAP CURAD

## (undated) DEVICE — BRUSH ENDO CLN L90.5IN SHTH DIA1.7MM BRIST DIA5-7MM 2-6MM

## (undated) DEVICE — REDUCER: Brand: ENDOWRIST

## (undated) DEVICE — ELECTRO LUBE IS A SINGLE PATIENT USE DEVICE THAT IS INTENDED TO BE USED ON ELECTROSURGICAL ELECTRODES TO REDUCE STICKING.: Brand: KEY SURGICAL ELECTRO LUBE

## (undated) DEVICE — GAUZE,SPONGE,FLUFF,6"X6.75",STRL,10/TRAY: Brand: MEDLINE

## (undated) DEVICE — TROCAR: Brand: KII FIOS FIRST ENTRY

## (undated) DEVICE — 3M™ IOBAN™ 2 ANTIMICROBIAL INCISE DRAPE 6650EZ: Brand: IOBAN™ 2

## (undated) DEVICE — MASTISOL ADHESIVE LIQ 2/3ML

## (undated) DEVICE — 4-PORT MANIFOLD: Brand: NEPTUNE 2

## (undated) DEVICE — SUTURE ABSRB BRAID COAT VLT SH 3-0 27IN VCRL J311H

## (undated) DEVICE — GOWN,AURORA,NONREINFORCED,LARGE: Brand: MEDLINE

## (undated) DEVICE — PADDING CAST W2INXL4YD COT LO LINTING WYTEX

## (undated) DEVICE — GOWN,SIRUS,NONRNF,SETINSLV,XL,20/CS: Brand: MEDLINE

## (undated) DEVICE — PROTECTOR ULN NRV PUR FOAM HK LOOP STRP ANATOMICALLY

## (undated) DEVICE — PADDING UNDERCAST W4INXL12FT RAYON POLY SYN NONADHESIVE

## (undated) DEVICE — SUTURE SZ 0 27IN 5/8 CIR UR-6  TAPER PT VIOLET ABSRB VICRYL J603H

## (undated) DEVICE — ELECTRODE PT RET AD L9FT HI MOIST COND ADH HYDRGEL CORDED

## (undated) DEVICE — PAD,ABDOMINAL,5"X9",ST,LF,25/BX: Brand: MEDLINE INDUSTRIES, INC.

## (undated) DEVICE — SOLUTION IV AD PED 1000ML LAC R INJ USP CONTAINER EXCEL

## (undated) DEVICE — BLADELESS OBTURATOR: Brand: WECK VISTA

## (undated) DEVICE — Device: Brand: ENDO SMARTCAP

## (undated) DEVICE — BLADE,CARBON-STEEL,15,STRL,DISPOSABLE,TB: Brand: MEDLINE

## (undated) DEVICE — SUTURE PROL SZ 3-0 L18IN NONABSORBABLE BLU L30MM PS-1 3/8 8663G

## (undated) DEVICE — ADHESIVE SKIN CLOSURE TOP 36 CC HI VISC DERMBND MINI

## (undated) DEVICE — APPLIER CLP M L L11.4IN DIA10MM ENDOSCP ROT MULT FOR LIG

## (undated) DEVICE — BANDAGE COMPR W2INXL5YD HI E BGE W/ CLP SURE-WRAP

## (undated) DEVICE — STRIP,CLOSURE,WOUND,MEDI-STRIP,1/2X4: Brand: MEDLINE

## (undated) DEVICE — SYRINGE IRRIG 60ML SFT PLIABLE BLB EZ TO GRP 1 HND USE W/

## (undated) DEVICE — SOLUTION ANTIFOG VIS SYS CLEARIFY LAPSCP

## (undated) DEVICE — GLOVE ORANGE PI 8   MSG9080

## (undated) DEVICE — ENDO CARRY-ON PROCEDURE KIT: Brand: ENDO CARRY-ON PROCEDURE KIT

## (undated) DEVICE — CUSHION PRONEVIEW L HD NK FOAM

## (undated) DEVICE — SUTURE ABSORB MONOFILAMENT 3-0 RB 1 6IN STRATAFIX SPRL SXMP2B402

## (undated) DEVICE — SUTURE PDS II SZ 0 L60IN ABSRB VLT L65MM TP-1 1/2 CIR Z991G

## (undated) DEVICE — SPLINT CAST W3XL15IN GRN STRENGTH PLSTR OF PARIS FAST SET

## (undated) DEVICE — BINDER ABD UNIV H9IN WAIST 45-62IN E SFT COT PREM 3 PNL

## (undated) DEVICE — 3M™ STERI-DRAPE™ INCISE DRAPE 1050 (60CM X 45CM): Brand: STERI-DRAPE™

## (undated) DEVICE — BLADE ES L6IN ELASTOMERIC COAT INSUL DURABLE BEND UPTO

## (undated) DEVICE — SUTURE ETHILON SZ 5-0 L18IN NONABSORBABLE BLK P-3 L13MM 3/8 698G

## (undated) DEVICE — 1010 S-DRAPE TOWEL DRAPE 10/BX: Brand: STERI-DRAPE™

## (undated) DEVICE — Device

## (undated) DEVICE — DRAPE,UTILTY,TAPE,15X26, 4EA/PK: Brand: MEDLINE

## (undated) DEVICE — STAPLER 60 RELOAD BLUE: Brand: SUREFORM

## (undated) DEVICE — TUBE SET 96 MM 64 MM H2O PERISTALTIC STD AUX CHANNEL

## (undated) DEVICE — NEPTUNE E-SEP 165MM SUCTION SLEEVE: Brand: NEPTUNE E-SEP

## (undated) DEVICE — SUTURE STRATAFIX SPRL PDS + VLT 3-0 15CM DISPOSABLE/SNGLE SXPP1B420

## (undated) DEVICE — STERILE POLYISOPRENE POWDER-FREE SURGICAL GLOVES WITH EMOLLIENT COATING: Brand: PROTEXIS

## (undated) DEVICE — STAZ MINOR LAPAROTOMY: Brand: MEDLINE INDUSTRIES, INC.

## (undated) DEVICE — TOWEL,OR,DSP,ST,BLUE,STD,4/PK,20PK/CS: Brand: MEDLINE

## (undated) DEVICE — SUTURE PROL SZ 2-0 L30IN NONABSORBABLE BLU L26MM SH 1/2 CIR 8833H

## (undated) DEVICE — TUBING ASPIRATION FLEX 12FT

## (undated) DEVICE — ARM DRAPE

## (undated) DEVICE — SUTURE VCRL + SZ 2-0 L36IN ABSRB UD L36MM CT-1 1/2 CIR VCP945H

## (undated) DEVICE — COUNTER NDL 40 COUNT HLD 70 FOAM BLK ADH W/ MAG

## (undated) DEVICE — TIP COVER ACCESSORY

## (undated) DEVICE — CATHETER,URETHRAL,REDRUBBER,STRL,16FR: Brand: MEDLINE

## (undated) DEVICE — DRESSING NEG PRESSURE INCISION MGMT SYS 90 CM KIT PREVENA +

## (undated) DEVICE — SYSTEM WND THER 14 DAY 150 CC CANSTR THER UNIT PREVENA + 125

## (undated) DEVICE — SUTURE VCRL SZ 3-0 L27IN ABSRB UD L26MM SH 1/2 CIR J416H

## (undated) DEVICE — ADAPTER FLSH PMP FLD MGMT GI IRRIG OFP 2 DISPOSABLE

## (undated) DEVICE — SUTURE VCRL SZ 2-0 L27IN ABSRB VLT L26MM SH 1/2 CIR J317H

## (undated) DEVICE — SUTURE ETHBND EXCEL SZ 3-0 L30IN NONABSORBABLE GRN L26MM SH X832H

## (undated) DEVICE — BINDER ABD UNISX 4 PNL PREM 6INX6INX12IN L XL 4

## (undated) DEVICE — AGENT HEMSTAT 1GM PORCINE GEL ABSRB PWD FOR CONT OOZING

## (undated) DEVICE — PLUMEPORT SEO LAPAROSCOPIC SMOKE FILTRATION DEVICE: Brand: PLUMEPORT

## (undated) DEVICE — FORCEPS BX L240CM JAW DIA2.4MM ORNG L CAP W/ NDL DISP RAD

## (undated) DEVICE — SUTURE VICRYL + SZ 2-0 L27IN ABSRB WHT SH 1/2 CIR TAPERCUT VCP417H

## (undated) DEVICE — DRESSING TRNSPAR W5XL4.5IN FLM SHT SEMIPERMEABLE WIND

## (undated) DEVICE — VESSEL SEALER EXTEND: Brand: ENDOWRIST

## (undated) DEVICE — INSUFFLATION TUBING SET WITH FILTER, FUNNEL CONNECTOR AND LUER LOCK: Brand: JOSNOE MEDICAL INC

## (undated) DEVICE — ZIMMER® STERILE DISPOSABLE TOURNIQUET CUFF, DUAL PORT, SINGLE BLADDER, 18 IN. (46 CM)

## (undated) DEVICE — NEPTUNE E-SEP SMOKE EVACUATION PENCIL, COATED, 70MM BLADE, PUSH BUTTON SWITCH: Brand: NEPTUNE E-SEP

## (undated) DEVICE — GAUZE,SPONGE,FLUFF,6"X6.75",STRL,5/TRAY: Brand: MEDLINE

## (undated) DEVICE — DRAPE,REIN 53X77,STERILE: Brand: MEDLINE

## (undated) DEVICE — LEGGINGS, PAIR, 31X48, STERILE: Brand: MEDLINE

## (undated) DEVICE — SUTURE MCRYL SZ 4-0 L18IN ABSRB UD L16MM PC-3 3/8 CIR PRIM Y845G